# Patient Record
Sex: FEMALE | Race: WHITE | NOT HISPANIC OR LATINO | Employment: OTHER | URBAN - METROPOLITAN AREA
[De-identification: names, ages, dates, MRNs, and addresses within clinical notes are randomized per-mention and may not be internally consistent; named-entity substitution may affect disease eponyms.]

---

## 2017-01-03 RX ORDER — TRAZODONE HYDROCHLORIDE 50 MG/1
50 TABLET ORAL NIGHTLY
Qty: 30 TABLET | Refills: 1 | Status: SHIPPED | OUTPATIENT
Start: 2017-01-03 | End: 2017-02-02 | Stop reason: ALTCHOICE

## 2017-01-04 DIAGNOSIS — N18.30 CHRONIC RENAL INSUFFICIENCY, STAGE 3 (MODERATE) (HCC): ICD-10-CM

## 2017-01-04 DIAGNOSIS — E78.49 OTHER HYPERLIPIDEMIA: ICD-10-CM

## 2017-01-04 DIAGNOSIS — M10.00 ACUTE IDIOPATHIC GOUT, UNSPECIFIED SITE: ICD-10-CM

## 2017-01-04 DIAGNOSIS — E11.22 TYPE 2 DIABETES MELLITUS WITH CHRONIC KIDNEY DISEASE, WITH LONG-TERM CURRENT USE OF INSULIN, UNSPECIFIED CKD STAGE (HCC): ICD-10-CM

## 2017-01-04 DIAGNOSIS — I10 BENIGN ESSENTIAL HYPERTENSION: Primary | ICD-10-CM

## 2017-01-04 DIAGNOSIS — Z79.4 TYPE 2 DIABETES MELLITUS WITH CHRONIC KIDNEY DISEASE, WITH LONG-TERM CURRENT USE OF INSULIN, UNSPECIFIED CKD STAGE (HCC): ICD-10-CM

## 2017-01-04 DIAGNOSIS — I48.20 CHRONIC ATRIAL FIBRILLATION (HCC): ICD-10-CM

## 2017-01-17 ENCOUNTER — OFFICE VISIT (OUTPATIENT)
Dept: INTERNAL MEDICINE | Facility: CLINIC | Age: 82
End: 2017-01-17

## 2017-01-17 VITALS
HEART RATE: 58 BPM | BODY MASS INDEX: 30.11 KG/M2 | OXYGEN SATURATION: 98 % | WEIGHT: 170 LBS | SYSTOLIC BLOOD PRESSURE: 130 MMHG | DIASTOLIC BLOOD PRESSURE: 60 MMHG

## 2017-01-17 DIAGNOSIS — I10 BENIGN ESSENTIAL HYPERTENSION: ICD-10-CM

## 2017-01-17 DIAGNOSIS — I34.0 NON-RHEUMATIC MITRAL REGURGITATION: ICD-10-CM

## 2017-01-17 DIAGNOSIS — J01.00 ACUTE NON-RECURRENT MAXILLARY SINUSITIS: Primary | ICD-10-CM

## 2017-01-17 DIAGNOSIS — IMO0001 IDDM (INSULIN DEPENDENT DIABETES MELLITUS): ICD-10-CM

## 2017-01-17 DIAGNOSIS — N18.30 CHRONIC RENAL INSUFFICIENCY, STAGE 3 (MODERATE) (HCC): ICD-10-CM

## 2017-01-17 PROCEDURE — 99214 OFFICE O/P EST MOD 30 MIN: CPT | Performed by: INTERNAL MEDICINE

## 2017-01-17 RX ORDER — AZITHROMYCIN 250 MG/1
TABLET, FILM COATED ORAL
Qty: 6 TABLET | Refills: 0 | Status: SHIPPED | OUTPATIENT
Start: 2017-01-17 | End: 2017-02-02

## 2017-01-17 RX ORDER — ECHINACEA PURPUREA EXTRACT 125 MG
1 TABLET ORAL AS NEEDED
Qty: 1 EACH | Refills: 12 | Status: SHIPPED | OUTPATIENT
Start: 2017-01-17 | End: 2017-02-02 | Stop reason: ALTCHOICE

## 2017-01-17 RX ORDER — FLUTICASONE PROPIONATE 50 MCG
2 SPRAY, SUSPENSION (ML) NASAL DAILY
Qty: 1 EACH | Refills: 6 | Status: SHIPPED | OUTPATIENT
Start: 2017-01-17 | End: 2017-02-16

## 2017-01-17 NOTE — MR AVS SNAPSHOT
Veronica Henao   1/17/2017 9:00 AM   Office Visit    Dept Phone:  [phone]   Encounter #:  [id number]    Provider:  Nelly Price MD   Department:  [facility] INTERNAL MEDICINE                Your Full Care Plan              Today's Medication Changes          These changes are accurate as of: 1/17/17  4:08 PM.  If you have any questions, ask your nurse or doctor.               New Medication(s)Ordered:     azithromycin 250 MG tablet   Commonly known as:  ZITHROMAX   Take 2 tablets the first day, then 1 tablet daily for 4 days.       fluticasone 50 MCG/ACT nasal spray   Commonly known as:  FLONASE   2 sprays into each nostril Daily for 30 days. Administer 2 sprays in each nostril for each dose.       sodium chloride 0.65 % nasal spray   Commonly known as:  OCEAN NASAL SPRAY   1 spray into each nostril As Needed for congestion.         Stop taking medication(s)listed here:     acetaminophen 325 MG tablet   Commonly known as:  TYLENOL           HYDROcodone-acetaminophen 5-325 MG per tablet   Commonly known as:  NORCO                Where to Get Your Medications      These medications were sent to [id number] - [place], [place] - [address] [place] [address] [place] [address] - [phone] [place] [address]     Phone:  [phone]     azithromycin 250 MG tablet    fluticasone 50 MCG/ACT nasal spray    sodium chloride 0.65 % nasal spray                  Your Updated Medication List          This list is accurate as of: 1/17/17  4:08 PM.  Always use your most recent med list.                allopurinol 100 MG tablet   Commonly known as:  ZYLOPRIM       aspirin 81 MG EC tablet   Take 1 tablet by mouth Daily.       azithromycin 250 MG tablet   Commonly known as:  ZITHROMAX   Take 2 tablets the first day, then 1 tablet daily for 4 days.       CENTRUM SILVER PO       cetirizine 10 MG tablet   Commonly known as:  zyrTEC       cholecalciferol 1000  UNITS tablet   Commonly known as:  VITAMIN D3       fenofibrate 48 MG tablet   Commonly known as:  TRICOR       fish oil 1000 MG capsule capsule       fluticasone 50 MCG/ACT nasal spray   Commonly known as:  FLONASE   2 sprays into each nostril Daily for 30 days. Administer 2 sprays in each nostril for each dose.       furosemide 40 MG tablet   Commonly known as:  LASIX   Take 1 tablet by mouth 2 (Two) Times a Day.       hydrALAZINE 10 MG tablet   Commonly known as:  APRESOLINE   Take 1 tablet by mouth Every 8 (Eight) Hours.       insulin detemir 100 UNIT/ML injection   Commonly known as:  LEVEMIR       metoprolol tartrate 25 MG tablet   Commonly known as:  LOPRESSOR   Take 1 tablet by mouth 3 (Three) Times a Day.       montelukast 10 MG tablet   Commonly known as:  SINGULAIR       potassium chloride 10 MEQ CR capsule   Commonly known as:  MICRO-K   Take 1 capsule by mouth 2 (Two) Times a Day With Meals.       raNITIdine 150 MG tablet   Commonly known as:  ZANTAC       sertraline 50 MG tablet   Commonly known as:  ZOLOFT       sodium chloride 0.65 % nasal spray   Commonly known as:  OCEAN NASAL SPRAY   1 spray into each nostril As Needed for congestion.       sucralfate 1 G tablet   Commonly known as:  CARAFATE       traZODone 50 MG tablet   Commonly known as:  DESYREL   Take 1 tablet by mouth Every Night.       warfarin 5 MG tablet   Commonly known as:  COUMADIN   Take daily or as directed               You Were Diagnosed With        Codes Comments    Acute non-recurrent maxillary sinusitis    -  Primary ICD-10-CM: J01.00  ICD-9-CM: 461.0     Benign essential hypertension     ICD-10-CM: I10  ICD-9-CM: 401.1     Chronic renal insufficiency, stage 3 (moderate)     ICD-10-CM: N18.3  ICD-9-CM: 585.3     Non-rheumatic mitral regurgitation     ICD-10-CM: I34.0  ICD-9-CM: 424.0     IDDM (insulin dependent diabetes mellitus)     ICD-10-CM: E11.9, Z79.4  ICD-9-CM: 250.00, V58.67       Instructions     None    Patient  Instructions History      Upcoming Appointments     Visit Type Date Time Department    OFFICE VISIT 2017  9:00 AM [facility]    FOLLOW UP 2017 12:00 PM [facility] [place]    OFFICE VISIT 2017  8:45 AM [facility]    POST-OP 2017  1:00 PM [facility]      [employer] Signup     [facility] Branch2 allows you to send messages to your doctor, view your test results, renew your prescriptions, schedule appointments, and more. To sign up, go to [employer] and click on the Sign Up Now link in the New User? box. Enter your Branch2 Activation Code exactly as it appears below along with the last four digits of your Social Security Number and your Date of Birth () to complete the sign-up process. If you do not sign up before the expiration date, you must request a new code.    Branch2 Activation Code: [email or web address]  Expires: 2017  4:08 PM    If you have questions, you can email [email or web address] or call [phone] to talk to our Branch2 staff. Remember, Branch2 is NOT to be used for urgent needs. For medical emergencies, dial 911.               Other Info from Your Visit           Your Appointments     2017 12:00 PM EST   Follow Up with Maria Luz Husain MD   [facility] CARDIOLOGY (--)    [address]  [facility] [address] [phone]           Arrive 15 minutes prior to appointment.            2017  8:45 AM EST   Office Visit with Nelly Price MD   [facility] INTERNAL MEDICINE (--)    [address]  [facility] [address] [phone]           Arrive 15 minutes prior to appointment.            2017  1:00 PM EST   Post-Op with DEBORAH Smith   [facility] CARDIAC SURGERY (--)    [address]  [facility] [address] [phone]              Allergies     Cephalexin  Swelling    Meperidine  Swelling    Penicillins   Swelling      Reason for Visit     Nasal Congestion     Fatigue           Vital Signs     Blood Pressure Pulse Weight Oxygen Saturation Body Mass Index Smoking Status    130/60 58 170 lb (77.1 kg) 98% 30.11 kg/m2 Former Smoker      Problems and Diagnoses Noted     Benign essential hypertension    Chronic renal insufficiency    IDDM (insulin dependent diabetes mellitus)    Leaky mitral heart valve    Acute non-recurrent maxillary sinusitis    -  Primary

## 2017-01-17 NOTE — PROGRESS NOTES
Sinus pain, headache, recent cardiac surgery, renal insufficiency    History of Present Illness   Veronica Henao is a 81 y.o. female presents for acute care. Increasing sinus pressure and pain of the left sinus. She reports that this started about 1 week ago and is getting worse not better. Has not tried anything for this. Stopped oral dm medications. She is on 18 u levemir daily only and fbs is running around 105-120 at this time.    The following portions of the patient's history were reviewed and updated as appropriate: allergies, current medications, past family history, past medical history, past social history, past surgical history and problem list.  Current Outpatient Prescriptions on File Prior to Visit   Medication Sig Dispense Refill   • aspirin 81 MG EC tablet Take 1 tablet by mouth Daily.     • cetirizine (zyrTEC) 10 MG tablet Take 10 mg by mouth Every Night.     • cholecalciferol (VITAMIN D3) 1000 UNITS tablet Take 1,000 Units by mouth daily.     • fenofibrate (TRICOR) 48 MG tablet Take 48 mg by mouth Daily.     • furosemide (LASIX) 40 MG tablet Take 1 tablet by mouth 2 (Two) Times a Day. 60 tablet 2   • hydrALAZINE (APRESOLINE) 10 MG tablet Take 1 tablet by mouth Every 8 (Eight) Hours. 90 tablet 2   • insulin detemir (LEVEMIR) 100 UNIT/ML injection Inject 18 Units under the skin Every Night.     • metoprolol tartrate (LOPRESSOR) 25 MG tablet Take 1 tablet by mouth 3 (Three) Times a Day. 90 tablet 2   • montelukast (SINGULAIR) 10 MG tablet Take 10 mg by mouth Every Night.     • Multiple Vitamins-Minerals (CENTRUM SILVER PO) Take 1 tablet by mouth Daily.     • potassium chloride (MICRO-K) 10 MEQ CR capsule Take 1 capsule by mouth 2 (Two) Times a Day With Meals. 60 capsule 2   • raNITIdine (ZANTAC) 150 MG tablet Take 150 mg by mouth 2 (Two) Times a Day.     • sertraline (ZOLOFT) 50 MG tablet Take 50 mg by mouth Daily.     • sucralfate (CARAFATE) 1 G tablet Take 1 g by mouth Daily.     • traZODone (DESYREL)  50 MG tablet Take 1 tablet by mouth Every Night. 30 tablet 1   • warfarin (COUMADIN) 5 MG tablet Take daily or as directed 30 tablet 2   • [DISCONTINUED] acetaminophen (TYLENOL) 325 MG tablet Take 2 tablets by mouth Every 4 (Four) Hours As Needed for mild pain (1-3).  0   • allopurinol (ZYLOPRIM) 100 MG tablet Take 100 mg by mouth Daily.     • Omega-3 Fatty Acids (FISH OIL) 1000 MG capsule capsule Take 1,200 mg by mouth 3 (Three) Times a Day With Meals. NONE IN PAST WEEK     • [DISCONTINUED] HYDROcodone-acetaminophen (NORCO) 5-325 MG per tablet Take 1 tablet by mouth Every 6 (Six) Hours As Needed.       No current facility-administered medications on file prior to visit.      Review of Systems   Constitutional: Negative.    HENT: Positive for sinus pressure and sore throat.    Eyes: Negative.    Respiratory: Negative.    Gastrointestinal: Negative.    Endocrine: Negative.    Genitourinary: Negative.    Musculoskeletal: Positive for arthralgias.   Skin: Negative.    Allergic/Immunologic: Negative.    Neurological: Negative.    Hematological: Negative.    Psychiatric/Behavioral: Negative.        Objective   Physical Exam   Constitutional: She is oriented to person, place, and time. She appears well-developed and well-nourished.   HENT:   Head: Normocephalic and atraumatic.   Maxillary sinus tenderness  Mild pharyngeal erythema   Eyes: Conjunctivae and EOM are normal. Pupils are equal, round, and reactive to light.   Neck: Normal range of motion. Neck supple.   Cardiovascular: Normal rate and regular rhythm.    Pulmonary/Chest: Effort normal and breath sounds normal.   Abdominal: Soft. Bowel sounds are normal.   Musculoskeletal:   artrhitic changes   Neurological: She is alert and oriented to person, place, and time. She has normal reflexes.   Skin: Skin is warm and dry.   Psychiatric: She has a normal mood and affect. Her behavior is normal. Judgment and thought content normal.   Nursing note and vitals reviewed.        Visit Vitals   • /60   • Pulse 58   • Wt 170 lb (77.1 kg)   • SpO2 98%   • BMI 30.11 kg/m2       Assessment/Plan   Diagnoses and all orders for this visit:    Acute non-recurrent maxillary sinusitis    Benign essential hypertension    Chronic renal insufficiency, stage 3 (moderate)    Non-rheumatic mitral regurgitation    IDDM (insulin dependent diabetes mellitus)    Other orders  -     azithromycin (ZITHROMAX) 250 MG tablet; Take 2 tablets the first day, then 1 tablet daily for 4 days.  -     fluticasone (FLONASE) 50 MCG/ACT nasal spray; 2 sprays into each nostril Daily for 30 days. Administer 2 sprays in each nostril for each dose.  -     sodium chloride (OCEAN NASAL SPRAY) 0.65 % nasal spray; 1 spray into each nostril As Needed for congestion.      Patient with acute maxillary sinusitis. She will start zpack for this. She has an inr test tomorrow and will repeat closely due to antibiotic. She will start nasal saline rinse and flonase. She is s/p surgical repair of her valve and she is doing well post repair of tricuspid and mitral valve and doing well. Will test renal function today and will monitor bp closely. She has dm and will test a1c. Continue levemir. She will test cost of vial v pen and apply for low-income subsidy program. Routine f/u as scheduled.

## 2017-01-18 ENCOUNTER — HOSPITAL ENCOUNTER (OUTPATIENT)
Dept: CARDIOLOGY | Facility: HOSPITAL | Age: 82
Setting detail: RECURRING SERIES
Discharge: HOME | End: 2017-01-18

## 2017-01-18 ENCOUNTER — OFFICE VISIT (OUTPATIENT)
Dept: CARDIOLOGY | Facility: CLINIC | Age: 82
End: 2017-01-18

## 2017-01-18 VITALS
BODY MASS INDEX: 30.12 KG/M2 | DIASTOLIC BLOOD PRESSURE: 70 MMHG | HEART RATE: 60 BPM | SYSTOLIC BLOOD PRESSURE: 130 MMHG | WEIGHT: 170 LBS | HEIGHT: 63 IN

## 2017-01-18 DIAGNOSIS — I48.20 CHRONIC ATRIAL FIBRILLATION (HCC): ICD-10-CM

## 2017-01-18 DIAGNOSIS — I34.0 NON-RHEUMATIC MITRAL REGURGITATION: Primary | ICD-10-CM

## 2017-01-18 DIAGNOSIS — I36.1 NON-RHEUMATIC TRICUSPID VALVE INSUFFICIENCY: ICD-10-CM

## 2017-01-18 DIAGNOSIS — E78.49 OTHER HYPERLIPIDEMIA: ICD-10-CM

## 2017-01-18 LAB
ALBUMIN SERPL-MCNC: 3.9 G/DL (ref 3.5–5.2)
ALBUMIN/CREAT UR: 2102.1 MG/G CREAT (ref 0–30)
ALBUMIN/GLOB SERPL: 1.2 G/DL
ALP SERPL-CCNC: 105 U/L (ref 39–117)
ALT SERPL-CCNC: 8 U/L (ref 1–33)
AST SERPL-CCNC: 17 U/L (ref 1–32)
BASOPHILS # BLD AUTO: 0.06 10*3/MM3 (ref 0–0.2)
BASOPHILS NFR BLD AUTO: 0.7 % (ref 0–1.5)
BILIRUB SERPL-MCNC: 0.2 MG/DL (ref 0.1–1.2)
BUN SERPL-MCNC: 42 MG/DL (ref 8–23)
BUN/CREAT SERPL: 18.3 (ref 7–25)
CALCIUM SERPL-MCNC: 9.7 MG/DL (ref 8.6–10.5)
CHLORIDE SERPL-SCNC: 94 MMOL/L (ref 98–107)
CHOLEST SERPL-MCNC: 175 MG/DL (ref 0–200)
CO2 SERPL-SCNC: 27.4 MMOL/L (ref 22–29)
CREAT SERPL-MCNC: 2.29 MG/DL (ref 0.57–1)
CREAT UR-MCNC: 39 MG/DL
EOSINOPHIL # BLD AUTO: 0.59 10*3/MM3 (ref 0–0.7)
EOSINOPHIL NFR BLD AUTO: 6.5 % (ref 0.3–6.2)
ERYTHROCYTE [DISTWIDTH] IN BLOOD BY AUTOMATED COUNT: 15.1 % (ref 11.7–13)
GLOBULIN SER CALC-MCNC: 3.2 GM/DL
GLUCOSE SERPL-MCNC: 119 MG/DL (ref 65–99)
HBA1C MFR BLD: 6.23 % (ref 4.8–5.6)
HCT VFR BLD AUTO: 34 % (ref 35.6–45.5)
HDLC SERPL-MCNC: 42 MG/DL (ref 40–60)
HGB BLD-MCNC: 10.4 G/DL (ref 11.9–15.5)
IMM GRANULOCYTES # BLD: 0.02 10*3/MM3 (ref 0–0.03)
IMM GRANULOCYTES NFR BLD: 0.2 % (ref 0–0.5)
LDLC SERPL CALC-MCNC: 96 MG/DL (ref 0–100)
LDLC/HDLC SERPL: 2.28 {RATIO}
LYMPHOCYTES # BLD AUTO: 2.55 10*3/MM3 (ref 0.9–4.8)
LYMPHOCYTES NFR BLD AUTO: 28.3 % (ref 19.6–45.3)
MCH RBC QN AUTO: 29.3 PG (ref 26.9–32)
MCHC RBC AUTO-ENTMCNC: 30.6 G/DL (ref 32.4–36.3)
MCV RBC AUTO: 95.8 FL (ref 80.5–98.2)
MICROALBUMIN UR-MCNC: 819.8 UG/ML
MONOCYTES # BLD AUTO: 0.65 10*3/MM3 (ref 0.2–1.2)
MONOCYTES NFR BLD AUTO: 7.2 % (ref 5–12)
NEUTROPHILS # BLD AUTO: 5.15 10*3/MM3 (ref 1.9–8.1)
NEUTROPHILS NFR BLD AUTO: 57.1 % (ref 42.7–76)
PLATELET # BLD AUTO: 373 10*3/MM3 (ref 140–500)
POTASSIUM SERPL-SCNC: 5.1 MMOL/L (ref 3.5–5.2)
PROT SERPL-MCNC: 7.1 G/DL (ref 6–8.5)
RBC # BLD AUTO: 3.55 10*6/MM3 (ref 3.9–5.2)
SODIUM SERPL-SCNC: 137 MMOL/L (ref 136–145)
TRIGL SERPL-MCNC: 187 MG/DL (ref 0–150)
TSH SERPL DL<=0.005 MIU/L-ACNC: 1.4 MIU/ML (ref 0.27–4.2)
URATE SERPL-MCNC: 9.7 MG/DL (ref 2.4–5.7)
VLDLC SERPL CALC-MCNC: 37.4 MG/DL (ref 5–40)
WBC # BLD AUTO: 9.02 10*3/MM3 (ref 4.5–10.7)

## 2017-01-18 PROCEDURE — 36416 COLLJ CAPILLARY BLOOD SPEC: CPT | Performed by: INTERNAL MEDICINE

## 2017-01-18 PROCEDURE — 85610 PROTHROMBIN TIME: CPT | Performed by: INTERNAL MEDICINE

## 2017-01-18 PROCEDURE — 93000 ELECTROCARDIOGRAM COMPLETE: CPT | Performed by: INTERNAL MEDICINE

## 2017-01-18 PROCEDURE — 99214 OFFICE O/P EST MOD 30 MIN: CPT | Performed by: INTERNAL MEDICINE

## 2017-01-18 RX ORDER — HYDRALAZINE HYDROCHLORIDE 10 MG/1
10 TABLET, FILM COATED ORAL 2 TIMES DAILY
Qty: 180 TABLET | Refills: 3 | Status: SHIPPED | OUTPATIENT
Start: 2017-01-18 | End: 2017-03-20

## 2017-01-18 RX ORDER — WARFARIN SODIUM 2.5 MG/1
5 TABLET ORAL
Qty: 180 TABLET | Refills: 0 | Status: SHIPPED | OUTPATIENT
Start: 2017-01-18 | End: 2017-02-21

## 2017-01-18 RX ORDER — POTASSIUM CHLORIDE 750 MG/1
10 CAPSULE, EXTENDED RELEASE ORAL 2 TIMES DAILY
Qty: 180 CAPSULE | Refills: 3 | Status: ON HOLD | OUTPATIENT
Start: 2017-01-18 | End: 2017-03-28

## 2017-01-18 NOTE — PROGRESS NOTES
PATIENTINFORMATION    Date of Office Visit: 2017  Encounter Provider: Maria Luz Husain MD  Place of Service: [facility] CARDIOLOGY  Patient Name: Veronica Henao  : 1935    Subjective:     Encounter Date:2017      Patient ID: Veronica Henao is a 81 y.o. female.      History of Present Illness    She was having palpitations with a sensation of a racing heart in  and went to the emergency room and was diagnosed with atrial fibrillation with rapid ventricular response. She was treated by [facility]. She had an echocardiogram, which no left ventricular hypertrophy, normal left ventricular function with an ejection fraction of 55%-60%, and an elevated left atrial pressure. There was mild to moderate left atrial enlargement. There was mild mitral regurgitation. There was mild tricuspid regurgitation with a right ventricular systolic pressure of 37 mmHg. She was put on amiodarone and anticoagulation and was cardioverted approximately a month later. To her knowledge, she has stayed in sinus rhythm since this time period. She began complaining of shortness of breath in the fall of  and her amiodarone was discontinued. She has been maintained on Eliquis at this time. She was dissatisfied with her care at [facility] and hash transferred her care to me in 2014.       I saw her in 2015 and at that time she had had some nausea and vomiting with metformin but was still in sinus rhythm. She reports stopping all of her medications because of the dehydration and then the medicines were slowly resumed.       In 2015 she came in with complaints of feeling her heart race. She had no energy. She was short of breath and had to stop and rest when she exerted herself. She had increased lower extremity edema. She went to see her pulmonologist and he found her to be tachycardic. She came to see me and was in atrial fibrillation with rapid  ventricular  response. I increased her diltiazem and subsequently started her on digoxin.      I saw her in August 2015. At that time she was in rate controlled atrial fibrillation and asymptomatic. We opted to not perform another cardioversion. She was having left lower extremity swelling and I checked a lower extremity Doppler which was negative for DVT.      When I saw her in August 2016, she was complaining of chest pain and shortness of breath. Her transthoracic echocardiogram from August 15, 2016 showed:  · All left ventricular all segments contract normally.  · Left ventricular function is normal. Calculated EF = 50.9%. Estimated EF was in agreement with the calculated EF. Estimated EF = 51%. Normal left ventricular cavity size and wall thickness noted. All left ventricular wall segments contract normally. Left ventricular diastolic function was unable to be assessed. Elevated left atrial pressure.  · Left atrial volume is severely increased.  · Right atrial cavity size is moderately dilated.  · Moderate-to-severe mitral valve regurgitation is present.  · The tricuspid valve is grossly normal. Moderate to severe tricuspid valve regurgitation is present. Estimated right ventricular systolic pressure from tricuspid regurgitation is mildly elevated (35-45 mmHg). The calculated RV systolic pressure 39 mmHg.      Nuclear stress test from August 15, 2016 showed:  · Myocardial perfusion imaging indicates a normal myocardial perfusion study with no evidence of ischemia.  · Left ventricular ejection fraction is normal (Calculated EF = 69%).  · Findings consistent with a normal ECG stress test.  · Impressions are consistent with a low risk study.      Trans-esophageal echo October 19, 2016:  · All left ventricular wall segments contract normally.  · Left ventricular function is normal. Estimated EF = 60%.  · Left atrial cavity size is severely dilated.  · Moderate-to-severe mitral valve regurgitation is  present  · Moderate to severe tricuspid valve regurgitation is present.  · Estimated right ventricular systolic pressure from tricuspid regurgitation is normal (<35 mmHg).  · There is moderate complex plaque in the descending aorta present.  · I am going to refer her to see Dr. Vital. I would like to see if she is a candidate for surgery. If so, we will arrange a coronary angiogram.    Coronary artery disease November 21, 2016 showed no significant coronary disease.  On December 14, 2016, she had a mitral valve repair with a 31 mm ATS band posterior annuloplasty and P2 chordal repair.  The tricuspid valve had an annuloplasty with a 26 mm ring and plication of the anterior and septal leaflet of commissure.  She had a left and right Maze procedure.  The left atrial appendage ligated.    Postoperatively she had asystole and bradycardia for 2 days.  Eventually her underlying rhythm returned.  She did have some postoperative atrial fibrillation.  Initially she did not receive any beta blockers or amiodarone because of the asystole.  However, she was able to tolerate a beta blocker by the time of discharge.  She was started on warfarin.    She comes in today for her one month follow-up.  She's been feeling well.  Her sternal wound is healing well.  She denies pain.  She is not short of breath.  She has been sick for the last week with a sinus infection    Review of Systems   Constitution: Negative for fever, malaise/fatigue, weight gain and weight loss.   HENT: Negative for ear pain, hearing loss, nosebleeds and sore throat.    Eyes: Negative for double vision, pain, vision loss in left eye and vision loss in right eye.   Cardiovascular:        See history of present illness.   Respiratory: Negative for cough, shortness of breath, sleep disturbances due to breathing, snoring and wheezing.    Endocrine: Negative for cold intolerance, heat intolerance and polyuria.   Skin: Negative for itching, poor wound healing and rash.  "  Musculoskeletal: Negative for joint pain, joint swelling and myalgias.   Gastrointestinal: Negative for abdominal pain, diarrhea, hematochezia, nausea and vomiting.   Genitourinary: Negative for hematuria and hesitancy.   Neurological: Negative for numbness, paresthesias and seizures.   Psychiatric/Behavioral: Negative for depression. The patient is not nervous/anxious.            ECG 12 Lead  Date/Time: 1/18/2017 4:43 PM  Performed by: IRVIN LAN  Authorized by: IRVIN LAN   Comparison: compared with previous ECG from 12/29/2016  Similar to previous ECG  Rhythm: sinus rhythm  BPM: 60  Conduction: conduction normal  ST Segments: ST segments normal  Other findings: PRWP  Clinical impression: normal ECG               Objective:       Visit Vitals   • /70   • Pulse 60   • Ht 63\" (160 cm)   • Wt 170 lb (77.1 kg)   • BMI 30.11 kg/m2    Body mass index is 30.11 kg/(m^2).     Physical Exam   Constitutional: She appears well-developed.   HENT:   Head: Normocephalic and atraumatic.   Eyes: Conjunctivae and lids are normal. Pupils are equal, round, and reactive to light. Lids are everted and swept, no foreign bodies found.   Neck: Normal range of motion. No JVD present. Carotid bruit is not present. No tracheal deviation present. No thyroid mass present.   Cardiovascular: Normal rate and regular rhythm.    Murmur heard.  Pulses:       Dorsalis pedis pulses are 2+ on the right side, and 2+ on the left side.   Pulmonary/Chest: Effort normal and breath sounds normal.   Abdominal: Normal appearance and bowel sounds are normal.   Musculoskeletal: Normal range of motion.   Neurological: She is alert. She has normal strength.   Skin: Skin is warm, dry and intact.   Psychiatric: She has a normal mood and affect. Her behavior is normal.   Vitals reviewed.        Assessment/Plan:       1. Moderate to severe mitral regurgitation and tricuspid regurgitation. She is s/p repair  2. Atrial fibrillation. S/P maze and left " atrial appendage ligation. Prior to surgery she was rate controlled and on Eliquis.  She is currently in sinus rhythm.  She is on warfarin.  She is tolerating it well and I will continue for now.  3. Diabetes  4. Hypertension.  Her blood pressure appears to be well controlled.  I'm going to make the metoprolol 25 twice a day.  5. Hyperlipidemia  6. Obstructive sleep apnea. She uses CPAP.  7. Chronic kidney disease.   8. Postoperative anemia    She is set up for cardiac rehabilitation.  I will see her back in 3 months and I will see how she's doing.     Orders Placed This Encounter   Procedures   • ECG 12 Lead     This order was created via procedure documentation      Veronica Henao   Home Medication Instructions VIDYA:    Printed on:01/18/17 2731   Medication Information                      allopurinol (ZYLOPRIM) 100 MG tablet  Take 100 mg by mouth Daily.             aspirin 81 MG EC tablet  Take 1 tablet by mouth Daily.             azithromycin (ZITHROMAX) 250 MG tablet  Take 2 tablets the first day, then 1 tablet daily for 4 days.             cetirizine (zyrTEC) 10 MG tablet  Take 10 mg by mouth Every Night.             cholecalciferol (VITAMIN D3) 1000 UNITS tablet  Take 1,000 Units by mouth daily.             fluticasone (FLONASE) 50 MCG/ACT nasal spray  2 sprays into each nostril Daily for 30 days. Administer 2 sprays in each nostril for each dose.             furosemide (LASIX) 40 MG tablet  Take 1 tablet by mouth 2 (Two) Times a Day.             hydrALAZINE (APRESOLINE) 10 MG tablet  Take 1 tablet by mouth 2 (Two) Times a Day.             insulin detemir (LEVEMIR) 100 UNIT/ML injection  Inject 18 Units under the skin Every Night.             metoprolol tartrate (LOPRESSOR) 25 MG tablet  Take 1 tablet by mouth 2 (Two) Times a Day.             montelukast (SINGULAIR) 10 MG tablet  Take 10 mg by mouth Every Night.             Multiple Vitamins-Minerals (CENTRUM SILVER PO)  Take 1 tablet by mouth Daily.              Omega-3 Fatty Acids (FISH OIL) 1000 MG capsule capsule  Take 1,200 mg by mouth 3 (Three) Times a Day With Meals. NONE IN PAST WEEK             potassium chloride (MICRO-K) 10 MEQ CR capsule  Take 1 capsule by mouth 2 (Two) Times a Day.             raNITIdine (ZANTAC) 150 MG tablet  Take 150 mg by mouth 2 (Two) Times a Day.             sertraline (ZOLOFT) 50 MG tablet  Take 50 mg by mouth Daily.             sodium chloride (OCEAN NASAL SPRAY) 0.65 % nasal spray  1 spray into each nostril As Needed for congestion.             sucralfate (CARAFATE) 1 G tablet  Take 1 g by mouth Daily.             traZODone (DESYREL) 50 MG tablet  Take 1 tablet by mouth Every Night.             warfarin (COUMADIN) 2.5 MG tablet  Take 2 tablets by mouth Daily. Take daily or as directed                        Maria Luz Husain MD  01/18/17  4:50 PM

## 2017-01-23 DIAGNOSIS — N28.9 RENAL INSUFFICIENCY: Primary | ICD-10-CM

## 2017-01-25 ENCOUNTER — HOSPITAL ENCOUNTER (OUTPATIENT)
Dept: CARDIOLOGY | Facility: HOSPITAL | Age: 82
Setting detail: RECURRING SERIES
Discharge: HOME | End: 2017-01-25

## 2017-01-25 PROCEDURE — 36416 COLLJ CAPILLARY BLOOD SPEC: CPT | Performed by: INTERNAL MEDICINE

## 2017-01-25 PROCEDURE — 85610 PROTHROMBIN TIME: CPT | Performed by: INTERNAL MEDICINE

## 2017-01-26 LAB
BUN SERPL-MCNC: 41 MG/DL (ref 8–23)
BUN/CREAT SERPL: 21 (ref 7–25)
CALCIUM SERPL-MCNC: 10 MG/DL (ref 8.6–10.5)
CHLORIDE SERPL-SCNC: 98 MMOL/L (ref 98–107)
CO2 SERPL-SCNC: 28.5 MMOL/L (ref 22–29)
CREAT SERPL-MCNC: 1.95 MG/DL (ref 0.57–1)
GLUCOSE SERPL-MCNC: 101 MG/DL (ref 65–99)
POTASSIUM SERPL-SCNC: 4.9 MMOL/L (ref 3.5–5.2)
SODIUM SERPL-SCNC: 139 MMOL/L (ref 136–145)

## 2017-01-27 ENCOUNTER — HOSPITAL ENCOUNTER (OUTPATIENT)
Dept: CARDIOLOGY | Facility: HOSPITAL | Age: 82
Setting detail: RECURRING SERIES
Discharge: HOME | End: 2017-01-27

## 2017-01-27 PROCEDURE — 85610 PROTHROMBIN TIME: CPT | Performed by: INTERNAL MEDICINE

## 2017-01-27 PROCEDURE — 36416 COLLJ CAPILLARY BLOOD SPEC: CPT | Performed by: INTERNAL MEDICINE

## 2017-01-30 ENCOUNTER — HOSPITAL ENCOUNTER (OUTPATIENT)
Dept: CARDIOLOGY | Facility: HOSPITAL | Age: 82
Setting detail: RECURRING SERIES
Discharge: HOME | End: 2017-01-30

## 2017-01-30 PROCEDURE — 36416 COLLJ CAPILLARY BLOOD SPEC: CPT | Performed by: INTERNAL MEDICINE

## 2017-01-30 PROCEDURE — 85610 PROTHROMBIN TIME: CPT | Performed by: INTERNAL MEDICINE

## 2017-02-02 ENCOUNTER — OFFICE VISIT (OUTPATIENT)
Dept: CARDIAC SURGERY | Facility: CLINIC | Age: 82
End: 2017-02-02

## 2017-02-02 VITALS
BODY MASS INDEX: 29.41 KG/M2 | DIASTOLIC BLOOD PRESSURE: 68 MMHG | OXYGEN SATURATION: 100 % | TEMPERATURE: 97.4 F | WEIGHT: 166 LBS | RESPIRATION RATE: 20 BRPM | HEIGHT: 63 IN | HEART RATE: 68 BPM | SYSTOLIC BLOOD PRESSURE: 128 MMHG

## 2017-02-02 DIAGNOSIS — Z98.890 S/P MVR (MITRAL VALVE REPAIR): Primary | ICD-10-CM

## 2017-02-02 PROCEDURE — 99024 POSTOP FOLLOW-UP VISIT: CPT | Performed by: NURSE PRACTITIONER

## 2017-02-02 RX ORDER — VITAMIN E 268 MG
400 CAPSULE ORAL DAILY
COMMUNITY
End: 2017-03-20

## 2017-02-02 RX ORDER — INSULIN DETEMIR 100 [IU]/ML
18 INJECTION, SOLUTION SUBCUTANEOUS NIGHTLY
COMMUNITY
Start: 2017-01-25 | End: 2017-11-10

## 2017-02-02 NOTE — PROGRESS NOTES
2/2/2017        Subjective:      Nelly Price MD    Chief Complaint of Post-op Follow-up           Dear Dr. Nelly Price MD and Colleagues,    It was nice to see Vernoica Henao in follow up today. She is status post mitral valve and tricuspid valve repair with left and right MAZE procedure and left atrial ligation by Dr. Vital on December 14, 2016.  She reports that she is doing well. She has started driving in the last week or so and has started going to the grocery. She saw Dr. Husain a few weeks ago and she was setup for cardiac rehab but she called and cancelled her appointment. She said that she just really doesn't want to get out 3 days a week in the cold and she has bad knees so she doesn't think she can walk on a Treadmill. She is currently on Coumadin and says that she has had trouble regulating the dose. Dr. Husain is working on this but she was on Eliquis prior to surgery and she would prefer to switch back to Eliquis. She reports that her incisions have healed well.     Patient Active Problem List   Diagnosis   • Atopic rhinitis   • Atrial fibrillation   • Chronic renal insufficiency   • Congestive heart failure   • Gout   • Cephalalgia   • Hypercalcemia   • Hyperlipidemia   • Subclinical hypothyroidism   • Type 2 diabetes mellitus   • Urinary tract infection   • Lung mass   • Gastroesophageal reflux disease   • Tricuspid insufficiency   • Benign essential hypertension   • Morbid obesity   • Obstructive sleep apnea syndrome   • Chronic venous insufficiency   • Mitral valve insufficiency   • S/P Maze operation for atrial fibrillation   • S/P TVR (tricuspid valve repair)   • S/P MVR (mitral valve repair)   • IDDM (insulin dependent diabetes mellitus)       Past Medical History   Diagnosis Date   • Acute bronchitis    • Acute renal insufficiency    • Allergic rhinitis    • Arrhythmia    • Atrial fibrillation    • Breast pain, right    • CHF (congestive heart failure)    • Chronic renal insufficiency    •  Cough    • Depression    • Diabetes mellitus      TYPE 2   • Diverticulosis    • Dyspnea    • Esophageal reflux    • Fatigue    • Gout    • Head injury    • Headache    • Hoarseness    • Hypercalcemia    • Hyperlipidemia    • Hypertension    • Influenza A (H1N1)    • Itching    • Macular degeneration    • Nontoxic multinodular goiter      RIGHT 2.0 CM  LEFT  2.5 CM   • JOSELUIS on CPAP    • Osteoarthritis    • Proteinuria    • Pulmonary nodule    • Sebaceous cyst    • SOBOE (shortness of breath on exertion)    • Solitary thyroid nodule    • Subclinical hypothyroidism    • Tachycardia    • Type 2 diabetes mellitus    • UTI (urinary tract infection)        Past Surgical History   Procedure Laterality Date   • Appendectomy     • Hysterectomy     • Total knee arthroplasty       bilateral   • Cardiac catheterization       procedure outcome:    • Cardiac catheterization N/A 11/21/2016     Procedure: Coronary angiography;  Surgeon: Marcin العراقي MD;  Location:  [facility];  Service:    • Cardiac catheterization N/A 11/21/2016     Procedure: Left Heart Cath;  Surgeon: Marcin العراقي MD;  [facility];  Service:    • Gastric restriction surgery     • Lumbar decompression       L4-5   • Clavicle surgery Left    • Shoulder surgery Left      AFTER SURGERY FOR FRACTURED CLAVICLE   • Mitral valve repair/replacement N/A 12/14/2016     Procedure: INTRAOPERATIVE KATELYN, MIDLINE STERNOTOMY, MITRAL VALVE REPAIR, TRICUSPID VALVE REPAIR, MAZE PROCEDURE;  Surgeon: Young Vital MD;  Location: [facility] MAIN OR;  Service:        Allergies   Allergen Reactions   • Cephalexin Swelling   • Meperidine Swelling   • Penicillins Swelling           Current Outpatient Prescriptions:   •  aspirin 81 MG EC tablet, Take 1 tablet by mouth Daily., Disp: , Rfl:   •  cetirizine (zyrTEC) 10 MG tablet, Take 10 mg by mouth Every Night., Disp: , Rfl:   •  cholecalciferol (VITAMIN D3) 1000 UNITS tablet, Take  1,000 Units by mouth daily., Disp: , Rfl:   •  fluticasone (FLONASE) 50 MCG/ACT nasal spray, 2 sprays into each nostril Daily for 30 days. Administer 2 sprays in each nostril for each dose., Disp: 1 each, Rfl: 6  •  furosemide (LASIX) 40 MG tablet, Take 1 tablet by mouth 2 (Two) Times a Day., Disp: 60 tablet, Rfl: 2  •  hydrALAZINE (APRESOLINE) 10 MG tablet, Take 1 tablet by mouth 2 (Two) Times a Day., Disp: 180 tablet, Rfl: 3  •  insulin detemir (LEVEMIR) 100 UNIT/ML injection, Inject 18 Units under the skin Every Night., Disp: 400 Units, Rfl: 3  •  LEVEMIR FLEXTOUCH 100 UNIT/ML injection, , Disp: , Rfl:   •  metoprolol tartrate (LOPRESSOR) 25 MG tablet, Take 1 tablet by mouth 2 (Two) Times a Day., Disp: 180 tablet, Rfl: 3  •  montelukast (SINGULAIR) 10 MG tablet, Take 10 mg by mouth Every Night., Disp: , Rfl:   •  Multiple Vitamins-Minerals (CENTRUM SILVER PO), Take 1 tablet by mouth Daily., Disp: , Rfl:   •  Omega-3 Fatty Acids (FISH OIL) 1000 MG capsule capsule, Take 1,200 mg by mouth 3 (Three) Times a Day With Meals. NONE IN PAST WEEK, Disp: , Rfl:   •  potassium chloride (MICRO-K) 10 MEQ CR capsule, Take 1 capsule by mouth 2 (Two) Times a Day., Disp: 180 capsule, Rfl: 3  •  raNITIdine (ZANTAC) 150 MG tablet, Take 150 mg by mouth 2 (Two) Times a Day., Disp: , Rfl:   •  sertraline (ZOLOFT) 50 MG tablet, Take 50 mg by mouth Daily., Disp: , Rfl:   •  vitamin E 400 UNIT capsule, Take 400 Units by mouth Daily., Disp: , Rfl:   •  warfarin (COUMADIN) 2.5 MG tablet, Take 2 tablets by mouth Daily. Take daily or as directed, Disp: 180 tablet, Rfl: 0    Social History     Social History   • Marital status:      Spouse name: N/A   • Number of children: N/A   • Years of education: N/A     Occupational History   • Not on file.     Social History Main Topics   • Smoking status: Former Smoker     Packs/day: 1.50     Years: 20.00   • Smokeless tobacco: Not on file      Comment: D/C 1970 SMOKING 1 1/2 PPD FOR 13 YRS  BEFORE QUITTING   • Alcohol use No      Comment: caffeine use   • Drug use: No   • Sexual activity: Defer     Other Topics Concern   • Not on file     Social History Narrative       Family History   Problem Relation Age of Onset   • Emphysema Mother    • Heart disease Father    • Lung cancer Father    • Prostate cancer Father    • Asthma Sister    • Lung cancer Daughter    • Colon cancer Other    • No Known Problems Maternal Grandmother    • No Known Problems Maternal Grandfather    • Arthritis Paternal Grandmother    • No Known Problems Paternal Grandfather            Vital Signs:  Weight: 166 lb (75.3 kg)   Body mass index is 29.41 kg/(m^2).  Temp: 97.4 °F (36.3 °C)   Heart Rate: 68   BP: 128/68     Physical Exam   Constitutional: She is oriented to person, place, and time. She appears well-developed and well-nourished.   Neck: No JVD present.   Cardiovascular: Normal rate.    Rhythm irregularly, irregular   Pulmonary/Chest: Effort normal and breath sounds normal. No respiratory distress.   Musculoskeletal: Normal range of motion. She exhibits edema (trace pedal edema).   Neurological: She is alert and oriented to person, place, and time.   Skin: Skin is warm and dry.   Incision healed   Psychiatric: She has a normal mood and affect. Her behavior is normal.   Vitals reviewed.       Recommendation/Plan:     Veronica Henao was seen for post operative follow up. She is doing well from a surgical standpoint. Her incisions are healing well. I have released her to resume normal daily activities without restrictions. I did talk to her about the benefits of cardiac rehab and encouraged her to give it a try. She tells me she will think about it. We will plan on seeing her back as needed. I have instructed her to call with any questions or concerns.           Thank you for allowing me to participate in her care.    Sincerely,    DEBORAH Werner

## 2017-02-03 ENCOUNTER — HOSPITAL ENCOUNTER (OUTPATIENT)
Dept: CARDIOLOGY | Facility: HOSPITAL | Age: 82
Setting detail: RECURRING SERIES
Discharge: HOME | End: 2017-02-03

## 2017-02-03 PROCEDURE — 85610 PROTHROMBIN TIME: CPT

## 2017-02-03 PROCEDURE — 36416 COLLJ CAPILLARY BLOOD SPEC: CPT

## 2017-02-13 ENCOUNTER — HOSPITAL ENCOUNTER (OUTPATIENT)
Dept: CARDIOLOGY | Facility: HOSPITAL | Age: 82
Setting detail: RECURRING SERIES
Discharge: HOME | End: 2017-02-13

## 2017-02-13 PROCEDURE — 85610 PROTHROMBIN TIME: CPT

## 2017-02-13 PROCEDURE — 36416 COLLJ CAPILLARY BLOOD SPEC: CPT

## 2017-02-14 ENCOUNTER — TELEPHONE (OUTPATIENT)
Dept: CARDIOLOGY | Facility: CLINIC | Age: 82
End: 2017-02-14

## 2017-02-14 DIAGNOSIS — Z98.890 S/P MITRAL VALVE REPAIR: ICD-10-CM

## 2017-02-14 DIAGNOSIS — I48.19 PERSISTENT ATRIAL FIBRILLATION (HCC): ICD-10-CM

## 2017-02-14 DIAGNOSIS — Z98.890 S/P TVR (TRICUSPID VALVE REPAIR): ICD-10-CM

## 2017-02-14 DIAGNOSIS — R00.2 PALPITATIONS: Primary | ICD-10-CM

## 2017-02-14 DIAGNOSIS — R06.09 DOE (DYSPNEA ON EXERTION): ICD-10-CM

## 2017-02-14 NOTE — TELEPHONE ENCOUNTER
I called and spoke with her.  She is having a lot of palpitations and shortness of breath.  I am going to check an echo and Holter monitor.

## 2017-02-14 NOTE — TELEPHONE ENCOUNTER
Pt calls to report that she has had continued fatigue for the last several weeks with no improvement, she reports that it is not any worse, just still present times 3 weeks.  Pt reports that she has bounding/ rapid heart upon minimal movement that is alleviated with rest.   Pt reports shortness of breath times 3 weeks with no improvement or change, it is still constant and she is concerned that her medication may need to be changed.  Pt reports some lightheadedness upon first moving during the day, no syncope or pre syncope just feels unsteady on her feet.  Pt report some mild edema yesterday in left leg that has gone down this morning  Pt denies chest pain  Pt is not able to check BP/ HR, I have placed a hold on tomorrow's ekg/ bp at 1030am. Please advise if you would like to have her come in, she reported that she could not come in today she has no transportation.

## 2017-02-21 ENCOUNTER — TELEPHONE (OUTPATIENT)
Dept: CARDIOLOGY | Facility: CLINIC | Age: 82
End: 2017-02-21

## 2017-02-21 ENCOUNTER — HOSPITAL ENCOUNTER (OUTPATIENT)
Dept: CARDIOLOGY | Facility: HOSPITAL | Age: 82
Discharge: HOME | End: 2017-02-21
Attending: INTERNAL MEDICINE | Admitting: INTERNAL MEDICINE

## 2017-02-21 VITALS
HEIGHT: 63 IN | HEART RATE: 62 BPM | DIASTOLIC BLOOD PRESSURE: 60 MMHG | BODY MASS INDEX: 29.23 KG/M2 | WEIGHT: 165 LBS | SYSTOLIC BLOOD PRESSURE: 138 MMHG

## 2017-02-21 DIAGNOSIS — R06.09 DOE (DYSPNEA ON EXERTION): ICD-10-CM

## 2017-02-21 DIAGNOSIS — Z98.890 S/P MITRAL VALVE REPAIR: ICD-10-CM

## 2017-02-21 DIAGNOSIS — Z98.890 S/P TVR (TRICUSPID VALVE REPAIR): ICD-10-CM

## 2017-02-21 LAB
BH CV ECHO MEAS - ACS: 1.5 CM
BH CV ECHO MEAS - AO MAX PG (FULL): 5.4 MMHG
BH CV ECHO MEAS - AO MAX PG: 9.1 MMHG
BH CV ECHO MEAS - AO MEAN PG (FULL): 3 MMHG
BH CV ECHO MEAS - AO MEAN PG: 4.9 MMHG
BH CV ECHO MEAS - AO ROOT AREA (BSA CORRECTED): 1.6
BH CV ECHO MEAS - AO ROOT AREA: 6.5 CM^2
BH CV ECHO MEAS - AO ROOT DIAM: 2.9 CM
BH CV ECHO MEAS - AO V2 MAX: 150.8 CM/SEC
BH CV ECHO MEAS - AO V2 MEAN: 101.6 CM/SEC
BH CV ECHO MEAS - AO V2 VTI: 35.8 CM
BH CV ECHO MEAS - AVA(I,A): 1.7 CM^2
BH CV ECHO MEAS - AVA(I,D): 1.7 CM^2
BH CV ECHO MEAS - AVA(V,A): 1.6 CM^2
BH CV ECHO MEAS - AVA(V,D): 1.6 CM^2
BH CV ECHO MEAS - BSA(HAYCOCK): 1.8 M^2
BH CV ECHO MEAS - BSA: 1.8 M^2
BH CV ECHO MEAS - BZI_BMI: 29.2 KILOGRAMS/M^2
BH CV ECHO MEAS - BZI_METRIC_HEIGHT: 160 CM
BH CV ECHO MEAS - BZI_METRIC_WEIGHT: 74.8 KG
BH CV ECHO MEAS - CONTRAST EF (2CH): 58.5 ML/M^2
BH CV ECHO MEAS - CONTRAST EF 4CH: 63.5 ML/M^2
BH CV ECHO MEAS - EDV(MOD-SP2): 53 ML
BH CV ECHO MEAS - EDV(MOD-SP4): 63 ML
BH CV ECHO MEAS - EDV(TEICH): 87.1 ML
BH CV ECHO MEAS - EF(CUBED): 74.6 %
BH CV ECHO MEAS - EF(MOD-SP2): 58.5 %
BH CV ECHO MEAS - EF(MOD-SP4): 63.5 %
BH CV ECHO MEAS - EF(TEICH): 66.7 %
BH CV ECHO MEAS - ESV(MOD-SP2): 22 ML
BH CV ECHO MEAS - ESV(MOD-SP4): 23 ML
BH CV ECHO MEAS - ESV(TEICH): 29 ML
BH CV ECHO MEAS - FS: 36.6 %
BH CV ECHO MEAS - IVS/LVPW: 1
BH CV ECHO MEAS - IVSD: 1 CM
BH CV ECHO MEAS - LV DIASTOLIC VOL/BSA (35-75): 35.4 ML/M^2
BH CV ECHO MEAS - LV MASS(C)D: 146.9 GRAMS
BH CV ECHO MEAS - LV MASS(C)DI: 82.4 GRAMS/M^2
BH CV ECHO MEAS - LV MAX PG: 3.7 MMHG
BH CV ECHO MEAS - LV MEAN PG: 1.9 MMHG
BH CV ECHO MEAS - LV SYSTOLIC VOL/BSA (12-30): 12.9 ML/M^2
BH CV ECHO MEAS - LV V1 MAX: 96 CM/SEC
BH CV ECHO MEAS - LV V1 MEAN: 65.6 CM/SEC
BH CV ECHO MEAS - LV V1 VTI: 24 CM
BH CV ECHO MEAS - LVIDD: 4.4 CM
BH CV ECHO MEAS - LVIDS: 2.8 CM
BH CV ECHO MEAS - LVLD AP2: 6.2 CM
BH CV ECHO MEAS - LVLD AP4: 6 CM
BH CV ECHO MEAS - LVLS AP2: 5.6 CM
BH CV ECHO MEAS - LVLS AP4: 5.5 CM
BH CV ECHO MEAS - LVOT AREA (M): 2.5 CM^2
BH CV ECHO MEAS - LVOT AREA: 2.6 CM^2
BH CV ECHO MEAS - LVOT DIAM: 1.8 CM
BH CV ECHO MEAS - LVPWD: 1 CM
BH CV ECHO MEAS - MV DEC SLOPE: 653.5 CM/SEC^2
BH CV ECHO MEAS - MV DEC TIME: 0.22 SEC
BH CV ECHO MEAS - MV E MAX VEL: 206.5 CM/SEC
BH CV ECHO MEAS - MV MAX PG: 18.1 MMHG
BH CV ECHO MEAS - MV MEAN PG: 5.4 MMHG
BH CV ECHO MEAS - MV P1/2T MAX VEL: 223.2 CM/SEC
BH CV ECHO MEAS - MV P1/2T: 100 MSEC
BH CV ECHO MEAS - MV V2 MAX: 212.7 CM/SEC
BH CV ECHO MEAS - MV V2 MEAN: 99.6 CM/SEC
BH CV ECHO MEAS - MV V2 VTI: 59.3 CM
BH CV ECHO MEAS - MVA P1/2T LCG: 0.99 CM^2
BH CV ECHO MEAS - MVA(P1/2T): 2.2 CM^2
BH CV ECHO MEAS - MVA(VTI): 1 CM^2
BH CV ECHO MEAS - PA MAX PG (FULL): 4 MMHG
BH CV ECHO MEAS - PA MAX PG: 4.6 MMHG
BH CV ECHO MEAS - PA V2 MAX: 107.6 CM/SEC
BH CV ECHO MEAS - PVA(V,A): 1.9 CM^2
BH CV ECHO MEAS - PVA(V,D): 1.9 CM^2
BH CV ECHO MEAS - QP/QS: 0.71
BH CV ECHO MEAS - RAP SYSTOLE: 8 MMHG
BH CV ECHO MEAS - RV MAX PG: 0.68 MMHG
BH CV ECHO MEAS - RV MEAN PG: 0.42 MMHG
BH CV ECHO MEAS - RV V1 MAX: 41.2 CM/SEC
BH CV ECHO MEAS - RV V1 MEAN: 30.4 CM/SEC
BH CV ECHO MEAS - RV V1 VTI: 9 CM
BH CV ECHO MEAS - RVOT AREA: 4.8 CM^2
BH CV ECHO MEAS - RVOT DIAM: 2.5 CM
BH CV ECHO MEAS - RVSP: 57 MMHG
BH CV ECHO MEAS - SI(AO): 130 ML/M^2
BH CV ECHO MEAS - SI(CUBED): 35.3 ML/M^2
BH CV ECHO MEAS - SI(LVOT): 34.6 ML/M^2
BH CV ECHO MEAS - SI(MOD-SP2): 17.4 ML/M^2
BH CV ECHO MEAS - SI(MOD-SP4): 22.4 ML/M^2
BH CV ECHO MEAS - SI(TEICH): 32.6 ML/M^2
BH CV ECHO MEAS - SUP REN AO DIAM: 1.9 CM
BH CV ECHO MEAS - SV(AO): 231.7 ML
BH CV ECHO MEAS - SV(CUBED): 62.9 ML
BH CV ECHO MEAS - SV(LVOT): 61.6 ML
BH CV ECHO MEAS - SV(MOD-SP2): 31 ML
BH CV ECHO MEAS - SV(MOD-SP4): 40 ML
BH CV ECHO MEAS - SV(RVOT): 43.5 ML
BH CV ECHO MEAS - SV(TEICH): 58 ML
BH CV ECHO MEAS - TR MAX VEL: 287.8 CM/SEC
BH CV XLRA - RV BASE: 3.5 CM
LEFT ATRIUM VOLUME INDEX: 41 ML/M2
LV EF 2D ECHO EST: 63 %
TV MEAN GRADIENT: 3 MMHG

## 2017-02-21 PROCEDURE — 93306 TTE W/DOPPLER COMPLETE: CPT

## 2017-02-21 PROCEDURE — 93306 TTE W/DOPPLER COMPLETE: CPT | Performed by: INTERNAL MEDICINE

## 2017-02-21 NOTE — TELEPHONE ENCOUNTER
I called her and went over the results of her echo with her.  Her mitral valve repair looks good.  Her tricuspid valve repair is adequate.  She does have some pulmonary hypertension but I don't think there is much else to do at this time.  I really just this is a baseline.  She wants to switch back to Eliquis.  I told her to hold the warfarin for 3 days and then start the Eliquis.  She will need samples.  She will be on 2.5 mg twice a day

## 2017-02-24 ENCOUNTER — TELEPHONE (OUTPATIENT)
Dept: CARDIOLOGY | Facility: CLINIC | Age: 82
End: 2017-02-24

## 2017-02-24 NOTE — TELEPHONE ENCOUNTER
I called and spoke with her about her echo and Holter monitor results.  She is bradycardic throughout the time of her Holter.  I'm going to have her cut her metoprolol tartrate to 12.5 milligrams twice a day.  I asked her to call me in the last week and let you know how she's feeling.  I may stop it completely. Keep for return call next week.

## 2017-03-01 ENCOUNTER — TELEPHONE (OUTPATIENT)
Dept: CARDIOLOGY | Facility: CLINIC | Age: 82
End: 2017-03-01

## 2017-03-01 NOTE — TELEPHONE ENCOUNTER
Pt states that she thinks the Metoprolol is the reason for fatigue and shortness of breath. Pt reports she forgot to take the medicine on Sunday and that day she had no fatigue and was able to walk much further with little fatigue.    Pt states that things are improving and would like to come off the medication all together. Please advise

## 2017-03-20 ENCOUNTER — HOSPITAL ENCOUNTER (OUTPATIENT)
Dept: CARDIOLOGY | Facility: HOSPITAL | Age: 82
Discharge: HOME | End: 2017-03-20
Admitting: INTERNAL MEDICINE

## 2017-03-20 ENCOUNTER — TELEPHONE (OUTPATIENT)
Dept: INTERNAL MEDICINE | Facility: CLINIC | Age: 82
End: 2017-03-20

## 2017-03-20 VITALS
HEIGHT: 63 IN | WEIGHT: 170 LBS | SYSTOLIC BLOOD PRESSURE: 106 MMHG | BODY MASS INDEX: 30.12 KG/M2 | DIASTOLIC BLOOD PRESSURE: 80 MMHG | HEART RATE: 138 BPM | OXYGEN SATURATION: 100 % | RESPIRATION RATE: 16 BRPM

## 2017-03-20 DIAGNOSIS — Z86.79 S/P MAZE OPERATION FOR ATRIAL FIBRILLATION: ICD-10-CM

## 2017-03-20 DIAGNOSIS — I48.92 ATRIAL FLUTTER, UNSPECIFIED TYPE (HCC): Primary | ICD-10-CM

## 2017-03-20 DIAGNOSIS — R79.89 ELEVATED SERUM CREATININE: ICD-10-CM

## 2017-03-20 DIAGNOSIS — I48.0 PAROXYSMAL ATRIAL FIBRILLATION (HCC): ICD-10-CM

## 2017-03-20 DIAGNOSIS — Z98.890 S/P TVR (TRICUSPID VALVE REPAIR): ICD-10-CM

## 2017-03-20 DIAGNOSIS — I48.19 PERSISTENT ATRIAL FIBRILLATION (HCC): Primary | ICD-10-CM

## 2017-03-20 DIAGNOSIS — R00.0 TACHYCARDIA: ICD-10-CM

## 2017-03-20 DIAGNOSIS — R00.2 PALPITATIONS: ICD-10-CM

## 2017-03-20 DIAGNOSIS — Z98.890 S/P MVR (MITRAL VALVE REPAIR): ICD-10-CM

## 2017-03-20 DIAGNOSIS — D64.9 ANEMIA, UNSPECIFIED TYPE: ICD-10-CM

## 2017-03-20 DIAGNOSIS — Z98.890 S/P MAZE OPERATION FOR ATRIAL FIBRILLATION: ICD-10-CM

## 2017-03-20 LAB
ALBUMIN SERPL-MCNC: 3.6 G/DL (ref 3.5–5.2)
ALBUMIN/GLOB SERPL: 1 G/DL
ALP SERPL-CCNC: 106 U/L (ref 39–117)
ALT SERPL W P-5'-P-CCNC: 15 U/L (ref 1–33)
ANION GAP SERPL CALCULATED.3IONS-SCNC: 14.8 MMOL/L
AST SERPL-CCNC: 19 U/L (ref 1–32)
BASOPHILS # BLD AUTO: 0.05 10*3/MM3 (ref 0–0.2)
BASOPHILS NFR BLD AUTO: 0.6 % (ref 0–1.5)
BILIRUB SERPL-MCNC: 0.3 MG/DL (ref 0.1–1.2)
BNP BLD-MCNC: 352 PG/ML
BUN BLD-MCNC: 41 MG/DL (ref 8–23)
BUN/CREAT SERPL: 22.7 (ref 7–25)
CALCIUM SPEC-SCNC: 9.6 MG/DL (ref 8.6–10.5)
CHLORIDE SERPL-SCNC: 97 MMOL/L (ref 98–107)
CK MB BLD-MCNC: 1 NG/ML
CO2 SERPL-SCNC: 26.2 MMOL/L (ref 22–29)
CREAT BLD-MCNC: 1.81 MG/DL (ref 0.57–1)
DEPRECATED D DIMER PPP-ACNC: 495
DEPRECATED RDW RBC AUTO: 53.7 FL (ref 37–54)
EOSINOPHIL # BLD AUTO: 0.34 10*3/MM3 (ref 0–0.7)
EOSINOPHIL NFR BLD AUTO: 4.2 % (ref 0.3–6.2)
ERYTHROCYTE [DISTWIDTH] IN BLOOD BY AUTOMATED COUNT: 17.3 % (ref 11.7–13)
GFR SERPL CREATININE-BSD FRML MDRD: 27 ML/MIN/1.73
GLOBULIN UR ELPH-MCNC: 3.6 GM/DL
GLUCOSE BLD-MCNC: 109 MG/DL (ref 65–99)
HCT VFR BLD AUTO: 33.4 % (ref 35.6–45.5)
HGB BLD-MCNC: 10.7 G/DL (ref 11.9–15.5)
IMM GRANULOCYTES # BLD: 0 10*3/MM3 (ref 0–0.03)
IMM GRANULOCYTES NFR BLD: 0 % (ref 0–0.5)
LYMPHOCYTES # BLD AUTO: 2.14 10*3/MM3 (ref 0.9–4.8)
LYMPHOCYTES NFR BLD AUTO: 26.4 % (ref 19.6–45.3)
MCH RBC QN AUTO: 27.4 PG (ref 26.9–32)
MCHC RBC AUTO-ENTMCNC: 32 G/DL (ref 32.4–36.3)
MCV RBC AUTO: 85.6 FL (ref 80.5–98.2)
MONOCYTES # BLD AUTO: 0.63 10*3/MM3 (ref 0.2–1.2)
MONOCYTES NFR BLD AUTO: 7.8 % (ref 5–12)
NEUTROPHILS # BLD AUTO: 4.96 10*3/MM3 (ref 1.9–8.1)
NEUTROPHILS NFR BLD AUTO: 61 % (ref 42.7–76)
PLATELET # BLD AUTO: 305 10*3/MM3 (ref 140–500)
PMV BLD AUTO: 10.1 FL (ref 6–12)
POC MYOGLOBIN: 132 NG/ML
POTASSIUM BLD-SCNC: 4.3 MMOL/L (ref 3.5–5.2)
PROT SERPL-MCNC: 7.2 G/DL (ref 6–8.5)
RBC # BLD AUTO: 3.9 10*6/MM3 (ref 3.9–5.2)
SODIUM BLD-SCNC: 138 MMOL/L (ref 136–145)
TROPONIN I SERPL-MCNC: 0.05 NG/ML (ref 0–0.6)
WBC NRBC COR # BLD: 8.12 10*3/MM3 (ref 4.5–10.7)

## 2017-03-20 PROCEDURE — 84484 ASSAY OF TROPONIN QUANT: CPT

## 2017-03-20 PROCEDURE — 93005 ELECTROCARDIOGRAM TRACING: CPT | Performed by: NURSE PRACTITIONER

## 2017-03-20 PROCEDURE — 84443 ASSAY THYROID STIM HORMONE: CPT

## 2017-03-20 PROCEDURE — 96376 TX/PRO/DX INJ SAME DRUG ADON: CPT

## 2017-03-20 PROCEDURE — 96374 THER/PROPH/DIAG INJ IV PUSH: CPT

## 2017-03-20 PROCEDURE — 82553 CREATINE MB FRACTION: CPT

## 2017-03-20 PROCEDURE — 99215 OFFICE O/P EST HI 40 MIN: CPT | Performed by: NURSE PRACTITIONER

## 2017-03-20 PROCEDURE — 84439 ASSAY OF FREE THYROXINE: CPT

## 2017-03-20 PROCEDURE — 83874 ASSAY OF MYOGLOBIN: CPT

## 2017-03-20 PROCEDURE — 93010 ELECTROCARDIOGRAM REPORT: CPT | Performed by: NURSE PRACTITIONER

## 2017-03-20 PROCEDURE — 85379 FIBRIN DEGRADATION QUANT: CPT

## 2017-03-20 PROCEDURE — 83880 ASSAY OF NATRIURETIC PEPTIDE: CPT

## 2017-03-20 PROCEDURE — 36415 COLL VENOUS BLD VENIPUNCTURE: CPT | Performed by: NURSE PRACTITIONER

## 2017-03-20 PROCEDURE — 84479 ASSAY OF THYROID (T3 OR T4): CPT

## 2017-03-20 PROCEDURE — 80053 COMPREHEN METABOLIC PANEL: CPT

## 2017-03-20 PROCEDURE — 94760 N-INVAS EAR/PLS OXIMETRY 1: CPT | Performed by: NURSE PRACTITIONER

## 2017-03-20 PROCEDURE — 85025 COMPLETE CBC W/AUTO DIFF WBC: CPT

## 2017-03-20 RX ORDER — SODIUM CHLORIDE 0.9 % (FLUSH) 0.9 %
10 SYRINGE (ML) INJECTION AS NEEDED
Status: DISCONTINUED | OUTPATIENT
Start: 2017-03-20 | End: 2017-03-28

## 2017-03-20 RX ORDER — NITROGLYCERIN 0.4 MG/1
0.4 TABLET SUBLINGUAL
Status: DISCONTINUED | OUTPATIENT
Start: 2017-03-20 | End: 2017-03-28

## 2017-03-20 RX ADMIN — METOROPROLOL TARTRATE 5 MG: 5 INJECTION, SOLUTION INTRAVENOUS at 12:39

## 2017-03-20 RX ADMIN — METOROPROLOL TARTRATE 5 MG: 5 INJECTION, SOLUTION INTRAVENOUS at 13:05

## 2017-03-20 RX ADMIN — METOROPROLOL TARTRATE 5 MG: 5 INJECTION, SOLUTION INTRAVENOUS at 13:22

## 2017-03-20 RX ADMIN — APIXABAN 2.5 MG: 2.5 TABLET, FILM COATED ORAL at 14:39

## 2017-03-20 NOTE — PATIENT INSTRUCTIONS
Stop your Hydralazine and Aspirin.    Take Eliquis 2.5mg this evening, and continue as previously instructed.    Resume your Metoprolol Tartrate 25mg. Take one half tablet twice daily twelve hours apart.    Arrive tomorrow, Tuesday March 21st at 7:30am at the [facility] waiting room for your Cardioversion.

## 2017-03-20 NOTE — TELEPHONE ENCOUNTER
03/20/17  10:05 AM  Veronica Henao  1935    Home Phone [phone]     Ms. Henao calls to report that today her  with /104. She did not measure it over the weekend, but thinks she may have been in AFib since Saturday. She has been SOA with activity. She has no chest pain. There has been no change lightheadedness. She has not been on the metoprolol since this message dated 3/1/17. She is on eliquis and 10mg hydralazine BID. She also takes 40mg lasix BID.    Does she need to come to CP unit? Does she need medication adjustments?    Anamaria ASKEW RN

## 2017-03-20 NOTE — PROGRESS NOTES
Date of Office Visit: 2017  Encounter Provider: DEBORAH Petersen  Place of Service: [facility]  Patient Name: Veronica Henao  :1935    Chief Complaint   Patient presents with   • Palpitations   :     HPI: Veronica Henao is a 82 y.o. female who presents today for evaluation of palpitations.  The patient contacted our office today with a chief concern of fast palpitations over the past 2 days.  She's had accompanying shortness of breath with exertion, lightheadedness, and fatigue.  Her shortness of breath does resolve with rest.  She denies chest pain, paroxysmal nocturnal dyspnea, orthopnea, cough, or syncope.  She does have chronic lower extremity edema, left greater than right which is unchanged.  She is experiencing some tingling of her right hand.  She is on apixaban and denies any blood in her urine or stools.    Upon review of the patient's records, she has had a history of paroxysmal atrial fibrillation dating back to .  In 2016 she was experiencing chest pain and shortness of breath.  She had an echocardiogram which showed ejection fraction of 51%, moderate to severe mitral valve regurgitation, and moderate to severe tricuspid valve regurgitation.  She had a nuclear stress test which showed no evidence of ischemia or infarction.  She had a transesophageal echocardiogram completed in 2016 which revealed the following: Ejection fraction 60%, moderate to severe mitral valve regurgitation, moderate to severe tricuspid valve regurgitation, and moderate complex plaque in the descending aorta.  She had a cardiac catheterization in 2016 which showed no significant coronary artery disease. I reviewed Dr. Young Vital's office note and the patient was on Cartia 300 mg daily and digoxin 0.125 mg daily.      On 16 she had mitral valve repair with 31 mm ATS bands posterior annuloplasty and P2 chordal repair,  tricuspid valve had annuloplasty with a 26 mm ring and plication of the anterior and septal leaflet of commissure, left and right Maze procedure, and left atrial appendage ligated.  Postoperatively she had asystole and bradycardia for 2 days.  She also experienced postoperative atrial fibrillation.  By the end of discharge she was able to tolerate a beta blocker.  Amiodarone was not initiated because of asystole.  She was started on warfarin therapy for stroke prevention.    She saw Dr. Maria Luz Husain for follow-up of the hospitalization on 1/18/17.  Her metoprolol tartrate was increased to 25 mg twice daily.  She had a follow-up 2-D echocardiogram on 2/21/17 with the following results: Ejection fraction 63%, left atrium mildly dilated, mild pulmonic valve regurgitation, mitral valve ring present with increased velocity throughout the valve which is consistent with the repair, tricuspid valve repaired, mild to moderate tricuspid regurgitation and elevated RVSP of greater than 55 mmHg.  She also wore a 24-hour Holter monitor on 2/21/17 with the following results: Normal sinus rhythm with average heart rate of 52 bpm, range 32-81, occasional premature atrial contractions totaling 2514 and 48 hour period accounting for 1.7% of the time, and rare premature atrial contractions.  The patient was noted to be predominantly bradycardic throughout the study.    Dr. Husain spoke with the patient about the test results on 2/21/17.  The patient wanted to switch from warfarin to Eliquis.  Dr. Husain started her on Eliquis 2.5 mg twice daily.  Dr. Husain then spoke with the patient via telephone in 2/24/17 and asked her to reduce the metoprolol tartrate to 12.5 mg twice daily.  Dr. Husain said she may end up stopping the medication completely and asked the patient to call in one week.  The patient told Dr. Husain that she felt the metoprolol was the reason for her fatigue and shortness of breath.  The metoprolol tartrate was  discontinued.      Past Medical History   Diagnosis Date   • Acute bronchitis    • Acute renal insufficiency    • Allergic rhinitis    • Arrhythmia    • Atrial fibrillation    • Breast pain, right    • CHF (congestive heart failure)    • Chronic renal insufficiency    • Cough    • Depression    • Diabetes mellitus      TYPE 2   • Diverticulosis    • Dyspnea    • Esophageal reflux    • Fatigue    • Gout    • Head injury    • Headache    • Hoarseness    • Hypercalcemia    • Hyperlipidemia    • Hypertension    • Influenza A (H1N1)    • Itching    • Macular degeneration    • Nontoxic multinodular goiter      RIGHT 2.0 CM  LEFT  2.5 CM   • JOSELUIS on CPAP    • Osteoarthritis    • Proteinuria    • Pulmonary nodule    • Sebaceous cyst    • SOBOE (shortness of breath on exertion)    • Solitary thyroid nodule    • Subclinical hypothyroidism    • Tachycardia    • Type 2 diabetes mellitus    • UTI (urinary tract infection)        Past Surgical History   Procedure Laterality Date   • Appendectomy     • Hysterectomy     • Total knee arthroplasty       bilateral   • Cardiac catheterization       procedure outcome:    • Cardiac catheterization N/A 11/21/2016     Procedure: Coronary angiography;  Surgeon: Marcin العراقي MD;  Location: [facility] INVASIVE LOCATION;  Service:    • Cardiac catheterization N/A 11/21/2016     Procedure: Left Heart Cath;  Surgeon: Marcin العراقي MD;  Location: [facility] INVASIVE LOCATION;  Service:    • Gastric restriction surgery     • Lumbar decompression       L4-5   • Clavicle surgery Left    • Shoulder surgery Left      AFTER SURGERY FOR FRACTURED CLAVICLE   • Mitral valve repair/replacement N/A 12/14/2016     Procedure: INTRAOPERATIVE KATELYN, MIDLINE STERNOTOMY, MITRAL VALVE REPAIR, TRICUSPID VALVE REPAIR, MAZE PROCEDURE;  Surgeon: Young Vital MD;  Location: [facility] OR;  Service:        Social History     Social History   • Marital status:      Spouse name: N/A   • Number of  children: N/A   • Years of education: N/A     Occupational History   • Not on file.     Social History Main Topics   • Smoking status: Former Smoker     Packs/day: 1.50     Years: 20.00   • Smokeless tobacco: Not on file      Comment: D/C 1970 SMOKING 1 1/2 PPD FOR 13 YRS BEFORE QUITTING   • Alcohol use No      Comment: caffeine use   • Drug use: No   • Sexual activity: Defer     Other Topics Concern   • Not on file     Social History Narrative       Family History   Problem Relation Age of Onset   • Emphysema Mother    • Heart disease Father    • Lung cancer Father    • Prostate cancer Father    • Asthma Sister    • Lung cancer Daughter    • Colon cancer Other    • No Known Problems Maternal Grandmother    • No Known Problems Maternal Grandfather    • Arthritis Paternal Grandmother    • No Known Problems Paternal Grandfather        Review of Systems   Constitution: Positive for malaise/fatigue. Negative for chills, diaphoresis, fever, night sweats, weight gain and weight loss.   HENT: Negative for hearing loss, nosebleeds, sore throat and tinnitus.    Eyes: Negative for blurred vision, double vision, pain and visual disturbance.   Cardiovascular: Positive for dyspnea on exertion, leg swelling and palpitations. Negative for chest pain, claudication, cyanosis, irregular heartbeat, near-syncope, orthopnea, paroxysmal nocturnal dyspnea and syncope.   Respiratory: Negative for cough, hemoptysis, snoring and wheezing.    Endocrine: Negative for cold intolerance, heat intolerance and polyuria.   Hematologic/Lymphatic: Negative for bleeding problem. Does not bruise/bleed easily.   Skin: Negative for color change, dry skin, flushing and itching.   Musculoskeletal: Negative for falls, joint pain, joint swelling, muscle cramps, muscle weakness and myalgias.   Gastrointestinal: Negative for abdominal pain, constipation, heartburn, melena, nausea and vomiting.   Genitourinary: Negative for dysuria and hematuria.   Neurological:  Positive for paresthesias. Negative for excessive daytime sleepiness, dizziness, light-headedness, loss of balance, numbness, seizures and vertigo.   Psychiatric/Behavioral: Negative for altered mental status, depression, memory loss and substance abuse. The patient does not have insomnia and is not nervous/anxious.    Allergic/Immunologic: Negative for environmental allergies.       Allergies   Allergen Reactions   • Cephalexin Swelling   • Meperidine Swelling   • Penicillins Swelling         Current Outpatient Prescriptions:   •  apixaban (ELIQUIS) 2.5 MG tablet tablet, Take 1 tablet by mouth 2 (Two) Times a Day., Disp: 60 tablet, Rfl: 11  •  cetirizine (zyrTEC) 10 MG tablet, Take 10 mg by mouth Every Night., Disp: , Rfl:   •  cholecalciferol (VITAMIN D3) 1000 UNITS tablet, Take 1,000 Units by mouth daily., Disp: , Rfl:   •  furosemide (LASIX) 40 MG tablet, Take 1 tablet by mouth 2 (Two) Times a Day., Disp: 60 tablet, Rfl: 2  •  insulin detemir (LEVEMIR) 100 UNIT/ML injection, Inject 18 Units under the skin Every Night., Disp: 400 Units, Rfl: 3  •  LEVEMIR FLEXTOUCH 100 UNIT/ML injection, , Disp: , Rfl:   •  montelukast (SINGULAIR) 10 MG tablet, Take 10 mg by mouth Every Night., Disp: , Rfl:   •  Multiple Vitamins-Minerals (CENTRUM SILVER PO), Take 1 tablet by mouth Daily., Disp: , Rfl:   •  potassium chloride (MICRO-K) 10 MEQ CR capsule, Take 1 capsule by mouth 2 (Two) Times a Day., Disp: 180 capsule, Rfl: 3  •  raNITIdine (ZANTAC) 150 MG tablet, Take 150 mg by mouth 2 (Two) Times a Day., Disp: , Rfl:   •  sertraline (ZOLOFT) 50 MG tablet, Take 50 mg by mouth Daily., Disp: , Rfl:     Current Facility-Administered Medications:   •  nitroglycerin (NITROSTAT) SL tablet 0.4 mg, 0.4 mg, Sublingual, Q5 Min PRN, Grace Price, APRN  •  sodium chloride 0.9 % flush 10 mL, 10 mL, Intravenous, PRN, Grace Price, APRN     Objective:     Vitals:    03/20/17 1134 03/20/17 1135 03/20/17 1304 03/20/17 1323   BP: 155/99  "151/97 120/84 106/80   BP Location: Left arm Right arm Left arm Left arm   Patient Position: Sitting Sitting Sitting Sitting   Pulse: (!) 151  (!) 132 (!) 138   Resp: 16      SpO2: 100%      Weight: 170 lb (77.1 kg)      Height: 63\" (160 cm)        Body mass index is 30.11 kg/(m^2).    PHYSICAL EXAM:    General Appearance: No acute distress, well developed and well nourished. Obese.   Eyes: Conjunctiva and lids: No erythema, swelling, or discharge. Sclera non-icteric. Glasses.   HENT: Atraumatic, normocephalic. External eyes, ears, and nose normal. No hearing loss noted. Mucous membranes normal. Lips not cyanotic. Neck supple with no tenderness.  Respiratory: No signs of respiratory distress. Respiration rhythm and depth normal.   Clear to auscultation. No rales, crackles, rhonchi, or wheezing auscultated.   Cardiovascular:  Jugular Venous Pressure: Elevated.   Heart Rate and Rhythm: Irregularly, irregular.  Rapid ventricular response.    Heart Sounds: Normal S1 and S2. No S3 or S4 noted.  Murmurs: No murmurs noted. No rubs, thrills, or gallops.   Arterial Pulses: Carotid pulses normal. No carotid bruit noted. Posterior tibialis and dorsalis pedis pulses normal.   Lower Extremities: Bilateral lower edema noted. Left greater than right.   Gastrointestinal:  Abdomen soft, non-distended, non-tender. Normal bowel sounds. No hepatomegaly.   Musculoskeletal: Normal movement of extremities  Skin and Nails: General appearance normal. No pallor, cyanosis, diaphoresis. Skin temperature normal. No clubbing of fingernails.   Psychiatric: Patient alert and oriented to person, place, and time. Speech and behavior appropriate. Normal mood and affect.       ECG 12 Lead  Date/Time: 3/20/2017 11:48 AM  Performed by: GEORGIANA SINCLAIR  Authorized by: GEORGIANA SINCLAIR   Comparison: compared with previous ECG from 1/18/2017  Comparison to previous ECG: NSR  Rhythm: atrial fibrillation  Rate: tachycardic  BPM: 149  Conduction: conduction " normal  ST Segments: ST segments normal  T Waves: T waves normal  QRS axis: normal  Other: no other findings  Clinical impression: abnormal ECG              LABS:     Rapid Blood Work:      Results from last 7 days  Lab Units 03/20/17  1206   BNP  352   TROPONIN I ng/mL 0.05   CKMB ng/mL 1.0   D DIMER QUANT  495   MYOGLOBIN ng/mL 132     BNP and quantitative d-dimer elevated.    Hospital Blood Work:       Results from last 7 days  Lab Units 03/20/17  1138   SODIUM mmol/L 138   POTASSIUM mmol/L 4.3   CHLORIDE mmol/L 97*   TOTAL CO2 mmol/L 26.2   BUN mg/dL 41*   CREATININE mg/dL 1.81*   CALCIUM mg/dL 9.6   BILIRUBIN mg/dL 0.3   ALK PHOS U/L 106   ALT (SGPT) U/L 15   AST (SGOT) U/L 19   GLUCOSE mg/dL 109*       Results from last 7 days  Lab Units 03/20/17  1138   WBC 10*3/mm3 8.12   HEMOGLOBIN g/dL 10.7*   HEMATOCRIT % 33.4*   PLATELETS 10*3/mm3 305         LABS all REVIEWED.     Assessment:       Diagnosis Plan   1. Atrial flutter, unspecified type  apixaban (ELIQUIS) tablet 2.5 mg    Electrical Cardioversion   2. Paroxysmal atrial fibrillation  ECG 12 Lead    ECG 12 Lead   3. Palpitations  Cardiac Monitoring    Vital Signs - Once    Vital Signs - As Needed    Pulse Oximetry    Oxygen Therapy- Nasal Cannula; Titrate for spO2: 92%, equal to or greater than    Insert Peripheral IV    sodium chloride 0.9 % flush 10 mL    nitroglycerin (NITROSTAT) SL tablet 0.4 mg    NPO Diet    Bathroom Privileges With Assistance    POCT B-Type Natriuretic Peptide (BNP)    POCT Troponin I    POC CKMB, Rapid    POCT D-Dimer    POCT Myoglobin    CBC & Differential    Comprehensive Metabolic Panel    Thyroid Panel With TSH    TSH+Free T4    Cardiac Monitoring    Vital Signs - Once    Insert Peripheral IV    NPO Diet    Bathroom Privileges With Assistance    CBC Auto Differential    ECG 12 Lead    ECG 12 Lead   4. Tachycardia  ECG 12 Lead    ECG 12 Lead   5. S/P MVR (mitral valve repair)     6. S/P TVR (tricuspid valve repair)     7. S/P  Maze operation for atrial fibrillation     8. Elevated serum creatinine     9. Anemia, unspecified type            Plan:       Dr. Rodríguez Ward and I had the pleasure of seeing the patient today. Dr. Ward reviewed her EKG and interpreted atrial flutter with rapid ventricular response.  The patient received 3 doses of Lopressor 5 mg IV push at least 5 minutes apart and her heart rate did not respond.  We have recommended the patient undergo electrical cardioversion tomorrow in the hospital.  The patient will be released home today and recommended to relax.  She did not take her dose of apixaban this morning and she received a dosage here in the office.  We've asked her to take her second dose of apixaban later this evening.  We have recommended that she discontinue her hydralazine and restart metoprolol tartrate 12.5 milligrams twice daily.  I've also recommended that she stop her aspirin, vitamin E, and fish oil.  Further recommendations will be made tomorrow after her electrical cardioversion.    I have informed Dr. Maria Luz Husain about the plan of care.    Patient was seen and dictated independently by DEBORAH Hurtado.  As always, it has been a pleasure to participate in your patient's care.      Sincerely,         DEBORAH Hurtado

## 2017-03-20 NOTE — TELEPHONE ENCOUNTER
03/20/17  10:30 AM  Called; Ms. Henao is coming into CP unit now. I advised to have someone drive her - tmm.

## 2017-03-21 ENCOUNTER — HOSPITAL ENCOUNTER (OUTPATIENT)
Dept: CARDIOLOGY | Facility: HOSPITAL | Age: 82
Discharge: HOME | End: 2017-03-21
Attending: INTERNAL MEDICINE | Admitting: INTERNAL MEDICINE

## 2017-03-21 VITALS
HEIGHT: 63 IN | SYSTOLIC BLOOD PRESSURE: 138 MMHG | HEART RATE: 63 BPM | BODY MASS INDEX: 31.01 KG/M2 | TEMPERATURE: 98.3 F | RESPIRATION RATE: 16 BRPM | DIASTOLIC BLOOD PRESSURE: 69 MMHG | OXYGEN SATURATION: 99 % | WEIGHT: 175 LBS

## 2017-03-21 DIAGNOSIS — I48.19 PERSISTENT ATRIAL FIBRILLATION (HCC): ICD-10-CM

## 2017-03-21 DIAGNOSIS — I48.19 PERSISTENT ATRIAL FIBRILLATION (HCC): Primary | ICD-10-CM

## 2017-03-21 LAB
GLUCOSE BLDC GLUCOMTR-MCNC: 152 MG/DL (ref 70–130)
T-UPTAKE NFR SERPL: 1.01 TBI (ref 0.8–1.3)
T4 FREE SERPL-MCNC: 1.1 NG/DL (ref 0.93–1.7)
T4 SERPL-MCNC: 5.5 MCG/DL (ref 4.5–11.7)
TSH SERPL DL<=0.05 MIU/L-ACNC: 1.46 MIU/ML (ref 0.27–4.2)
TSH SERPL DL<=0.05 MIU/L-ACNC: 1.47 MIU/ML (ref 0.27–4.2)

## 2017-03-21 PROCEDURE — 82962 GLUCOSE BLOOD TEST: CPT

## 2017-03-21 PROCEDURE — 93005 ELECTROCARDIOGRAM TRACING: CPT | Performed by: INTERNAL MEDICINE

## 2017-03-21 PROCEDURE — 92960 CARDIOVERSION ELECTRIC EXT: CPT

## 2017-03-21 PROCEDURE — 92960 CARDIOVERSION ELECTRIC EXT: CPT | Performed by: INTERNAL MEDICINE

## 2017-03-21 PROCEDURE — 93010 ELECTROCARDIOGRAM REPORT: CPT | Performed by: INTERNAL MEDICINE

## 2017-03-21 RX ORDER — METOCLOPRAMIDE HYDROCHLORIDE 5 MG/ML
10 INJECTION INTRAMUSCULAR; INTRAVENOUS EVERY 6 HOURS PRN
Status: DISCONTINUED | OUTPATIENT
Start: 2017-03-21 | End: 2017-03-21 | Stop reason: HOSPADM

## 2017-03-21 RX ORDER — POTASSIUM CHLORIDE 750 MG/1
10 CAPSULE, EXTENDED RELEASE ORAL 2 TIMES DAILY
Status: ON HOLD | COMMUNITY
End: 2017-07-05

## 2017-03-21 RX ORDER — LIDOCAINE HYDROCHLORIDE 10 MG/ML
0.1 INJECTION, SOLUTION EPIDURAL; INFILTRATION; INTRACAUDAL; PERINEURAL ONCE AS NEEDED
Status: DISCONTINUED | OUTPATIENT
Start: 2017-03-21 | End: 2017-03-21 | Stop reason: HOSPADM

## 2017-03-21 RX ORDER — ONDANSETRON 2 MG/ML
4 INJECTION INTRAMUSCULAR; INTRAVENOUS EVERY 6 HOURS PRN
Status: DISCONTINUED | OUTPATIENT
Start: 2017-03-21 | End: 2017-03-21 | Stop reason: HOSPADM

## 2017-03-21 RX ORDER — PROMETHAZINE HYDROCHLORIDE 25 MG/ML
12.5 INJECTION, SOLUTION INTRAMUSCULAR; INTRAVENOUS EVERY 4 HOURS PRN
Status: DISCONTINUED | OUTPATIENT
Start: 2017-03-21 | End: 2017-03-21 | Stop reason: HOSPADM

## 2017-03-21 RX ORDER — SODIUM CHLORIDE 9 MG/ML
75 INJECTION, SOLUTION INTRAVENOUS CONTINUOUS
Status: DISCONTINUED | OUTPATIENT
Start: 2017-03-21 | End: 2017-03-23 | Stop reason: HOSPADM

## 2017-03-21 RX ORDER — FUROSEMIDE 40 MG/1
40 TABLET ORAL 2 TIMES DAILY
Status: ON HOLD | COMMUNITY
End: 2017-07-05

## 2017-03-21 RX ORDER — SODIUM CHLORIDE 0.9 % (FLUSH) 0.9 %
1-10 SYRINGE (ML) INJECTION AS NEEDED
Status: DISCONTINUED | OUTPATIENT
Start: 2017-03-21 | End: 2017-03-21 | Stop reason: HOSPADM

## 2017-03-21 RX ADMIN — METHOHEXITAL SODIUM 55 MG: 500 INJECTION, POWDER, LYOPHILIZED, FOR SOLUTION INTRAMUSCULAR; INTRAVENOUS; RECTAL at 09:24

## 2017-03-21 RX ADMIN — SODIUM CHLORIDE 75 ML/HR: 9 INJECTION, SOLUTION INTRAVENOUS at 09:10

## 2017-03-21 NOTE — H&P (VIEW-ONLY)
Date of Office Visit: 2017  Encounter Provider: DEBORAH Petersen  Place of Service: [facility]  Patient Name: Veronica Henao  :1935    Chief Complaint   Patient presents with   • Palpitations   :     HPI: Veronica Henao is a 82 y.o. female who presents today for evaluation of palpitations.  The patient contacted our office today with a chief concern of fast palpitations over the past 2 days.  She's had accompanying shortness of breath with exertion, lightheadedness, and fatigue.  Her shortness of breath does resolve with rest.  She denies chest pain, paroxysmal nocturnal dyspnea, orthopnea, cough, or syncope.  She does have chronic lower extremity edema, left greater than right which is unchanged.  She is experiencing some tingling of her right hand.  She is on apixaban and denies any blood in her urine or stools.    Upon review of the patient's records, she has had a history of paroxysmal atrial fibrillation dating back to .  In 2016 she was experiencing chest pain and shortness of breath.  She had an echocardiogram which showed ejection fraction of 51%, moderate to severe mitral valve regurgitation, and moderate to severe tricuspid valve regurgitation.  She had a nuclear stress test which showed no evidence of ischemia or infarction.  She had a transesophageal echocardiogram completed in 2016 which revealed the following: Ejection fraction 60%, moderate to severe mitral valve regurgitation, moderate to severe tricuspid valve regurgitation, and moderate complex plaque in the descending aorta.  She had a cardiac catheterization in 2016 which showed no significant coronary artery disease. I reviewed Dr. Young Vital's office note and the patient was on Cartia 300 mg daily and digoxin 0.125 mg daily.      On 16 she had mitral valve repair with 31 mm ATS bands posterior annuloplasty and P2 chordal repair,  tricuspid valve had annuloplasty with a 26 mm ring and plication of the anterior and septal leaflet of commissure, left and right Maze procedure, and left atrial appendage ligated.  Postoperatively she had asystole and bradycardia for 2 days.  She also experienced postoperative atrial fibrillation.  By the end of discharge she was able to tolerate a beta blocker.  Amiodarone was not initiated because of asystole.  She was started on warfarin therapy for stroke prevention.    She saw Dr. Maria Luz Husain for follow-up of the hospitalization on 1/18/17.  Her metoprolol tartrate was increased to 25 mg twice daily.  She had a follow-up 2-D echocardiogram on 2/21/17 with the following results: Ejection fraction 63%, left atrium mildly dilated, mild pulmonic valve regurgitation, mitral valve ring present with increased velocity throughout the valve which is consistent with the repair, tricuspid valve repaired, mild to moderate tricuspid regurgitation and elevated RVSP of greater than 55 mmHg.  She also wore a 24-hour Holter monitor on 2/21/17 with the following results: Normal sinus rhythm with average heart rate of 52 bpm, range 32-81, occasional premature atrial contractions totaling 2514 and 48 hour period accounting for 1.7% of the time, and rare premature atrial contractions.  The patient was noted to be predominantly bradycardic throughout the study.    Dr. Husain spoke with the patient about the test results on 2/21/17.  The patient wanted to switch from warfarin to Eliquis.  Dr. Husain started her on Eliquis 2.5 mg twice daily.  Dr. Husain then spoke with the patient via telephone in 2/24/17 and asked her to reduce the metoprolol tartrate to 12.5 mg twice daily.  Dr. Husain said she may end up stopping the medication completely and asked the patient to call in one week.  The patient told Dr. Husain that she felt the metoprolol was the reason for her fatigue and shortness of breath.  The metoprolol tartrate was  discontinued.      Past Medical History   Diagnosis Date   • Acute bronchitis    • Acute renal insufficiency    • Allergic rhinitis    • Arrhythmia    • Atrial fibrillation    • Breast pain, right    • CHF (congestive heart failure)    • Chronic renal insufficiency    • Cough    • Depression    • Diabetes mellitus      TYPE 2   • Diverticulosis    • Dyspnea    • Esophageal reflux    • Fatigue    • Gout    • Head injury    • Headache    • Hoarseness    • Hypercalcemia    • Hyperlipidemia    • Hypertension    • Influenza A (H1N1)    • Itching    • Macular degeneration    • Nontoxic multinodular goiter      RIGHT 2.0 CM  LEFT  2.5 CM   • JOSELUIS on CPAP    • Osteoarthritis    • Proteinuria    • Pulmonary nodule    • Sebaceous cyst    • SOBOE (shortness of breath on exertion)    • Solitary thyroid nodule    • Subclinical hypothyroidism    • Tachycardia    • Type 2 diabetes mellitus    • UTI (urinary tract infection)        Past Surgical History   Procedure Laterality Date   • Appendectomy     • Hysterectomy     • Total knee arthroplasty       bilateral   • Cardiac catheterization       procedure outcome:    • Cardiac catheterization N/A 11/21/2016     Procedure: Coronary angiography;  Surgeon: Marcin العراقي MD;  Location: [facility] INVASIVE LOCATION;  Service:    • Cardiac catheterization N/A 11/21/2016     Procedure: Left Heart Cath;  Surgeon: Marcin العراقي MD;  Location: [facility] INVASIVE LOCATION;  Service:    • Gastric restriction surgery     • Lumbar decompression       L4-5   • Clavicle surgery Left    • Shoulder surgery Left      AFTER SURGERY FOR FRACTURED CLAVICLE   • Mitral valve repair/replacement N/A 12/14/2016     Procedure: INTRAOPERATIVE KATELYN, MIDLINE STERNOTOMY, MITRAL VALVE REPAIR, TRICUSPID VALVE REPAIR, MAZE PROCEDURE;  Surgeon: Young Vital MD;  Location: [facility] OR;  Service:        Social History     Social History   • Marital status:      Spouse name: N/A   • Number of  children: N/A   • Years of education: N/A     Occupational History   • Not on file.     Social History Main Topics   • Smoking status: Former Smoker     Packs/day: 1.50     Years: 20.00   • Smokeless tobacco: Not on file      Comment: D/C 1970 SMOKING 1 1/2 PPD FOR 13 YRS BEFORE QUITTING   • Alcohol use No      Comment: caffeine use   • Drug use: No   • Sexual activity: Defer     Other Topics Concern   • Not on file     Social History Narrative       Family History   Problem Relation Age of Onset   • Emphysema Mother    • Heart disease Father    • Lung cancer Father    • Prostate cancer Father    • Asthma Sister    • Lung cancer Daughter    • Colon cancer Other    • No Known Problems Maternal Grandmother    • No Known Problems Maternal Grandfather    • Arthritis Paternal Grandmother    • No Known Problems Paternal Grandfather        Review of Systems   Constitution: Positive for malaise/fatigue. Negative for chills, diaphoresis, fever, night sweats, weight gain and weight loss.   HENT: Negative for hearing loss, nosebleeds, sore throat and tinnitus.    Eyes: Negative for blurred vision, double vision, pain and visual disturbance.   Cardiovascular: Positive for dyspnea on exertion, leg swelling and palpitations. Negative for chest pain, claudication, cyanosis, irregular heartbeat, near-syncope, orthopnea, paroxysmal nocturnal dyspnea and syncope.   Respiratory: Negative for cough, hemoptysis, snoring and wheezing.    Endocrine: Negative for cold intolerance, heat intolerance and polyuria.   Hematologic/Lymphatic: Negative for bleeding problem. Does not bruise/bleed easily.   Skin: Negative for color change, dry skin, flushing and itching.   Musculoskeletal: Negative for falls, joint pain, joint swelling, muscle cramps, muscle weakness and myalgias.   Gastrointestinal: Negative for abdominal pain, constipation, heartburn, melena, nausea and vomiting.   Genitourinary: Negative for dysuria and hematuria.   Neurological:  Positive for paresthesias. Negative for excessive daytime sleepiness, dizziness, light-headedness, loss of balance, numbness, seizures and vertigo.   Psychiatric/Behavioral: Negative for altered mental status, depression, memory loss and substance abuse. The patient does not have insomnia and is not nervous/anxious.    Allergic/Immunologic: Negative for environmental allergies.       Allergies   Allergen Reactions   • Cephalexin Swelling   • Meperidine Swelling   • Penicillins Swelling         Current Outpatient Prescriptions:   •  apixaban (ELIQUIS) 2.5 MG tablet tablet, Take 1 tablet by mouth 2 (Two) Times a Day., Disp: 60 tablet, Rfl: 11  •  cetirizine (zyrTEC) 10 MG tablet, Take 10 mg by mouth Every Night., Disp: , Rfl:   •  cholecalciferol (VITAMIN D3) 1000 UNITS tablet, Take 1,000 Units by mouth daily., Disp: , Rfl:   •  furosemide (LASIX) 40 MG tablet, Take 1 tablet by mouth 2 (Two) Times a Day., Disp: 60 tablet, Rfl: 2  •  insulin detemir (LEVEMIR) 100 UNIT/ML injection, Inject 18 Units under the skin Every Night., Disp: 400 Units, Rfl: 3  •  LEVEMIR FLEXTOUCH 100 UNIT/ML injection, , Disp: , Rfl:   •  montelukast (SINGULAIR) 10 MG tablet, Take 10 mg by mouth Every Night., Disp: , Rfl:   •  Multiple Vitamins-Minerals (CENTRUM SILVER PO), Take 1 tablet by mouth Daily., Disp: , Rfl:   •  potassium chloride (MICRO-K) 10 MEQ CR capsule, Take 1 capsule by mouth 2 (Two) Times a Day., Disp: 180 capsule, Rfl: 3  •  raNITIdine (ZANTAC) 150 MG tablet, Take 150 mg by mouth 2 (Two) Times a Day., Disp: , Rfl:   •  sertraline (ZOLOFT) 50 MG tablet, Take 50 mg by mouth Daily., Disp: , Rfl:     Current Facility-Administered Medications:   •  nitroglycerin (NITROSTAT) SL tablet 0.4 mg, 0.4 mg, Sublingual, Q5 Min PRN, Grace Price, APRN  •  sodium chloride 0.9 % flush 10 mL, 10 mL, Intravenous, PRN, Grace Price, APRN     Objective:     Vitals:    03/20/17 1134 03/20/17 1135 03/20/17 1304 03/20/17 1323   BP: 155/99  "151/97 120/84 106/80   BP Location: Left arm Right arm Left arm Left arm   Patient Position: Sitting Sitting Sitting Sitting   Pulse: (!) 151  (!) 132 (!) 138   Resp: 16      SpO2: 100%      Weight: 170 lb (77.1 kg)      Height: 63\" (160 cm)        Body mass index is 30.11 kg/(m^2).    PHYSICAL EXAM:    General Appearance: No acute distress, well developed and well nourished. Obese.   Eyes: Conjunctiva and lids: No erythema, swelling, or discharge. Sclera non-icteric. Glasses.   HENT: Atraumatic, normocephalic. External eyes, ears, and nose normal. No hearing loss noted. Mucous membranes normal. Lips not cyanotic. Neck supple with no tenderness.  Respiratory: No signs of respiratory distress. Respiration rhythm and depth normal.   Clear to auscultation. No rales, crackles, rhonchi, or wheezing auscultated.   Cardiovascular:  Jugular Venous Pressure: Elevated.   Heart Rate and Rhythm: Irregularly, irregular.  Rapid ventricular response.    Heart Sounds: Normal S1 and S2. No S3 or S4 noted.  Murmurs: No murmurs noted. No rubs, thrills, or gallops.   Arterial Pulses: Carotid pulses normal. No carotid bruit noted. Posterior tibialis and dorsalis pedis pulses normal.   Lower Extremities: Bilateral lower edema noted. Left greater than right.   Gastrointestinal:  Abdomen soft, non-distended, non-tender. Normal bowel sounds. No hepatomegaly.   Musculoskeletal: Normal movement of extremities  Skin and Nails: General appearance normal. No pallor, cyanosis, diaphoresis. Skin temperature normal. No clubbing of fingernails.   Psychiatric: Patient alert and oriented to person, place, and time. Speech and behavior appropriate. Normal mood and affect.       ECG 12 Lead  Date/Time: 3/20/2017 11:48 AM  Performed by: GEORGIANA SINCLAIR  Authorized by: GEORGIANA SINCLAIR   Comparison: compared with previous ECG from 1/18/2017  Comparison to previous ECG: NSR  Rhythm: atrial fibrillation  Rate: tachycardic  BPM: 149  Conduction: conduction " normal  ST Segments: ST segments normal  T Waves: T waves normal  QRS axis: normal  Other: no other findings  Clinical impression: abnormal ECG              LABS:     Rapid Blood Work:      Results from last 7 days  Lab Units 03/20/17  1206   BNP  352   TROPONIN I ng/mL 0.05   CKMB ng/mL 1.0   D DIMER QUANT  495   MYOGLOBIN ng/mL 132     BNP and quantitative d-dimer elevated.    Hospital Blood Work:       Results from last 7 days  Lab Units 03/20/17  1138   SODIUM mmol/L 138   POTASSIUM mmol/L 4.3   CHLORIDE mmol/L 97*   TOTAL CO2 mmol/L 26.2   BUN mg/dL 41*   CREATININE mg/dL 1.81*   CALCIUM mg/dL 9.6   BILIRUBIN mg/dL 0.3   ALK PHOS U/L 106   ALT (SGPT) U/L 15   AST (SGOT) U/L 19   GLUCOSE mg/dL 109*       Results from last 7 days  Lab Units 03/20/17  1138   WBC 10*3/mm3 8.12   HEMOGLOBIN g/dL 10.7*   HEMATOCRIT % 33.4*   PLATELETS 10*3/mm3 305         LABS all REVIEWED.     Assessment:       Diagnosis Plan   1. Atrial flutter, unspecified type  apixaban (ELIQUIS) tablet 2.5 mg    Electrical Cardioversion   2. Paroxysmal atrial fibrillation  ECG 12 Lead    ECG 12 Lead   3. Palpitations  Cardiac Monitoring    Vital Signs - Once    Vital Signs - As Needed    Pulse Oximetry    Oxygen Therapy- Nasal Cannula; Titrate for spO2: 92%, equal to or greater than    Insert Peripheral IV    sodium chloride 0.9 % flush 10 mL    nitroglycerin (NITROSTAT) SL tablet 0.4 mg    NPO Diet    Bathroom Privileges With Assistance    POCT B-Type Natriuretic Peptide (BNP)    POCT Troponin I    POC CKMB, Rapid    POCT D-Dimer    POCT Myoglobin    CBC & Differential    Comprehensive Metabolic Panel    Thyroid Panel With TSH    TSH+Free T4    Cardiac Monitoring    Vital Signs - Once    Insert Peripheral IV    NPO Diet    Bathroom Privileges With Assistance    CBC Auto Differential    ECG 12 Lead    ECG 12 Lead   4. Tachycardia  ECG 12 Lead    ECG 12 Lead   5. S/P MVR (mitral valve repair)     6. S/P TVR (tricuspid valve repair)     7. S/P  Maze operation for atrial fibrillation     8. Elevated serum creatinine     9. Anemia, unspecified type            Plan:       Dr. Rodríguez Ward and I had the pleasure of seeing the patient today. Dr. Ward reviewed her EKG and interpreted atrial flutter with rapid ventricular response.  The patient received 3 doses of Lopressor 5 mg IV push at least 5 minutes apart and her heart rate did not respond.  We have recommended the patient undergo electrical cardioversion tomorrow in the hospital.  The patient will be released home today and recommended to relax.  She did not take her dose of apixaban this morning and she received a dosage here in the office.  We've asked her to take her second dose of apixaban later this evening.  We have recommended that she discontinue her hydralazine and restart metoprolol tartrate 12.5 milligrams twice daily.  I've also recommended that she stop her aspirin, vitamin E, and fish oil.  Further recommendations will be made tomorrow after her electrical cardioversion.    I have informed Dr. Maria Luz Husain about the plan of care.    Patient was seen and dictated independently by DEBORAH Hurtado.  As always, it has been a pleasure to participate in your patient's care.      Sincerely,         DEBORAH Hurtado

## 2017-03-21 NOTE — TELEPHONE ENCOUNTER
Debby,  I received a request to put in a referral for cardioversion. Cpt code 50333 and dx a fib 48.0  Any idea how to do this???

## 2017-03-24 ENCOUNTER — TELEPHONE (OUTPATIENT)
Dept: CARDIOLOGY | Facility: CLINIC | Age: 82
End: 2017-03-24

## 2017-03-24 DIAGNOSIS — I48.91 ATRIAL FIBRILLATION, RAPID (HCC): Primary | ICD-10-CM

## 2017-03-24 NOTE — TELEPHONE ENCOUNTER
Rodríguez what do you want to do with her? She is the lady you saw in [facility] on Monday and then did a CV

## 2017-03-24 NOTE — TELEPHONE ENCOUNTER
Pt had a cardioversion this week and reports that yesterday afternoon she started feeling her heart race, this morning her HR is 125 and BP is 147/97. No missed doses of medication, no new medication, no recent illness, no soa, no edema, no increased lightheadedness, no chest pain, Pt does note fatigue. Please advise    [phone]

## 2017-03-24 NOTE — TELEPHONE ENCOUNTER
This is what happens when doctors mess around in the [place]    She needs to have ablation     Tuesday --add on     Until then sit in chair and read books.

## 2017-03-28 ENCOUNTER — ANESTHESIA EVENT (OUTPATIENT)
Dept: CARDIOLOGY | Facility: HOSPITAL | Age: 82
End: 2017-03-28

## 2017-03-28 ENCOUNTER — ANESTHESIA (OUTPATIENT)
Dept: CARDIOLOGY | Facility: HOSPITAL | Age: 82
End: 2017-03-28

## 2017-03-28 ENCOUNTER — HOSPITAL ENCOUNTER (OUTPATIENT)
Facility: HOSPITAL | Age: 82
Setting detail: OBSERVATION
Discharge: HOME | End: 2017-03-29
Attending: INTERNAL MEDICINE | Admitting: INTERNAL MEDICINE

## 2017-03-28 DIAGNOSIS — I48.91 ATRIAL FIBRILLATION, RAPID (HCC): ICD-10-CM

## 2017-03-28 LAB
GLUCOSE BLDC GLUCOMTR-MCNC: 101 MG/DL (ref 70–130)
GLUCOSE BLDC GLUCOMTR-MCNC: 110 MG/DL (ref 70–130)
GLUCOSE BLDC GLUCOMTR-MCNC: 205 MG/DL (ref 70–130)

## 2017-03-28 PROCEDURE — 93653 COMPRE EP EVAL TX SVT: CPT | Performed by: INTERNAL MEDICINE

## 2017-03-28 PROCEDURE — C1732 CATH, EP, DIAG/ABL, 3D/VECT: HCPCS | Performed by: INTERNAL MEDICINE

## 2017-03-28 PROCEDURE — C1894 INTRO/SHEATH, NON-LASER: HCPCS | Performed by: INTERNAL MEDICINE

## 2017-03-28 PROCEDURE — 93613 INTRACARDIAC EPHYS 3D MAPG: CPT | Performed by: INTERNAL MEDICINE

## 2017-03-28 PROCEDURE — G0378 HOSPITAL OBSERVATION PER HR: HCPCS

## 2017-03-28 PROCEDURE — 93462 L HRT CATH TRNSPTL PUNCTURE: CPT | Performed by: INTERNAL MEDICINE

## 2017-03-28 PROCEDURE — C1731 CATH, EP, 20 OR MORE ELEC: HCPCS | Performed by: INTERNAL MEDICINE

## 2017-03-28 PROCEDURE — 93621 COMP EP EVL L PAC&REC C SINS: CPT | Performed by: INTERNAL MEDICINE

## 2017-03-28 PROCEDURE — 25010000002 DEXAMETHASONE PER 1 MG: Performed by: NURSE ANESTHETIST, CERTIFIED REGISTERED

## 2017-03-28 PROCEDURE — 25010000002 FENTANYL CITRATE (PF) 100 MCG/2ML SOLUTION: Performed by: NURSE ANESTHETIST, CERTIFIED REGISTERED

## 2017-03-28 PROCEDURE — 25010000002 ONDANSETRON PER 1 MG: Performed by: ANESTHESIOLOGY

## 2017-03-28 PROCEDURE — 25010000002 HEPARIN (PORCINE) PER 1000 UNITS: Performed by: INTERNAL MEDICINE

## 2017-03-28 PROCEDURE — C1893 INTRO/SHEATH, FIXED,NON-PEEL: HCPCS | Performed by: INTERNAL MEDICINE

## 2017-03-28 PROCEDURE — 63710000001 INSULIN DETEMER PER 5 UNITS: Performed by: INTERNAL MEDICINE

## 2017-03-28 PROCEDURE — 25010000002 PROPOFOL 10 MG/ML EMULSION: Performed by: NURSE ANESTHETIST, CERTIFIED REGISTERED

## 2017-03-28 PROCEDURE — 25010000002 PHENYLEPHRINE PER 1 ML: Performed by: NURSE ANESTHETIST, CERTIFIED REGISTERED

## 2017-03-28 PROCEDURE — C1730 CATH, EP, 19 OR FEW ELECT: HCPCS | Performed by: INTERNAL MEDICINE

## 2017-03-28 PROCEDURE — 85347 COAGULATION TIME ACTIVATED: CPT

## 2017-03-28 PROCEDURE — 82962 GLUCOSE BLOOD TEST: CPT

## 2017-03-28 RX ORDER — HYDROCODONE BITARTRATE AND ACETAMINOPHEN 7.5; 325 MG/1; MG/1
1 TABLET ORAL EVERY 4 HOURS PRN
Status: DISCONTINUED | OUTPATIENT
Start: 2017-03-28 | End: 2017-03-29 | Stop reason: HOSPADM

## 2017-03-28 RX ORDER — PROPOFOL 10 MG/ML
VIAL (ML) INTRAVENOUS AS NEEDED
Status: DISCONTINUED | OUTPATIENT
Start: 2017-03-28 | End: 2017-03-28 | Stop reason: SURG

## 2017-03-28 RX ORDER — SODIUM CHLORIDE 9 MG/ML
75 INJECTION, SOLUTION INTRAVENOUS CONTINUOUS
Status: DISCONTINUED | OUTPATIENT
Start: 2017-03-28 | End: 2017-03-28

## 2017-03-28 RX ORDER — HYDROMORPHONE HYDROCHLORIDE 1 MG/ML
0.5 INJECTION, SOLUTION INTRAMUSCULAR; INTRAVENOUS; SUBCUTANEOUS
Status: DISCONTINUED | OUTPATIENT
Start: 2017-03-28 | End: 2017-03-28 | Stop reason: HOSPADM

## 2017-03-28 RX ORDER — FLUMAZENIL 0.1 MG/ML
0.2 INJECTION INTRAVENOUS AS NEEDED
Status: DISCONTINUED | OUTPATIENT
Start: 2017-03-28 | End: 2017-03-28 | Stop reason: HOSPADM

## 2017-03-28 RX ORDER — PROMETHAZINE HYDROCHLORIDE 25 MG/ML
12.5 INJECTION, SOLUTION INTRAMUSCULAR; INTRAVENOUS ONCE AS NEEDED
Status: DISCONTINUED | OUTPATIENT
Start: 2017-03-28 | End: 2017-03-28 | Stop reason: HOSPADM

## 2017-03-28 RX ORDER — FAMOTIDINE 10 MG/ML
20 INJECTION, SOLUTION INTRAVENOUS ONCE
Status: DISCONTINUED | OUTPATIENT
Start: 2017-03-28 | End: 2017-03-29 | Stop reason: HOSPADM

## 2017-03-28 RX ORDER — MIDAZOLAM HYDROCHLORIDE 1 MG/ML
2 INJECTION INTRAMUSCULAR; INTRAVENOUS
Status: DISCONTINUED | OUTPATIENT
Start: 2017-03-28 | End: 2017-03-29 | Stop reason: HOSPADM

## 2017-03-28 RX ORDER — FUROSEMIDE 40 MG/1
40 TABLET ORAL 2 TIMES DAILY
Status: DISCONTINUED | OUTPATIENT
Start: 2017-03-28 | End: 2017-03-29 | Stop reason: HOSPADM

## 2017-03-28 RX ORDER — DIPHENHYDRAMINE HYDROCHLORIDE 50 MG/ML
12.5 INJECTION INTRAMUSCULAR; INTRAVENOUS
Status: DISCONTINUED | OUTPATIENT
Start: 2017-03-28 | End: 2017-03-28 | Stop reason: HOSPADM

## 2017-03-28 RX ORDER — SODIUM CHLORIDE 9 MG/ML
INJECTION, SOLUTION INTRAVENOUS CONTINUOUS PRN
Status: DISCONTINUED | OUTPATIENT
Start: 2017-03-28 | End: 2017-03-28 | Stop reason: HOSPADM

## 2017-03-28 RX ORDER — LIDOCAINE HYDROCHLORIDE 10 MG/ML
0.1 INJECTION, SOLUTION EPIDURAL; INFILTRATION; INTRACAUDAL; PERINEURAL ONCE AS NEEDED
Status: DISCONTINUED | OUTPATIENT
Start: 2017-03-28 | End: 2017-03-28

## 2017-03-28 RX ORDER — DEXAMETHASONE SODIUM PHOSPHATE 10 MG/ML
INJECTION INTRAMUSCULAR; INTRAVENOUS AS NEEDED
Status: DISCONTINUED | OUTPATIENT
Start: 2017-03-28 | End: 2017-03-28 | Stop reason: SURG

## 2017-03-28 RX ORDER — PROMETHAZINE HYDROCHLORIDE 25 MG/1
25 TABLET ORAL ONCE AS NEEDED
Status: DISCONTINUED | OUTPATIENT
Start: 2017-03-28 | End: 2017-03-28 | Stop reason: HOSPADM

## 2017-03-28 RX ORDER — PROMETHAZINE HYDROCHLORIDE 25 MG/1
25 SUPPOSITORY RECTAL ONCE AS NEEDED
Status: DISCONTINUED | OUTPATIENT
Start: 2017-03-28 | End: 2017-03-28 | Stop reason: HOSPADM

## 2017-03-28 RX ORDER — MIDAZOLAM HYDROCHLORIDE 1 MG/ML
1 INJECTION INTRAMUSCULAR; INTRAVENOUS
Status: DISCONTINUED | OUTPATIENT
Start: 2017-03-28 | End: 2017-03-29 | Stop reason: HOSPADM

## 2017-03-28 RX ORDER — FENTANYL CITRATE 50 UG/ML
50 INJECTION, SOLUTION INTRAMUSCULAR; INTRAVENOUS
Status: DISCONTINUED | OUTPATIENT
Start: 2017-03-28 | End: 2017-03-29 | Stop reason: HOSPADM

## 2017-03-28 RX ORDER — LIDOCAINE HYDROCHLORIDE AND EPINEPHRINE 10; 10 MG/ML; UG/ML
INJECTION, SOLUTION INFILTRATION; PERINEURAL AS NEEDED
Status: DISCONTINUED | OUTPATIENT
Start: 2017-03-28 | End: 2017-03-28 | Stop reason: HOSPADM

## 2017-03-28 RX ORDER — LABETALOL HYDROCHLORIDE 5 MG/ML
5 INJECTION, SOLUTION INTRAVENOUS
Status: DISCONTINUED | OUTPATIENT
Start: 2017-03-28 | End: 2017-03-28 | Stop reason: HOSPADM

## 2017-03-28 RX ORDER — POTASSIUM CHLORIDE 750 MG/1
10 CAPSULE, EXTENDED RELEASE ORAL 2 TIMES DAILY
Status: DISCONTINUED | OUTPATIENT
Start: 2017-03-28 | End: 2017-03-29 | Stop reason: HOSPADM

## 2017-03-28 RX ORDER — SODIUM CHLORIDE, SODIUM LACTATE, POTASSIUM CHLORIDE, CALCIUM CHLORIDE 600; 310; 30; 20 MG/100ML; MG/100ML; MG/100ML; MG/100ML
9 INJECTION, SOLUTION INTRAVENOUS CONTINUOUS
Status: DISCONTINUED | OUTPATIENT
Start: 2017-03-28 | End: 2017-03-29 | Stop reason: HOSPADM

## 2017-03-28 RX ORDER — HYDROCODONE BITARTRATE AND ACETAMINOPHEN 10; 325 MG/1; MG/1
1 TABLET ORAL EVERY 4 HOURS PRN
Status: DISCONTINUED | OUTPATIENT
Start: 2017-03-28 | End: 2017-03-29 | Stop reason: HOSPADM

## 2017-03-28 RX ORDER — SODIUM CHLORIDE 0.9 % (FLUSH) 0.9 %
1-10 SYRINGE (ML) INJECTION AS NEEDED
Status: DISCONTINUED | OUTPATIENT
Start: 2017-03-28 | End: 2017-03-28

## 2017-03-28 RX ORDER — FAMOTIDINE 20 MG/1
20 TABLET, FILM COATED ORAL DAILY
Status: DISCONTINUED | OUTPATIENT
Start: 2017-03-28 | End: 2017-03-29 | Stop reason: HOSPADM

## 2017-03-28 RX ORDER — HYDRALAZINE HYDROCHLORIDE 20 MG/ML
5 INJECTION INTRAMUSCULAR; INTRAVENOUS
Status: DISCONTINUED | OUTPATIENT
Start: 2017-03-28 | End: 2017-03-28 | Stop reason: HOSPADM

## 2017-03-28 RX ORDER — FENTANYL CITRATE 50 UG/ML
INJECTION, SOLUTION INTRAMUSCULAR; INTRAVENOUS AS NEEDED
Status: DISCONTINUED | OUTPATIENT
Start: 2017-03-28 | End: 2017-03-28 | Stop reason: SURG

## 2017-03-28 RX ORDER — FENTANYL CITRATE 50 UG/ML
50 INJECTION, SOLUTION INTRAMUSCULAR; INTRAVENOUS
Status: DISCONTINUED | OUTPATIENT
Start: 2017-03-28 | End: 2017-03-28 | Stop reason: HOSPADM

## 2017-03-28 RX ORDER — ALPRAZOLAM 0.25 MG/1
0.25 TABLET ORAL NIGHTLY PRN
Status: DISCONTINUED | OUTPATIENT
Start: 2017-03-28 | End: 2017-03-29 | Stop reason: HOSPADM

## 2017-03-28 RX ORDER — SODIUM CHLORIDE 0.9 % (FLUSH) 0.9 %
1-10 SYRINGE (ML) INJECTION AS NEEDED
Status: DISCONTINUED | OUTPATIENT
Start: 2017-03-28 | End: 2017-03-29 | Stop reason: HOSPADM

## 2017-03-28 RX ORDER — NALOXONE HCL 0.4 MG/ML
0.4 VIAL (ML) INJECTION
Status: DISCONTINUED | OUTPATIENT
Start: 2017-03-28 | End: 2017-03-29 | Stop reason: HOSPADM

## 2017-03-28 RX ORDER — HYDROCODONE BITARTRATE AND ACETAMINOPHEN 7.5; 325 MG/1; MG/1
1 TABLET ORAL ONCE AS NEEDED
Status: DISCONTINUED | OUTPATIENT
Start: 2017-03-28 | End: 2017-03-28 | Stop reason: HOSPADM

## 2017-03-28 RX ORDER — HEPARIN SODIUM 1000 [USP'U]/ML
INJECTION, SOLUTION INTRAVENOUS; SUBCUTANEOUS AS NEEDED
Status: DISCONTINUED | OUTPATIENT
Start: 2017-03-28 | End: 2017-03-28 | Stop reason: HOSPADM

## 2017-03-28 RX ORDER — ROCURONIUM BROMIDE 10 MG/ML
INJECTION, SOLUTION INTRAVENOUS AS NEEDED
Status: DISCONTINUED | OUTPATIENT
Start: 2017-03-28 | End: 2017-03-28 | Stop reason: SURG

## 2017-03-28 RX ORDER — HYDRALAZINE HYDROCHLORIDE 10 MG/1
10 TABLET, FILM COATED ORAL 3 TIMES DAILY
COMMUNITY
End: 2017-05-26 | Stop reason: HOSPADM

## 2017-03-28 RX ORDER — HYDRALAZINE HYDROCHLORIDE 10 MG/1
10 TABLET, FILM COATED ORAL 3 TIMES DAILY
Status: DISCONTINUED | OUTPATIENT
Start: 2017-03-28 | End: 2017-03-29 | Stop reason: HOSPADM

## 2017-03-28 RX ORDER — HYDROMORPHONE HYDROCHLORIDE 1 MG/ML
0.5 INJECTION, SOLUTION INTRAMUSCULAR; INTRAVENOUS; SUBCUTANEOUS
Status: DISCONTINUED | OUTPATIENT
Start: 2017-03-28 | End: 2017-03-29 | Stop reason: HOSPADM

## 2017-03-28 RX ORDER — OXYCODONE AND ACETAMINOPHEN 7.5; 325 MG/1; MG/1
1 TABLET ORAL ONCE AS NEEDED
Status: DISCONTINUED | OUTPATIENT
Start: 2017-03-28 | End: 2017-03-28 | Stop reason: HOSPADM

## 2017-03-28 RX ORDER — ONDANSETRON 2 MG/ML
4 INJECTION INTRAMUSCULAR; INTRAVENOUS ONCE AS NEEDED
Status: COMPLETED | OUTPATIENT
Start: 2017-03-28 | End: 2017-03-28

## 2017-03-28 RX ORDER — SODIUM CHLORIDE, SODIUM LACTATE, POTASSIUM CHLORIDE, CALCIUM CHLORIDE 600; 310; 30; 20 MG/100ML; MG/100ML; MG/100ML; MG/100ML
INJECTION, SOLUTION INTRAVENOUS CONTINUOUS PRN
Status: DISCONTINUED | OUTPATIENT
Start: 2017-03-28 | End: 2017-03-28 | Stop reason: SURG

## 2017-03-28 RX ORDER — NALOXONE HCL 0.4 MG/ML
0.2 VIAL (ML) INJECTION AS NEEDED
Status: DISCONTINUED | OUTPATIENT
Start: 2017-03-28 | End: 2017-03-28 | Stop reason: HOSPADM

## 2017-03-28 RX ORDER — LIDOCAINE HYDROCHLORIDE 20 MG/ML
INJECTION, SOLUTION INFILTRATION; PERINEURAL AS NEEDED
Status: DISCONTINUED | OUTPATIENT
Start: 2017-03-28 | End: 2017-03-28 | Stop reason: SURG

## 2017-03-28 RX ORDER — PROMETHAZINE HYDROCHLORIDE 25 MG/1
12.5 TABLET ORAL ONCE AS NEEDED
Status: DISCONTINUED | OUTPATIENT
Start: 2017-03-28 | End: 2017-03-28 | Stop reason: HOSPADM

## 2017-03-28 RX ADMIN — SODIUM CHLORIDE 75 ML/HR: 9 INJECTION, SOLUTION INTRAVENOUS at 12:35

## 2017-03-28 RX ADMIN — ROCURONIUM BROMIDE 50 MG: 10 INJECTION INTRAVENOUS at 13:41

## 2017-03-28 RX ADMIN — POTASSIUM CHLORIDE 10 MEQ: 750 CAPSULE, EXTENDED RELEASE ORAL at 21:08

## 2017-03-28 RX ADMIN — EPHEDRINE SULFATE 10 MG: 50 INJECTION INTRAMUSCULAR; INTRAVENOUS; SUBCUTANEOUS at 13:49

## 2017-03-28 RX ADMIN — LIDOCAINE HYDROCHLORIDE 100 MG: 20 INJECTION, SOLUTION INFILTRATION; PERINEURAL at 13:41

## 2017-03-28 RX ADMIN — EPHEDRINE SULFATE 10 MG: 50 INJECTION INTRAMUSCULAR; INTRAVENOUS; SUBCUTANEOUS at 13:59

## 2017-03-28 RX ADMIN — PROPOFOL 50 MG: 10 INJECTION, EMULSION INTRAVENOUS at 16:00

## 2017-03-28 RX ADMIN — SERTRALINE 50 MG: 50 TABLET, FILM COATED ORAL at 21:08

## 2017-03-28 RX ADMIN — ONDANSETRON 4 MG: 2 INJECTION INTRAMUSCULAR; INTRAVENOUS at 16:30

## 2017-03-28 RX ADMIN — SODIUM CHLORIDE, POTASSIUM CHLORIDE, SODIUM LACTATE AND CALCIUM CHLORIDE: 600; 310; 30; 20 INJECTION, SOLUTION INTRAVENOUS at 13:20

## 2017-03-28 RX ADMIN — SUGAMMADEX 500 MG: 100 INJECTION, SOLUTION INTRAVENOUS at 16:37

## 2017-03-28 RX ADMIN — INSULIN DETEMIR 18 UNITS: 100 INJECTION, SOLUTION SUBCUTANEOUS at 22:48

## 2017-03-28 RX ADMIN — HYDRALAZINE HYDROCHLORIDE 10 MG: 10 TABLET, FILM COATED ORAL at 21:08

## 2017-03-28 RX ADMIN — PHENYLEPHRINE HYDROCHLORIDE 100 MCG: 10 INJECTION INTRAVENOUS at 14:08

## 2017-03-28 RX ADMIN — PROPOFOL 150 MG: 10 INJECTION, EMULSION INTRAVENOUS at 13:41

## 2017-03-28 RX ADMIN — FAMOTIDINE 20 MG: 20 TABLET, FILM COATED ORAL at 21:08

## 2017-03-28 RX ADMIN — APIXABAN 2.5 MG: 2.5 TABLET, FILM COATED ORAL at 21:08

## 2017-03-28 RX ADMIN — FENTANYL CITRATE 100 MCG: 50 INJECTION INTRAMUSCULAR; INTRAVENOUS at 13:33

## 2017-03-28 RX ADMIN — EPHEDRINE SULFATE 10 MG: 50 INJECTION INTRAMUSCULAR; INTRAVENOUS; SUBCUTANEOUS at 14:06

## 2017-03-28 RX ADMIN — DEXAMETHASONE SODIUM PHOSPHATE 8 MG: 10 INJECTION INTRAMUSCULAR; INTRAVENOUS at 13:50

## 2017-03-28 RX ADMIN — ALPRAZOLAM 0.25 MG: 0.25 TABLET ORAL at 22:48

## 2017-03-28 RX ADMIN — FENTANYL CITRATE 50 MCG: 50 INJECTION INTRAMUSCULAR; INTRAVENOUS at 16:00

## 2017-03-28 NOTE — ANESTHESIA POSTPROCEDURE EVALUATION
Patient: Veronica Henao    Procedure Summary     Date Anesthesia Start Anesthesia Stop Room / Location    03/28/17 4947 [id number] [facility] /  [facility] IN[facility]       Procedure Diagnosis Provider Provider    Ablation atrial flutter (N/A ) Atrial fibrillation, rapid MD Rodríguez Chow MD          Anesthesia Type: general  Last vitals  /59 (03/28/17 1735)    Temp      Pulse 73 (03/28/17 1735)   Resp 16 (03/28/17 1735)    SpO2 95 % (03/28/17 1735)      Post Anesthesia Care and Evaluation    Patient location during evaluation: PACU  Patient participation: complete - patient participated  Level of consciousness: awake and alert  Pain score: 0  Pain management: adequate  Airway patency: patent  Anesthetic complications: No anesthetic complications    Cardiovascular status: acceptable  Respiratory status: acceptable  Hydration status: acceptable

## 2017-03-28 NOTE — ANESTHESIA PROCEDURE NOTES
Airway  Date/Time: 3/28/2017 1:42 PM  Airway not difficult    General Information and Staff    Patient location during procedure: OR  Anesthesiologist: SIERRA GARNER  CRNA: RON CHAVEZ    Indications and Patient Condition  Indications for airway management: airway protection    Preoxygenated: yes  MILS not maintained throughout  Mask difficulty assessment: 1 - vent by mask    Final Airway Details  Final airway type: endotracheal airway      Successful airway: ETT  Cuffed: yes   Successful intubation technique: direct laryngoscopy  Endotracheal tube insertion site: oral  Blade: Elio  Blade size: #3  ETT size: 7.0 mm  Cormack-Lehane Classification: grade I - full view of glottis  Placement verified by: chest auscultation and capnometry   Cuff volume (mL): 7  Measured from: lips  ETT to lips (cm): 21  Number of attempts at approach: 1    Additional Comments  Pt preoxygenated prior to induction, easy mask airway, atraumatic intubation,+ ETCO2, + bs bilat,  ETT secured and connected to ventilator.

## 2017-03-29 VITALS
OXYGEN SATURATION: 96 % | WEIGHT: 181.56 LBS | SYSTOLIC BLOOD PRESSURE: 140 MMHG | HEIGHT: 63 IN | HEART RATE: 72 BPM | DIASTOLIC BLOOD PRESSURE: 65 MMHG | RESPIRATION RATE: 18 BRPM | BODY MASS INDEX: 32.17 KG/M2 | TEMPERATURE: 97.7 F

## 2017-03-29 LAB
ACT BLD: 219 SECONDS (ref 82–152)
ACT BLD: 245 SECONDS (ref 82–152)
ACT BLD: 245 SECONDS (ref 82–152)
ACT BLD: 255 SECONDS (ref 82–152)
ACT BLD: 260 SECONDS (ref 82–152)
ACT BLD: 271 SECONDS (ref 82–152)
GLUCOSE BLDC GLUCOMTR-MCNC: 134 MG/DL (ref 70–130)
GLUCOSE BLDC GLUCOMTR-MCNC: 145 MG/DL (ref 70–130)
INR PPP: 1.21 (ref 0.9–1.1)
PROTHROMBIN TIME: 14.8 SECONDS (ref 11.7–14.2)

## 2017-03-29 PROCEDURE — 93005 ELECTROCARDIOGRAM TRACING: CPT | Performed by: INTERNAL MEDICINE

## 2017-03-29 PROCEDURE — 93010 ELECTROCARDIOGRAM REPORT: CPT | Performed by: INTERNAL MEDICINE

## 2017-03-29 PROCEDURE — G0378 HOSPITAL OBSERVATION PER HR: HCPCS

## 2017-03-29 PROCEDURE — 99217 PR OBSERVATION CARE DISCHARGE MANAGEMENT: CPT | Performed by: INTERNAL MEDICINE

## 2017-03-29 PROCEDURE — 85610 PROTHROMBIN TIME: CPT | Performed by: INTERNAL MEDICINE

## 2017-03-29 PROCEDURE — 82962 GLUCOSE BLOOD TEST: CPT

## 2017-03-29 RX ADMIN — FUROSEMIDE 40 MG: 40 TABLET ORAL at 08:21

## 2017-03-29 RX ADMIN — HYDRALAZINE HYDROCHLORIDE 10 MG: 10 TABLET, FILM COATED ORAL at 08:21

## 2017-03-29 RX ADMIN — APIXABAN 2.5 MG: 2.5 TABLET, FILM COATED ORAL at 08:21

## 2017-03-29 RX ADMIN — POTASSIUM CHLORIDE 10 MEQ: 750 CAPSULE, EXTENDED RELEASE ORAL at 08:21

## 2017-03-29 RX ADMIN — SERTRALINE 50 MG: 50 TABLET, FILM COATED ORAL at 08:21

## 2017-03-29 RX ADMIN — FAMOTIDINE 20 MG: 20 TABLET, FILM COATED ORAL at 08:21

## 2017-03-29 NOTE — PLAN OF CARE
Problem: Patient Care Overview (Adult)  Goal: Plan of Care Review  Outcome: Outcome(s) achieved Date Met:  03/29/17  Goal: Adult Individualization and Mutuality  Outcome: Outcome(s) achieved Date Met:  03/29/17  Goal: Discharge Needs Assessment  Outcome: Outcome(s) achieved Date Met:  03/29/17    Problem: Perioperative Period (Adult)  Goal: Signs and Symptoms of Listed Potential Problems Will be Absent or Manageable (Perioperative Period)  Outcome: Outcome(s) achieved Date Met:  03/29/17

## 2017-03-29 NOTE — H&P (VIEW-ONLY)
Date of Office Visit: 2017  Encounter Provider: DEBORAH Petersen  Place of Service: [facility]  Patient Name: Veronica Henao  :1935    Chief Complaint   Patient presents with   • Palpitations   :     HPI: Veronica Henao is a 82 y.o. female who presents today for evaluation of palpitations.  The patient contacted our office today with a chief concern of fast palpitations over the past 2 days.  She's had accompanying shortness of breath with exertion, lightheadedness, and fatigue.  Her shortness of breath does resolve with rest.  She denies chest pain, paroxysmal nocturnal dyspnea, orthopnea, cough, or syncope.  She does have chronic lower extremity edema, left greater than right which is unchanged.  She is experiencing some tingling of her right hand.  She is on apixaban and denies any blood in her urine or stools.    Upon review of the patient's records, she has had a history of paroxysmal atrial fibrillation dating back to .  In 2016 she was experiencing chest pain and shortness of breath.  She had an echocardiogram which showed ejection fraction of 51%, moderate to severe mitral valve regurgitation, and moderate to severe tricuspid valve regurgitation.  She had a nuclear stress test which showed no evidence of ischemia or infarction.  She had a transesophageal echocardiogram completed in 2016 which revealed the following: Ejection fraction 60%, moderate to severe mitral valve regurgitation, moderate to severe tricuspid valve regurgitation, and moderate complex plaque in the descending aorta.  She had a cardiac catheterization in 2016 which showed no significant coronary artery disease. I reviewed Dr. Young Vital's office note and the patient was on Cartia 300 mg daily and digoxin 0.125 mg daily.      On 16 she had mitral valve repair with 31 mm ATS bands posterior annuloplasty and P2 chordal repair,  tricuspid valve had annuloplasty with a 26 mm ring and plication of the anterior and septal leaflet of commissure, left and right Maze procedure, and left atrial appendage ligated.  Postoperatively she had asystole and bradycardia for 2 days.  She also experienced postoperative atrial fibrillation.  By the end of discharge she was able to tolerate a beta blocker.  Amiodarone was not initiated because of asystole.  She was started on warfarin therapy for stroke prevention.    She saw Dr. Maria Luz Husain for follow-up of the hospitalization on 1/18/17.  Her metoprolol tartrate was increased to 25 mg twice daily.  She had a follow-up 2-D echocardiogram on 2/21/17 with the following results: Ejection fraction 63%, left atrium mildly dilated, mild pulmonic valve regurgitation, mitral valve ring present with increased velocity throughout the valve which is consistent with the repair, tricuspid valve repaired, mild to moderate tricuspid regurgitation and elevated RVSP of greater than 55 mmHg.  She also wore a 24-hour Holter monitor on 2/21/17 with the following results: Normal sinus rhythm with average heart rate of 52 bpm, range 32-81, occasional premature atrial contractions totaling 2514 and 48 hour period accounting for 1.7% of the time, and rare premature atrial contractions.  The patient was noted to be predominantly bradycardic throughout the study.    Dr. Husain spoke with the patient about the test results on 2/21/17.  The patient wanted to switch from warfarin to Eliquis.  Dr. Husain started her on Eliquis 2.5 mg twice daily.  Dr. Husain then spoke with the patient via telephone in 2/24/17 and asked her to reduce the metoprolol tartrate to 12.5 mg twice daily.  Dr. Husain said she may end up stopping the medication completely and asked the patient to call in one week.  The patient told Dr. Husain that she felt the metoprolol was the reason for her fatigue and shortness of breath.  The metoprolol tartrate was  discontinued.      Past Medical History   Diagnosis Date   • Acute bronchitis    • Acute renal insufficiency    • Allergic rhinitis    • Arrhythmia    • Atrial fibrillation    • Breast pain, right    • CHF (congestive heart failure)    • Chronic renal insufficiency    • Cough    • Depression    • Diabetes mellitus      TYPE 2   • Diverticulosis    • Dyspnea    • Esophageal reflux    • Fatigue    • Gout    • Head injury    • Headache    • Hoarseness    • Hypercalcemia    • Hyperlipidemia    • Hypertension    • Influenza A (H1N1)    • Itching    • Macular degeneration    • Nontoxic multinodular goiter      RIGHT 2.0 CM  LEFT  2.5 CM   • JOSELUIS on CPAP    • Osteoarthritis    • Proteinuria    • Pulmonary nodule    • Sebaceous cyst    • SOBOE (shortness of breath on exertion)    • Solitary thyroid nodule    • Subclinical hypothyroidism    • Tachycardia    • Type 2 diabetes mellitus    • UTI (urinary tract infection)        Past Surgical History   Procedure Laterality Date   • Appendectomy     • Hysterectomy     • Total knee arthroplasty       bilateral   • Cardiac catheterization       procedure outcome:    • Cardiac catheterization N/A 11/21/2016     Procedure: Coronary angiography;  Surgeon: Marcin العراقي MD;  Location: [facility] INVASIVE LOCATION;  Service:    • Cardiac catheterization N/A 11/21/2016     Procedure: Left Heart Cath;  Surgeon: Marcin العراقي MD;  Location: [facility] INVASIVE LOCATION;  Service:    • Gastric restriction surgery     • Lumbar decompression       L4-5   • Clavicle surgery Left    • Shoulder surgery Left      AFTER SURGERY FOR FRACTURED CLAVICLE   • Mitral valve repair/replacement N/A 12/14/2016     Procedure: INTRAOPERATIVE KATELYN, MIDLINE STERNOTOMY, MITRAL VALVE REPAIR, TRICUSPID VALVE REPAIR, MAZE PROCEDURE;  Surgeon: Young Vital MD;  Location: [facility] OR;  Service:        Social History     Social History   • Marital status:      Spouse name: N/A   • Number of  children: N/A   • Years of education: N/A     Occupational History   • Not on file.     Social History Main Topics   • Smoking status: Former Smoker     Packs/day: 1.50     Years: 20.00   • Smokeless tobacco: Not on file      Comment: D/C 1970 SMOKING 1 1/2 PPD FOR 13 YRS BEFORE QUITTING   • Alcohol use No      Comment: caffeine use   • Drug use: No   • Sexual activity: Defer     Other Topics Concern   • Not on file     Social History Narrative       Family History   Problem Relation Age of Onset   • Emphysema Mother    • Heart disease Father    • Lung cancer Father    • Prostate cancer Father    • Asthma Sister    • Lung cancer Daughter    • Colon cancer Other    • No Known Problems Maternal Grandmother    • No Known Problems Maternal Grandfather    • Arthritis Paternal Grandmother    • No Known Problems Paternal Grandfather        Review of Systems   Constitution: Positive for malaise/fatigue. Negative for chills, diaphoresis, fever, night sweats, weight gain and weight loss.   HENT: Negative for hearing loss, nosebleeds, sore throat and tinnitus.    Eyes: Negative for blurred vision, double vision, pain and visual disturbance.   Cardiovascular: Positive for dyspnea on exertion, leg swelling and palpitations. Negative for chest pain, claudication, cyanosis, irregular heartbeat, near-syncope, orthopnea, paroxysmal nocturnal dyspnea and syncope.   Respiratory: Negative for cough, hemoptysis, snoring and wheezing.    Endocrine: Negative for cold intolerance, heat intolerance and polyuria.   Hematologic/Lymphatic: Negative for bleeding problem. Does not bruise/bleed easily.   Skin: Negative for color change, dry skin, flushing and itching.   Musculoskeletal: Negative for falls, joint pain, joint swelling, muscle cramps, muscle weakness and myalgias.   Gastrointestinal: Negative for abdominal pain, constipation, heartburn, melena, nausea and vomiting.   Genitourinary: Negative for dysuria and hematuria.   Neurological:  Positive for paresthesias. Negative for excessive daytime sleepiness, dizziness, light-headedness, loss of balance, numbness, seizures and vertigo.   Psychiatric/Behavioral: Negative for altered mental status, depression, memory loss and substance abuse. The patient does not have insomnia and is not nervous/anxious.    Allergic/Immunologic: Negative for environmental allergies.       Allergies   Allergen Reactions   • Cephalexin Swelling   • Meperidine Swelling   • Penicillins Swelling         Current Outpatient Prescriptions:   •  apixaban (ELIQUIS) 2.5 MG tablet tablet, Take 1 tablet by mouth 2 (Two) Times a Day., Disp: 60 tablet, Rfl: 11  •  cetirizine (zyrTEC) 10 MG tablet, Take 10 mg by mouth Every Night., Disp: , Rfl:   •  cholecalciferol (VITAMIN D3) 1000 UNITS tablet, Take 1,000 Units by mouth daily., Disp: , Rfl:   •  furosemide (LASIX) 40 MG tablet, Take 1 tablet by mouth 2 (Two) Times a Day., Disp: 60 tablet, Rfl: 2  •  insulin detemir (LEVEMIR) 100 UNIT/ML injection, Inject 18 Units under the skin Every Night., Disp: 400 Units, Rfl: 3  •  LEVEMIR FLEXTOUCH 100 UNIT/ML injection, , Disp: , Rfl:   •  montelukast (SINGULAIR) 10 MG tablet, Take 10 mg by mouth Every Night., Disp: , Rfl:   •  Multiple Vitamins-Minerals (CENTRUM SILVER PO), Take 1 tablet by mouth Daily., Disp: , Rfl:   •  potassium chloride (MICRO-K) 10 MEQ CR capsule, Take 1 capsule by mouth 2 (Two) Times a Day., Disp: 180 capsule, Rfl: 3  •  raNITIdine (ZANTAC) 150 MG tablet, Take 150 mg by mouth 2 (Two) Times a Day., Disp: , Rfl:   •  sertraline (ZOLOFT) 50 MG tablet, Take 50 mg by mouth Daily., Disp: , Rfl:     Current Facility-Administered Medications:   •  nitroglycerin (NITROSTAT) SL tablet 0.4 mg, 0.4 mg, Sublingual, Q5 Min PRN, Grace Price, APRN  •  sodium chloride 0.9 % flush 10 mL, 10 mL, Intravenous, PRN, Grace Price, APRN     Objective:     Vitals:    03/20/17 1134 03/20/17 1135 03/20/17 1304 03/20/17 1323   BP: 155/99  "151/97 120/84 106/80   BP Location: Left arm Right arm Left arm Left arm   Patient Position: Sitting Sitting Sitting Sitting   Pulse: (!) 151  (!) 132 (!) 138   Resp: 16      SpO2: 100%      Weight: 170 lb (77.1 kg)      Height: 63\" (160 cm)        Body mass index is 30.11 kg/(m^2).    PHYSICAL EXAM:    General Appearance: No acute distress, well developed and well nourished. Obese.   Eyes: Conjunctiva and lids: No erythema, swelling, or discharge. Sclera non-icteric. Glasses.   HENT: Atraumatic, normocephalic. External eyes, ears, and nose normal. No hearing loss noted. Mucous membranes normal. Lips not cyanotic. Neck supple with no tenderness.  Respiratory: No signs of respiratory distress. Respiration rhythm and depth normal.   Clear to auscultation. No rales, crackles, rhonchi, or wheezing auscultated.   Cardiovascular:  Jugular Venous Pressure: Elevated.   Heart Rate and Rhythm: Irregularly, irregular.  Rapid ventricular response.    Heart Sounds: Normal S1 and S2. No S3 or S4 noted.  Murmurs: No murmurs noted. No rubs, thrills, or gallops.   Arterial Pulses: Carotid pulses normal. No carotid bruit noted. Posterior tibialis and dorsalis pedis pulses normal.   Lower Extremities: Bilateral lower edema noted. Left greater than right.   Gastrointestinal:  Abdomen soft, non-distended, non-tender. Normal bowel sounds. No hepatomegaly.   Musculoskeletal: Normal movement of extremities  Skin and Nails: General appearance normal. No pallor, cyanosis, diaphoresis. Skin temperature normal. No clubbing of fingernails.   Psychiatric: Patient alert and oriented to person, place, and time. Speech and behavior appropriate. Normal mood and affect.       ECG 12 Lead  Date/Time: 3/20/2017 11:48 AM  Performed by: GEORGIANA SINCLAIR  Authorized by: GEORGIANA SINCLAIR   Comparison: compared with previous ECG from 1/18/2017  Comparison to previous ECG: NSR  Rhythm: atrial fibrillation  Rate: tachycardic  BPM: 149  Conduction: conduction " normal  ST Segments: ST segments normal  T Waves: T waves normal  QRS axis: normal  Other: no other findings  Clinical impression: abnormal ECG              LABS:     Rapid Blood Work:      Results from last 7 days  Lab Units 03/20/17  1206   BNP  352   TROPONIN I ng/mL 0.05   CKMB ng/mL 1.0   D DIMER QUANT  495   MYOGLOBIN ng/mL 132     BNP and quantitative d-dimer elevated.    Hospital Blood Work:       Results from last 7 days  Lab Units 03/20/17  1138   SODIUM mmol/L 138   POTASSIUM mmol/L 4.3   CHLORIDE mmol/L 97*   TOTAL CO2 mmol/L 26.2   BUN mg/dL 41*   CREATININE mg/dL 1.81*   CALCIUM mg/dL 9.6   BILIRUBIN mg/dL 0.3   ALK PHOS U/L 106   ALT (SGPT) U/L 15   AST (SGOT) U/L 19   GLUCOSE mg/dL 109*       Results from last 7 days  Lab Units 03/20/17  1138   WBC 10*3/mm3 8.12   HEMOGLOBIN g/dL 10.7*   HEMATOCRIT % 33.4*   PLATELETS 10*3/mm3 305         LABS all REVIEWED.     Assessment:       Diagnosis Plan   1. Atrial flutter, unspecified type  apixaban (ELIQUIS) tablet 2.5 mg    Electrical Cardioversion   2. Paroxysmal atrial fibrillation  ECG 12 Lead    ECG 12 Lead   3. Palpitations  Cardiac Monitoring    Vital Signs - Once    Vital Signs - As Needed    Pulse Oximetry    Oxygen Therapy- Nasal Cannula; Titrate for spO2: 92%, equal to or greater than    Insert Peripheral IV    sodium chloride 0.9 % flush 10 mL    nitroglycerin (NITROSTAT) SL tablet 0.4 mg    NPO Diet    Bathroom Privileges With Assistance    POCT B-Type Natriuretic Peptide (BNP)    POCT Troponin I    POC CKMB, Rapid    POCT D-Dimer    POCT Myoglobin    CBC & Differential    Comprehensive Metabolic Panel    Thyroid Panel With TSH    TSH+Free T4    Cardiac Monitoring    Vital Signs - Once    Insert Peripheral IV    NPO Diet    Bathroom Privileges With Assistance    CBC Auto Differential    ECG 12 Lead    ECG 12 Lead   4. Tachycardia  ECG 12 Lead    ECG 12 Lead   5. S/P MVR (mitral valve repair)     6. S/P TVR (tricuspid valve repair)     7. S/P  Maze operation for atrial fibrillation     8. Elevated serum creatinine     9. Anemia, unspecified type            Plan:       Dr. Rodríguez Ward and I had the pleasure of seeing the patient today. Dr. Ward reviewed her EKG and interpreted atrial flutter with rapid ventricular response.  The patient received 3 doses of Lopressor 5 mg IV push at least 5 minutes apart and her heart rate did not respond.  We have recommended the patient undergo electrical cardioversion tomorrow in the hospital.  The patient will be released home today and recommended to relax.  She did not take her dose of apixaban this morning and she received a dosage here in the office.  We've asked her to take her second dose of apixaban later this evening.  We have recommended that she discontinue her hydralazine and restart metoprolol tartrate 12.5 milligrams twice daily.  I've also recommended that she stop her aspirin, vitamin E, and fish oil.  Further recommendations will be made tomorrow after her electrical cardioversion.    I have informed Dr. Maria Luz Husain about the plan of care.    Patient was seen and dictated independently by DEBORAH Hurtado.  As always, it has been a pleasure to participate in your patient's care.      Sincerely,         DEBORAH Hurtado

## 2017-03-29 NOTE — PROGRESS NOTES
Discharge Planning Assessment  [facility]     Patient Name: Veronica Henao  MRN: [id number]  Today's Date: 3/29/2017    Admit Date: 3/28/2017          Discharge Needs Assessment       03/29/17 0900    Living Environment    Lives With alone    Living Arrangements house   states there are 2 steps to enter her home and there is a handrail    Provides Primary Care For no one    Primary Care Provided By child(afshin) (specify)    Quality Of Family Relationships supportive    Able to Return to Prior Living Arrangements yes    Discharge Needs Assessment    Concerns To Be Addressed denies needs/concerns at this time    Outpatient/Agency/Support Group Needs homecare agency (specify level of care);inpatient rehabilitation facility (specify)    Community Agency Name(S) states she has been to [facility] rehab in the past after knee surgery and has used [facility] in the past    Anticipated Changes Related to Illness none    Equipment Currently Used at Home cane, quad;respiratory supplies   has a CPAP thru [employer]    Equipment Needed After Discharge none    Transportation Available car;family or friend will provide    Current Discharge Risk lives alone    Discharge Disposition still a patient            Discharge Plan       03/29/17 0972    Case Management/Social Work Plan    Plan home via private auto with no anticipated needs    Patient/Family In Agreement With Plan yes    Additional Comments Introduced self/explained role of CCP. Face sheet data confirmed. CMS THOMSON letter signed. Pt states she lives alone and independently but has many family members who live close enough to assist her should any needs arise. States her daughter Nesha Young is her POA and Nesha plans to bring in the paperwork to put on pt's chart. Pt states she is unsure if dtr is POA/HC or Finance or both. Pt has a quad cane at bedside she uses for ambulation and a CPAP she wears at night thru [employer]. Pt states she does have a glucometer to check her blood sugar. Pt  states she went to [facility] after knee surgery in the past and has used [facility]. Plans at this time are home via private auto and she does not anticipate any needs. States she hopes to go home today. CCP to follow..........JW        Discharge Placement     No information found                Demographic Summary       03/29/17 0927    Referral Information    Admission Type observation    Arrived From home or self-care    Referral Source admission list    Reason For Consult discharge planning    Record Reviewed medical record    Contact Information    Permission Granted to Share Information With ;family/designee            Functional Status       03/29/17 0927    Functional Status Current    Ambulation 1-->assistive equipment    Transferring 1-->assistive equipment    Toileting 0-->independent    Bathing 0-->independent    Dressing 2-->assistive person    Eating 0-->independent    Communication 0-->understands/communicates without difficulty    Swallowing (if score 2 or more for any item, consult Rehab Services) 0-->swallows foods/liquids without difficulty    Current Functional Level Comment pt encouraged to call for staff assist for needs    Change in Functional Status Since Onset of Current Illness/Injury yes    Functional Status Prior    Ambulation 1-->assistive equipment    Transferring 1-->assistive equipment    Toileting 0-->independent    Bathing 0-->independent    Dressing 0-->independent    Eating 0-->independent    Communication 0-->understands/communicates without difficulty    Swallowing 0-->swallows foods/liquids without difficulty    IADL    Medications independent   also receives medications thru [employer] mail order    Meal Preparation independent    Housekeeping independent    Laundry independent    Shopping independent    Oral Care independent    Activity Tolerance    Current Activity Limitations none    Usual Activity Tolerance good    Current Activity Tolerance moderate     Cognitive/Perceptual/Developmental    Current Mental Status/Cognitive Functioning no deficits noted            Psychosocial     None            Abuse/Neglect     None            Legal       03/29/17 5329    Legal    Assistance with Managing/Advocating Healthcare Needs power of  for healthcare;power of  for finances    Legal Comments pt states she her daughter Nesha Young is her POA but she is unsure if it is for finances or healthcare or both. States she will request her daughter bring in copy of POA to put in her medical record            Substance Abuse     None            Patient Forms     None          Bisi Mccullough, RN

## 2017-03-29 NOTE — DISCHARGE SUMMARY
DISCHARGE NOTE    Patient Name: Veronica Henao  Age/Sex: 82 y.o. female  : 1935  MRN: [id number]    Date of Discharge:  3/29/2017   Date of Admit: 3/28/2017  Encounter Provider: Rodríguez Ward MD  Place of Service: [facility] CARDIOLOGY  Patient Care Team:  Nelly Price MD as PCP - General  Gino Gibbs MD as Consulting Physician (Cardiology)    Subjective:     Discharge Diagnosis:  Active Problems:    Atrial fibrillation, rapid    Right Atrial Flutter     Hospital Course:  Ms. Henao is a 82 year old female patient of Dr. Maria Luz Husain with a documented history of paroxysmal atrial fibrillation, moderate to severe MR and TR, moderate complex plaque in the ascending aorta and on 16 she had mitral valve repair with 31 mm ATS bands posterior annuloplasty and P2 chordal repair, tricuspid valve had annuloplasty with a 26 mm ring and plication of the anterior and septal leaflet of commissure, left and right Maze procedure, and left atrial appendage ligated. Postoperatively she had asystole and bradycardia for 2 days. She also experienced postoperative atrial fibrillation. By the end of discharge she was able to tolerate a beta blocker. Amiodarone was not initiated because of asystole. She was started on warfarin therapy for stroke prevention, which was later switched to Eliquis per patient request and her beta blocker was discontinued due to bradycardia and fatigue. I had seen her in [facility] on 3/20 for atrial flutter with RVR. I gave her 3 doses of IV Lopressor without response. She was restarted on her beta blocker and set up for cardioversion on the .  On 3/23 patient had called and gone back into atrial fibrillation/flutter, she denied missing any doses of her medications. I recommended for her to have an ablation and patient agreed to proceed.     She is now POD # 1  EPS/CTI ablation and eliminated right atrial flutter. She has done well and feels great, her right groin site oozed a little last night, but there is no hematoma. She has ambulated and denied any chest pain, shortness of breath or palpitations. There has been no ectopy noted on telemetry. She is back on her apixaban and has a follow up already scheduled with Dr. Husain. She is not requiring any Narcotics for pain.         Procedures:   Conclusions:  The previous Maze procedure left her with almost no areas of atrial e-grams.   Successful caval tricuspid isthmus ablation for the elimination of right atrial flutter  Normal His-Purkinje function  Normal AV node function  No evidence of an accessory pathway    Vital Signs  Temp:  [97.7 °F (36.5 °C)-98.2 °F (36.8 °C)] 97.7 °F (36.5 °C)  Heart Rate:  [72-80] 72  Resp:  [16-18] 18  BP: (117-145)/(55-75) 140/65    Intake/Output Summary (Last 24 hours) at 03/29/17 1338  Last data filed at 03/29/17 0900   Gross per 24 hour   Intake              980 ml   Output                0 ml   Net              980 ml       Physical Exam:  Physical Exam   Constitutional: She is oriented to person, place, and time.   HENT:   Head: Normocephalic and atraumatic.   Eyes: Right eye exhibits no discharge. Left eye exhibits no discharge.   Neck: No JVD present. No thyromegaly present.   Cardiovascular: Normal rate and regular rhythm.  Exam reveals no gallop and no friction rub.    No murmur heard.  Pulmonary/Chest: Effort normal and breath sounds normal. She has no rales.   Abdominal: Soft. Bowel sounds are normal. There is no tenderness.   Musculoskeletal: Normal range of motion. She exhibits no edema or deformity.   Neurological: She is alert and oriented to person, place, and time. She exhibits normal muscle tone.   Skin: Skin is warm and dry. No erythema.   Psychiatric: She has a normal mood and affect. Her behavior is normal. Thought content normal.        Labs:                 Results from  last 7 days  Lab Units 03/29/17  0404   INR  1.21*           EKG:               Discharge Diet:         Dietary Orders            Start     Ordered    03/28/17 1838  Diet Regular  Diet Effective Now     Question:  Diet Texture / Consistency  Answer:  Regular    03/28/17 1837            Activity at Discharge:   Activity Instructions     **Gradually resume normal activity.**    **No heavy lifting greater than 10 pounds for one week.**    **No tub baths, swimming pools or hot tubs for one week.**                Discharge Medications   Veronica Henao   Home Medication Instructions VIDYA:[id number]    Printed on:03/29/17 9640   Medication Information                      apixaban (ELIQUIS) 2.5 MG tablet tablet  Take 2.5 mg by mouth 2 (Two) Times a Day.             cetirizine (zyrTEC) 10 MG tablet  Take 10 mg by mouth Every Night.             cholecalciferol (VITAMIN D3) 1000 UNITS tablet  Take 1,000 Units by mouth daily.             furosemide (LASIX) 40 MG tablet  Take 40 mg by mouth 2 (Two) Times a Day.             hydrALAZINE (APRESOLINE) 10 MG tablet  Take 10 mg by mouth 3 (Three) Times a Day.             LEVEMIR FLEXTOUCH 100 UNIT/ML injection  18 Units Every Night.             montelukast (SINGULAIR) 10 MG tablet  Take 10 mg by mouth Every Night.             Multiple Vitamins-Minerals (CENTRUM SILVER PO)  Take 1 tablet by mouth Daily.             potassium chloride (MICRO-K) 10 MEQ CR capsule  Take 10 mEq by mouth 2 (Two) Times a Day.             raNITIdine (ZANTAC) 150 MG tablet  Take 150 mg by mouth 2 (Two) Times a Day.             sertraline (ZOLOFT) 50 MG tablet  Take 50 mg by mouth Daily.                 Discharge disposition: home    Follow-up Appointments  Additional Instructions for the Follow-ups that You Need to Schedule     Additional Discharge Follow-Up (Specify Provider)    As directed    To:  Dr. Husain   Follow Up Details:  Keep appointment with Dr. Husain as scheduled       Basic Metabolic Panel    Apr  29, 2017 (Approximate)              Follow-up Information     Follow up with Nelly Price MD .    Specialty:  Internal Medicine    Contact information:    [address]  [place] [address]  [phone]          Future Appointments  Date Time Provider Department Center   4/4/2017 11:40 AM Maria Luz Nye MD [facility] None   4/21/2017 11:00 AM Maria Luz Nye MD [facility] None     Pt is ok     Fu w dr nye     Check bmp 7 in 4 weeks     Instructions -- no need for bb--causes side effect.           Rodríguez Ward MD  03/29/17  1:38 PM

## 2017-03-29 NOTE — PLAN OF CARE
Problem: Patient Care Overview (Adult)  Goal: Plan of Care Review  Outcome: Ongoing (interventions implemented as appropriate)    03/29/17 0503   Coping/Psychosocial Response Interventions   Plan Of Care Reviewed With patient   Patient Care Overview   Progress improving   Outcome Evaluation   Outcome Summary/Follow up Plan VSS. Bilateral groins c/d/i. No c/o this shift. Patient has ambulated to the bathroom and did well. Small rebleed after first attempt at ambulation. Held x10 min with + results. No further rebleeding.

## 2017-03-29 NOTE — DISCHARGE INSTR - ACTIVITY
**Gradually resume normal activity.**    **No heavy lifting greater than 10 pounds for one week.**    **No tub baths, swimming pools or hot tubs for one week.**

## 2017-04-11 ENCOUNTER — TELEPHONE (OUTPATIENT)
Dept: CARDIOLOGY | Facility: CLINIC | Age: 82
End: 2017-04-11

## 2017-05-12 ENCOUNTER — OFFICE VISIT (OUTPATIENT)
Dept: CARDIOLOGY | Facility: CLINIC | Age: 82
End: 2017-05-12

## 2017-05-12 ENCOUNTER — HOSPITAL ENCOUNTER (OUTPATIENT)
Facility: HOSPITAL | Age: 82
Setting detail: HOSPITAL OUTPATIENT SURGERY
End: 2017-05-12
Attending: INTERNAL MEDICINE | Admitting: INTERNAL MEDICINE

## 2017-05-12 VITALS
RESPIRATION RATE: 16 BRPM | DIASTOLIC BLOOD PRESSURE: 88 MMHG | SYSTOLIC BLOOD PRESSURE: 136 MMHG | HEART RATE: 139 BPM | BODY MASS INDEX: 32.43 KG/M2 | HEIGHT: 63 IN | WEIGHT: 183 LBS

## 2017-05-12 DIAGNOSIS — I34.0 NON-RHEUMATIC MITRAL REGURGITATION: ICD-10-CM

## 2017-05-12 DIAGNOSIS — I10 BENIGN ESSENTIAL HYPERTENSION: ICD-10-CM

## 2017-05-12 DIAGNOSIS — I48.0 PAROXYSMAL ATRIAL FIBRILLATION (HCC): ICD-10-CM

## 2017-05-12 DIAGNOSIS — I48.4 ATYPICAL ATRIAL FLUTTER (HCC): Primary | ICD-10-CM

## 2017-05-12 PROCEDURE — 93000 ELECTROCARDIOGRAM COMPLETE: CPT | Performed by: INTERNAL MEDICINE

## 2017-05-12 PROCEDURE — 99214 OFFICE O/P EST MOD 30 MIN: CPT | Performed by: INTERNAL MEDICINE

## 2017-05-24 ENCOUNTER — HOSPITAL ENCOUNTER (INPATIENT)
Facility: HOSPITAL | Age: 82
LOS: 1 days | Discharge: HOME | End: 2017-05-26
Attending: EMERGENCY MEDICINE | Admitting: INTERNAL MEDICINE

## 2017-05-24 ENCOUNTER — TELEPHONE (OUTPATIENT)
Dept: CARDIOLOGY | Facility: CLINIC | Age: 82
End: 2017-05-24

## 2017-05-24 ENCOUNTER — APPOINTMENT (OUTPATIENT)
Dept: GENERAL RADIOLOGY | Facility: HOSPITAL | Age: 82
End: 2017-05-24

## 2017-05-24 DIAGNOSIS — I48.0 PAF (PAROXYSMAL ATRIAL FIBRILLATION) (HCC): ICD-10-CM

## 2017-05-24 DIAGNOSIS — I50.9 ACUTE ON CHRONIC CONGESTIVE HEART FAILURE, UNSPECIFIED CONGESTIVE HEART FAILURE TYPE: Primary | ICD-10-CM

## 2017-05-24 LAB
ALBUMIN SERPL-MCNC: 3.9 G/DL (ref 3.5–5.2)
ALBUMIN/GLOB SERPL: 1 G/DL
ALP SERPL-CCNC: 147 U/L (ref 39–117)
ALT SERPL W P-5'-P-CCNC: 16 U/L (ref 1–33)
ANION GAP SERPL CALCULATED.3IONS-SCNC: 13.2 MMOL/L
AST SERPL-CCNC: 23 U/L (ref 1–32)
BASOPHILS # BLD AUTO: 0.05 10*3/MM3 (ref 0–0.2)
BASOPHILS NFR BLD AUTO: 0.5 % (ref 0–1.5)
BILIRUB SERPL-MCNC: 0.3 MG/DL (ref 0.1–1.2)
BUN BLD-MCNC: 44 MG/DL (ref 8–23)
BUN/CREAT SERPL: 22.2 (ref 7–25)
CALCIUM SPEC-SCNC: 10 MG/DL (ref 8.6–10.5)
CHLORIDE SERPL-SCNC: 92 MMOL/L (ref 98–107)
CO2 SERPL-SCNC: 27.8 MMOL/L (ref 22–29)
CREAT BLD-MCNC: 1.98 MG/DL (ref 0.57–1)
DEPRECATED RDW RBC AUTO: 50.9 FL (ref 37–54)
EOSINOPHIL # BLD AUTO: 0.38 10*3/MM3 (ref 0–0.7)
EOSINOPHIL NFR BLD AUTO: 3.8 % (ref 0.3–6.2)
ERYTHROCYTE [DISTWIDTH] IN BLOOD BY AUTOMATED COUNT: 15.9 % (ref 11.7–13)
GFR SERPL CREATININE-BSD FRML MDRD: 24 ML/MIN/1.73
GLOBULIN UR ELPH-MCNC: 4.1 GM/DL
GLUCOSE BLD-MCNC: 169 MG/DL (ref 65–99)
HCT VFR BLD AUTO: 33 % (ref 35.6–45.5)
HGB BLD-MCNC: 10.5 G/DL (ref 11.9–15.5)
IMM GRANULOCYTES # BLD: 0.03 10*3/MM3 (ref 0–0.03)
IMM GRANULOCYTES NFR BLD: 0.3 % (ref 0–0.5)
LYMPHOCYTES # BLD AUTO: 3.05 10*3/MM3 (ref 0.9–4.8)
LYMPHOCYTES NFR BLD AUTO: 30.5 % (ref 19.6–45.3)
MCH RBC QN AUTO: 27.7 PG (ref 26.9–32)
MCHC RBC AUTO-ENTMCNC: 31.8 G/DL (ref 32.4–36.3)
MCV RBC AUTO: 87.1 FL (ref 80.5–98.2)
MONOCYTES # BLD AUTO: 0.76 10*3/MM3 (ref 0.2–1.2)
MONOCYTES NFR BLD AUTO: 7.6 % (ref 5–12)
NEUTROPHILS # BLD AUTO: 5.74 10*3/MM3 (ref 1.9–8.1)
NEUTROPHILS NFR BLD AUTO: 57.3 % (ref 42.7–76)
NT-PROBNP SERPL-MCNC: 2700 PG/ML (ref 0–1800)
PLATELET # BLD AUTO: 295 10*3/MM3 (ref 140–500)
PMV BLD AUTO: 10 FL (ref 6–12)
POTASSIUM BLD-SCNC: 4.2 MMOL/L (ref 3.5–5.2)
PROT SERPL-MCNC: 8 G/DL (ref 6–8.5)
RBC # BLD AUTO: 3.79 10*6/MM3 (ref 3.9–5.2)
SODIUM BLD-SCNC: 133 MMOL/L (ref 136–145)
TROPONIN T SERPL-MCNC: <0.01 NG/ML (ref 0–0.03)
WBC NRBC COR # BLD: 10.01 10*3/MM3 (ref 4.5–10.7)

## 2017-05-24 PROCEDURE — 80053 COMPREHEN METABOLIC PANEL: CPT | Performed by: EMERGENCY MEDICINE

## 2017-05-24 PROCEDURE — 83880 ASSAY OF NATRIURETIC PEPTIDE: CPT | Performed by: EMERGENCY MEDICINE

## 2017-05-24 PROCEDURE — 85025 COMPLETE CBC W/AUTO DIFF WBC: CPT | Performed by: EMERGENCY MEDICINE

## 2017-05-24 PROCEDURE — 93010 ELECTROCARDIOGRAM REPORT: CPT | Performed by: INTERNAL MEDICINE

## 2017-05-24 PROCEDURE — 71020 HC CHEST PA AND LATERAL: CPT

## 2017-05-24 PROCEDURE — 25010000002 FUROSEMIDE PER 20 MG: Performed by: EMERGENCY MEDICINE

## 2017-05-24 PROCEDURE — 93005 ELECTROCARDIOGRAM TRACING: CPT | Performed by: EMERGENCY MEDICINE

## 2017-05-24 PROCEDURE — 99285 EMERGENCY DEPT VISIT HI MDM: CPT

## 2017-05-24 PROCEDURE — 84484 ASSAY OF TROPONIN QUANT: CPT | Performed by: EMERGENCY MEDICINE

## 2017-05-24 RX ORDER — SODIUM CHLORIDE 0.9 % (FLUSH) 0.9 %
10 SYRINGE (ML) INJECTION AS NEEDED
Status: DISCONTINUED | OUTPATIENT
Start: 2017-05-24 | End: 2017-05-26 | Stop reason: HOSPADM

## 2017-05-24 RX ORDER — FUROSEMIDE 10 MG/ML
40 INJECTION INTRAMUSCULAR; INTRAVENOUS ONCE
Status: COMPLETED | OUTPATIENT
Start: 2017-05-24 | End: 2017-05-24

## 2017-05-24 RX ADMIN — NITROGLYCERIN 1 INCH: 20 OINTMENT TOPICAL at 22:54

## 2017-05-24 RX ADMIN — FUROSEMIDE 40 MG: 10 INJECTION, SOLUTION INTRAMUSCULAR; INTRAVENOUS at 22:54

## 2017-05-25 PROBLEM — I50.9 CHF, ACUTE ON CHRONIC (HCC): Status: ACTIVE | Noted: 2017-05-25

## 2017-05-25 PROBLEM — I50.9 ACUTE ON CHRONIC CONGESTIVE HEART FAILURE (HCC): Status: ACTIVE | Noted: 2017-05-25

## 2017-05-25 LAB
ANION GAP SERPL CALCULATED.3IONS-SCNC: 14.7 MMOL/L
BUN BLD-MCNC: 40 MG/DL (ref 8–23)
BUN/CREAT SERPL: 19.8 (ref 7–25)
CALCIUM SPEC-SCNC: 10 MG/DL (ref 8.6–10.5)
CHLORIDE SERPL-SCNC: 97 MMOL/L (ref 98–107)
CO2 SERPL-SCNC: 26.3 MMOL/L (ref 22–29)
CREAT BLD-MCNC: 2.02 MG/DL (ref 0.57–1)
GFR SERPL CREATININE-BSD FRML MDRD: 24 ML/MIN/1.73
GLUCOSE BLD-MCNC: 169 MG/DL (ref 65–99)
MAGNESIUM SERPL-MCNC: 2.3 MG/DL (ref 1.6–2.4)
POTASSIUM BLD-SCNC: 4.3 MMOL/L (ref 3.5–5.2)
SODIUM BLD-SCNC: 138 MMOL/L (ref 136–145)

## 2017-05-25 PROCEDURE — 80048 BASIC METABOLIC PNL TOTAL CA: CPT | Performed by: INTERNAL MEDICINE

## 2017-05-25 PROCEDURE — 99223 1ST HOSP IP/OBS HIGH 75: CPT | Performed by: INTERNAL MEDICINE

## 2017-05-25 PROCEDURE — 99221 1ST HOSP IP/OBS SF/LOW 40: CPT | Performed by: NURSE PRACTITIONER

## 2017-05-25 PROCEDURE — 93005 ELECTROCARDIOGRAM TRACING: CPT | Performed by: INTERNAL MEDICINE

## 2017-05-25 PROCEDURE — 25010000002 FUROSEMIDE PER 20 MG: Performed by: INTERNAL MEDICINE

## 2017-05-25 PROCEDURE — 83735 ASSAY OF MAGNESIUM: CPT | Performed by: INTERNAL MEDICINE

## 2017-05-25 PROCEDURE — 93010 ELECTROCARDIOGRAM REPORT: CPT | Performed by: INTERNAL MEDICINE

## 2017-05-25 PROCEDURE — 63710000001 INSULIN DETEMER PER 5 UNITS: Performed by: INTERNAL MEDICINE

## 2017-05-25 RX ORDER — CARVEDILOL 12.5 MG/1
12.5 TABLET ORAL 2 TIMES DAILY WITH MEALS
Status: DISCONTINUED | OUTPATIENT
Start: 2017-05-25 | End: 2017-05-26 | Stop reason: HOSPADM

## 2017-05-25 RX ORDER — CETIRIZINE HYDROCHLORIDE 10 MG/1
10 TABLET ORAL NIGHTLY
Status: DISCONTINUED | OUTPATIENT
Start: 2017-05-25 | End: 2017-05-26 | Stop reason: HOSPADM

## 2017-05-25 RX ORDER — MONTELUKAST SODIUM 10 MG/1
10 TABLET ORAL NIGHTLY
Status: DISCONTINUED | OUTPATIENT
Start: 2017-05-25 | End: 2017-05-26 | Stop reason: HOSPADM

## 2017-05-25 RX ORDER — NITROGLYCERIN 0.4 MG/1
0.4 TABLET SUBLINGUAL
Status: DISCONTINUED | OUTPATIENT
Start: 2017-05-25 | End: 2017-05-26 | Stop reason: HOSPADM

## 2017-05-25 RX ORDER — CARVEDILOL 12.5 MG/1
12.5 TABLET ORAL 2 TIMES DAILY WITH MEALS
Qty: 60 TABLET | Refills: 1 | Status: SHIPPED | OUTPATIENT
Start: 2017-05-25 | End: 2017-06-09

## 2017-05-25 RX ORDER — ACETAMINOPHEN 325 MG/1
650 TABLET ORAL EVERY 4 HOURS PRN
Status: DISCONTINUED | OUTPATIENT
Start: 2017-05-25 | End: 2017-05-26 | Stop reason: HOSPADM

## 2017-05-25 RX ORDER — CARVEDILOL 12.5 MG/1
12.5 TABLET ORAL 2 TIMES DAILY WITH MEALS
Status: DISCONTINUED | OUTPATIENT
Start: 2017-05-25 | End: 2017-05-25

## 2017-05-25 RX ORDER — HYDRALAZINE HYDROCHLORIDE 25 MG/1
25 TABLET, FILM COATED ORAL EVERY 12 HOURS SCHEDULED
Status: DISCONTINUED | OUTPATIENT
Start: 2017-05-25 | End: 2017-05-26 | Stop reason: HOSPADM

## 2017-05-25 RX ORDER — POTASSIUM CHLORIDE 750 MG/1
10 CAPSULE, EXTENDED RELEASE ORAL 2 TIMES DAILY
Status: DISCONTINUED | OUTPATIENT
Start: 2017-05-25 | End: 2017-05-26 | Stop reason: HOSPADM

## 2017-05-25 RX ORDER — FUROSEMIDE 10 MG/ML
40 INJECTION INTRAMUSCULAR; INTRAVENOUS ONCE
Status: COMPLETED | OUTPATIENT
Start: 2017-05-25 | End: 2017-05-25

## 2017-05-25 RX ORDER — SODIUM CHLORIDE 0.9 % (FLUSH) 0.9 %
1-10 SYRINGE (ML) INJECTION AS NEEDED
Status: DISCONTINUED | OUTPATIENT
Start: 2017-05-25 | End: 2017-05-26 | Stop reason: HOSPADM

## 2017-05-25 RX ORDER — HYDRALAZINE HYDROCHLORIDE 10 MG/1
10 TABLET, FILM COATED ORAL 3 TIMES DAILY
Status: DISCONTINUED | OUTPATIENT
Start: 2017-05-25 | End: 2017-05-25

## 2017-05-25 RX ORDER — LORAZEPAM 1 MG/1
1 TABLET ORAL EVERY 6 HOURS PRN
Status: DISCONTINUED | OUTPATIENT
Start: 2017-05-25 | End: 2017-05-26 | Stop reason: HOSPADM

## 2017-05-25 RX ADMIN — SERTRALINE 50 MG: 50 TABLET, FILM COATED ORAL at 11:19

## 2017-05-25 RX ADMIN — APIXABAN 2.5 MG: 2.5 TABLET, FILM COATED ORAL at 11:19

## 2017-05-25 RX ADMIN — CETIRIZINE HYDROCHLORIDE 10 MG: 10 TABLET, FILM COATED ORAL at 21:00

## 2017-05-25 RX ADMIN — CARVEDILOL 12.5 MG: 12.5 TABLET, FILM COATED ORAL at 19:05

## 2017-05-25 RX ADMIN — HYDRALAZINE HYDROCHLORIDE 25 MG: 25 TABLET, FILM COATED ORAL at 21:00

## 2017-05-25 RX ADMIN — INSULIN DETEMIR 18 UNITS: 100 INJECTION, SOLUTION SUBCUTANEOUS at 21:00

## 2017-05-25 RX ADMIN — MONTELUKAST 10 MG: 10 TABLET, FILM COATED ORAL at 20:59

## 2017-05-25 RX ADMIN — Medication 10 ML: at 21:00

## 2017-05-25 RX ADMIN — APIXABAN 2.5 MG: 2.5 TABLET, FILM COATED ORAL at 19:04

## 2017-05-25 RX ADMIN — HYDRALAZINE HYDROCHLORIDE 25 MG: 25 TABLET, FILM COATED ORAL at 11:18

## 2017-05-25 RX ADMIN — HYDRALAZINE HYDROCHLORIDE 75 MG: 25 TABLET, FILM COATED ORAL at 05:10

## 2017-05-25 RX ADMIN — FUROSEMIDE 40 MG: 10 INJECTION, SOLUTION INTRAMUSCULAR; INTRAVENOUS at 11:20

## 2017-05-25 RX ADMIN — POTASSIUM CHLORIDE 10 MEQ: 750 CAPSULE, EXTENDED RELEASE ORAL at 19:05

## 2017-05-25 RX ADMIN — POTASSIUM CHLORIDE 10 MEQ: 750 CAPSULE, EXTENDED RELEASE ORAL at 11:22

## 2017-05-26 ENCOUNTER — APPOINTMENT (OUTPATIENT)
Dept: CARDIOLOGY | Facility: HOSPITAL | Age: 82
End: 2017-05-26
Attending: INTERNAL MEDICINE

## 2017-05-26 VITALS
HEIGHT: 63 IN | SYSTOLIC BLOOD PRESSURE: 150 MMHG | DIASTOLIC BLOOD PRESSURE: 69 MMHG | OXYGEN SATURATION: 93 % | RESPIRATION RATE: 16 BRPM | BODY MASS INDEX: 32.78 KG/M2 | WEIGHT: 185 LBS | TEMPERATURE: 98.3 F | HEART RATE: 59 BPM

## 2017-05-26 LAB
ANION GAP SERPL CALCULATED.3IONS-SCNC: 14 MMOL/L
BH CV ECHO MEAS - ACS: 1.7 CM
BH CV ECHO MEAS - AO MAX PG (FULL): 8 MMHG
BH CV ECHO MEAS - AO MEAN PG (FULL): 2 MMHG
BH CV ECHO MEAS - AO MEAN PG: 5 MMHG
BH CV ECHO MEAS - AO ROOT AREA (BSA CORRECTED): 1.4
BH CV ECHO MEAS - AO ROOT AREA: 5.3 CM^2
BH CV ECHO MEAS - AO ROOT DIAM: 2.6 CM
BH CV ECHO MEAS - AO V2 MAX: 137 CM/SEC
BH CV ECHO MEAS - AO V2 MEAN: 103 CM/SEC
BH CV ECHO MEAS - AO V2 VTI: 30.8 CM
BH CV ECHO MEAS - AVA(I,A): 3.3 CM^2
BH CV ECHO MEAS - AVA(I,D): 3.3 CM^2
BH CV ECHO MEAS - BSA(HAYCOCK): 2 M^2
BH CV ECHO MEAS - BSA: 1.9 M^2
BH CV ECHO MEAS - BZI_BMI: 32.8 KILOGRAMS/M^2
BH CV ECHO MEAS - BZI_METRIC_HEIGHT: 160 CM
BH CV ECHO MEAS - BZI_METRIC_WEIGHT: 83.9 KG
BH CV ECHO MEAS - CONTRAST EF (2CH): 63 ML/M^2
BH CV ECHO MEAS - CONTRAST EF 4CH: 71 ML/M^2
BH CV ECHO MEAS - EDV(CUBED): 59.3 ML
BH CV ECHO MEAS - EDV(MOD-SP2): 73 ML
BH CV ECHO MEAS - EDV(MOD-SP4): 69 ML
BH CV ECHO MEAS - EDV(TEICH): 65.9 ML
BH CV ECHO MEAS - EF(CUBED): 66.8 %
BH CV ECHO MEAS - EF(MOD-SP2): 63 %
BH CV ECHO MEAS - EF(MOD-SP4): 71 %
BH CV ECHO MEAS - EF(TEICH): 59 %
BH CV ECHO MEAS - ESV(CUBED): 19.7 ML
BH CV ECHO MEAS - ESV(MOD-SP2): 27 ML
BH CV ECHO MEAS - ESV(MOD-SP4): 20 ML
BH CV ECHO MEAS - ESV(TEICH): 27 ML
BH CV ECHO MEAS - FS: 30.8 %
BH CV ECHO MEAS - IVS/LVPW: 1.2
BH CV ECHO MEAS - IVSD: 1.2 CM
BH CV ECHO MEAS - LA DIMENSION(2D): 32 CM
BH CV ECHO MEAS - LA DIMENSION: 7 CM
BH CV ECHO MEAS - LA/AO: 1.9
BH CV ECHO MEAS - LAT PEAK E' VEL: 7 CM/SEC
BH CV ECHO MEAS - LV DIASTOLIC VOL/BSA (35-75): 36.9 ML/M^2
BH CV ECHO MEAS - LV MASS(C)D: 140.1 GRAMS
BH CV ECHO MEAS - LV MASS(C)DI: 74.9 GRAMS/M^2
BH CV ECHO MEAS - LV MEAN PG: 3 MMHG
BH CV ECHO MEAS - LV SYSTOLIC VOL/BSA (12-30): 10.7 ML/M^2
BH CV ECHO MEAS - LV V1 MEAN: 86 CM/SEC
BH CV ECHO MEAS - LV V1 VTI: 32 CM
BH CV ECHO MEAS - LVIDD: 3.9 CM
BH CV ECHO MEAS - LVIDS: 2.7 CM
BH CV ECHO MEAS - LVLD AP2: 7.9 CM
BH CV ECHO MEAS - LVLD AP4: 7 CM
BH CV ECHO MEAS - LVLS AP2: 6.7 CM
BH CV ECHO MEAS - LVLS AP4: 5.7 CM
BH CV ECHO MEAS - LVOT AREA (M): 3.1 CM^2
BH CV ECHO MEAS - LVOT AREA: 3.1 CM^2
BH CV ECHO MEAS - LVOT DIAM: 2 CM
BH CV ECHO MEAS - LVPWD: 1 CM
BH CV ECHO MEAS - MED PEAK E' VEL: 5 CM/SEC
BH CV ECHO MEAS - MV A DUR: 0.11 SEC
BH CV ECHO MEAS - MV A MAX VEL: 41.5 CM/SEC
BH CV ECHO MEAS - MV DEC SLOPE: 672 CM/SEC^2
BH CV ECHO MEAS - MV DEC TIME: 0.37 SEC
BH CV ECHO MEAS - MV E MAX VEL: 172 CM/SEC
BH CV ECHO MEAS - MV E/A: 4.1
BH CV ECHO MEAS - MV MEAN PG: 6 MMHG
BH CV ECHO MEAS - MV P1/2T MAX VEL: 252 CM/SEC
BH CV ECHO MEAS - MV P1/2T: 109.8 MSEC
BH CV ECHO MEAS - MV V2 MEAN: 109 CM/SEC
BH CV ECHO MEAS - MV V2 VTI: 63.5 CM
BH CV ECHO MEAS - MVA P1/2T LCG: 0.87 CM^2
BH CV ECHO MEAS - MVA(P1/2T): 2 CM^2
BH CV ECHO MEAS - MVA(VTI): 1.6 CM^2
BH CV ECHO MEAS - PA MAX PG (FULL): 3.5 MMHG
BH CV ECHO MEAS - PA MAX PG: 4.4 MMHG
BH CV ECHO MEAS - PA V2 MAX: 105 CM/SEC
BH CV ECHO MEAS - PI END-D VEL: 92.3 CM/SEC
BH CV ECHO MEAS - PULM A REVS DUR: 0.16 SEC
BH CV ECHO MEAS - PULM A REVS VEL: 24.2 CM/SEC
BH CV ECHO MEAS - PULM DIAS VEL: 78.4 CM/SEC
BH CV ECHO MEAS - PULM S/D: 0.3
BH CV ECHO MEAS - PULM SYS VEL: 23.8 CM/SEC
BH CV ECHO MEAS - PVA(V,A): 1.9 CM^2
BH CV ECHO MEAS - PVA(V,D): 1.9 CM^2
BH CV ECHO MEAS - QP/QS: 0.51
BH CV ECHO MEAS - RAP SYSTOLE: 10 MMHG
BH CV ECHO MEAS - RV MAX PG: 0.9 MMHG
BH CV ECHO MEAS - RV MEAN PG: 1 MMHG
BH CV ECHO MEAS - RV V1 MAX: 47.4 CM/SEC
BH CV ECHO MEAS - RV V1 MEAN: 41.3 CM/SEC
BH CV ECHO MEAS - RV V1 VTI: 12.4 CM
BH CV ECHO MEAS - RVOT AREA: 4.2 CM^2
BH CV ECHO MEAS - RVOT DIAM: 2.3 CM
BH CV ECHO MEAS - RVSP: 45 MMHG
BH CV ECHO MEAS - SI(AO): 87.4 ML/M^2
BH CV ECHO MEAS - SI(CUBED): 21.2 ML/M^2
BH CV ECHO MEAS - SI(LVOT): 53.7 ML/M^2
BH CV ECHO MEAS - SI(MOD-SP2): 24.6 ML/M^2
BH CV ECHO MEAS - SI(MOD-SP4): 26.2 ML/M^2
BH CV ECHO MEAS - SI(TEICH): 20.8 ML/M^2
BH CV ECHO MEAS - SV(AO): 163.5 ML
BH CV ECHO MEAS - SV(CUBED): 39.6 ML
BH CV ECHO MEAS - SV(LVOT): 100.5 ML
BH CV ECHO MEAS - SV(MOD-SP2): 46 ML
BH CV ECHO MEAS - SV(MOD-SP4): 49 ML
BH CV ECHO MEAS - SV(RVOT): 51.5 ML
BH CV ECHO MEAS - SV(TEICH): 38.9 ML
BH CV ECHO MEAS - TAPSE (>1.6): 0.9 CM2
BH CV ECHO MEAS - TR MAX VEL: 307 CM/SEC
BH CV XLRA - RV BASE: 3.6 CM
BH CV XLRA - TDI S': 9 CM/SEC
BUN BLD-MCNC: 41 MG/DL (ref 8–23)
BUN/CREAT SERPL: 20.6 (ref 7–25)
CALCIUM SPEC-SCNC: 9.5 MG/DL (ref 8.6–10.5)
CHLORIDE SERPL-SCNC: 96 MMOL/L (ref 98–107)
CO2 SERPL-SCNC: 25 MMOL/L (ref 22–29)
CREAT BLD-MCNC: 1.99 MG/DL (ref 0.57–1)
E/E' RATIO: 32
GFR SERPL CREATININE-BSD FRML MDRD: 24 ML/MIN/1.73
GLUCOSE BLD-MCNC: 143 MG/DL (ref 65–99)
LEFT ATRIUM VOLUME INDEX: 66 ML/M2
LEFT ATRIUM VOLUME: 124 CM3
LV EF 2D ECHO EST: 70 %
MAGNESIUM SERPL-MCNC: 2.4 MG/DL (ref 1.6–2.4)
POTASSIUM BLD-SCNC: 4.2 MMOL/L (ref 3.5–5.2)
SODIUM BLD-SCNC: 135 MMOL/L (ref 136–145)

## 2017-05-26 PROCEDURE — 93306 TTE W/DOPPLER COMPLETE: CPT | Performed by: INTERNAL MEDICINE

## 2017-05-26 PROCEDURE — 93306 TTE W/DOPPLER COMPLETE: CPT

## 2017-05-26 PROCEDURE — 80048 BASIC METABOLIC PNL TOTAL CA: CPT | Performed by: INTERNAL MEDICINE

## 2017-05-26 PROCEDURE — 83735 ASSAY OF MAGNESIUM: CPT | Performed by: INTERNAL MEDICINE

## 2017-05-26 PROCEDURE — 99239 HOSP IP/OBS DSCHRG MGMT >30: CPT | Performed by: INTERNAL MEDICINE

## 2017-05-26 RX ORDER — HYDRALAZINE HYDROCHLORIDE 25 MG/1
25 TABLET, FILM COATED ORAL EVERY 12 HOURS SCHEDULED
Qty: 90 TABLET | Refills: 11 | Status: ON HOLD | OUTPATIENT
Start: 2017-05-26 | End: 2017-07-03

## 2017-05-26 RX ADMIN — APIXABAN 2.5 MG: 2.5 TABLET, FILM COATED ORAL at 09:02

## 2017-05-26 RX ADMIN — HYDRALAZINE HYDROCHLORIDE 25 MG: 25 TABLET, FILM COATED ORAL at 09:02

## 2017-05-26 RX ADMIN — CARVEDILOL 12.5 MG: 12.5 TABLET, FILM COATED ORAL at 09:02

## 2017-05-26 RX ADMIN — POTASSIUM CHLORIDE 10 MEQ: 750 CAPSULE, EXTENDED RELEASE ORAL at 09:02

## 2017-05-26 RX ADMIN — SERTRALINE 50 MG: 50 TABLET, FILM COATED ORAL at 09:02

## 2017-06-02 ENCOUNTER — OFFICE VISIT (OUTPATIENT)
Dept: INTERNAL MEDICINE | Facility: CLINIC | Age: 82
End: 2017-06-02

## 2017-06-02 VITALS
RESPIRATION RATE: 18 BRPM | OXYGEN SATURATION: 96 % | HEART RATE: 87 BPM | DIASTOLIC BLOOD PRESSURE: 90 MMHG | HEIGHT: 63 IN | BODY MASS INDEX: 33.84 KG/M2 | WEIGHT: 191 LBS | SYSTOLIC BLOOD PRESSURE: 162 MMHG

## 2017-06-02 DIAGNOSIS — G47.33 OBSTRUCTIVE SLEEP APNEA SYNDROME: ICD-10-CM

## 2017-06-02 DIAGNOSIS — I48.0 PAROXYSMAL ATRIAL FIBRILLATION (HCC): ICD-10-CM

## 2017-06-02 DIAGNOSIS — I50.22 CHRONIC SYSTOLIC CONGESTIVE HEART FAILURE (HCC): ICD-10-CM

## 2017-06-02 DIAGNOSIS — I10 ESSENTIAL HYPERTENSION: ICD-10-CM

## 2017-06-02 DIAGNOSIS — N28.9 RENAL INSUFFICIENCY: Primary | ICD-10-CM

## 2017-06-02 DIAGNOSIS — J01.00 ACUTE NON-RECURRENT MAXILLARY SINUSITIS: ICD-10-CM

## 2017-06-02 PROCEDURE — 99214 OFFICE O/P EST MOD 30 MIN: CPT | Performed by: INTERNAL MEDICINE

## 2017-06-02 RX ORDER — RIBOFLAVIN (VITAMIN B2) 100 MG
TABLET ORAL
Status: ON HOLD | COMMUNITY
Start: 2017-05-22 | End: 2020-01-05

## 2017-06-02 RX ORDER — VITAMIN E (DL,TOCOPHERYL ACET) 400 UNIT
400 TABLET,CHEWABLE ORAL DAILY
Status: ON HOLD | COMMUNITY
Start: 2017-05-22 | End: 2020-01-05

## 2017-06-02 RX ORDER — AZITHROMYCIN 250 MG/1
TABLET, FILM COATED ORAL
Qty: 6 TABLET | Refills: 0 | Status: SHIPPED | OUTPATIENT
Start: 2017-06-02 | End: 2017-06-14 | Stop reason: HOSPADM

## 2017-06-02 NOTE — PROGRESS NOTES
"Chief Complaint   Patient presents with   • Congestive Heart Failure     Hospital Follow Up   htn, chf, headache    History of Present Illness   Veronica Henao is a 82 y.o. female presents for acute care/ hospital follow up. Patient went to the ER for acute swelling and dizziness. Found to have emergent hypertension w/ chf. Her hydralazine was increased to 25 mg bid and had carvedilol 12.5 mg bid added for heart failure and flutter. She is on eliquis for anticoagulation. Today she reports a continued dyspnea \"like I can't breathe deep down\". \"I don't think my water pills are doing what they are supposed to do.\" on lasix 40 mg bid. Having some fatigue. Leg swelling persists L>R but improvement compared to hospitalization. Having sinus congestion w/ frontal and maxillary sinus pressure/ discomfort.            The following portions of the patient's history were reviewed and updated as appropriate: allergies, current medications, past family history, past medical history, past social history, past surgical history and problem list.  Current Outpatient Prescriptions on File Prior to Visit   Medication Sig Dispense Refill   • apixaban (ELIQUIS) 2.5 MG tablet tablet Take 1 tablet by mouth 2 (Two) Times a Day. 28 tablet 0   • carvedilol (COREG) 12.5 MG tablet Take 1 tablet by mouth 2 (Two) Times a Day With Meals. 60 tablet 1   • cetirizine (zyrTEC) 10 MG tablet Take 10 mg by mouth Every Night.     • cholecalciferol (VITAMIN D3) 1000 UNITS tablet Take 1,000 Units by mouth daily.     • furosemide (LASIX) 40 MG tablet Take 40 mg by mouth 2 (Two) Times a Day.     • hydrALAZINE (APRESOLINE) 25 MG tablet Take 1 tablet by mouth Every 12 (Twelve) Hours. 90 tablet 11   • LEVEMIR FLEXTOUCH 100 UNIT/ML injection 18 Units Every Night.     • montelukast (SINGULAIR) 10 MG tablet Take 10 mg by mouth Every Night.     • Multiple Vitamins-Minerals (CENTRUM SILVER PO) Take 1 tablet by mouth Daily.     • potassium chloride (MICRO-K) 10 MEQ CR " "capsule Take 10 mEq by mouth 2 (Two) Times a Day.     • raNITIdine (ZANTAC) 150 MG tablet Take 150 mg by mouth 2 (Two) Times a Day.     • sertraline (ZOLOFT) 50 MG tablet Take 50 mg by mouth Daily.       No current facility-administered medications on file prior to visit.      Review of Systems   Constitutional: Positive for fatigue.   HENT: Negative.    Eyes: Negative.    Respiratory: Positive for shortness of breath.    Cardiovascular: Positive for leg swelling.   Gastrointestinal: Negative.    Endocrine: Negative.    Genitourinary: Negative.    Musculoskeletal: Positive for arthralgias.   Skin: Negative.    Allergic/Immunologic: Negative.    Neurological: Positive for dizziness.   Hematological: Negative.    Psychiatric/Behavioral: Negative.        Objective   Physical Exam   Constitutional: She is oriented to person, place, and time. She appears well-developed and well-nourished.   HENT:   Head: Normocephalic and atraumatic.   Right Ear: External ear normal.   Left Ear: External ear normal.   Nose: Nose normal.   Mouth/Throat: Oropharynx is clear and moist.   Eyes: Conjunctivae and EOM are normal. Pupils are equal, round, and reactive to light.   Neck: Normal range of motion. Neck supple.   Cardiovascular: Normal rate and regular rhythm.    Edema L>R lower extremity 1-2 +   Pulmonary/Chest: Effort normal and breath sounds normal.   Abdominal: Soft. Bowel sounds are normal.   Musculoskeletal:   Ambulates w/ a cane   Neurological: She is alert and oriented to person, place, and time. She has normal reflexes.   Skin: Skin is warm and dry.   Psychiatric: She has a normal mood and affect. Her behavior is normal. Judgment and thought content normal.   Nursing note and vitals reviewed.       /68  Pulse 87  Resp 18  Ht 63\" (160 cm)  Wt 191 lb (86.6 kg)  SpO2 96%  BMI 33.83 kg/m2    Assessment/Plan   Diagnoses and all orders for this visit:    Renal insufficiency  -     Basic Metabolic Panel; " Future    Chronic systolic congestive heart failure    Paroxysmal atrial fibrillation    Essential hypertension    Obstructive sleep apnea syndrome    Acute non-recurrent maxillary sinusitis    Other orders  -     Vitamin E 400 UNITS chewable tablet;   -     Vitamins A & D (VITAMIN A & D) 76581-928 UNITS tablet;   -     Cyanocobalamin (VITAMIN B-12) 5000 MCG sublingual tablet;   -     metoprolol tartrate (LOPRESSOR) 25 MG tablet;   -     azithromycin (ZITHROMAX) 250 MG tablet; Take 2 tablets the first day, then 1 tablet daily for 4 days.      Patient here for hospital follow up from acute chf. She is still having edema and dyspnea w elevated bp. Will increase lasix to 2 tab am and 1 tab afternoon. Will repeat bmp in 1 week. She has an apparent sinusitis. Will give zpack for this. She will continue cpap hs. Avoid salt. Follow up w/ cardiology as planned. Call if worsens or no improvement.        addendum  6/16/17  Cr continues to increase w/ worsening gfr. Taking lasix 40 mg tid. She had recent cardioversion and feels well for now. Will try lasix 60 am 40 afternoon and repeat bmp next week. Patient informed and she will also f/u w/ her nephrologist.

## 2017-06-09 ENCOUNTER — OFFICE VISIT (OUTPATIENT)
Dept: CARDIOLOGY | Facility: CLINIC | Age: 82
End: 2017-06-09

## 2017-06-09 VITALS
HEIGHT: 63 IN | HEART RATE: 57 BPM | WEIGHT: 191 LBS | BODY MASS INDEX: 33.84 KG/M2 | DIASTOLIC BLOOD PRESSURE: 98 MMHG | SYSTOLIC BLOOD PRESSURE: 162 MMHG

## 2017-06-09 DIAGNOSIS — I10 ESSENTIAL HYPERTENSION: ICD-10-CM

## 2017-06-09 DIAGNOSIS — I48.92 ATRIAL FLUTTER, PAROXYSMAL (HCC): Primary | ICD-10-CM

## 2017-06-09 DIAGNOSIS — N28.9 RENAL INSUFFICIENCY: ICD-10-CM

## 2017-06-09 PROCEDURE — 99214 OFFICE O/P EST MOD 30 MIN: CPT | Performed by: NURSE PRACTITIONER

## 2017-06-09 PROCEDURE — 93000 ELECTROCARDIOGRAM COMPLETE: CPT | Performed by: NURSE PRACTITIONER

## 2017-06-09 RX ORDER — AMLODIPINE BESYLATE 5 MG/1
5 TABLET ORAL DAILY
Qty: 90 TABLET | Refills: 3 | Status: SHIPPED | OUTPATIENT
Start: 2017-06-09 | End: 2017-06-30

## 2017-06-09 RX ORDER — AMLODIPINE BESYLATE 5 MG/1
5 TABLET ORAL DAILY
Qty: 30 TABLET | Refills: 0 | Status: SHIPPED | OUTPATIENT
Start: 2017-06-09 | End: 2017-06-09 | Stop reason: SDUPTHER

## 2017-06-09 NOTE — PROGRESS NOTES
Date of Office Visit: 2017  Encounter Provider: DEBORAH Smith  Place of Service: [facility] CARDIOLOGY  Patient Name: Veronica Henao  :1935    Chief Complaint   Patient presents with   • PAROXYSMALL AFIB     1 MONTH BHE F/U -   :     HPI: Veroniac Henao is a 82 y.o. female who is a patient of Dr. Wade and Dr. Pino. She has a past medical history of  atrial fibrillation, status post maze with postop atrial flutter, status post ablation in 2017 (on apixaban), congestive heart failure, hypertension, hyperlipidemia, obstructive sleep apnea (on CPAP), valvular heart disease, status post mitral and tricuspid valve repair. She had recurrent atrial flutter despite ablation and on 3/24/17 she underwent cardioversion for recurrent atrial flutter but unfortunately has had intermittent recurrent atrial flutter. Patient was recently seen in office on  for follow up at which time she reported she was feeling well. She denied any palpitations, shortness of breath or chest pain and has been able to tolerate some activity. ECG performed at this time showed the patient was back in atrial flutter with a heart rate of 139.  Patient was referred to Dr. Ward for possible redo ablation which was scheduled on . She was not on a beta blocker as that time. It was discontinued due to increase fatigue (she has only tried metoprolol).     She then presented to the hospital on 17 with shortness of breath and was diagnosed with congestive heart failure. However she was not in atrial flutter at that time but was significantly hypertensive. She had a repeat echo which showed Normal LV systolic function with an EF of 70%, severely dilated left atrium, mild mitral valve stenosis with a mitral valve mean gradient is 6 mmHg.  Mild tricuspid regurgitation with a moderately elevated RVSP of 45-55 mmHg. Her medications were adjusted and she was willing to try carvedilol  and was discharged on 12.5mg and hydralazine was added. She presents today for follow up.     She tried the carvedilol however she just became excessively fatigued and could hardly walk across the floor so she discontinued the medication on June 5.  She said after being off of it for 2 days she felt much better. She now feels good without chest pain, shortness of breath, PND, orthopnea, dizziness or palpitations. She has been monitoring her blood pressure and has been getting systolic reading of 160-170's/70's since her discharge from the hospital (even while taking the carvedilol). She says her heart rate has been good, running 50-60's.          Past Medical History:   Diagnosis Date   • Acute bronchitis    • Acute renal insufficiency    • Allergic rhinitis    • Arrhythmia    • Atrial fibrillation    • Brachycephaly    • Breast pain, right    • Chest pain    • CHF (congestive heart failure)    • Chronic renal insufficiency    • Cough    • Depression    • Diabetes mellitus     TYPE 2   • Diverticulosis    • ORTIZ (dyspnea on exertion)    • Dyspnea    • Esophageal reflux    • Fatigue    • Gout    • Head injury    • Headache    • Hoarseness    • Hypercalcemia    • Hyperlipidemia    • Hypertension    • Influenza A (H1N1)    • Itching    • Leg swelling    • Lightheadedness    • Macular degeneration    • Malaise and fatigue    • Mitral valve regurgitation     moderate to severe   • Nontoxic multinodular goiter     RIGHT 2.0 CM  LEFT  2.5 CM   • JOSELUIS on CPAP    • Osteoarthritis    • PAF (paroxysmal atrial fibrillation)    • Palpitations    • Paresthesias    • Proteinuria    • Pulmonary nodule    • Pulmonic valve regurgitation     mild   • Sebaceous cyst    • Sebaceous cyst    • SOBOE (shortness of breath on exertion)    • Solitary thyroid nodule    • Subclinical hypothyroidism    • Tachycardia    • TR (tricuspid regurgitation)     mild to moderate   • Type 2 diabetes mellitus    • UTI (urinary tract infection)        Past  Surgical History:   Procedure Laterality Date   • APPENDECTOMY     • CARDIAC CATHETERIZATION      procedure outcome:    • CARDIAC CATHETERIZATION N/A 11/21/2016    Procedure: Coronary angiography;  Surgeon: Marcin العراقي MD;  Location: [facility] CATH INVASIVE LOCATION;  Service:    • CARDIAC CATHETERIZATION N/A 11/21/2016    Procedure: Left Heart Cath;  Surgeon: Marcin العراقي MD;  Location: [facility] CATH INVASIVE LOCATION;  Service:    • CARDIAC ELECTROPHYSIOLOGY PROCEDURE N/A 3/28/2017    Procedure: Ablation atrial flutter;  Surgeon: Rodríguez Ward MD;  Location: [facility] CATH INVASIVE LOCATION;  Service:    • CLAVICLE SURGERY Left    • GASTRIC RESTRICTION SURGERY     • HYSTERECTOMY     • LUMBAR DECOMPRESSION      L4-5   • MITRAL VALVE REPAIR/REPLACEMENT N/A 12/14/2016    Procedure: INTRAOPERATIVE KATELYN, MIDLINE STERNOTOMY, MITRAL VALVE REPAIR, TRICUSPID VALVE REPAIR, MAZE PROCEDURE;  Surgeon: Young Vital MD;  Location: [facility] OR;  Service:    • SHOULDER SURGERY Left     AFTER SURGERY FOR FRACTURED CLAVICLE   • TOTAL KNEE ARTHROPLASTY      bilateral       Social History     Social History   • Marital status:      Spouse name: N/A   • Number of children: N/A   • Years of education: N/A     Occupational History   • Not on file.     Social History Main Topics   • Smoking status: Former Smoker     Packs/day: 1.50     Years: 20.00   • Smokeless tobacco: Not on file      Comment: D/C 1970 SMOKING 1 1/2 PPD FOR 13 YRS BEFORE QUITTING   • Alcohol use No      Comment: caffeine use   • Drug use: No   • Sexual activity: Defer     Other Topics Concern   • Not on file     Social History Narrative       Family History   Problem Relation Age of Onset   • Emphysema Mother    • Heart disease Father    • Lung cancer Father    • Prostate cancer Father    • Asthma Sister    • Lung cancer Daughter    • Colon cancer Other    • No Known Problems Maternal Grandmother    • No Known Problems Maternal Grandfather    •  Arthritis Paternal Grandmother    • No Known Problems Paternal Grandfather        Review of Systems   Constitution: Positive for malaise/fatigue (while on beta blocker, improved since stopping 6/5). Negative for chills, fever, weight gain and weight loss.   HENT: Negative for ear pain, headaches, hearing loss, nosebleeds and sore throat.    Eyes: Negative for double vision, pain and visual disturbance.   Cardiovascular: Negative for chest pain, dyspnea on exertion, irregular heartbeat, leg swelling, near-syncope, orthopnea, palpitations, paroxysmal nocturnal dyspnea and syncope.   Respiratory: Negative for cough, shortness of breath, sleep disturbances due to breathing, snoring and wheezing.    Endocrine: Negative for cold intolerance, heat intolerance and polyuria.   Hematologic/Lymphatic: Negative.    Skin: Negative for itching and rash.   Musculoskeletal: Negative for joint pain, joint swelling and myalgias.   Gastrointestinal: Negative for abdominal pain, diarrhea, melena, nausea and vomiting.   Genitourinary: Negative for frequency, hematuria and hesitancy.   Neurological: Negative for excessive daytime sleepiness, light-headedness, numbness, paresthesias and seizures.   Psychiatric/Behavioral: Negative for altered mental status and depression.   Allergic/Immunologic: Negative.    All other systems reviewed and are negative.      Allergies   Allergen Reactions   • Cephalexin Swelling   • Meperidine Swelling   • Penicillins Swelling         Current Outpatient Prescriptions:   •  apixaban (ELIQUIS) 2.5 MG tablet tablet, Take 1 tablet by mouth 2 (Two) Times a Day., Disp: 28 tablet, Rfl: 0  •  azithromycin (ZITHROMAX) 250 MG tablet, Take 2 tablets the first day, then 1 tablet daily for 4 days., Disp: 6 tablet, Rfl: 0  •  cetirizine (zyrTEC) 10 MG tablet, Take 10 mg by mouth Every Night., Disp: , Rfl:   •  cholecalciferol (VITAMIN D3) 1000 UNITS tablet, Take 1,000 Units by mouth daily., Disp: , Rfl:   •   "Cyanocobalamin (VITAMIN B-12) 5000 MCG sublingual tablet, , Disp: , Rfl:   •  furosemide (LASIX) 40 MG tablet, Take 40 mg by mouth 2 (Two) Times a Day., Disp: , Rfl:   •  hydrALAZINE (APRESOLINE) 25 MG tablet, Take 1 tablet by mouth Every 12 (Twelve) Hours., Disp: 90 tablet, Rfl: 11  •  LEVEMIR FLEXTOUCH 100 UNIT/ML injection, 18 Units Every Night., Disp: , Rfl:   •  montelukast (SINGULAIR) 10 MG tablet, Take 10 mg by mouth Every Night., Disp: , Rfl:   •  Multiple Vitamins-Minerals (CENTRUM SILVER PO), Take 1 tablet by mouth Daily., Disp: , Rfl:   •  potassium chloride (MICRO-K) 10 MEQ CR capsule, Take 10 mEq by mouth 2 (Two) Times a Day., Disp: , Rfl:   •  raNITIdine (ZANTAC) 150 MG tablet, Take 150 mg by mouth 2 (Two) Times a Day., Disp: , Rfl:   •  sertraline (ZOLOFT) 50 MG tablet, Take 50 mg by mouth Daily., Disp: , Rfl:   •  Vitamin E 400 UNITS chewable tablet, , Disp: , Rfl:   •  Vitamins A & D (VITAMIN A & D) 13614-483 UNITS tablet, , Disp: , Rfl:   •  amLODIPine (NORVASC) 5 MG tablet, Take 1 tablet by mouth Daily., Disp: 90 tablet, Rfl: 3     Objective:     Vitals:    06/09/17 1000   BP: 162/98   Pulse: 57   Weight: 191 lb (86.6 kg)   Height: 63\" (160 cm)     Body mass index is 33.83 kg/(m^2).    PHYSICAL EXAM:    Vitals Reviewed.   General Appearance: No acute distress, well developed and well nourished.   Eyes: Conjunctiva and lids: No erythema, swelling, or discharge. Sclera non-icteric.   HENT: Atraumatic, normocephalic. External eyes, ears, and nose normal. No hearing loss noted. Mucous membranes normal. Lips not cyanotic. Neck supple with no tenderness.  Respiratory: No signs of respiratory distress. Respiration rhythm and depth normal.   Clear to auscultation. No rales, crackles, rhonchi, or wheezing auscultated.   Cardiovascular:  Jugular Venous Pressure: Normal  Heart Rate and Rhythm: Normal, Heart Sounds: Normal S1 and S2. No S3 or S4 noted.  Murmurs: No murmurs noted. No rubs, thrills, or gallops. "   Arterial Pulses: Posterior tibialis and dorsalis pedis pulses normal.   Lower Extremities: Left lower extremity with some mild edema.  Gastrointestinal:  Abdomen soft, non-distended, non-tender. Normal bowel sounds. No hepatomegaly.   Musculoskeletal: Normal movement of extremities  Skin and Nails: General appearance normal. No pallor, cyanosis, diaphoresis. Skin temperature normal. No clubbing of fingernails.   Psychiatric: Patient alert and oriented to person, place, and time. Speech and behavior appropriate. Normal mood and affect.       ECG 12 Lead  Date/Time: 6/9/2017 10:40 AM  Performed by: TELLY LOPEZ  Authorized by: TELLY LOPEZ   Comparison: compared with previous ECG from 5/24/2017  Similar to previous ECG  Rhythm: sinus bradycardia  Clinical impression: non-specific ECG  Comments: Clinical indication: PAF              Assessment:       Diagnosis Plan   1. Atrial flutter, paroxysmal     2. Essential hypertension            Plan:       1. Atrial Fibrillation and Atrial Flutter  Assessment  • The patient has paroxysmal atrial flutter  • The patient's CHADS2-VASc score is 5  • A [id number] score of 2 or more is considered a high risk for a thromboembolic event  • Apixaban prescribed  • She has remained in SB/SR for about a month now. Discussed with Dr. Ward and as long as she is doing well he would prefer not to do repeat ablation but it is always and option. He recommended that if she does have palpitations and heart racing that she could try just PRN carvedilol and she is agreeable to try that. She is going to follow up with Dr. Husain in 4 weeks for her blood pressure and I have told her that if she has further problems with the atrial flutter that Dr. Husain will refer her back to Dr. Ward.     Plan  • Attempt to maintain sinus rhythm  • Continue apixaban for antithrombotic therapy, bleeding issues discussed    2. HTN, blood pressure is still elevated. Discussed with Dr. Husain and we are going  to add amlodipine 5mg daily and she will see her in 4 weeks. Ms. Henao is going to continue to monitor her blood pressure and heart rate and bring her readings with her to that visit.   As always, it has been a pleasure to participate in your patient's care.      Sincerely,         DEBORAH Werner

## 2017-06-12 ENCOUNTER — TELEPHONE (OUTPATIENT)
Dept: CARDIOLOGY | Facility: CLINIC | Age: 82
End: 2017-06-12

## 2017-06-12 NOTE — TELEPHONE ENCOUNTER
Pt reports that Friday 6/9, she was seen by Monie Huitron and a new BP medicine was added Amlodipine 5 mg once daily. She reports her BP is better but her heart rate is elevated now.    Pt reports feeling rapid hr/ palpitations, soa with exertion, and increased fatigue, states she feels like she is running a marathon.    Denies swelling or chest pain. Pt reports she has not missed any doses of any medication, no recent illness, the only new medicine is listed above, no diet change.    Below are vitals    Friday   139/95, 138 pm    Saturday  151/100, 138 am  159/95, 136 noon  146/85, 145 pm    Sunday  146/94, 129 am  153/85, 137 noon  146/90, 140    Monday  155/96, 132    Please advise

## 2017-06-13 LAB
BUN SERPL-MCNC: 50 MG/DL (ref 8–23)
BUN/CREAT SERPL: 22.1 (ref 7–25)
CALCIUM SERPL-MCNC: 10.1 MG/DL (ref 8.6–10.5)
CHLORIDE SERPL-SCNC: 91 MMOL/L (ref 98–107)
CO2 SERPL-SCNC: 30.9 MMOL/L (ref 22–29)
CREAT SERPL-MCNC: 2.26 MG/DL (ref 0.57–1)
GLUCOSE SERPL-MCNC: 75 MG/DL (ref 65–99)
POTASSIUM SERPL-SCNC: 4.6 MMOL/L (ref 3.5–5.2)
SODIUM SERPL-SCNC: 135 MMOL/L (ref 136–145)

## 2017-06-14 ENCOUNTER — CLINICAL SUPPORT (OUTPATIENT)
Dept: CARDIOLOGY | Facility: CLINIC | Age: 82
End: 2017-06-14

## 2017-06-14 ENCOUNTER — HOSPITAL ENCOUNTER (OUTPATIENT)
Dept: CARDIOLOGY | Facility: HOSPITAL | Age: 82
Setting detail: HOSPITAL OUTPATIENT SURGERY
Discharge: HOME | End: 2017-06-14
Attending: INTERNAL MEDICINE | Admitting: INTERNAL MEDICINE

## 2017-06-14 VITALS
RESPIRATION RATE: 16 BRPM | HEIGHT: 63 IN | HEART RATE: 60 BPM | SYSTOLIC BLOOD PRESSURE: 168 MMHG | DIASTOLIC BLOOD PRESSURE: 67 MMHG | TEMPERATURE: 97.9 F | OXYGEN SATURATION: 99 %

## 2017-06-14 DIAGNOSIS — I48.4 ATYPICAL ATRIAL FLUTTER (HCC): ICD-10-CM

## 2017-06-14 DIAGNOSIS — I48.4 ATYPICAL ATRIAL FLUTTER (HCC): Primary | ICD-10-CM

## 2017-06-14 LAB — GLUCOSE BLDC GLUCOMTR-MCNC: 165 MG/DL (ref 70–130)

## 2017-06-14 PROCEDURE — 93005 ELECTROCARDIOGRAM TRACING: CPT | Performed by: INTERNAL MEDICINE

## 2017-06-14 PROCEDURE — 82962 GLUCOSE BLOOD TEST: CPT

## 2017-06-14 PROCEDURE — 92960 CARDIOVERSION ELECTRIC EXT: CPT | Performed by: INTERNAL MEDICINE

## 2017-06-14 PROCEDURE — 93000 ELECTROCARDIOGRAM COMPLETE: CPT | Performed by: INTERNAL MEDICINE

## 2017-06-14 PROCEDURE — 92960 CARDIOVERSION ELECTRIC EXT: CPT

## 2017-06-14 RX ORDER — HYDRALAZINE HYDROCHLORIDE 25 MG/1
25 TABLET, FILM COATED ORAL 3 TIMES DAILY
Status: ON HOLD | COMMUNITY
End: 2017-07-03

## 2017-06-14 RX ORDER — SODIUM CHLORIDE 9 MG/ML
75 INJECTION, SOLUTION INTRAVENOUS CONTINUOUS
Status: DISCONTINUED | OUTPATIENT
Start: 2017-06-14 | End: 2017-06-15 | Stop reason: HOSPADM

## 2017-06-14 RX ORDER — AMLODIPINE BESYLATE 5 MG/1
5 TABLET ORAL DAILY
COMMUNITY
End: 2017-07-05 | Stop reason: HOSPADM

## 2017-06-14 RX ORDER — HYDRALAZINE HYDROCHLORIDE 25 MG/1
25 TABLET, FILM COATED ORAL 3 TIMES DAILY
Status: ON HOLD | COMMUNITY
End: 2017-07-05

## 2017-06-14 RX ADMIN — SODIUM CHLORIDE 75 ML/HR: 9 INJECTION, SOLUTION INTRAVENOUS at 14:06

## 2017-06-14 RX ADMIN — METHOHEXITAL SODIUM 60 MG: 500 INJECTION, POWDER, LYOPHILIZED, FOR SOLUTION INTRAMUSCULAR; INTRAVENOUS; RECTAL at 14:25

## 2017-06-14 NOTE — PROGRESS NOTES
Procedure     ECG 12 Lead  Date/Time: 6/26/2017 6:40 PM  Performed by: FIONA GARCIA  Authorized by: FIONA GARCIA   Comparison: compared with previous ECG   Similar to previous ECG  Comparison to previous ECG: New atrial flutter   Rhythm: atrial flutter and paced  Clinical impression: abnormal ECG  Comments: Atypical variety --            Pt came in EKG/BP check.   B/P 138/92   O2-98%     EKG preformed   and reviewed by Dr Garcia.   Per Dr Garcia wants to do a Cardioversion in Cath Lab this afternoon. He states he will discuss with pt at Cath Lab.   Pt aware. We will transport to to Cath Lab once orders are in Epic.   Hector ENAMORADO     To any of you all who do charting I tried to change the date   The date should be 6-14 but [facility] won't let me   I am now officially done with this note   Don't text me again -- the ecg was 6-14 not 6-26

## 2017-06-14 NOTE — H&P (VIEW-ONLY)
Date of Office Visit: 2017  Encounter Provider: DEOBRAH Smith  Place of Service: [facility] CARDIOLOGY  Patient Name: Veronica Henao  :1935    Chief Complaint   Patient presents with   • PAROXYSMALL AFIB     1 MONTH BHE F/U -   :     HPI: Veronica Henao is a 82 y.o. female who is a patient of Dr. Wade and Dr. Pino. She has a past medical history of  atrial fibrillation, status post maze with postop atrial flutter, status post ablation in 2017 (on apixaban), congestive heart failure, hypertension, hyperlipidemia, obstructive sleep apnea (on CPAP), valvular heart disease, status post mitral and tricuspid valve repair. She had recurrent atrial flutter despite ablation and on 3/24/17 she underwent cardioversion for recurrent atrial flutter but unfortunately has had intermittent recurrent atrial flutter. Patient was recently seen in office on  for follow up at which time she reported she was feeling well. She denied any palpitations, shortness of breath or chest pain and has been able to tolerate some activity. ECG performed at this time showed the patient was back in atrial flutter with a heart rate of 139.  Patient was referred to Dr. Ward for possible redo ablation which was scheduled on . She was not on a beta blocker as that time. It was discontinued due to increase fatigue (she has only tried metoprolol).     She then presented to the hospital on 17 with shortness of breath and was diagnosed with congestive heart failure. However she was not in atrial flutter at that time but was significantly hypertensive. She had a repeat echo which showed Normal LV systolic function with an EF of 70%, severely dilated left atrium, mild mitral valve stenosis with a mitral valve mean gradient is 6 mmHg.  Mild tricuspid regurgitation with a moderately elevated RVSP of 45-55 mmHg. Her medications were adjusted and she was willing to try carvedilol  and was discharged on 12.5mg and hydralazine was added. She presents today for follow up.     She tried the carvedilol however she just became excessively fatigued and could hardly walk across the floor so she discontinued the medication on June 5.  She said after being off of it for 2 days she felt much better. She now feels good without chest pain, shortness of breath, PND, orthopnea, dizziness or palpitations. She has been monitoring her blood pressure and has been getting systolic reading of 160-170's/70's since her discharge from the hospital (even while taking the carvedilol). She says her heart rate has been good, running 50-60's.          Past Medical History:   Diagnosis Date   • Acute bronchitis    • Acute renal insufficiency    • Allergic rhinitis    • Arrhythmia    • Atrial fibrillation    • Brachycephaly    • Breast pain, right    • Chest pain    • CHF (congestive heart failure)    • Chronic renal insufficiency    • Cough    • Depression    • Diabetes mellitus     TYPE 2   • Diverticulosis    • ORTIZ (dyspnea on exertion)    • Dyspnea    • Esophageal reflux    • Fatigue    • Gout    • Head injury    • Headache    • Hoarseness    • Hypercalcemia    • Hyperlipidemia    • Hypertension    • Influenza A (H1N1)    • Itching    • Leg swelling    • Lightheadedness    • Macular degeneration    • Malaise and fatigue    • Mitral valve regurgitation     moderate to severe   • Nontoxic multinodular goiter     RIGHT 2.0 CM  LEFT  2.5 CM   • JOSELUIS on CPAP    • Osteoarthritis    • PAF (paroxysmal atrial fibrillation)    • Palpitations    • Paresthesias    • Proteinuria    • Pulmonary nodule    • Pulmonic valve regurgitation     mild   • Sebaceous cyst    • Sebaceous cyst    • SOBOE (shortness of breath on exertion)    • Solitary thyroid nodule    • Subclinical hypothyroidism    • Tachycardia    • TR (tricuspid regurgitation)     mild to moderate   • Type 2 diabetes mellitus    • UTI (urinary tract infection)        Past  Surgical History:   Procedure Laterality Date   • APPENDECTOMY     • CARDIAC CATHETERIZATION      procedure outcome:    • CARDIAC CATHETERIZATION N/A 11/21/2016    Procedure: Coronary angiography;  Surgeon: Marcin العراقي MD;  Location: [facility] CATH INVASIVE LOCATION;  Service:    • CARDIAC CATHETERIZATION N/A 11/21/2016    Procedure: Left Heart Cath;  Surgeon: Marcin العراقي MD;  Location: [facility] CATH INVASIVE LOCATION;  Service:    • CARDIAC ELECTROPHYSIOLOGY PROCEDURE N/A 3/28/2017    Procedure: Ablation atrial flutter;  Surgeon: Rodríguez Ward MD;  Location: [facility] CATH INVASIVE LOCATION;  Service:    • CLAVICLE SURGERY Left    • GASTRIC RESTRICTION SURGERY     • HYSTERECTOMY     • LUMBAR DECOMPRESSION      L4-5   • MITRAL VALVE REPAIR/REPLACEMENT N/A 12/14/2016    Procedure: INTRAOPERATIVE KATELYN, MIDLINE STERNOTOMY, MITRAL VALVE REPAIR, TRICUSPID VALVE REPAIR, MAZE PROCEDURE;  Surgeon: Young Vital MD;  Location: [facility] OR;  Service:    • SHOULDER SURGERY Left     AFTER SURGERY FOR FRACTURED CLAVICLE   • TOTAL KNEE ARTHROPLASTY      bilateral       Social History     Social History   • Marital status:      Spouse name: N/A   • Number of children: N/A   • Years of education: N/A     Occupational History   • Not on file.     Social History Main Topics   • Smoking status: Former Smoker     Packs/day: 1.50     Years: 20.00   • Smokeless tobacco: Not on file      Comment: D/C 1970 SMOKING 1 1/2 PPD FOR 13 YRS BEFORE QUITTING   • Alcohol use No      Comment: caffeine use   • Drug use: No   • Sexual activity: Defer     Other Topics Concern   • Not on file     Social History Narrative       Family History   Problem Relation Age of Onset   • Emphysema Mother    • Heart disease Father    • Lung cancer Father    • Prostate cancer Father    • Asthma Sister    • Lung cancer Daughter    • Colon cancer Other    • No Known Problems Maternal Grandmother    • No Known Problems Maternal Grandfather    •  Arthritis Paternal Grandmother    • No Known Problems Paternal Grandfather        Review of Systems   Constitution: Positive for malaise/fatigue (while on beta blocker, improved since stopping 6/5). Negative for chills, fever, weight gain and weight loss.   HENT: Negative for ear pain, headaches, hearing loss, nosebleeds and sore throat.    Eyes: Negative for double vision, pain and visual disturbance.   Cardiovascular: Negative for chest pain, dyspnea on exertion, irregular heartbeat, leg swelling, near-syncope, orthopnea, palpitations, paroxysmal nocturnal dyspnea and syncope.   Respiratory: Negative for cough, shortness of breath, sleep disturbances due to breathing, snoring and wheezing.    Endocrine: Negative for cold intolerance, heat intolerance and polyuria.   Hematologic/Lymphatic: Negative.    Skin: Negative for itching and rash.   Musculoskeletal: Negative for joint pain, joint swelling and myalgias.   Gastrointestinal: Negative for abdominal pain, diarrhea, melena, nausea and vomiting.   Genitourinary: Negative for frequency, hematuria and hesitancy.   Neurological: Negative for excessive daytime sleepiness, light-headedness, numbness, paresthesias and seizures.   Psychiatric/Behavioral: Negative for altered mental status and depression.   Allergic/Immunologic: Negative.    All other systems reviewed and are negative.      Allergies   Allergen Reactions   • Cephalexin Swelling   • Meperidine Swelling   • Penicillins Swelling         Current Outpatient Prescriptions:   •  apixaban (ELIQUIS) 2.5 MG tablet tablet, Take 1 tablet by mouth 2 (Two) Times a Day., Disp: 28 tablet, Rfl: 0  •  azithromycin (ZITHROMAX) 250 MG tablet, Take 2 tablets the first day, then 1 tablet daily for 4 days., Disp: 6 tablet, Rfl: 0  •  cetirizine (zyrTEC) 10 MG tablet, Take 10 mg by mouth Every Night., Disp: , Rfl:   •  cholecalciferol (VITAMIN D3) 1000 UNITS tablet, Take 1,000 Units by mouth daily., Disp: , Rfl:   •   "Cyanocobalamin (VITAMIN B-12) 5000 MCG sublingual tablet, , Disp: , Rfl:   •  furosemide (LASIX) 40 MG tablet, Take 40 mg by mouth 2 (Two) Times a Day., Disp: , Rfl:   •  hydrALAZINE (APRESOLINE) 25 MG tablet, Take 1 tablet by mouth Every 12 (Twelve) Hours., Disp: 90 tablet, Rfl: 11  •  LEVEMIR FLEXTOUCH 100 UNIT/ML injection, 18 Units Every Night., Disp: , Rfl:   •  montelukast (SINGULAIR) 10 MG tablet, Take 10 mg by mouth Every Night., Disp: , Rfl:   •  Multiple Vitamins-Minerals (CENTRUM SILVER PO), Take 1 tablet by mouth Daily., Disp: , Rfl:   •  potassium chloride (MICRO-K) 10 MEQ CR capsule, Take 10 mEq by mouth 2 (Two) Times a Day., Disp: , Rfl:   •  raNITIdine (ZANTAC) 150 MG tablet, Take 150 mg by mouth 2 (Two) Times a Day., Disp: , Rfl:   •  sertraline (ZOLOFT) 50 MG tablet, Take 50 mg by mouth Daily., Disp: , Rfl:   •  Vitamin E 400 UNITS chewable tablet, , Disp: , Rfl:   •  Vitamins A & D (VITAMIN A & D) 79439-547 UNITS tablet, , Disp: , Rfl:   •  amLODIPine (NORVASC) 5 MG tablet, Take 1 tablet by mouth Daily., Disp: 90 tablet, Rfl: 3     Objective:     Vitals:    06/09/17 1000   BP: 162/98   Pulse: 57   Weight: 191 lb (86.6 kg)   Height: 63\" (160 cm)     Body mass index is 33.83 kg/(m^2).    PHYSICAL EXAM:    Vitals Reviewed.   General Appearance: No acute distress, well developed and well nourished.   Eyes: Conjunctiva and lids: No erythema, swelling, or discharge. Sclera non-icteric.   HENT: Atraumatic, normocephalic. External eyes, ears, and nose normal. No hearing loss noted. Mucous membranes normal. Lips not cyanotic. Neck supple with no tenderness.  Respiratory: No signs of respiratory distress. Respiration rhythm and depth normal.   Clear to auscultation. No rales, crackles, rhonchi, or wheezing auscultated.   Cardiovascular:  Jugular Venous Pressure: Normal  Heart Rate and Rhythm: Normal, Heart Sounds: Normal S1 and S2. No S3 or S4 noted.  Murmurs: No murmurs noted. No rubs, thrills, or gallops. "   Arterial Pulses: Posterior tibialis and dorsalis pedis pulses normal.   Lower Extremities: Left lower extremity with some mild edema.  Gastrointestinal:  Abdomen soft, non-distended, non-tender. Normal bowel sounds. No hepatomegaly.   Musculoskeletal: Normal movement of extremities  Skin and Nails: General appearance normal. No pallor, cyanosis, diaphoresis. Skin temperature normal. No clubbing of fingernails.   Psychiatric: Patient alert and oriented to person, place, and time. Speech and behavior appropriate. Normal mood and affect.       ECG 12 Lead  Date/Time: 6/9/2017 10:40 AM  Performed by: TELLY LOPEZ  Authorized by: TELLY LOPEZ   Comparison: compared with previous ECG from 5/24/2017  Similar to previous ECG  Rhythm: sinus bradycardia  Clinical impression: non-specific ECG  Comments: Clinical indication: PAF              Assessment:       Diagnosis Plan   1. Atrial flutter, paroxysmal     2. Essential hypertension            Plan:       1. Atrial Fibrillation and Atrial Flutter  Assessment  • The patient has paroxysmal atrial flutter  • The patient's CHADS2-VASc score is 5  • A [id number] score of 2 or more is considered a high risk for a thromboembolic event  • Apixaban prescribed  • She has remained in SB/SR for about a month now. Discussed with Dr. Ward and as long as she is doing well he would prefer not to do repeat ablation but it is always and option. He recommended that if she does have palpitations and heart racing that she could try just PRN carvedilol and she is agreeable to try that. She is going to follow up with Dr. Husain in 4 weeks for her blood pressure and I have told her that if she has further problems with the atrial flutter that Dr. Husain will refer her back to Dr. Ward.     Plan  • Attempt to maintain sinus rhythm  • Continue apixaban for antithrombotic therapy, bleeding issues discussed    2. HTN, blood pressure is still elevated. Discussed with Dr. Husain and we are going  to add amlodipine 5mg daily and she will see her in 4 weeks. Ms. Henao is going to continue to monitor her blood pressure and heart rate and bring her readings with her to that visit.   As always, it has been a pleasure to participate in your patient's care.      Sincerely,         DEBORAH Werner

## 2017-06-14 NOTE — PLAN OF CARE
Problem: Perioperative Period (Adult)  Goal: Signs and Symptoms of Listed Potential Problems Will be Absent or Manageable (Perioperative Period)  Outcome: Outcome(s) achieved Date Met:  06/14/17 06/14/17 1510   Perioperative Period   Problems Assessed (Perioperative Period) all   Problems Present (Perioperative Period) none         Problem: Patient Care Overview (Adult)  Goal: Plan of Care Review  Outcome: Outcome(s) achieved Date Met:  06/14/17  Goal: Adult Individualization and Mutuality  Outcome: Outcome(s) achieved Date Met:  06/14/17  Goal: Discharge Needs Assessment  Outcome: Outcome(s) achieved Date Met:  06/14/17

## 2017-06-14 NOTE — INTERVAL H&P NOTE
H&P reviewed. The patient was examined and there are no changes to the H&P.     Went into rapid rate the same day she saw Monie -- been out of rhythm most of the weekend   Plan cardioversion

## 2017-06-29 ENCOUNTER — TELEPHONE (OUTPATIENT)
Dept: CARDIOLOGY | Facility: CLINIC | Age: 82
End: 2017-06-29

## 2017-06-30 ENCOUNTER — TELEPHONE (OUTPATIENT)
Dept: CARDIOLOGY | Facility: CLINIC | Age: 82
End: 2017-06-30

## 2017-07-03 ENCOUNTER — APPOINTMENT (OUTPATIENT)
Dept: GENERAL RADIOLOGY | Facility: HOSPITAL | Age: 82
End: 2017-07-03
Attending: INTERNAL MEDICINE

## 2017-07-03 ENCOUNTER — HOSPITAL ENCOUNTER (OUTPATIENT)
Facility: HOSPITAL | Age: 82
Setting detail: OBSERVATION
Discharge: HOME | End: 2017-07-05
Attending: INTERNAL MEDICINE | Admitting: INTERNAL MEDICINE

## 2017-07-03 PROBLEM — I49.9 ARRHYTHMIA: Status: ACTIVE | Noted: 2017-07-03

## 2017-07-03 LAB
ANION GAP SERPL CALCULATED.3IONS-SCNC: 16.1 MMOL/L
BASOPHILS # BLD AUTO: 0.04 10*3/MM3 (ref 0–0.2)
BASOPHILS NFR BLD AUTO: 0.5 % (ref 0–1.5)
BUN BLD-MCNC: 54 MG/DL (ref 8–23)
BUN/CREAT SERPL: 23.7 (ref 7–25)
CALCIUM SPEC-SCNC: 10 MG/DL (ref 8.6–10.5)
CHLORIDE SERPL-SCNC: 97 MMOL/L (ref 98–107)
CO2 SERPL-SCNC: 25.9 MMOL/L (ref 22–29)
CREAT BLD-MCNC: 2.28 MG/DL (ref 0.57–1)
DEPRECATED RDW RBC AUTO: 49.3 FL (ref 37–54)
EOSINOPHIL # BLD AUTO: 0.19 10*3/MM3 (ref 0–0.7)
EOSINOPHIL NFR BLD AUTO: 2.5 % (ref 0.3–6.2)
ERYTHROCYTE [DISTWIDTH] IN BLOOD BY AUTOMATED COUNT: 16.2 % (ref 11.7–13)
GFR SERPL CREATININE-BSD FRML MDRD: 21 ML/MIN/1.73
GLUCOSE BLD-MCNC: 87 MG/DL (ref 65–99)
GLUCOSE BLDC GLUCOMTR-MCNC: 120 MG/DL (ref 70–130)
GLUCOSE BLDC GLUCOMTR-MCNC: 138 MG/DL (ref 70–130)
GLUCOSE BLDC GLUCOMTR-MCNC: 90 MG/DL (ref 70–130)
HCT VFR BLD AUTO: 31.5 % (ref 35.6–45.5)
HGB BLD-MCNC: 10.4 G/DL (ref 11.9–15.5)
IMM GRANULOCYTES # BLD: 0.02 10*3/MM3 (ref 0–0.03)
IMM GRANULOCYTES NFR BLD: 0.3 % (ref 0–0.5)
LYMPHOCYTES # BLD AUTO: 1.83 10*3/MM3 (ref 0.9–4.8)
LYMPHOCYTES NFR BLD AUTO: 24.4 % (ref 19.6–45.3)
MCH RBC QN AUTO: 27.2 PG (ref 26.9–32)
MCHC RBC AUTO-ENTMCNC: 33 G/DL (ref 32.4–36.3)
MCV RBC AUTO: 82.2 FL (ref 80.5–98.2)
MONOCYTES # BLD AUTO: 0.6 10*3/MM3 (ref 0.2–1.2)
MONOCYTES NFR BLD AUTO: 8 % (ref 5–12)
NEUTROPHILS # BLD AUTO: 4.82 10*3/MM3 (ref 1.9–8.1)
NEUTROPHILS NFR BLD AUTO: 64.3 % (ref 42.7–76)
PLATELET # BLD AUTO: 278 10*3/MM3 (ref 140–500)
PMV BLD AUTO: 9.5 FL (ref 6–12)
POTASSIUM BLD-SCNC: 4.6 MMOL/L (ref 3.5–5.2)
RBC # BLD AUTO: 3.83 10*6/MM3 (ref 3.9–5.2)
SODIUM BLD-SCNC: 139 MMOL/L (ref 136–145)
WBC NRBC COR # BLD: 7.5 10*3/MM3 (ref 4.5–10.7)

## 2017-07-03 PROCEDURE — C1732 CATH, EP, DIAG/ABL, 3D/VECT: HCPCS | Performed by: INTERNAL MEDICINE

## 2017-07-03 PROCEDURE — 25010000002 MIDAZOLAM PER 1 MG: Performed by: INTERNAL MEDICINE

## 2017-07-03 PROCEDURE — C1785 PMKR, DUAL, RATE-RESP: HCPCS | Performed by: INTERNAL MEDICINE

## 2017-07-03 PROCEDURE — 25010000002 VANCOMYCIN PER 500 MG: Performed by: INTERNAL MEDICINE

## 2017-07-03 PROCEDURE — C1893 INTRO/SHEATH, FIXED,NON-PEEL: HCPCS | Performed by: INTERNAL MEDICINE

## 2017-07-03 PROCEDURE — 25010000002 FENTANYL CITRATE (PF) 100 MCG/2ML SOLUTION: Performed by: INTERNAL MEDICINE

## 2017-07-03 PROCEDURE — 93650 ICAR CATH ABLTJ AV NODE FUNC: CPT | Performed by: INTERNAL MEDICINE

## 2017-07-03 PROCEDURE — G0378 HOSPITAL OBSERVATION PER HR: HCPCS

## 2017-07-03 PROCEDURE — 85025 COMPLETE CBC W/AUTO DIFF WBC: CPT | Performed by: INTERNAL MEDICINE

## 2017-07-03 PROCEDURE — 63710000001 INSULIN DETEMER PER 5 UNITS: Performed by: INTERNAL MEDICINE

## 2017-07-03 PROCEDURE — 93010 ELECTROCARDIOGRAM REPORT: CPT | Performed by: INTERNAL MEDICINE

## 2017-07-03 PROCEDURE — 71010 HC CHEST PA OR AP: CPT

## 2017-07-03 PROCEDURE — C1760 CLOSURE DEV, VASC: HCPCS | Performed by: INTERNAL MEDICINE

## 2017-07-03 PROCEDURE — 0 IOPAMIDOL PER 1 ML: Performed by: INTERNAL MEDICINE

## 2017-07-03 PROCEDURE — 82962 GLUCOSE BLOOD TEST: CPT

## 2017-07-03 PROCEDURE — 80048 BASIC METABOLIC PNL TOTAL CA: CPT | Performed by: INTERNAL MEDICINE

## 2017-07-03 PROCEDURE — 93005 ELECTROCARDIOGRAM TRACING: CPT | Performed by: INTERNAL MEDICINE

## 2017-07-03 PROCEDURE — 99152 MOD SED SAME PHYS/QHP 5/>YRS: CPT | Performed by: INTERNAL MEDICINE

## 2017-07-03 PROCEDURE — C1898 LEAD, PMKR, OTHER THAN TRANS: HCPCS | Performed by: INTERNAL MEDICINE

## 2017-07-03 PROCEDURE — 99153 MOD SED SAME PHYS/QHP EA: CPT | Performed by: INTERNAL MEDICINE

## 2017-07-03 PROCEDURE — C1892 INTRO/SHEATH,FIXED,PEEL-AWAY: HCPCS | Performed by: INTERNAL MEDICINE

## 2017-07-03 PROCEDURE — C1887 CATHETER, GUIDING: HCPCS | Performed by: INTERNAL MEDICINE

## 2017-07-03 PROCEDURE — C1894 INTRO/SHEATH, NON-LASER: HCPCS | Performed by: INTERNAL MEDICINE

## 2017-07-03 PROCEDURE — 33208 INSRT HEART PM ATRIAL & VENT: CPT | Performed by: INTERNAL MEDICINE

## 2017-07-03 DEVICE — IMPLANTABLE DEVICE: Type: IMPLANTABLE DEVICE | Status: FUNCTIONAL

## 2017-07-03 DEVICE — GEN PM ADAPTA DR MVP 45.4MM ADDRL1: Type: IMPLANTABLE DEVICE | Status: FUNCTIONAL

## 2017-07-03 DEVICE — LD PM CAPSUREFIX NOVUS5076 58CM: Type: IMPLANTABLE DEVICE | Status: FUNCTIONAL

## 2017-07-03 RX ORDER — VANCOMYCIN HYDROCHLORIDE 1 G/200ML
INJECTION, SOLUTION INTRAVENOUS CONTINUOUS PRN
Status: DISCONTINUED | OUTPATIENT
Start: 2017-07-03 | End: 2017-07-03 | Stop reason: HOSPADM

## 2017-07-03 RX ORDER — NALOXONE HCL 0.4 MG/ML
0.4 VIAL (ML) INJECTION
Status: DISCONTINUED | OUTPATIENT
Start: 2017-07-03 | End: 2017-07-03 | Stop reason: HOSPADM

## 2017-07-03 RX ORDER — HYDRALAZINE HYDROCHLORIDE 25 MG/1
25 TABLET, FILM COATED ORAL 3 TIMES DAILY
Status: DISCONTINUED | OUTPATIENT
Start: 2017-07-03 | End: 2017-07-05 | Stop reason: HOSPADM

## 2017-07-03 RX ORDER — POTASSIUM CHLORIDE 750 MG/1
10 CAPSULE, EXTENDED RELEASE ORAL 2 TIMES DAILY
Status: DISCONTINUED | OUTPATIENT
Start: 2017-07-03 | End: 2017-07-03 | Stop reason: HOSPADM

## 2017-07-03 RX ORDER — HYDROMORPHONE HYDROCHLORIDE 1 MG/ML
0.5 INJECTION, SOLUTION INTRAMUSCULAR; INTRAVENOUS; SUBCUTANEOUS
Status: DISCONTINUED | OUTPATIENT
Start: 2017-07-03 | End: 2017-07-03 | Stop reason: HOSPADM

## 2017-07-03 RX ORDER — METOPROLOL TARTRATE 5 MG/5ML
INJECTION INTRAVENOUS AS NEEDED
Status: DISCONTINUED | OUTPATIENT
Start: 2017-07-03 | End: 2017-07-03 | Stop reason: HOSPADM

## 2017-07-03 RX ORDER — MIDAZOLAM HYDROCHLORIDE 1 MG/ML
INJECTION INTRAMUSCULAR; INTRAVENOUS AS NEEDED
Status: DISCONTINUED | OUTPATIENT
Start: 2017-07-03 | End: 2017-07-03 | Stop reason: HOSPADM

## 2017-07-03 RX ORDER — POTASSIUM CHLORIDE 750 MG/1
10 CAPSULE, EXTENDED RELEASE ORAL 2 TIMES DAILY
Status: DISCONTINUED | OUTPATIENT
Start: 2017-07-03 | End: 2017-07-05 | Stop reason: HOSPADM

## 2017-07-03 RX ORDER — ACETAMINOPHEN 325 MG/1
650 TABLET ORAL EVERY 6 HOURS PRN
Status: DISCONTINUED | OUTPATIENT
Start: 2017-07-03 | End: 2017-07-05 | Stop reason: HOSPADM

## 2017-07-03 RX ORDER — FUROSEMIDE 40 MG/1
40 TABLET ORAL 2 TIMES DAILY
Status: DISCONTINUED | OUTPATIENT
Start: 2017-07-03 | End: 2017-07-05 | Stop reason: HOSPADM

## 2017-07-03 RX ORDER — AMLODIPINE BESYLATE 5 MG/1
5 TABLET ORAL DAILY
Status: DISCONTINUED | OUTPATIENT
Start: 2017-07-04 | End: 2017-07-05 | Stop reason: HOSPADM

## 2017-07-03 RX ORDER — FENTANYL CITRATE 50 UG/ML
INJECTION, SOLUTION INTRAMUSCULAR; INTRAVENOUS AS NEEDED
Status: DISCONTINUED | OUTPATIENT
Start: 2017-07-03 | End: 2017-07-03 | Stop reason: HOSPADM

## 2017-07-03 RX ORDER — AMLODIPINE BESYLATE 5 MG/1
5 TABLET ORAL DAILY
Status: DISCONTINUED | OUTPATIENT
Start: 2017-07-03 | End: 2017-07-03 | Stop reason: HOSPADM

## 2017-07-03 RX ORDER — FUROSEMIDE 40 MG/1
40 TABLET ORAL 2 TIMES DAILY
Status: DISCONTINUED | OUTPATIENT
Start: 2017-07-03 | End: 2017-07-03 | Stop reason: HOSPADM

## 2017-07-03 RX ORDER — SODIUM CHLORIDE 0.9 % (FLUSH) 0.9 %
1-10 SYRINGE (ML) INJECTION AS NEEDED
Status: DISCONTINUED | OUTPATIENT
Start: 2017-07-03 | End: 2017-07-03 | Stop reason: HOSPADM

## 2017-07-03 RX ORDER — LIDOCAINE HYDROCHLORIDE AND EPINEPHRINE 10; 10 MG/ML; UG/ML
INJECTION, SOLUTION INFILTRATION; PERINEURAL AS NEEDED
Status: DISCONTINUED | OUTPATIENT
Start: 2017-07-03 | End: 2017-07-03 | Stop reason: HOSPADM

## 2017-07-03 RX ORDER — SODIUM CHLORIDE 9 MG/ML
75 INJECTION, SOLUTION INTRAVENOUS CONTINUOUS
Status: DISCONTINUED | OUTPATIENT
Start: 2017-07-03 | End: 2017-07-05 | Stop reason: HOSPADM

## 2017-07-03 RX ORDER — HYDROCODONE BITARTRATE AND ACETAMINOPHEN 10; 325 MG/1; MG/1
1 TABLET ORAL EVERY 4 HOURS PRN
Status: DISCONTINUED | OUTPATIENT
Start: 2017-07-03 | End: 2017-07-03 | Stop reason: HOSPADM

## 2017-07-03 RX ORDER — HYDROCODONE BITARTRATE AND ACETAMINOPHEN 5; 325 MG/1; MG/1
1 TABLET ORAL EVERY 6 HOURS PRN
Status: DISCONTINUED | OUTPATIENT
Start: 2017-07-03 | End: 2017-07-05 | Stop reason: HOSPADM

## 2017-07-03 RX ORDER — HYDRALAZINE HYDROCHLORIDE 25 MG/1
25 TABLET, FILM COATED ORAL 3 TIMES DAILY
Status: DISCONTINUED | OUTPATIENT
Start: 2017-07-03 | End: 2017-07-03 | Stop reason: HOSPADM

## 2017-07-03 RX ORDER — HYDROCODONE BITARTRATE AND ACETAMINOPHEN 7.5; 325 MG/1; MG/1
1 TABLET ORAL EVERY 4 HOURS PRN
Status: DISCONTINUED | OUTPATIENT
Start: 2017-07-03 | End: 2017-07-03 | Stop reason: HOSPADM

## 2017-07-03 RX ORDER — LIDOCAINE HYDROCHLORIDE 10 MG/ML
0.1 INJECTION, SOLUTION EPIDURAL; INFILTRATION; INTRACAUDAL; PERINEURAL ONCE AS NEEDED
Status: DISCONTINUED | OUTPATIENT
Start: 2017-07-03 | End: 2017-07-03 | Stop reason: HOSPADM

## 2017-07-03 RX ADMIN — ACETAMINOPHEN 650 MG: 325 TABLET ORAL at 20:24

## 2017-07-03 RX ADMIN — SODIUM CHLORIDE 75 ML/HR: 9 INJECTION, SOLUTION INTRAVENOUS at 12:44

## 2017-07-03 RX ADMIN — HYDRALAZINE HYDROCHLORIDE 25 MG: 25 TABLET, FILM COATED ORAL at 20:59

## 2017-07-03 RX ADMIN — INSULIN DETEMIR 15 UNITS: 100 INJECTION, SOLUTION SUBCUTANEOUS at 21:00

## 2017-07-03 NOTE — INTERVAL H&P NOTE
H&P reviewed. The patient was examined and there are no changes to the H&P.     We had a long chat about choices -- Primary LA ablation v AV node and pacer   We both think AV node and pacer is best -- tho it is close     Risk discussed

## 2017-07-03 NOTE — H&P (VIEW-ONLY)
Date of Office Visit: 2017  Encounter Provider: DEBORAH Smith  Place of Service: [facility]  Patient Name: Veronica Henao  :1935    Chief Complaint   Patient presents with   • PAROXYSMALL AFIB     1 MONTH BHE F/U -   :     HPI: Veronica Henao is a 82 y.o. female who is a patient of Dr. Wade and Dr. Pino. She has a past medical history of  atrial fibrillation, status post maze with postop atrial flutter, status post ablation in 2017 (on apixaban), congestive heart failure, hypertension, hyperlipidemia, obstructive sleep apnea (on CPAP), valvular heart disease, status post mitral and tricuspid valve repair. She had recurrent atrial flutter despite ablation and on 3/24/17 she underwent cardioversion for recurrent atrial flutter but unfortunately has had intermittent recurrent atrial flutter. Patient was recently seen in office on  for follow up at which time she reported she was feeling well. She denied any palpitations, shortness of breath or chest pain and has been able to tolerate some activity. ECG performed at this time showed the patient was back in atrial flutter with a heart rate of 139.  Patient was referred to Dr. Ward for possible redo ablation which was scheduled on . She was not on a beta blocker as that time. It was discontinued due to increase fatigue (she has only tried metoprolol).     She then presented to the hospital on 17 with shortness of breath and was diagnosed with congestive heart failure. However she was not in atrial flutter at that time but was significantly hypertensive. She had a repeat echo which showed Normal LV systolic function with an EF of 70%, severely dilated left atrium, mild mitral valve stenosis with a mitral valve mean gradient is 6 mmHg.  Mild tricuspid regurgitation with a moderately elevated RVSP of 45-55 mmHg. Her medications were adjusted and she was willing to try carvedilol  and was discharged on 12.5mg and hydralazine was added. She presents today for follow up.     She tried the carvedilol however she just became excessively fatigued and could hardly walk across the floor so she discontinued the medication on June 5.  She said after being off of it for 2 days she felt much better. She now feels good without chest pain, shortness of breath, PND, orthopnea, dizziness or palpitations. She has been monitoring her blood pressure and has been getting systolic reading of 160-170's/70's since her discharge from the hospital (even while taking the carvedilol). She says her heart rate has been good, running 50-60's.          Past Medical History:   Diagnosis Date   • Acute bronchitis    • Acute renal insufficiency    • Allergic rhinitis    • Arrhythmia    • Atrial fibrillation    • Brachycephaly    • Breast pain, right    • Chest pain    • CHF (congestive heart failure)    • Chronic renal insufficiency    • Cough    • Depression    • Diabetes mellitus     TYPE 2   • Diverticulosis    • ORTIZ (dyspnea on exertion)    • Dyspnea    • Esophageal reflux    • Fatigue    • Gout    • Head injury    • Headache    • Hoarseness    • Hypercalcemia    • Hyperlipidemia    • Hypertension    • Influenza A (H1N1)    • Itching    • Leg swelling    • Lightheadedness    • Macular degeneration    • Malaise and fatigue    • Mitral valve regurgitation     moderate to severe   • Nontoxic multinodular goiter     RIGHT 2.0 CM  LEFT  2.5 CM   • JOSELUIS on CPAP    • Osteoarthritis    • PAF (paroxysmal atrial fibrillation)    • Palpitations    • Paresthesias    • Proteinuria    • Pulmonary nodule    • Pulmonic valve regurgitation     mild   • Sebaceous cyst    • Sebaceous cyst    • SOBOE (shortness of breath on exertion)    • Solitary thyroid nodule    • Subclinical hypothyroidism    • Tachycardia    • TR (tricuspid regurgitation)     mild to moderate   • Type 2 diabetes mellitus    • UTI (urinary tract infection)        Past  Surgical History:   Procedure Laterality Date   • APPENDECTOMY     • CARDIAC CATHETERIZATION      procedure outcome:    • CARDIAC CATHETERIZATION N/A 11/21/2016    Procedure: Coronary angiography;  Surgeon: Marcin العراقي MD;  Location: [facility] CATH INVASIVE LOCATION;  Service:    • CARDIAC CATHETERIZATION N/A 11/21/2016    Procedure: Left Heart Cath;  Surgeon: Marcin العراقي MD;  Location: [facility] CATH INVASIVE LOCATION;  Service:    • CARDIAC ELECTROPHYSIOLOGY PROCEDURE N/A 3/28/2017    Procedure: Ablation atrial flutter;  Surgeon: Rodríguez Ward MD;  Location: [facility] CATH INVASIVE LOCATION;  Service:    • CLAVICLE SURGERY Left    • GASTRIC RESTRICTION SURGERY     • HYSTERECTOMY     • LUMBAR DECOMPRESSION      L4-5   • MITRAL VALVE REPAIR/REPLACEMENT N/A 12/14/2016    Procedure: INTRAOPERATIVE KATELYN, MIDLINE STERNOTOMY, MITRAL VALVE REPAIR, TRICUSPID VALVE REPAIR, MAZE PROCEDURE;  Surgeon: Young Vital MD;  Location: [facility] OR;  Service:    • SHOULDER SURGERY Left     AFTER SURGERY FOR FRACTURED CLAVICLE   • TOTAL KNEE ARTHROPLASTY      bilateral       Social History     Social History   • Marital status:      Spouse name: N/A   • Number of children: N/A   • Years of education: N/A     Occupational History   • Not on file.     Social History Main Topics   • Smoking status: Former Smoker     Packs/day: 1.50     Years: 20.00   • Smokeless tobacco: Not on file      Comment: D/C 1970 SMOKING 1 1/2 PPD FOR 13 YRS BEFORE QUITTING   • Alcohol use No      Comment: caffeine use   • Drug use: No   • Sexual activity: Defer     Other Topics Concern   • Not on file     Social History Narrative       Family History   Problem Relation Age of Onset   • Emphysema Mother    • Heart disease Father    • Lung cancer Father    • Prostate cancer Father    • Asthma Sister    • Lung cancer Daughter    • Colon cancer Other    • No Known Problems Maternal Grandmother    • No Known Problems Maternal Grandfather    •  Arthritis Paternal Grandmother    • No Known Problems Paternal Grandfather        Review of Systems   Constitution: Positive for malaise/fatigue (while on beta blocker, improved since stopping 6/5). Negative for chills, fever, weight gain and weight loss.   HENT: Negative for ear pain, headaches, hearing loss, nosebleeds and sore throat.    Eyes: Negative for double vision, pain and visual disturbance.   Cardiovascular: Negative for chest pain, dyspnea on exertion, irregular heartbeat, leg swelling, near-syncope, orthopnea, palpitations, paroxysmal nocturnal dyspnea and syncope.   Respiratory: Negative for cough, shortness of breath, sleep disturbances due to breathing, snoring and wheezing.    Endocrine: Negative for cold intolerance, heat intolerance and polyuria.   Hematologic/Lymphatic: Negative.    Skin: Negative for itching and rash.   Musculoskeletal: Negative for joint pain, joint swelling and myalgias.   Gastrointestinal: Negative for abdominal pain, diarrhea, melena, nausea and vomiting.   Genitourinary: Negative for frequency, hematuria and hesitancy.   Neurological: Negative for excessive daytime sleepiness, light-headedness, numbness, paresthesias and seizures.   Psychiatric/Behavioral: Negative for altered mental status and depression.   Allergic/Immunologic: Negative.    All other systems reviewed and are negative.      Allergies   Allergen Reactions   • Cephalexin Swelling   • Meperidine Swelling   • Penicillins Swelling         Current Outpatient Prescriptions:   •  apixaban (ELIQUIS) 2.5 MG tablet tablet, Take 1 tablet by mouth 2 (Two) Times a Day., Disp: 28 tablet, Rfl: 0  •  azithromycin (ZITHROMAX) 250 MG tablet, Take 2 tablets the first day, then 1 tablet daily for 4 days., Disp: 6 tablet, Rfl: 0  •  cetirizine (zyrTEC) 10 MG tablet, Take 10 mg by mouth Every Night., Disp: , Rfl:   •  cholecalciferol (VITAMIN D3) 1000 UNITS tablet, Take 1,000 Units by mouth daily., Disp: , Rfl:   •   "Cyanocobalamin (VITAMIN B-12) 5000 MCG sublingual tablet, , Disp: , Rfl:   •  furosemide (LASIX) 40 MG tablet, Take 40 mg by mouth 2 (Two) Times a Day., Disp: , Rfl:   •  hydrALAZINE (APRESOLINE) 25 MG tablet, Take 1 tablet by mouth Every 12 (Twelve) Hours., Disp: 90 tablet, Rfl: 11  •  LEVEMIR FLEXTOUCH 100 UNIT/ML injection, 18 Units Every Night., Disp: , Rfl:   •  montelukast (SINGULAIR) 10 MG tablet, Take 10 mg by mouth Every Night., Disp: , Rfl:   •  Multiple Vitamins-Minerals (CENTRUM SILVER PO), Take 1 tablet by mouth Daily., Disp: , Rfl:   •  potassium chloride (MICRO-K) 10 MEQ CR capsule, Take 10 mEq by mouth 2 (Two) Times a Day., Disp: , Rfl:   •  raNITIdine (ZANTAC) 150 MG tablet, Take 150 mg by mouth 2 (Two) Times a Day., Disp: , Rfl:   •  sertraline (ZOLOFT) 50 MG tablet, Take 50 mg by mouth Daily., Disp: , Rfl:   •  Vitamin E 400 UNITS chewable tablet, , Disp: , Rfl:   •  Vitamins A & D (VITAMIN A & D) 03814-148 UNITS tablet, , Disp: , Rfl:   •  amLODIPine (NORVASC) 5 MG tablet, Take 1 tablet by mouth Daily., Disp: 90 tablet, Rfl: 3     Objective:     Vitals:    06/09/17 1000   BP: 162/98   Pulse: 57   Weight: 191 lb (86.6 kg)   Height: 63\" (160 cm)     Body mass index is 33.83 kg/(m^2).    PHYSICAL EXAM:    Vitals Reviewed.   General Appearance: No acute distress, well developed and well nourished.   Eyes: Conjunctiva and lids: No erythema, swelling, or discharge. Sclera non-icteric.   HENT: Atraumatic, normocephalic. External eyes, ears, and nose normal. No hearing loss noted. Mucous membranes normal. Lips not cyanotic. Neck supple with no tenderness.  Respiratory: No signs of respiratory distress. Respiration rhythm and depth normal.   Clear to auscultation. No rales, crackles, rhonchi, or wheezing auscultated.   Cardiovascular:  Jugular Venous Pressure: Normal  Heart Rate and Rhythm: Normal, Heart Sounds: Normal S1 and S2. No S3 or S4 noted.  Murmurs: No murmurs noted. No rubs, thrills, or gallops. "   Arterial Pulses: Posterior tibialis and dorsalis pedis pulses normal.   Lower Extremities: Left lower extremity with some mild edema.  Gastrointestinal:  Abdomen soft, non-distended, non-tender. Normal bowel sounds. No hepatomegaly.   Musculoskeletal: Normal movement of extremities  Skin and Nails: General appearance normal. No pallor, cyanosis, diaphoresis. Skin temperature normal. No clubbing of fingernails.   Psychiatric: Patient alert and oriented to person, place, and time. Speech and behavior appropriate. Normal mood and affect.       ECG 12 Lead  Date/Time: 6/9/2017 10:40 AM  Performed by: TELLY LOPEZ  Authorized by: TELLY LOPEZ   Comparison: compared with previous ECG from 5/24/2017  Similar to previous ECG  Rhythm: sinus bradycardia  Clinical impression: non-specific ECG  Comments: Clinical indication: PAF              Assessment:       Diagnosis Plan   1. Atrial flutter, paroxysmal     2. Essential hypertension            Plan:       1. Atrial Fibrillation and Atrial Flutter  Assessment  • The patient has paroxysmal atrial flutter  • The patient's CHADS2-VASc score is 5  • A [id number] score of 2 or more is considered a high risk for a thromboembolic event  • Apixaban prescribed  • She has remained in SB/SR for about a month now. Discussed with Dr. Ward and as long as she is doing well he would prefer not to do repeat ablation but it is always and option. He recommended that if she does have palpitations and heart racing that she could try just PRN carvedilol and she is agreeable to try that. She is going to follow up with Dr. Husain in 4 weeks for her blood pressure and I have told her that if she has further problems with the atrial flutter that Dr. Husain will refer her back to Dr. Ward.     Plan  • Attempt to maintain sinus rhythm  • Continue apixaban for antithrombotic therapy, bleeding issues discussed    2. HTN, blood pressure is still elevated. Discussed with Dr. Husain and we are going  to add amlodipine 5mg daily and she will see her in 4 weeks. Ms. Henao is going to continue to monitor her blood pressure and heart rate and bring her readings with her to that visit.   As always, it has been a pleasure to participate in your patient's care.      Sincerely,         DEBORAH Werner

## 2017-07-04 PROBLEM — I44.2 THIRD DEGREE ATRIOVENTRICULAR BLOCK (HCC): Status: ACTIVE | Noted: 2017-07-04

## 2017-07-04 LAB
ANION GAP SERPL CALCULATED.3IONS-SCNC: 13.9 MMOL/L
BUN BLD-MCNC: 50 MG/DL (ref 8–23)
BUN/CREAT SERPL: 24 (ref 7–25)
CALCIUM SPEC-SCNC: 9.3 MG/DL (ref 8.6–10.5)
CHLORIDE SERPL-SCNC: 98 MMOL/L (ref 98–107)
CO2 SERPL-SCNC: 24.1 MMOL/L (ref 22–29)
CREAT BLD-MCNC: 2.08 MG/DL (ref 0.57–1)
GFR SERPL CREATININE-BSD FRML MDRD: 23 ML/MIN/1.73
GLUCOSE BLD-MCNC: 199 MG/DL (ref 65–99)
GLUCOSE BLDC GLUCOMTR-MCNC: 174 MG/DL (ref 70–130)
GLUCOSE BLDC GLUCOMTR-MCNC: 191 MG/DL (ref 70–130)
GLUCOSE BLDC GLUCOMTR-MCNC: 209 MG/DL (ref 70–130)
GLUCOSE BLDC GLUCOMTR-MCNC: 84 MG/DL (ref 70–130)
POTASSIUM BLD-SCNC: 4.5 MMOL/L (ref 3.5–5.2)
SODIUM BLD-SCNC: 136 MMOL/L (ref 136–145)

## 2017-07-04 PROCEDURE — 93005 ELECTROCARDIOGRAM TRACING: CPT | Performed by: INTERNAL MEDICINE

## 2017-07-04 PROCEDURE — 80048 BASIC METABOLIC PNL TOTAL CA: CPT | Performed by: INTERNAL MEDICINE

## 2017-07-04 PROCEDURE — 82962 GLUCOSE BLOOD TEST: CPT

## 2017-07-04 PROCEDURE — G0378 HOSPITAL OBSERVATION PER HR: HCPCS

## 2017-07-04 PROCEDURE — 93010 ELECTROCARDIOGRAM REPORT: CPT | Performed by: INTERNAL MEDICINE

## 2017-07-04 PROCEDURE — 99225 PR SBSQ OBSERVATION CARE/DAY 25 MINUTES: CPT | Performed by: INTERNAL MEDICINE

## 2017-07-04 RX ADMIN — HYDRALAZINE HYDROCHLORIDE 25 MG: 25 TABLET, FILM COATED ORAL at 09:16

## 2017-07-04 RX ADMIN — POTASSIUM CHLORIDE 10 MEQ: 750 CAPSULE, EXTENDED RELEASE ORAL at 09:16

## 2017-07-04 RX ADMIN — FUROSEMIDE 40 MG: 40 TABLET ORAL at 17:25

## 2017-07-04 RX ADMIN — HYDRALAZINE HYDROCHLORIDE 25 MG: 25 TABLET, FILM COATED ORAL at 20:55

## 2017-07-04 RX ADMIN — INSULIN DETEMIR 15 UNITS: 100 INJECTION, SOLUTION SUBCUTANEOUS at 20:55

## 2017-07-04 RX ADMIN — ACETAMINOPHEN 650 MG: 325 TABLET ORAL at 04:00

## 2017-07-04 RX ADMIN — SERTRALINE 50 MG: 50 TABLET, FILM COATED ORAL at 09:16

## 2017-07-04 RX ADMIN — HYDRALAZINE HYDROCHLORIDE 25 MG: 25 TABLET, FILM COATED ORAL at 17:24

## 2017-07-04 RX ADMIN — ACETAMINOPHEN 650 MG: 325 TABLET ORAL at 12:21

## 2017-07-04 RX ADMIN — POTASSIUM CHLORIDE 10 MEQ: 750 CAPSULE, EXTENDED RELEASE ORAL at 18:56

## 2017-07-04 RX ADMIN — FUROSEMIDE 40 MG: 40 TABLET ORAL at 09:16

## 2017-07-04 NOTE — PROGRESS NOTES
Electrophysiology Follow-Up Note      Patient Name: Veronica Henao  Age/Sex: 82 y.o. female  : 1935  MRN: [id number]      Day of Service: 17       Chief Complaint/Follow-up: flutter    Interval History:     S: min post op pain        Temp:  [97.5 °F (36.4 °C)-98.9 °F (37.2 °C)] 98.1 °F (36.7 °C)  Heart Rate:  [] 80  Resp:  [16-20] 18  BP: (109-153)/(60-93) 135/93       Appearance: No distress   Neck: No JVD  Lungs: CTA   Cardiac: reg, no rub  Extrem: No edema  Skin: warm and dry  Site ok     ECG/TELE:         Results from last 7 days  Lab Units 17  1244   SODIUM mmol/L 139   POTASSIUM mmol/L 4.6   CHLORIDE mmol/L 97*   CO2 mmol/L 25.9   BUN mg/dL 54*   CREATININE mg/dL 2.28*   GLUCOSE mg/dL 87   CALCIUM mg/dL 10.0       Results from last 7 days  Lab Units 17  1244   WBC 10*3/mm3 7.50   HEMOGLOBIN g/dL 10.4*   HEMATOCRIT % 31.5*   PLATELETS 10*3/mm3 278                           Current Medications:   Scheduled Meds:    amLODIPine 5 mg Oral Daily   furosemide 40 mg Oral BID   hydrALAZINE 25 mg Oral TID   insulin detemir 15 Units Subcutaneous Nightly   potassium chloride 10 mEq Oral BID   sertraline 50 mg Oral Daily     Continuous Infusions:    sodium chloride 75 mL/hr Last Rate: 75 mL/hr (17 1244)         Active Problems:    Atrial flutter, paroxysmal    Arrhythmia    Third degree atrioventricular block       Plan: Her his threshold is down to 3.75 at 1.5   She feels better but i want to watch her one more day given how much trouble she has had w diastolic congestion and now av block.  And we have to watch rachael Ward MD  17  8:43 AM

## 2017-07-05 ENCOUNTER — TELEPHONE (OUTPATIENT)
Dept: CARDIOLOGY | Facility: CLINIC | Age: 82
End: 2017-07-05

## 2017-07-05 VITALS
RESPIRATION RATE: 20 BRPM | WEIGHT: 189.38 LBS | OXYGEN SATURATION: 94 % | BODY MASS INDEX: 33.55 KG/M2 | HEART RATE: 80 BPM | SYSTOLIC BLOOD PRESSURE: 157 MMHG | TEMPERATURE: 98.9 F | DIASTOLIC BLOOD PRESSURE: 86 MMHG | HEIGHT: 63 IN

## 2017-07-05 LAB
ANION GAP SERPL CALCULATED.3IONS-SCNC: 15.5 MMOL/L
BASOPHILS # BLD AUTO: 0.04 10*3/MM3 (ref 0–0.2)
BASOPHILS NFR BLD AUTO: 0.6 % (ref 0–1.5)
BUN BLD-MCNC: 46 MG/DL (ref 8–23)
BUN/CREAT SERPL: 21.9 (ref 7–25)
CALCIUM SPEC-SCNC: 9.4 MG/DL (ref 8.6–10.5)
CHLORIDE SERPL-SCNC: 96 MMOL/L (ref 98–107)
CO2 SERPL-SCNC: 24.5 MMOL/L (ref 22–29)
CREAT BLD-MCNC: 2.1 MG/DL (ref 0.57–1)
DEPRECATED RDW RBC AUTO: 49.6 FL (ref 37–54)
EOSINOPHIL # BLD AUTO: 0.31 10*3/MM3 (ref 0–0.7)
EOSINOPHIL NFR BLD AUTO: 4.4 % (ref 0.3–6.2)
ERYTHROCYTE [DISTWIDTH] IN BLOOD BY AUTOMATED COUNT: 15.9 % (ref 11.7–13)
GFR SERPL CREATININE-BSD FRML MDRD: 23 ML/MIN/1.73
GLUCOSE BLD-MCNC: 127 MG/DL (ref 65–99)
GLUCOSE BLDC GLUCOMTR-MCNC: 116 MG/DL (ref 70–130)
GLUCOSE BLDC GLUCOMTR-MCNC: 204 MG/DL (ref 70–130)
HCT VFR BLD AUTO: 30.2 % (ref 35.6–45.5)
HGB BLD-MCNC: 9.8 G/DL (ref 11.9–15.5)
IMM GRANULOCYTES # BLD: 0 10*3/MM3 (ref 0–0.03)
IMM GRANULOCYTES NFR BLD: 0 % (ref 0–0.5)
LYMPHOCYTES # BLD AUTO: 1.82 10*3/MM3 (ref 0.9–4.8)
LYMPHOCYTES NFR BLD AUTO: 26 % (ref 19.6–45.3)
MCH RBC QN AUTO: 27.6 PG (ref 26.9–32)
MCHC RBC AUTO-ENTMCNC: 32.5 G/DL (ref 32.4–36.3)
MCV RBC AUTO: 85.1 FL (ref 80.5–98.2)
MONOCYTES # BLD AUTO: 0.55 10*3/MM3 (ref 0.2–1.2)
MONOCYTES NFR BLD AUTO: 7.8 % (ref 5–12)
NEUTROPHILS # BLD AUTO: 4.29 10*3/MM3 (ref 1.9–8.1)
NEUTROPHILS NFR BLD AUTO: 61.2 % (ref 42.7–76)
PLATELET # BLD AUTO: 245 10*3/MM3 (ref 140–500)
PMV BLD AUTO: 9.9 FL (ref 6–12)
POTASSIUM BLD-SCNC: 4.9 MMOL/L (ref 3.5–5.2)
RBC # BLD AUTO: 3.55 10*6/MM3 (ref 3.9–5.2)
SODIUM BLD-SCNC: 136 MMOL/L (ref 136–145)
WBC NRBC COR # BLD: 7.01 10*3/MM3 (ref 4.5–10.7)

## 2017-07-05 PROCEDURE — 85025 COMPLETE CBC W/AUTO DIFF WBC: CPT | Performed by: INTERNAL MEDICINE

## 2017-07-05 PROCEDURE — G0378 HOSPITAL OBSERVATION PER HR: HCPCS

## 2017-07-05 PROCEDURE — 80048 BASIC METABOLIC PNL TOTAL CA: CPT | Performed by: INTERNAL MEDICINE

## 2017-07-05 PROCEDURE — 82962 GLUCOSE BLOOD TEST: CPT

## 2017-07-05 PROCEDURE — 99024 POSTOP FOLLOW-UP VISIT: CPT | Performed by: NURSE PRACTITIONER

## 2017-07-05 RX ORDER — HYDRALAZINE HYDROCHLORIDE 50 MG/1
50 TABLET, FILM COATED ORAL 2 TIMES DAILY
Qty: 60 TABLET | Refills: 3 | Status: SHIPPED | OUTPATIENT
Start: 2017-07-05 | End: 2017-09-11 | Stop reason: SDUPTHER

## 2017-07-05 RX ORDER — FUROSEMIDE 40 MG/1
40 TABLET ORAL DAILY
Start: 2017-07-05 | End: 2017-07-18

## 2017-07-05 RX ORDER — POTASSIUM CHLORIDE 750 MG/1
10 CAPSULE, EXTENDED RELEASE ORAL DAILY
Start: 2017-07-05 | End: 2018-02-13 | Stop reason: SDUPTHER

## 2017-07-05 RX ORDER — ACETAMINOPHEN 325 MG/1
650 TABLET ORAL EVERY 6 HOURS PRN
Refills: 0
Start: 2017-07-05

## 2017-07-05 RX ADMIN — POTASSIUM CHLORIDE 10 MEQ: 750 CAPSULE, EXTENDED RELEASE ORAL at 08:39

## 2017-07-05 RX ADMIN — FUROSEMIDE 40 MG: 40 TABLET ORAL at 08:39

## 2017-07-05 RX ADMIN — HYDRALAZINE HYDROCHLORIDE 25 MG: 25 TABLET, FILM COATED ORAL at 08:39

## 2017-07-05 RX ADMIN — SERTRALINE 50 MG: 50 TABLET, FILM COATED ORAL at 08:39

## 2017-07-05 RX ADMIN — ACETAMINOPHEN 650 MG: 325 TABLET ORAL at 08:39

## 2017-07-05 NOTE — TELEPHONE ENCOUNTER
I did schedule pt for 7/12 at 0900 in conjunction with her Dr. Rodrigue kirby. I called and left pt a message informing her of the new incision check apt and reminding her of Dr. Rodrigue kirby. I did tell her if Dr. Ward wanted her checked sooner than Wednesday I would call her to reschedule. I left a call back number if there were any questions or concerns.

## 2017-07-05 NOTE — TELEPHONE ENCOUNTER
----- Message from Darion Green RN sent at 7/5/2017  2:27 PM EDT -----  Regarding: RE: followup   Would Wednesday be ok, since she has backup RV pacing? I ask because she is already seeing Dr. Husain in the AM of 7/12 and Dr. Ward is in clinic that day.     ThanksDarion  ----- Message -----     From: DEBORAH Smith     Sent: 7/5/2017   1:54 PM       To: Darion Green RN  Subject: followup                                         She needs incision check on Monday or Tuesday-she is HIS lead in the aport and backup RV lead in V port-she has high HIS threshold and had pocket stim unipolar. They had improved some post op but she needs to be check both unipolar and bipolar when she comes in and let JM know what they are. Today she was 5V & 5.5V at 1.5ms bipolar today-Steve did not check unipolar.     Thanks Monie

## 2017-07-05 NOTE — DISCHARGE SUMMARY
DISCHARGE NOTE    Patient Name: Veronica Henao  Age/Sex: 82 y.o. female  : 1935  MRN: [id number]    Date of Discharge:  2017   Date of Admit: 7/3/2017  Encounter Provider: DEBORAH Smith  Place of Service: [facility] CARDIOLOGY  Patient Care Team:  Nelly Price MD as PCP - General  Gino Gibbs MD as Consulting Physician (Cardiology)    Subjective:     Discharge Diagnosis:  Active Problems:    Atrial flutter, paroxysmal    Arrhythmia    Third degree atrioventricular block      Hospital Course:     82 year old female with complex past medical history of atrial fibrillation, status post maze with postop atrial flutter, status post ablation in 2017 (on apixaban), congestive heart failure, hypertension, hyperlipidemia, obstructive sleep apnea (on CPAP), valvular heart disease, status post mitral and tricuspid valve repair. She had recurrent atrial flutter despite ablation and on 3/24/17 she underwent cardioversion for recurrent atrial flutter but unfortunately has had intermittent recurrent atrial flutter that has proven very difficult to control. She was scheduled for a redo ablation on 17 but around that time had had a period that she stayed in SR for awhile so it was cancelled. She is intolerant to all beta blockers to this point as they make her severely fatigued. She called on  as was back out of rhythm. Discussed with Dr. Ward and he discussed with Dr. Husain and decided AV node ablation with PPM was probably the best route. Patient was agreeable and came in on 7/3/17 and underwent procedures. She had a dual chamber pacemaker placed which is a HIS lead with RV lead for backup ([employer]). Her HIS lead threshold was elevated on implant and did improve some on POD #1 and this morning it is still elevated but stable. She says she feels much better. Her  creat. In 2.1 today which is stable but she is euvolemic so we are going to decrease her furosemide and potassium to daily for now. Dr. Husain has been adjusting meds for her blood pressure. She added amlodipine recently but she says she can not tolerate it so she stopped it prior to admission. Blood pressure this admission has ranged from 109-165/62-93. Her most recent ones have trended on the high side. She was only taking hydralazine twice daily at home even though it looks like it was prescribed 3 times daily. We are going to increase her to 50mg twice daily and she has follow up appointment with Dr. Husain on 7/13/17. She will have an incision check in the pacemaker center on Monday or Tuesday and they will check her HIS lead thresholds at that time as well. She will be discharged home today.     Vital Signs  Temp:  [97.8 °F (36.6 °C)-98.9 °F (37.2 °C)] 98.9 °F (37.2 °C)  Heart Rate:  [80-81] 80  Resp:  [18-20] 20  BP: (124-157)/(78-86) 157/86  No intake or output data in the 24 hours ending 07/05/17 1404    Physical Exam:  Physical Exam   Constitutional: She is oriented to person, place, and time. She appears well-developed and well-nourished. No distress.   HENT:   Head: Normocephalic.   Eyes: Conjunctivae are normal. No scleral icterus.   Neck: Neck supple.   Cardiovascular: Normal rate, regular rhythm and normal heart sounds.    Pulmonary/Chest: Effort normal. No respiratory distress.   Abdominal: Soft.   Musculoskeletal: Normal range of motion. She exhibits no edema.   Neurological: She is alert and oriented to person, place, and time.   Skin: Skin is warm and dry.   Incision wnl   Psychiatric: She has a normal mood and affect. Her behavior is normal.   Vitals reviewed.       Labs:     Results from last 7 days  Lab Units 07/05/17  0835 07/04/17  1007 07/03/17  1244   SODIUM mmol/L 136 136 139   POTASSIUM mmol/L 4.9 4.5 4.6   CHLORIDE mmol/L 96* 98 97*   CO2 mmol/L 24.5 24.1 25.9   BUN mg/dL 46* 50* 54*    CREATININE mg/dL 2.10* 2.08* 2.28*   GLUCOSE mg/dL 127* 199* 87   CALCIUM mg/dL 9.4 9.3 10.0           Results from last 7 days  Lab Units 07/05/17  0306 07/03/17  1244   WBC 10*3/mm3 7.01 7.50   HEMOGLOBIN g/dL 9.8* 10.4*   HEMATOCRIT % 30.2* 31.5*   PLATELETS 10*3/mm3 245 278                           Discharge Diet:         Dietary Orders            Start     Ordered    07/03/17 1705  Diet Regular  Diet Effective Now     Question:  Diet Texture / Consistency  Answer:  Regular    07/03/17 1705            Activity at Discharge:  Routine post op pacemaker instructions.     Discharge Medications   Veronica Henao   Home Medication Instructions VIDYA:[id number]    Printed on:07/05/17 2823   Medication Information                      acetaminophen (TYLENOL) 325 MG tablet  Take 2 tablets by mouth Every 6 (Six) Hours As Needed for Mild Pain (1-3).             apixaban (ELIQUIS) 2.5 MG tablet tablet  Take 2.5 mg by mouth 2 (Two) Times a Day.             cetirizine (zyrTEC) 10 MG tablet  Take 10 mg by mouth Every Night.             cholecalciferol (VITAMIN D3) 1000 UNITS tablet  Take 1,000 Units by mouth daily.             Cyanocobalamin (VITAMIN B-12) 5000 MCG sublingual tablet  5,000 mcg.             furosemide (LASIX) 40 MG tablet  Take 1 tablet by mouth Daily.             hydrALAZINE (APRESOLINE) 50 MG tablet  Take 1 tablet by mouth 2 (Two) Times a Day.             LEVEMIR FLEXTOUCH 100 UNIT/ML injection  18 Units Every Night.             montelukast (SINGULAIR) 10 MG tablet  Take 10 mg by mouth Every Night.             Multiple Vitamins-Minerals (CENTRUM SILVER PO)  Take 1 tablet by mouth Daily.             potassium chloride (MICRO-K) 10 MEQ CR capsule  Take 1 capsule by mouth Daily.             raNITIdine (ZANTAC) 150 MG tablet  Take 150 mg by mouth 2 (Two) Times a Day.             sertraline (ZOLOFT) 50 MG tablet  Take 50 mg by mouth Daily.             Vitamin E 400 UNITS chewable tablet  400 Units.              Vitamins A & D (VITAMIN A & D) 52524-530 UNITS tablet                   Discharge disposition: home    Follow-up Appointments  Follow-up Information     Follow up with [facility] Follow up in 5 day(s).    Why:  F/u Monday or Tuesday-check thresolds that day also        Future Appointments  Date Time Provider Department Center   7/12/2017 9:40 AM MD KIMBERLI [facility] None   9/8/2017 10:45 AM MD KIMBERLI [facility] None         DEBORAH Smith  07/05/17  2:04 PM

## 2017-07-05 NOTE — PLAN OF CARE
Problem: Patient Care Overview (Adult)  Goal: Plan of Care Review  Outcome: Ongoing (interventions implemented as appropriate)  Goal: Adult Individualization and Mutuality  Outcome: Ongoing (interventions implemented as appropriate)  Goal: Discharge Needs Assessment  Outcome: Ongoing (interventions implemented as appropriate)    Problem: Perioperative Period (Adult)  Goal: Signs and Symptoms of Listed Potential Problems Will be Absent or Manageable (Perioperative Period)  Outcome: Ongoing (interventions implemented as appropriate)    Problem: Arrhythmia/Dysrhythmia (Symptomatic) (Adult)  Goal: Signs and Symptoms of Listed Potential Problems Will be Absent or Manageable (Arrhythmia/Dysrhythmia)  Outcome: Ongoing (interventions implemented as appropriate)

## 2017-07-05 NOTE — PROGRESS NOTES
Discharge Planning Assessment  [facility]     Patient Name: Veronica Henao  MRN: [id number]  Today's Date: 7/5/2017    Admit Date: 7/3/2017          Discharge Needs Assessment       07/05/17 1340    Living Environment    Lives With child(afshin), adult;other relative(s) (specify)   Son Cheo and grandson    Living Arrangements house    Home Accessibility no concerns;bed and bath on same level;stairs to enter home    Number of Stairs to Enter Home 2    Stair Railings at Home outside, present at both sides    Type of Financial/Environmental Concern none    Transportation Available family or friend will provide    Living Environment    Provides Primary Care For no one    Quality Of Family Relationships supportive;helpful;involved    Able to Return to Prior Living Arrangements yes    Discharge Needs Assessment    Concerns To Be Addressed denies needs/concerns at this time;no discharge needs identified    Anticipated Changes Related to Illness none    Equipment Currently Used at Home cane, straight;cane, quad;bipap/ cpap    Discharge Disposition still a patient    Discharge Contact Information if Applicable Nesha Young dtr [phone]            Discharge Plan       07/05/17 1348    Case Management/Social Work Plan    Plan Home.  Denies d/c needs.      Additional Comments S/W Pt at bedside.  CCP Introduced self and role of CCP.  Pt confirmed information on face sheet.  Pt states [employer] & retired.  Pt lives with son and grandson.  Pt has used [facility] in past and has been to [facility] in past.  Pt uses a CPAP provided by [facility].  Pt denies d/c needs.  Pt plans to return home at d/c with assist of son and grandson.  CCP following.              Discharge Placement     No information found        Expected Discharge Date and Time     Expected Discharge Date Expected Discharge Time    Jul 5, 2017               Demographic Summary       07/05/17 1339    Referral Information    Admission Type observation    Arrived From home or  self-care    Contact Information    Permission Granted to Share Information With family/designee    Primary Care Physician Information    Name Nelly Price MD            Functional Status       07/05/17 2902    Functional Status Current    Ambulation 3-->assistive equipment and person    Transferring 3-->assistive equipment and person    Toileting 0-->independent    Bathing 0-->independent    Dressing 0-->independent    Eating 0-->independent    Communication 0-->understands/communicates without difficulty    Swallowing (if score 2 or more for any item, consult Rehab Services) 0-->swallows foods/liquids without difficulty    Change in Functional Status Since Onset of Current Illness/Injury no    Functional Status Prior    Ambulation 1-->assistive equipment    Transferring 1-->assistive equipment    Toileting 0-->independent    Bathing 0-->independent    Dressing 0-->independent    Eating 0-->independent    Communication 0-->understands/communicates without difficulty    Swallowing 0-->swallows foods/liquids without difficulty    Cognitive/Perceptual/Developmental    Current Mental Status/Cognitive Functioning no deficits noted            Psychosocial     None            Abuse/Neglect     None            Legal     None            Substance Abuse     None            Patient Forms     None          Sharon Sexton RN

## 2017-07-05 NOTE — DISCHARGE INSTR - ACTIVITY
· Check the incision site every day to make sure it is not infected, bleeding, or starting to pull apart.  · Do not use lotions or ointments near the incision site unless directed to do so.  · Keep the incision area clean and dry for 2-3 days after the procedure or as directed by your health care provider. It takes several weeks for the incision site to completely heal.  · Do not take baths, swim, or use a hot tub until your health care provider approves.  Activities  · Try to walk a little every day. Exercising is important after this procedure. It is also important to use your shoulder on the side of the pacemaker in daily tasks that do not require exaggerated motion.  · Avoid sudden jerking, pulling, or chopping movements that pull your upper arm far away from your body for at least 6 weeks.  · Do not lift your upper arm above your shoulders for at least 6 weeks. This means no tennis, golf, or swimming for this period of time. If you sleep with the arm above your head, use a restraint to prevent this from happening as you sleep.  · You may go back to work when your health care provider says it is okay. Check with your health care provider before you start to drive or play sports.  Other Instructions  · Follow diet instructions if they were provided. You should be able to eat what you usually do right away, but you may need to limit your salt intake.  · Weigh yourself every day. If you suddenly gain weight, fluid may be building up in your body.  · Always carry your pacemaker identification card with you. The card should list the implant date, device model, and . Consider wearing a medical alert bracelet or necklace.  · Tell all health care providers that you have a pacemaker. This may prevent them from giving you a magnetic resource imaging scan (MRI) because of the strong magnets used during that test.  · If you must pass through a metal detector, quickly walk through it. Do not stop under the  detector or stand near it.  · Avoid places or objects with a strong electric or magnetic field, including:  ¨ Airport security ortiz. When at the airport, let officials know you have a pacemaker. Your ID card will let you be checked in a way that is safe for you and that will not damage your pacemaker. Also, do not let a security person wave a magnetic wand near your pacemaker. That can make it stop working.  ¨ Power plants.  ¨ Large electrical generators.  ¨ Radiofrequency transmission towers, such as cell phone and radio towers.  · Do not use amateur (ham) radio equipment or electric (arc) welding torches. Some devices are safe to use if held at least 1 foot from your pacemaker. These include power tools, lawn mowers, and speakers. If you are unsure of whether something is safe to use, ask your health care provider.  · You may safely use electric blankets, heating pads, computers, and microwave ovens.  · When using your cell phone, hold it to the ear opposite the pacemaker. Do not leave your cell phone in a pocket over the pacemaker.  · Keep all follow-up visits as directed by your health care provider. This is how your health care provider makes sure your chest is healing the way it should. Ask your health care provider when you should come back to have your stitches or staples taken out.  · Have your pacemaker checked every 3-6 months or as directed by your health care provider. Most pacemakers last for 4-8 years before a new one is needed.

## 2017-07-07 NOTE — TELEPHONE ENCOUNTER
DEBORAH Smith RN                     Yes            Previous Messages       ----- Message -----      From: Darion Green RN      Sent: 7/5/2017   2:27 PM        To: DEBORAH Smith, *   Subject: RE: followup                                     Would Wednesday be ok, since she has backup RV pacing? I ask because she is already seeing Dr. Husain in the AM of 7/12 and Dr. Ward is in clinic that day.

## 2017-07-12 ENCOUNTER — OFFICE VISIT (OUTPATIENT)
Dept: CARDIOLOGY | Facility: CLINIC | Age: 82
End: 2017-07-12

## 2017-07-12 ENCOUNTER — CLINICAL SUPPORT NO REQUIREMENTS (OUTPATIENT)
Dept: CARDIOLOGY | Facility: CLINIC | Age: 82
End: 2017-07-12

## 2017-07-12 VITALS
DIASTOLIC BLOOD PRESSURE: 90 MMHG | SYSTOLIC BLOOD PRESSURE: 160 MMHG | WEIGHT: 191 LBS | RESPIRATION RATE: 18 BRPM | HEIGHT: 63 IN | BODY MASS INDEX: 33.84 KG/M2 | HEART RATE: 80 BPM

## 2017-07-12 DIAGNOSIS — E11.22 TYPE 2 DIABETES MELLITUS WITH CHRONIC KIDNEY DISEASE, WITH LONG-TERM CURRENT USE OF INSULIN, UNSPECIFIED CKD STAGE (HCC): ICD-10-CM

## 2017-07-12 DIAGNOSIS — E66.01 MORBID OBESITY, UNSPECIFIED OBESITY TYPE (HCC): ICD-10-CM

## 2017-07-12 DIAGNOSIS — I44.2 ATRIOVENTRICULAR BLOCK, COMPLETE (HCC): Primary | ICD-10-CM

## 2017-07-12 DIAGNOSIS — G47.33 OBSTRUCTIVE SLEEP APNEA SYNDROME: ICD-10-CM

## 2017-07-12 DIAGNOSIS — Z98.890 S/P TVR (TRICUSPID VALVE REPAIR): ICD-10-CM

## 2017-07-12 DIAGNOSIS — I48.0 PAROXYSMAL ATRIAL FIBRILLATION (HCC): Primary | ICD-10-CM

## 2017-07-12 DIAGNOSIS — I36.1 NON-RHEUMATIC TRICUSPID VALVE INSUFFICIENCY: ICD-10-CM

## 2017-07-12 DIAGNOSIS — I10 ESSENTIAL HYPERTENSION: ICD-10-CM

## 2017-07-12 DIAGNOSIS — E78.49 OTHER HYPERLIPIDEMIA: ICD-10-CM

## 2017-07-12 DIAGNOSIS — Z79.4 TYPE 2 DIABETES MELLITUS WITH CHRONIC KIDNEY DISEASE, WITH LONG-TERM CURRENT USE OF INSULIN, UNSPECIFIED CKD STAGE (HCC): ICD-10-CM

## 2017-07-12 DIAGNOSIS — I34.0 NON-RHEUMATIC MITRAL REGURGITATION: ICD-10-CM

## 2017-07-12 PROCEDURE — 99214 OFFICE O/P EST MOD 30 MIN: CPT | Performed by: INTERNAL MEDICINE

## 2017-07-12 PROCEDURE — 93000 ELECTROCARDIOGRAM COMPLETE: CPT | Performed by: INTERNAL MEDICINE

## 2017-07-12 PROCEDURE — 93280 PM DEVICE PROGR EVAL DUAL: CPT | Performed by: INTERNAL MEDICINE

## 2017-07-12 RX ORDER — DILTIAZEM HYDROCHLORIDE 300 MG/1
300 CAPSULE, COATED, EXTENDED RELEASE ORAL DAILY
Qty: 30 CAPSULE | Refills: 11
Start: 2017-07-12 | End: 2017-07-30 | Stop reason: HOSPADM

## 2017-07-12 NOTE — PROGRESS NOTES
PATIENTINFORMATION    Date of Office Visit: 2017  Encounter Provider: Maria Luz Husain MD  Place of Service: [facility] CARDIOLOGY  Patient Name: Veronica Henao  : 1935    Subjective:     Encounter Date:2017      Patient ID: Veronica Henao is a 82 y.o. female.      History of Present Illness    She was having palpitations with a sensation of a racing heart in  and went to the emergency room and was diagnosed with atrial fibrillation with rapid ventricular response. She was treated by [facility]. She had an echocardiogram, which no left ventricular hypertrophy, normal left ventricular function with an ejection fraction of 55%-60%, and an elevated left atrial pressure. There was mild to moderate left atrial enlargement. There was mild mitral regurgitation. There was mild tricuspid regurgitation with a right ventricular systolic pressure of 37 mmHg. She was put on amiodarone and anticoagulation and was cardioverted approximately a month later. To her knowledge, she has stayed in sinus rhythm since this time period. She began complaining of shortness of breath in the fall of  and her amiodarone was discontinued. She has been maintained on Eliquis at this time. She was dissatisfied with her care at [facility] and transferred her care to me in 2014.       I saw her in 2015 and at that time she had had some nausea and vomiting with metformin but was still in sinus rhythm. She reports stopping all of her medications because of the dehydration and then the medicines were slowly resumed.       In 2015 she came in with complaints of feeling her heart race. She had no energy. She was short of breath and had to stop and rest when she exerted herself. She had increased lower extremity edema. She went to see her pulmonologist and he found her to be tachycardic. She came to see me and was in atrial fibrillation with rapid  ventricular response. I increased her diltiazem and subsequently started her on digoxin.      I saw her in August 2015. At that time she was in rate controlled atrial fibrillation and asymptomatic. We opted to not perform another cardioversion. She was having left lower extremity swelling and I checked a lower extremity Doppler which was negative for DVT.      When I saw her in August 2016, she was complaining of chest pain and shortness of breath. Her transthoracic echocardiogram from August 15, 2016 showed:  All left ventricular all segments contract normally.  Left ventricular function is normal. Calculated EF = 50.9%. Estimated EF was in agreement with the calculated EF. Estimated EF = 51%. Normal left ventricular cavity size and wall thickness noted. All left ventricular wall segments contract normally. Left ventricular diastolic function was unable to be assessed. Elevated left atrial pressure.  Left atrial volume is severely increased.  Right atrial cavity size is moderately dilated.  Moderate-to-severe mitral valve regurgitation is present.  The tricuspid valve is grossly normal. Moderate to severe tricuspid valve regurgitation is present. Estimated right ventricular systolic pressure from tricuspid regurgitation is mildly elevated (35-45 mmHg). The calculated RV systolic pressure 39 mmHg.      Nuclear stress test from August 15, 2016 showed:  Myocardial perfusion imaging indicates a normal myocardial perfusion study with no evidence of ischemia.  Left ventricular ejection fraction is normal (Calculated EF = 69%).  Findings consistent with a normal ECG stress test.  Impressions are consistent with a low risk study.      Trans-esophageal echo October 19, 2016:  All left ventricular wall segments contract normally.  Left ventricular function is normal. Estimated EF = 60%.  Left atrial cavity size is severely dilated.  Moderate-to-severe mitral valve regurgitation is present  Moderate to severe tricuspid valve  regurgitation is present.  Estimated right ventricular systolic pressure from tricuspid regurgitation is normal (<35 mmHg).  There is moderate complex plaque in the descending aorta present.  I am going to refer her to see Dr. Vital. I would like to see if she is a candidate for surgery. If so, we will arrange a coronary angiogram.      Coronary artery disease November 21, 2016 showed no significant coronary disease. On December 14, 2016, she had a mitral valve repair with a 31 mm ATS band posterior annuloplasty and P2 chordal repair. The tricuspid valve had an annuloplasty with a 26 mm ring and plication of the anterior and septal leaflet of commissure. She had a left and right Maze procedure. The left atrial appendage ligated.      Postoperatively she had asystole and bradycardia for 2 days. Eventually her underlying rhythm returned. She did have some postoperative atrial fibrillation. Initially she did not receive any beta blockers or amiodarone because of the asystole. However, she was able to tolerate a beta blocker by the time of discharge. She was started on warfarin which was subsequently switched to Eliquis. I discontinued her metoprolol because she felt it was causing fatigue and shortness of breath.     She presented to the chest pain unit in March 2017 with palpitations. She was found to be in rapid atrial flutter. She had a cardioversion on March 24, 2017 which was initially successful but she went back into atrial flutter the next day. On March 28 Dr. Ward performed an atrial flutter ablation.    Echo 5/25/17  · Left ventricular systolic function is normal. Estimated EF = 70%.  · Left atrial cavity size is severely dilated.  · Mild mitral valve stenosis is present. The mitral valve mean gradient is 6 mmHg. There is a mitral valve ring present.  · Mild tricuspid valve regurgitation is present. Estimated right ventricular systolic pressure from tricuspid regurgitation is moderately elevated (45-55  mmHg).  · There is evidence of previous tricuspid valve repair.    She went back into atrial flutter.  Dr. Ward performed a cardioversion on June 14.  She was successfully cardioverted back to sinus rhythm.  In less than 2 weeks, she is back in atrial flutter.  I discussed this with Dr. Ward and we decided on an AV node ablation and pacemaker insertion.  This was performed on July 5.  She had a pacemaker placed with her ventricular lead at the his bundle.    She comes in today for follow-up.  She says she is felt short of breath yesterday and today.  She does have a little bit of a cough but no wheezing.  She denies fever or orthopnea.  She has obstructive sleep apnea and is compliant with her CPAP.  She also reports feeling lightheaded with exertion.  Her pacemaker was interrogated today and is normal.  She is hypertensive and has been hypertensive at home as well.     Review of Systems   Constitution: Negative for fever, malaise/fatigue, weight gain and weight loss.   HENT: Negative for ear pain, hearing loss, nosebleeds and sore throat.    Eyes: Negative for double vision, pain, vision loss in left eye and vision loss in right eye.   Cardiovascular:        See history of present illness.   Respiratory: Positive for cough and shortness of breath. Negative for sleep disturbances due to breathing, snoring and wheezing.    Endocrine: Negative for cold intolerance, heat intolerance and polyuria.   Skin: Negative for itching, poor wound healing and rash.   Musculoskeletal: Negative for joint pain, joint swelling and myalgias.   Gastrointestinal: Negative for abdominal pain, diarrhea, hematochezia, nausea and vomiting.   Genitourinary: Negative for hematuria and hesitancy.   Neurological: Negative for numbness, paresthesias and seizures.   Psychiatric/Behavioral: Negative for depression. The patient is not nervous/anxious.            ECG 12 Lead  Date/Time: 7/12/2017 12:01 PM  Performed by: IRVIN LAN  "A  Authorized by: IRVIN LAN   Comparison: compared with previous ECG from 5/24/2017  Comparison to previous ECG: She is now the paced  Rhythm: atrial fibrillation  BPM: 80  Clinical impression: abnormal ECG               Objective:     /90 (BP Location: Right arm, Patient Position: Sitting, Cuff Size: Adult)  Pulse 80  Resp 18  Ht 63\" (160 cm)  Wt 191 lb (86.6 kg)  BMI 33.83 kg/m2 Body mass index is 33.83 kg/(m^2).     Physical Exam   Constitutional: She appears well-developed.   HENT:   Head: Normocephalic and atraumatic.   Eyes: Conjunctivae and lids are normal. Pupils are equal, round, and reactive to light. Lids are everted and swept, no foreign bodies found.   Neck: Normal range of motion. No JVD present. Carotid bruit is not present. No tracheal deviation present. No thyroid mass present.   Cardiovascular: Normal rate, regular rhythm and normal heart sounds.    Pulses:       Dorsalis pedis pulses are 2+ on the right side, and 2+ on the left side.   Pulmonary/Chest: Effort normal and breath sounds normal.   Abdominal: Normal appearance and bowel sounds are normal.   Musculoskeletal: Normal range of motion.   Neurological: She is alert. She has normal strength.   Skin: Skin is warm, dry and intact.   Psychiatric: She has a normal mood and affect. Her behavior is normal.   Vitals reviewed.          Assessment/Plan:       1. Moderate to severe mitral regurgitation and tricuspid regurgitation. She is s/p repair in December 2016.  2. Atrial fibrillation. S/P maze and left atrial appendage ligation.   3. Rapid atrial flutter. She is status post a flutter ablation in March 2017. Unfortunately, she was not maintaining sinus rhythm.  In July 2017, she had an AV node ablation and pacemaker at the His bundle inserted.  4. Diabetes  5. Hypertension.  Her blood pressure was controlled at her last visit but is now really high.  I have been increasing her hydralazine.  He felt diltiazem works the best for " her blood pressure.  She has a pacemaker so I'm not worried about bradycardia.  I'm going to resume diltiazem 300 mg a day in addition to hydralazine.  She will have a blood pressure check in the office next week and I am going to have them check her home cuff as well for accuracy.  Her high blood pressure may be causing some of her shortness of breath and lightheadedness with exertion.  6. Hyperlipidemia  7. Obstructive sleep apnea. She uses CPAP.  8. Chronic kidney disease.   9. Postoperative anemia      I will see her back in 4 weeks to follow-up on her blood pressure.    Orders Placed This Encounter   Procedures   • ECG 12 Lead     This order was created via procedure documentation      Veronica Henao   Home Medication Instructions VIDYA:    Printed on:07/12/17 3275   Medication Information                      acetaminophen (TYLENOL) 325 MG tablet  Take 2 tablets by mouth Every 6 (Six) Hours As Needed for Mild Pain (1-3).             apixaban (ELIQUIS) 2.5 MG tablet tablet  Take 2.5 mg by mouth 2 (Two) Times a Day.             cetirizine (zyrTEC) 10 MG tablet  Take 10 mg by mouth Every Night.             cholecalciferol (VITAMIN D3) 1000 UNITS tablet  Take 1,000 Units by mouth daily.             Cyanocobalamin (VITAMIN B-12) 5000 MCG sublingual tablet  5,000 mcg.             diltiaZEM CD (CARDIZEM CD) 300 MG 24 hr capsule  Take 1 capsule by mouth Daily.             furosemide (LASIX) 40 MG tablet  Take 1 tablet by mouth Daily.             hydrALAZINE (APRESOLINE) 50 MG tablet  Take 1 tablet by mouth 2 (Two) Times a Day.             LEVEMIR FLEXTOUCH 100 UNIT/ML injection  18 Units Every Night.             montelukast (SINGULAIR) 10 MG tablet  Take 10 mg by mouth Every Night.             Multiple Vitamins-Minerals (CENTRUM SILVER PO)  Take 1 tablet by mouth Daily.             potassium chloride (MICRO-K) 10 MEQ CR capsule  Take 1 capsule by mouth Daily.             raNITIdine (ZANTAC) 150 MG tablet  Take 150 mg by  mouth 2 (Two) Times a Day.             sertraline (ZOLOFT) 50 MG tablet  Take 50 mg by mouth Daily.             Vitamin E 400 UNITS chewable tablet  400 Units.             Vitamins A & D (VITAMIN A & D) 85243-769 UNITS tablet                          Maria Luz Husain MD  07/12/17  12:08 PM

## 2017-07-17 RX ORDER — RANITIDINE 150 MG/1
TABLET ORAL
Qty: 180 TABLET | Refills: 1 | Status: SHIPPED | OUTPATIENT
Start: 2017-07-17 | End: 2018-02-21 | Stop reason: SDUPTHER

## 2017-07-18 ENCOUNTER — OFFICE VISIT (OUTPATIENT)
Dept: INTERNAL MEDICINE | Facility: CLINIC | Age: 82
End: 2017-07-18

## 2017-07-18 VITALS
OXYGEN SATURATION: 98 % | SYSTOLIC BLOOD PRESSURE: 138 MMHG | BODY MASS INDEX: 36.67 KG/M2 | WEIGHT: 207 LBS | HEART RATE: 85 BPM | DIASTOLIC BLOOD PRESSURE: 60 MMHG

## 2017-07-18 DIAGNOSIS — I50.22 CHRONIC SYSTOLIC CONGESTIVE HEART FAILURE (HCC): ICD-10-CM

## 2017-07-18 DIAGNOSIS — IMO0001 IDDM (INSULIN DEPENDENT DIABETES MELLITUS): ICD-10-CM

## 2017-07-18 DIAGNOSIS — D64.9 ANEMIA, UNSPECIFIED TYPE: ICD-10-CM

## 2017-07-18 DIAGNOSIS — I48.0 PAROXYSMAL ATRIAL FIBRILLATION (HCC): Primary | ICD-10-CM

## 2017-07-18 DIAGNOSIS — Z12.11 COLON CANCER SCREENING: ICD-10-CM

## 2017-07-18 PROCEDURE — 99214 OFFICE O/P EST MOD 30 MIN: CPT | Performed by: INTERNAL MEDICINE

## 2017-07-18 RX ORDER — GLIMEPIRIDE 2 MG/1
2 TABLET ORAL 2 TIMES DAILY
COMMUNITY
End: 2018-06-28 | Stop reason: SDUPTHER

## 2017-07-18 RX ORDER — BUMETANIDE 1 MG/1
1 TABLET ORAL DAILY
Qty: 90 TABLET | Refills: 4 | Status: SHIPPED | OUTPATIENT
Start: 2017-07-18 | End: 2017-07-30 | Stop reason: HOSPADM

## 2017-07-18 NOTE — PROGRESS NOTES
Chief Complaint   Patient presents with   • Shortness of Breath   • Leg Swelling   • Dizziness   • Diabetes     discuss meds   dyspnea, edema, afib, chf,     History of Present Illness   Veronica Henao is a 82 y.o. female presents for follow up evaluation. She has dm, cri, chf, and atrial fibrillation. She has had increased shortness of air and lower extremity edema. Reports that she has been eating poorly as she is too winded to prepare more nutritious foods. She had a pacer placed recently. She is on lasix in general taking lasix 40 mg 2 am and 1 pm. Patient thought that her swelling and edema was from using levemir so she stopped this and started tradjenta and glimeperide for dm. Weight gain of approx 16 # in last 2 weeks.     The following portions of the patient's history were reviewed and updated as appropriate: allergies, current medications, past family history, past medical history, past social history, past surgical history and problem list.  Current Outpatient Prescriptions on File Prior to Visit   Medication Sig Dispense Refill   • apixaban (ELIQUIS) 2.5 MG tablet tablet Take 2.5 mg by mouth 2 (Two) Times a Day.     • cetirizine (zyrTEC) 10 MG tablet Take 10 mg by mouth Every Night.     • cholecalciferol (VITAMIN D3) 1000 UNITS tablet Take 1,000 Units by mouth daily.     • Cyanocobalamin (VITAMIN B-12) 5000 MCG sublingual tablet 5,000 mcg.     • diltiaZEM CD (CARDIZEM CD) 300 MG 24 hr capsule Take 1 capsule by mouth Daily. 30 capsule 11   • hydrALAZINE (APRESOLINE) 50 MG tablet Take 1 tablet by mouth 2 (Two) Times a Day. 60 tablet 3   • montelukast (SINGULAIR) 10 MG tablet Take 10 mg by mouth Every Night.     • Multiple Vitamins-Minerals (CENTRUM SILVER PO) Take 1 tablet by mouth Daily.     • potassium chloride (MICRO-K) 10 MEQ CR capsule Take 1 capsule by mouth Daily.     • raNITIdine (ZANTAC) 150 MG tablet TAKE 1 TABLET TWICE DAILY 180 tablet 1   • sertraline (ZOLOFT) 50 MG tablet Take 50 mg by mouth  Daily.     • Vitamin E 400 UNITS chewable tablet 400 Units.     • Vitamins A & D (VITAMIN A & D) 84030-547 UNITS tablet      • [DISCONTINUED] furosemide (LASIX) 40 MG tablet Take 1 tablet by mouth Daily.     • acetaminophen (TYLENOL) 325 MG tablet Take 2 tablets by mouth Every 6 (Six) Hours As Needed for Mild Pain (1-3).  0   • LEVEMIR FLEXTOUCH 100 UNIT/ML injection 18 Units Every Night.       No current facility-administered medications on file prior to visit.      Review of Systems   Constitutional: Negative.    HENT: Negative.    Eyes: Negative.    Respiratory: Positive for shortness of breath.    Cardiovascular: Positive for leg swelling.   Gastrointestinal: Negative.    Endocrine: Negative.    Genitourinary: Negative.    Musculoskeletal: Positive for arthralgias and back pain.   Skin: Negative.    Allergic/Immunologic: Negative.    Neurological: Positive for dizziness.   Hematological: Negative.    Psychiatric/Behavioral: Negative.        Objective   Physical Exam   Constitutional: She is oriented to person, place, and time. She appears well-developed and well-nourished.   HENT:   Head: Normocephalic and atraumatic.   Eyes: EOM are normal. Pupils are equal, round, and reactive to light.   Neck: Normal range of motion. Neck supple.   Cardiovascular: Normal rate and regular rhythm.    Peripheral edema   Pulmonary/Chest: Effort normal and breath sounds normal.   Musculoskeletal: Normal range of motion.   Neurological: She is alert and oriented to person, place, and time. She has normal reflexes.   Skin: Skin is warm and dry.   Psychiatric: She has a normal mood and affect. Her behavior is normal. Judgment and thought content normal.   Nursing note and vitals reviewed.       /60  Pulse 85  Wt 207 lb (93.9 kg)  SpO2 98%  BMI 36.67 kg/m2    Assessment/Plan   Diagnoses and all orders for this visit:    Paroxysmal atrial fibrillation    Chronic systolic congestive heart failure  -     Basic Metabolic Panel;  Future    IDDM (insulin dependent diabetes mellitus)  -     Basic Metabolic Panel; Future    Colon cancer screening  -     Ambulatory Referral For Screening Colonoscopy    Anemia, unspecified type  -     Ambulatory Referral For Screening Colonoscopy  -     CBC & Differential  -     Ferritin  -     Iron Profile  -     Basic Metabolic Panel  -     Vitamin B12  -     Erythropoietin    Other orders  -     glimepiride (AMARYL) 2 MG tablet; Take 2 mg by mouth 2 (Two) Times a Day.  -     linagliptin (TRADJENTA) 5 MG tablet tablet; Take 5 mg by mouth Daily.  -     bumetanide (BUMEX) 1 MG tablet; Take 1 tablet by mouth Daily.      Patient w/ CHF. She is on lasix but dose recently reduced. She will try bumex 2 am and 1 afternoon. She will continue glimeperide and tradjenta for now and monitor glucose. To clean up her diet. Low sodium and low carb. She will watch her weight.   She is also anemic w/ picca/ constant craving for ice. hgb is now <10. Will test listed labs. Possibly anemia chronic disease and may need epo inj given renal insufficiency.

## 2017-07-19 ENCOUNTER — TELEPHONE (OUTPATIENT)
Dept: CARDIOLOGY | Facility: CLINIC | Age: 82
End: 2017-07-19

## 2017-07-19 LAB
BASOPHILS # BLD AUTO: 0.07 10*3/MM3 (ref 0–0.2)
BASOPHILS NFR BLD AUTO: 0.8 % (ref 0–1.5)
BUN SERPL-MCNC: 33 MG/DL (ref 8–23)
BUN/CREAT SERPL: 15.5 (ref 7–25)
CALCIUM SERPL-MCNC: 10 MG/DL (ref 8.6–10.5)
CHLORIDE SERPL-SCNC: 98 MMOL/L (ref 98–107)
CO2 SERPL-SCNC: 24.2 MMOL/L (ref 22–29)
CREAT SERPL-MCNC: 2.13 MG/DL (ref 0.57–1)
EOSINOPHIL # BLD AUTO: 0.25 10*3/MM3 (ref 0–0.7)
EOSINOPHIL NFR BLD AUTO: 2.8 % (ref 0.3–6.2)
EPO SERPL-ACNC: 15.4 MIU/ML (ref 2.6–18.5)
ERYTHROCYTE [DISTWIDTH] IN BLOOD BY AUTOMATED COUNT: 17 % (ref 11.7–13)
FERRITIN SERPL-MCNC: 29.76 NG/ML (ref 13–150)
GLUCOSE SERPL-MCNC: 173 MG/DL (ref 65–99)
HCT VFR BLD AUTO: 32.2 % (ref 35.6–45.5)
HGB BLD-MCNC: 10.2 G/DL (ref 11.9–15.5)
IMM GRANULOCYTES # BLD: 0.03 10*3/MM3 (ref 0–0.03)
IMM GRANULOCYTES NFR BLD: 0.3 % (ref 0–0.5)
IRON SATN MFR SERPL: 8 % (ref 20–50)
IRON SERPL-MCNC: 32 MCG/DL (ref 37–145)
LYMPHOCYTES # BLD AUTO: 1.48 10*3/MM3 (ref 0.9–4.8)
LYMPHOCYTES NFR BLD AUTO: 16.3 % (ref 19.6–45.3)
MCH RBC QN AUTO: 27.8 PG (ref 26.9–32)
MCHC RBC AUTO-ENTMCNC: 31.7 G/DL (ref 32.4–36.3)
MCV RBC AUTO: 87.7 FL (ref 80.5–98.2)
MONOCYTES # BLD AUTO: 0.73 10*3/MM3 (ref 0.2–1.2)
MONOCYTES NFR BLD AUTO: 8 % (ref 5–12)
NEUTROPHILS # BLD AUTO: 6.53 10*3/MM3 (ref 1.9–8.1)
NEUTROPHILS NFR BLD AUTO: 71.8 % (ref 42.7–76)
PLATELET # BLD AUTO: 305 10*3/MM3 (ref 140–500)
POTASSIUM SERPL-SCNC: 4.9 MMOL/L (ref 3.5–5.2)
RBC # BLD AUTO: 3.67 10*6/MM3 (ref 3.9–5.2)
SODIUM SERPL-SCNC: 139 MMOL/L (ref 136–145)
TIBC SERPL-MCNC: 392 MCG/DL
UIBC SERPL-MCNC: 360 MCG/DL
VIT B12 SERPL-MCNC: 943 PG/ML (ref 211–946)
WBC # BLD AUTO: 9.09 10*3/MM3 (ref 4.5–10.7)

## 2017-07-19 NOTE — TELEPHONE ENCOUNTER
Dr. Price adjusted her diuretics yesterday.  Lets get her seen by one of the nurse practitioners early next week to follow up and make sure the change in diuretics is working

## 2017-07-19 NOTE — TELEPHONE ENCOUNTER
Pt recently seen in PCP office, yesterday. She reports that for the last two weeks she has had increased shortness of breath, fatigue and weight gain. She has not felt well enough to fix nutritious meals and reports that she has changed her medication for blood sugar control. She still felt poorly today and did not come in for her BP check. She reports that her BP yesterday was 138/60, she wanted to know if you would be ok with her not being seen in office this week. Please advise.

## 2017-07-20 RX ORDER — LANOLIN ALCOHOL/MO/W.PET/CERES
325 CREAM (GRAM) TOPICAL
Qty: 30 TABLET | Refills: 11 | Status: SHIPPED | OUTPATIENT
Start: 2017-07-20 | End: 2017-07-20 | Stop reason: SDUPTHER

## 2017-07-20 RX ORDER — LANOLIN ALCOHOL/MO/W.PET/CERES
325 CREAM (GRAM) TOPICAL
Qty: 30 TABLET | Refills: 11 | Status: SHIPPED | OUTPATIENT
Start: 2017-07-20 | End: 2018-07-20

## 2017-07-24 ENCOUNTER — TELEPHONE (OUTPATIENT)
Dept: INTERNAL MEDICINE | Facility: CLINIC | Age: 82
End: 2017-07-24

## 2017-07-24 NOTE — TELEPHONE ENCOUNTER
"Please verify how many bumex tab she is taking and which diabetic (\"sugar tabs\") she is taking??  "

## 2017-07-24 NOTE — TELEPHONE ENCOUNTER
2 in the morning and 1 in the afternoon of Bumex.( rx in med list does not say that.)  Glimepiride  2 tab in the am and 2 in the pm   trajenta 1 tablet midday

## 2017-07-24 NOTE — TELEPHONE ENCOUNTER
"Pt called to say that she lost 5lbs Saturday night and gained 2 lbs yesterday of fluids. Says she is still SOB and the \"sugar pill\" is not working  "

## 2017-07-25 ENCOUNTER — HOSPITAL ENCOUNTER (INPATIENT)
Facility: HOSPITAL | Age: 82
LOS: 5 days | Discharge: HOME HEALTH | End: 2017-07-30
Attending: INTERNAL MEDICINE | Admitting: INTERNAL MEDICINE

## 2017-07-25 ENCOUNTER — RESULTS ENCOUNTER (OUTPATIENT)
Dept: INTERNAL MEDICINE | Facility: CLINIC | Age: 82
End: 2017-07-25

## 2017-07-25 ENCOUNTER — OFFICE VISIT (OUTPATIENT)
Dept: CARDIOLOGY | Facility: CLINIC | Age: 82
End: 2017-07-25

## 2017-07-25 VITALS
HEART RATE: 84 BPM | DIASTOLIC BLOOD PRESSURE: 68 MMHG | BODY MASS INDEX: 35.79 KG/M2 | HEIGHT: 63 IN | SYSTOLIC BLOOD PRESSURE: 150 MMHG | WEIGHT: 202 LBS

## 2017-07-25 DIAGNOSIS — I50.22 CHRONIC SYSTOLIC CONGESTIVE HEART FAILURE (HCC): ICD-10-CM

## 2017-07-25 DIAGNOSIS — I50.42 CHRONIC COMBINED SYSTOLIC AND DIASTOLIC CONGESTIVE HEART FAILURE (HCC): Primary | ICD-10-CM

## 2017-07-25 DIAGNOSIS — I50.42 CHRONIC COMBINED SYSTOLIC AND DIASTOLIC CONGESTIVE HEART FAILURE (HCC): ICD-10-CM

## 2017-07-25 DIAGNOSIS — I48.0 PAROXYSMAL ATRIAL FIBRILLATION (HCC): ICD-10-CM

## 2017-07-25 DIAGNOSIS — IMO0001 IDDM (INSULIN DEPENDENT DIABETES MELLITUS): ICD-10-CM

## 2017-07-25 DIAGNOSIS — I10 ESSENTIAL HYPERTENSION: ICD-10-CM

## 2017-07-25 PROBLEM — N18.9 ANEMIA IN CHRONIC KIDNEY DISEASE: Status: ACTIVE | Noted: 2017-07-25

## 2017-07-25 PROBLEM — D63.1 ANEMIA IN CHRONIC KIDNEY DISEASE: Status: ACTIVE | Noted: 2017-07-25

## 2017-07-25 PROBLEM — N18.4 CHRONIC KIDNEY DISEASE, STAGE IV (SEVERE) (HCC): Status: ACTIVE | Noted: 2017-07-25

## 2017-07-25 PROBLEM — D50.9 IRON DEFICIENCY ANEMIA: Status: ACTIVE | Noted: 2017-07-25

## 2017-07-25 LAB
ALBUMIN SERPL-MCNC: 3.9 G/DL (ref 3.5–5.2)
ALBUMIN/GLOB SERPL: 0.9 G/DL
ALP SERPL-CCNC: 178 U/L (ref 39–117)
ALT SERPL W P-5'-P-CCNC: 13 U/L (ref 1–33)
ANION GAP SERPL CALCULATED.3IONS-SCNC: 14.1 MMOL/L
AST SERPL-CCNC: 15 U/L (ref 1–32)
BILIRUB SERPL-MCNC: 0.2 MG/DL (ref 0.1–1.2)
BUN BLD-MCNC: 27 MG/DL (ref 8–23)
BUN/CREAT SERPL: 11.5 (ref 7–25)
CALCIUM SPEC-SCNC: 9.9 MG/DL (ref 8.6–10.5)
CHLORIDE SERPL-SCNC: 97 MMOL/L (ref 98–107)
CO2 SERPL-SCNC: 24.9 MMOL/L (ref 22–29)
CREAT BLD-MCNC: 2.35 MG/DL (ref 0.57–1)
DEPRECATED RDW RBC AUTO: 53.6 FL (ref 37–54)
ERYTHROCYTE [DISTWIDTH] IN BLOOD BY AUTOMATED COUNT: 16.7 % (ref 11.7–13)
GFR SERPL CREATININE-BSD FRML MDRD: 20 ML/MIN/1.73
GLOBULIN UR ELPH-MCNC: 4.2 GM/DL
GLUCOSE BLD-MCNC: 115 MG/DL (ref 65–99)
GLUCOSE BLDC GLUCOMTR-MCNC: 119 MG/DL (ref 70–130)
GLUCOSE BLDC GLUCOMTR-MCNC: 192 MG/DL (ref 70–130)
HCT VFR BLD AUTO: 33.5 % (ref 35.6–45.5)
HGB BLD-MCNC: 10.4 G/DL (ref 11.9–15.5)
MCH RBC QN AUTO: 27.3 PG (ref 26.9–32)
MCHC RBC AUTO-ENTMCNC: 31 G/DL (ref 32.4–36.3)
MCV RBC AUTO: 87.9 FL (ref 80.5–98.2)
PLATELET # BLD AUTO: 273 10*3/MM3 (ref 140–500)
PMV BLD AUTO: 9.1 FL (ref 6–12)
POTASSIUM BLD-SCNC: 4 MMOL/L (ref 3.5–5.2)
PROT SERPL-MCNC: 8.1 G/DL (ref 6–8.5)
RBC # BLD AUTO: 3.81 10*6/MM3 (ref 3.9–5.2)
SODIUM BLD-SCNC: 136 MMOL/L (ref 136–145)
WBC NRBC COR # BLD: 9.18 10*3/MM3 (ref 4.5–10.7)

## 2017-07-25 PROCEDURE — 82962 GLUCOSE BLOOD TEST: CPT

## 2017-07-25 PROCEDURE — 85027 COMPLETE CBC AUTOMATED: CPT | Performed by: INTERNAL MEDICINE

## 2017-07-25 PROCEDURE — 63710000001 DIPHENHYDRAMINE PER 50 MG: Performed by: INTERNAL MEDICINE

## 2017-07-25 PROCEDURE — 99214 OFFICE O/P EST MOD 30 MIN: CPT | Performed by: NURSE PRACTITIONER

## 2017-07-25 PROCEDURE — 93000 ELECTROCARDIOGRAM COMPLETE: CPT | Performed by: NURSE PRACTITIONER

## 2017-07-25 PROCEDURE — 80053 COMPREHEN METABOLIC PANEL: CPT | Performed by: INTERNAL MEDICINE

## 2017-07-25 PROCEDURE — 25010000002 IRON SUCROSE PER 1 MG: Performed by: INTERNAL MEDICINE

## 2017-07-25 PROCEDURE — 63710000001 INSULIN DETEMER PER 5 UNITS: Performed by: INTERNAL MEDICINE

## 2017-07-25 RX ORDER — MELATONIN
1000 DAILY
Status: DISCONTINUED | OUTPATIENT
Start: 2017-07-25 | End: 2017-07-30 | Stop reason: HOSPADM

## 2017-07-25 RX ORDER — DIPHENHYDRAMINE HCL 25 MG
50 CAPSULE ORAL ONCE
Status: COMPLETED | OUTPATIENT
Start: 2017-07-25 | End: 2017-07-25

## 2017-07-25 RX ORDER — CARVEDILOL 6.25 MG/1
6.25 TABLET ORAL EVERY 12 HOURS SCHEDULED
Status: DISCONTINUED | OUTPATIENT
Start: 2017-07-25 | End: 2017-07-26

## 2017-07-25 RX ORDER — HYDRALAZINE HYDROCHLORIDE 50 MG/1
50 TABLET, FILM COATED ORAL 2 TIMES DAILY
Status: DISCONTINUED | OUTPATIENT
Start: 2017-07-25 | End: 2017-07-26

## 2017-07-25 RX ORDER — BUMETANIDE 0.25 MG/ML
2 INJECTION INTRAMUSCULAR; INTRAVENOUS EVERY 8 HOURS
Status: CANCELLED | OUTPATIENT
Start: 2017-07-25

## 2017-07-25 RX ORDER — DEXTROSE MONOHYDRATE 25 G/50ML
25 INJECTION, SOLUTION INTRAVENOUS
Status: DISCONTINUED | OUTPATIENT
Start: 2017-07-25 | End: 2017-07-30 | Stop reason: HOSPADM

## 2017-07-25 RX ORDER — SODIUM CHLORIDE 0.9 % (FLUSH) 0.9 %
1-10 SYRINGE (ML) INJECTION AS NEEDED
Status: CANCELLED | OUTPATIENT
Start: 2017-07-25

## 2017-07-25 RX ORDER — CETIRIZINE HYDROCHLORIDE 10 MG/1
10 TABLET ORAL NIGHTLY
Status: DISCONTINUED | OUTPATIENT
Start: 2017-07-25 | End: 2017-07-30 | Stop reason: HOSPADM

## 2017-07-25 RX ORDER — ACETAMINOPHEN 325 MG/1
650 TABLET ORAL ONCE
Status: COMPLETED | OUTPATIENT
Start: 2017-07-25 | End: 2017-07-25

## 2017-07-25 RX ORDER — NICOTINE POLACRILEX 4 MG
15 LOZENGE BUCCAL
Status: DISCONTINUED | OUTPATIENT
Start: 2017-07-25 | End: 2017-07-30 | Stop reason: HOSPADM

## 2017-07-25 RX ORDER — BUMETANIDE 0.25 MG/ML
2 INJECTION INTRAMUSCULAR; INTRAVENOUS EVERY 8 HOURS
Status: DISCONTINUED | OUTPATIENT
Start: 2017-07-25 | End: 2017-07-25

## 2017-07-25 RX ORDER — ACETAMINOPHEN 325 MG/1
650 TABLET ORAL EVERY 6 HOURS PRN
Status: DISCONTINUED | OUTPATIENT
Start: 2017-07-25 | End: 2017-07-30 | Stop reason: HOSPADM

## 2017-07-25 RX ORDER — POTASSIUM CHLORIDE 750 MG/1
10 CAPSULE, EXTENDED RELEASE ORAL DAILY
Status: DISCONTINUED | OUTPATIENT
Start: 2017-07-25 | End: 2017-07-26

## 2017-07-25 RX ORDER — MONTELUKAST SODIUM 10 MG/1
10 TABLET ORAL NIGHTLY
Status: DISCONTINUED | OUTPATIENT
Start: 2017-07-25 | End: 2017-07-30 | Stop reason: HOSPADM

## 2017-07-25 RX ORDER — FAMOTIDINE 20 MG/1
20 TABLET, FILM COATED ORAL 2 TIMES DAILY
Status: DISCONTINUED | OUTPATIENT
Start: 2017-07-25 | End: 2017-07-26

## 2017-07-25 RX ORDER — SODIUM CHLORIDE 0.9 % (FLUSH) 0.9 %
1-10 SYRINGE (ML) INJECTION AS NEEDED
Status: DISCONTINUED | OUTPATIENT
Start: 2017-07-25 | End: 2017-07-30 | Stop reason: HOSPADM

## 2017-07-25 RX ORDER — BUMETANIDE 0.25 MG/ML
4 INJECTION INTRAMUSCULAR; INTRAVENOUS EVERY 8 HOURS
Status: DISCONTINUED | OUTPATIENT
Start: 2017-07-25 | End: 2017-07-26

## 2017-07-25 RX ADMIN — IRON SUCROSE 300 MG: 20 INJECTION, SOLUTION INTRAVENOUS at 23:39

## 2017-07-25 RX ADMIN — INSULIN DETEMIR 10 UNITS: 100 INJECTION, SOLUTION SUBCUTANEOUS at 22:58

## 2017-07-25 RX ADMIN — DIPHENHYDRAMINE HYDROCHLORIDE 50 MG: 25 CAPSULE ORAL at 22:56

## 2017-07-25 RX ADMIN — CETIRIZINE HYDROCHLORIDE 10 MG: 10 TABLET, FILM COATED ORAL at 22:58

## 2017-07-25 RX ADMIN — ACETAMINOPHEN 650 MG: 325 TABLET ORAL at 22:57

## 2017-07-25 RX ADMIN — CARVEDILOL 6.25 MG: 6.25 TABLET, FILM COATED ORAL at 22:57

## 2017-07-25 RX ADMIN — BUMETANIDE 4 MG: 0.25 INJECTION INTRAMUSCULAR; INTRAVENOUS at 23:00

## 2017-07-25 RX ADMIN — FAMOTIDINE 20 MG: 20 TABLET, FILM COATED ORAL at 23:03

## 2017-07-25 RX ADMIN — VITAMIN D, TAB 1000IU (100/BT) 1000 UNITS: 25 TAB at 23:05

## 2017-07-25 RX ADMIN — APIXABAN 2.5 MG: 2.5 TABLET, FILM COATED ORAL at 23:04

## 2017-07-25 RX ADMIN — MONTELUKAST 10 MG: 10 TABLET, FILM COATED ORAL at 22:57

## 2017-07-25 RX ADMIN — SERTRALINE 50 MG: 50 TABLET, FILM COATED ORAL at 23:06

## 2017-07-25 RX ADMIN — HYDRALAZINE HYDROCHLORIDE 50 MG: 50 TABLET, FILM COATED ORAL at 23:04

## 2017-07-25 NOTE — PROGRESS NOTES
Date of Office Visit: 2017  Encounter Provider: DEBORAH Alonso  Place of Service: [facility] CARDIOLOGY  Patient Name: Veronica Henao  :1935    Chief Complaint   Patient presents with   • Edema   • Shortness of Breath   :     HPI: Veronica Henao is a 82 y.o. female comes in today for follow-up for diuretic adjustment.  She is a patient of Dr. Husain.  I'm seeing her for the first time today and have reviewed her records.  [facility] and transferred care to Dr. Perry in 2014.    She has a history of moderate to severe mitral valve regurgitation with repair in , atrial fibrillation, atrial flutter, diabetes, hypertension, hyperlipidemia, sleep apnea, chronic kidney disease and anemia.    She was diagnosed with A. fib with RVR in .  She was treated with amiodarone and Eliquis.  She was cardioverted a month after diagnosis.  Her amiodarone was discontinued a few months after that.    She did well for 2 years but then presented in 2015 with atrial fibrillation.    2016, she has shortness of breath and had an echocardiogram.  This showed an EF of 50% and moderate to severe regurgitation of her mitral valve.  She had a nuclear stress test showing no evidence of ischemia.  She went on to have a KATELYN showing moderate to severe MR.  She had a cardiac catheterization in 2016 showing no significant coronary disease.  She went on to have a mitral valve repair in 2016 with a 31 mm capital a TS band posterior annuloplasty MP2 cortical repair.  She also had a tricuspid valve repair with annuloplasty ring with a 26 mm ring.  She had a Maze procedure and left atrial appendage ligated.    After surgery, she had asystole and bradycardia for 2 days.  She eventually was able to tolerate beta blocker and switch from warfarin to Eliquis.    2017, she presented with atrial flutter.  An echocardiogram showing  an EF of 70%, mild mitral valve stenosis with a mean gradient of 6 mmHg and mild tricuspid valve regurgitation.  She had a cardioversion March 24, 2017 but then went back into atrial fibrillation and had an ablation on March 28, 2017.  She did go back into atrial flutter and had a cardioversion again performed on June 14, 2017 but then again had issues.  She went on to have an AV francheska ablation and pacemaker placement on July 5, 2017.    She last followed up with Dr. Husain on July 12, 2017 and was complaining of some shortness of breath.  Her blood pressure was really high.  She was put on diltiazem in addition to hydralazine.    On July 19, 2017, the patient called in complaining that she had increased shortness of breath and fatigue.  Her blood pressure was better.  She followed up with her primary care physician who switched her to Bumex.    Today, she comes in for follow-up.  She complains of extreme edema.  She complains of shortness of breath.  She has a unable to walk but a few feet without getting short of breath.  Her daughter is here with her and reports that she was not this swollen before starting diltiazem.  The patient also been taking diltiazem twice a day instead of once a day.  She is very fatigued.  She has gained over 10 pounds and she was here on July 12.  She does not feel any better since switching to bumetanide.    Past Medical History:   Diagnosis Date   • Acute bronchitis    • Acute renal insufficiency    • Allergic rhinitis    • Arrhythmia    • Atrial fibrillation    • Brachycephaly    • Breast pain, right    • Chest pain    • CHF (congestive heart failure)    • Chronic renal insufficiency    • Cough    • Depression    • Diabetes mellitus     TYPE 2   • Diverticulosis    • ORTIZ (dyspnea on exertion)    • Dyspnea    • Esophageal reflux    • Fatigue    • Gout    • Head injury    • Headache    • Hoarseness    • Hypercalcemia    • Hyperlipidemia    • Hypertension    • Influenza A (H1N1)    •  Itching    • Leg swelling    • Lightheadedness    • Macular degeneration    • Malaise and fatigue    • Mitral valve regurgitation     moderate to severe   • Nontoxic multinodular goiter     RIGHT 2.0 CM  LEFT  2.5 CM   • JOSELUIS on CPAP    • Osteoarthritis    • PAF (paroxysmal atrial fibrillation)    • Palpitations    • Paresthesias    • Proteinuria    • Pulmonary nodule    • Pulmonic valve regurgitation     mild   • Sebaceous cyst    • SOBOE (shortness of breath on exertion)    • Solitary thyroid nodule    • Subclinical hypothyroidism    • Tachycardia    • TR (tricuspid regurgitation)     mild to moderate   • Type 2 diabetes mellitus    • UTI (urinary tract infection)        Past Surgical History:   Procedure Laterality Date   • APPENDECTOMY     • CARDIAC CATHETERIZATION      procedure outcome:    • CARDIAC CATHETERIZATION N/A 11/21/2016    Procedure: Coronary angiography;  Surgeon: Marcin العراقي MD;  [facility];  Service:    • CARDIAC CATHETERIZATION N/A 11/21/2016    Procedure: Left Heart Cath;  Surgeon: Marcin العراقي MD;  Location: [facility] CATH INVASIVE LOCATION;  Service:    • CARDIAC ELECTROPHYSIOLOGY PROCEDURE N/A 3/28/2017    Procedure: Ablation atrial flutter;  Surgeon: Rodríguez Ward MD;  Location: [facility] CATH INVASIVE LOCATION;  Service:    • CARDIAC ELECTROPHYSIOLOGY PROCEDURE N/A 7/3/2017    Procedure: AV node ablation;  Surgeon: Rodríguez Ward MD;  Location: [facility] CATH INVASIVE LOCATION;  Service:    • CARDIAC ELECTROPHYSIOLOGY PROCEDURE N/A 7/3/2017    Procedure: Pacemaker DC new  [employer] and will need 3830 lead-I did chase Meneses ;  Surgeon: Rodríguez Ward MD;  Location: [facility] CATH INVASIVE LOCATION;  Service:    • CLAVICLE SURGERY Left    • GASTRIC RESTRICTION SURGERY     • HYSTERECTOMY     • LUMBAR DECOMPRESSION      L4-5   • MITRAL VALVE REPAIR/REPLACEMENT N/A 12/14/2016    Procedure: INTRAOPERATIVE KATELYN, MIDLINE STERNOTOMY, MITRAL VALVE REPAIR,  "TRICUSPID VALVE REPAIR, MAZE PROCEDURE;  Surgeon: Young Vital MD;  Location: [facility];  Service:    • SHOULDER SURGERY Left     AFTER SURGERY FOR FRACTURED CLAVICLE   • TOTAL KNEE ARTHROPLASTY      bilateral           Review of Systems   Constitution: Positive for malaise/fatigue. Negative for fever.   HENT: Negative for ear pain, hearing loss, nosebleeds and sore throat.    Eyes: Negative for double vision, pain, vision loss in left eye, vision loss in right eye and visual disturbance.   Cardiovascular: Positive for dyspnea on exertion and leg swelling. Negative for claudication.   Respiratory: Negative for cough, snoring and wheezing.    Endocrine: Negative for cold intolerance, heat intolerance and polyuria.   Skin: Negative for color change, itching and rash.   Musculoskeletal: Negative for joint pain, joint swelling and muscle cramps.   Gastrointestinal: Negative for abdominal pain, diarrhea, melena, nausea and vomiting.   Genitourinary: Negative for bladder incontinence and hematuria.   Neurological: Negative for excessive daytime sleepiness, dizziness, light-headedness, paresthesias and seizures.   Psychiatric/Behavioral: Negative for depression. The patient is not nervous/anxious.    All other systems reviewed and are negative.    All other systems reviewed and are negative    Allergies   Allergen Reactions   • Cephalexin Swelling   • Meperidine Swelling   • Penicillins Swelling       All aspects of family and social history reviewed.          Objective:     Vitals:    07/25/17 1432   BP: 150/68   BP Location: Left arm   Cuff Size: Large Adult   Pulse: 84   Weight: 202 lb (91.6 kg)   Height: 63\" (160 cm)     Body mass index is 35.78 kg/(m^2).    PHYSICAL EXAM:  Physical Exam   Constitutional: She appears well-developed.   HENT:   Head: Normocephalic and atraumatic.   Eyes: Conjunctivae and lids are normal. Pupils are equal, round, and reactive to light. Lids are everted and swept, no foreign " bodies found.   Neck: Normal range of motion. No JVD present. Carotid bruit is not present. No tracheal deviation present. No thyroid mass present.   Cardiovascular: Normal rate, regular rhythm and normal heart sounds.    Pulses:       Dorsalis pedis pulses are 2+ on the right side, and 2+ on the left side.   Pulmonary/Chest: Effort normal. She has rales.   Abdominal: Normal appearance and bowel sounds are normal.   Musculoskeletal: Normal range of motion. She exhibits edema (3+ edema bilaterally).   Neurological: She is alert. She has normal strength.   Skin: Skin is warm, dry and intact.   Psychiatric: She has a normal mood and affect. Her behavior is normal.   Vitals reviewed.        ECG 12 Lead  Date/Time: 7/25/2017 2:56 PM  Performed by: IRVING MARTINEZ  Authorized by: IRVING MARTINEZ   Comparison: compared with previous ECG from 7/12/2017  Similar to previous ECG  Rhythm: paced  BPM: 84  Clinical impression: normal ECG  Comments: Indication: afib/flutter                Assessment:       Diagnosis Plan   1. Chronic combined systolic and diastolic congestive heart failure  ECG 12 Lead   2. Paroxysmal atrial fibrillation  ECG 12 Lead   3. Essential hypertension  ECG 12 Lead        Orders Placed This Encounter   Procedures   • ECG 12 Lead     This order was created via procedure documentation       Current Outpatient Prescriptions   Medication Sig Dispense Refill   • acetaminophen (TYLENOL) 325 MG tablet Take 2 tablets by mouth Every 6 (Six) Hours As Needed for Mild Pain (1-3).  0   • apixaban (ELIQUIS) 2.5 MG tablet tablet Take 2.5 mg by mouth 2 (Two) Times a Day.     • cetirizine (zyrTEC) 10 MG tablet Take 10 mg by mouth Every Night.     • cholecalciferol (VITAMIN D3) 1000 UNITS tablet Take 1,000 Units by mouth daily.     • Cyanocobalamin (VITAMIN B-12) 5000 MCG sublingual tablet Take 5,000 mcg by mouth Daily.     • ferrous sulfate 325 (65 FE) MG EC tablet Take 1 tablet by mouth Daily With Breakfast. 30  tablet 11   • glimepiride (AMARYL) 2 MG tablet Take 2 mg by mouth 2 (Two) Times a Day.     • hydrALAZINE (APRESOLINE) 50 MG tablet Take 1 tablet by mouth 2 (Two) Times a Day. 60 tablet 3   • insulin degludec (TRESIBA FLEXTOUCH) 100 UNIT/ML solution pen-injector injection Inject 10 Units under the skin Daily. Indications: Diabetes 2 pen 3   • LEVEMIR FLEXTOUCH 100 UNIT/ML injection 18 Units Every Night.     • linagliptin (TRADJENTA) 5 MG tablet tablet Take 5 mg by mouth Daily.     • montelukast (SINGULAIR) 10 MG tablet Take 10 mg by mouth Every Night.     • Multiple Vitamins-Minerals (CENTRUM SILVER PO) Take 1 tablet by mouth Daily.     • potassium chloride (MICRO-K) 10 MEQ CR capsule Take 1 capsule by mouth Daily.     • raNITIdine (ZANTAC) 150 MG tablet TAKE 1 TABLET TWICE DAILY 180 tablet 1   • sertraline (ZOLOFT) 50 MG tablet Take 50 mg by mouth Daily.     • Vitamin E 400 UNITS chewable tablet 400 Units.     • Vitamins A & D (VITAMIN A & D) 82478-769 UNITS tablet      • bumetanide (BUMEX) 1 MG tablet Take 1 tablet by mouth Daily. (Patient taking differently: Take 1 mg by mouth Daily. Take 2 mg in am and 1 mg in pm) 90 tablet 4   • diltiaZEM CD (CARDIZEM CD) 300 MG 24 hr capsule Take 1 capsule by mouth Daily. (Patient taking differently: Take 300 mg by mouth 2 (Two) Times a Day.) 30 capsule 11     No current facility-administered medications for this visit.             Plan:       Mrs. Henao comes in today with complaints of lower show any swelling and shortness of breath.  She appears to be in heart failure.  She did see her primary care physician last week who attempted to switch her to bumetanide but has not had any improvement.  She has chronic renal insufficiency with a baseline creatinine of around 2.0.  She was recently started on diltiazem for hypertension and has been taking it twice a day and soda once a day.  She had a AV francheska ablation with pacemaker placement on July 5, 2017.  Dr. Husain is currently out  [facility] and I have discussed with Dr. Parkinson.  We will admit her to the hospital for diuresis.  She will need nephrology consult to help with hypertension and diuretics due to her renal insufficiency.    Follow up in office after discharge.     As always, it has been a pleasure to participate in this patient's care.      Sincerely,      DEBORAH Alonso

## 2017-07-26 ENCOUNTER — APPOINTMENT (OUTPATIENT)
Dept: GENERAL RADIOLOGY | Facility: HOSPITAL | Age: 82
End: 2017-07-26
Attending: INTERNAL MEDICINE

## 2017-07-26 LAB
ALBUMIN SERPL-MCNC: 3.7 G/DL (ref 3.5–5.2)
ALBUMIN/GLOB SERPL: 1 G/DL
ALP SERPL-CCNC: 158 U/L (ref 39–117)
ALT SERPL W P-5'-P-CCNC: 11 U/L (ref 1–33)
ANION GAP SERPL CALCULATED.3IONS-SCNC: 13.7 MMOL/L
AST SERPL-CCNC: 13 U/L (ref 1–32)
BACTERIA UR QL AUTO: NORMAL /HPF
BASOPHILS # BLD AUTO: 0.05 10*3/MM3 (ref 0–0.2)
BASOPHILS NFR BLD AUTO: 0.7 % (ref 0–1.5)
BILIRUB SERPL-MCNC: 0.3 MG/DL (ref 0.1–1.2)
BILIRUB UR QL STRIP: NEGATIVE
BUN BLD-MCNC: 28 MG/DL (ref 8–23)
BUN/CREAT SERPL: 11.6 (ref 7–25)
CALCIUM SPEC-SCNC: 9.4 MG/DL (ref 8.6–10.5)
CHLORIDE SERPL-SCNC: 99 MMOL/L (ref 98–107)
CLARITY UR: CLEAR
CO2 SERPL-SCNC: 24.3 MMOL/L (ref 22–29)
COLOR UR: YELLOW
CREAT BLD-MCNC: 2.41 MG/DL (ref 0.57–1)
CREAT UR-MCNC: 27.1 MG/DL
CREAT UR-MCNC: 27.1 MG/DL
DEPRECATED RDW RBC AUTO: 52.8 FL (ref 37–54)
EOSINOPHIL # BLD AUTO: 0.38 10*3/MM3 (ref 0–0.7)
EOSINOPHIL NFR BLD AUTO: 5.1 % (ref 0.3–6.2)
ERYTHROCYTE [DISTWIDTH] IN BLOOD BY AUTOMATED COUNT: 16.4 % (ref 11.7–13)
GFR SERPL CREATININE-BSD FRML MDRD: 19 ML/MIN/1.73
GLOBULIN UR ELPH-MCNC: 3.8 GM/DL
GLUCOSE BLD-MCNC: 173 MG/DL (ref 65–99)
GLUCOSE BLDC GLUCOMTR-MCNC: 150 MG/DL (ref 70–130)
GLUCOSE BLDC GLUCOMTR-MCNC: 168 MG/DL (ref 70–130)
GLUCOSE BLDC GLUCOMTR-MCNC: 170 MG/DL (ref 70–130)
GLUCOSE BLDC GLUCOMTR-MCNC: 186 MG/DL (ref 70–130)
GLUCOSE UR STRIP-MCNC: NEGATIVE MG/DL
HCT VFR BLD AUTO: 31.1 % (ref 35.6–45.5)
HGB BLD-MCNC: 9.9 G/DL (ref 11.9–15.5)
HGB UR QL STRIP.AUTO: NEGATIVE
HYALINE CASTS UR QL AUTO: NORMAL /LPF
IMM GRANULOCYTES # BLD: 0.03 10*3/MM3 (ref 0–0.03)
IMM GRANULOCYTES NFR BLD: 0.4 % (ref 0–0.5)
KETONES UR QL STRIP: NEGATIVE
LEUKOCYTE ESTERASE UR QL STRIP.AUTO: NEGATIVE
LYMPHOCYTES # BLD AUTO: 1.28 10*3/MM3 (ref 0.9–4.8)
LYMPHOCYTES NFR BLD AUTO: 17 % (ref 19.6–45.3)
MAGNESIUM SERPL-MCNC: 2.4 MG/DL (ref 1.6–2.4)
MCH RBC QN AUTO: 27.8 PG (ref 26.9–32)
MCHC RBC AUTO-ENTMCNC: 31.8 G/DL (ref 32.4–36.3)
MCV RBC AUTO: 87.4 FL (ref 80.5–98.2)
MONOCYTES # BLD AUTO: 0.59 10*3/MM3 (ref 0.2–1.2)
MONOCYTES NFR BLD AUTO: 7.8 % (ref 5–12)
NEUTROPHILS # BLD AUTO: 5.19 10*3/MM3 (ref 1.9–8.1)
NEUTROPHILS NFR BLD AUTO: 69 % (ref 42.7–76)
NITRITE UR QL STRIP: NEGATIVE
PH UR STRIP.AUTO: 7.5 [PH] (ref 5–8)
PHOSPHATE SERPL-MCNC: 4.4 MG/DL (ref 2.5–4.5)
PLATELET # BLD AUTO: 258 10*3/MM3 (ref 140–500)
PMV BLD AUTO: 9.3 FL (ref 6–12)
POTASSIUM BLD-SCNC: 4.6 MMOL/L (ref 3.5–5.2)
PROT SERPL-MCNC: 7.5 G/DL (ref 6–8.5)
PROT UR QL STRIP: ABNORMAL
PROT UR-MCNC: 145 MG/DL
PROT UR-MCNC: 145 MG/DL
PROT/CREAT UR: 5350.6 MG/G CREA
RBC # BLD AUTO: 3.56 10*6/MM3 (ref 3.9–5.2)
RBC # UR: NORMAL /HPF
REF LAB TEST METHOD: NORMAL
SODIUM BLD-SCNC: 137 MMOL/L (ref 136–145)
SP GR UR STRIP: 1.01 (ref 1–1.03)
SQUAMOUS #/AREA URNS HPF: NORMAL /HPF
URATE SERPL-MCNC: 9.8 MG/DL (ref 2.4–5.7)
UROBILINOGEN UR QL STRIP: ABNORMAL
WBC NRBC COR # BLD: 7.52 10*3/MM3 (ref 4.5–10.7)
WBC UR QL AUTO: NORMAL /HPF

## 2017-07-26 PROCEDURE — 80053 COMPREHEN METABOLIC PANEL: CPT | Performed by: INTERNAL MEDICINE

## 2017-07-26 PROCEDURE — 84100 ASSAY OF PHOSPHORUS: CPT | Performed by: INTERNAL MEDICINE

## 2017-07-26 PROCEDURE — 82962 GLUCOSE BLOOD TEST: CPT

## 2017-07-26 PROCEDURE — 84550 ASSAY OF BLOOD/URIC ACID: CPT | Performed by: INTERNAL MEDICINE

## 2017-07-26 PROCEDURE — 85025 COMPLETE CBC W/AUTO DIFF WBC: CPT | Performed by: INTERNAL MEDICINE

## 2017-07-26 PROCEDURE — 84156 ASSAY OF PROTEIN URINE: CPT | Performed by: INTERNAL MEDICINE

## 2017-07-26 PROCEDURE — 99232 SBSQ HOSP IP/OBS MODERATE 35: CPT | Performed by: INTERNAL MEDICINE

## 2017-07-26 PROCEDURE — 63710000001 INSULIN DETEMER PER 5 UNITS: Performed by: HOSPITALIST

## 2017-07-26 PROCEDURE — 81001 URINALYSIS AUTO W/SCOPE: CPT | Performed by: INTERNAL MEDICINE

## 2017-07-26 PROCEDURE — 71020 HC CHEST PA AND LATERAL: CPT

## 2017-07-26 PROCEDURE — 82570 ASSAY OF URINE CREATININE: CPT | Performed by: INTERNAL MEDICINE

## 2017-07-26 PROCEDURE — 63710000001 INSULIN ASPART PER 5 UNITS: Performed by: INTERNAL MEDICINE

## 2017-07-26 PROCEDURE — 83735 ASSAY OF MAGNESIUM: CPT | Performed by: INTERNAL MEDICINE

## 2017-07-26 RX ORDER — BUMETANIDE 0.25 MG/ML
4 INJECTION INTRAMUSCULAR; INTRAVENOUS EVERY 6 HOURS
Status: DISCONTINUED | OUTPATIENT
Start: 2017-07-26 | End: 2017-07-28

## 2017-07-26 RX ORDER — FAMOTIDINE 20 MG/1
20 TABLET, FILM COATED ORAL DAILY
Status: DISCONTINUED | OUTPATIENT
Start: 2017-07-27 | End: 2017-07-30 | Stop reason: HOSPADM

## 2017-07-26 RX ORDER — HYDRALAZINE HYDROCHLORIDE 25 MG/1
25 TABLET, FILM COATED ORAL 2 TIMES DAILY
Status: DISCONTINUED | OUTPATIENT
Start: 2017-07-26 | End: 2017-07-30 | Stop reason: HOSPADM

## 2017-07-26 RX ORDER — BUMETANIDE 0.25 MG/ML
4 INJECTION INTRAMUSCULAR; INTRAVENOUS EVERY 12 HOURS
Status: DISCONTINUED | OUTPATIENT
Start: 2017-07-26 | End: 2017-07-26

## 2017-07-26 RX ORDER — CARVEDILOL 12.5 MG/1
12.5 TABLET ORAL EVERY 12 HOURS SCHEDULED
Status: DISCONTINUED | OUTPATIENT
Start: 2017-07-26 | End: 2017-07-30 | Stop reason: HOSPADM

## 2017-07-26 RX ADMIN — BUMETANIDE 4 MG: 0.25 INJECTION INTRAMUSCULAR; INTRAVENOUS at 20:57

## 2017-07-26 RX ADMIN — BUMETANIDE 4 MG: 0.25 INJECTION INTRAMUSCULAR; INTRAVENOUS at 06:25

## 2017-07-26 RX ADMIN — INSULIN DETEMIR 15 UNITS: 100 INJECTION, SOLUTION SUBCUTANEOUS at 20:58

## 2017-07-26 RX ADMIN — INSULIN ASPART 2 UNITS: 100 INJECTION, SOLUTION INTRAVENOUS; SUBCUTANEOUS at 17:56

## 2017-07-26 RX ADMIN — CARVEDILOL 12.5 MG: 12.5 TABLET, FILM COATED ORAL at 20:58

## 2017-07-26 RX ADMIN — APIXABAN 2.5 MG: 2.5 TABLET, FILM COATED ORAL at 17:56

## 2017-07-26 RX ADMIN — MONTELUKAST 10 MG: 10 TABLET, FILM COATED ORAL at 20:58

## 2017-07-26 RX ADMIN — HYDRALAZINE HYDROCHLORIDE 25 MG: 50 TABLET, FILM COATED ORAL at 17:56

## 2017-07-26 RX ADMIN — CARVEDILOL 12.5 MG: 12.5 TABLET, FILM COATED ORAL at 08:33

## 2017-07-26 RX ADMIN — VITAMIN D, TAB 1000IU (100/BT) 1000 UNITS: 25 TAB at 08:36

## 2017-07-26 RX ADMIN — INSULIN ASPART 2 UNITS: 100 INJECTION, SOLUTION INTRAVENOUS; SUBCUTANEOUS at 12:29

## 2017-07-26 RX ADMIN — HYDRALAZINE HYDROCHLORIDE 25 MG: 50 TABLET, FILM COATED ORAL at 08:35

## 2017-07-26 RX ADMIN — APIXABAN 2.5 MG: 2.5 TABLET, FILM COATED ORAL at 08:36

## 2017-07-26 RX ADMIN — CETIRIZINE HYDROCHLORIDE 10 MG: 10 TABLET, FILM COATED ORAL at 20:58

## 2017-07-26 RX ADMIN — INSULIN ASPART 2 UNITS: 100 INJECTION, SOLUTION INTRAVENOUS; SUBCUTANEOUS at 08:33

## 2017-07-26 RX ADMIN — BUMETANIDE 4 MG: 0.25 INJECTION INTRAMUSCULAR; INTRAVENOUS at 12:45

## 2017-07-26 RX ADMIN — SERTRALINE 50 MG: 50 TABLET, FILM COATED ORAL at 08:35

## 2017-07-26 RX ADMIN — INSULIN ASPART 2 UNITS: 100 INJECTION, SOLUTION INTRAVENOUS; SUBCUTANEOUS at 20:58

## 2017-07-27 LAB
ANION GAP SERPL CALCULATED.3IONS-SCNC: 15.3 MMOL/L
BUN BLD-MCNC: 30 MG/DL (ref 8–23)
BUN/CREAT SERPL: 13.8 (ref 7–25)
CALCIUM SPEC-SCNC: 9 MG/DL (ref 8.6–10.5)
CHLORIDE SERPL-SCNC: 97 MMOL/L (ref 98–107)
CO2 SERPL-SCNC: 25.7 MMOL/L (ref 22–29)
CREAT BLD-MCNC: 2.18 MG/DL (ref 0.57–1)
GFR SERPL CREATININE-BSD FRML MDRD: 22 ML/MIN/1.73
GLUCOSE BLD-MCNC: 160 MG/DL (ref 65–99)
GLUCOSE BLDC GLUCOMTR-MCNC: 121 MG/DL (ref 70–130)
GLUCOSE BLDC GLUCOMTR-MCNC: 161 MG/DL (ref 70–130)
GLUCOSE BLDC GLUCOMTR-MCNC: 185 MG/DL (ref 70–130)
GLUCOSE BLDC GLUCOMTR-MCNC: 200 MG/DL (ref 70–130)
POTASSIUM BLD-SCNC: 4.2 MMOL/L (ref 3.5–5.2)
SODIUM BLD-SCNC: 138 MMOL/L (ref 136–145)

## 2017-07-27 PROCEDURE — 63710000001 INSULIN ASPART PER 5 UNITS: Performed by: INTERNAL MEDICINE

## 2017-07-27 PROCEDURE — 82962 GLUCOSE BLOOD TEST: CPT

## 2017-07-27 PROCEDURE — 80048 BASIC METABOLIC PNL TOTAL CA: CPT | Performed by: INTERNAL MEDICINE

## 2017-07-27 PROCEDURE — 99232 SBSQ HOSP IP/OBS MODERATE 35: CPT | Performed by: INTERNAL MEDICINE

## 2017-07-27 RX ADMIN — INSULIN ASPART 3 UNITS: 100 INJECTION, SOLUTION INTRAVENOUS; SUBCUTANEOUS at 22:07

## 2017-07-27 RX ADMIN — HYDRALAZINE HYDROCHLORIDE 25 MG: 50 TABLET, FILM COATED ORAL at 08:34

## 2017-07-27 RX ADMIN — VITAMIN D, TAB 1000IU (100/BT) 1000 UNITS: 25 TAB at 08:35

## 2017-07-27 RX ADMIN — BUMETANIDE 4 MG: 0.25 INJECTION INTRAMUSCULAR; INTRAVENOUS at 18:44

## 2017-07-27 RX ADMIN — SERTRALINE 50 MG: 50 TABLET, FILM COATED ORAL at 08:35

## 2017-07-27 RX ADMIN — HYDRALAZINE HYDROCHLORIDE 25 MG: 50 TABLET, FILM COATED ORAL at 17:27

## 2017-07-27 RX ADMIN — BUMETANIDE 4 MG: 0.25 INJECTION INTRAMUSCULAR; INTRAVENOUS at 02:03

## 2017-07-27 RX ADMIN — BUMETANIDE 4 MG: 0.25 INJECTION INTRAMUSCULAR; INTRAVENOUS at 12:44

## 2017-07-27 RX ADMIN — APIXABAN 2.5 MG: 2.5 TABLET, FILM COATED ORAL at 08:35

## 2017-07-27 RX ADMIN — MONTELUKAST 10 MG: 10 TABLET, FILM COATED ORAL at 20:31

## 2017-07-27 RX ADMIN — INSULIN ASPART 2 UNITS: 100 INJECTION, SOLUTION INTRAVENOUS; SUBCUTANEOUS at 12:45

## 2017-07-27 RX ADMIN — CARVEDILOL 12.5 MG: 12.5 TABLET, FILM COATED ORAL at 08:35

## 2017-07-27 RX ADMIN — INSULIN DETEMIR 15 UNITS: 100 INJECTION, SOLUTION SUBCUTANEOUS at 22:07

## 2017-07-27 RX ADMIN — APIXABAN 2.5 MG: 2.5 TABLET, FILM COATED ORAL at 17:27

## 2017-07-27 RX ADMIN — CARVEDILOL 12.5 MG: 12.5 TABLET, FILM COATED ORAL at 20:31

## 2017-07-27 RX ADMIN — BUMETANIDE 4 MG: 0.25 INJECTION INTRAMUSCULAR; INTRAVENOUS at 07:22

## 2017-07-27 RX ADMIN — FAMOTIDINE 20 MG: 20 TABLET, FILM COATED ORAL at 08:35

## 2017-07-27 RX ADMIN — CETIRIZINE HYDROCHLORIDE 10 MG: 10 TABLET, FILM COATED ORAL at 20:31

## 2017-07-27 RX ADMIN — INSULIN ASPART 2 UNITS: 100 INJECTION, SOLUTION INTRAVENOUS; SUBCUTANEOUS at 08:34

## 2017-07-28 ENCOUNTER — TELEPHONE (OUTPATIENT)
Dept: INTERNAL MEDICINE | Facility: CLINIC | Age: 82
End: 2017-07-28

## 2017-07-28 LAB
ALBUMIN SERPL-MCNC: 3.8 G/DL (ref 3.5–5.2)
ALBUMIN/GLOB SERPL: 1.1 G/DL
ALP SERPL-CCNC: 168 U/L (ref 39–117)
ALT SERPL W P-5'-P-CCNC: 10 U/L (ref 1–33)
ANION GAP SERPL CALCULATED.3IONS-SCNC: 16.9 MMOL/L
AST SERPL-CCNC: 12 U/L (ref 1–32)
BILIRUB SERPL-MCNC: 0.2 MG/DL (ref 0.1–1.2)
BUN BLD-MCNC: 36 MG/DL (ref 8–23)
BUN/CREAT SERPL: 15 (ref 7–25)
CALCIUM SPEC-SCNC: 9.2 MG/DL (ref 8.6–10.5)
CHLORIDE SERPL-SCNC: 97 MMOL/L (ref 98–107)
CO2 SERPL-SCNC: 25.1 MMOL/L (ref 22–29)
CREAT BLD-MCNC: 2.4 MG/DL (ref 0.57–1)
DEPRECATED RDW RBC AUTO: 51.1 FL (ref 37–54)
ERYTHROCYTE [DISTWIDTH] IN BLOOD BY AUTOMATED COUNT: 16 % (ref 11.7–13)
GFR SERPL CREATININE-BSD FRML MDRD: 19 ML/MIN/1.73
GLOBULIN UR ELPH-MCNC: 3.6 GM/DL
GLUCOSE BLD-MCNC: 184 MG/DL (ref 65–99)
GLUCOSE BLDC GLUCOMTR-MCNC: 186 MG/DL (ref 70–130)
GLUCOSE BLDC GLUCOMTR-MCNC: 187 MG/DL (ref 70–130)
GLUCOSE BLDC GLUCOMTR-MCNC: 193 MG/DL (ref 70–130)
GLUCOSE BLDC GLUCOMTR-MCNC: 199 MG/DL (ref 70–130)
HCT VFR BLD AUTO: 32.7 % (ref 35.6–45.5)
HGB BLD-MCNC: 10.5 G/DL (ref 11.9–15.5)
MCH RBC QN AUTO: 27.9 PG (ref 26.9–32)
MCHC RBC AUTO-ENTMCNC: 32.1 G/DL (ref 32.4–36.3)
MCV RBC AUTO: 87 FL (ref 80.5–98.2)
PLATELET # BLD AUTO: 256 10*3/MM3 (ref 140–500)
PMV BLD AUTO: 9.4 FL (ref 6–12)
POTASSIUM BLD-SCNC: 3.9 MMOL/L (ref 3.5–5.2)
PROT SERPL-MCNC: 7.4 G/DL (ref 6–8.5)
RBC # BLD AUTO: 3.76 10*6/MM3 (ref 3.9–5.2)
SODIUM BLD-SCNC: 139 MMOL/L (ref 136–145)
WBC NRBC COR # BLD: 7.68 10*3/MM3 (ref 4.5–10.7)

## 2017-07-28 PROCEDURE — 82962 GLUCOSE BLOOD TEST: CPT

## 2017-07-28 PROCEDURE — 63710000001 INSULIN ASPART PER 5 UNITS: Performed by: INTERNAL MEDICINE

## 2017-07-28 PROCEDURE — 80053 COMPREHEN METABOLIC PANEL: CPT | Performed by: INTERNAL MEDICINE

## 2017-07-28 PROCEDURE — 85027 COMPLETE CBC AUTOMATED: CPT | Performed by: INTERNAL MEDICINE

## 2017-07-28 PROCEDURE — 99232 SBSQ HOSP IP/OBS MODERATE 35: CPT | Performed by: INTERNAL MEDICINE

## 2017-07-28 RX ORDER — TORSEMIDE 100 MG/1
100 TABLET ORAL DAILY
Status: DISCONTINUED | OUTPATIENT
Start: 2017-07-28 | End: 2017-07-30 | Stop reason: HOSPADM

## 2017-07-28 RX ADMIN — CARVEDILOL 12.5 MG: 12.5 TABLET, FILM COATED ORAL at 08:57

## 2017-07-28 RX ADMIN — HYDRALAZINE HYDROCHLORIDE 25 MG: 50 TABLET, FILM COATED ORAL at 08:57

## 2017-07-28 RX ADMIN — VITAMIN D, TAB 1000IU (100/BT) 1000 UNITS: 25 TAB at 08:56

## 2017-07-28 RX ADMIN — BUMETANIDE 4 MG: 0.25 INJECTION INTRAMUSCULAR; INTRAVENOUS at 00:37

## 2017-07-28 RX ADMIN — INSULIN DETEMIR 15 UNITS: 100 INJECTION, SOLUTION SUBCUTANEOUS at 22:14

## 2017-07-28 RX ADMIN — BUMETANIDE 4 MG: 0.25 INJECTION INTRAMUSCULAR; INTRAVENOUS at 13:16

## 2017-07-28 RX ADMIN — TORSEMIDE 100 MG: 100 TABLET ORAL at 15:27

## 2017-07-28 RX ADMIN — FAMOTIDINE 20 MG: 20 TABLET, FILM COATED ORAL at 08:57

## 2017-07-28 RX ADMIN — INSULIN ASPART 2 UNITS: 100 INJECTION, SOLUTION INTRAVENOUS; SUBCUTANEOUS at 22:13

## 2017-07-28 RX ADMIN — APIXABAN 2.5 MG: 2.5 TABLET, FILM COATED ORAL at 08:57

## 2017-07-28 RX ADMIN — CETIRIZINE HYDROCHLORIDE 10 MG: 10 TABLET, FILM COATED ORAL at 20:17

## 2017-07-28 RX ADMIN — CARVEDILOL 12.5 MG: 12.5 TABLET, FILM COATED ORAL at 20:17

## 2017-07-28 RX ADMIN — APIXABAN 2.5 MG: 2.5 TABLET, FILM COATED ORAL at 19:04

## 2017-07-28 RX ADMIN — INSULIN ASPART 2 UNITS: 100 INJECTION, SOLUTION INTRAVENOUS; SUBCUTANEOUS at 08:57

## 2017-07-28 RX ADMIN — SERTRALINE 50 MG: 50 TABLET, FILM COATED ORAL at 08:57

## 2017-07-28 RX ADMIN — MONTELUKAST 10 MG: 10 TABLET, FILM COATED ORAL at 20:17

## 2017-07-28 RX ADMIN — INSULIN ASPART 2 UNITS: 100 INJECTION, SOLUTION INTRAVENOUS; SUBCUTANEOUS at 13:17

## 2017-07-28 RX ADMIN — INSULIN ASPART 2 UNITS: 100 INJECTION, SOLUTION INTRAVENOUS; SUBCUTANEOUS at 19:04

## 2017-07-28 RX ADMIN — BUMETANIDE 4 MG: 0.25 INJECTION INTRAMUSCULAR; INTRAVENOUS at 06:57

## 2017-07-28 RX ADMIN — HYDRALAZINE HYDROCHLORIDE 25 MG: 50 TABLET, FILM COATED ORAL at 19:04

## 2017-07-28 NOTE — TELEPHONE ENCOUNTER
I put in a referral on patient 7/2017. She should not require weekly referrals. Please check w company to identify why an additional referral is being requested for same issue??

## 2017-07-29 PROBLEM — I50.33 ACUTE ON CHRONIC DIASTOLIC (CONGESTIVE) HEART FAILURE (HCC): Status: ACTIVE | Noted: 2017-07-29

## 2017-07-29 LAB
ALBUMIN SERPL-MCNC: 3.5 G/DL (ref 3.5–5.2)
ANION GAP SERPL CALCULATED.3IONS-SCNC: 14.2 MMOL/L
BASOPHILS # BLD AUTO: 0.06 10*3/MM3 (ref 0–0.2)
BASOPHILS NFR BLD AUTO: 0.7 % (ref 0–1.5)
BUN BLD-MCNC: 41 MG/DL (ref 8–23)
BUN/CREAT SERPL: 17.9 (ref 7–25)
CALCIUM SPEC-SCNC: 9.6 MG/DL (ref 8.6–10.5)
CHLORIDE SERPL-SCNC: 99 MMOL/L (ref 98–107)
CO2 SERPL-SCNC: 26.8 MMOL/L (ref 22–29)
CREAT BLD-MCNC: 2.29 MG/DL (ref 0.57–1)
DEPRECATED RDW RBC AUTO: 50.3 FL (ref 37–54)
EOSINOPHIL # BLD AUTO: 0.37 10*3/MM3 (ref 0–0.7)
EOSINOPHIL NFR BLD AUTO: 4.4 % (ref 0.3–6.2)
ERYTHROCYTE [DISTWIDTH] IN BLOOD BY AUTOMATED COUNT: 15.8 % (ref 11.7–13)
GFR SERPL CREATININE-BSD FRML MDRD: 20 ML/MIN/1.73
GLUCOSE BLD-MCNC: 131 MG/DL (ref 65–99)
GLUCOSE BLDC GLUCOMTR-MCNC: 126 MG/DL (ref 70–130)
GLUCOSE BLDC GLUCOMTR-MCNC: 131 MG/DL (ref 70–130)
GLUCOSE BLDC GLUCOMTR-MCNC: 134 MG/DL (ref 70–130)
GLUCOSE BLDC GLUCOMTR-MCNC: 167 MG/DL (ref 70–130)
GLUCOSE BLDC GLUCOMTR-MCNC: 219 MG/DL (ref 70–130)
HCT VFR BLD AUTO: 31.9 % (ref 35.6–45.5)
HGB BLD-MCNC: 10.2 G/DL (ref 11.9–15.5)
IMM GRANULOCYTES # BLD: 0.02 10*3/MM3 (ref 0–0.03)
IMM GRANULOCYTES NFR BLD: 0.2 % (ref 0–0.5)
LYMPHOCYTES # BLD AUTO: 2.01 10*3/MM3 (ref 0.9–4.8)
LYMPHOCYTES NFR BLD AUTO: 23.6 % (ref 19.6–45.3)
MAGNESIUM SERPL-MCNC: 2.3 MG/DL (ref 1.6–2.4)
MCH RBC QN AUTO: 27.7 PG (ref 26.9–32)
MCHC RBC AUTO-ENTMCNC: 32 G/DL (ref 32.4–36.3)
MCV RBC AUTO: 86.7 FL (ref 80.5–98.2)
MONOCYTES # BLD AUTO: 0.74 10*3/MM3 (ref 0.2–1.2)
MONOCYTES NFR BLD AUTO: 8.7 % (ref 5–12)
NEUTROPHILS # BLD AUTO: 5.3 10*3/MM3 (ref 1.9–8.1)
NEUTROPHILS NFR BLD AUTO: 62.4 % (ref 42.7–76)
PHOSPHATE SERPL-MCNC: 4.6 MG/DL (ref 2.5–4.5)
PLATELET # BLD AUTO: 249 10*3/MM3 (ref 140–500)
PMV BLD AUTO: 9.2 FL (ref 6–12)
POTASSIUM BLD-SCNC: 4.1 MMOL/L (ref 3.5–5.2)
RBC # BLD AUTO: 3.68 10*6/MM3 (ref 3.9–5.2)
SODIUM BLD-SCNC: 140 MMOL/L (ref 136–145)
URATE SERPL-MCNC: 10.7 MG/DL (ref 2.4–5.7)
WBC NRBC COR # BLD: 8.5 10*3/MM3 (ref 4.5–10.7)

## 2017-07-29 PROCEDURE — 63710000001 INSULIN ASPART PER 5 UNITS: Performed by: INTERNAL MEDICINE

## 2017-07-29 PROCEDURE — 94620 HC PULMONARY STRESS TEST SIMPLE: CPT

## 2017-07-29 PROCEDURE — 80069 RENAL FUNCTION PANEL: CPT | Performed by: INTERNAL MEDICINE

## 2017-07-29 PROCEDURE — 99232 SBSQ HOSP IP/OBS MODERATE 35: CPT | Performed by: INTERNAL MEDICINE

## 2017-07-29 PROCEDURE — 85025 COMPLETE CBC W/AUTO DIFF WBC: CPT | Performed by: INTERNAL MEDICINE

## 2017-07-29 PROCEDURE — 84550 ASSAY OF BLOOD/URIC ACID: CPT | Performed by: INTERNAL MEDICINE

## 2017-07-29 PROCEDURE — 82962 GLUCOSE BLOOD TEST: CPT

## 2017-07-29 PROCEDURE — 83735 ASSAY OF MAGNESIUM: CPT | Performed by: INTERNAL MEDICINE

## 2017-07-29 RX ADMIN — CETIRIZINE HYDROCHLORIDE 10 MG: 10 TABLET, FILM COATED ORAL at 20:58

## 2017-07-29 RX ADMIN — CARVEDILOL 12.5 MG: 12.5 TABLET, FILM COATED ORAL at 20:58

## 2017-07-29 RX ADMIN — FAMOTIDINE 20 MG: 20 TABLET, FILM COATED ORAL at 09:12

## 2017-07-29 RX ADMIN — INSULIN ASPART 3 UNITS: 100 INJECTION, SOLUTION INTRAVENOUS; SUBCUTANEOUS at 12:19

## 2017-07-29 RX ADMIN — CARVEDILOL 12.5 MG: 12.5 TABLET, FILM COATED ORAL at 09:13

## 2017-07-29 RX ADMIN — HYDRALAZINE HYDROCHLORIDE 25 MG: 50 TABLET, FILM COATED ORAL at 09:13

## 2017-07-29 RX ADMIN — TORSEMIDE 100 MG: 100 TABLET ORAL at 09:13

## 2017-07-29 RX ADMIN — APIXABAN 2.5 MG: 2.5 TABLET, FILM COATED ORAL at 09:13

## 2017-07-29 RX ADMIN — VITAMIN D, TAB 1000IU (100/BT) 1000 UNITS: 25 TAB at 09:13

## 2017-07-29 RX ADMIN — INSULIN DETEMIR 15 UNITS: 100 INJECTION, SOLUTION SUBCUTANEOUS at 21:03

## 2017-07-29 RX ADMIN — INSULIN ASPART 2 UNITS: 100 INJECTION, SOLUTION INTRAVENOUS; SUBCUTANEOUS at 21:04

## 2017-07-29 RX ADMIN — MONTELUKAST 10 MG: 10 TABLET, FILM COATED ORAL at 20:58

## 2017-07-29 RX ADMIN — APIXABAN 2.5 MG: 2.5 TABLET, FILM COATED ORAL at 17:50

## 2017-07-29 RX ADMIN — HYDRALAZINE HYDROCHLORIDE 25 MG: 50 TABLET, FILM COATED ORAL at 17:50

## 2017-07-29 RX ADMIN — SERTRALINE 50 MG: 50 TABLET, FILM COATED ORAL at 09:13

## 2017-07-30 VITALS
BODY MASS INDEX: 33.04 KG/M2 | SYSTOLIC BLOOD PRESSURE: 140 MMHG | TEMPERATURE: 98 F | OXYGEN SATURATION: 92 % | HEART RATE: 80 BPM | HEIGHT: 63 IN | WEIGHT: 186.5 LBS | RESPIRATION RATE: 18 BRPM | DIASTOLIC BLOOD PRESSURE: 80 MMHG

## 2017-07-30 LAB
GLUCOSE BLDC GLUCOMTR-MCNC: 123 MG/DL (ref 70–130)
GLUCOSE BLDC GLUCOMTR-MCNC: 269 MG/DL (ref 70–130)

## 2017-07-30 PROCEDURE — 99238 HOSP IP/OBS DSCHRG MGMT 30/<: CPT | Performed by: INTERNAL MEDICINE

## 2017-07-30 PROCEDURE — 63710000001 INSULIN ASPART PER 5 UNITS: Performed by: INTERNAL MEDICINE

## 2017-07-30 PROCEDURE — 82962 GLUCOSE BLOOD TEST: CPT

## 2017-07-30 RX ORDER — TORSEMIDE 100 MG/1
100 TABLET ORAL DAILY
Qty: 30 TABLET | Refills: 6 | Status: SHIPPED | OUTPATIENT
Start: 2017-07-30 | End: 2017-08-07 | Stop reason: SDUPTHER

## 2017-07-30 RX ORDER — CARVEDILOL 12.5 MG/1
12.5 TABLET ORAL EVERY 12 HOURS SCHEDULED
Qty: 60 TABLET | Refills: 3 | Status: SHIPPED | OUTPATIENT
Start: 2017-07-30 | End: 2017-08-11 | Stop reason: SDUPTHER

## 2017-07-30 RX ADMIN — HYDRALAZINE HYDROCHLORIDE 25 MG: 50 TABLET, FILM COATED ORAL at 08:26

## 2017-07-30 RX ADMIN — TORSEMIDE 100 MG: 100 TABLET ORAL at 08:26

## 2017-07-30 RX ADMIN — VITAMIN D, TAB 1000IU (100/BT) 1000 UNITS: 25 TAB at 08:26

## 2017-07-30 RX ADMIN — INSULIN ASPART 4 UNITS: 100 INJECTION, SOLUTION INTRAVENOUS; SUBCUTANEOUS at 13:03

## 2017-07-30 RX ADMIN — SERTRALINE 50 MG: 50 TABLET, FILM COATED ORAL at 08:26

## 2017-07-30 RX ADMIN — FAMOTIDINE 20 MG: 20 TABLET, FILM COATED ORAL at 08:26

## 2017-07-30 RX ADMIN — CARVEDILOL 12.5 MG: 12.5 TABLET, FILM COATED ORAL at 08:26

## 2017-07-30 RX ADMIN — APIXABAN 2.5 MG: 2.5 TABLET, FILM COATED ORAL at 08:26

## 2017-08-07 ENCOUNTER — OFFICE VISIT (OUTPATIENT)
Dept: INTERNAL MEDICINE | Facility: CLINIC | Age: 82
End: 2017-08-07

## 2017-08-07 ENCOUNTER — TELEPHONE (OUTPATIENT)
Dept: INTERNAL MEDICINE | Facility: CLINIC | Age: 82
End: 2017-08-07

## 2017-08-07 VITALS
HEART RATE: 88 BPM | OXYGEN SATURATION: 98 % | WEIGHT: 191 LBS | BODY MASS INDEX: 33.83 KG/M2 | DIASTOLIC BLOOD PRESSURE: 60 MMHG | SYSTOLIC BLOOD PRESSURE: 140 MMHG

## 2017-08-07 DIAGNOSIS — I48.0 PAROXYSMAL ATRIAL FIBRILLATION (HCC): ICD-10-CM

## 2017-08-07 DIAGNOSIS — G47.33 OBSTRUCTIVE SLEEP APNEA SYNDROME: ICD-10-CM

## 2017-08-07 DIAGNOSIS — I48.91 ATRIAL FIBRILLATION, UNSPECIFIED TYPE (HCC): ICD-10-CM

## 2017-08-07 DIAGNOSIS — Z95.0 PACEMAKER: Primary | ICD-10-CM

## 2017-08-07 DIAGNOSIS — Z79.4 TYPE 2 DIABETES MELLITUS WITH CHRONIC KIDNEY DISEASE, WITH LONG-TERM CURRENT USE OF INSULIN, UNSPECIFIED CKD STAGE (HCC): ICD-10-CM

## 2017-08-07 DIAGNOSIS — I10 ESSENTIAL HYPERTENSION: ICD-10-CM

## 2017-08-07 DIAGNOSIS — I50.22 CHRONIC SYSTOLIC CONGESTIVE HEART FAILURE (HCC): ICD-10-CM

## 2017-08-07 DIAGNOSIS — I50.33 ACUTE ON CHRONIC DIASTOLIC CONGESTIVE HEART FAILURE (HCC): ICD-10-CM

## 2017-08-07 DIAGNOSIS — E11.22 TYPE 2 DIABETES MELLITUS WITH CHRONIC KIDNEY DISEASE, WITH LONG-TERM CURRENT USE OF INSULIN, UNSPECIFIED CKD STAGE (HCC): ICD-10-CM

## 2017-08-07 DIAGNOSIS — D64.9 ANEMIA, UNSPECIFIED TYPE: ICD-10-CM

## 2017-08-07 LAB
BASOPHILS # BLD AUTO: 0.03 10*3/MM3 (ref 0–0.2)
BASOPHILS NFR BLD AUTO: 0.4 % (ref 0–1.5)
BUN SERPL-MCNC: 78 MG/DL (ref 8–23)
BUN/CREAT SERPL: 27.8 (ref 7–25)
CALCIUM SERPL-MCNC: 9.3 MG/DL (ref 8.6–10.5)
CHLORIDE SERPL-SCNC: 91 MMOL/L (ref 98–107)
CO2 SERPL-SCNC: 28.2 MMOL/L (ref 22–29)
CREAT SERPL-MCNC: 2.81 MG/DL (ref 0.57–1)
DIFFERENTIAL COMMENT: NORMAL
EOSINOPHIL # BLD AUTO: 0.26 10*3/MM3 (ref 0–0.7)
EOSINOPHIL NFR BLD AUTO: 3.8 % (ref 0.3–6.2)
ERYTHROCYTE [DISTWIDTH] IN BLOOD BY AUTOMATED COUNT: 16.2 % (ref 11.7–13)
GLUCOSE SERPL-MCNC: 224 MG/DL (ref 65–99)
HCT VFR BLD AUTO: 33.6 % (ref 35.6–45.5)
HGB BLD-MCNC: 10.5 G/DL (ref 11.9–15.5)
IMM GRANULOCYTES # BLD: 0.02 10*3/MM3 (ref 0–0.03)
IMM GRANULOCYTES NFR BLD: 0.3 % (ref 0–0.5)
LYMPHOCYTES # BLD AUTO: 1.5 10*3/MM3 (ref 0.9–4.8)
LYMPHOCYTES NFR BLD AUTO: 21.7 % (ref 19.6–45.3)
MCH RBC QN AUTO: 27.1 PG (ref 26.9–32)
MCHC RBC AUTO-ENTMCNC: 31.3 G/DL (ref 32.4–36.3)
MCV RBC AUTO: 86.8 FL (ref 80.5–98.2)
MONOCYTES # BLD AUTO: 0.46 10*3/MM3 (ref 0.2–1.2)
MONOCYTES NFR BLD AUTO: 6.6 % (ref 5–12)
NEUTROPHILS # BLD AUTO: 4.65 10*3/MM3 (ref 1.9–8.1)
NEUTROPHILS NFR BLD AUTO: 67.2 % (ref 42.7–76)
NRBC BLD AUTO-RTO: 0 /100 WBC (ref 0–0)
PLATELET # BLD AUTO: 265 10*3/MM3 (ref 140–500)
PLATELET BLD QL SMEAR: NORMAL
POTASSIUM SERPL-SCNC: 4.3 MMOL/L (ref 3.5–5.2)
RBC # BLD AUTO: 3.87 10*6/MM3 (ref 3.9–5.2)
RBC MORPH BLD: NORMAL
SODIUM SERPL-SCNC: 136 MMOL/L (ref 136–145)
TSH SERPL DL<=0.005 MIU/L-ACNC: 1.08 MIU/ML (ref 0.27–4.2)
WBC # BLD AUTO: 6.92 10*3/MM3 (ref 4.5–10.7)

## 2017-08-07 PROCEDURE — 99397 PER PM REEVAL EST PAT 65+ YR: CPT | Performed by: INTERNAL MEDICINE

## 2017-08-07 RX ORDER — TORSEMIDE 100 MG/1
100 TABLET ORAL DAILY
Qty: 90 TABLET | Refills: 2 | Status: SHIPPED | OUTPATIENT
Start: 2017-08-07 | End: 2017-08-11 | Stop reason: SDUPTHER

## 2017-08-07 NOTE — PROGRESS NOTES
"Chief Complaint   Patient presents with   • Follow-up     from hospital  july 18 2014   chF, HTN, DM,     History of Present Illness   Veronica Henao is a 82 y.o. female presents for hospital f/u. She has chf w/ recent exacerbation w/ symptoms of dyspnea and fatigue and weight gain. She was hospitalized and diuresed. Discharged on demadex and reports that she now feels \"better than I have in a long time\". She is aware of need for low sodium diet but is now using kosher salt.     The following portions of the patient's history were reviewed and updated as appropriate: allergies, current medications, past family history, past medical history, past social history, past surgical history and problem list.  Current Outpatient Prescriptions on File Prior to Visit   Medication Sig Dispense Refill   • acetaminophen (TYLENOL) 325 MG tablet Take 2 tablets by mouth Every 6 (Six) Hours As Needed for Mild Pain (1-3).  0   • apixaban (ELIQUIS) 2.5 MG tablet tablet Take 2.5 mg by mouth 2 (Two) Times a Day.     • carvedilol (COREG) 12.5 MG tablet Take 1 tablet by mouth Every 12 (Twelve) Hours. 60 tablet 3   • cetirizine (zyrTEC) 10 MG tablet Take 10 mg by mouth Every Night.     • cholecalciferol (VITAMIN D3) 1000 UNITS tablet Take 1,000 Units by mouth daily.     • Cyanocobalamin (VITAMIN B-12) 5000 MCG sublingual tablet Take 5,000 mcg by mouth Daily.     • ferrous sulfate 325 (65 FE) MG EC tablet Take 1 tablet by mouth Daily With Breakfast. 30 tablet 11   • glimepiride (AMARYL) 2 MG tablet Take 2 mg by mouth 2 (Two) Times a Day.     • hydrALAZINE (APRESOLINE) 50 MG tablet Take 1 tablet by mouth 2 (Two) Times a Day. 60 tablet 3   • insulin degludec (TRESIBA FLEXTOUCH) 100 UNIT/ML solution pen-injector injection Inject 10 Units under the skin Daily. Indications: Diabetes 2 pen 3   • LEVEMIR FLEXTOUCH 100 UNIT/ML injection 18 Units Every Night.     • linagliptin (TRADJENTA) 5 MG tablet tablet Take 5 mg by mouth Daily.     • montelukast " (SINGULAIR) 10 MG tablet Take 10 mg by mouth Every Night.     • Multiple Vitamins-Minerals (CENTRUM SILVER PO) Take 1 tablet by mouth Daily.     • potassium chloride (MICRO-K) 10 MEQ CR capsule Take 1 capsule by mouth Daily.     • raNITIdine (ZANTAC) 150 MG tablet TAKE 1 TABLET TWICE DAILY 180 tablet 1   • sertraline (ZOLOFT) 50 MG tablet Take 50 mg by mouth Daily.     • Vitamin E 400 UNITS chewable tablet 400 Units.     • Vitamins A & D (VITAMIN A & D) 98858-459 UNITS tablet      • [DISCONTINUED] torsemide (DEMADEX) 100 MG tablet Take 1 tablet by mouth Daily. 30 tablet 6     No current facility-administered medications on file prior to visit.      Review of Systems   Constitutional: Negative.    HENT: Negative.    Eyes: Negative.    Respiratory: Negative.    Cardiovascular: Negative.         Swelling now resolved   Gastrointestinal: Negative.    Endocrine: Negative.    Genitourinary: Negative.    Musculoskeletal: Positive for arthralgias.   Skin: Negative.    Allergic/Immunologic: Negative.    Neurological: Negative.    Hematological: Negative.    Psychiatric/Behavioral: Negative.        Objective   Physical Exam   Constitutional: She is oriented to person, place, and time. She appears well-developed and well-nourished.   HENT:   Head: Normocephalic and atraumatic.   Right Ear: External ear normal.   Left Ear: External ear normal.   Nose: Nose normal.   Mouth/Throat: Oropharynx is clear and moist.   Eyes: EOM are normal. Pupils are equal, round, and reactive to light.   Neck: Neck supple.   Cardiovascular: Normal rate and regular rhythm.    Murmur heard.  Mild edema/ much improved from prior   Pulmonary/Chest: Effort normal and breath sounds normal. No respiratory distress.   Abdominal: Soft.   Musculoskeletal: Normal range of motion.   Neurological: She is alert and oriented to person, place, and time.   Skin: Skin is warm and dry.   Psychiatric: She has a normal mood and affect. Her behavior is normal. Judgment  and thought content normal.   Nursing note and vitals reviewed.       /60  Pulse 88  Wt 191 lb (86.6 kg)  SpO2 98%  BMI 33.83 kg/m2    Assessment/Plan   Diagnoses and all orders for this visit:    Pacemaker  -     Ambulatory Referral to Cardiology    Chronic systolic congestive heart failure  -     Ambulatory Referral to Cardiology  -     TSH    Essential hypertension  -     Basic Metabolic Panel    Paroxysmal atrial fibrillation    Acute on chronic diastolic congestive heart failure    Obstructive sleep apnea syndrome    Type 2 diabetes mellitus with chronic kidney disease, with long-term current use of insulin, unspecified CKD stage    Atrial fibrillation, unspecified type  -     TSH    Anemia, unspecified type  -     CBC & Differential    Other orders  -     torsemide (DEMADEX) 100 MG tablet; Take 1 tablet by mouth Daily.      Presents for hospital follow up evaluation. She is doing well on torsemide. Has gained a few pounds from discharge. Advised a low sodium diet and gave lit on DASH. Advised no added salt of any kind. Will test listed labs today. She has CRI and will monitor her renal function. Anemia of chronic disease w/ mild iron def. She is on an iron supplement. Will monitor her blood counts.   She has hypertension. Will continue to monitor bp at home.

## 2017-08-07 NOTE — TELEPHONE ENCOUNTER
Dr guerrero office needs new referral for a follow up pacemaker      [id number] code   ICD 10 I50.42

## 2017-08-11 ENCOUNTER — OFFICE VISIT (OUTPATIENT)
Dept: CARDIOLOGY | Facility: CLINIC | Age: 82
End: 2017-08-11

## 2017-08-11 ENCOUNTER — CLINICAL SUPPORT NO REQUIREMENTS (OUTPATIENT)
Dept: CARDIOLOGY | Facility: CLINIC | Age: 82
End: 2017-08-11

## 2017-08-11 VITALS
HEIGHT: 63 IN | BODY MASS INDEX: 33.88 KG/M2 | WEIGHT: 191.2 LBS | SYSTOLIC BLOOD PRESSURE: 112 MMHG | DIASTOLIC BLOOD PRESSURE: 80 MMHG | HEART RATE: 86 BPM | RESPIRATION RATE: 16 BRPM

## 2017-08-11 DIAGNOSIS — E78.49 OTHER HYPERLIPIDEMIA: ICD-10-CM

## 2017-08-11 DIAGNOSIS — I48.0 PAROXYSMAL ATRIAL FIBRILLATION (HCC): ICD-10-CM

## 2017-08-11 DIAGNOSIS — N18.4 CHRONIC KIDNEY DISEASE, STAGE IV (SEVERE) (HCC): ICD-10-CM

## 2017-08-11 DIAGNOSIS — I10 ESSENTIAL HYPERTENSION: ICD-10-CM

## 2017-08-11 DIAGNOSIS — I50.33 ACUTE ON CHRONIC DIASTOLIC CONGESTIVE HEART FAILURE (HCC): Primary | ICD-10-CM

## 2017-08-11 PROCEDURE — 99214 OFFICE O/P EST MOD 30 MIN: CPT | Performed by: INTERNAL MEDICINE

## 2017-08-11 PROCEDURE — 93000 ELECTROCARDIOGRAM COMPLETE: CPT | Performed by: INTERNAL MEDICINE

## 2017-08-11 RX ORDER — TORSEMIDE 20 MG/1
60 TABLET ORAL DAILY
Qty: 270 TABLET | Refills: 3 | Status: SHIPPED | OUTPATIENT
Start: 2017-08-11 | End: 2018-07-17 | Stop reason: ALTCHOICE

## 2017-08-11 RX ORDER — CARVEDILOL 12.5 MG/1
12.5 TABLET ORAL EVERY 12 HOURS SCHEDULED
Qty: 180 TABLET | Refills: 3 | Status: SHIPPED | OUTPATIENT
Start: 2017-08-11 | End: 2017-11-10 | Stop reason: SINTOL

## 2017-08-11 NOTE — PROGRESS NOTES
PATIENTINFORMATION    Date of Office Visit: 2017  Encounter Provider: Maria Luz Husain MD  Place of Service: [facility] CARDIOLOGY  Patient Name: Veronica Henao  : 1935    Subjective:     Encounter Date:2017      Patient ID: Veronica Henao is a 82 y.o. female.      History of Present Illness    She was having palpitations with a sensation of a racing heart in  and went to the emergency room and was diagnosed with atrial fibrillation with rapid ventricular response. She was treated by [facility]. She had an echocardiogram, which no left ventricular hypertrophy, normal left ventricular function with an ejection fraction of 55%-60%, and an elevated left atrial pressure. There was mild to moderate left atrial enlargement. There was mild mitral regurgitation. There was mild tricuspid regurgitation with a right ventricular systolic pressure of 37 mmHg. She was put on amiodarone and anticoagulation and was cardioverted approximately a month later. To her knowledge, she has stayed in sinus rhythm since this time period. She began complaining of shortness of breath in the fall of  and her amiodarone was discontinued. She has been maintained on Eliquis at this time. She was dissatisfied with her care at [facility] and transferred her care to me in 2014.       I saw her in 2015 and at that time she had had some nausea and vomiting with metformin but was still in sinus rhythm. She reports stopping all of her medications because of the dehydration and then the medicines were slowly resumed.       In 2015 she came in with complaints of feeling her heart race. She had no energy. She was short of breath and had to stop and rest when she exerted herself. She had increased lower extremity edema. She went to see her pulmonologist and he found her to be tachycardic. She came to see me and was in atrial fibrillation with rapid  ventricular response. I increased her diltiazem and subsequently started her on digoxin.      I saw her in August 2015. At that time she was in rate controlled atrial fibrillation and asymptomatic. We opted to not perform another cardioversion. She was having left lower extremity swelling and I checked a lower extremity Doppler which was negative for DVT.      When I saw her in August 2016, she was complaining of chest pain and shortness of breath. Her transthoracic echocardiogram from August 15, 2016 showed:  All left ventricular all segments contract normally.  Left ventricular function is normal. Calculated EF = 50.9%. Estimated EF was in agreement with the calculated EF. Estimated EF = 51%. Normal left ventricular cavity size and wall thickness noted. All left ventricular wall segments contract normally. Left ventricular diastolic function was unable to be assessed. Elevated left atrial pressure.  Left atrial volume is severely increased.  Right atrial cavity size is moderately dilated.  Moderate-to-severe mitral valve regurgitation is present.  The tricuspid valve is grossly normal. Moderate to severe tricuspid valve regurgitation is present. Estimated right ventricular systolic pressure from tricuspid regurgitation is mildly elevated (35-45 mmHg). The calculated RV systolic pressure 39 mmHg.      Nuclear stress test from August 15, 2016 showed:  Myocardial perfusion imaging indicates a normal myocardial perfusion study with no evidence of ischemia.  Left ventricular ejection fraction is normal (Calculated EF = 69%).  Findings consistent with a normal ECG stress test.  Impressions are consistent with a low risk study.      Trans-esophageal echo October 19, 2016:  All left ventricular wall segments contract normally.  Left ventricular function is normal. Estimated EF = 60%.  Left atrial cavity size is severely dilated.  Moderate-to-severe mitral valve regurgitation is present  Moderate to severe tricuspid valve  regurgitation is present.  Estimated right ventricular systolic pressure from tricuspid regurgitation is normal (<35 mmHg).  There is moderate complex plaque in the descending aorta present.  I am going to refer her to see Dr. Vital. I would like to see if she is a candidate for surgery. If so, we will arrange a coronary angiogram.      Coronary artery disease November 21, 2016 showed no significant coronary disease. On December 14, 2016, she had a mitral valve repair with a 31 mm ATS band posterior annuloplasty and P2 chordal repair. The tricuspid valve had an annuloplasty with a 26 mm ring and plication of the anterior and septal leaflet of commissure. She had a left and right Maze procedure. The left atrial appendage ligated.      Postoperatively she had asystole and bradycardia for 2 days. Eventually her underlying rhythm returned. She did have some postoperative atrial fibrillation. Initially she did not receive any beta blockers or amiodarone because of the asystole. However, she was able to tolerate a beta blocker by the time of discharge. She was started on warfarin which was subsequently switched to Eliquis. I discontinued her metoprolol because she felt it was causing fatigue and shortness of breath.      She presented to the chest pain unit in March 2017 with palpitations. She was found to be in rapid atrial flutter. She had a cardioversion on March 24, 2017 which was initially successful but she went back into atrial flutter the next day. On March 28 Dr. Ward performed an atrial flutter ablation.     Echo 5/25/17  · Left ventricular systolic function is normal. Estimated EF = 70%.  · Left atrial cavity size is severely dilated.  · Mild mitral valve stenosis is present. The mitral valve mean gradient is 6 mmHg. There is a mitral valve ring present.  · Mild tricuspid valve regurgitation is present. Estimated right ventricular systolic pressure from tricuspid regurgitation is moderately elevated (45-55  mmHg).  · There is evidence of previous tricuspid valve repair.     She went back into atrial flutter.  Dr. Ward performed a cardioversion on June 14.  She was successfully cardioverted back to sinus rhythm.  In less than 2 weeks, she is back in atrial flutter.  I discussed this with Dr. Ward and we decided on an AV node ablation and pacemaker insertion.  This was performed on July 5.  She had a pacemaker placed with her ventricular lead at the HIS bundle.    Unfortunately, she was admitted on July 25 with heart failure.  She was diuresed 15 pounds and felt much better.  She was discharged on torsemide 100 mg a day.  She had lab work at Dr. Price's office which I reviewed today.  Her creatinine and BUN have, quite a bit.  She feels great.  She is not having shortness of breath or edema.  She can do the things she wants to do without dyspnea.  Her pacemaker was interrogated today.  She has high thresholds on her his lead.  Dr. Ward adjusted it.  She may feel some muscular pacing.  She does have an RV lead as a backup.    Review of Systems   Constitution: Negative for fever, malaise/fatigue, weight gain and weight loss.   HENT: Negative for ear pain, hearing loss, nosebleeds and sore throat.    Eyes: Negative for double vision, pain, vision loss in left eye and vision loss in right eye.   Cardiovascular:        See history of present illness.   Respiratory: Negative for cough, shortness of breath, sleep disturbances due to breathing, snoring and wheezing.    Endocrine: Negative for cold intolerance, heat intolerance and polyuria.   Skin: Negative for itching, poor wound healing and rash.   Musculoskeletal: Negative for joint pain, joint swelling and myalgias.   Gastrointestinal: Negative for abdominal pain, diarrhea, hematochezia, nausea and vomiting.   Genitourinary: Negative for hematuria and hesitancy.   Neurological: Negative for numbness, paresthesias and seizures.   Psychiatric/Behavioral: Negative  "for depression. The patient is not nervous/anxious.            ECG 12 Lead  Date/Time: 8/11/2017 11:58 AM  Performed by: IRVIN LAN  Authorized by: IRVIN LNA   Comparison: compared with previous ECG from 7/25/2017  Similar to previous ECG  Rhythm comments: Ventricularly paced rhythm  Clinical impression: abnormal ECG               Objective:     /80 (BP Location: Left arm, Patient Position: Sitting, Cuff Size: Adult)  Pulse 86  Resp 16  Ht 63\" (160 cm)  Wt 191 lb 3.2 oz (86.7 kg)  BMI 33.87 kg/m2 Body mass index is 33.87 kg/(m^2).     Physical Exam   Constitutional: She appears well-developed.   HENT:   Head: Normocephalic and atraumatic.   Eyes: Conjunctivae and lids are normal. Pupils are equal, round, and reactive to light. Lids are everted and swept, no foreign bodies found.   Neck: Normal range of motion. No JVD present. Carotid bruit is not present. No tracheal deviation present. No thyroid mass present.   Cardiovascular: Normal rate, regular rhythm and normal heart sounds.    Pulses:       Dorsalis pedis pulses are 2+ on the right side, and 2+ on the left side.   Pulmonary/Chest: Effort normal and breath sounds normal.   Abdominal: Normal appearance and bowel sounds are normal.   Musculoskeletal: Normal range of motion.   Neurological: She is alert. She has normal strength.   Skin: Skin is warm, dry and intact.   Psychiatric: She has a normal mood and affect. Her behavior is normal.   Vitals reviewed.           Assessment/Plan:       1. Moderate to severe mitral regurgitation and tricuspid regurgitation. She is s/p repair in December 2016.  2. Atrial fibrillation. S/P maze and left atrial appendage ligation.   3. Rapid atrial flutter. She is status post a flutter ablation in March 2017. Unfortunately, she was not maintaining sinus rhythm.  In July 2017, she had an AV node ablation and pacemaker at the His bundle inserted.  4. Diabetes  5. Hypertension.   her blood pressure looks great " today on her current regimen.  I am not going to make any adjustments except to decrease the torsemide due to her acute on chronic kidney disease  6. Hyperlipidemia  7. Obstructive sleep apnea. She uses CPAP.  8. Chronic kidney disease.  Her BUN and creatinine have all increased.  I am going to decrease the torsemide to 60 mg a day and I told the patient she should call Dr. May to schedule an appointment.  9. Postoperative anemia    I will see her back in 3 months repeat basic metabolic panel in 2 weeks    Orders Placed This Encounter   Procedures   • Basic Metabolic Panel     Standing Status:   Future     Standing Expiration Date:   8/11/2018   • ECG 12 Lead     This order was created via procedure documentation      Veronica Henao   Home Medication Instructions VIDYA:    Printed on:08/11/17 8329   Medication Information                      acetaminophen (TYLENOL) 325 MG tablet  Take 2 tablets by mouth Every 6 (Six) Hours As Needed for Mild Pain (1-3).             apixaban (ELIQUIS) 2.5 MG tablet tablet  Take 2.5 mg by mouth 2 (Two) Times a Day.             carvedilol (COREG) 12.5 MG tablet  Take 1 tablet by mouth Every 12 (Twelve) Hours.             cetirizine (zyrTEC) 10 MG tablet  Take 10 mg by mouth Every Night.             cholecalciferol (VITAMIN D3) 1000 UNITS tablet  Take 1,000 Units by mouth daily.             Cyanocobalamin (VITAMIN B-12) 5000 MCG sublingual tablet  Take 5,000 mcg by mouth Daily.             ferrous sulfate 325 (65 FE) MG EC tablet  Take 1 tablet by mouth Daily With Breakfast.             glimepiride (AMARYL) 2 MG tablet  Take 2 mg by mouth 2 (Two) Times a Day.             hydrALAZINE (APRESOLINE) 50 MG tablet  Take 1 tablet by mouth 2 (Two) Times a Day.             insulin degludec (TRESIBA FLEXTOUCH) 100 UNIT/ML solution pen-injector injection  Inject 10 Units under the skin Daily. Indications: Diabetes             LEVEMIR FLEXTOUCH 100 UNIT/ML injection  18 Units Every Night.              linagliptin (TRADJENTA) 5 MG tablet tablet  Take 5 mg by mouth Daily.             montelukast (SINGULAIR) 10 MG tablet  Take 10 mg by mouth Every Night.             Multiple Vitamins-Minerals (CENTRUM SILVER PO)  Take 1 tablet by mouth Daily.             potassium chloride (MICRO-K) 10 MEQ CR capsule  Take 1 capsule by mouth Daily.             raNITIdine (ZANTAC) 150 MG tablet  TAKE 1 TABLET TWICE DAILY             sertraline (ZOLOFT) 50 MG tablet  Take 50 mg by mouth Daily.             torsemide (DEMADEX) 20 MG tablet  Take 3 tablets by mouth Daily.             Vitamin E 400 UNITS chewable tablet  400 Units.             Vitamins A & D (VITAMIN A & D) 20943-262 UNITS tablet                          Maria Luz Husain MD  08/11/17  11:59 AM

## 2017-08-22 ENCOUNTER — TELEPHONE (OUTPATIENT)
Dept: GASTROENTEROLOGY | Facility: CLINIC | Age: 82
End: 2017-08-22

## 2017-08-22 NOTE — TELEPHONE ENCOUNTER
Spoke with patient and informed her that Dr Browning requests to see her in the office prior to scheduling any testing.  Patient voiced understanding.  New patient appointment scheduled for 9/1/17 at 1:15 pm.

## 2017-08-22 NOTE — TELEPHONE ENCOUNTER
----- Message from Jacy Browning MD sent at 8/22/2017 11:39 AM EDT -----  Regarding: RE: OA PAPERWORK  I would like to see her in the office before hand-- It looks like she needs an EGD and colonoscopy.  OK to overbook in the next couple of weeks, thanks    ----- Message -----     From: Honey Holguin RN     Sent: 8/18/2017   2:02 PM       To: Jacy Browning MD  Subject: OA PAPERWORK                                     Open Access/Recall paperwork scanned in under the media tab.  Please review and return to Honey.

## 2017-09-01 ENCOUNTER — CLINICAL SUPPORT NO REQUIREMENTS (OUTPATIENT)
Dept: CARDIOLOGY | Facility: CLINIC | Age: 82
End: 2017-09-01

## 2017-09-01 DIAGNOSIS — I44.2 ATRIOVENTRICULAR BLOCK, COMPLETE (HCC): Primary | ICD-10-CM

## 2017-09-01 PROCEDURE — 93280 PM DEVICE PROGR EVAL DUAL: CPT | Performed by: INTERNAL MEDICINE

## 2017-09-12 RX ORDER — HYDRALAZINE HYDROCHLORIDE 50 MG/1
TABLET, FILM COATED ORAL
Qty: 180 TABLET | Refills: 1 | Status: SHIPPED | OUTPATIENT
Start: 2017-09-12 | End: 2018-03-15 | Stop reason: SDUPTHER

## 2017-09-25 ENCOUNTER — TELEPHONE (OUTPATIENT)
Dept: CARDIOLOGY | Facility: CLINIC | Age: 82
End: 2017-09-25

## 2017-09-25 ENCOUNTER — OFFICE VISIT (OUTPATIENT)
Dept: GASTROENTEROLOGY | Facility: CLINIC | Age: 82
End: 2017-09-25

## 2017-09-25 ENCOUNTER — OFFICE VISIT (OUTPATIENT)
Dept: INTERNAL MEDICINE | Facility: CLINIC | Age: 82
End: 2017-09-25

## 2017-09-25 ENCOUNTER — TELEPHONE (OUTPATIENT)
Dept: GASTROENTEROLOGY | Facility: CLINIC | Age: 82
End: 2017-09-25

## 2017-09-25 VITALS
SYSTOLIC BLOOD PRESSURE: 128 MMHG | WEIGHT: 198 LBS | DIASTOLIC BLOOD PRESSURE: 76 MMHG | HEIGHT: 63 IN | BODY MASS INDEX: 35.08 KG/M2

## 2017-09-25 VITALS
OXYGEN SATURATION: 97 % | WEIGHT: 204 LBS | BODY MASS INDEX: 36.14 KG/M2 | SYSTOLIC BLOOD PRESSURE: 140 MMHG | DIASTOLIC BLOOD PRESSURE: 70 MMHG | HEART RATE: 99 BPM

## 2017-09-25 DIAGNOSIS — D50.8 OTHER IRON DEFICIENCY ANEMIA: Primary | ICD-10-CM

## 2017-09-25 DIAGNOSIS — N28.9 RENAL INSUFFICIENCY: Primary | ICD-10-CM

## 2017-09-25 DIAGNOSIS — L29.9 ITCHING: ICD-10-CM

## 2017-09-25 DIAGNOSIS — K21.9 GASTROESOPHAGEAL REFLUX DISEASE, ESOPHAGITIS PRESENCE NOT SPECIFIED: ICD-10-CM

## 2017-09-25 DIAGNOSIS — I10 ESSENTIAL HYPERTENSION: ICD-10-CM

## 2017-09-25 PROCEDURE — 90670 PCV13 VACCINE IM: CPT | Performed by: INTERNAL MEDICINE

## 2017-09-25 PROCEDURE — 90472 IMMUNIZATION ADMIN EACH ADD: CPT | Performed by: INTERNAL MEDICINE

## 2017-09-25 PROCEDURE — 99204 OFFICE O/P NEW MOD 45 MIN: CPT | Performed by: INTERNAL MEDICINE

## 2017-09-25 PROCEDURE — 99214 OFFICE O/P EST MOD 30 MIN: CPT | Performed by: INTERNAL MEDICINE

## 2017-09-25 PROCEDURE — 90662 IIV NO PRSV INCREASED AG IM: CPT | Performed by: INTERNAL MEDICINE

## 2017-09-25 PROCEDURE — G0008 ADMIN INFLUENZA VIRUS VAC: HCPCS | Performed by: INTERNAL MEDICINE

## 2017-09-25 RX ORDER — ALLOPURINOL 100 MG/1
100 TABLET ORAL DAILY
Qty: 30 TABLET | Refills: 5 | Status: SHIPPED | OUTPATIENT
Start: 2017-09-25 | End: 2018-06-11 | Stop reason: SDUPTHER

## 2017-09-25 RX ORDER — SODIUM CHLORIDE, SODIUM LACTATE, POTASSIUM CHLORIDE, CALCIUM CHLORIDE 600; 310; 30; 20 MG/100ML; MG/100ML; MG/100ML; MG/100ML
30 INJECTION, SOLUTION INTRAVENOUS CONTINUOUS
Status: CANCELLED | OUTPATIENT
Start: 2017-11-14

## 2017-09-25 RX ORDER — HYDROXYZINE HYDROCHLORIDE 25 MG/1
25 TABLET, FILM COATED ORAL
Qty: 30 TABLET | Refills: 3 | Status: SHIPPED | OUTPATIENT
Start: 2017-09-25 | End: 2017-12-01 | Stop reason: SDUPTHER

## 2017-09-25 NOTE — PROGRESS NOTES
Chief Complaint   Patient presents with   • Rash     itching     Renal insufficiency, htn, itching, gout  History of Present Illness   Veronica Henao is a 82 y.o. female presents for acute care. Reports itching. Most pronounced on the upper extremities but can be all over at times. No real rash but is developing scratches and bruising from itching. She has progressively worsening kidney dysfunction as well as chf. Reports some recent weight gain. She is now taking torsemide for fluid retention. She is taking 50 mg bid of this. She has very elevated uric acid levels at this time.   Has dm and reports glucose levels are 90s fasting.     The following portions of the patient's history were reviewed and updated as appropriate: allergies, current medications, past family history, past medical history, past social history, past surgical history and problem list.  Current Outpatient Prescriptions on File Prior to Visit   Medication Sig Dispense Refill   • acetaminophen (TYLENOL) 325 MG tablet Take 2 tablets by mouth Every 6 (Six) Hours As Needed for Mild Pain (1-3).  0   • apixaban (ELIQUIS) 2.5 MG tablet tablet Take 1 tablet by mouth 2 (Two) Times a Day. 60 tablet 0   • cetirizine (zyrTEC) 10 MG tablet Take 10 mg by mouth Every Night.     • cholecalciferol (VITAMIN D3) 1000 UNITS tablet Take 1,000 Units by mouth daily.     • Cyanocobalamin (VITAMIN B-12) 5000 MCG sublingual tablet Take 5,000 mcg by mouth Daily.     • ferrous sulfate 325 (65 FE) MG EC tablet Take 1 tablet by mouth Daily With Breakfast. 30 tablet 11   • glimepiride (AMARYL) 2 MG tablet Take 2 mg by mouth 2 (Two) Times a Day.     • hydrALAZINE (APRESOLINE) 50 MG tablet TAKE 1 TABLET TWICE DAILY 180 tablet 1   • insulin degludec (TRESIBA FLEXTOUCH) 100 UNIT/ML solution pen-injector injection Inject 10 Units under the skin Daily. Indications: Diabetes 2 pen 3   • linagliptin (TRADJENTA) 5 MG tablet tablet Take 5 mg by mouth Daily.     • montelukast (SINGULAIR)  10 MG tablet Take 10 mg by mouth Every Night.     • Multiple Vitamins-Minerals (CENTRUM SILVER PO) Take 1 tablet by mouth Daily.     • potassium chloride (MICRO-K) 10 MEQ CR capsule Take 1 capsule by mouth Daily.     • raNITIdine (ZANTAC) 150 MG tablet TAKE 1 TABLET TWICE DAILY 180 tablet 1   • sertraline (ZOLOFT) 50 MG tablet TAKE 1 TABLET EVERY DAY AS DIRECTED 90 tablet 2   • Vitamin E 400 UNITS chewable tablet 400 Units.     • Vitamins A & D (VITAMIN A & D) 93254-080 UNITS tablet      • carvedilol (COREG) 12.5 MG tablet Take 1 tablet by mouth Every 12 (Twelve) Hours. 180 tablet 3   • LEVEMIR FLEXTOUCH 100 UNIT/ML injection 18 Units Every Night.     • torsemide (DEMADEX) 20 MG tablet Take 3 tablets by mouth Daily. (Patient taking differently: Take 100 mg by mouth Daily.) 270 tablet 3     No current facility-administered medications on file prior to visit.      Review of Systems   Constitutional: Negative.    HENT: Negative.    Eyes: Negative.    Respiratory: Negative.    Cardiovascular: Positive for leg swelling.        Small vol leg swelling. Fairly stable at this time   Gastrointestinal: Negative.    Endocrine: Negative.    Genitourinary: Positive for frequency.   Musculoskeletal: Positive for arthralgias.   Skin:        itching   Hematological: Negative.    Psychiatric/Behavioral: Negative.        Objective   Physical Exam   Constitutional: She is oriented to person, place, and time. She appears well-developed and well-nourished.   HENT:   Head: Normocephalic and atraumatic.   Right Ear: External ear normal.   Left Ear: External ear normal.   Nose: Nose normal.   Mouth/Throat: Oropharynx is clear and moist.   Eyes: EOM are normal. Pupils are equal, round, and reactive to light.   Neck: Neck supple.   Cardiovascular: Normal rate, regular rhythm and normal heart sounds.    Pulmonary/Chest: Effort normal and breath sounds normal. No respiratory distress.   Abdominal: Soft.   Musculoskeletal: Normal range of  motion.   Neurological: She is alert and oriented to person, place, and time.   Skin: Skin is warm and dry.   Excoriations and bruising but no real rash.    Psychiatric: She has a normal mood and affect. Her behavior is normal.   Nursing note and vitals reviewed.       /70  Pulse 99  Wt 204 lb (92.5 kg)  SpO2 97%  BMI 36.14 kg/m2    Assessment/Plan   Diagnoses and all orders for this visit:    Renal insufficiency  -     Basic Metabolic Panel  -     PTH, Intact  -     Phosphorus  -     Magnesium    Itching  -     Basic Metabolic Panel  -     PTH, Intact  -     Phosphorus  -     Magnesium    Essential hypertension  -     Basic Metabolic Panel    Other orders  -     hydrOXYzine (ATARAX) 25 MG tablet; Take 1 tablet by mouth every night at bedtime.      Patient presents w/ pruirits. Suspect this is related to renal dysfunction and meds. She will start zyrtec am and hydroxyzine at night. She will stop zquil. Use diphenhydramine cream on arms. Start allopurinol for gout/ elevated uric acid. Test listed labs. Close follow up.

## 2017-09-25 NOTE — PATIENT INSTRUCTIONS
Schedule the EGD and colonoscopy    I will make sure it is ok with Dr Husain to stop the eliquis 3 days before-- my office will confirm this with you    Continue iron supplementation and ranitidine    For any additional questions, concerns or changes to your condition after today's office visit please contact the office at [phone].

## 2017-09-25 NOTE — TELEPHONE ENCOUNTER
Can you send a clearance letter that it is ok to hold eliquis 3 day prior to surgery to the fax number listed? I called the doctor office and they are close for the day

## 2017-09-25 NOTE — TELEPHONE ENCOUNTER
Call from Josie at Dr Husain's office.  She is checking what procedure - advise c/s scheduled for 11/14/17.  Office phone and fax # given -awaiting further instructions.

## 2017-09-25 NOTE — TELEPHONE ENCOUNTER
----- Message from Jacy Browning MD sent at 9/25/2017 10:36 AM EDT -----  She is on Eliquis with Dr. Maria Luz Husain.  She will need to hold this 3 days prior to her endoscopy.  Can we please check to make sure this is okay with Dr. Husain and then notify the patient.  Thank you!

## 2017-09-25 NOTE — TELEPHONE ENCOUNTER
Call to Dr Maria Luz Husain's office @ [phone].  VM left for [place] to check if pt may hold Eliquis for 3 days prior to scope to be scheduled.  Awaiting reply.

## 2017-09-25 NOTE — TELEPHONE ENCOUNTER
Sonam called from the doctor office. Pt is having a Colonoscopy on 11/14/17 with . Is it ok for the pt to hold Eliquis 3 days prior?Please Advise    PH #: [phone]   FaX #: [phone]    Thanks Josie

## 2017-09-25 NOTE — PROGRESS NOTES
Chief Complaint   Patient presents with   • Heartburn   • Anemia       Subjective     HPI    Veronica Henao is a 82 y.o. female with a past medical history noted below who presents for evaluation of heartburn and anemia.  She has iron deficiency anemia diagnosed during a July hospitalization.  Her iron was 32 and her ferritin was 28.  Her Hb remains ~10.  She denies any overt bleeding.  She reports being told that her blood counts have been low in the past though she does not think anything was done for her.   She does not have any diarrhea or constipation.  She reports a easy to pass bowel movement every day.  She has no nausea or vomiting.  She does not take NSAIDs.    She has chronic GERD.  She takes twice daily ranitidine for this.  This controls her symptoms.  She is currently on oral iron supplementation.    I have reviewed records from her 2003 colonoscopy and EGD with Dr. Turner.  She had diverticulosis of the colon but no polyps.  Her EGD showed a hiatal hernia.  Biopsies were normal for Reis's esophagus.  She has not had further screening since that time.  She does not have a family history of GI malignancy.    She is on eliquis for A fib.  She has been able to come off of this medication in the past for mostly heart procedures.    She occasionally has issues with eating meat-- usually pork- this will come back up.  However, she denies any consistent episodes of food sticking    No chest pain.  She does have shortness of breath that is chronic.    She denies family history of GI malignancy or inflammatory bowel disease.  She does not smoke cigarettes.  She does not drink alcohol.    She was initially completed open access paperwork for colonoscopy only but indicated that the procedure was to be for anemia, she also reported heartburn symptom; subsequently meriting further evaluation in the office to decide about further endoscopy.      Past Medical History:   Diagnosis Date   • Acute bronchitis    • Acute  renal insufficiency    • Allergic rhinitis    • Anemia in chronic kidney disease    • Arrhythmia    • Atrial fibrillation     chronic   • Brachycephaly    • Breast pain, right    • Chest pain    • CHF (congestive heart failure)    • Chronic combined systolic and diastolic CHF (congestive heart failure)    • Chronic renal insufficiency    • CKD (chronic kidney disease), stage IV    • Cough    • Depression    • Diabetes mellitus     TYPE 2   • Diverticulosis    • ORTIZ (dyspnea on exertion)    • Dyspnea    • Esophageal reflux    • Essential hypertension    • Fatigue    • GERD (gastroesophageal reflux disease)    • Gout    • Head injury    • Headache    • Hoarseness    • Hypercalcemia    • Hyperlipidemia    • Hypertension    • Influenza A (H1N1)    • Iron deficiency anemia    • Itching    • Leg swelling    • Lightheadedness    • Macular degeneration    • Malaise and fatigue    • Mitral valve regurgitation     moderate to severe   • Nontoxic multinodular goiter     RIGHT 2.0 CM  LEFT  2.5 CM   • JOSELUIS on CPAP    • Osteoarthritis    • PAF (paroxysmal atrial fibrillation)    • Palpitations    • Paresthesias    • Proteinuria    • Pulmonary nodule    • Pulmonic valve regurgitation     mild   • Sebaceous cyst    • SOBOE (shortness of breath on exertion)    • Solitary thyroid nodule    • Subclinical hypothyroidism    • Tachycardia    • TR (tricuspid regurgitation)     mild to moderate   • Type 2 diabetes mellitus    • Type 2 diabetes mellitus with chronic kidney disease    • UTI (urinary tract infection)          Current Outpatient Prescriptions:   •  acetaminophen (TYLENOL) 325 MG tablet, Take 2 tablets by mouth Every 6 (Six) Hours As Needed for Mild Pain (1-3)., Disp: , Rfl: 0  •  apixaban (ELIQUIS) 2.5 MG tablet tablet, Take 1 tablet by mouth 2 (Two) Times a Day., Disp: 60 tablet, Rfl: 0  •  cetirizine (zyrTEC) 10 MG tablet, Take 10 mg by mouth Every Night., Disp: , Rfl:   •  cholecalciferol (VITAMIN D3) 1000 UNITS tablet,  Take 1,000 Units by mouth daily., Disp: , Rfl:   •  Cyanocobalamin (VITAMIN B-12) 5000 MCG sublingual tablet, Take 5,000 mcg by mouth Daily., Disp: , Rfl:   •  ferrous sulfate 325 (65 FE) MG EC tablet, Take 1 tablet by mouth Daily With Breakfast., Disp: 30 tablet, Rfl: 11  •  glimepiride (AMARYL) 2 MG tablet, Take 2 mg by mouth 2 (Two) Times a Day., Disp: , Rfl:   •  hydrALAZINE (APRESOLINE) 50 MG tablet, TAKE 1 TABLET TWICE DAILY, Disp: 180 tablet, Rfl: 1  •  insulin degludec (TRESIBA FLEXTOUCH) 100 UNIT/ML solution pen-injector injection, Inject 10 Units under the skin Daily. Indications: Diabetes, Disp: 2 pen, Rfl: 3  •  LEVEMIR FLEXTOUCH 100 UNIT/ML injection, 18 Units Every Night., Disp: , Rfl:   •  linagliptin (TRADJENTA) 5 MG tablet tablet, Take 5 mg by mouth Daily., Disp: , Rfl:   •  montelukast (SINGULAIR) 10 MG tablet, Take 10 mg by mouth Every Night., Disp: , Rfl:   •  Multiple Vitamins-Minerals (CENTRUM SILVER PO), Take 1 tablet by mouth Daily., Disp: , Rfl:   •  potassium chloride (MICRO-K) 10 MEQ CR capsule, Take 1 capsule by mouth Daily., Disp: , Rfl:   •  raNITIdine (ZANTAC) 150 MG tablet, TAKE 1 TABLET TWICE DAILY, Disp: 180 tablet, Rfl: 1  •  sertraline (ZOLOFT) 50 MG tablet, TAKE 1 TABLET EVERY DAY AS DIRECTED, Disp: 90 tablet, Rfl: 2  •  torsemide (DEMADEX) 20 MG tablet, Take 3 tablets by mouth Daily., Disp: 270 tablet, Rfl: 3  •  Vitamin E 400 UNITS chewable tablet, 400 Units., Disp: , Rfl:   •  Vitamins A & D (VITAMIN A & D) 27355-309 UNITS tablet, , Disp: , Rfl:   •  carvedilol (COREG) 12.5 MG tablet, Take 1 tablet by mouth Every 12 (Twelve) Hours., Disp: 180 tablet, Rfl: 3    Allergies   Allergen Reactions   • Cephalexin Swelling   • Meperidine Swelling   • Penicillins Swelling       Social History     Social History   • Marital status:      Spouse name: N/A   • Number of children: N/A   • Years of education: N/A     Occupational History   • Not on file.     Social History Main Topics    • Smoking status: Former Smoker     Packs/day: 1.50     Years: 20.00     Start date: 6/14/1970   • Smokeless tobacco: Not on file      Comment: D/C 1970 SMOKING 1 1/2 PPD FOR 13 YRS BEFORE QUITTING   • Alcohol use No      Comment: caffeine use   • Drug use: No   • Sexual activity: Defer     Other Topics Concern   • Not on file     Social History Narrative       Family History   Problem Relation Age of Onset   • Emphysema Mother    • Heart disease Father    • Lung cancer Father    • Prostate cancer Father    • Asthma Sister    • Lung cancer Daughter    • Colon cancer Other    • No Known Problems Maternal Grandmother    • No Known Problems Maternal Grandfather    • Arthritis Paternal Grandmother    • No Known Problems Paternal Grandfather        Review of Systems   Constitutional: Negative for activity change, appetite change, fatigue and unexpected weight change.   HENT: Negative for sore throat and trouble swallowing.    Respiratory: Positive for shortness of breath.         Chronic   Cardiovascular: Negative.    Gastrointestinal: Negative for abdominal distention, abdominal pain, blood in stool, constipation, diarrhea, nausea and rectal pain.   Endocrine: Negative for cold intolerance and heat intolerance.   Genitourinary: Negative for difficulty urinating, dysuria and frequency.   Musculoskeletal: Negative for arthralgias, back pain and myalgias.   Skin: Negative.    Hematological: Negative for adenopathy. Does not bruise/bleed easily.   All other systems reviewed and are negative.      Objective     Vitals:    09/25/17 1000   BP: 128/76     Last 2 weights    09/25/17  1000   Weight: 198 lb (89.8 kg)     Body mass index is 35.07 kg/(m^2).    Physical Exam   Constitutional: She is oriented to person, place, and time. She appears well-developed and well-nourished. No distress.   HENT:   Head: Normocephalic and atraumatic.   Right Ear: External ear normal.   Left Ear: External ear normal.   Nose: Nose normal.    Mouth/Throat: Oropharynx is clear and moist.   Eyes: Conjunctivae and EOM are normal. Right eye exhibits no discharge. Left eye exhibits no discharge. No scleral icterus.   Neck: Normal range of motion. Neck supple. No thyromegaly present.   No supraclavicular adenopathy   Cardiovascular: Normal rate, regular rhythm, normal heart sounds and intact distal pulses.  Exam reveals no gallop.    No murmur heard.  No lower extremity edema   Pulmonary/Chest: Effort normal and breath sounds normal. No respiratory distress. She has no wheezes.   Abdominal: Soft. Normal appearance and bowel sounds are normal. She exhibits no distension and no mass. There is no hepatosplenomegaly. There is no tenderness. There is no rigidity, no rebound and no guarding.   Genitourinary:   Genitourinary Comments: Rectal exam deferred   Musculoskeletal: Normal range of motion. She exhibits no edema or tenderness.   No atrophy of upper or lower extremities.  Normal digits and nails of both hands.  Walks with a cane   Lymphadenopathy:     She has no cervical adenopathy.   Neurological: She is alert and oriented to person, place, and time. She displays no atrophy. Coordination normal.   Skin: Skin is warm and dry. No rash noted. She is not diaphoretic. No erythema.   Psychiatric: She has a normal mood and affect. Her behavior is normal. Judgment and thought content normal.   Vitals reviewed.      WBC   Date Value Ref Range Status   08/07/2017 6.92 4.50 - 10.70 10*3/mm3 Final   07/29/2017 8.50 4.50 - 10.70 10*3/mm3 Final     RBC   Date Value Ref Range Status   08/07/2017 3.87 (L) 3.90 - 5.20 10*6/mm3 Final   07/29/2017 3.68 (L) 3.90 - 5.20 10*6/mm3 Final   05/19/2017 3.53  Final     Hemoglobin   Date Value Ref Range Status   08/07/2017 10.5 (L) 11.9 - 15.5 g/dL Final   07/29/2017 10.2 (L) 11.9 - 15.5 g/dL Final     Hematocrit   Date Value Ref Range Status   08/07/2017 33.6 (L) 35.6 - 45.5 % Final   07/29/2017 31.9 (L) 35.6 - 45.5 % Final     MCV    Date Value Ref Range Status   08/07/2017 86.8 80.5 - 98.2 fL Final   07/29/2017 86.7 80.5 - 98.2 fL Final   05/19/2017 86.1  Final     MCH   Date Value Ref Range Status   08/07/2017 27.1 26.9 - 32.0 pg Final   07/29/2017 27.7 26.9 - 32.0 pg Final   05/19/2017 28.3  Final     MCHC   Date Value Ref Range Status   08/07/2017 31.3 (L) 32.4 - 36.3 g/dL Final   07/29/2017 32.0 (L) 32.4 - 36.3 g/dL Final   05/19/2017 32.9  Final     RDW   Date Value Ref Range Status   08/07/2017 16.2 (H) 11.7 - 13.0 % Final   07/29/2017 15.8 (H) 11.7 - 13.0 % Final     RDW-SD   Date Value Ref Range Status   07/29/2017 50.3 37.0 - 54.0 fl Final     MPV   Date Value Ref Range Status   07/29/2017 9.2 6.0 - 12.0 fL Final   05/19/2017 9.6  Final     Platelets   Date Value Ref Range Status   08/07/2017 265 140 - 500 10*3/mm3 Final   07/29/2017 249 140 - 500 10*3/mm3 Final     Neutrophil %   Date Value Ref Range Status   07/29/2017 62.4 42.7 - 76.0 % Final     Neutrophil Rel %   Date Value Ref Range Status   08/07/2017 67.2 42.7 - 76.0 % Final     Lymphocyte %   Date Value Ref Range Status   07/29/2017 23.6 19.6 - 45.3 % Final     Lymphocyte Rel %   Date Value Ref Range Status   08/07/2017 21.7 19.6 - 45.3 % Final     Monocyte %   Date Value Ref Range Status   07/29/2017 8.7 5.0 - 12.0 % Final     Monocyte Rel %   Date Value Ref Range Status   08/07/2017 6.6 5.0 - 12.0 % Final     Eosinophil %   Date Value Ref Range Status   07/29/2017 4.4 0.3 - 6.2 % Final     Eosinophil Rel %   Date Value Ref Range Status   08/07/2017 3.8 0.3 - 6.2 % Final     Basophil %   Date Value Ref Range Status   07/29/2017 0.7 0.0 - 1.5 % Final     Basophil Rel %   Date Value Ref Range Status   08/07/2017 0.4 0.0 - 1.5 % Final     Immature Grans %   Date Value Ref Range Status   07/29/2017 0.2 0.0 - 0.5 % Final     Neutrophils, Absolute   Date Value Ref Range Status   07/29/2017 5.30 1.90 - 8.10 10*3/mm3 Final     Neutrophils Absolute   Date Value Ref Range Status    08/07/2017 4.65 1.90 - 8.10 10*3/mm3 Final     Lymphocytes, Absolute   Date Value Ref Range Status   07/29/2017 2.01 0.90 - 4.80 10*3/mm3 Final     Lymphocytes Absolute   Date Value Ref Range Status   08/07/2017 1.50 0.90 - 4.80 10*3/mm3 Final     Monocytes, Absolute   Date Value Ref Range Status   07/29/2017 0.74 0.20 - 1.20 10*3/mm3 Final     Monocytes Absolute   Date Value Ref Range Status   08/07/2017 0.46 0.20 - 1.20 10*3/mm3 Final     Eosinophils, Absolute   Date Value Ref Range Status   07/29/2017 0.37 0.00 - 0.70 10*3/mm3 Final     Eosinophils Absolute   Date Value Ref Range Status   08/07/2017 0.26 0.00 - 0.70 10*3/mm3 Final     Basophils, Absolute   Date Value Ref Range Status   07/29/2017 0.06 0.00 - 0.20 10*3/mm3 Final     Basophils Absolute   Date Value Ref Range Status   08/07/2017 0.03 0.00 - 0.20 10*3/mm3 Final     Immature Grans, Absolute   Date Value Ref Range Status   07/29/2017 0.02 0.00 - 0.03 10*3/mm3 Final     nRBC   Date Value Ref Range Status   08/07/2017 0.0 0.0 - 0.0 /100 WBC Final       Glucose   Date Value Ref Range Status   07/29/2017 131 (H) 65 - 99 mg/dL Final     Sodium   Date Value Ref Range Status   08/07/2017 136 136 - 145 mmol/L Final   07/29/2017 140 136 - 145 mmol/L Final     Potassium   Date Value Ref Range Status   08/07/2017 4.3 3.5 - 5.2 mmol/L Final   07/29/2017 4.1 3.5 - 5.2 mmol/L Final     CO2   Date Value Ref Range Status   07/29/2017 26.8 22.0 - 29.0 mmol/L Final     Total CO2   Date Value Ref Range Status   08/07/2017 28.2 22.0 - 29.0 mmol/L Final     Chloride   Date Value Ref Range Status   08/07/2017 91 (L) 98 - 107 mmol/L Final   07/29/2017 99 98 - 107 mmol/L Final     Anion Gap   Date Value Ref Range Status   07/29/2017 14.2 mmol/L Final     Creatinine   Date Value Ref Range Status   08/07/2017 2.81 (H) 0.57 - 1.00 mg/dL Final   07/29/2017 2.29 (H) 0.57 - 1.00 mg/dL Final   04/21/2016 2.00 (H) 0.60 - 1.30 mg/dL Final     Comment:     Serial Number: [id number]     : [id number]     BUN   Date Value Ref Range Status   08/07/2017 78 (H) 8 - 23 mg/dL Final   07/29/2017 41 (H) 8 - 23 mg/dL Final     BUN/Creatinine Ratio   Date Value Ref Range Status   08/07/2017 27.8 (H) 7.0 - 25.0 Final   07/29/2017 17.9 7.0 - 25.0 Final     Calcium   Date Value Ref Range Status   08/07/2017 9.3 8.6 - 10.5 mg/dL Final   07/29/2017 9.6 8.6 - 10.5 mg/dL Final     eGFR Non  Amer   Date Value Ref Range Status   07/29/2017 20 (L) >60 mL/min/1.73 Final     eGFR Non  Am   Date Value Ref Range Status   08/07/2017 16 (L) >60 mL/min/1.73 Final     Comment:     The MDRD GFR formula is only valid for adults with stable  renal function between ages 18 and 70.       Alkaline Phosphatase   Date Value Ref Range Status   07/28/2017 168 (H) 39 - 117 U/L Final     Total Protein   Date Value Ref Range Status   07/28/2017 7.4 6.0 - 8.5 g/dL Final     ALT (SGPT)   Date Value Ref Range Status   07/28/2017 10 1 - 33 U/L Final     AST (SGOT)   Date Value Ref Range Status   07/28/2017 12 1 - 32 U/L Final     Total Bilirubin   Date Value Ref Range Status   07/28/2017 0.2 0.1 - 1.2 mg/dL Final     Albumin   Date Value Ref Range Status   07/29/2017 3.50 3.50 - 5.20 g/dL Final     Globulin   Date Value Ref Range Status   07/28/2017 3.6 gm/dL Final     A/G Ratio   Date Value Ref Range Status   07/28/2017 1.1 g/dL Final         Imaging Results (last 7 days)     ** No results found for the last 168 hours. **            No notes on file    Assessment/Plan    Iron deficiency anemia- diagnosed via lab work during her July hospitalization.  She denies any overt bleeding.  She has been on oral supplementation since that time.  She is on Eliquis    GERD: Chronic issue but controlled on twice daily ranitidine.  Review of records show an EGD in 2003 with Dr. mcelroy notable for a hiatal hernia; biopsies without evidence of Reis's    Atrial fibrillation: on eliquis    CKD: stage IV    Plan  EGD and colonoscopy for  further evaluation of iron deficiency anemia  I will have my office contact Dr. Husain to make sure it is okay to hold her Eliquis for 3 days prior to the procedure  Will need GoLYTELY prep given her heart issues and chronic kidney disease  To continue the ranitidine and iron supplementation    Veronica was seen today for heartburn and anemia.    Diagnoses and all orders for this visit:    Other iron deficiency anemia  -     Case Request; Standing  -     lactated ringers infusion; Infuse 30 mL/hr into a venous catheter Continuous.  -     Case Request    Gastroesophageal reflux disease, esophagitis presence not specified  -     Case Request; Standing  -     lactated ringers infusion; Infuse 30 mL/hr into a venous catheter Continuous.  -     Case Request    Other orders  -     Implement Anesthesia Orders Day of Procedure; Standing  -     Obtain Informed Consent; Standing      I have discussed the above plan with the patient.  They verbalize understanding and are in agreement with the plan.  They have been advised to contact the office for any questions, concerns, or changes related to their health.    Dictated utilizing Dragon dictation

## 2017-09-26 LAB
BUN SERPL-MCNC: 58 MG/DL (ref 8–23)
BUN/CREAT SERPL: 25.3 (ref 7–25)
CALCIUM SERPL-MCNC: 9.8 MG/DL (ref 8.6–10.5)
CHLORIDE SERPL-SCNC: 100 MMOL/L (ref 98–107)
CO2 SERPL-SCNC: 25.4 MMOL/L (ref 22–29)
CREAT SERPL-MCNC: 2.29 MG/DL (ref 0.57–1)
GLUCOSE SERPL-MCNC: 151 MG/DL (ref 65–99)
MAGNESIUM SERPL-MCNC: 2.5 MG/DL (ref 1.6–2.4)
PHOSPHATE SERPL-MCNC: 4.3 MG/DL (ref 2.5–4.5)
POTASSIUM SERPL-SCNC: 5 MMOL/L (ref 3.5–5.2)
PTH-INTACT SERPL-MCNC: 150 PG/ML (ref 15–65)
SODIUM SERPL-SCNC: 138 MMOL/L (ref 136–145)

## 2017-09-28 ENCOUNTER — TELEPHONE (OUTPATIENT)
Dept: CARDIOLOGY | Facility: CLINIC | Age: 82
End: 2017-09-28

## 2017-09-28 NOTE — TELEPHONE ENCOUNTER
See notes from 9/25 from Dr Husain's office, and letter under Letter Tab  - may hold Eliquis for 3 days prior to procedure.      Call to pt.  Advise that DR Husain has authorized to hold Eliquis for 3 days prior to procedure on 11/14.  Take last dose on 11/10, and hold 11/11, 11/12, 11/13.   Pt verb understanding.    Update to DR Browning.

## 2017-10-03 DIAGNOSIS — E21.3 HYPERPARATHYROIDISM (HCC): ICD-10-CM

## 2017-10-03 DIAGNOSIS — D50.8 OTHER IRON DEFICIENCY ANEMIA: ICD-10-CM

## 2017-10-03 DIAGNOSIS — N18.30 CHRONIC RENAL IMPAIRMENT, STAGE 3 (MODERATE) (HCC): Primary | ICD-10-CM

## 2017-10-10 ENCOUNTER — CLINICAL SUPPORT NO REQUIREMENTS (OUTPATIENT)
Dept: CARDIOLOGY | Facility: CLINIC | Age: 82
End: 2017-10-10

## 2017-10-10 DIAGNOSIS — I44.2 ATRIOVENTRICULAR BLOCK, COMPLETE (HCC): Primary | ICD-10-CM

## 2017-10-31 LAB
25(OH)D3+25(OH)D2 SERPL-MCNC: 34.6 NG/ML (ref 30–100)
BUN SERPL-MCNC: 43 MG/DL (ref 8–23)
BUN/CREAT SERPL: 16.7 (ref 7–25)
CALCIUM SERPL-MCNC: 9.4 MG/DL (ref 8.6–10.5)
CHLORIDE SERPL-SCNC: 98 MMOL/L (ref 98–107)
CO2 SERPL-SCNC: 24.9 MMOL/L (ref 22–29)
CREAT SERPL-MCNC: 2.58 MG/DL (ref 0.57–1)
CRP SERPL-MCNC: 0.55 MG/DL (ref 0–0.5)
GFR SERPLBLD CREATININE-BSD FMLA CKD-EPI: 18 ML/MIN/1.73
GFR SERPLBLD CREATININE-BSD FMLA CKD-EPI: 22 ML/MIN/1.73
GLUCOSE SERPL-MCNC: 158 MG/DL (ref 65–99)
POTASSIUM SERPL-SCNC: 4.3 MMOL/L (ref 3.5–5.2)
PTH-INTACT SERPL-MCNC: 135 PG/ML (ref 15–65)
SODIUM SERPL-SCNC: 140 MMOL/L (ref 136–145)

## 2017-11-08 ENCOUNTER — TELEPHONE (OUTPATIENT)
Dept: CARDIOLOGY | Facility: CLINIC | Age: 82
End: 2017-11-08

## 2017-11-08 NOTE — TELEPHONE ENCOUNTER
Pt called re: both Xarelto and Eliquis is going to cost her $131/mo.  Pt is wanting to know if there is any other cheaper alternative?  Please advise.

## 2017-11-10 ENCOUNTER — OFFICE VISIT (OUTPATIENT)
Dept: INTERNAL MEDICINE | Facility: CLINIC | Age: 82
End: 2017-11-10

## 2017-11-10 VITALS
DIASTOLIC BLOOD PRESSURE: 66 MMHG | BODY MASS INDEX: 36.31 KG/M2 | SYSTOLIC BLOOD PRESSURE: 156 MMHG | WEIGHT: 205 LBS | OXYGEN SATURATION: 96 % | HEART RATE: 91 BPM

## 2017-11-10 DIAGNOSIS — IMO0001 IDDM (INSULIN DEPENDENT DIABETES MELLITUS): ICD-10-CM

## 2017-11-10 DIAGNOSIS — D63.1 ANEMIA IN STAGE 4 CHRONIC KIDNEY DISEASE (HCC): Primary | ICD-10-CM

## 2017-11-10 DIAGNOSIS — N18.4 ANEMIA IN STAGE 4 CHRONIC KIDNEY DISEASE (HCC): Primary | ICD-10-CM

## 2017-11-10 DIAGNOSIS — I50.33 ACUTE ON CHRONIC DIASTOLIC CONGESTIVE HEART FAILURE (HCC): ICD-10-CM

## 2017-11-10 DIAGNOSIS — I10 ESSENTIAL HYPERTENSION: ICD-10-CM

## 2017-11-10 DIAGNOSIS — I48.0 PAROXYSMAL ATRIAL FIBRILLATION (HCC): ICD-10-CM

## 2017-11-10 DIAGNOSIS — M1A.9XX0 CHRONIC GOUT WITHOUT TOPHUS, UNSPECIFIED CAUSE, UNSPECIFIED SITE: ICD-10-CM

## 2017-11-10 DIAGNOSIS — E78.49 OTHER HYPERLIPIDEMIA: ICD-10-CM

## 2017-11-10 LAB — URATE SERPL-MCNC: 8.6 MG/DL (ref 2.4–5.7)

## 2017-11-10 PROCEDURE — 99214 OFFICE O/P EST MOD 30 MIN: CPT | Performed by: INTERNAL MEDICINE

## 2017-11-10 RX ORDER — NYSTATIN AND TRIAMCINOLONE ACETONIDE 100000; 1 [USP'U]/G; MG/G
OINTMENT TOPICAL 2 TIMES DAILY
Qty: 30 G | Refills: 3 | Status: SHIPPED | OUTPATIENT
Start: 2017-11-10 | End: 2018-11-04 | Stop reason: HOSPADM

## 2017-11-10 NOTE — PROGRESS NOTES
"Chief Complaint   Patient presents with   • Hyperlipidemia   • Hypertension       History of Present Illness   Veronica Henao is a 82 y.o. female presents for follow up evaluation. She has multiple chronic medical issues but in general is doing well. She does report that she is itching. She has taken hydroxyzine at night w/ benefit. There is no assosciated rash.   Has dm. Glucose is doing \"very well\". fbs 90s-100s. No issues w/ hypoglycemia.   Has a h/o dermatitis below the breast. Responds well to lotrisone and would like a refill.   Has ckd. She reports avoiding nsaids and maintinaing hydration. \"i'm eating a lot of ice\". She has had iron infusions for anemia in the past. She is taking oral iron at this time.   No leg swelling or chest pain. occ soa. Reports weight gain and sensation of \"feeling so hungry\".   She is taking eliquis v xarelto for atrial fibrillation. Unfortunately both have been cost prohibitive.   Medically managed bp and lipids.         The following portions of the patient's history were reviewed and updated as appropriate: allergies, current medications, past family history, past medical history, past social history, past surgical history and problem list.  Current Outpatient Prescriptions on File Prior to Visit   Medication Sig Dispense Refill   • acetaminophen (TYLENOL) 325 MG tablet Take 2 tablets by mouth Every 6 (Six) Hours As Needed for Mild Pain (1-3).  0   • allopurinol (ZYLOPRIM) 100 MG tablet Take 1 tablet by mouth Daily. 30 tablet 5   • cetirizine (zyrTEC) 10 MG tablet Take 10 mg by mouth Every Night.     • cholecalciferol (VITAMIN D3) 1000 UNITS tablet Take 1,000 Units by mouth daily.     • Cyanocobalamin (VITAMIN B-12) 5000 MCG sublingual tablet Take 5,000 mcg by mouth Daily.     • diphenhydrAMINE (BENADRYL) 2 % cream Apply  topically 3 (Three) Times a Day As Needed for Itching. 30 g 3   • ferrous sulfate 325 (65 FE) MG EC tablet Take 1 tablet by mouth Daily With Breakfast. 30 tablet " 11   • glimepiride (AMARYL) 2 MG tablet Take 2 mg by mouth 2 (Two) Times a Day.     • hydrALAZINE (APRESOLINE) 50 MG tablet TAKE 1 TABLET TWICE DAILY 180 tablet 1   • hydrOXYzine (ATARAX) 25 MG tablet Take 1 tablet by mouth every night at bedtime. 30 tablet 3   • insulin degludec (TRESIBA FLEXTOUCH) 100 UNIT/ML solution pen-injector injection Inject 10 Units under the skin Daily. Indications: Diabetes 2 pen 3   • linagliptin (TRADJENTA) 5 MG tablet tablet Take 5 mg by mouth Daily.     • montelukast (SINGULAIR) 10 MG tablet Take 10 mg by mouth Every Night.     • Multiple Vitamins-Minerals (CENTRUM SILVER PO) Take 1 tablet by mouth Daily.     • potassium chloride (MICRO-K) 10 MEQ CR capsule Take 1 capsule by mouth Daily.     • raNITIdine (ZANTAC) 150 MG tablet TAKE 1 TABLET TWICE DAILY 180 tablet 1   • rivaroxaban (XARELTO) 15 MG tablet Take 1 tablet by mouth Daily With Dinner. 30 tablet 11   • sertraline (ZOLOFT) 50 MG tablet TAKE 1 TABLET EVERY DAY AS DIRECTED 90 tablet 2   • torsemide (DEMADEX) 20 MG tablet Take 3 tablets by mouth Daily. (Patient taking differently: Take 100 mg by mouth Daily.) 270 tablet 3   • Vitamin E 400 UNITS chewable tablet 400 Units.     • Vitamins A & D (VITAMIN A & D) 42261-182 UNITS tablet      • [DISCONTINUED] carvedilol (COREG) 12.5 MG tablet Take 1 tablet by mouth Every 12 (Twelve) Hours. 180 tablet 3   • [DISCONTINUED] LEVEMIR FLEXTOUCH 100 UNIT/ML injection 18 Units Every Night.       No current facility-administered medications on file prior to visit.      Review of Systems   Constitutional: Positive for fatigue.   HENT: Negative.    Eyes: Negative.    Respiratory: Negative.    Cardiovascular: Negative.    Gastrointestinal: Negative.    Endocrine: Negative.    Genitourinary: Negative.    Musculoskeletal: Negative.    Skin: Negative.         Persistent itching   Allergic/Immunologic: Negative.    Neurological: Negative.    Hematological: Negative.    Psychiatric/Behavioral: Negative.         Objective   Physical Exam   Constitutional: She is oriented to person, place, and time. She appears well-developed and well-nourished.   HENT:   Head: Normocephalic and atraumatic.   Right Ear: External ear normal.   Left Ear: External ear normal.   Nose: Nose normal.   Mouth/Throat: Oropharynx is clear and moist.   Eyes: EOM are normal. Pupils are equal, round, and reactive to light.   Neck: Neck supple.   Cardiovascular: Normal rate, regular rhythm and normal heart sounds.    Pulmonary/Chest: Effort normal and breath sounds normal. No respiratory distress.   Abdominal: Soft. Bowel sounds are normal.   Musculoskeletal: Normal range of motion.   Neurological: She is alert and oriented to person, place, and time.   Skin: Skin is warm and dry.   Psychiatric: She has a normal mood and affect. Her behavior is normal. Judgment and thought content normal.   Nursing note and vitals reviewed.       /66  Pulse 91  Wt 205 lb (93 kg)  SpO2 96%  BMI 36.31 kg/m2    Assessment/Plan   Diagnoses and all orders for this visit:    Anemia in stage 4 chronic kidney disease  -     CBC & Differential  -     Iron Profile  -     Vitamin B12    Chronic gout without tophus, unspecified cause, unspecified site  -     Uric Acid    Paroxysmal atrial fibrillation    Other hyperlipidemia    Essential hypertension    Acute on chronic diastolic (congestive) heart failure    IDDM (insulin dependent diabetes mellitus)    Other orders  -     nystatin-triamcinolone (MYCOLOG) 532696-0.1 UNIT/GM-% ointment; Apply  topically 2 (Two) Times a Day.        Patient w/ mult medical issues. She has anemia. Will check blood counts and listed labs today. She will have a cscope and egd next week. If unrevealing may need to consider epo inj. She has htn and will continue current meds for this and lipids as well as diabetic management. She has gout and will test uric acid level as well. Recent rash will given nystatin therapy for this. She is having  "cost issues with xarelto. Gave numbers for patient to call for possible patient assistance program. Gave samples #[phone] today. Will also provide insulin samples in attempt to avoid \"dougnut hole\". She will f/u routinely.          "

## 2017-11-14 ENCOUNTER — ANESTHESIA EVENT (OUTPATIENT)
Dept: GASTROENTEROLOGY | Facility: HOSPITAL | Age: 82
End: 2017-11-14

## 2017-11-14 ENCOUNTER — ANESTHESIA (OUTPATIENT)
Dept: GASTROENTEROLOGY | Facility: HOSPITAL | Age: 82
End: 2017-11-14

## 2017-11-14 ENCOUNTER — HOSPITAL ENCOUNTER (OUTPATIENT)
Facility: HOSPITAL | Age: 82
Setting detail: HOSPITAL OUTPATIENT SURGERY
Discharge: HOME | End: 2017-11-14
Attending: INTERNAL MEDICINE | Admitting: INTERNAL MEDICINE

## 2017-11-14 VITALS
WEIGHT: 204.3 LBS | SYSTOLIC BLOOD PRESSURE: 165 MMHG | HEIGHT: 63 IN | HEART RATE: 69 BPM | DIASTOLIC BLOOD PRESSURE: 80 MMHG | RESPIRATION RATE: 16 BRPM | BODY MASS INDEX: 36.2 KG/M2 | OXYGEN SATURATION: 97 % | TEMPERATURE: 97.6 F

## 2017-11-14 DIAGNOSIS — K21.9 GASTROESOPHAGEAL REFLUX DISEASE, ESOPHAGITIS PRESENCE NOT SPECIFIED: ICD-10-CM

## 2017-11-14 DIAGNOSIS — D50.8 OTHER IRON DEFICIENCY ANEMIA: ICD-10-CM

## 2017-11-14 LAB — GLUCOSE BLDC GLUCOMTR-MCNC: 94 MG/DL (ref 70–130)

## 2017-11-14 PROCEDURE — 88312 SPECIAL STAINS GROUP 1: CPT | Performed by: INTERNAL MEDICINE

## 2017-11-14 PROCEDURE — 45380 COLONOSCOPY AND BIOPSY: CPT | Performed by: INTERNAL MEDICINE

## 2017-11-14 PROCEDURE — 25010000002 PROPOFOL 10 MG/ML EMULSION: Performed by: ANESTHESIOLOGY

## 2017-11-14 PROCEDURE — 43239 EGD BIOPSY SINGLE/MULTIPLE: CPT | Performed by: INTERNAL MEDICINE

## 2017-11-14 PROCEDURE — 82962 GLUCOSE BLOOD TEST: CPT

## 2017-11-14 PROCEDURE — 88305 TISSUE EXAM BY PATHOLOGIST: CPT | Performed by: INTERNAL MEDICINE

## 2017-11-14 PROCEDURE — 45381 COLONOSCOPY SUBMUCOUS NJX: CPT | Performed by: INTERNAL MEDICINE

## 2017-11-14 PROCEDURE — 45385 COLONOSCOPY W/LESION REMOVAL: CPT | Performed by: INTERNAL MEDICINE

## 2017-11-14 DEVICE — DEV CLIP ENDO RESOLUTION360 CONTRL ROT 235CM: Type: IMPLANTABLE DEVICE | Site: CECUM | Status: FUNCTIONAL

## 2017-11-14 RX ORDER — PROPOFOL 10 MG/ML
VIAL (ML) INTRAVENOUS AS NEEDED
Status: DISCONTINUED | OUTPATIENT
Start: 2017-11-14 | End: 2017-11-14 | Stop reason: SURG

## 2017-11-14 RX ORDER — LIDOCAINE HYDROCHLORIDE 20 MG/ML
INJECTION, SOLUTION INFILTRATION; PERINEURAL AS NEEDED
Status: DISCONTINUED | OUTPATIENT
Start: 2017-11-14 | End: 2017-11-14 | Stop reason: SURG

## 2017-11-14 RX ORDER — SODIUM CHLORIDE, SODIUM LACTATE, POTASSIUM CHLORIDE, CALCIUM CHLORIDE 600; 310; 30; 20 MG/100ML; MG/100ML; MG/100ML; MG/100ML
1000 INJECTION, SOLUTION INTRAVENOUS CONTINUOUS PRN
Status: DISCONTINUED | OUTPATIENT
Start: 2017-11-14 | End: 2017-11-14 | Stop reason: HOSPADM

## 2017-11-14 RX ORDER — SODIUM CHLORIDE, SODIUM LACTATE, POTASSIUM CHLORIDE, CALCIUM CHLORIDE 600; 310; 30; 20 MG/100ML; MG/100ML; MG/100ML; MG/100ML
30 INJECTION, SOLUTION INTRAVENOUS CONTINUOUS
Status: DISCONTINUED | OUTPATIENT
Start: 2017-11-14 | End: 2017-11-14 | Stop reason: HOSPADM

## 2017-11-14 RX ORDER — SODIUM CHLORIDE 0.9 % (FLUSH) 0.9 %
3 SYRINGE (ML) INJECTION AS NEEDED
Status: DISCONTINUED | OUTPATIENT
Start: 2017-11-14 | End: 2017-11-14 | Stop reason: HOSPADM

## 2017-11-14 RX ORDER — LIDOCAINE HYDROCHLORIDE 10 MG/ML
0.5 INJECTION, SOLUTION INFILTRATION; PERINEURAL ONCE AS NEEDED
Status: DISCONTINUED | OUTPATIENT
Start: 2017-11-14 | End: 2017-11-14 | Stop reason: HOSPADM

## 2017-11-14 RX ADMIN — LIDOCAINE HYDROCHLORIDE 100 MG: 20 INJECTION, SOLUTION INFILTRATION; PERINEURAL at 08:54

## 2017-11-14 RX ADMIN — PROPOFOL 200 MG: 10 INJECTION, EMULSION INTRAVENOUS at 08:54

## 2017-11-14 RX ADMIN — PROPOFOL 200 MG: 10 INJECTION, EMULSION INTRAVENOUS at 09:00

## 2017-11-14 RX ADMIN — SODIUM CHLORIDE, POTASSIUM CHLORIDE, SODIUM LACTATE AND CALCIUM CHLORIDE 30 ML/HR: 600; 310; 30; 20 INJECTION, SOLUTION INTRAVENOUS at 07:58

## 2017-11-14 NOTE — H&P
[facility]  Pre Procedure History & Physical    Chief Complaint: iron deficiency anemia      HPI: 82 y.o. female with a past medical history noted below who presents for evaluation of heartburn and anemia.  She has iron deficiency anemia diagnosed during a July hospitalization.  Her iron was 32 and her ferritin was 28.  Her Hb remains ~10.  She denies any overt bleeding.  She reports being told that her blood counts have been low in the past though she does not think anything was done for her.   She does not have any diarrhea or constipation.  She reports a easy to pass bowel movement every day.  She has no nausea or vomiting.  She does not take NSAIDs.     She has chronic GERD.  She takes twice daily ranitidine for this.  This controls her symptoms.  She is currently on oral iron supplementation.     I have reviewed records from her 2003 colonoscopy and EGD with Dr. Turner.  She had diverticulosis of the colon but no polyps.  Her EGD showed a hiatal hernia.  Biopsies were normal for Reis's esophagus.  She has not had further screening since that time.  She does not have a family history of GI malignancy.     She is on eliquis for A fib.  She has been able to come off of this medication in the past for mostly heart procedures.     She occasionally has issues with eating meat-- usually pork- this will come back up.  However, she denies any consistent episodes of food sticking     No chest pain.  She does have shortness of breath that is chronic.     She denies family history of GI malignancy or inflammatory bowel disease.  She does not smoke cigarettes.  She does not drink alcohol.    Past Medical History:   Past Medical History:   Diagnosis Date   • Acute bronchitis    • Acute renal insufficiency    • Allergic rhinitis    • Anemia in chronic kidney disease    • Arrhythmia    • Atrial fibrillation     chronic   • Brachycephaly    • Breast pain, right    • Chest pain    • CHF (congestive  heart failure)    • Chronic combined systolic and diastolic CHF (congestive heart failure)    • Chronic renal insufficiency    • CKD (chronic kidney disease), stage IV    • Cough    • Depression    • Diabetes mellitus     TYPE 2   • Diverticulosis    • ORTIZ (dyspnea on exertion)    • Dyspnea    • Esophageal reflux    • Essential hypertension    • Fatigue    • GERD (gastroesophageal reflux disease)    • Gout    • Head injury    • Headache    • Hoarseness    • Hypercalcemia    • Hyperlipidemia    • Hypertension    • Influenza A (H1N1)    • Iron deficiency anemia    • Itching    • Leg swelling    • Lightheadedness    • Macular degeneration    • Malaise and fatigue    • Mitral valve regurgitation     moderate to severe   • Nontoxic multinodular goiter     RIGHT 2.0 CM  LEFT  2.5 CM   • JOSELUIS on CPAP    • Osteoarthritis    • PAF (paroxysmal atrial fibrillation)    • Palpitations    • Paresthesias    • Proteinuria    • Pulmonary nodule    • Pulmonic valve regurgitation     mild   • Sebaceous cyst    • SOBOE (shortness of breath on exertion)    • Solitary thyroid nodule    • Subclinical hypothyroidism    • Tachycardia    • TR (tricuspid regurgitation)     mild to moderate   • Type 2 diabetes mellitus    • Type 2 diabetes mellitus with chronic kidney disease    • UTI (urinary tract infection)        Family History:  Family History   Problem Relation Age of Onset   • Emphysema Mother    • Heart disease Father    • Lung cancer Father    • Prostate cancer Father    • Asthma Sister    • Lung cancer Daughter    • Colon cancer Other    • No Known Problems Maternal Grandmother    • No Known Problems Maternal Grandfather    • Arthritis Paternal Grandmother    • No Known Problems Paternal Grandfather        Social History:   reports that she has quit smoking. She started smoking about 47 years ago. She has a 30.00 pack-year smoking history. She does not have any smokeless tobacco history on file. She reports that she does not drink  alcohol or use illicit drugs.    Medications:   Prescriptions Prior to Admission   Medication Sig Dispense Refill Last Dose   • acetaminophen (TYLENOL) 325 MG tablet Take 2 tablets by mouth Every 6 (Six) Hours As Needed for Mild Pain (1-3).  0 Taking   • allopurinol (ZYLOPRIM) 100 MG tablet Take 1 tablet by mouth Daily. 30 tablet 5 Taking   • cetirizine (zyrTEC) 10 MG tablet Take 10 mg by mouth Every Night.   Taking   • cholecalciferol (VITAMIN D3) 1000 UNITS tablet Take 1,000 Units by mouth daily.   Taking   • Cyanocobalamin (VITAMIN B-12) 5000 MCG sublingual tablet Take 5,000 mcg by mouth Daily.   Taking   • diphenhydrAMINE (BENADRYL) 2 % cream Apply  topically 3 (Three) Times a Day As Needed for Itching. 30 g 3 Taking   • ferrous sulfate 325 (65 FE) MG EC tablet Take 1 tablet by mouth Daily With Breakfast. 30 tablet 11 Taking   • glimepiride (AMARYL) 2 MG tablet Take 2 mg by mouth 2 (Two) Times a Day.   Taking   • hydrALAZINE (APRESOLINE) 50 MG tablet TAKE 1 TABLET TWICE DAILY 180 tablet 1 Taking   • hydrOXYzine (ATARAX) 25 MG tablet Take 1 tablet by mouth every night at bedtime. 30 tablet 3 Taking   • insulin degludec (TRESIBA FLEXTOUCH) 100 UNIT/ML solution pen-injector injection Inject 10 Units under the skin Daily. Indications: Diabetes 2 pen 3 Taking   • linagliptin (TRADJENTA) 5 MG tablet tablet Take 5 mg by mouth Daily.   Taking   • montelukast (SINGULAIR) 10 MG tablet Take 10 mg by mouth Every Night.   Taking   • Multiple Vitamins-Minerals (CENTRUM SILVER PO) Take 1 tablet by mouth Daily.   Taking   • nystatin-triamcinolone (MYCOLOG) 685282-5.1 UNIT/GM-% ointment Apply  topically 2 (Two) Times a Day. 30 g 3    • potassium chloride (MICRO-K) 10 MEQ CR capsule Take 1 capsule by mouth Daily.   Taking   • raNITIdine (ZANTAC) 150 MG tablet TAKE 1 TABLET TWICE DAILY 180 tablet 1 Taking   • rivaroxaban (XARELTO) 15 MG tablet Take 1 tablet by mouth Daily With Dinner. 30 tablet 11 Taking   • sertraline (ZOLOFT) 50  MG tablet TAKE 1 TABLET EVERY DAY AS DIRECTED 90 tablet 2 Taking   • torsemide (DEMADEX) 20 MG tablet Take 3 tablets by mouth Daily. (Patient taking differently: Take 100 mg by mouth Daily.) 270 tablet 3 Taking   • Vitamin E 400 UNITS chewable tablet 400 Units.   Taking   • Vitamins A & D (VITAMIN A & D) 17936-680 UNITS tablet    Taking       Allergies:  Cephalexin; Meperidine; and Penicillins    ROS:    Pertinent items are noted in HPI     Objective     There were no vitals taken for this visit.    Physical Exam   Constitutional: Pt is oriented to person, place, and time and well-developed, well-nourished, and in no distress.   HENT:   Mouth/Throat: Oropharynx is clear and moist.   Neck: Normal range of motion. Neck supple.   Cardiovascular: Normal rate, regular rhythm and normal heart sounds.    Pulmonary/Chest: Effort normal and breath sounds normal. No respiratory distress. No  wheezes.   Abdominal: Soft. Bowel sounds are normal.   Skin: Skin is warm and dry.   Psychiatric: Mood, memory, affect and judgment normal.     Assessment/Plan     Diagnosis:  iron deficiency anemia      Anticipated Surgical Procedure:  EGD  Colonoscopy    The risks, benefits, and alternatives of this procedure have been discussed with the patient or the responsible party- the patient understands and agrees to proceed.

## 2017-11-14 NOTE — DISCHARGE INSTRUCTIONS
For the next 24 hours patient needs to be with a responsible adult.    For 24 hours DO NOT drive, operate machinery, appliances, drink alcohol, make important decisions or sign legal documents.    Start with a light or bland diet and advance to regular diet as tolerated.    Follow recommendations on procedure report provided by your doctor.    Call Dr Browning for problems [phone] and for biopsy results in 7-10 days.    Problems may include but not limited to: large amounts of bleeding, trouble breathing, repeated vomiting, severe unrelieved pain, fever or chills.

## 2017-11-14 NOTE — ANESTHESIA POSTPROCEDURE EVALUATION
"Patient: Veronica Henao    Procedure Summary     Date Anesthesia Start Anesthesia Stop Room / Location    11/14/17 0840 0935  [facility]       Procedure Diagnosis Surgeon Provider    COLONOSCOPY INTO CECUM/ TERMINAL ILEUM WITH POLYPECTOMY  X 8 AND CLIP X 2 (N/A ); ESOPHAGOGASTRODUODENOSCOPY WITH BIOPSIES (N/A Esophagus) Other iron deficiency anemia; Gastroesophageal reflux disease, esophagitis presence not specified  (Other iron deficiency anemia [D50.8]; Gastroesophageal reflux disease, esophagitis presence not specified [K21.9]) MD Daxa Garcia MD          Anesthesia Type: MAC  Last vitals  BP   165/80 (11/14/17 0955)   Temp   36.4 °C (97.6 °F) (11/14/17 0945)   Pulse   69 (11/14/17 0955)   Resp   16 (11/14/17 0955)     SpO2   97 % (11/14/17 0955)     Post Anesthesia Care and Evaluation    Patient location during evaluation: PHASE II  Patient participation: complete - patient participated  Level of consciousness: awake  Pain score: 0  Pain management: adequate  Airway patency: patent  Anesthetic complications: No anesthetic complications    Cardiovascular status: acceptable  Respiratory status: acceptable  Hydration status: acceptable    Comments: Blood pressure 165/80, pulse 69, temperature 36.4 °C (97.6 °F), resp. rate 16, height 63\" (160 cm), weight 204 lb 4.8 oz (92.7 kg), SpO2 97 %.    No anesthesia care post op    "

## 2017-11-14 NOTE — ANESTHESIA PREPROCEDURE EVALUATION
Anesthesia Evaluation     Patient summary reviewed and Nursing notes reviewed          Airway   Mallampati: IV  TM distance: <3 FB  Dental      Pulmonary    (+) shortness of breath, sleep apnea on CPAP, decreased breath sounds,   Cardiovascular     ECG reviewed  PT is on anticoagulation therapy  Rhythm: regular  Rate: normal    (+) pacemaker pacemaker interrogated <1 month ago, hypertension, valvular problems/murmurs (mvr tvr), CHF, ORTIZ,       Neuro/Psych  (+) psychiatric history Depression,    GI/Hepatic/Renal/Endo    (+) obesity,  renal disease CRI, diabetes mellitus type 2 using insulin,   (-) morbid obesity    Musculoskeletal     Abdominal   (+) obese,    Substance History      OB/GYN          Other                                      Anesthesia Plan    ASA 3     MAC     Anesthetic plan and risks discussed with patient.

## 2017-11-14 NOTE — PLAN OF CARE
Problem: Patient Care Overview (Adult)  Goal: Plan of Care Review  Outcome: Ongoing (interventions implemented as appropriate)    11/14/17 0739   Coping/Psychosocial Response Interventions   Plan Of Care Reviewed With patient   Patient Care Overview   Progress progress toward functional goals as expected       Goal: Adult Individualization and Mutuality  Outcome: Ongoing (interventions implemented as appropriate)    11/14/17 0739   Individualization   Patient Specific Preferences Veronica       Goal: Discharge Needs Assessment  Outcome: Ongoing (interventions implemented as appropriate)    11/14/17 0739   Discharge Needs Assessment   Concerns To Be Addressed denies needs/concerns at this time   Discharge Disposition home or self-care   Living Environment   Transportation Available car         Problem: GI Endoscopy (Adult)  Goal: Signs and Symptoms of Listed Potential Problems Will be Absent or Manageable (GI Endoscopy)  Outcome: Ongoing (interventions implemented as appropriate)    11/14/17 0739   GI Endoscopy   Problems Assessed (GI Endoscopy) all   Problems Present (GI Endoscopy) (iron deficiency anemia)

## 2017-11-15 LAB
CYTO UR: NORMAL
LAB AP CASE REPORT: NORMAL
Lab: NORMAL
PATH REPORT.FINAL DX SPEC: NORMAL
PATH REPORT.GROSS SPEC: NORMAL

## 2017-11-16 NOTE — PROGRESS NOTES
The biopsies of her stomach and small intestine were normal.    The polyps removed were all tubular adenomas, which are high risk polyps.      Repeat colonoscopy is recommended in 3 years, so please place in recall, but she can discuss the benefit of further colon cancer screening with her PCP as it is not likely to provide a mortality benefit at age 85.

## 2017-11-30 ENCOUNTER — TELEPHONE (OUTPATIENT)
Dept: GASTROENTEROLOGY | Facility: CLINIC | Age: 82
End: 2017-11-30

## 2017-11-30 NOTE — TELEPHONE ENCOUNTER
The biopsies of her stomach and small intestine were normal.     The polyps removed were all tubular adenomas, which are high risk polyps.       Repeat colonoscopy is recommended in 3 years, so please place in recall, but she can discuss the benefit of further colon cancer screening with her PCP as it is not likely to provide a mortality benefit at age 85. Per Dr Browning.     Called pt and advised of the above. Pt verb understanding.     C/s placed in recall for 11/14/2020.

## 2017-11-30 NOTE — TELEPHONE ENCOUNTER
----- Message from Nadya Gaytan sent at 11/30/2017  1:03 PM EST -----  Regarding: test results  Contact: [phone]  Pt had a scope 11-14 still has not received her results

## 2017-11-30 NOTE — TELEPHONE ENCOUNTER
----- Message from Jacy Browning MD sent at 11/16/2017  3:26 PM EST -----  The biopsies of her stomach and small intestine were normal.    The polyps removed were all tubular adenomas, which are high risk polyps.      Repeat colonoscopy is recommended in 3 years, so please place in recall, but she can discuss the benefit of further colon cancer screening with her PCP as it is not likely to provide a mortality benefit at age 85.

## 2017-12-01 ENCOUNTER — TELEPHONE (OUTPATIENT)
Dept: INTERNAL MEDICINE | Facility: CLINIC | Age: 82
End: 2017-12-01

## 2017-12-01 RX ORDER — HYDROXYZINE HYDROCHLORIDE 25 MG/1
25 TABLET, FILM COATED ORAL
Qty: 90 TABLET | Refills: 0 | Status: SHIPPED | OUTPATIENT
Start: 2017-12-01 | End: 2018-02-13 | Stop reason: SDUPTHER

## 2017-12-01 NOTE — TELEPHONE ENCOUNTER
Pt called to see if we had results on her anemia? I told her I didn't see anything on that. She said she knows she has labs in march but wanted to see if you wanted to wait till then to check or have her come in and have blood work now

## 2017-12-01 NOTE — TELEPHONE ENCOUNTER
Order was put in chart 2 weeks ago for blood count, iron, and b 12 studies. She may schedule and come in for lab at any time. Does not need to fast for this.   JW

## 2017-12-05 ENCOUNTER — OFFICE VISIT (OUTPATIENT)
Dept: CARDIOLOGY | Facility: CLINIC | Age: 82
End: 2017-12-05

## 2017-12-05 VITALS
RESPIRATION RATE: 16 BRPM | BODY MASS INDEX: 36.93 KG/M2 | WEIGHT: 208.4 LBS | HEIGHT: 63 IN | DIASTOLIC BLOOD PRESSURE: 80 MMHG | SYSTOLIC BLOOD PRESSURE: 142 MMHG | HEART RATE: 77 BPM

## 2017-12-05 DIAGNOSIS — I50.33 ACUTE ON CHRONIC DIASTOLIC CONGESTIVE HEART FAILURE (HCC): ICD-10-CM

## 2017-12-05 DIAGNOSIS — I48.0 PAROXYSMAL ATRIAL FIBRILLATION (HCC): Primary | ICD-10-CM

## 2017-12-05 DIAGNOSIS — I48.92 ATRIAL FLUTTER, PAROXYSMAL (HCC): ICD-10-CM

## 2017-12-05 DIAGNOSIS — E78.49 OTHER HYPERLIPIDEMIA: ICD-10-CM

## 2017-12-05 DIAGNOSIS — I10 ESSENTIAL HYPERTENSION: ICD-10-CM

## 2017-12-05 DIAGNOSIS — I34.0 NON-RHEUMATIC MITRAL REGURGITATION: ICD-10-CM

## 2017-12-05 DIAGNOSIS — Z98.890 S/P TVR (TRICUSPID VALVE REPAIR): ICD-10-CM

## 2017-12-05 DIAGNOSIS — I36.1 NON-RHEUMATIC TRICUSPID VALVE INSUFFICIENCY: ICD-10-CM

## 2017-12-05 PROCEDURE — 99214 OFFICE O/P EST MOD 30 MIN: CPT | Performed by: INTERNAL MEDICINE

## 2017-12-05 PROCEDURE — 93000 ELECTROCARDIOGRAM COMPLETE: CPT | Performed by: INTERNAL MEDICINE

## 2017-12-05 RX ORDER — CHLORAL HYDRATE 500 MG
CAPSULE ORAL
COMMUNITY
End: 2018-11-04 | Stop reason: HOSPADM

## 2017-12-05 NOTE — PROGRESS NOTES
PATIENTINFORMATION    Date of Office Visit: 2017  Encounter Provider: Maria Luz Husain MD  Place of Service: [facility]  Patient Name: Veronica Henao  : 1935    Subjective:     Encounter Date:2017      Patient ID: Veronica Henao is a 82 y.o. female.      History of Present Illness    This is a lady that I have been following for many years.  In , she went to the emergency room and was diagnosed with atrial fibrillation with rapid ventricular response.  She had normal left ventricular function and no significant valvular heart disease.  She transferred her care to me in 2014.  She had a recurrence of atrial fibrillation in 2015 associated with diastolic heart failure.  In 2016, she was complaining of chest pain and shortness of breath.  A repeat echocardiogram at that time showed her ejection fraction to be 51% with elevated left atrial pressure, severe left atrial enlargement, moderate to severe mitral regurgitation, and moderate to severe tricuspid regurgitation.  Nuclear stress test from 2016 showed no evidence for ischemia.  Transesophageal echocardiogram in 10/2016 showed normal left ventricular systolic function, severe left atrial enlargement, and mild to moderate mitral regurgitation with moderate to severe tricuspid regurgitation.  She had a cardiac catheterization in 2016, which showed no significant coronary disease.  In 2016, she had a mitral valve repair with a 23 mm ATF band posterior annuloplasty and P2 chordal repair.  The tricuspid valve was repaired with a 26 mm ring and plication of the anterior and septal leaflet commissure.  She also underwent a right and left Maze procedure and left atrial appendage ligation.     In 2017, she was found to be in rapid atrial flutter.  I cardioverted her but she went back into atrial flutter the next day.  On 2017, Dr. Ward performed and atrial flutter ablation.  She had an  echocardiogram in 05/2017, which showed her ejection fraction to be 70%, severe left atrial enlargement, mild mitral stenosis from her valve repair but no regurgitation.  There was mild tricuspid regurgitation with moderate pulmonary hypertension.  She went back into atrial flutter in 06/2017 and was cardioverted back to sinus rhythm but again went right back into atrial flutter and Dr. Ward performed an AV node ablation with a pacemaker, which was performed on 07/05/2017.  The pacemaker was placed with a ventricular lead at the His bundle.      She was readmitted on 07/25/2017, with heart failure.  She was diuresed well.  Pacemaker interrogation in 08/2017 showed high thresholds in her His lead.  Dr. Ward made some adjustments and said that she may feel some muscular pacing and has a right ventricular lead for backup.    She comes in today for follow-up.  She has been feeling well.  She has not had any palpitations and cannot feel her pacemaker.  She stopped taking carvedilol because it was making her dizzy.  She feels better now that she is off of it.  She denies any kind of palpitations, lightheadedness, or chest pain.  She has some shortness of breath with exertion.  She is not having any edema.        Review of Systems   Constitution: Negative for fever, malaise/fatigue, weight gain and weight loss.   HENT: Negative for ear pain, hearing loss, nosebleeds and sore throat.    Eyes: Negative for double vision, pain, vision loss in left eye and vision loss in right eye.   Cardiovascular:        See history of present illness.   Respiratory: Negative for cough, shortness of breath, sleep disturbances due to breathing, snoring and wheezing.    Endocrine: Negative for cold intolerance, heat intolerance and polyuria.   Skin: Negative for itching, poor wound healing and rash.   Musculoskeletal: Negative for joint pain, joint swelling and myalgias.   Gastrointestinal: Negative for abdominal pain, diarrhea,  "hematochezia, nausea and vomiting.   Genitourinary: Negative for hematuria and hesitancy.   Neurological: Negative for numbness, paresthesias and seizures.   Psychiatric/Behavioral: Negative for depression. The patient is not nervous/anxious.            ECG 12 Lead  Date/Time: 12/5/2017 1:19 PM  Performed by: IRVIN LAN  Authorized by: IRVIN LAN   Comparison: compared with previous ECG from 8/11/2017  Comparison to previous ECG: Pacemaker spikes are less prominent  Rhythm comments: Ventricularly paced  BPM: 77  Clinical impression: abnormal ECG               Objective:     /80 (BP Location: Right arm, Patient Position: Sitting, Cuff Size: Adult)  Pulse 77  Resp 16  Ht 160 cm (63\")  Wt 94.5 kg (208 lb 6.4 oz)  BMI 36.92 kg/m2 Body mass index is 36.92 kg/(m^2).     Physical Exam   Constitutional: She appears well-developed.   HENT:   Head: Normocephalic and atraumatic.   Eyes: Conjunctivae and lids are normal. Pupils are equal, round, and reactive to light. Lids are everted and swept, no foreign bodies found.   Neck: Normal range of motion. No JVD present. Carotid bruit is not present. No tracheal deviation present. No thyroid mass present.   Cardiovascular: Normal rate, regular rhythm and normal heart sounds.    Pulses:       Dorsalis pedis pulses are 2+ on the right side, and 2+ on the left side.   Pulmonary/Chest: Effort normal and breath sounds normal.   Abdominal: Normal appearance and bowel sounds are normal.   Musculoskeletal: Normal range of motion.   Neurological: She is alert. She has normal strength.   Skin: Skin is warm, dry and intact.   Psychiatric: She has a normal mood and affect. Her behavior is normal.   Vitals reviewed.      Lab Review: She had lab work drawn today as Dr. Price's office.      Assessment/Plan:       1. Atrial Fibrillation and Atrial Flutter  Assessment  • The patient has persistent atrial fibrillation  • The patient's CHADS2-VASc score is 6  • A [id number] " score of 2 or more is considered a high risk for a thromboembolic event    Plan  • Continue rivaroxaban for antithrombotic therapy, bleeding issues discussed  • She has had a right and left-sided maze before.  She had an atrial flutter ablation before.  Eventually she had an AV node ablation and pacemaker implant.  She is anticoagulated with Xarelto.    2. Heart Failure  Assessment  • NYHA class II - There is slight limitation of physical activity. The patient is comfortable at rest, but physical activity results in fatigue, palpitations or shortness of breath.  • ACE inhibitor not prescribed for medical reasons  • Beta blocker not prescribed for patient reasons  • Diuretics prescribed  • The most recent ejection fraction is 70%  • The left ventricle was last assessed on 5/25/2017    Plan  • The heart failure care plan was discussed with the patient today including: continuing the current program  • She is not on an ACE inhibitor because of renal insufficiency.  She is not on a beta blocker because they made her feel dizzy.  She has normal LV function.  Now that she is not having arrhythmia issues, I do not suspect she will have issues with diastolic heart failure any longer.    Subjective/Objective    • Physical exam findings negative for elevated JVP.    3.  AV node ablation status post pacemaker  4.  Diabetes  5.  Hypertension.  Controlled.  6.  Hyperlipidemia  7.  Obstructive sleep apnea compliant with CPAP  8.  Chronic kidney disease.  She follows with Dr. May  9.  History of mitral regurgitation status post mitral valve repair.  She has some residual mitral stenosis but no regurgitation.  10.  History of tricuspid regurgitation status post tricuspid repair  11.  Moderate pulmonary hypertension.    She will see Meeta in March 2018 with a pacemaker check.  If everything is going well at that time, I will see her back a year later.    Orders Placed This Encounter   Procedures   • ECG 12 Lead     This order was  created via procedure documentation      Veronica Henao   Home Medication Instructions VIDYA:    Printed on:12/05/17 8070   Medication Information                      acetaminophen (TYLENOL) 325 MG tablet  Take 2 tablets by mouth Every 6 (Six) Hours As Needed for Mild Pain (1-3).             allopurinol (ZYLOPRIM) 100 MG tablet  Take 1 tablet by mouth Daily.             cetirizine (zyrTEC) 10 MG tablet  Take 10 mg by mouth Every Night.             cholecalciferol (VITAMIN D3) 1000 UNITS tablet  Take 1,000 Units by mouth daily.             Cyanocobalamin (VITAMIN B-12) 5000 MCG sublingual tablet  Take 5,000 mcg by mouth Daily.             diphenhydrAMINE (BENADRYL) 2 % cream  Apply  topically 3 (Three) Times a Day As Needed for Itching.             ferrous sulfate 325 (65 FE) MG EC tablet  Take 1 tablet by mouth Daily With Breakfast.             glimepiride (AMARYL) 2 MG tablet  Take 2 mg by mouth 2 (Two) Times a Day.             hydrALAZINE (APRESOLINE) 50 MG tablet  TAKE 1 TABLET TWICE DAILY             hydrOXYzine (ATARAX) 25 MG tablet  Take 1 tablet by mouth every night at bedtime.             insulin degludec (TRESIBA FLEXTOUCH) 100 UNIT/ML solution pen-injector injection  Inject 10 Units under the skin Daily. Indications: Diabetes             linagliptin (TRADJENTA) 5 MG tablet tablet  Take 5 mg by mouth Daily.             montelukast (SINGULAIR) 10 MG tablet  Take 10 mg by mouth Every Night.             Multiple Vitamins-Minerals (CENTRUM SILVER PO)  Take 1 tablet by mouth Daily.             nystatin-triamcinolone (MYCOLOG) 719047-4.1 UNIT/GM-% ointment  Apply  topically 2 (Two) Times a Day.             Omega-3 Fatty Acids (FISH OIL) 1000 MG capsule capsule  Take by mouth Daily With Breakfast             potassium chloride (MICRO-K) 10 MEQ CR capsule  Take 1 capsule by mouth Daily.             raNITIdine (ZANTAC) 150 MG tablet  TAKE 1 TABLET TWICE DAILY             rivaroxaban (XARELTO) 15 MG tablet  Take 1  tablet by mouth Daily With Dinner.             sertraline (ZOLOFT) 50 MG tablet  TAKE 1 TABLET EVERY DAY AS DIRECTED             torsemide (DEMADEX) 20 MG tablet  Take 3 tablets by mouth Daily.             Vitamin E 400 UNITS chewable tablet  400 Units.             Vitamins A & D (VITAMIN A & D) 79138-784 UNITS tablet                          Maria Luz Husain MD  12/05/17  1:30 PM

## 2017-12-06 LAB
BASOPHILS # BLD AUTO: 0.05 10*3/MM3 (ref 0–0.2)
BASOPHILS NFR BLD AUTO: 0.7 % (ref 0–1.5)
EOSINOPHIL # BLD AUTO: 0.28 10*3/MM3 (ref 0–0.7)
EOSINOPHIL NFR BLD AUTO: 3.7 % (ref 0.3–6.2)
ERYTHROCYTE [DISTWIDTH] IN BLOOD BY AUTOMATED COUNT: 17.1 % (ref 11.7–13)
HCT VFR BLD AUTO: 35.4 % (ref 35.6–45.5)
HGB BLD-MCNC: 11.1 G/DL (ref 11.9–15.5)
IMM GRANULOCYTES # BLD: 0.02 10*3/MM3 (ref 0–0.03)
IMM GRANULOCYTES NFR BLD: 0.3 % (ref 0–0.5)
IRON SATN MFR SERPL: 22 % (ref 20–50)
IRON SERPL-MCNC: 84 MCG/DL (ref 37–145)
LYMPHOCYTES # BLD AUTO: 1.53 10*3/MM3 (ref 0.9–4.8)
LYMPHOCYTES NFR BLD AUTO: 20.1 % (ref 19.6–45.3)
MCH RBC QN AUTO: 29.1 PG (ref 26.9–32)
MCHC RBC AUTO-ENTMCNC: 31.4 G/DL (ref 32.4–36.3)
MCV RBC AUTO: 92.9 FL (ref 80.5–98.2)
MONOCYTES # BLD AUTO: 0.49 10*3/MM3 (ref 0.2–1.2)
MONOCYTES NFR BLD AUTO: 6.4 % (ref 5–12)
NEUTROPHILS # BLD AUTO: 5.23 10*3/MM3 (ref 1.9–8.1)
NEUTROPHILS NFR BLD AUTO: 68.8 % (ref 42.7–76)
PLATELET # BLD AUTO: 280 10*3/MM3 (ref 140–500)
RBC # BLD AUTO: 3.81 10*6/MM3 (ref 3.9–5.2)
TIBC SERPL-MCNC: 385 MCG/DL
UIBC SERPL-MCNC: 301 MCG/DL
VIT B12 SERPL-MCNC: >2000 PG/ML (ref 211–946)
WBC # BLD AUTO: 7.6 10*3/MM3 (ref 4.5–10.7)

## 2017-12-08 ENCOUNTER — TELEPHONE (OUTPATIENT)
Dept: CARDIOLOGY | Facility: CLINIC | Age: 82
End: 2017-12-08

## 2017-12-08 ENCOUNTER — CLINICAL SUPPORT NO REQUIREMENTS (OUTPATIENT)
Dept: CARDIOLOGY | Facility: CLINIC | Age: 82
End: 2017-12-08

## 2017-12-08 DIAGNOSIS — Z98.890 H/O ATRIOVENTRICULAR NODAL ABLATION: Primary | ICD-10-CM

## 2017-12-08 DIAGNOSIS — R06.09 DYSPNEA ON EXERTION: Primary | ICD-10-CM

## 2017-12-08 PROCEDURE — 93296 REM INTERROG EVL PM/IDS: CPT | Performed by: INTERNAL MEDICINE

## 2017-12-08 PROCEDURE — 93294 REM INTERROG EVL PM/LDLS PM: CPT | Performed by: INTERNAL MEDICINE

## 2017-12-08 NOTE — TELEPHONE ENCOUNTER
Pt called to see if she should send a remote transmission or come in to have PM checked due to SOA with movement.  Last remote was 10/10/17 and is due for in-clinic check 3/5/18.

## 2017-12-08 NOTE — TELEPHONE ENCOUNTER
I called pt back. She stated she is +30 lbs since PM placed(was 170 now 200lbs) but has no edema. However, she states her breathing just isn't right.     She just saw Dr. Husain in clinic on 12/5/17, where she noted pt has SOB with exertion and no peripheral edema.     Pt is convinced it's her PM not functioning appropriately again. The HIS lead was turned off due to inconsistent capture.  With hx of AVN ablation pt should RV pace near 100%.     I asked her to send a remote transmission. Dr. Husain, I will let you know the results.

## 2017-12-08 NOTE — TELEPHONE ENCOUNTER
Remote shows a presenting rhythm RVP at 102 bpm. She  100%. There are no episodes since 10/10/17. HR histogram shows HR in 70's ~ 80% of the time and 80 - 110 the other 20% of the time. Completely normal transmission.     Please advise

## 2017-12-11 NOTE — TELEPHONE ENCOUNTER
I see an EKG on 12/5. I don't see an echo. Did she have it done somewhere else or she confusing the EKG with an echo?

## 2017-12-18 ENCOUNTER — HOSPITAL ENCOUNTER (OUTPATIENT)
Dept: CARDIOLOGY | Facility: HOSPITAL | Age: 82
Discharge: HOME | End: 2017-12-18
Attending: INTERNAL MEDICINE | Admitting: INTERNAL MEDICINE

## 2017-12-18 ENCOUNTER — TELEPHONE (OUTPATIENT)
Dept: CARDIOLOGY | Facility: CLINIC | Age: 82
End: 2017-12-18

## 2017-12-18 VITALS
HEART RATE: 68 BPM | HEIGHT: 63 IN | WEIGHT: 199 LBS | DIASTOLIC BLOOD PRESSURE: 78 MMHG | SYSTOLIC BLOOD PRESSURE: 170 MMHG | BODY MASS INDEX: 35.26 KG/M2

## 2017-12-18 DIAGNOSIS — R06.09 DYSPNEA ON EXERTION: ICD-10-CM

## 2017-12-18 DIAGNOSIS — I27.20 PULMONARY HTN (HCC): ICD-10-CM

## 2017-12-18 DIAGNOSIS — R06.02 SHORTNESS OF BREATH: Primary | ICD-10-CM

## 2017-12-18 LAB
ASCENDING AORTA: 3.2 CM
BH CV ECHO MEAS - ACS: 1.5 CM
BH CV ECHO MEAS - AO MAX PG (FULL): 7 MMHG
BH CV ECHO MEAS - AO MAX PG: 11.1 MMHG
BH CV ECHO MEAS - AO MEAN PG (FULL): 3.5 MMHG
BH CV ECHO MEAS - AO MEAN PG: 5.4 MMHG
BH CV ECHO MEAS - AO ROOT AREA (BSA CORRECTED): 1.5
BH CV ECHO MEAS - AO ROOT AREA: 6.2 CM^2
BH CV ECHO MEAS - AO ROOT DIAM: 2.8 CM
BH CV ECHO MEAS - AO V2 MAX: 167 CM/SEC
BH CV ECHO MEAS - AO V2 MEAN: 103.1 CM/SEC
BH CV ECHO MEAS - AO V2 VTI: 34.1 CM
BH CV ECHO MEAS - ASC AORTA: 3.2 CM
BH CV ECHO MEAS - AVA(I,A): 1.6 CM^2
BH CV ECHO MEAS - AVA(I,D): 1.6 CM^2
BH CV ECHO MEAS - AVA(V,A): 1.4 CM^2
BH CV ECHO MEAS - AVA(V,D): 1.4 CM^2
BH CV ECHO MEAS - BSA(HAYCOCK): 2 M^2
BH CV ECHO MEAS - BSA: 1.9 M^2
BH CV ECHO MEAS - BZI_BMI: 35.3 KILOGRAMS/M^2
BH CV ECHO MEAS - BZI_METRIC_HEIGHT: 160 CM
BH CV ECHO MEAS - BZI_METRIC_WEIGHT: 90.3 KG
BH CV ECHO MEAS - CONTRAST EF (2CH): 58.6 ML/M^2
BH CV ECHO MEAS - CONTRAST EF 4CH: 51.8 ML/M^2
BH CV ECHO MEAS - EDV(MOD-SP2): 70 ML
BH CV ECHO MEAS - EDV(MOD-SP4): 56 ML
BH CV ECHO MEAS - EDV(TEICH): 120 ML
BH CV ECHO MEAS - EF(CUBED): 75.5 %
BH CV ECHO MEAS - EF(MOD-SP2): 58.6 %
BH CV ECHO MEAS - EF(MOD-SP4): 59 %
BH CV ECHO MEAS - EF(TEICH): 67.2 %
BH CV ECHO MEAS - ESV(MOD-SP2): 29 ML
BH CV ECHO MEAS - ESV(MOD-SP4): 27 ML
BH CV ECHO MEAS - ESV(TEICH): 39.4 ML
BH CV ECHO MEAS - FS: 37.4 %
BH CV ECHO MEAS - IVS/LVPW: 1.1
BH CV ECHO MEAS - IVSD: 1 CM
BH CV ECHO MEAS - LAT PEAK E' VEL: 9 CM/SEC
BH CV ECHO MEAS - LV DIASTOLIC VOL/BSA (35-75): 29 ML/M^2
BH CV ECHO MEAS - LV MASS(C)D: 183.6 GRAMS
BH CV ECHO MEAS - LV MASS(C)DI: 95.1 GRAMS/M^2
BH CV ECHO MEAS - LV MAX PG: 4.1 MMHG
BH CV ECHO MEAS - LV MEAN PG: 2 MMHG
BH CV ECHO MEAS - LV SYSTOLIC VOL/BSA (12-30): 14 ML/M^2
BH CV ECHO MEAS - LV V1 MAX: 101.4 CM/SEC
BH CV ECHO MEAS - LV V1 MEAN: 59.8 CM/SEC
BH CV ECHO MEAS - LV V1 VTI: 23.6 CM
BH CV ECHO MEAS - LVIDD: 5 CM
BH CV ECHO MEAS - LVIDS: 3.1 CM
BH CV ECHO MEAS - LVLD AP2: 7.4 CM
BH CV ECHO MEAS - LVLD AP4: 7.3 CM
BH CV ECHO MEAS - LVLS AP2: 6.8 CM
BH CV ECHO MEAS - LVLS AP4: 6.4 CM
BH CV ECHO MEAS - LVOT AREA (M): 2.3 CM^2
BH CV ECHO MEAS - LVOT AREA: 2.4 CM^2
BH CV ECHO MEAS - LVOT DIAM: 1.7 CM
BH CV ECHO MEAS - LVPWD: 0.96 CM
BH CV ECHO MEAS - MED PEAK E' VEL: 6 CM/SEC
BH CV ECHO MEAS - MR MAX PG: 89.4 MMHG
BH CV ECHO MEAS - MR MAX VEL: 472.8 CM/SEC
BH CV ECHO MEAS - MV DEC SLOPE: 692.7 CM/SEC^2
BH CV ECHO MEAS - MV DEC TIME: 0.24 SEC
BH CV ECHO MEAS - MV E MAX VEL: 212.3 CM/SEC
BH CV ECHO MEAS - MV MAX PG: 22.4 MMHG
BH CV ECHO MEAS - MV MEAN PG: 7.1 MMHG
BH CV ECHO MEAS - MV P1/2T MAX VEL: 202.7 CM/SEC
BH CV ECHO MEAS - MV P1/2T: 85.7 MSEC
BH CV ECHO MEAS - MV V2 MAX: 236.7 CM/SEC
BH CV ECHO MEAS - MV V2 MEAN: 109.9 CM/SEC
BH CV ECHO MEAS - MV V2 VTI: 48.4 CM
BH CV ECHO MEAS - MVA P1/2T LCG: 1.1 CM^2
BH CV ECHO MEAS - MVA(P1/2T): 2.6 CM^2
BH CV ECHO MEAS - MVA(VTI): 1.1 CM^2
BH CV ECHO MEAS - PA MAX PG (FULL): 0.51 MMHG
BH CV ECHO MEAS - PA MAX PG: 1.4 MMHG
BH CV ECHO MEAS - PA V2 MAX: 58.2 CM/SEC
BH CV ECHO MEAS - PVA(V,A): 1.7 CM^2
BH CV ECHO MEAS - PVA(V,D): 1.7 CM^2
BH CV ECHO MEAS - QP/QS: 0.45
BH CV ECHO MEAS - RAP SYSTOLE: 15 MMHG
BH CV ECHO MEAS - RV MAX PG: 0.85 MMHG
BH CV ECHO MEAS - RV MEAN PG: 0.46 MMHG
BH CV ECHO MEAS - RV V1 MAX: 46.1 CM/SEC
BH CV ECHO MEAS - RV V1 MEAN: 31.7 CM/SEC
BH CV ECHO MEAS - RV V1 VTI: 11.9 CM
BH CV ECHO MEAS - RVOT AREA: 2.1 CM^2
BH CV ECHO MEAS - RVOT DIAM: 1.6 CM
BH CV ECHO MEAS - RVSP: 69.5 MMHG
BH CV ECHO MEAS - SI(AO): 110.4 ML/M^2
BH CV ECHO MEAS - SI(CUBED): 49.8 ML/M^2
BH CV ECHO MEAS - SI(LVOT): 28.7 ML/M^2
BH CV ECHO MEAS - SI(MOD-SP2): 21.2 ML/M^2
BH CV ECHO MEAS - SI(MOD-SP4): 15 ML/M^2
BH CV ECHO MEAS - SI(TEICH): 41.8 ML/M^2
BH CV ECHO MEAS - SUP REN AO DIAM: 1.7 CM
BH CV ECHO MEAS - SV(AO): 213.1 ML
BH CV ECHO MEAS - SV(CUBED): 96.2 ML
BH CV ECHO MEAS - SV(LVOT): 55.4 ML
BH CV ECHO MEAS - SV(MOD-SP2): 41 ML
BH CV ECHO MEAS - SV(MOD-SP4): 29 ML
BH CV ECHO MEAS - SV(RVOT): 25.2 ML
BH CV ECHO MEAS - SV(TEICH): 80.6 ML
BH CV ECHO MEAS - TAPSE (>1.6): 1.9 CM2
BH CV ECHO MEAS - TR MAX VEL: 369.2 CM/SEC
BH CV XLRA - RV BASE: 3.2 CM
BH CV XLRA - TDI S': 6 CM/SEC
E/E' RATIO: 28
LEFT ATRIUM VOLUME INDEX: 31 ML/M2
LV EF 2D ECHO EST: 58 %
MAXIMAL PREDICTED HEART RATE: 138 BPM
SINUS: 3 CM
STJ: 2.9 CM
STRESS TARGET HR: 117 BPM

## 2017-12-18 PROCEDURE — 93306 TTE W/DOPPLER COMPLETE: CPT | Performed by: INTERNAL MEDICINE

## 2017-12-18 PROCEDURE — 93306 TTE W/DOPPLER COMPLETE: CPT

## 2017-12-18 NOTE — TELEPHONE ENCOUNTER
I spoke with her.  She is taking 60 mg of torsemide a day.  I want her to increase that to twice a day.  On Friday I want her to come in for a chest x-ray and lab work.  We please make sure she is reminded on Thursday she needs to come in on Friday

## 2017-12-22 NOTE — TELEPHONE ENCOUNTER
I spoke with the Pt yesterday to remind her of labs and cxr. She was going to come in today hwoever she cannot find a ride and does not want to drvie. She wanted to know if she could come and get the testing done on Tuesday. Is this ok ? Please advise

## 2018-01-02 ENCOUNTER — TELEPHONE (OUTPATIENT)
Dept: INTERNAL MEDICINE | Facility: CLINIC | Age: 83
End: 2018-01-02

## 2018-01-02 RX ORDER — DOXYCYCLINE HYCLATE 100 MG/1
100 TABLET, DELAYED RELEASE ORAL 2 TIMES DAILY
Qty: 14 TABLET | Refills: 0 | Status: SHIPPED | OUTPATIENT
Start: 2018-01-02 | End: 2018-03-16

## 2018-01-02 NOTE — TELEPHONE ENCOUNTER
Pt called to say she has a sinus infection and would like something to be called in as she states it is to cold for her to come out and she is afraid of falling

## 2018-01-02 NOTE — TELEPHONE ENCOUNTER
rx for doxycycline 100 mg po bid x 7 d sent to [facility]. Verify if this is her preferred pharmacy and send accordingly. Nasal saline rinse. Follow up if no improvement.

## 2018-01-10 ENCOUNTER — CLINICAL SUPPORT NO REQUIREMENTS (OUTPATIENT)
Dept: CARDIOLOGY | Facility: CLINIC | Age: 83
End: 2018-01-10

## 2018-01-10 DIAGNOSIS — I48.0 PAROXYSMAL ATRIAL FIBRILLATION (HCC): Primary | ICD-10-CM

## 2018-02-14 RX ORDER — POTASSIUM CHLORIDE 750 MG/1
CAPSULE, EXTENDED RELEASE ORAL
Qty: 180 CAPSULE | Refills: 3 | Status: SHIPPED | OUTPATIENT
Start: 2018-02-14 | End: 2018-06-11

## 2018-02-14 RX ORDER — HYDROXYZINE HYDROCHLORIDE 25 MG/1
TABLET, FILM COATED ORAL
Qty: 90 TABLET | Refills: 0 | Status: SHIPPED | OUTPATIENT
Start: 2018-02-14 | End: 2018-05-08 | Stop reason: SDUPTHER

## 2018-02-21 RX ORDER — RANITIDINE 150 MG/1
TABLET ORAL
Qty: 180 TABLET | Refills: 1 | Status: SHIPPED | OUTPATIENT
Start: 2018-02-21 | End: 2018-07-23 | Stop reason: SDUPTHER

## 2018-03-02 ENCOUNTER — LAB (OUTPATIENT)
Dept: INTERNAL MEDICINE | Facility: CLINIC | Age: 83
End: 2018-03-02

## 2018-03-02 DIAGNOSIS — M10.00 ACUTE IDIOPATHIC GOUT, UNSPECIFIED SITE: Primary | ICD-10-CM

## 2018-03-02 DIAGNOSIS — D64.9 ANEMIA, UNSPECIFIED TYPE: ICD-10-CM

## 2018-03-02 DIAGNOSIS — E11.8 TYPE 2 DIABETES MELLITUS WITH COMPLICATION, UNSPECIFIED LONG TERM INSULIN USE STATUS: ICD-10-CM

## 2018-03-02 LAB
BASOPHILS # BLD AUTO: 0.06 10*3/MM3 (ref 0–0.2)
BASOPHILS NFR BLD AUTO: 0.8 % (ref 0–1.5)
EOSINOPHIL # BLD AUTO: 0.35 10*3/MM3 (ref 0–0.7)
EOSINOPHIL NFR BLD AUTO: 4.9 % (ref 0.3–6.2)
ERYTHROCYTE [DISTWIDTH] IN BLOOD BY AUTOMATED COUNT: 14.7 % (ref 11.7–13)
FERRITIN SERPL-MCNC: 42.3 NG/ML (ref 13–150)
FOLATE SERPL-MCNC: >20 NG/ML (ref 4.78–24.2)
HBA1C MFR BLD: 7.4 % (ref 4.8–5.6)
HCT VFR BLD AUTO: 35 % (ref 35.6–45.5)
HGB BLD-MCNC: 11.1 G/DL (ref 11.9–15.5)
IMM GRANULOCYTES # BLD: 0.02 10*3/MM3 (ref 0–0.03)
IMM GRANULOCYTES NFR BLD: 0.3 % (ref 0–0.5)
IRON SATN MFR SERPL: 10 % (ref 20–50)
IRON SERPL-MCNC: 38 MCG/DL (ref 37–145)
LYMPHOCYTES # BLD AUTO: 1.29 10*3/MM3 (ref 0.9–4.8)
LYMPHOCYTES NFR BLD AUTO: 18 % (ref 19.6–45.3)
MCH RBC QN AUTO: 29.7 PG (ref 26.9–32)
MCHC RBC AUTO-ENTMCNC: 31.7 G/DL (ref 32.4–36.3)
MCV RBC AUTO: 93.6 FL (ref 80.5–98.2)
MONOCYTES # BLD AUTO: 0.53 10*3/MM3 (ref 0.2–1.2)
MONOCYTES NFR BLD AUTO: 7.4 % (ref 5–12)
NEUTROPHILS # BLD AUTO: 4.93 10*3/MM3 (ref 1.9–8.1)
NEUTROPHILS NFR BLD AUTO: 68.6 % (ref 42.7–76)
PLATELET # BLD AUTO: 272 10*3/MM3 (ref 140–500)
RBC # BLD AUTO: 3.74 10*6/MM3 (ref 3.9–5.2)
TIBC SERPL-MCNC: 370 MCG/DL
UIBC SERPL-MCNC: 332 MCG/DL
URATE SERPL-MCNC: 8.4 MG/DL (ref 2.4–5.7)
VIT B12 SERPL-MCNC: 893 PG/ML (ref 211–946)
WBC # BLD AUTO: 7.18 10*3/MM3 (ref 4.5–10.7)

## 2018-03-02 RX ORDER — LATANOPROST 50 UG/ML
1 SOLUTION/ DROPS OPHTHALMIC NIGHTLY
COMMUNITY
Start: 2017-12-06 | End: 2018-11-04 | Stop reason: HOSPADM

## 2018-03-02 RX ORDER — ETOH/EUC OIL/MENTH/PEP/WINTERG
SPRAY, NON-AEROSOL (ML) MUCOUS MEMBRANE
COMMUNITY
Start: 2017-11-30 | End: 2020-01-01

## 2018-03-05 ENCOUNTER — OFFICE VISIT (OUTPATIENT)
Dept: CARDIOLOGY | Facility: CLINIC | Age: 83
End: 2018-03-05

## 2018-03-05 ENCOUNTER — LAB (OUTPATIENT)
Dept: LAB | Facility: HOSPITAL | Age: 83
End: 2018-03-05

## 2018-03-05 ENCOUNTER — TELEPHONE (OUTPATIENT)
Dept: CARDIOLOGY | Facility: CLINIC | Age: 83
End: 2018-03-05

## 2018-03-05 ENCOUNTER — HOSPITAL ENCOUNTER (OUTPATIENT)
Dept: GENERAL RADIOLOGY | Facility: HOSPITAL | Age: 83
Discharge: HOME | End: 2018-03-05
Admitting: NURSE PRACTITIONER

## 2018-03-05 ENCOUNTER — CLINICAL SUPPORT NO REQUIREMENTS (OUTPATIENT)
Dept: CARDIOLOGY | Facility: CLINIC | Age: 83
End: 2018-03-05

## 2018-03-05 VITALS
BODY MASS INDEX: 37.56 KG/M2 | HEART RATE: 77 BPM | DIASTOLIC BLOOD PRESSURE: 84 MMHG | HEIGHT: 63 IN | SYSTOLIC BLOOD PRESSURE: 158 MMHG | WEIGHT: 212 LBS

## 2018-03-05 DIAGNOSIS — I50.33 ACUTE ON CHRONIC DIASTOLIC CONGESTIVE HEART FAILURE (HCC): ICD-10-CM

## 2018-03-05 DIAGNOSIS — I10 ESSENTIAL HYPERTENSION: ICD-10-CM

## 2018-03-05 DIAGNOSIS — I36.1 NON-RHEUMATIC TRICUSPID VALVE INSUFFICIENCY: ICD-10-CM

## 2018-03-05 DIAGNOSIS — I44.2 COMPLETE HEART BLOCK (HCC): Primary | ICD-10-CM

## 2018-03-05 DIAGNOSIS — I50.33 ACUTE ON CHRONIC DIASTOLIC CONGESTIVE HEART FAILURE (HCC): Primary | ICD-10-CM

## 2018-03-05 DIAGNOSIS — E78.49 OTHER HYPERLIPIDEMIA: ICD-10-CM

## 2018-03-05 DIAGNOSIS — G47.33 OBSTRUCTIVE SLEEP APNEA SYNDROME: ICD-10-CM

## 2018-03-05 DIAGNOSIS — I34.0 NON-RHEUMATIC MITRAL REGURGITATION: ICD-10-CM

## 2018-03-05 LAB
ANION GAP SERPL CALCULATED.3IONS-SCNC: 13.3 MMOL/L
BUN BLD-MCNC: 38 MG/DL (ref 8–23)
BUN/CREAT SERPL: 16.5 (ref 7–25)
CALCIUM SPEC-SCNC: 9.2 MG/DL (ref 8.6–10.5)
CHLORIDE SERPL-SCNC: 98 MMOL/L (ref 98–107)
CO2 SERPL-SCNC: 25.7 MMOL/L (ref 22–29)
CREAT BLD-MCNC: 2.31 MG/DL (ref 0.57–1)
GFR SERPL CREATININE-BSD FRML MDRD: 20 ML/MIN/1.73
GLUCOSE BLD-MCNC: 223 MG/DL (ref 65–99)
NT-PROBNP SERPL-MCNC: 5184 PG/ML (ref 0–1800)
POTASSIUM BLD-SCNC: 4.7 MMOL/L (ref 3.5–5.2)
SODIUM BLD-SCNC: 137 MMOL/L (ref 136–145)

## 2018-03-05 PROCEDURE — 93279 PRGRMG DEV EVAL PM/LDLS PM: CPT | Performed by: INTERNAL MEDICINE

## 2018-03-05 PROCEDURE — 93000 ELECTROCARDIOGRAM COMPLETE: CPT | Performed by: NURSE PRACTITIONER

## 2018-03-05 PROCEDURE — 80048 BASIC METABOLIC PNL TOTAL CA: CPT

## 2018-03-05 PROCEDURE — 83880 ASSAY OF NATRIURETIC PEPTIDE: CPT

## 2018-03-05 PROCEDURE — 99214 OFFICE O/P EST MOD 30 MIN: CPT | Performed by: NURSE PRACTITIONER

## 2018-03-05 PROCEDURE — 36415 COLL VENOUS BLD VENIPUNCTURE: CPT

## 2018-03-05 PROCEDURE — 71046 X-RAY EXAM CHEST 2 VIEWS: CPT

## 2018-03-05 NOTE — PROGRESS NOTES
Date of Office Visit: 2017  Encounter Provider: DEBORAH Alonso  Place of Service: [facility] CARDIOLOGY  Patient Name: Veronica Henao  :1935    Chief Complaint   Patient presents with   • Shortness of Breath   :     HPI: Veronica Henao is a 83 y.o. female comes in today for follow-up 3 month follow up.   [facility] and transferred care to Dr. Husain in 2014.    She has a history of moderate to severe mitral valve regurgitation with repair in , atrial fibrillation, atrial flutter, diabetes, hypertension, hyperlipidemia, sleep apnea, chronic kidney disease and anemia.    She was diagnosed with A. fib with RVR in .  She was treated with amiodarone and Eliquis.  She was cardioverted a month after diagnosis.  Her amiodarone was discontinued a few months after that.    She did well for 2 years but then presented in 2015 with atrial fibrillation.    2016, she has shortness of breath and had an echocardiogram.  This showed an EF of 50% and moderate to severe regurgitation of her mitral valve.  She had a nuclear stress test showing no evidence of ischemia.  She went on to have a KATELYN showing moderate to severe MR.  She had a cardiac catheterization in 2016 showing no significant coronary disease.  She went on to have a mitral valve repair in 2016 with a 31 mm capital a TS band posterior annuloplasty MP2 cortical repair.  She also had a tricuspid valve repair with annuloplasty ring with a 26 mm ring.  She had a Maze procedure and left atrial appendage ligated.    After surgery, she had asystole and bradycardia for 2 days.  She eventually was able to tolerate beta blocker and switch from warfarin to Eliquis.    2017, she presented with atrial flutter.  An echocardiogram showing an EF of 70%, mild mitral valve stenosis with a mean gradient of 6 mmHg and mild tricuspid valve regurgitation.  She had  a cardioversion March 24, 2017 but then went back into atrial fibrillation and had an ablation on March 28, 2017.  She did go back into atrial flutter and had a cardioversion again performed on June 14, 2017 but then again had issues.  She went on to have an AV francheska ablation and pacemaker placement on July 5, 2017.    She has had issues with edema and was admitted in July/2017 for heart failure.  Continued to have shortness of breath.  An echo in December showed an EF of 58%, RVSP of 69.5 mmHg, trace MR.  Dr. Husain increased her torsemide.  She was to come in for a chest x-ray and lab work but it does not like like this was ever performed.    Her latest blood work shows CBC that is stable along along with some other lab work.  She has not had a BMP drawn since October.  At that time her creatinine was 2.58.    She comes in today reporting that she still very short of breath.  She also has some lower extremity edema.  Left greater than right.  Her weight has been stable.  She is taking torsemide 40 mg 3 times a day.  She did not have the lab work or the chest x-ray done that was ordered in the past.  Occasional dizziness at times.  Denies chest pain or chest pressure.  Denies orthopnea or PND.  Past Medical History:   Diagnosis Date   • Acute bronchitis    • Acute renal insufficiency    • Allergic rhinitis    • Anemia in chronic kidney disease    • Arrhythmia    • Atrial fibrillation     chronic   • Brachycephaly    • Breast pain, right    • Chest pain    • CHF (congestive heart failure)    • Chronic combined systolic and diastolic CHF (congestive heart failure)    • Chronic renal insufficiency    • CKD (chronic kidney disease), stage IV    • Cough    • Depression    • Diabetes mellitus     TYPE 2   • Diverticulosis    • ORTIZ (dyspnea on exertion)    • Dyspnea    • Esophageal reflux    • Essential hypertension    • Fatigue    • GERD (gastroesophageal reflux disease)    • Gout    • Head injury    • Headache    •  Hoarseness    • Hypercalcemia    • Hyperlipidemia    • Hypertension    • Influenza A (H1N1)    • Iron deficiency anemia    • Itching    • Leg swelling    • Lightheadedness    • Macular degeneration    • Malaise and fatigue    • Mitral valve regurgitation     moderate to severe   • Nontoxic multinodular goiter     RIGHT 2.0 CM  LEFT  2.5 CM   • JOSELUIS on CPAP    • Osteoarthritis    • PAF (paroxysmal atrial fibrillation)    • Palpitations    • Paresthesias    • Proteinuria    • Pulmonary nodule    • Pulmonic valve regurgitation     mild   • Sebaceous cyst    • SOBOE (shortness of breath on exertion)    • Solitary thyroid nodule    • Subclinical hypothyroidism    • Tachycardia    • TR (tricuspid regurgitation)     mild to moderate   • Type 2 diabetes mellitus    • Type 2 diabetes mellitus with chronic kidney disease    • UTI (urinary tract infection)        Past Surgical History:   Procedure Laterality Date   • APPENDECTOMY     • CARDIAC CATHETERIZATION      procedure outcome:    • CARDIAC CATHETERIZATION N/A 11/21/2016    Procedure: Coronary angiography;  Surgeon: Marcin العراقي MD;  Location: [facility] CATH INVASIVE LOCATION;  Service:    • CARDIAC CATHETERIZATION N/A 11/21/2016    Procedure: Left Heart Cath;  Surgeon: Marcin العراقي MD;  Location: [facility] CATH INVASIVE LOCATION;  Service:    • CARDIAC ELECTROPHYSIOLOGY PROCEDURE N/A 3/28/2017    Procedure: Ablation atrial flutter;  Surgeon: Rodríguez Ward MD;  Location: [facility] CATH INVASIVE LOCATION;  Service:    • CARDIAC ELECTROPHYSIOLOGY PROCEDURE N/A 7/3/2017    Procedure: AV node ablation;  Surgeon: Rodríguez Ward MD;  Location: [facility] CATH INVASIVE LOCATION;  Service:    • CARDIAC ELECTROPHYSIOLOGY PROCEDURE N/A 7/3/2017    Procedure: Pacemaker DC new  [employer] and will need 3830 lead-I did chase Meneses ;  Surgeon: Rodríguez Ward MD;  Location: [facility] CATH INVASIVE LOCATION;  Service:    • CARDIAC SURGERY     • CHOLECYSTECTOMY     • CLAVICLE SURGERY  Left    • COLONOSCOPY N/A 11/14/2017    Procedure: COLONOSCOPY INTO CECUM/ TERMINAL ILEUM WITH POLYPECTOMY  X 8 AND CLIP X 2;  Surgeon: Jacy Browning MD;  Location: [facility] ENDOSCOPY;  Service:    • ENDOSCOPY N/A 11/14/2017    Procedure: ESOPHAGOGASTRODUODENOSCOPY WITH BIOPSIES;  Surgeon: Jacy Browning MD;  Location: [facility] ENDOSCOPY;  Service:    • GASTRIC RESTRICTION SURGERY     • HYSTERECTOMY     • JOINT REPLACEMENT     • LUMBAR DECOMPRESSION      L4-5   • MITRAL VALVE REPAIR/REPLACEMENT N/A 12/14/2016    Procedure: INTRAOPERATIVE KATELYN, MIDLINE STERNOTOMY, MITRAL VALVE REPAIR, TRICUSPID VALVE REPAIR, MAZE PROCEDURE;  Surgeon: Yonug Vital MD;  Location: [facility] MAIN OR;  Service:    • SHOULDER SURGERY Left     AFTER SURGERY FOR FRACTURED CLAVICLE   • TONSILLECTOMY     • TOTAL KNEE ARTHROPLASTY      bilateral           Review of Systems   Constitution: Positive for malaise/fatigue. Negative for fever.   HENT: Negative for ear pain, hearing loss, nosebleeds and sore throat.    Eyes: Negative for double vision, pain, vision loss in left eye, vision loss in right eye and visual disturbance.   Cardiovascular: Positive for dyspnea on exertion and leg swelling. Negative for claudication.   Respiratory: Negative for cough, shortness of breath, snoring and wheezing.    Endocrine: Negative for cold intolerance, heat intolerance and polyuria.   Skin: Negative for color change, itching and rash.   Musculoskeletal: Negative for joint pain, joint swelling and muscle cramps.   Gastrointestinal: Negative for abdominal pain, diarrhea, melena, nausea and vomiting.   Genitourinary: Negative for bladder incontinence and hematuria.   Neurological: Negative for excessive daytime sleepiness, dizziness, light-headedness, paresthesias and seizures.   Psychiatric/Behavioral: Negative for depression. The patient is not nervous/anxious.    All other systems reviewed and are negative.    All other systems reviewed and are  "negative    Allergies   Allergen Reactions   • Cephalexin Swelling   • Meperidine Swelling   • Penicillins Swelling       All aspects of family and social history reviewed.          Objective:     Vitals:    03/05/18 1240   BP: 158/84   BP Location: Left arm   Pulse: 77   Weight: 96.2 kg (212 lb)   Height: 160 cm (63\")     Body mass index is 37.55 kg/(m^2).    PHYSICAL EXAM:  Physical Exam   Constitutional: She appears well-developed.   HENT:   Head: Normocephalic and atraumatic.   Eyes: Conjunctivae and lids are normal. Pupils are equal, round, and reactive to light. Lids are everted and swept, no foreign bodies found.   Neck: Normal range of motion. No JVD present. Carotid bruit is not present. No tracheal deviation present. No thyroid mass present.   Cardiovascular: Normal rate, regular rhythm and normal heart sounds.    Pulses:       Dorsalis pedis pulses are 2+ on the right side, and 2+ on the left side.   Pulmonary/Chest: Effort normal. She has no rales.   Abdominal: Normal appearance and bowel sounds are normal.   Musculoskeletal: Normal range of motion. She exhibits edema (1+ edema BLE).   Neurological: She is alert. She has normal strength.   Skin: Skin is warm, dry and intact.   Psychiatric: She has a normal mood and affect. Her behavior is normal.   Vitals reviewed.        ECG 12 Lead  Date/Time: 3/5/2018 1:18 PM  Performed by: IRVING MARTINEZ  Authorized by: IRVING MARTINEZ   Comparison: compared with previous ECG   Rhythm: paced  Rate: normal  BPM: 77  Conduction: conduction normal  ST Segments: ST segments normal  QRS axis: normal  Comments: Indication: afib                Assessment:       Diagnosis Plan   1. Acute on chronic diastolic (congestive) heart failure  XR Chest 2 View    BNP    Basic Metabolic Panel    Adult Transthoracic Echo Limited W/ Cont if Necessary Per Protocol    ECG 12 Lead   2. Essential hypertension  XR Chest 2 View    BNP    Basic Metabolic Panel    Adult Transthoracic Echo " Limited W/ Cont if Necessary Per Protocol    ECG 12 Lead   3. Non-rheumatic mitral regurgitation  ECG 12 Lead   4. Non-rheumatic tricuspid valve insufficiency  ECG 12 Lead   5. Other hyperlipidemia  ECG 12 Lead   6. Obstructive sleep apnea syndrome  ECG 12 Lead        Orders Placed This Encounter   Procedures   • XR Chest 2 View     Standing Status:   Future     Number of Occurrences:   1     Standing Expiration Date:   3/5/2019     Order Specific Question:   Reason for Exam:     Answer:   shortness of breath   • BNP     Standing Status:   Future     Standing Expiration Date:   3/5/2019   • Basic Metabolic Panel     Standing Status:   Future     Standing Expiration Date:   3/5/2019   • ECG 12 Lead     This order was created via procedure documentation   • Adult Transthoracic Echo Limited W/ Cont if Necessary Per Protocol     eval RVSP,     Standing Status:   Future     Standing Expiration Date:   3/5/2019     Order Specific Question:   Reason for exam?     Answer:   Dyspnea       Current Outpatient Prescriptions   Medication Sig Dispense Refill   • acetaminophen (TYLENOL) 325 MG tablet Take 2 tablets by mouth Every 6 (Six) Hours As Needed for Mild Pain (1-3).  0   • allopurinol (ZYLOPRIM) 100 MG tablet Take 1 tablet by mouth Daily. 30 tablet 5   • cetirizine (zyrTEC) 10 MG tablet Take 10 mg by mouth Every Night.     • cholecalciferol (VITAMIN D3) 1000 UNITS tablet Take 1,000 Units by mouth daily.     • Cyanocobalamin (VITAMIN B-12) 5000 MCG sublingual tablet Take 5,000 mcg by mouth Daily.     • diphenhydrAMINE (BENADRYL) 2 % cream Apply  topically 3 (Three) Times a Day As Needed for Itching. 30 g 3   • doxycycline (DORYX) 100 MG enteric coated tablet Take 1 tablet by mouth 2 (Two) Times a Day. 14 tablet 0   • ferrous sulfate 325 (65 FE) MG EC tablet Take 1 tablet by mouth Daily With Breakfast. 30 tablet 11   • glimepiride (AMARYL) 2 MG tablet Take 2 mg by mouth 2 (Two) Times a Day.     • hydrALAZINE (APRESOLINE) 50  MG tablet TAKE 1 TABLET TWICE DAILY 180 tablet 1   • hydrOXYzine (ATARAX) 25 MG tablet TAKE 1 TABLET EVERY NIGHT AT BEDTIME 90 tablet 0   • Incontinence Supply Disposable (BLADDER CONTROL PADS EX ABSORB) misc      • insulin degludec (TRESIBA FLEXTOUCH) 100 UNIT/ML solution pen-injector injection Inject 10 Units under the skin Daily. Indications: Diabetes 2 pen 3   • latanoprost (XALATAN) 0.005 % ophthalmic solution Administer 1 drop to both eyes Every Night.     • linagliptin (TRADJENTA) 5 MG tablet tablet Take 5 mg by mouth Daily.     • montelukast (SINGULAIR) 10 MG tablet Take 10 mg by mouth Every Night.     • Multiple Vitamins-Minerals (CENTRUM SILVER PO) Take 1 tablet by mouth Daily.     • nystatin-triamcinolone (MYCOLOG) 480979-4.1 UNIT/GM-% ointment Apply  topically 2 (Two) Times a Day. 30 g 3   • Omega-3 Fatty Acids (FISH OIL) 1000 MG capsule capsule Take by mouth Daily With Breakfast     • potassium chloride (MICRO-K) 10 MEQ CR capsule TAKE 1 CAPSULE TWICE DAILY 180 capsule 3   • raNITIdine (ZANTAC) 150 MG tablet TAKE 1 TABLET TWICE DAILY 180 tablet 1   • rivaroxaban (XARELTO) 15 MG tablet Take 1 tablet by mouth Daily With Dinner. 42 tablet 0   • sertraline (ZOLOFT) 50 MG tablet TAKE 1 TABLET EVERY DAY AS DIRECTED 90 tablet 2   • torsemide (DEMADEX) 20 MG tablet Take 3 tablets by mouth Daily. (Patient taking differently: Take 100 mg by mouth Daily.) 270 tablet 3   • Vitamin E 400 UNITS chewable tablet Chew 400 Units Daily. Two daily     • Vitamins A & D (VITAMIN A & D) 80591-751 UNITS tablet        No current facility-administered medications for this visit.             Plan:       1. Heart Failure  Assessment  • NYHA class II - There is slight limitation of physical activity. The patient is comfortable at rest, but physical activity results in fatigue, palpitations or shortness of breath.  • ACE inhibitor not prescribed for medical reasons  • Beta blocker not prescribed for patient reasons  • Diuretics  prescribed  • The most recent ejection fraction is 70%  • Left ventricular function is normal by qualitative assessment  • The left ventricle was last assessed on 5/25/2017  • HFpEF    Plan  • The patient has received heart failure education on the following topics: dietary sodium restriction, medication instructions, minimizing or avoiding NSAID use, symptom management, physical activity, weight monitoring and minimizing alcohol intake  • The heart failure care plan was discussed with the patient today including: continuing the current program    Subjective/Objective    • Physical exam findings negative for rales and elevated JVP.    It was thought that her A. fib was causing her diastolic heart failure.  She still continues to have issues.  Her last echo shows an RVSP greater than 65 mmHg.  She increased her torsemide but has not had repeat lab work.  She has renal insufficiency.  I minute check a B in PE and a pro BNP today.  We'll check a chest x-ray.  Repeat limited echo to evaluate RVSP.    2. HTN-will need to monitor    3. Valve disease-s/p mitral replacement with tricuspid repair.     4. Hyperlipidemia    5. Sleep apnea    6. Atrial Fibrillation and Atrial Flutter  Assessment  • The patient has persistent atrial fibrillation  • The patient's CHADS2-VASc score is 6  • A [id number] score of 2 or more is considered a high risk for a thromboembolic event    Plan  • Continue rivaroxaban for antithrombotic therapy, bleeding issues discussed  • She has had a right and left-sided maze before.  She had an atrial flutter ablation before.  Eventually she had an AV node ablation and pacemaker implant.  She is anticoagulated with Xarelto.    7. Renal insufficiency-    Follow up after testing complete    As always, it has been a pleasure to participate in this patient's care.      Sincerely,      DEBORAH Alonso

## 2018-03-13 NOTE — TELEPHONE ENCOUNTER
Can we please call to see if this patient scheduled an appt with her kidney doctor for help with management of her diuretics?

## 2018-03-13 NOTE — TELEPHONE ENCOUNTER
Contacted pt and she states she has an appt with her PCP on Friday for referral to kidney specialist.  [facility] Float

## 2018-03-15 RX ORDER — HYDRALAZINE HYDROCHLORIDE 50 MG/1
TABLET, FILM COATED ORAL
Qty: 180 TABLET | Refills: 1 | Status: SHIPPED | OUTPATIENT
Start: 2018-03-15 | End: 2018-06-11

## 2018-03-16 ENCOUNTER — OFFICE VISIT (OUTPATIENT)
Dept: INTERNAL MEDICINE | Facility: CLINIC | Age: 83
End: 2018-03-16

## 2018-03-16 VITALS
HEART RATE: 85 BPM | BODY MASS INDEX: 37.73 KG/M2 | WEIGHT: 213 LBS | OXYGEN SATURATION: 96 % | SYSTOLIC BLOOD PRESSURE: 144 MMHG | DIASTOLIC BLOOD PRESSURE: 84 MMHG

## 2018-03-16 DIAGNOSIS — I50.32 CHRONIC DIASTOLIC CONGESTIVE HEART FAILURE (HCC): ICD-10-CM

## 2018-03-16 DIAGNOSIS — I10 ESSENTIAL HYPERTENSION: ICD-10-CM

## 2018-03-16 DIAGNOSIS — Z79.4 TYPE 2 DIABETES MELLITUS WITH CHRONIC KIDNEY DISEASE, WITH LONG-TERM CURRENT USE OF INSULIN, UNSPECIFIED CKD STAGE (HCC): ICD-10-CM

## 2018-03-16 DIAGNOSIS — E11.22 TYPE 2 DIABETES MELLITUS WITH CHRONIC KIDNEY DISEASE, WITH LONG-TERM CURRENT USE OF INSULIN, UNSPECIFIED CKD STAGE (HCC): ICD-10-CM

## 2018-03-16 DIAGNOSIS — I48.0 PAROXYSMAL ATRIAL FIBRILLATION (HCC): ICD-10-CM

## 2018-03-16 DIAGNOSIS — N28.9 RENAL INSUFFICIENCY: Primary | ICD-10-CM

## 2018-03-16 DIAGNOSIS — R60.9 EDEMA, UNSPECIFIED TYPE: ICD-10-CM

## 2018-03-16 PROCEDURE — 99214 OFFICE O/P EST MOD 30 MIN: CPT | Performed by: INTERNAL MEDICINE

## 2018-03-16 NOTE — PROGRESS NOTES
Chief Complaint   Patient presents with   • Shortness of Breath   • Leg Swelling       History of Present Illness   Veronica Henao is a 83 y.o. female presents for follow up evaluation. Patient has CRI and CHF. She reports that she has recently been having increasing shortness of air and swelling. She is on demadex for fluid retention. She is taking 2 tab 20 mg bid. Rx tid but this is a difficult regiment for the patient to follow. She follows with cardiology. She is feeling fatigue with the dyspnea. She is having leg swelling. She recently had labs. Her anemia is above baseline and stable. She has cri and gfr is quite low but stable from 7/17. Her BNP is quite elevated. She has JOSELUIS and is wearing CPAP hs. Has DM. fbs 100-130. She is wearing diabetic appropropriate footwear and is due for new shoes.           The following portions of the patient's history were reviewed and updated as appropriate: allergies, current medications, past family history, past medical history, past social history, past surgical history and problem list.  Current Outpatient Prescriptions on File Prior to Visit   Medication Sig Dispense Refill   • acetaminophen (TYLENOL) 325 MG tablet Take 2 tablets by mouth Every 6 (Six) Hours As Needed for Mild Pain (1-3).  0   • allopurinol (ZYLOPRIM) 100 MG tablet Take 1 tablet by mouth Daily. 30 tablet 5   • cetirizine (zyrTEC) 10 MG tablet Take 10 mg by mouth Every Night.     • cholecalciferol (VITAMIN D3) 1000 UNITS tablet Take 1,000 Units by mouth daily.     • Cyanocobalamin (VITAMIN B-12) 5000 MCG sublingual tablet Take 5,000 mcg by mouth Daily.     • diphenhydrAMINE (BENADRYL) 2 % cream Apply  topically 3 (Three) Times a Day As Needed for Itching. 30 g 3   • ferrous sulfate 325 (65 FE) MG EC tablet Take 1 tablet by mouth Daily With Breakfast. 30 tablet 11   • glimepiride (AMARYL) 2 MG tablet Take 2 mg by mouth 2 (Two) Times a Day.     • hydrALAZINE (APRESOLINE) 50 MG tablet TAKE 1 TABLET TWICE DAILY  180 tablet 1   • hydrOXYzine (ATARAX) 25 MG tablet TAKE 1 TABLET EVERY NIGHT AT BEDTIME 90 tablet 0   • Incontinence Supply Disposable (BLADDER CONTROL PADS EX ABSORB) misc      • insulin degludec (TRESIBA FLEXTOUCH) 100 UNIT/ML solution pen-injector injection Inject 10 Units under the skin Daily. Indications: Diabetes 2 pen 3   • latanoprost (XALATAN) 0.005 % ophthalmic solution Administer 1 drop to both eyes Every Night.     • linagliptin (TRADJENTA) 5 MG tablet tablet Take 5 mg by mouth Daily.     • montelukast (SINGULAIR) 10 MG tablet Take 10 mg by mouth Every Night.     • Multiple Vitamins-Minerals (CENTRUM SILVER PO) Take 1 tablet by mouth Daily.     • nystatin-triamcinolone (MYCOLOG) 895875-4.1 UNIT/GM-% ointment Apply  topically 2 (Two) Times a Day. 30 g 3   • Omega-3 Fatty Acids (FISH OIL) 1000 MG capsule capsule Take by mouth Daily With Breakfast     • potassium chloride (MICRO-K) 10 MEQ CR capsule TAKE 1 CAPSULE TWICE DAILY 180 capsule 3   • raNITIdine (ZANTAC) 150 MG tablet TAKE 1 TABLET TWICE DAILY 180 tablet 1   • rivaroxaban (XARELTO) 15 MG tablet Take 1 tablet by mouth Daily With Dinner. 42 tablet 0   • sertraline (ZOLOFT) 50 MG tablet TAKE 1 TABLET EVERY DAY AS DIRECTED 90 tablet 2   • torsemide (DEMADEX) 20 MG tablet Take 3 tablets by mouth Daily. (Patient taking differently: Take 100 mg by mouth Daily.) 270 tablet 3   • Vitamin E 400 UNITS chewable tablet Chew 400 Units Daily. Two daily     • Vitamins A & D (VITAMIN A & D) 19996-692 UNITS tablet      • doxycycline (DORYX) 100 MG enteric coated tablet Take 1 tablet by mouth 2 (Two) Times a Day. 14 tablet 0     No current facility-administered medications on file prior to visit.      Review of Systems   Constitutional: Positive for fatigue.   HENT: Negative.    Eyes: Negative.    Respiratory: Positive for shortness of breath.    Gastrointestinal: Negative.    Endocrine: Negative.    Genitourinary: Positive for frequency.   Musculoskeletal: Positive  for arthralgias.   Skin: Negative.    Allergic/Immunologic: Negative.    Neurological: Positive for dizziness.   Hematological: Negative.    Psychiatric/Behavioral: Negative.        Objective   Physical Exam   Constitutional: She is oriented to person, place, and time. She appears well-developed and well-nourished.   HENT:   Head: Normocephalic and atraumatic.   Right Ear: External ear normal.   Left Ear: External ear normal.   Nose: Nose normal.   Mouth/Throat: Oropharynx is clear and moist.   Eyes: EOM are normal. Pupils are equal, round, and reactive to light.   Neck: Normal range of motion. Neck supple.   Cardiovascular: Normal rate, regular rhythm and normal heart sounds.    Pulmonary/Chest: Effort normal and breath sounds normal.   Abdominal: Soft.   Musculoskeletal: Normal range of motion.    Veronica had a diabetic foot exam performed today.   During the foot exam she had a monofilament test performed.     Neurological: She is alert and oriented to person, place, and time.   Skin: Skin is warm and dry.   Psychiatric: She has a normal mood and affect. Her behavior is normal. Judgment and thought content normal.   Nursing note and vitals reviewed.       /84   Pulse 85   Wt 96.6 kg (213 lb)   SpO2 96%   BMI 37.73 kg/m²     Assessment/Plan   Diagnoses and all orders for this visit:    Renal insufficiency  -     Ambulatory Referral to Nephrology    Edema, unspecified type  -     Ambulatory Referral to Nephrology    Chronic diastolic congestive heart failure    Essential hypertension    Paroxysmal atrial fibrillation      Patient with chronic renal insufficiency and CHF. She is struggling with fluid retention. She is having difficulty w/ tid demadex dosing. Will try 60 mg am then 40 mg afternoon. She is to work on sleep hygeine and continue cpap qhs for angle. She will follow up w/ pulmonary to establish if mask is adequate. She has dm. She will go to podiatry for nail trimming and evaluation. She will  continue diabetic socks and shoes for callous formation. She will f/u w/ nephrology to assist on optimal fluid status. Cardiology routinely as well given bnp >5,000. Monitor weight.

## 2018-03-27 ENCOUNTER — TRANSCRIBE ORDERS (OUTPATIENT)
Dept: ADMINISTRATIVE | Facility: HOSPITAL | Age: 83
End: 2018-03-27

## 2018-03-27 DIAGNOSIS — N17.9 ACUTE KIDNEY INJURY (HCC): Primary | ICD-10-CM

## 2018-04-04 ENCOUNTER — HOSPITAL ENCOUNTER (OUTPATIENT)
Dept: ULTRASOUND IMAGING | Facility: HOSPITAL | Age: 83
Discharge: HOME | End: 2018-04-04
Attending: INTERNAL MEDICINE | Admitting: INTERNAL MEDICINE

## 2018-04-04 DIAGNOSIS — N17.9 ACUTE KIDNEY INJURY (HCC): ICD-10-CM

## 2018-04-04 PROCEDURE — 76775 US EXAM ABDO BACK WALL LIM: CPT

## 2018-04-16 RX ORDER — CALCIUM CITRATE/VITAMIN D3 200MG-6.25
TABLET ORAL
Qty: 100 EACH | Refills: 3 | Status: SHIPPED | OUTPATIENT
Start: 2018-04-16 | End: 2020-01-27 | Stop reason: SDUPTHER

## 2018-05-08 RX ORDER — HYDROXYZINE HYDROCHLORIDE 25 MG/1
TABLET, FILM COATED ORAL
Qty: 90 TABLET | Refills: 0 | Status: SHIPPED | OUTPATIENT
Start: 2018-05-08 | End: 2018-06-08 | Stop reason: DRUGHIGH

## 2018-05-23 ENCOUNTER — HOSPITAL ENCOUNTER (OUTPATIENT)
Dept: GENERAL RADIOLOGY | Facility: HOSPITAL | Age: 83
Discharge: HOME | End: 2018-05-23
Attending: INTERNAL MEDICINE | Admitting: INTERNAL MEDICINE

## 2018-05-23 DIAGNOSIS — R06.02 SHORT OF BREATH ON EXERTION: ICD-10-CM

## 2018-05-23 PROCEDURE — 71046 X-RAY EXAM CHEST 2 VIEWS: CPT

## 2018-06-07 ENCOUNTER — CLINICAL SUPPORT NO REQUIREMENTS (OUTPATIENT)
Dept: CARDIOLOGY | Facility: CLINIC | Age: 83
End: 2018-06-07

## 2018-06-07 DIAGNOSIS — I48.21 PERMANENT ATRIAL FIBRILLATION (HCC): Primary | ICD-10-CM

## 2018-06-07 PROCEDURE — 93294 REM INTERROG EVL PM/LDLS PM: CPT | Performed by: INTERNAL MEDICINE

## 2018-06-07 PROCEDURE — 93296 REM INTERROG EVL PM/IDS: CPT | Performed by: INTERNAL MEDICINE

## 2018-06-08 ENCOUNTER — OFFICE VISIT (OUTPATIENT)
Dept: INTERNAL MEDICINE | Facility: CLINIC | Age: 83
End: 2018-06-08

## 2018-06-08 VITALS
TEMPERATURE: 98 F | WEIGHT: 219 LBS | OXYGEN SATURATION: 100 % | SYSTOLIC BLOOD PRESSURE: 142 MMHG | BODY MASS INDEX: 38.8 KG/M2 | HEIGHT: 63 IN | HEART RATE: 85 BPM | DIASTOLIC BLOOD PRESSURE: 72 MMHG

## 2018-06-08 DIAGNOSIS — Z79.4 TYPE 2 DIABETES MELLITUS WITH CHRONIC KIDNEY DISEASE, WITH LONG-TERM CURRENT USE OF INSULIN, UNSPECIFIED CKD STAGE (HCC): ICD-10-CM

## 2018-06-08 DIAGNOSIS — M1A.9XX0 CHRONIC GOUT WITHOUT TOPHUS, UNSPECIFIED CAUSE, UNSPECIFIED SITE: ICD-10-CM

## 2018-06-08 DIAGNOSIS — N18.4 CHRONIC KIDNEY DISEASE, STAGE IV (SEVERE) (HCC): ICD-10-CM

## 2018-06-08 DIAGNOSIS — R06.09 EXERTIONAL DYSPNEA: ICD-10-CM

## 2018-06-08 DIAGNOSIS — E03.8 SUBCLINICAL HYPOTHYROIDISM: ICD-10-CM

## 2018-06-08 DIAGNOSIS — I50.32 CHRONIC DIASTOLIC CONGESTIVE HEART FAILURE (HCC): Primary | ICD-10-CM

## 2018-06-08 DIAGNOSIS — I10 ESSENTIAL HYPERTENSION: ICD-10-CM

## 2018-06-08 DIAGNOSIS — E11.22 TYPE 2 DIABETES MELLITUS WITH CHRONIC KIDNEY DISEASE, WITH LONG-TERM CURRENT USE OF INSULIN, UNSPECIFIED CKD STAGE (HCC): ICD-10-CM

## 2018-06-08 DIAGNOSIS — D63.1 ANEMIA IN STAGE 4 CHRONIC KIDNEY DISEASE (HCC): ICD-10-CM

## 2018-06-08 DIAGNOSIS — N18.4 ANEMIA IN STAGE 4 CHRONIC KIDNEY DISEASE (HCC): ICD-10-CM

## 2018-06-08 PROCEDURE — 99214 OFFICE O/P EST MOD 30 MIN: CPT | Performed by: INTERNAL MEDICINE

## 2018-06-08 RX ORDER — HYDRALAZINE HYDROCHLORIDE 50 MG/1
50 TABLET, FILM COATED ORAL 2 TIMES DAILY
COMMUNITY
End: 2018-06-11 | Stop reason: DRUGHIGH

## 2018-06-08 NOTE — PROGRESS NOTES
"Chief Complaint   Patient presents with   • Follow-up     3 mon f/u    • Shortness of Breath   • Dizziness   • Itching     All over body    ckd, anemia, gout, htn, itch, dyspnea, dm.     History of Present Illness   Veronica Henao is a 83 y.o. female presents for routine follow up. Patient has chronic kidney disease. Patient has hypertension. She has not been feeling well. She is dyspnic. She went to her nephrologist. She had a chest x ray that per patient did not reveal \"any fluid\". She becomes short of breath with short distances. Due to poor renal function demadex was reduced from 80 mg to 40 mg and hydralazine was increased to 100 mg po bid. Patient feels itching without a rash. She is quite fatigued.   Reports that glucose is stable. No  Recent a1c. No recent gout flair.   Has persistent itching. All over. She is taking zyrtec an claritin every evening w/ no benefit.         The following portions of the patient's history were reviewed and updated as appropriate: allergies, current medications, past family history, past medical history, past social history, past surgical history and problem list.  Current Outpatient Prescriptions on File Prior to Visit   Medication Sig Dispense Refill   • acetaminophen (TYLENOL) 325 MG tablet Take 2 tablets by mouth Every 6 (Six) Hours As Needed for Mild Pain (1-3).  0   • allopurinol (ZYLOPRIM) 100 MG tablet Take 1 tablet by mouth Daily. 30 tablet 5   • cetirizine (zyrTEC) 10 MG tablet Take 10 mg by mouth Every Night.     • cholecalciferol (VITAMIN D3) 1000 UNITS tablet Take 1,000 Units by mouth daily.     • Cyanocobalamin (VITAMIN B-12) 5000 MCG sublingual tablet Take 5,000 mcg by mouth Daily.     • diphenhydrAMINE (BENADRYL) 2 % cream Apply  topically 3 (Three) Times a Day As Needed for Itching. 30 g 3   • ferrous sulfate 325 (65 FE) MG EC tablet Take 1 tablet by mouth Daily With Breakfast. 30 tablet 11   • glimepiride (AMARYL) 2 MG tablet Take 2 mg by mouth 2 (Two) Times a " Day.     • hydrALAZINE (APRESOLINE) 50 MG tablet TAKE 1 TABLET TWICE DAILY 180 tablet 1   • Incontinence Supply Disposable (BLADDER CONTROL PADS EX ABSORB) misc      • insulin degludec (TRESIBA FLEXTOUCH) 100 UNIT/ML solution pen-injector injection Inject 10 Units under the skin Daily. Indications: Diabetes 2 pen 3   • latanoprost (XALATAN) 0.005 % ophthalmic solution Administer 1 drop to both eyes Every Night.     • linagliptin (TRADJENTA) 5 MG tablet tablet Take 5 mg by mouth Daily.     • montelukast (SINGULAIR) 10 MG tablet Take 10 mg by mouth Every Night.     • Multiple Vitamins-Minerals (CENTRUM SILVER PO) Take 1 tablet by mouth Daily.     • nystatin-triamcinolone (MYCOLOG) 190909-7.1 UNIT/GM-% ointment Apply  topically 2 (Two) Times a Day. 30 g 3   • Omega-3 Fatty Acids (FISH OIL) 1000 MG capsule capsule Take by mouth Daily With Breakfast     • potassium chloride (MICRO-K) 10 MEQ CR capsule TAKE 1 CAPSULE TWICE DAILY 180 capsule 3   • raNITIdine (ZANTAC) 150 MG tablet TAKE 1 TABLET TWICE DAILY 180 tablet 1   • rivaroxaban (XARELTO) 15 MG tablet Take 1 tablet by mouth Daily With Dinner. 42 tablet 0   • sertraline (ZOLOFT) 50 MG tablet TAKE 1 TABLET EVERY DAY AS DIRECTED 90 tablet 2   • torsemide (DEMADEX) 20 MG tablet Take 3 tablets by mouth Daily. (Patient taking differently: Take 100 mg by mouth Daily.) 270 tablet 3   • TRUE METRIX BLOOD GLUCOSE TEST test strip CHECK BLOOD SUGAR TWICE DAILY 100 each 3   • Vitamin E 400 UNITS chewable tablet Chew 400 Units Daily. Two daily     • Vitamins A & D (VITAMIN A & D) 72397-140 UNITS tablet      • [DISCONTINUED] hydrOXYzine (ATARAX) 25 MG tablet TAKE 1 TABLET EVERY NIGHT AT BEDTIME 90 tablet 0     No current facility-administered medications on file prior to visit.      Review of Systems   Constitutional: Positive for fatigue.   HENT: Negative.    Eyes: Negative.    Respiratory: Positive for shortness of breath.    Cardiovascular: Negative.    Gastrointestinal:  "Negative.    Endocrine: Negative.    Genitourinary: Negative.    Musculoskeletal: Negative.    Skin: Negative.    Allergic/Immunologic: Negative.    Hematological: Negative.    Psychiatric/Behavioral: Negative.        Objective   Physical Exam   Constitutional: She is oriented to person, place, and time. She appears well-developed and well-nourished.   HENT:   Head: Normocephalic and atraumatic.   Right Ear: External ear normal.   Left Ear: External ear normal.   Nose: Nose normal.   Mouth/Throat: Oropharynx is clear and moist.   Eyes: EOM are normal. Pupils are equal, round, and reactive to light.   Neck: Normal range of motion. Neck supple.   Cardiovascular: Normal rate, regular rhythm, normal heart sounds and intact distal pulses.    Pitting edema L>R   Pulmonary/Chest: Effort normal and breath sounds normal.   Dyspnea with exertion   Abdominal: Soft. Bowel sounds are normal.   Musculoskeletal:   Arthritic changes   Neurological: She is alert and oriented to person, place, and time.   Skin: Skin is warm and dry.   Psychiatric: She has a normal mood and affect. Her behavior is normal. Judgment and thought content normal.   Nursing note and vitals reviewed.   02 [id number]% on ra even with walk. Gave xopenex neb. Patient reported mild symptom improvement.     /72   Pulse 85   Temp 98 °F (36.7 °C)   Ht 160 cm (62.99\")   Wt 99.3 kg (219 lb)   SpO2 100%   BMI 38.80 kg/m²     Assessment/Plan   Diagnoses and all orders for this visit:    Chronic diastolic congestive heart failure  -     proBNP    Essential hypertension  -     Basic Metabolic Panel    Anemia in stage 4 chronic kidney disease  -     CBC & Differential    Chronic kidney disease, stage IV (severe)  -     PTH, Intact    Subclinical hypothyroidism  -     PTH, Intact  -     TSH    Chronic gout without tophus, unspecified cause, unspecified site  -     Uric Acid    Type 2 diabetes mellitus with chronic kidney disease, with long-term current " use of insulin, unspecified CKD stage  -     Hemoglobin A1c    Other orders  -     hydrALAZINE (APRESOLINE) 50 MG tablet; Take 50 mg by mouth 2 (Two) Times a Day.      Complicated patient. Feeling fatigue w/ exertional dyspnea. Progressive renal disease with anemia. Likely largely the source. She also has heart failure. Will test bmp and bnp to see which is larger contributor. Will increase torsemide to 20 mg tid. She has a past history of tob dep. Will get pft and try anoro qd in the event that copd plays any role. ? If she has JOSELUIS contributing to heart failure/ fatigue? Will refer to pulm for pft and possibly sleep studies. She will have listed labs tested. Encouraged lotion for her itching and will continue antihistamine for now. Will test a1c, look for anemia, thyroid function, uric acid levels as well. She will have a close follow up.

## 2018-06-09 LAB
BASOPHILS # BLD AUTO: 0.04 10*3/MM3 (ref 0–0.2)
BASOPHILS NFR BLD AUTO: 0.5 % (ref 0–1.5)
BUN SERPL-MCNC: 41 MG/DL (ref 8–23)
BUN/CREAT SERPL: 16.7 (ref 7–25)
CALCIUM SERPL-MCNC: 9.4 MG/DL (ref 8.6–10.5)
CHLORIDE SERPL-SCNC: 98 MMOL/L (ref 98–107)
CO2 SERPL-SCNC: 23.2 MMOL/L (ref 22–29)
CREAT SERPL-MCNC: 2.46 MG/DL (ref 0.57–1)
EOSINOPHIL # BLD AUTO: 0.2 10*3/MM3 (ref 0–0.7)
EOSINOPHIL NFR BLD AUTO: 2.4 % (ref 0.3–6.2)
ERYTHROCYTE [DISTWIDTH] IN BLOOD BY AUTOMATED COUNT: 15.4 % (ref 11.7–13)
GFR SERPLBLD CREATININE-BSD FMLA CKD-EPI: 19 ML/MIN/1.73
GFR SERPLBLD CREATININE-BSD FMLA CKD-EPI: 23 ML/MIN/1.73
GLUCOSE SERPL-MCNC: 244 MG/DL (ref 65–99)
HBA1C MFR BLD: 7.55 % (ref 4.8–5.6)
HCT VFR BLD AUTO: 34.6 % (ref 35.6–45.5)
HGB BLD-MCNC: 11.2 G/DL (ref 11.9–15.5)
IMM GRANULOCYTES # BLD: 0.02 10*3/MM3 (ref 0–0.03)
IMM GRANULOCYTES NFR BLD: 0.2 % (ref 0–0.5)
LYMPHOCYTES # BLD AUTO: 1.56 10*3/MM3 (ref 0.9–4.8)
LYMPHOCYTES NFR BLD AUTO: 18.7 % (ref 19.6–45.3)
MCH RBC QN AUTO: 29.8 PG (ref 26.9–32)
MCHC RBC AUTO-ENTMCNC: 32.4 G/DL (ref 32.4–36.3)
MCV RBC AUTO: 92 FL (ref 80.5–98.2)
MONOCYTES # BLD AUTO: 0.64 10*3/MM3 (ref 0.2–1.2)
MONOCYTES NFR BLD AUTO: 7.7 % (ref 5–12)
NEUTROPHILS # BLD AUTO: 5.9 10*3/MM3 (ref 1.9–8.1)
NEUTROPHILS NFR BLD AUTO: 70.7 % (ref 42.7–76)
NT-PROBNP SERPL-MCNC: 2841 PG/ML (ref 0–738)
PLATELET # BLD AUTO: 249 10*3/MM3 (ref 140–500)
POTASSIUM SERPL-SCNC: 5.3 MMOL/L (ref 3.5–5.2)
PTH-INTACT SERPL-MCNC: 181 PG/ML (ref 15–65)
RBC # BLD AUTO: 3.76 10*6/MM3 (ref 3.9–5.2)
SODIUM SERPL-SCNC: 138 MMOL/L (ref 136–145)
TSH SERPL DL<=0.005 MIU/L-ACNC: 2.55 MIU/ML (ref 0.27–4.2)
URATE SERPL-MCNC: 8.8 MG/DL (ref 2.4–5.7)
WBC # BLD AUTO: 8.34 10*3/MM3 (ref 4.5–10.7)

## 2018-06-11 ENCOUNTER — DOCUMENTATION (OUTPATIENT)
Dept: INTERNAL MEDICINE | Facility: CLINIC | Age: 83
End: 2018-06-11

## 2018-06-11 RX ORDER — ALLOPURINOL 100 MG/1
100 TABLET ORAL 2 TIMES DAILY
Qty: 180 TABLET | Refills: 2 | Status: SHIPPED | OUTPATIENT
Start: 2018-06-11 | End: 2019-08-19

## 2018-06-11 RX ORDER — HYDRALAZINE HYDROCHLORIDE 100 MG/1
100 TABLET, FILM COATED ORAL 2 TIMES DAILY
Qty: 180 TABLET | Refills: 1 | Status: SHIPPED | OUTPATIENT
Start: 2018-06-11 | End: 2018-12-10 | Stop reason: SDUPTHER

## 2018-06-11 NOTE — PROGRESS NOTES
Patients labs reviewed. She is advised to increase allopurinol to 2/ day. Continue demadex at tid. Will monitor potassium level. She does not have elevated calcium. She reports that her breathing is improving w/ increased demadex. She is on an increased dose of hydralazine and this was sent to her pharmacy. Patient is aware of med changes. She is to reduce carbohydrates in her diet. Per new guidelines will maintain A1c <8.0.

## 2018-06-25 ENCOUNTER — TELEPHONE (OUTPATIENT)
Dept: INTERNAL MEDICINE | Facility: CLINIC | Age: 83
End: 2018-06-25

## 2018-06-25 NOTE — TELEPHONE ENCOUNTER
Max dosing is 8 mg daily   Is she taking 2mg 4 times daily?  With her limited kidney function she could end up with low sugars. Please schedule a follow up to discuss DM management

## 2018-06-25 NOTE — TELEPHONE ENCOUNTER
Pt called to say that she has stopped her Tresiba and is taking her Glimepiride 4 x day. She says she feels much better. Her sugars are running 109-135.

## 2018-06-28 RX ORDER — GLIMEPIRIDE 2 MG/1
4 TABLET ORAL 2 TIMES DAILY
Qty: 180 TABLET | Refills: 1 | Status: SHIPPED | OUTPATIENT
Start: 2018-06-28 | End: 2018-07-05 | Stop reason: SDUPTHER

## 2018-07-05 RX ORDER — GLIMEPIRIDE 2 MG/1
4 TABLET ORAL 2 TIMES DAILY
Qty: 180 TABLET | Refills: 1 | Status: SHIPPED | OUTPATIENT
Start: 2018-07-05 | End: 2018-10-08 | Stop reason: ALTCHOICE

## 2018-07-09 RX ORDER — HYDROXYZINE HYDROCHLORIDE 25 MG/1
TABLET, FILM COATED ORAL
Qty: 90 TABLET | Refills: 0 | Status: SHIPPED | OUTPATIENT
Start: 2018-07-09 | End: 2018-11-04 | Stop reason: HOSPADM

## 2018-07-17 ENCOUNTER — OFFICE VISIT (OUTPATIENT)
Dept: INTERNAL MEDICINE | Facility: CLINIC | Age: 83
End: 2018-07-17

## 2018-07-17 VITALS
OXYGEN SATURATION: 98 % | WEIGHT: 214 LBS | DIASTOLIC BLOOD PRESSURE: 80 MMHG | HEART RATE: 86 BPM | BODY MASS INDEX: 37.92 KG/M2 | SYSTOLIC BLOOD PRESSURE: 146 MMHG

## 2018-07-17 DIAGNOSIS — E66.01 MORBID OBESITY, UNSPECIFIED OBESITY TYPE (HCC): ICD-10-CM

## 2018-07-17 DIAGNOSIS — I50.33 ACUTE ON CHRONIC DIASTOLIC CONGESTIVE HEART FAILURE (HCC): Primary | ICD-10-CM

## 2018-07-17 DIAGNOSIS — E11.22 TYPE 2 DIABETES MELLITUS WITH CHRONIC KIDNEY DISEASE, WITH LONG-TERM CURRENT USE OF INSULIN, UNSPECIFIED CKD STAGE (HCC): ICD-10-CM

## 2018-07-17 DIAGNOSIS — Z79.4 TYPE 2 DIABETES MELLITUS WITH CHRONIC KIDNEY DISEASE, WITH LONG-TERM CURRENT USE OF INSULIN, UNSPECIFIED CKD STAGE (HCC): ICD-10-CM

## 2018-07-17 DIAGNOSIS — D63.1 ANEMIA IN STAGE 4 CHRONIC KIDNEY DISEASE (HCC): ICD-10-CM

## 2018-07-17 DIAGNOSIS — N18.4 ANEMIA IN STAGE 4 CHRONIC KIDNEY DISEASE (HCC): ICD-10-CM

## 2018-07-17 PROCEDURE — 99214 OFFICE O/P EST MOD 30 MIN: CPT | Performed by: INTERNAL MEDICINE

## 2018-07-17 RX ORDER — FUROSEMIDE 40 MG/1
40 TABLET ORAL 2 TIMES DAILY
Qty: 180 TABLET | Refills: 2 | Status: ON HOLD | OUTPATIENT
Start: 2018-07-17 | End: 2018-08-30

## 2018-07-17 NOTE — PROGRESS NOTES
Chief Complaint   Patient presents with   • Diabetes   dyspnea  DM 2  CKD  Gout  chf    History of Present Illness   Veronica Henao is a 83 y.o. female presents for follow up evaluation. Patient has multiple chronic issues. She has dyspnea related to chf on possibly chronic lung disease. Patient had an elevated bnp early June. She has been taking demadex 20 mg tid. This was not giving her adequate fluid control so she switched to furosemide 40 mg once a day. Some continued swelling and dyspnea but improved with this change. She has dm and glucose was close to goal level on daily insulin. She reported that this caused increased swelling and intolerance. She stopped insulin for this reason. Am fasting glucose is now 140s-170 on glimeperide and tradjenta. She has gout. She takes allopurinol. Uric acid is above goal. She is to apply for med coverage through social security.     The following portions of the patient's history were reviewed and updated as appropriate: allergies, current medications, past family history, past medical history, past social history, past surgical history and problem list.  Current Outpatient Prescriptions on File Prior to Visit   Medication Sig Dispense Refill   • acetaminophen (TYLENOL) 325 MG tablet Take 2 tablets by mouth Every 6 (Six) Hours As Needed for Mild Pain (1-3).  0   • allopurinol (ZYLOPRIM) 100 MG tablet Take 1 tablet by mouth 2 (Two) Times a Day. 180 tablet 2   • cetirizine (zyrTEC) 10 MG tablet Take 10 mg by mouth Every Night.     • cholecalciferol (VITAMIN D3) 1000 UNITS tablet Take 1,000 Units by mouth daily.     • Cyanocobalamin (VITAMIN B-12) 5000 MCG sublingual tablet Take 5,000 mcg by mouth Daily.     • diphenhydrAMINE (BENADRYL) 2 % cream Apply  topically 3 (Three) Times a Day As Needed for Itching. 30 g 3   • ferrous sulfate 325 (65 FE) MG EC tablet Take 1 tablet by mouth Daily With Breakfast. 30 tablet 11   • glimepiride (AMARYL) 2 MG tablet Take 2 tablets by mouth 2  (Two) Times a Day. DM E11.9 180 tablet 1   • hydrALAZINE (APRESOLINE) 100 MG tablet Take 1 tablet by mouth 2 (Two) Times a Day. 180 tablet 1   • hydrOXYzine (ATARAX) 25 MG tablet TAKE 1 TABLET AT BEDTIME 90 tablet 0   • Incontinence Supply Disposable (BLADDER CONTROL PADS EX ABSORB) misc      • latanoprost (XALATAN) 0.005 % ophthalmic solution Administer 1 drop to both eyes Every Night.     • linagliptin (TRADJENTA) 5 MG tablet tablet Take 5 mg by mouth Daily.     • montelukast (SINGULAIR) 10 MG tablet Take 10 mg by mouth Every Night.     • Multiple Vitamins-Minerals (CENTRUM SILVER PO) Take 1 tablet by mouth Daily.     • nystatin-triamcinolone (MYCOLOG) 014443-2.1 UNIT/GM-% ointment Apply  topically 2 (Two) Times a Day. 30 g 3   • Omega-3 Fatty Acids (FISH OIL) 1000 MG capsule capsule Take by mouth Daily With Breakfast     • raNITIdine (ZANTAC) 150 MG tablet TAKE 1 TABLET TWICE DAILY 180 tablet 1   • rivaroxaban (XARELTO) 15 MG tablet Take 1 tablet by mouth Daily With Dinner. 42 tablet 0   • sertraline (ZOLOFT) 50 MG tablet TAKE 1 TABLET EVERY DAY AS DIRECTED 90 tablet 2   • TRUE METRIX BLOOD GLUCOSE TEST test strip CHECK BLOOD SUGAR TWICE DAILY 100 each 3   • Vitamin E 400 UNITS chewable tablet Chew 400 Units Daily. Two daily     • Vitamins A & D (VITAMIN A & D) 00924-940 UNITS tablet      • [DISCONTINUED] torsemide (DEMADEX) 20 MG tablet Take 3 tablets by mouth Daily. (Patient taking differently: Take 100 mg by mouth Daily.) 270 tablet 3   • insulin degludec (TRESIBA FLEXTOUCH) 100 UNIT/ML solution pen-injector injection Inject 10 Units under the skin Daily. Indications: Diabetes 2 pen 3     No current facility-administered medications on file prior to visit.      Review of Systems   Constitutional: Positive for fatigue.   HENT: Negative.    Eyes: Negative.    Respiratory: Positive for shortness of breath.    Cardiovascular: Positive for leg swelling.   Gastrointestinal: Negative.    Endocrine: Negative.     Genitourinary: Negative.    Musculoskeletal: Positive for arthralgias and back pain.   Skin: Negative.    Allergic/Immunologic: Negative.    Neurological: Negative.    Hematological: Negative.    Psychiatric/Behavioral: Negative.        Objective   Physical Exam   Constitutional: She is oriented to person, place, and time. She appears well-developed and well-nourished.   HENT:   Head: Normocephalic and atraumatic.   Right Ear: External ear normal.   Left Ear: External ear normal.   Nose: Nose normal.   Mouth/Throat: Oropharynx is clear and moist.   Eyes: Conjunctivae and EOM are normal. Pupils are equal, round, and reactive to light.   Neck: Normal range of motion. Neck supple.   Cardiovascular: Normal rate, regular rhythm and normal heart sounds.    2+ pitting edema bilateral   Pulmonary/Chest: Effort normal and breath sounds normal.   Abdominal: Soft. Bowel sounds are normal.   Musculoskeletal:   Cautious gait but ambulating independently   Neurological: She is alert and oriented to person, place, and time.   Skin: Skin is warm and dry.   Psychiatric: She has a normal mood and affect. Her behavior is normal. Judgment and thought content normal.   Nursing note and vitals reviewed.       /80   Pulse 86   Wt 97.1 kg (214 lb)   SpO2 98%   BMI 37.92 kg/m²     Assessment/Plan   Diagnoses and all orders for this visit:    Acute on chronic diastolic (congestive) heart failure  -     Comprehensive metabolic panel; Future  -     proBNP    Type 2 diabetes mellitus with chronic kidney disease, with long-term current use of insulin, unspecified CKD stage (CMS/[facility])  -     Comprehensive metabolic panel; Future  -     proBNP    Morbid obesity, unspecified obesity type (CMS/[facility])    Anemia in stage 4 chronic kidney disease (CMS/[facility])    Other orders  -     Semaglutide (OZEMPIC) 0.25 or 0.5 MG/DOSE solution pen-injector; Inject 0.5 mg under the skin 1 (One) Time Per Week.  -     furosemide (LASIX) 40 MG tablet; Take 1  tablet by mouth 2 (Two) Times a Day.      Patient w/ dyspnea. Known chf. Will try switching from torsemide back to furosemide given improved symptoms with this. Will test bmp and renal function today. She is off of insulin and will remain off of this. She will try ozempic once weekly starting at .25mg for 1 mo then increase to .5mg for one month. May increase to 1 mg if needed. She will f/u w/ nephrology and go to pulmonary as scheduled. She is advised weight loss through dietary and fitness changes. shwe will follow up in 3 mo or prn. Continue allopurinol for gout. Will not increase related to renal insufficiency.

## 2018-07-18 LAB
Lab: NORMAL
NT-PROBNP SERPL-MCNC: 2311 PG/ML (ref 0–738)

## 2018-07-22 ENCOUNTER — RESULTS ENCOUNTER (OUTPATIENT)
Dept: INTERNAL MEDICINE | Facility: CLINIC | Age: 83
End: 2018-07-22

## 2018-07-22 DIAGNOSIS — I50.33 ACUTE ON CHRONIC DIASTOLIC CONGESTIVE HEART FAILURE (HCC): ICD-10-CM

## 2018-07-22 DIAGNOSIS — Z79.4 TYPE 2 DIABETES MELLITUS WITH CHRONIC KIDNEY DISEASE, WITH LONG-TERM CURRENT USE OF INSULIN, UNSPECIFIED CKD STAGE (HCC): ICD-10-CM

## 2018-07-22 DIAGNOSIS — E11.22 TYPE 2 DIABETES MELLITUS WITH CHRONIC KIDNEY DISEASE, WITH LONG-TERM CURRENT USE OF INSULIN, UNSPECIFIED CKD STAGE (HCC): ICD-10-CM

## 2018-07-23 RX ORDER — RANITIDINE 150 MG/1
TABLET ORAL
Qty: 180 TABLET | Refills: 1 | Status: SHIPPED | OUTPATIENT
Start: 2018-07-23 | End: 2018-07-24 | Stop reason: SDUPTHER

## 2018-07-24 RX ORDER — ISOPROPYL ALCOHOL 0.75 G/1
1 SWAB TOPICAL DAILY
Qty: 100 EACH | Refills: 11 | Status: SHIPPED | OUTPATIENT
Start: 2018-07-24 | End: 2020-01-27 | Stop reason: SDUPTHER

## 2018-07-24 RX ORDER — GLUCOSAM/CHON-MSM1/C/MANG/BOSW 500-416.6
TABLET ORAL
Qty: 100 EACH | Refills: 12 | Status: SHIPPED | OUTPATIENT
Start: 2018-07-24 | End: 2018-07-31 | Stop reason: SDUPTHER

## 2018-07-24 RX ORDER — RANITIDINE 150 MG/1
150 TABLET ORAL 2 TIMES DAILY
Qty: 180 TABLET | Refills: 1 | Status: SHIPPED | OUTPATIENT
Start: 2018-07-24 | End: 2019-02-12 | Stop reason: SDUPTHER

## 2018-07-24 RX ORDER — BLOOD-GLUCOSE METER
1 EACH MISCELLANEOUS DAILY
Qty: 1 EACH | Refills: 0 | Status: SHIPPED | OUTPATIENT
Start: 2018-07-24 | End: 2020-01-27 | Stop reason: SDUPTHER

## 2018-07-31 RX ORDER — GLUCOSAM/CHON-MSM1/C/MANG/BOSW 500-416.6
TABLET ORAL
Qty: 100 EACH | Refills: 12 | Status: SHIPPED | OUTPATIENT
Start: 2018-07-31 | End: 2020-01-27 | Stop reason: SDUPTHER

## 2018-07-31 RX ORDER — LANCETS 23 GAUGE
EACH MISCELLANEOUS
Qty: 100 EACH | Refills: 12 | Status: SHIPPED | OUTPATIENT
Start: 2018-07-31

## 2018-08-27 ENCOUNTER — APPOINTMENT (OUTPATIENT)
Dept: GENERAL RADIOLOGY | Facility: HOSPITAL | Age: 83
End: 2018-08-27

## 2018-08-27 ENCOUNTER — HOSPITAL ENCOUNTER (INPATIENT)
Facility: HOSPITAL | Age: 83
LOS: 3 days | Discharge: HOME | End: 2018-08-30
Attending: INTERNAL MEDICINE | Admitting: INTERNAL MEDICINE

## 2018-08-27 ENCOUNTER — APPOINTMENT (OUTPATIENT)
Dept: CARDIOLOGY | Facility: HOSPITAL | Age: 83
End: 2018-08-27

## 2018-08-27 ENCOUNTER — APPOINTMENT (OUTPATIENT)
Dept: NUCLEAR MEDICINE | Facility: HOSPITAL | Age: 83
End: 2018-08-27

## 2018-08-27 DIAGNOSIS — Z99.89 OSA ON CPAP: ICD-10-CM

## 2018-08-27 DIAGNOSIS — I48.0 PAROXYSMAL ATRIAL FIBRILLATION (HCC): Primary | ICD-10-CM

## 2018-08-27 DIAGNOSIS — G47.33 OSA ON CPAP: ICD-10-CM

## 2018-08-27 LAB
ALBUMIN SERPL-MCNC: 3.7 G/DL (ref 3.5–5.2)
ALBUMIN/GLOB SERPL: 1 G/DL
ALP SERPL-CCNC: 139 U/L (ref 39–117)
ALT SERPL W P-5'-P-CCNC: 12 U/L (ref 1–33)
ANION GAP SERPL CALCULATED.3IONS-SCNC: 13.6 MMOL/L
AST SERPL-CCNC: 16 U/L (ref 1–32)
BH CV LOWER VASCULAR LEFT COMMON FEMORAL AUGMENT: NORMAL
BH CV LOWER VASCULAR LEFT COMMON FEMORAL COMPETENT: NORMAL
BH CV LOWER VASCULAR LEFT COMMON FEMORAL COMPRESS: NORMAL
BH CV LOWER VASCULAR LEFT COMMON FEMORAL PHASIC: NORMAL
BH CV LOWER VASCULAR LEFT COMMON FEMORAL SPONT: NORMAL
BH CV LOWER VASCULAR LEFT DISTAL FEMORAL COMPRESS: NORMAL
BH CV LOWER VASCULAR LEFT GASTRONEMIUS COMPRESS: NORMAL
BH CV LOWER VASCULAR LEFT GREATER SAPH AK COMPRESS: NORMAL
BH CV LOWER VASCULAR LEFT GREATER SAPH BK COMPRESS: NORMAL
BH CV LOWER VASCULAR LEFT MID FEMORAL AUGMENT: NORMAL
BH CV LOWER VASCULAR LEFT MID FEMORAL COMPETENT: NORMAL
BH CV LOWER VASCULAR LEFT MID FEMORAL COMPRESS: NORMAL
BH CV LOWER VASCULAR LEFT MID FEMORAL PHASIC: NORMAL
BH CV LOWER VASCULAR LEFT MID FEMORAL SPONT: NORMAL
BH CV LOWER VASCULAR LEFT PERONEAL COMPRESS: NORMAL
BH CV LOWER VASCULAR LEFT POPLITEAL AUGMENT: NORMAL
BH CV LOWER VASCULAR LEFT POPLITEAL COMPETENT: NORMAL
BH CV LOWER VASCULAR LEFT POPLITEAL COMPRESS: NORMAL
BH CV LOWER VASCULAR LEFT POPLITEAL PHASIC: NORMAL
BH CV LOWER VASCULAR LEFT POPLITEAL SPONT: NORMAL
BH CV LOWER VASCULAR LEFT POSTERIOR TIBIAL COMPRESS: NORMAL
BH CV LOWER VASCULAR LEFT PROXIMAL FEMORAL COMPRESS: NORMAL
BH CV LOWER VASCULAR LEFT SAPHENOFEMORAL JUNCTION AUGMENT: NORMAL
BH CV LOWER VASCULAR LEFT SAPHENOFEMORAL JUNCTION COMPETENT: NORMAL
BH CV LOWER VASCULAR LEFT SAPHENOFEMORAL JUNCTION COMPRESS: NORMAL
BH CV LOWER VASCULAR LEFT SAPHENOFEMORAL JUNCTION PHASIC: NORMAL
BH CV LOWER VASCULAR LEFT SAPHENOFEMORAL JUNCTION SPONT: NORMAL
BH CV LOWER VASCULAR RIGHT COMMON FEMORAL AUGMENT: NORMAL
BH CV LOWER VASCULAR RIGHT COMMON FEMORAL COMPETENT: NORMAL
BH CV LOWER VASCULAR RIGHT COMMON FEMORAL COMPRESS: NORMAL
BH CV LOWER VASCULAR RIGHT COMMON FEMORAL PHASIC: NORMAL
BH CV LOWER VASCULAR RIGHT COMMON FEMORAL SPONT: NORMAL
BH CV LOWER VASCULAR RIGHT DISTAL FEMORAL COMPRESS: NORMAL
BH CV LOWER VASCULAR RIGHT GASTRONEMIUS COMPRESS: NORMAL
BH CV LOWER VASCULAR RIGHT GREATER SAPH AK COMPRESS: NORMAL
BH CV LOWER VASCULAR RIGHT GREATER SAPH BK COMPRESS: NORMAL
BH CV LOWER VASCULAR RIGHT MID FEMORAL AUGMENT: NORMAL
BH CV LOWER VASCULAR RIGHT MID FEMORAL COMPETENT: NORMAL
BH CV LOWER VASCULAR RIGHT MID FEMORAL COMPRESS: NORMAL
BH CV LOWER VASCULAR RIGHT MID FEMORAL PHASIC: NORMAL
BH CV LOWER VASCULAR RIGHT MID FEMORAL SPONT: NORMAL
BH CV LOWER VASCULAR RIGHT PERONEAL COMPRESS: NORMAL
BH CV LOWER VASCULAR RIGHT POPLITEAL AUGMENT: NORMAL
BH CV LOWER VASCULAR RIGHT POPLITEAL COMPETENT: NORMAL
BH CV LOWER VASCULAR RIGHT POPLITEAL COMPRESS: NORMAL
BH CV LOWER VASCULAR RIGHT POPLITEAL PHASIC: NORMAL
BH CV LOWER VASCULAR RIGHT POPLITEAL SPONT: NORMAL
BH CV LOWER VASCULAR RIGHT POSTERIOR TIBIAL COMPRESS: NORMAL
BH CV LOWER VASCULAR RIGHT PROXIMAL FEMORAL COMPRESS: NORMAL
BH CV LOWER VASCULAR RIGHT SAPHENOFEMORAL JUNCTION AUGMENT: NORMAL
BH CV LOWER VASCULAR RIGHT SAPHENOFEMORAL JUNCTION COMPETENT: NORMAL
BH CV LOWER VASCULAR RIGHT SAPHENOFEMORAL JUNCTION COMPRESS: NORMAL
BH CV LOWER VASCULAR RIGHT SAPHENOFEMORAL JUNCTION PHASIC: NORMAL
BH CV LOWER VASCULAR RIGHT SAPHENOFEMORAL JUNCTION SPONT: NORMAL
BILIRUB SERPL-MCNC: 0.3 MG/DL (ref 0.1–1.2)
BUN BLD-MCNC: 38 MG/DL (ref 8–23)
BUN/CREAT SERPL: 14.2 (ref 7–25)
CALCIUM SPEC-SCNC: 9.7 MG/DL (ref 8.6–10.5)
CHLORIDE SERPL-SCNC: 100 MMOL/L (ref 98–107)
CO2 SERPL-SCNC: 22.4 MMOL/L (ref 22–29)
CREAT BLD-MCNC: 2.67 MG/DL (ref 0.57–1)
GFR SERPL CREATININE-BSD FRML MDRD: 17 ML/MIN/1.73
GLOBULIN UR ELPH-MCNC: 3.8 GM/DL
GLUCOSE BLD-MCNC: 141 MG/DL (ref 65–99)
GLUCOSE BLDC GLUCOMTR-MCNC: 101 MG/DL (ref 70–130)
GLUCOSE BLDC GLUCOMTR-MCNC: 117 MG/DL (ref 70–130)
POTASSIUM BLD-SCNC: 4.2 MMOL/L (ref 3.5–5.2)
PROT SERPL-MCNC: 7.5 G/DL (ref 6–8.5)
SODIUM BLD-SCNC: 136 MMOL/L (ref 136–145)

## 2018-08-27 PROCEDURE — 99222 1ST HOSP IP/OBS MODERATE 55: CPT | Performed by: INTERNAL MEDICINE

## 2018-08-27 PROCEDURE — 93970 EXTREMITY STUDY: CPT

## 2018-08-27 PROCEDURE — 80053 COMPREHEN METABOLIC PANEL: CPT | Performed by: NURSE PRACTITIONER

## 2018-08-27 PROCEDURE — 93010 ELECTROCARDIOGRAM REPORT: CPT | Performed by: INTERNAL MEDICINE

## 2018-08-27 PROCEDURE — 78582 LUNG VENTILAT&PERFUS IMAGING: CPT

## 2018-08-27 PROCEDURE — 94762 N-INVAS EAR/PLS OXIMTRY CONT: CPT

## 2018-08-27 PROCEDURE — A9540 TC99M MAA: HCPCS | Performed by: INTERNAL MEDICINE

## 2018-08-27 PROCEDURE — 76000 FLUOROSCOPY <1 HR PHYS/QHP: CPT

## 2018-08-27 PROCEDURE — A9558 XE133 XENON 10MCI: HCPCS | Performed by: INTERNAL MEDICINE

## 2018-08-27 PROCEDURE — 0 XENON XE 133: Performed by: INTERNAL MEDICINE

## 2018-08-27 PROCEDURE — 0 TECHNETIUM ALBUMIN AGGREGATED: Performed by: INTERNAL MEDICINE

## 2018-08-27 PROCEDURE — 93005 ELECTROCARDIOGRAM TRACING: CPT | Performed by: INTERNAL MEDICINE

## 2018-08-27 PROCEDURE — 25010000002 FUROSEMIDE PER 20 MG: Performed by: INTERNAL MEDICINE

## 2018-08-27 PROCEDURE — 82962 GLUCOSE BLOOD TEST: CPT

## 2018-08-27 RX ORDER — ONDANSETRON 4 MG/1
4 TABLET, FILM COATED ORAL EVERY 6 HOURS PRN
Status: DISCONTINUED | OUTPATIENT
Start: 2018-08-27 | End: 2018-08-30 | Stop reason: HOSPADM

## 2018-08-27 RX ORDER — FAMOTIDINE 20 MG/1
20 TABLET, FILM COATED ORAL 2 TIMES DAILY
Status: DISCONTINUED | OUTPATIENT
Start: 2018-08-27 | End: 2018-08-27

## 2018-08-27 RX ORDER — MELATONIN
1000 DAILY
Status: DISCONTINUED | OUTPATIENT
Start: 2018-08-27 | End: 2018-08-30 | Stop reason: HOSPADM

## 2018-08-27 RX ORDER — HYDRALAZINE HYDROCHLORIDE 50 MG/1
100 TABLET, FILM COATED ORAL 2 TIMES DAILY
Status: DISCONTINUED | OUTPATIENT
Start: 2018-08-27 | End: 2018-08-30 | Stop reason: HOSPADM

## 2018-08-27 RX ORDER — GLIPIZIDE 5 MG/1
5 TABLET ORAL
Status: DISCONTINUED | OUTPATIENT
Start: 2018-08-27 | End: 2018-08-30 | Stop reason: HOSPADM

## 2018-08-27 RX ORDER — VITAMIN E 268 MG
400 CAPSULE ORAL DAILY
Status: DISCONTINUED | OUTPATIENT
Start: 2018-08-27 | End: 2018-08-30 | Stop reason: HOSPADM

## 2018-08-27 RX ORDER — LEVOCETIRIZINE DIHYDROCHLORIDE 5 MG/1
5 TABLET, FILM COATED ORAL 2 TIMES DAILY
COMMUNITY
End: 2018-09-27

## 2018-08-27 RX ORDER — LATANOPROST 50 UG/ML
1 SOLUTION/ DROPS OPHTHALMIC NIGHTLY
Status: DISCONTINUED | OUTPATIENT
Start: 2018-08-27 | End: 2018-08-30 | Stop reason: HOSPADM

## 2018-08-27 RX ORDER — HYDROXYZINE HYDROCHLORIDE 25 MG/1
25 TABLET, FILM COATED ORAL NIGHTLY PRN
Status: DISCONTINUED | OUTPATIENT
Start: 2018-08-27 | End: 2018-08-30 | Stop reason: HOSPADM

## 2018-08-27 RX ORDER — ONDANSETRON 4 MG/1
4 TABLET, ORALLY DISINTEGRATING ORAL EVERY 6 HOURS PRN
Status: DISCONTINUED | OUTPATIENT
Start: 2018-08-27 | End: 2018-08-30 | Stop reason: HOSPADM

## 2018-08-27 RX ORDER — MULTIPLE VITAMINS W/ MINERALS TAB 9MG-400MCG
1 TAB ORAL DAILY
Status: DISCONTINUED | OUTPATIENT
Start: 2018-08-27 | End: 2018-08-30 | Stop reason: HOSPADM

## 2018-08-27 RX ORDER — CHOLECALCIFEROL (VITAMIN D3) 125 MCG
1000 CAPSULE ORAL DAILY
Status: DISCONTINUED | OUTPATIENT
Start: 2018-08-27 | End: 2018-08-30 | Stop reason: HOSPADM

## 2018-08-27 RX ORDER — DEXTROSE MONOHYDRATE 25 G/50ML
25 INJECTION, SOLUTION INTRAVENOUS
Status: DISCONTINUED | OUTPATIENT
Start: 2018-08-27 | End: 2018-08-30 | Stop reason: HOSPADM

## 2018-08-27 RX ORDER — SODIUM CHLORIDE 0.9 % (FLUSH) 0.9 %
1-10 SYRINGE (ML) INJECTION AS NEEDED
Status: DISCONTINUED | OUTPATIENT
Start: 2018-08-27 | End: 2018-08-30 | Stop reason: HOSPADM

## 2018-08-27 RX ORDER — ONDANSETRON 2 MG/ML
4 INJECTION INTRAMUSCULAR; INTRAVENOUS EVERY 6 HOURS PRN
Status: DISCONTINUED | OUTPATIENT
Start: 2018-08-27 | End: 2018-08-30 | Stop reason: HOSPADM

## 2018-08-27 RX ORDER — NICOTINE POLACRILEX 4 MG
15 LOZENGE BUCCAL
Status: DISCONTINUED | OUTPATIENT
Start: 2018-08-27 | End: 2018-08-30 | Stop reason: HOSPADM

## 2018-08-27 RX ORDER — NITROGLYCERIN 0.4 MG/1
0.4 TABLET SUBLINGUAL
Status: DISCONTINUED | OUTPATIENT
Start: 2018-08-27 | End: 2018-08-30 | Stop reason: HOSPADM

## 2018-08-27 RX ORDER — ACETAMINOPHEN 325 MG/1
650 TABLET ORAL EVERY 6 HOURS PRN
Status: DISCONTINUED | OUTPATIENT
Start: 2018-08-27 | End: 2018-08-30 | Stop reason: HOSPADM

## 2018-08-27 RX ORDER — CETIRIZINE HYDROCHLORIDE 10 MG/1
10 TABLET ORAL NIGHTLY
Status: DISCONTINUED | OUTPATIENT
Start: 2018-08-27 | End: 2018-08-30 | Stop reason: HOSPADM

## 2018-08-27 RX ORDER — FAMOTIDINE 10 MG
10 TABLET ORAL 2 TIMES DAILY
Status: DISCONTINUED | OUTPATIENT
Start: 2018-08-27 | End: 2018-08-30 | Stop reason: HOSPADM

## 2018-08-27 RX ORDER — FUROSEMIDE 10 MG/ML
80 INJECTION INTRAMUSCULAR; INTRAVENOUS EVERY 8 HOURS SCHEDULED
Status: DISCONTINUED | OUTPATIENT
Start: 2018-08-27 | End: 2018-08-29

## 2018-08-27 RX ADMIN — LATANOPROST 1 DROP: 50 SOLUTION OPHTHALMIC at 21:46

## 2018-08-27 RX ADMIN — CETIRIZINE HYDROCHLORIDE 10 MG: 10 TABLET, FILM COATED ORAL at 20:29

## 2018-08-27 RX ADMIN — RIVAROXABAN 15 MG: 15 TABLET, FILM COATED ORAL at 18:09

## 2018-08-27 RX ADMIN — XENON XE-133 5.7 MILLICURIE: 10 GAS RESPIRATORY (INHALATION) at 12:20

## 2018-08-27 RX ADMIN — HYDRALAZINE HYDROCHLORIDE 100 MG: 50 TABLET, FILM COATED ORAL at 20:29

## 2018-08-27 RX ADMIN — FUROSEMIDE 80 MG: 10 INJECTION, SOLUTION INTRAMUSCULAR; INTRAVENOUS at 20:29

## 2018-08-27 RX ADMIN — Medication 1 DOSE: at 12:30

## 2018-08-27 RX ADMIN — FAMOTIDINE 10 MG: 10 TABLET, FILM COATED ORAL at 20:29

## 2018-08-28 PROBLEM — I48.0 PAROXYSMAL ATRIAL FIBRILLATION (HCC): Status: ACTIVE | Noted: 2018-08-27

## 2018-08-28 PROBLEM — I27.20 PULMONARY HYPERTENSION (HCC): Status: ACTIVE | Noted: 2018-08-28

## 2018-08-28 LAB
ALBUMIN SERPL-MCNC: 3.5 G/DL (ref 3.5–5.2)
ANION GAP SERPL CALCULATED.3IONS-SCNC: 13.5 MMOL/L
BACTERIA UR QL AUTO: NORMAL /HPF
BASOPHILS # BLD AUTO: 0.04 10*3/MM3 (ref 0–0.2)
BASOPHILS NFR BLD AUTO: 0.5 % (ref 0–1.5)
BILIRUB UR QL STRIP: NEGATIVE
BUN BLD-MCNC: 36 MG/DL (ref 8–23)
BUN/CREAT SERPL: 15.4 (ref 7–25)
CALCIUM SPEC-SCNC: 9.2 MG/DL (ref 8.6–10.5)
CHLORIDE SERPL-SCNC: 102 MMOL/L (ref 98–107)
CLARITY UR: CLEAR
CO2 SERPL-SCNC: 22.5 MMOL/L (ref 22–29)
COLOR UR: YELLOW
CREAT BLD-MCNC: 2.34 MG/DL (ref 0.57–1)
CREAT UR-MCNC: 23.7 MG/DL
DEPRECATED RDW RBC AUTO: 47.7 FL (ref 37–54)
EOSINOPHIL # BLD AUTO: 0.28 10*3/MM3 (ref 0–0.7)
EOSINOPHIL NFR BLD AUTO: 3.7 % (ref 0.3–6.2)
ERYTHROCYTE [DISTWIDTH] IN BLOOD BY AUTOMATED COUNT: 14.3 % (ref 11.7–13)
GFR SERPL CREATININE-BSD FRML MDRD: 20 ML/MIN/1.73
GLUCOSE BLD-MCNC: 131 MG/DL (ref 65–99)
GLUCOSE BLDC GLUCOMTR-MCNC: 116 MG/DL (ref 70–130)
GLUCOSE BLDC GLUCOMTR-MCNC: 128 MG/DL (ref 70–130)
GLUCOSE BLDC GLUCOMTR-MCNC: 133 MG/DL (ref 70–130)
GLUCOSE BLDC GLUCOMTR-MCNC: 200 MG/DL (ref 70–130)
GLUCOSE UR STRIP-MCNC: NEGATIVE MG/DL
HCT VFR BLD AUTO: 36.2 % (ref 35.6–45.5)
HGB BLD-MCNC: 11.5 G/DL (ref 11.9–15.5)
HGB UR QL STRIP.AUTO: NEGATIVE
HYALINE CASTS UR QL AUTO: NORMAL /LPF
IMM GRANULOCYTES # BLD: 0 10*3/MM3 (ref 0–0.03)
IMM GRANULOCYTES NFR BLD: 0 % (ref 0–0.5)
KETONES UR QL STRIP: NEGATIVE
LEUKOCYTE ESTERASE UR QL STRIP.AUTO: NEGATIVE
LYMPHOCYTES # BLD AUTO: 1.36 10*3/MM3 (ref 0.9–4.8)
LYMPHOCYTES NFR BLD AUTO: 18 % (ref 19.6–45.3)
MCH RBC QN AUTO: 29.3 PG (ref 26.9–32)
MCHC RBC AUTO-ENTMCNC: 31.8 G/DL (ref 32.4–36.3)
MCV RBC AUTO: 92.3 FL (ref 80.5–98.2)
MONOCYTES # BLD AUTO: 0.78 10*3/MM3 (ref 0.2–1.2)
MONOCYTES NFR BLD AUTO: 10.3 % (ref 5–12)
NEUTROPHILS # BLD AUTO: 5.1 10*3/MM3 (ref 1.9–8.1)
NEUTROPHILS NFR BLD AUTO: 67.5 % (ref 42.7–76)
NITRITE UR QL STRIP: NEGATIVE
PH UR STRIP.AUTO: 7 [PH] (ref 5–8)
PHOSPHATE SERPL-MCNC: 4.1 MG/DL (ref 2.5–4.5)
PLATELET # BLD AUTO: 236 10*3/MM3 (ref 140–500)
PMV BLD AUTO: 10.2 FL (ref 6–12)
POTASSIUM BLD-SCNC: 4.1 MMOL/L (ref 3.5–5.2)
PROT UR QL STRIP: ABNORMAL
PROT UR-MCNC: 184 MG/DL
RBC # BLD AUTO: 3.92 10*6/MM3 (ref 3.9–5.2)
RBC # UR: NORMAL /HPF
REF LAB TEST METHOD: NORMAL
SODIUM BLD-SCNC: 138 MMOL/L (ref 136–145)
SODIUM UR-SCNC: 115 MMOL/L
SP GR UR STRIP: 1.01 (ref 1–1.03)
SQUAMOUS #/AREA URNS HPF: NORMAL /HPF
TSH SERPL DL<=0.05 MIU/L-ACNC: 1.78 MIU/ML (ref 0.27–4.2)
UROBILINOGEN UR QL STRIP: ABNORMAL
WBC NRBC COR # BLD: 7.56 10*3/MM3 (ref 4.5–10.7)
WBC UR QL AUTO: NORMAL /HPF

## 2018-08-28 PROCEDURE — 84156 ASSAY OF PROTEIN URINE: CPT | Performed by: INTERNAL MEDICINE

## 2018-08-28 PROCEDURE — 25010000002 FUROSEMIDE PER 20 MG: Performed by: INTERNAL MEDICINE

## 2018-08-28 PROCEDURE — 82962 GLUCOSE BLOOD TEST: CPT

## 2018-08-28 PROCEDURE — 84300 ASSAY OF URINE SODIUM: CPT | Performed by: INTERNAL MEDICINE

## 2018-08-28 PROCEDURE — B2141ZZ FLUOROSCOPY OF RIGHT HEART USING LOW OSMOLAR CONTRAST: ICD-10-PCS | Performed by: INTERNAL MEDICINE

## 2018-08-28 PROCEDURE — 84443 ASSAY THYROID STIM HORMONE: CPT | Performed by: INTERNAL MEDICINE

## 2018-08-28 PROCEDURE — 81001 URINALYSIS AUTO W/SCOPE: CPT | Performed by: INTERNAL MEDICINE

## 2018-08-28 PROCEDURE — 80069 RENAL FUNCTION PANEL: CPT | Performed by: INTERNAL MEDICINE

## 2018-08-28 PROCEDURE — 4A023N6 MEASUREMENT OF CARDIAC SAMPLING AND PRESSURE, RIGHT HEART, PERCUTANEOUS APPROACH: ICD-10-PCS | Performed by: INTERNAL MEDICINE

## 2018-08-28 PROCEDURE — B31S1ZZ FLUOROSCOPY OF RIGHT PULMONARY ARTERY USING LOW OSMOLAR CONTRAST: ICD-10-PCS | Performed by: INTERNAL MEDICINE

## 2018-08-28 PROCEDURE — 82570 ASSAY OF URINE CREATININE: CPT | Performed by: INTERNAL MEDICINE

## 2018-08-28 PROCEDURE — 85025 COMPLETE CBC W/AUTO DIFF WBC: CPT | Performed by: INTERNAL MEDICINE

## 2018-08-28 RX ADMIN — FUROSEMIDE 80 MG: 10 INJECTION, SOLUTION INTRAMUSCULAR; INTRAVENOUS at 06:41

## 2018-08-28 RX ADMIN — HYDRALAZINE HYDROCHLORIDE 100 MG: 50 TABLET, FILM COATED ORAL at 08:51

## 2018-08-28 RX ADMIN — RIVAROXABAN 15 MG: 15 TABLET, FILM COATED ORAL at 17:59

## 2018-08-28 RX ADMIN — SERTRALINE 50 MG: 50 TABLET, FILM COATED ORAL at 08:51

## 2018-08-28 RX ADMIN — VITAMIN D, TAB 1000IU (100/BT) 1000 UNITS: 25 TAB at 08:50

## 2018-08-28 RX ADMIN — GLIPIZIDE 5 MG: 5 TABLET ORAL at 08:51

## 2018-08-28 RX ADMIN — FAMOTIDINE 10 MG: 10 TABLET, FILM COATED ORAL at 08:50

## 2018-08-28 RX ADMIN — HYDRALAZINE HYDROCHLORIDE 100 MG: 50 TABLET, FILM COATED ORAL at 20:43

## 2018-08-28 RX ADMIN — FAMOTIDINE 10 MG: 10 TABLET, FILM COATED ORAL at 20:52

## 2018-08-28 RX ADMIN — MULTIPLE VITAMINS W/ MINERALS TAB 1 TABLET: TAB at 08:51

## 2018-08-28 RX ADMIN — FUROSEMIDE 80 MG: 10 INJECTION, SOLUTION INTRAMUSCULAR; INTRAVENOUS at 13:58

## 2018-08-28 RX ADMIN — FUROSEMIDE 80 MG: 10 INJECTION, SOLUTION INTRAMUSCULAR; INTRAVENOUS at 21:11

## 2018-08-28 RX ADMIN — CETIRIZINE HYDROCHLORIDE 10 MG: 10 TABLET, FILM COATED ORAL at 20:43

## 2018-08-28 RX ADMIN — Medication 400 UNITS: at 08:50

## 2018-08-28 RX ADMIN — LATANOPROST 1 DROP: 50 SOLUTION OPHTHALMIC at 20:43

## 2018-08-28 RX ADMIN — LINAGLIPTIN 5 MG: 5 TABLET, FILM COATED ORAL at 08:51

## 2018-08-28 RX ADMIN — Medication 1000 MCG: at 08:50

## 2018-08-29 LAB
ALBUMIN SERPL-MCNC: 3.5 G/DL (ref 3.5–5.2)
ANION GAP SERPL CALCULATED.3IONS-SCNC: 10.5 MMOL/L
BUN BLD-MCNC: 38 MG/DL (ref 8–23)
BUN/CREAT SERPL: 14.8 (ref 7–25)
CALCIUM SPEC-SCNC: 9 MG/DL (ref 8.6–10.5)
CHLORIDE SERPL-SCNC: 102 MMOL/L (ref 98–107)
CO2 SERPL-SCNC: 26.5 MMOL/L (ref 22–29)
CREAT BLD-MCNC: 2.57 MG/DL (ref 0.57–1)
GFR SERPL CREATININE-BSD FRML MDRD: 18 ML/MIN/1.73
GLUCOSE BLD-MCNC: 132 MG/DL (ref 65–99)
GLUCOSE BLDC GLUCOMTR-MCNC: 132 MG/DL (ref 70–130)
GLUCOSE BLDC GLUCOMTR-MCNC: 185 MG/DL (ref 70–130)
PHOSPHATE SERPL-MCNC: 3.9 MG/DL (ref 2.5–4.5)
POTASSIUM BLD-SCNC: 3.9 MMOL/L (ref 3.5–5.2)
SODIUM BLD-SCNC: 139 MMOL/L (ref 136–145)

## 2018-08-29 PROCEDURE — 85014 HEMATOCRIT: CPT

## 2018-08-29 PROCEDURE — 25010000002 FUROSEMIDE PER 20 MG: Performed by: INTERNAL MEDICINE

## 2018-08-29 PROCEDURE — 25010000002 FENTANYL CITRATE (PF) 100 MCG/2ML SOLUTION: Performed by: INTERNAL MEDICINE

## 2018-08-29 PROCEDURE — 93463 DRUG ADMIN & HEMODYNMIC MEAS: CPT | Performed by: INTERNAL MEDICINE

## 2018-08-29 PROCEDURE — 63710000001 INSULIN ASPART PER 5 UNITS: Performed by: NURSE PRACTITIONER

## 2018-08-29 PROCEDURE — 25010000002 BH (CUPID ONLY) ADENOSINE CHARGE TOTAL VOLUME: Performed by: INTERNAL MEDICINE

## 2018-08-29 PROCEDURE — 93451 RIGHT HEART CATH: CPT | Performed by: INTERNAL MEDICINE

## 2018-08-29 PROCEDURE — 85018 HEMOGLOBIN: CPT

## 2018-08-29 PROCEDURE — 80069 RENAL FUNCTION PANEL: CPT | Performed by: INTERNAL MEDICINE

## 2018-08-29 PROCEDURE — 82962 GLUCOSE BLOOD TEST: CPT

## 2018-08-29 PROCEDURE — 99232 SBSQ HOSP IP/OBS MODERATE 35: CPT | Performed by: INTERNAL MEDICINE

## 2018-08-29 PROCEDURE — 25010000002 MIDAZOLAM PER 1 MG: Performed by: INTERNAL MEDICINE

## 2018-08-29 PROCEDURE — C1894 INTRO/SHEATH, NON-LASER: HCPCS | Performed by: INTERNAL MEDICINE

## 2018-08-29 RX ORDER — NIFEDIPINE 60 MG/1
60 TABLET, EXTENDED RELEASE ORAL
Status: DISCONTINUED | OUTPATIENT
Start: 2018-08-29 | End: 2018-08-30 | Stop reason: HOSPADM

## 2018-08-29 RX ORDER — MIDAZOLAM HYDROCHLORIDE 1 MG/ML
INJECTION INTRAMUSCULAR; INTRAVENOUS AS NEEDED
Status: DISCONTINUED | OUTPATIENT
Start: 2018-08-29 | End: 2018-08-29 | Stop reason: HOSPADM

## 2018-08-29 RX ORDER — LIDOCAINE HYDROCHLORIDE 20 MG/ML
INJECTION, SOLUTION INFILTRATION; PERINEURAL AS NEEDED
Status: DISCONTINUED | OUTPATIENT
Start: 2018-08-29 | End: 2018-08-29 | Stop reason: HOSPADM

## 2018-08-29 RX ORDER — FUROSEMIDE 40 MG/1
40 TABLET ORAL
Status: DISCONTINUED | OUTPATIENT
Start: 2018-08-29 | End: 2018-08-30

## 2018-08-29 RX ORDER — SODIUM CHLORIDE 9 MG/ML
INJECTION, SOLUTION INTRAVENOUS CONTINUOUS PRN
Status: COMPLETED | OUTPATIENT
Start: 2018-08-29 | End: 2018-08-29

## 2018-08-29 RX ORDER — FENTANYL CITRATE 50 UG/ML
INJECTION, SOLUTION INTRAMUSCULAR; INTRAVENOUS AS NEEDED
Status: DISCONTINUED | OUTPATIENT
Start: 2018-08-29 | End: 2018-08-29 | Stop reason: HOSPADM

## 2018-08-29 RX ADMIN — HYDRALAZINE HYDROCHLORIDE 100 MG: 50 TABLET, FILM COATED ORAL at 20:21

## 2018-08-29 RX ADMIN — FAMOTIDINE 10 MG: 10 TABLET, FILM COATED ORAL at 13:35

## 2018-08-29 RX ADMIN — NIFEDIPINE 60 MG: 60 TABLET, FILM COATED, EXTENDED RELEASE ORAL at 13:34

## 2018-08-29 RX ADMIN — CETIRIZINE HYDROCHLORIDE 10 MG: 10 TABLET, FILM COATED ORAL at 20:21

## 2018-08-29 RX ADMIN — INSULIN ASPART 2 UNITS: 100 INJECTION, SOLUTION INTRAVENOUS; SUBCUTANEOUS at 20:50

## 2018-08-29 RX ADMIN — HYDRALAZINE HYDROCHLORIDE 100 MG: 50 TABLET, FILM COATED ORAL at 13:34

## 2018-08-29 RX ADMIN — VITAMIN D, TAB 1000IU (100/BT) 1000 UNITS: 25 TAB at 13:37

## 2018-08-29 RX ADMIN — SERTRALINE 50 MG: 50 TABLET, FILM COATED ORAL at 13:34

## 2018-08-29 RX ADMIN — Medication 400 UNITS: at 13:34

## 2018-08-29 RX ADMIN — FAMOTIDINE 10 MG: 10 TABLET, FILM COATED ORAL at 20:21

## 2018-08-29 RX ADMIN — FUROSEMIDE 40 MG: 40 TABLET ORAL at 13:35

## 2018-08-29 RX ADMIN — LATANOPROST 1 DROP: 50 SOLUTION OPHTHALMIC at 20:21

## 2018-08-29 RX ADMIN — Medication 1000 MCG: at 13:35

## 2018-08-29 RX ADMIN — MULTIPLE VITAMINS W/ MINERALS TAB 1 TABLET: TAB at 13:34

## 2018-08-29 RX ADMIN — FUROSEMIDE 80 MG: 10 INJECTION, SOLUTION INTRAMUSCULAR; INTRAVENOUS at 05:41

## 2018-08-30 ENCOUNTER — APPOINTMENT (OUTPATIENT)
Dept: CARDIOLOGY | Facility: HOSPITAL | Age: 83
End: 2018-08-30
Attending: INTERNAL MEDICINE

## 2018-08-30 VITALS
WEIGHT: 191 LBS | BODY MASS INDEX: 33.84 KG/M2 | DIASTOLIC BLOOD PRESSURE: 69 MMHG | SYSTOLIC BLOOD PRESSURE: 158 MMHG | HEIGHT: 63 IN | TEMPERATURE: 97.9 F | OXYGEN SATURATION: 97 % | RESPIRATION RATE: 18 BRPM | HEART RATE: 73 BPM

## 2018-08-30 LAB
ALBUMIN SERPL-MCNC: 3.8 G/DL (ref 3.5–5.2)
ANION GAP SERPL CALCULATED.3IONS-SCNC: 12.4 MMOL/L
AORTIC DIMENSIONLESS INDEX: 0.6 (DI)
BH CV ECHO MEAS - ACS: 1.4 CM
BH CV ECHO MEAS - AO MAX PG (FULL): 7 MMHG
BH CV ECHO MEAS - AO MAX PG: 11.7 MMHG
BH CV ECHO MEAS - AO MEAN PG (FULL): 3 MMHG
BH CV ECHO MEAS - AO MEAN PG: 6 MMHG
BH CV ECHO MEAS - AO ROOT AREA (BSA CORRECTED): 1.6
BH CV ECHO MEAS - AO ROOT AREA: 7.5 CM^2
BH CV ECHO MEAS - AO ROOT DIAM: 3.1 CM
BH CV ECHO MEAS - AO V2 MAX: 171 CM/SEC
BH CV ECHO MEAS - AO V2 MEAN: 111 CM/SEC
BH CV ECHO MEAS - AO V2 VTI: 33.5 CM
BH CV ECHO MEAS - AVA(I,A): 1.9 CM^2
BH CV ECHO MEAS - AVA(I,D): 1.9 CM^2
BH CV ECHO MEAS - AVA(V,A): 1.8 CM^2
BH CV ECHO MEAS - AVA(V,D): 1.8 CM^2
BH CV ECHO MEAS - BSA(HAYCOCK): 2 M^2
BH CV ECHO MEAS - BSA: 1.9 M^2
BH CV ECHO MEAS - BZI_BMI: 33.8 KILOGRAMS/M^2
BH CV ECHO MEAS - BZI_METRIC_HEIGHT: 160 CM
BH CV ECHO MEAS - BZI_METRIC_WEIGHT: 86.6 KG
BH CV ECHO MEAS - EDV(CUBED): 125 ML
BH CV ECHO MEAS - EDV(MOD-SP2): 87 ML
BH CV ECHO MEAS - EDV(MOD-SP4): 80 ML
BH CV ECHO MEAS - EDV(TEICH): 118.2 ML
BH CV ECHO MEAS - EF(CUBED): 62.7 %
BH CV ECHO MEAS - EF(MOD-BP): 65 %
BH CV ECHO MEAS - EF(MOD-SP2): 62.1 %
BH CV ECHO MEAS - EF(MOD-SP4): 67.5 %
BH CV ECHO MEAS - EF(TEICH): 54 %
BH CV ECHO MEAS - ESV(CUBED): 46.7 ML
BH CV ECHO MEAS - ESV(MOD-SP2): 33 ML
BH CV ECHO MEAS - ESV(MOD-SP4): 26 ML
BH CV ECHO MEAS - ESV(TEICH): 54.4 ML
BH CV ECHO MEAS - FS: 28 %
BH CV ECHO MEAS - IVS/LVPW: 1
BH CV ECHO MEAS - IVSD: 0.8 CM
BH CV ECHO MEAS - LAT PEAK E' VEL: 6 CM/SEC
BH CV ECHO MEAS - LV DIASTOLIC VOL/BSA (35-75): 42.2 ML/M^2
BH CV ECHO MEAS - LV MASS(C)D: 135.8 GRAMS
BH CV ECHO MEAS - LV MASS(C)DI: 71.6 GRAMS/M^2
BH CV ECHO MEAS - LV MAX PG: 4.7 MMHG
BH CV ECHO MEAS - LV MEAN PG: 3 MMHG
BH CV ECHO MEAS - LV SYSTOLIC VOL/BSA (12-30): 13.7 ML/M^2
BH CV ECHO MEAS - LV V1 MAX: 108 CM/SEC
BH CV ECHO MEAS - LV V1 MEAN: 74.2 CM/SEC
BH CV ECHO MEAS - LV V1 VTI: 22.6 CM
BH CV ECHO MEAS - LVIDD: 5 CM
BH CV ECHO MEAS - LVIDS: 3.6 CM
BH CV ECHO MEAS - LVLD AP2: 7.8 CM
BH CV ECHO MEAS - LVLD AP4: 7.3 CM
BH CV ECHO MEAS - LVLS AP2: 6.9 CM
BH CV ECHO MEAS - LVLS AP4: 6.4 CM
BH CV ECHO MEAS - LVOT AREA (M): 2.8 CM^2
BH CV ECHO MEAS - LVOT AREA: 2.8 CM^2
BH CV ECHO MEAS - LVOT DIAM: 1.9 CM
BH CV ECHO MEAS - LVPWD: 0.8 CM
BH CV ECHO MEAS - MED PEAK E' VEL: 4 CM/SEC
BH CV ECHO MEAS - MV DEC SLOPE: 893 CM/SEC^2
BH CV ECHO MEAS - MV DEC TIME: 0.23 SEC
BH CV ECHO MEAS - MV E MAX VEL: 219 CM/SEC
BH CV ECHO MEAS - MV MAX PG: 25.8 MMHG
BH CV ECHO MEAS - MV MEAN PG: 7 MMHG
BH CV ECHO MEAS - MV P1/2T MAX VEL: 252 CM/SEC
BH CV ECHO MEAS - MV P1/2T: 82.7 MSEC
BH CV ECHO MEAS - MV V2 MAX: 254 CM/SEC
BH CV ECHO MEAS - MV V2 MEAN: 106 CM/SEC
BH CV ECHO MEAS - MV V2 VTI: 56.1 CM
BH CV ECHO MEAS - MVA P1/2T LCG: 0.87 CM^2
BH CV ECHO MEAS - MVA(P1/2T): 2.7 CM^2
BH CV ECHO MEAS - MVA(VTI): 1.1 CM^2
BH CV ECHO MEAS - PA ACC TIME: 0.1 SEC
BH CV ECHO MEAS - PA MAX PG (FULL): 7.5 MMHG
BH CV ECHO MEAS - PA MAX PG: 9.9 MMHG
BH CV ECHO MEAS - PA PR(ACCEL): 33.1 MMHG
BH CV ECHO MEAS - PA V2 MAX: 157 CM/SEC
BH CV ECHO MEAS - PI END-D VEL: 141 CM/SEC
BH CV ECHO MEAS - PULM DIAS VEL: 79 CM/SEC
BH CV ECHO MEAS - PULM S/D: 0.37
BH CV ECHO MEAS - PULM SYS VEL: 29.5 CM/SEC
BH CV ECHO MEAS - PVA(V,A): 1.7 CM^2
BH CV ECHO MEAS - PVA(V,D): 1.7 CM^2
BH CV ECHO MEAS - QP/QS: 0.81
BH CV ECHO MEAS - RAP SYSTOLE: 8 MMHG
BH CV ECHO MEAS - RV MAX PG: 2.3 MMHG
BH CV ECHO MEAS - RV MEAN PG: 1 MMHG
BH CV ECHO MEAS - RV V1 MAX: 76.1 CM/SEC
BH CV ECHO MEAS - RV V1 MEAN: 42.4 CM/SEC
BH CV ECHO MEAS - RV V1 VTI: 14.9 CM
BH CV ECHO MEAS - RVOT AREA: 3.5 CM^2
BH CV ECHO MEAS - RVOT DIAM: 2.1 CM
BH CV ECHO MEAS - RVSP: 63 MMHG
BH CV ECHO MEAS - SI(AO): 133.3 ML/M^2
BH CV ECHO MEAS - SI(CUBED): 41.3 ML/M^2
BH CV ECHO MEAS - SI(LVOT): 33.8 ML/M^2
BH CV ECHO MEAS - SI(MOD-SP2): 28.5 ML/M^2
BH CV ECHO MEAS - SI(MOD-SP4): 28.5 ML/M^2
BH CV ECHO MEAS - SI(TEICH): 33.6 ML/M^2
BH CV ECHO MEAS - SV(AO): 252.8 ML
BH CV ECHO MEAS - SV(CUBED): 78.3 ML
BH CV ECHO MEAS - SV(LVOT): 64.1 ML
BH CV ECHO MEAS - SV(MOD-SP2): 54 ML
BH CV ECHO MEAS - SV(MOD-SP4): 54 ML
BH CV ECHO MEAS - SV(RVOT): 51.6 ML
BH CV ECHO MEAS - SV(TEICH): 63.8 ML
BH CV ECHO MEAS - TAPSE (>1.6): 1.3 CM2
BH CV ECHO MEAS - TR MAX VEL: 371 CM/SEC
BH CV ECHO MEASUREMENTS AVERAGE E/E' RATIO: 43.8
BH CV VAS BP RIGHT ARM: NORMAL MMHG
BH CV XLRA - RV BASE: 3.5 CM
BH CV XLRA - TDI S': 13 CM/SEC
BUN BLD-MCNC: 41 MG/DL (ref 8–23)
BUN/CREAT SERPL: 14.8 (ref 7–25)
CALCIUM SPEC-SCNC: 9.5 MG/DL (ref 8.6–10.5)
CHLORIDE SERPL-SCNC: 101 MMOL/L (ref 98–107)
CO2 SERPL-SCNC: 24.6 MMOL/L (ref 22–29)
CREAT BLD-MCNC: 2.77 MG/DL (ref 0.57–1)
GFR SERPL CREATININE-BSD FRML MDRD: 16 ML/MIN/1.73
GLUCOSE BLD-MCNC: 152 MG/DL (ref 65–99)
GLUCOSE BLDC GLUCOMTR-MCNC: 114 MG/DL (ref 70–130)
GLUCOSE BLDC GLUCOMTR-MCNC: 118 MG/DL (ref 70–130)
HCT VFR BLDA CALC: 35 % (ref 38–51)
HCT VFR BLDA CALC: 36 % (ref 38–51)
HCT VFR BLDA CALC: 37 % (ref 38–51)
HGB BLDA-MCNC: 11.9 G/DL (ref 12–17)
HGB BLDA-MCNC: 12.2 G/DL (ref 12–17)
HGB BLDA-MCNC: 12.6 G/DL (ref 12–17)
LEFT ATRIUM VOLUME INDEX: 38 ML/M2
LEFT ATRIUM VOLUME: 68 CM3
MAXIMAL PREDICTED HEART RATE: 137 BPM
PHOSPHATE SERPL-MCNC: 4.4 MG/DL (ref 2.5–4.5)
POTASSIUM BLD-SCNC: 3.8 MMOL/L (ref 3.5–5.2)
SAO2 % BLDA: 75 % (ref 95–98)
SAO2 % BLDA: 75 % (ref 95–98)
SAO2 % BLDA: 76 % (ref 95–98)
SODIUM BLD-SCNC: 138 MMOL/L (ref 136–145)
STRESS TARGET HR: 116 BPM

## 2018-08-30 PROCEDURE — 93306 TTE W/DOPPLER COMPLETE: CPT | Performed by: INTERNAL MEDICINE

## 2018-08-30 PROCEDURE — 97110 THERAPEUTIC EXERCISES: CPT

## 2018-08-30 PROCEDURE — 99233 SBSQ HOSP IP/OBS HIGH 50: CPT | Performed by: INTERNAL MEDICINE

## 2018-08-30 PROCEDURE — 93306 TTE W/DOPPLER COMPLETE: CPT

## 2018-08-30 PROCEDURE — 80069 RENAL FUNCTION PANEL: CPT | Performed by: INTERNAL MEDICINE

## 2018-08-30 PROCEDURE — 82962 GLUCOSE BLOOD TEST: CPT

## 2018-08-30 PROCEDURE — 97162 PT EVAL MOD COMPLEX 30 MIN: CPT

## 2018-08-30 PROCEDURE — 94799 UNLISTED PULMONARY SVC/PX: CPT

## 2018-08-30 RX ORDER — NIFEDIPINE 60 MG/1
60 TABLET, FILM COATED, EXTENDED RELEASE ORAL
Qty: 30 TABLET | Refills: 2 | Status: SHIPPED | OUTPATIENT
Start: 2018-08-31 | End: 2018-09-07 | Stop reason: HOSPADM

## 2018-08-30 RX ORDER — AMBRISENTAN 5 MG/1
5 TABLET, FILM COATED ORAL DAILY
Qty: 30 TABLET | Refills: 11 | Status: CANCELLED | OUTPATIENT
Start: 2018-08-30 | End: 2019-08-30

## 2018-08-30 RX ORDER — SILDENAFIL CITRATE 20 MG/1
20 TABLET ORAL EVERY 8 HOURS SCHEDULED
Status: CANCELLED | OUTPATIENT
Start: 2018-08-30

## 2018-08-30 RX ORDER — TADALAFIL 20 MG/1
20 TABLET ORAL
Status: CANCELLED | OUTPATIENT
Start: 2018-08-30

## 2018-08-30 RX ORDER — TADALAFIL 2.5 MG/1
2.5 TABLET ORAL DAILY PRN
Qty: 60 TABLET | Status: CANCELLED | OUTPATIENT
Start: 2018-08-30

## 2018-08-30 RX ORDER — FUROSEMIDE 40 MG/1
40 TABLET ORAL DAILY
Qty: 180 TABLET | Refills: 2 | Status: SHIPPED | OUTPATIENT
Start: 2018-08-30 | End: 2018-11-04 | Stop reason: HOSPADM

## 2018-08-30 RX ORDER — SILDENAFIL CITRATE 20 MG/1
10 TABLET ORAL 2 TIMES DAILY
Status: DISCONTINUED | OUTPATIENT
Start: 2018-08-30 | End: 2018-08-30 | Stop reason: HOSPADM

## 2018-08-30 RX ADMIN — SERTRALINE 50 MG: 50 TABLET, FILM COATED ORAL at 08:36

## 2018-08-30 RX ADMIN — NIFEDIPINE 60 MG: 60 TABLET, FILM COATED, EXTENDED RELEASE ORAL at 08:36

## 2018-08-30 RX ADMIN — Medication 1000 MCG: at 08:36

## 2018-08-30 RX ADMIN — HYDRALAZINE HYDROCHLORIDE 100 MG: 50 TABLET, FILM COATED ORAL at 08:36

## 2018-08-30 RX ADMIN — FAMOTIDINE 10 MG: 10 TABLET, FILM COATED ORAL at 08:36

## 2018-08-30 RX ADMIN — Medication 400 UNITS: at 08:36

## 2018-08-30 RX ADMIN — MULTIPLE VITAMINS W/ MINERALS TAB 1 TABLET: TAB at 08:36

## 2018-08-30 RX ADMIN — GLIPIZIDE 5 MG: 5 TABLET ORAL at 08:36

## 2018-08-30 RX ADMIN — FUROSEMIDE 40 MG: 40 TABLET ORAL at 08:36

## 2018-08-30 RX ADMIN — RIVAROXABAN 15 MG: 15 TABLET, FILM COATED ORAL at 17:23

## 2018-08-30 RX ADMIN — VITAMIN D, TAB 1000IU (100/BT) 1000 UNITS: 25 TAB at 08:36

## 2018-08-30 RX ADMIN — LINAGLIPTIN 5 MG: 5 TABLET, FILM COATED ORAL at 08:36

## 2018-08-31 ENCOUNTER — READMISSION MANAGEMENT (OUTPATIENT)
Dept: CALL CENTER | Facility: HOSPITAL | Age: 83
End: 2018-08-31

## 2018-09-04 ENCOUNTER — READMISSION MANAGEMENT (OUTPATIENT)
Dept: CALL CENTER | Facility: HOSPITAL | Age: 83
End: 2018-09-04

## 2018-09-04 ENCOUNTER — TELEPHONE (OUTPATIENT)
Dept: CARDIOLOGY | Facility: CLINIC | Age: 83
End: 2018-09-04

## 2018-09-04 NOTE — OUTREACH NOTE
CHF Week 1 Survey      Responses   Facility patient discharged from?  [facility]   Does the patient have one of the following disease processes/diagnoses(primary or secondary)?  CHF   Is there a successful TCM telephone encounter documented?  No   CHF Week 1 attempt successful?  Yes   Call start time  1218   Call end time  1227   Discharge diagnosis  AFIB,  CHF,  CKD,  DM II,    Meds reviewed with patient/caregiver?  Yes   Is the patient having any side effects they believe may be caused by any medication additions or changes?  Yes   Side effects comments   Pt was having DAYANA, severe body pain, pain with breathing she called Dr. Husain today &waiting on return call. Pt has decided not to take Nifedipine at this time r/t side effects   Does the patient have all medications ordered at discharge?  Yes   Is the patient taking all medications as directed (includes completed medication regime)?  No   What is preventing the patient from taking all medications as directed?  Side effects   Nursing Interventions  Nurse provided patient education, Advised patient to call provider   Does the patient have a primary care provider?   Yes   Does the patient have an appointment with their PCP within 7 days of discharge?  Yes   Has the patient kept scheduled appointments due by today?  N/A   DME comments  CPAP and cane are currently in home, NOT new orders.    Psychosocial issues?  No   Comments  Pt monitors BP at home. Reports 150/80's   Did the patient receive a copy of their discharge instructions?  Yes   Nursing interventions  Reviewed instructions with patient   What is the patient's perception of their health status since discharge?  New symptoms unrelated to diagnosis   Nursing interventions  Nurse provided patient education, Advised patient to call provider   Is the patient weighing daily?  Yes   Does the patient have scales?  Yes   Daily weight interventions  Education provided on importance of daily weight   Is the patient  able to teach back Heart Failure Zones?  Yes   Is the patient/caregiver able to teach back the hierarchy of who to call/visit for symptoms/problems? PCP, Specialist, Home health nurse, Urgent Care, ED, 911  Yes    CHF Week 1 call completed?  Yes          Marissa Neal RN

## 2018-09-04 NOTE — TELEPHONE ENCOUNTER
I tried to call 3 times, there was a busy signal.  I think that she can just hold on the nifedipine for now.  I have copied Dr. Husain on this note in regards to having her come in tomorrow.

## 2018-09-05 NOTE — TELEPHONE ENCOUNTER
Tell her to remain off nifedipine.  Keep follow up appointments.  Monitor blood pressure and bring in a list when she follows up

## 2018-09-06 ENCOUNTER — APPOINTMENT (OUTPATIENT)
Dept: SLEEP MEDICINE | Facility: HOSPITAL | Age: 83
End: 2018-09-06

## 2018-09-06 ENCOUNTER — HOSPITAL ENCOUNTER (OUTPATIENT)
Dept: SLEEP MEDICINE | Facility: HOSPITAL | Age: 83
Discharge: HOME | End: 2018-09-06

## 2018-09-07 ENCOUNTER — OFFICE VISIT (OUTPATIENT)
Dept: INTERNAL MEDICINE | Facility: CLINIC | Age: 83
End: 2018-09-07

## 2018-09-07 VITALS
HEART RATE: 90 BPM | SYSTOLIC BLOOD PRESSURE: 152 MMHG | DIASTOLIC BLOOD PRESSURE: 76 MMHG | WEIGHT: 193 LBS | BODY MASS INDEX: 34.19 KG/M2 | OXYGEN SATURATION: 98 %

## 2018-09-07 DIAGNOSIS — I27.20 PULMONARY HYPERTENSION (HCC): ICD-10-CM

## 2018-09-07 DIAGNOSIS — E11.22 TYPE 2 DIABETES MELLITUS WITH CHRONIC KIDNEY DISEASE, WITH LONG-TERM CURRENT USE OF INSULIN, UNSPECIFIED CKD STAGE (HCC): ICD-10-CM

## 2018-09-07 DIAGNOSIS — I48.0 PAROXYSMAL ATRIAL FIBRILLATION (HCC): ICD-10-CM

## 2018-09-07 DIAGNOSIS — I50.33 ACUTE ON CHRONIC DIASTOLIC CONGESTIVE HEART FAILURE (HCC): Primary | ICD-10-CM

## 2018-09-07 DIAGNOSIS — Z79.4 TYPE 2 DIABETES MELLITUS WITH CHRONIC KIDNEY DISEASE, WITH LONG-TERM CURRENT USE OF INSULIN, UNSPECIFIED CKD STAGE (HCC): ICD-10-CM

## 2018-09-07 DIAGNOSIS — N18.4 CHRONIC KIDNEY DISEASE, STAGE IV (SEVERE) (HCC): ICD-10-CM

## 2018-09-07 PROCEDURE — 99214 OFFICE O/P EST MOD 30 MIN: CPT | Performed by: INTERNAL MEDICINE

## 2018-09-07 PROCEDURE — 90471 IMMUNIZATION ADMIN: CPT | Performed by: INTERNAL MEDICINE

## 2018-09-07 PROCEDURE — 90662 IIV NO PRSV INCREASED AG IM: CPT | Performed by: INTERNAL MEDICINE

## 2018-09-07 NOTE — PROGRESS NOTES
Chief Complaint   Patient presents with   • Hypertension   • Congestive Heart Failure   • Chronic Kidney Disease       History of Present Illness   Veronica Henao is a 83 y.o. female presents for hospital follow up. Patient has known chf w/ difficulty managing this even with increasing dosing of diuretics. In hospital she was found to have pulmonary hypertension. After med changes she is maintaining a base weight and has diuresed well. She is on glp-1 inj q week (ozempic) w great results on glucose regulation. She is tolerating this well.   Reduced appetite and as listed weight reduction. She continues to have sbp in 150s. She is taking hydralazine 100 mg bid and is tolerating this well. She was given nifedipine but this gave her chest/back discomfort and was discontinued for this reason. Pain resolved when she stopped the medication. She is to f/u w/ pulmonary and consider sildenafil for pulm hypertension.       The following portions of the patient's history were reviewed and updated as appropriate: allergies, current medications, past family history, past medical history, past social history, past surgical history and problem list.  Current Outpatient Prescriptions on File Prior to Visit   Medication Sig Dispense Refill   • acetaminophen (TYLENOL) 325 MG tablet Take 2 tablets by mouth Every 6 (Six) Hours As Needed for Mild Pain (1-3).  0   • Alcohol Swabs (B-D SINGLE USE SWABS REGULAR) pads Inject 1 each under the skin Daily. 100 each 11   • allopurinol (ZYLOPRIM) 100 MG tablet Take 1 tablet by mouth 2 (Two) Times a Day. 180 tablet 2   • Blood Glucose Monitoring Suppl (TRUE METRIX AIR GLUCOSE METER) device 1 each Daily. 1 each 0   • cholecalciferol (VITAMIN D3) 1000 UNITS tablet Take 1,000 Units by mouth daily.     • Cyanocobalamin (VITAMIN B-12) 5000 MCG sublingual tablet Take 5,000 mcg by mouth Daily.     • diphenhydrAMINE (BENADRYL) 2 % cream Apply  topically 3 (Three) Times a Day As Needed for Itching. 30 g 3    • furosemide (LASIX) 40 MG tablet Take 1 tablet by mouth Daily. 180 tablet 2   • glimepiride (AMARYL) 2 MG tablet Take 2 tablets by mouth 2 (Two) Times a Day. DM E11.9 180 tablet 1   • hydrALAZINE (APRESOLINE) 100 MG tablet Take 1 tablet by mouth 2 (Two) Times a Day. 180 tablet 1   • hydrOXYzine (ATARAX) 25 MG tablet TAKE 1 TABLET AT BEDTIME 90 tablet 0   • Incontinence Supply Disposable (BLADDER CONTROL PADS EX ABSORB) misc      • insulin degludec (TRESIBA FLEXTOUCH) 100 UNIT/ML solution pen-injector injection Inject 10 Units under the skin Daily. Indications: Diabetes 2 pen 3   • Lancets 28G misc To check sugar qd. E11.9  each 12   • latanoprost (XALATAN) 0.005 % ophthalmic solution Administer 1 drop to both eyes Every Night.     • levocetirizine (XYZAL) 5 MG tablet Take 5 mg by mouth 2 (Two) Times a Day. For Runny Nose     • linagliptin (TRADJENTA) 5 MG tablet tablet Take 5 mg by mouth Daily.     • Multiple Vitamins-Minerals (CENTRUM SILVER PO) Take 1 tablet by mouth Daily.     • nystatin-triamcinolone (MYCOLOG) 840901-5.1 UNIT/GM-% ointment Apply  topically 2 (Two) Times a Day. 30 g 3   • Omega-3 Fatty Acids (FISH OIL) 1000 MG capsule capsule Take by mouth Daily With Breakfast     • raNITIdine (ZANTAC) 150 MG tablet Take 1 tablet by mouth 2 (Two) Times a Day. 180 tablet 1   • rivaroxaban (XARELTO) 15 MG tablet Take 1 tablet by mouth Daily With Dinner. 42 tablet 0   • Semaglutide (OZEMPIC) 0.25 or 0.5 MG/DOSE solution pen-injector Inject 0.5 mg under the skin into the appropriate area as directed 1 (One) Time Per Week. 1.5 mL 5   • sertraline (ZOLOFT) 50 MG tablet TAKE 1 TABLET EVERY DAY AS DIRECTED 90 tablet 2   • TRUE METRIX BLOOD GLUCOSE TEST test strip CHECK BLOOD SUGAR TWICE DAILY 100 each 3   • TRUEPLUS LANCETS 28G misc E11.9 DM to check sugar  each 12   • Vitamin E 400 UNITS chewable tablet Chew 400 Units Daily. Two daily     • Vitamins A & D (VITAMIN A & D) 74869-645 UNITS tablet      •  [DISCONTINUED] NIFEdipine CC (ADALAT CC) 60 MG 24 hr tablet Take 1 tablet by mouth Daily. 30 tablet 2     No current facility-administered medications on file prior to visit.      Review of Systems   Constitutional: Negative.    HENT: Negative.    Eyes: Negative.    Respiratory: Negative.    Cardiovascular: Negative.    Gastrointestinal: Negative.    Endocrine: Negative.    Genitourinary: Negative.    Musculoskeletal: Positive for arthralgias.        B knee pain   Skin: Negative.    Allergic/Immunologic: Negative.    Neurological: Negative.    Hematological: Negative.    Psychiatric/Behavioral: Negative.        Objective   Physical Exam   Constitutional: She is oriented to person, place, and time. She appears well-developed and well-nourished.   HENT:   Head: Normocephalic and atraumatic.   Right Ear: External ear normal.   Left Ear: External ear normal.   Mouth/Throat: Oropharynx is clear and moist.   Eyes: Pupils are equal, round, and reactive to light. EOM are normal.   Neck: Normal range of motion. Neck supple.   Cardiovascular: Normal rate, regular rhythm, normal heart sounds and intact distal pulses.    Pulmonary/Chest: Effort normal and breath sounds normal.   Abdominal: Soft. Bowel sounds are normal.   Musculoskeletal: Normal range of motion.   Chronic oa changes. Knee degeneration   Neurological: She is alert and oriented to person, place, and time.   Skin: Skin is warm and dry.   Psychiatric: She has a normal mood and affect. Her behavior is normal. Judgment and thought content normal.   Nursing note and vitals reviewed.       /76   Pulse 90   Wt 87.5 kg (193 lb)   SpO2 98%   BMI 34.19 kg/m²     Assessment/Plan   Diagnoses and all orders for this visit:    Acute on chronic diastolic (congestive) heart failure    Paroxysmal atrial fibrillation (CMS/HCC)    Pulmonary hypertension    Type 2 diabetes mellitus with chronic kidney disease, with long-term current use of insulin, unspecified CKD stage  (CMS/[facility])    Chronic kidney disease, stage IV (severe) (CMS/[facility])      Patient is doing well after hospitalization. Her bp is elevated. She reports good tolerance w/ hydralazine but some side effects w/ other meds. She will increase to 120 mg bid and monitor. Maintain low sodium diet. Continue ozempic for dm and hopefully weight loss will be beneficial for bp. She will f/u w/ her specialists. monthor her bp and glucose routinely. Continue lasix tab daily. F/u 3 mo or prn.       Current outpatient and discharge medications have been reconciled for the patient.  Reviewed by: Nelly Price MD

## 2018-09-07 NOTE — PROGRESS NOTES
Pt arrived at [facility].  Pt expressed she did not have issues with intolerance of CPAP.  Unable to access modem, pt is not assigned to [facility] ([facility] pt).  Pt unsure if she is having leakage issues.  Pt uses CPAP device every night and does not have compliance issues either, per pt report.  Pt states that [facility] DEBORAH Rendon wanted her to see DreamWear FFM.  Pt was not aware she could contact her DME with equipment or mask issues.  Pt was shown Air Touch F20 small, and DreamWear FFM small/small.  Pt does not think she is exclusively an oral breather, but did not want to trial any nasal masks this visit.  Pt preference for [employer] DreamWear FFM Sm/Sm.  Mask loaned to patient for 5 day trial at home.  Pt to return to [facility] with Mask in 5 days.  Will order mask from DME if pt has successful trial at home.  Pt may benefit from use of Air TOUCH F20 if unable to tolerate Dream Wear at home.  Tech will await pt feedback for mask order. juan

## 2018-09-10 RX ORDER — HYDRALAZINE HYDROCHLORIDE 25 MG/1
25 TABLET, FILM COATED ORAL 2 TIMES DAILY
Qty: 180 TABLET | Refills: 0 | Status: SHIPPED | OUTPATIENT
Start: 2018-09-10 | End: 2018-11-04 | Stop reason: HOSPADM

## 2018-09-10 NOTE — TELEPHONE ENCOUNTER
Pt is asking for a rx for her Hydralazine 20mg/ said it was added at last visit. I do see it in the note but can not find a rx with that dose. She states she is to take 120 mg bid????  If so she needs a rx for the 20 mg

## 2018-09-11 ENCOUNTER — READMISSION MANAGEMENT (OUTPATIENT)
Dept: CALL CENTER | Facility: HOSPITAL | Age: 83
End: 2018-09-11

## 2018-09-11 NOTE — OUTREACH NOTE
CHF Week 2 Survey      Responses   Facility patient discharged from?  [facility]   Does the patient have one of the following disease processes/diagnoses(primary or secondary)?  CHF   Week 2 attempt successful?  Yes   Call start time  1531   Call end time  1535   Discharge diagnosis  AFIB,  CHF,  CKD,  DM II,    Meds reviewed with patient/caregiver?  Yes   Is the patient having any side effects they believe may be caused by any medication additions or changes?  No   Does the patient have all medications ordered at discharge?  Yes   Is the patient taking all medications as directed (includes completed medication regime)?  Yes   What is preventing the patient from taking all medications as directed?  Side effects   Does the patient have a primary care provider?   Yes   Does the patient have an appointment with their PCP within 7 days of discharge?  Yes   Has the patient kept scheduled appointments due by today?  Yes   Psychosocial issues?  No   Comments  Pt monitors BP at home. Reports 150/80's   Did the patient receive a copy of their discharge instructions?  Yes   Nursing interventions  Reviewed instructions with patient   What is the patient's perception of their health status since discharge?  Improving   Nursing interventions  Nurse provided patient education   Is the patient weighing daily?  Yes   Does the patient have scales?  Yes   Daily weight interventions  Education provided on importance of daily weight   Is the patient able to teach back Heart Failure diet management?  Yes   Is the patient able to teach back Heart Failure Zones?  Yes   Is the patient able to teach back signs and symptoms of worsening condition? (i.e. weight gain, shortness of air, etc.)  Yes   Is the patient/caregiver able to teach back the hierarchy of who to call/visit for symptoms/problems? PCP, Specialist, Home health nurse, Urgent Care, ED, 911  Yes   CHF Week 2 call completed?  Yes          Artis Griffiths RN

## 2018-09-19 ENCOUNTER — READMISSION MANAGEMENT (OUTPATIENT)
Dept: CALL CENTER | Facility: HOSPITAL | Age: 83
End: 2018-09-19

## 2018-09-19 NOTE — OUTREACH NOTE
CHF Week 3 Survey      Responses   Facility patient discharged from?  [facility]   Does the patient have one of the following disease processes/diagnoses(primary or secondary)?  CHF   Week 3 attempt successful?  Yes   Call start time  1201   Call end time  1204   Discharge diagnosis  AFIB,  CHF,  CKD,  DM II,    Meds reviewed with patient/caregiver?  Yes   Is the patient having any side effects they believe may be caused by any medication additions or changes?  No   Does the patient have all medications ordered at discharge?  Yes   Is the patient taking all medications as directed (includes completed medication regime)?  Yes   Does the patient have a primary care provider?   Yes   Does the patient have an appointment with their PCP within 7 days of discharge?  Yes   Has the patient kept scheduled appointments due by today?  Yes   Has all DME been delivered?  Yes   Psychosocial issues?  No   Comments  BP is 148/76   Did the patient receive a copy of their discharge instructions?  Yes   Nursing interventions  Reviewed instructions with patient   What is the patient's perception of their health status since discharge?  Improving   Nursing interventions  Nurse provided patient education   Is the patient weighing daily?  Yes   Does the patient have scales?  Yes   Daily weight interventions  Education provided on importance of daily weight   Is the patient able to teach back Heart Failure diet management?  Yes   Is the patient able to teach back Heart Failure Zones?  Yes   Is the patient able to teach back signs and symptoms of worsening condition? (i.e. weight gain, shortness of air, etc.)  Yes   Is the patient/caregiver able to teach back the hierarchy of who to call/visit for symptoms/problems? PCP, Specialist, Home health nurse, Urgent Care, ED, 911  Yes   CHF Week 3 call completed?  Yes          Janey Richard RN

## 2018-09-26 ENCOUNTER — READMISSION MANAGEMENT (OUTPATIENT)
Dept: CALL CENTER | Facility: HOSPITAL | Age: 83
End: 2018-09-26

## 2018-09-26 NOTE — OUTREACH NOTE
CHF Week 4 Survey      Responses   Facility patient discharged from?  [place]   Does the patient have one of the following disease processes/diagnoses(primary or secondary)?  CHF   Week 4 attempt successful?  No          Radha Ramos RN

## 2018-09-27 ENCOUNTER — OFFICE VISIT (OUTPATIENT)
Dept: CARDIOLOGY | Facility: CLINIC | Age: 83
End: 2018-09-27

## 2018-09-27 VITALS
BODY MASS INDEX: 34.23 KG/M2 | WEIGHT: 186 LBS | SYSTOLIC BLOOD PRESSURE: 152 MMHG | DIASTOLIC BLOOD PRESSURE: 90 MMHG | RESPIRATION RATE: 16 BRPM | HEIGHT: 62 IN | HEART RATE: 74 BPM

## 2018-09-27 DIAGNOSIS — I36.1 NON-RHEUMATIC TRICUSPID VALVE INSUFFICIENCY: ICD-10-CM

## 2018-09-27 DIAGNOSIS — I50.33 ACUTE ON CHRONIC DIASTOLIC HEART FAILURE (HCC): Primary | ICD-10-CM

## 2018-09-27 DIAGNOSIS — I44.2 THIRD DEGREE ATRIOVENTRICULAR BLOCK (HCC): ICD-10-CM

## 2018-09-27 DIAGNOSIS — I10 ESSENTIAL HYPERTENSION: ICD-10-CM

## 2018-09-27 DIAGNOSIS — E78.49 OTHER HYPERLIPIDEMIA: ICD-10-CM

## 2018-09-27 DIAGNOSIS — I48.19 PERSISTENT ATRIAL FIBRILLATION (HCC): ICD-10-CM

## 2018-09-27 DIAGNOSIS — Z99.89 OSA ON CPAP: ICD-10-CM

## 2018-09-27 DIAGNOSIS — Z98.890 S/P MVR (MITRAL VALVE REPAIR): ICD-10-CM

## 2018-09-27 DIAGNOSIS — I87.2 CHRONIC VENOUS INSUFFICIENCY: ICD-10-CM

## 2018-09-27 DIAGNOSIS — G47.33 OSA ON CPAP: ICD-10-CM

## 2018-09-27 DIAGNOSIS — Z98.890 S/P TVR (TRICUSPID VALVE REPAIR): ICD-10-CM

## 2018-09-27 DIAGNOSIS — I48.0 PAROXYSMAL ATRIAL FIBRILLATION (HCC): ICD-10-CM

## 2018-09-27 PROBLEM — I50.32 CHRONIC DIASTOLIC CONGESTIVE HEART FAILURE (HCC): Status: ACTIVE | Noted: 2017-07-29

## 2018-09-27 PROCEDURE — 93000 ELECTROCARDIOGRAM COMPLETE: CPT | Performed by: INTERNAL MEDICINE

## 2018-09-27 PROCEDURE — 99214 OFFICE O/P EST MOD 30 MIN: CPT | Performed by: INTERNAL MEDICINE

## 2018-10-02 ENCOUNTER — CLINICAL SUPPORT NO REQUIREMENTS (OUTPATIENT)
Dept: CARDIOLOGY | Facility: CLINIC | Age: 83
End: 2018-10-02

## 2018-10-02 ENCOUNTER — TELEPHONE (OUTPATIENT)
Dept: CARDIOLOGY | Facility: CLINIC | Age: 83
End: 2018-10-02

## 2018-10-02 DIAGNOSIS — I48.21 PERMANENT ATRIAL FIBRILLATION (HCC): Primary | ICD-10-CM

## 2018-10-02 PROCEDURE — 93296 REM INTERROG EVL PM/IDS: CPT | Performed by: INTERNAL MEDICINE

## 2018-10-02 PROCEDURE — 93294 REM INTERROG EVL PM/LDLS PM: CPT | Performed by: INTERNAL MEDICINE

## 2018-10-02 NOTE — TELEPHONE ENCOUNTER
Normal LV function by an echo in August 2018.  Not on a beta blocker due to severe lung disease.  No coronary artery disease by heart catheterization in November 2016.  Will monitor for now.

## 2018-10-02 NOTE — TELEPHONE ENCOUNTER
A remote tx. Was reviewed 10/2/18 and there is an episode of what appears to be vt lasting 14 beats on 6/11/18 max rate of 202 bpm. . Strips are  In your box. Thanks Kailyn            Sent to Dr. GARCIA / TELLY BY MISTAKE 10/2/18

## 2018-10-05 ENCOUNTER — TELEPHONE (OUTPATIENT)
Dept: INTERNAL MEDICINE | Facility: CLINIC | Age: 83
End: 2018-10-05

## 2018-10-08 ENCOUNTER — OFFICE VISIT (OUTPATIENT)
Dept: INTERNAL MEDICINE | Facility: CLINIC | Age: 83
End: 2018-10-08

## 2018-10-08 VITALS
SYSTOLIC BLOOD PRESSURE: 150 MMHG | HEART RATE: 91 BPM | BODY MASS INDEX: 34.2 KG/M2 | WEIGHT: 187 LBS | OXYGEN SATURATION: 99 % | DIASTOLIC BLOOD PRESSURE: 66 MMHG

## 2018-10-08 DIAGNOSIS — I48.19 PERSISTENT ATRIAL FIBRILLATION (HCC): ICD-10-CM

## 2018-10-08 DIAGNOSIS — Z79.4 TYPE 2 DIABETES MELLITUS WITH CHRONIC KIDNEY DISEASE, WITH LONG-TERM CURRENT USE OF INSULIN, UNSPECIFIED CKD STAGE (HCC): ICD-10-CM

## 2018-10-08 DIAGNOSIS — E11.22 TYPE 2 DIABETES MELLITUS WITH CHRONIC KIDNEY DISEASE, WITH LONG-TERM CURRENT USE OF INSULIN, UNSPECIFIED CKD STAGE (HCC): ICD-10-CM

## 2018-10-08 DIAGNOSIS — I50.9 CONGESTIVE HEART FAILURE, UNSPECIFIED HF CHRONICITY, UNSPECIFIED HEART FAILURE TYPE (HCC): ICD-10-CM

## 2018-10-08 DIAGNOSIS — H81.10 BENIGN PAROXYSMAL POSITIONAL VERTIGO, UNSPECIFIED LATERALITY: Primary | ICD-10-CM

## 2018-10-08 DIAGNOSIS — I27.20 PULMONARY HYPERTENSION (HCC): ICD-10-CM

## 2018-10-08 DIAGNOSIS — I10 ESSENTIAL HYPERTENSION: ICD-10-CM

## 2018-10-08 PROBLEM — IMO0001 IDDM (INSULIN DEPENDENT DIABETES MELLITUS): Status: RESOLVED | Noted: 2017-01-17 | Resolved: 2018-10-08

## 2018-10-08 PROCEDURE — 99214 OFFICE O/P EST MOD 30 MIN: CPT | Performed by: INTERNAL MEDICINE

## 2018-10-08 PROCEDURE — G0009 ADMIN PNEUMOCOCCAL VACCINE: HCPCS | Performed by: INTERNAL MEDICINE

## 2018-10-08 PROCEDURE — 90732 PPSV23 VACC 2 YRS+ SUBQ/IM: CPT | Performed by: INTERNAL MEDICINE

## 2018-10-08 RX ORDER — SILDENAFIL 25 MG/1
10 TABLET, FILM COATED ORAL 3 TIMES DAILY
COMMUNITY
End: 2019-02-19

## 2018-10-08 NOTE — PATIENT INSTRUCTIONS
Dizziness  Dizziness is a common problem. It is a feeling of unsteadiness or light-headedness. You may feel like you are about to faint. Dizziness can lead to injury if you stumble or fall. Anyone can become dizzy, but dizziness is more common in older adults. This condition can be caused by a number of things, including medicines, dehydration, or illness.  Follow these instructions at home:How to Perform the Epley Maneuver  The Epley maneuver is an exercise that relieves symptoms of vertigo. Vertigo is the feeling that you or your surroundings are moving when they are not. When you feel vertigo, you may feel like the room is spinning and have trouble walking. Dizziness is a little different than vertigo. When you are dizzy, you may feel unsteady or light-headed.  You can do this maneuver at home whenever you have symptoms of vertigo. You can do it up to 3 times a day until your symptoms go away.  Even though the Epley maneuver may relieve your vertigo for a few weeks, it is possible that your symptoms will return. This maneuver relieves vertigo, but it does not relieve dizziness.  What are the risks?  If it is done correctly, the Epley maneuver is considered safe. Sometimes it can lead to dizziness or nausea that goes away after a short time. If you develop other symptoms, such as changes in vision, weakness, or numbness, stop doing the maneuver and call your health care provider.  How to perform the Epley maneuver  1. Sit on the edge of a bed or table with your back straight and your legs extended or hanging over the edge of the bed or table.  2. Turn your head custodial toward the affected ear or side.  3. Lie backward quickly with your head turned until you are lying flat on your back. You may want to position a pillow under your shoulders.  4. Hold this position for 30 seconds. You may experience an attack of vertigo. This is normal.  5. Turn your head to the opposite direction until your unaffected ear is facing  the floor.  6. Hold this position for 30 seconds. You may experience an attack of vertigo. This is normal. Hold this position until the vertigo stops.  7. Turn your whole body to the same side as your head. Hold for another 30 seconds.  8. Sit back up.  You can repeat this exercise up to 3 times a day.  Follow these instructions at home:  · After doing the Epley maneuver, you can return to your normal activities.  · Ask your health care provider if there is anything you should do at home to prevent vertigo. He or she may recommend that you:  ? Keep your head raised (elevated) with two or more pillows while you sleep.  ? Do not sleep on the side of your affected ear.  ? Get up slowly from bed.  ? Avoid sudden movements during the day.  ? Avoid extreme head movement, like looking up or bending over.  Contact a health care provider if:  · Your vertigo gets worse.  · You have other symptoms, including:  ? Nausea.  ? Vomiting.  ? Headache.  Get help right away if:  · You have vision changes.  · You have a severe or worsening headache or neck pain.  · You cannot stop vomiting.  · You have new numbness or weakness in any part of your body.  Summary  · Vertigo is the feeling that you or your surroundings are moving when they are not.  · The Epley maneuver is an exercise that relieves symptoms of vertigo.  · If the Epley maneuver is done correctly, it is considered safe. You can do it up to 3 times a day.  This information is not intended to replace advice given to you by your health care provider. Make sure you discuss any questions you have with your health care provider.  Document Released: 12/23/2014 Document Revised: 11/07/2017 Document Reviewed: 11/07/2017  [employer] Interactive Patient Education © 2017 [employer]    Benign Positional Vertigo  Vertigo is the feeling that you or your surroundings are moving when they are not. Benign positional vertigo is the most common form of vertigo. The cause of this condition is  not serious (is benign). This condition is triggered by certain movements and positions (is positional). This condition can be dangerous if it occurs while you are doing something that could endanger you or others, such as driving.  What are the causes?  In many cases, the cause of this condition is not known. It may be caused by a disturbance in an area of the inner ear that helps your brain to sense movement and balance. This disturbance can be caused by a viral infection (labyrinthitis), head injury, or repetitive motion.  What increases the risk?  This condition is more likely to develop in:  Women.  People who are 50 years of age or older.    What are the signs or symptoms?  Symptoms of this condition usually happen when you move your head or your eyes in different directions. Symptoms may start suddenly, and they usually last for less than a minute. Symptoms may include:  Loss of balance and falling.  Feeling like you are spinning or moving.  Feeling like your surroundings are spinning or moving.  Nausea and vomiting.  Blurred vision.  Dizziness.  Involuntary eye movement (nystagmus).    Symptoms can be mild and cause only slight annoyance, or they can be severe and interfere with daily life. Episodes of benign positional vertigo may return (recur) over time, and they may be triggered by certain movements. Symptoms may improve over time.  How is this diagnosed?  This condition is usually diagnosed by medical history and a physical exam of the head, neck, and ears. You may be referred to a health care provider who specializes in ear, nose, and throat (ENT) problems (otolaryngologist) or a provider who specializes in disorders of the nervous system (neurologist). You may have additional testing, including:  MRI.  A CT scan.  Eye movement tests. Your health care provider may ask you to change positions quickly while he or she watches you for symptoms of benign positional vertigo, such as nystagmus. Eye movement  may be tested with an electronystagmogram (ENG), caloric stimulation, the Jere-Hallpike test, or the roll test.  An electroencephalogram (EEG). This records electrical activity in your brain.  Hearing tests.    How is this treated?  Usually, your health care provider will treat this by moving your head in specific positions to adjust your inner ear back to normal. Surgery may be needed in severe cases, but this is rare. In some cases, benign positional vertigo may resolve on its own in 2-4 weeks.  Follow these instructions at home:  Safety  Move slowly.Avoid sudden body or head movements.  Avoid driving.  Avoid operating heavy machinery.  Avoid doing any tasks that would be dangerous to you or others if a vertigo episode would occur.  If you have trouble walking or keeping your balance, try using a cane for stability. If you feel dizzy or unstable, sit down right away.  Return to your normal activities as told by your health care provider. Ask your health care provider what activities are safe for you.  General instructions  Take over-the-counter and prescription medicines only as told by your health care provider.  Avoid certain positions or movements as told by your health care provider.  Drink enough fluid to keep your urine clear or pale yellow.  Keep all follow-up visits as told by your health care provider. This is important.  Contact a health care provider if:  You have a fever.  Your condition gets worse or you develop new symptoms.  Your family or friends notice any behavioral changes.  Your nausea or vomiting gets worse.  You have numbness or a “pins and needles” sensation.  Get help right away if:  You have difficulty speaking or moving.  You are always dizzy.  You faint.  You develop severe headaches.  You have weakness in your legs or arms.  You have changes in your hearing or vision.  You develop a stiff neck.  You develop sensitivity to light.  This information is not intended to replace advice given  to you by your health care provider. Make sure you discuss any questions you have with your health care provider.  Document Released: 09/25/2007 Document Revised: 05/25/2017 Document Reviewed: 04/11/2016  [employer] Interactive Patient Education © 2018 [employer]    Eating and drinking  · Drink enough fluid to keep your urine clear or pale yellow. This helps to keep you from becoming dehydrated. Try to drink more clear fluids, such as water.  · Do not drink alcohol.  · Limit your caffeine intake if told to do so by your health care provider. Check ingredients and nutrition facts to see if a food or beverage contains caffeine.  · Limit your salt (sodium) intake if told to do so by your health care provider. Check ingredients and nutrition facts to see if a food or beverage contains sodium.  Activity  · Avoid making quick movements.  ? Rise slowly from chairs and steady yourself until you feel okay.  ? In the morning, first sit up on the side of the bed. When you feel okay, stand slowly while you hold onto something until you know that your balance is fine.  · If you need to  one place for a long time, move your legs often. Tighten and relax the muscles in your legs while you are standing.  · Do not drive or use heavy machinery if you feel dizzy.  · Avoid bending down if you feel dizzy. Place items in your home so that they are easy for you to reach without leaning over.  Lifestyle  · Do not use any products that contain nicotine or tobacco, such as cigarettes and e-cigarettes. If you need help quitting, ask your health care provider.  · Try to reduce your stress level by using methods such as yoga or meditation. Talk with your health care provider if you need help to manage your stress.  General instructions  · Watch your dizziness for any changes.  · Take over-the-counter and prescription medicines only as told by your health care provider. Talk with your health care provider if you think that your  dizziness is caused by a medicine that you are taking.  · Tell a friend or a family member that you are feeling dizzy. If he or she notices any changes in your behavior, have this person call your health care provider.  · Keep all follow-up visits as told by your health care provider. This is important.  Contact a health care provider if:  · Your dizziness does not go away.  · Your dizziness or light-headedness gets worse.  · You feel nauseous.  · You have reduced hearing.  · You have new symptoms.  · You are unsteady on your feet or you feel like the room is spinning.  Get help right away if:  · You vomit or have diarrhea and are unable to eat or drink anything.  · You have problems talking, walking, swallowing, or using your arms, hands, or legs.  · You feel generally weak.  · You are not thinking clearly or you have trouble forming sentences. It may take a friend or family member to notice this.  · You have chest pain, abdominal pain, shortness of breath, or sweating.  · Your vision changes.  · You have any bleeding.  · You have a severe headache.  · You have neck pain or a stiff neck.  · You have a fever.  These symptoms may represent a serious problem that is an emergency. Do not wait to see if the symptoms will go away. Get medical help right away. Call your local emergency services (911 in the [place] Do not drive yourself to the hospital.  Summary  · Dizziness is a feeling of unsteadiness or light-headedness. This condition can be caused by a number of things, including medicines, dehydration, or illness.  · Anyone can become dizzy, but dizziness is more common in older adults.  · Drink enough fluid to keep your urine clear or pale yellow. Do not drink alcohol.  · Avoid making quick movements if you feel dizzy. Monitor your dizziness for any changes.  This information is not intended to replace advice given to you by your health care provider. Make sure you discuss any questions you have with your health care  provider.  Document Released: 06/13/2002 Document Revised: 01/20/2018 Document Reviewed: 01/20/2018  [employer] Interactive Patient Education © 2018 [employer]

## 2018-10-08 NOTE — PROGRESS NOTES
"Chief Complaint   Patient presents with   • Dizziness   • Diabetes   • Congestive Heart Failure   pulmonary hypertension.    History of Present Illness   Veronica Henao is a 83 y.o. female presents for routine follow up evaluation. Patient has DM, chf, peripheral edema, and dizziness. Patient recently started ozempic for dm. This is working very well. She has a decreased appetite and has d/c her oral hypoglycemic medications.   Has pulmnary hyertension. She reports that she started letairis and \"i think it's working\" this allows her to walk further without becoming dyspnic. She has been cleaning her home more than in the past. Reduced leg swelling.     Patient reports feeling \"dizzy\". This occurs with positional changes of the head. She reports it has been present for some time. She notes when she first sits up in the morning as well. She reports this has been present for > 1 year. (first noted around time of heart surger)    Has hypertension. bp is elevated today. Patient is compliant with her current medications.     Patient has renal insufficiency. She is to f/u w/ her nephrologist this week     The following portions of the patient's history were reviewed and updated as appropriate: allergies, current medications, past family history, past medical history, past social history, past surgical history and problem list.  Current Outpatient Prescriptions on File Prior to Visit   Medication Sig Dispense Refill   • acetaminophen (TYLENOL) 325 MG tablet Take 2 tablets by mouth Every 6 (Six) Hours As Needed for Mild Pain (1-3).  0   • Alcohol Swabs (B-D SINGLE USE SWABS REGULAR) pads Inject 1 each under the skin Daily. 100 each 11   • allopurinol (ZYLOPRIM) 100 MG tablet Take 1 tablet by mouth 2 (Two) Times a Day. 180 tablet 2   • Blood Glucose Monitoring Suppl (TRUE METRIX AIR GLUCOSE METER) device 1 each Daily. 1 each 0   • cholecalciferol (VITAMIN D3) 1000 UNITS tablet Take 1,000 Units by mouth daily.     • " Cyanocobalamin (VITAMIN B-12) 5000 MCG sublingual tablet Take 5,000 mcg by mouth Daily.     • diphenhydrAMINE (BENADRYL) 2 % cream Apply  topically 3 (Three) Times a Day As Needed for Itching. 30 g 3   • furosemide (LASIX) 40 MG tablet Take 1 tablet by mouth Daily. 180 tablet 2   • hydrALAZINE (APRESOLINE) 100 MG tablet Take 1 tablet by mouth 2 (Two) Times a Day. 180 tablet 1   • hydrALAZINE (APRESOLINE) 25 MG tablet Take 1 tablet by mouth 2 (Two) Times a Day. 180 tablet 0   • hydrOXYzine (ATARAX) 25 MG tablet TAKE 1 TABLET AT BEDTIME 90 tablet 0   • Incontinence Supply Disposable (BLADDER CONTROL PADS EX ABSORB) misc      • Lancets 28G misc To check sugar qd. E11.9  each 12   • latanoprost (XALATAN) 0.005 % ophthalmic solution Administer 1 drop to both eyes Every Night.     • Multiple Vitamins-Minerals (CENTRUM SILVER PO) Take 1 tablet by mouth Daily.     • nystatin-triamcinolone (MYCOLOG) 383884-7.1 UNIT/GM-% ointment Apply  topically 2 (Two) Times a Day. 30 g 3   • Omega-3 Fatty Acids (FISH OIL) 1000 MG capsule capsule Take by mouth Daily With Breakfast     • raNITIdine (ZANTAC) 150 MG tablet Take 1 tablet by mouth 2 (Two) Times a Day. 180 tablet 1   • rivaroxaban (XARELTO) 15 MG tablet Take 1 tablet by mouth Daily With Dinner. 42 tablet 0   • Semaglutide (OZEMPIC) 0.25 or 0.5 MG/DOSE solution pen-injector Inject 0.5 mg under the skin into the appropriate area as directed 1 (One) Time Per Week. 1.5 mL 5   • sertraline (ZOLOFT) 50 MG tablet TAKE 1 TABLET EVERY DAY AS DIRECTED 90 tablet 2   • TRUE METRIX BLOOD GLUCOSE TEST test strip CHECK BLOOD SUGAR TWICE DAILY 100 each 3   • TRUEPLUS LANCETS 28G misc E11.9 DM to check sugar  each 12   • Vitamin E 400 UNITS chewable tablet Chew 400 Units Daily. Two daily     • Vitamins A & D (VITAMIN A & D) 53105-211 UNITS tablet      • [DISCONTINUED] glimepiride (AMARYL) 2 MG tablet Take 2 tablets by mouth 2 (Two) Times a Day. DM E11.9 180 tablet 1   • [DISCONTINUED]  linagliptin (TRADJENTA) 5 MG tablet tablet Take 5 mg by mouth Daily.       No current facility-administered medications on file prior to visit.      Review of Systems   Constitutional: Positive for fatigue.   HENT: Negative.    Eyes: Negative.    Respiratory: Positive for shortness of breath.    Cardiovascular:        Stable leg swelling   Gastrointestinal: Negative.    Endocrine: Negative.    Genitourinary: Negative.    Musculoskeletal: Positive for arthralgias.   Skin: Negative.    Allergic/Immunologic: Negative.    Neurological: Negative.    Hematological: Negative.    Psychiatric/Behavioral: Negative.        Objective   Physical Exam   Constitutional: She appears well-developed and well-nourished.   HENT:   Head: Normocephalic and atraumatic.   Right Ear: External ear normal.   Left Ear: External ear normal.   Mouth/Throat: Oropharynx is clear and moist.   Eyes: Pupils are equal, round, and reactive to light. EOM are normal.   Neck: Normal range of motion. Neck supple.   Cardiovascular: Normal rate, normal heart sounds and intact distal pulses.    Pulmonary/Chest:   Less dyspnic   Neurological:   Dizziness reproduced with modified epley maneuver   Skin: Skin is warm and dry.   Psychiatric: She has a normal mood and affect. Her behavior is normal. Judgment and thought content normal.   Nursing note and vitals reviewed.       /66   Pulse 91   Wt 84.8 kg (187 lb)   SpO2 99%   BMI 34.20 kg/m²     Assessment/Plan   Diagnoses and all orders for this visit:    Benign paroxysmal positional vertigo, unspecified laterality  -     Ambulatory Referral to Physical Therapy Evaluate and treat, Vestibular    Congestive heart failure, unspecified HF chronicity, unspecified heart failure type (CMS/HCC)    Persistent atrial fibrillation (CMS/HCC)    Essential hypertension    Pulmonary hypertension (CMS/HCC)    Type 2 diabetes mellitus with chronic kidney disease, with long-term current use of insulin, unspecified CKD  stage (CMS/HCC)    Other orders  -     Ambrisentan (LETAIRIS PO); Take  by mouth.  -     sildenafil (VIAGRA) 25 MG tablet; Take 25 mg by mouth Daily As Needed for erectile dysfunction.      Patient w/ DM. She will continue weekly ozempic. She is advised a healthy diet with activity as tolerated. Will test listed labs. She is having positional vertigo. Gave literature on this and a referral to physical therapy. She will continue meds for chf and pulmonary hypertension. She will monitor bp as meds may need to be adjusted. She will eat a low salt and consistent carb diet. She will f/u here in 3-4 months or prn.

## 2018-10-09 LAB
BASOPHILS # BLD AUTO: 0.03 10*3/MM3 (ref 0–0.2)
BASOPHILS NFR BLD AUTO: 0.4 % (ref 0–1.5)
BUN SERPL-MCNC: 54 MG/DL (ref 8–23)
BUN/CREAT SERPL: 16.9 (ref 7–25)
CALCIUM SERPL-MCNC: 9.8 MG/DL (ref 8.6–10.5)
CHLORIDE SERPL-SCNC: 96 MMOL/L (ref 98–107)
CO2 SERPL-SCNC: 21.3 MMOL/L (ref 22–29)
CREAT SERPL-MCNC: 3.19 MG/DL (ref 0.57–1)
EOSINOPHIL # BLD AUTO: 0.16 10*3/MM3 (ref 0–0.7)
EOSINOPHIL NFR BLD AUTO: 2.2 % (ref 0.3–6.2)
ERYTHROCYTE [DISTWIDTH] IN BLOOD BY AUTOMATED COUNT: 16.2 % (ref 11.7–13)
GLUCOSE SERPL-MCNC: 148 MG/DL (ref 65–99)
HBA1C MFR BLD: 6.5 % (ref 4.8–5.6)
HCT VFR BLD AUTO: 35.9 % (ref 35.6–45.5)
HGB BLD-MCNC: 11.8 G/DL (ref 11.9–15.5)
IMM GRANULOCYTES # BLD: 0.02 10*3/MM3 (ref 0–0.03)
IMM GRANULOCYTES NFR BLD: 0.3 % (ref 0–0.5)
LYMPHOCYTES # BLD AUTO: 1.37 10*3/MM3 (ref 0.9–4.8)
LYMPHOCYTES NFR BLD AUTO: 18.5 % (ref 19.6–45.3)
MCH RBC QN AUTO: 30.1 PG (ref 26.9–32)
MCHC RBC AUTO-ENTMCNC: 32.9 G/DL (ref 32.4–36.3)
MCV RBC AUTO: 91.6 FL (ref 80.5–98.2)
MONOCYTES # BLD AUTO: 0.54 10*3/MM3 (ref 0.2–1.2)
MONOCYTES NFR BLD AUTO: 7.3 % (ref 5–12)
NEUTROPHILS # BLD AUTO: 5.3 10*3/MM3 (ref 1.9–8.1)
NEUTROPHILS NFR BLD AUTO: 71.6 % (ref 42.7–76)
PLATELET # BLD AUTO: 209 10*3/MM3 (ref 140–500)
POTASSIUM SERPL-SCNC: 3.9 MMOL/L (ref 3.5–5.2)
RBC # BLD AUTO: 3.92 10*6/MM3 (ref 3.9–5.2)
SODIUM SERPL-SCNC: 136 MMOL/L (ref 136–145)
WBC # BLD AUTO: 7.4 10*3/MM3 (ref 4.5–10.7)

## 2018-10-15 ENCOUNTER — TELEPHONE (OUTPATIENT)
Dept: INTERNAL MEDICINE | Facility: CLINIC | Age: 83
End: 2018-10-15

## 2018-10-15 RX ORDER — AZELASTINE 1 MG/ML
SPRAY, METERED NASAL
Qty: 1 EACH | Refills: 3 | Status: SHIPPED | OUTPATIENT
Start: 2018-10-15 | End: 2018-11-04 | Stop reason: HOSPADM

## 2018-10-15 NOTE — TELEPHONE ENCOUNTER
Pt is asking for a antibiotic for nasal congestion  Says that after using her nose spray she received some relief (?)

## 2018-10-17 ENCOUNTER — HOSPITAL ENCOUNTER (OUTPATIENT)
Dept: PHYSICAL THERAPY | Facility: HOSPITAL | Age: 83
Setting detail: THERAPIES SERIES
Discharge: HOME | End: 2018-10-17

## 2018-10-17 DIAGNOSIS — R42 DIZZINESS: Primary | ICD-10-CM

## 2018-10-17 DIAGNOSIS — H81.10 BPPV (BENIGN PAROXYSMAL POSITIONAL VERTIGO), UNSPECIFIED LATERALITY: ICD-10-CM

## 2018-10-17 DIAGNOSIS — H83.2X9 VESTIBULAR HYPOFUNCTION, UNSPECIFIED LATERALITY: ICD-10-CM

## 2018-10-17 PROCEDURE — G8982 BODY POS GOAL STATUS: HCPCS | Performed by: PHYSICAL THERAPIST

## 2018-10-17 PROCEDURE — 97112 NEUROMUSCULAR REEDUCATION: CPT | Performed by: PHYSICAL THERAPIST

## 2018-10-17 PROCEDURE — 97162 PT EVAL MOD COMPLEX 30 MIN: CPT | Performed by: PHYSICAL THERAPIST

## 2018-10-17 PROCEDURE — G8981 BODY POS CURRENT STATUS: HCPCS | Performed by: PHYSICAL THERAPIST

## 2018-10-17 NOTE — THERAPY EVALUATION
Outpatient Physical Therapy Vestibular Initial Evaluation  [facility]     Patient Name: Veronica Henao  : 1935  MRN: [id number]  Today's Date: 10/17/2018      Visit Date: 10/17/2018    Patient Active Problem List   Diagnosis   • Atopic rhinitis   • Atrial fibrillation (CMS/HCC)   • Chronic renal insufficiency   • Gout   • Cephalalgia   • Hypercalcemia   • Hyperlipidemia   • Subclinical hypothyroidism   • 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)   • Urinary tract infection   • Lung mass   • Essential hypertension   • Gastroesophageal reflux disease   • Tricuspid insufficiency   • Morbid obesity (CMS/HCC)   • JOSELUIS on CPAP   • Chronic venous insufficiency   • Mitral valve insufficiency   • S/P Maze operation for atrial fibrillation   • S/P TVR (tricuspid valve repair)   • S/P MVR (mitral valve repair)   • CHF (congestive heart failure) (CMS/HCC)   • Acute non-recurrent maxillary sinusitis   • Atrial flutter, paroxysmal (CMS/HCC)   • Third degree atrioventricular block (CMS/HCC)   • Chronic kidney disease, stage IV (severe) (CMS/HCC)   • Anemia in chronic kidney disease   • Iron deficiency anemia   • Chronic diastolic congestive heart failure (CMS/HCC)   • Paroxysmal atrial fibrillation (CMS/HCC)   • Pulmonary hypertension (CMS/HCC)        Past Medical History:   Diagnosis Date   • Acute bronchitis    • Acute renal insufficiency    • Allergic rhinitis    • Anemia in chronic kidney disease    • Arrhythmia    • Atrial fibrillation (CMS/HCC)     chronic   • Brachycephaly    • Breast pain, right    • Chest pain    • CHF (congestive heart failure) (CMS/HCC)    • Chronic combined systolic and diastolic CHF (congestive heart failure) (CMS/HCC)    • Chronic renal insufficiency    • CKD (chronic kidney disease), stage IV (CMS/HCC)    • Cough    • Depression    • Diabetes mellitus (CMS/HCC)     TYPE 2   • Diverticulosis    • ORTIZ (dyspnea on exertion)    • Dyspnea    • Esophageal reflux     • Essential hypertension    • Fatigue    • GERD (gastroesophageal reflux disease)    • Gout    • Head injury    • Headache    • Hoarseness    • Hypercalcemia    • Hyperlipidemia    • Hypertension    • Influenza A (H1N1)    • Iron deficiency anemia    • Itching    • Leg swelling    • Lightheadedness    • Macular degeneration    • Malaise and fatigue    • Mitral valve regurgitation     moderate to severe   • Nontoxic multinodular goiter     RIGHT 2.0 CM  LEFT  2.5 CM   • Obesity    • JOSELUIS on CPAP    • Osteoarthritis    • PAF (paroxysmal atrial fibrillation) (CMS/HCC)    • Palpitations    • Paresthesias    • Proteinuria    • Pulmonary nodule    • Pulmonic valve regurgitation     mild   • Sebaceous cyst    • SOBOE (shortness of breath on exertion)    • Solitary thyroid nodule    • Subclinical hypothyroidism    • Tachycardia    • TR (tricuspid regurgitation)     mild to moderate   • Type 2 diabetes mellitus (CMS/HCC)    • 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)    • UTI (urinary tract infection)         Past Surgical History:   Procedure Laterality Date   • APPENDECTOMY     • CARDIAC CATHETERIZATION      procedure outcome:    • CARDIAC CATHETERIZATION N/A 11/21/2016    Procedure: Coronary angiography;  Surgeon: Marcin العراقي MD;  Location: [facility] CATH INVASIVE LOCATION;  Service:    • CARDIAC CATHETERIZATION N/A 11/21/2016    Procedure: Left Heart Cath;  Surgeon: Marcin العراقي MD;  Location: [facility] CATH INVASIVE LOCATION;  Service:    • CARDIAC CATHETERIZATION N/A 8/29/2018    Procedure: Right Heart Cath with adenosine challenge if wedge pressure is normal.;  Surgeon: Paulo Decker MD;  Location: [facility] CATH INVASIVE LOCATION;  Service: Cardiovascular   • CARDIAC ELECTROPHYSIOLOGY PROCEDURE N/A 3/28/2017    Procedure: Ablation atrial flutter;  Surgeon: Rodríguez Ward MD;  Location: [facility] CATH INVASIVE LOCATION;  Service:    • CARDIAC ELECTROPHYSIOLOGY PROCEDURE N/A 7/3/2017     Procedure: AV node ablation;  Surgeon: Rodríguez Ward MD;  Location: [facility] CATH INVASIVE LOCATION;  Service:    • CARDIAC ELECTROPHYSIOLOGY PROCEDURE N/A 7/3/2017    Procedure: Pacemaker DC new  [employer] and will need 3830 lead-I did text Rachael Meneses ;  Surgeon: Rodríguez Ward MD;  Location: [facility] CATH INVASIVE LOCATION;  Service:    • CARDIAC SURGERY     • CHOLECYSTECTOMY     • CLAVICLE SURGERY Left    • COLONOSCOPY N/A 11/14/2017    Procedure: COLONOSCOPY INTO CECUM/ TERMINAL ILEUM WITH POLYPECTOMY  X 8 AND CLIP X 2;  Surgeon: Jacy Browning MD;  Location: [facility] ENDOSCOPY;  Service:    • ENDOSCOPY N/A 11/14/2017    Procedure: ESOPHAGOGASTRODUODENOSCOPY WITH BIOPSIES;  Surgeon: Jacy Brownnig MD;  Location: [facility] ENDOSCOPY;  Service:    • GASTRIC RESTRICTION SURGERY     • HYSTERECTOMY     • JOINT REPLACEMENT     • LUMBAR DECOMPRESSION      L4-5   • MITRAL VALVE REPAIR/REPLACEMENT N/A 12/14/2016    Procedure: INTRAOPERATIVE KATELYN, MIDLINE STERNOTOMY, MITRAL VALVE REPAIR, TRICUSPID VALVE REPAIR, MAZE PROCEDURE;  Surgeon: Young Vital MD;  Location: [facility] MAIN OR;  Service:    • SHOULDER SURGERY Left     AFTER SURGERY FOR FRACTURED CLAVICLE   • TONSILLECTOMY     • TOTAL KNEE ARTHROPLASTY      bilateral         Visit Dx:     ICD-10-CM ICD-9-CM   1. Dizziness R42 780.4   2. BPPV (benign paroxysmal positional vertigo), unspecified laterality H81.10 386.11   3. Vestibular hypofunction, unspecified laterality H83.2X9 386.50             Patient History     Row Name 10/17/18 1600 10/17/18 1500          History    Chief Complaint Dizziness  -GR  --     Date Current Problem(s) Began 12/30/16  -GR  --     Brief Description of Current Complaint 2 year history of vertigo, onset near the time of heart surgery.  She has had inner ear issues in the past and she states it feels different this time.  She denies spinning. She generally feels unsteady after waking. She will sit at the side of the bed for a while and then  the symptoms will stay her throughout the day.  She sits/rests to subside her symptoms. She is on BP medication and has had recent elevation in pressure as well as pulmonary hypotension. She is on a new medication for this which she thinks she is helping.  She ambulates with a SC. Has not fallen this year, but did in years past around the time of her heart surgery and then in the yard. She denies tinnitus or sound sensitivity; she is irritable to bright lights.  She drinks 1 caffeinated beverage per day; she has recently switched to a low sodium diet and has not noticed a change in her dizziness.  She does have correlated intermittent headache.  She has stopped driving the last few weeks due to increase in her dizziness. She lives with her son and grandson.  She has macular degeneration and history of trauma to the L eye with now a dilated pupil and intermittent loss of vision.    -GR  --     Patient/Caregiver Goals Relief from dizziness  -GR  --     Are you or can you be pregnant No  -GR  --        Fall Risk Assessment    Any falls in the past year: No  -GR  --        Services    Do you plan to receive Home Health services in the near future  -- No  -GR        Daily Activities    Primary Language  -- English  -GR     How does patient learn best?  -- Listening  -GR     Pt Participated in POC and Goals  -- Yes  -GR        Safety    Are you being hurt, hit, or frightened by anyone at home or in your life?  -- No  -GR     Are you being neglected by a caregiver  -- No  -GR       User Key  (r) = Recorded By, (t) = Taken By, (c) = Cosigned By    Initials Name Provider Type    Milton Sosa, PT Physical Therapist                Vestibular Eval     Row Name 10/17/18 1600             Occulomotor Exam Fixation Present    Occular ROM Normal  -GR      Spontaneous Nystagmus Absent  -GR      Gaze-induced Nystagmus Absent  -GR      Smooth Pursuit Saccadic  -GR      Saccades Overshoots  -GR      Convergence Abnormal  -GR          Vestibulo-Occular Reflex (VOR)    VOR to Fast Head Movement/Head Thrust Test Positive right  -GR      VOR Cancellation Corrective saccades  -GR         Positional Testing    Positional Testing Without infrared goggles  -GR      Vertebrobasilar Artery Screen - Right Negative  -GR      Vertebrobasilar Artery Screen - Left Negative  -GR      Jere-Hallpike Right No nystagmus  -GR      Jere-Hallpike Left No nystagmus  -GR      Horizontal Roll Test Right No nystagmus  -GR      Horizontal Roll Test Left No nystagmus  -GR         High-level Balance    High-level Balance Other modified CTSIB 32/120  -GR         Cervicogenic Assessment    Cervicogenic Assess >45 degrees of cervical rotation bilaterally  -GR        User Key  (r) = Recorded By, (t) = Taken By, (c) = Cosigned By    Initials Name Provider Type    Milton Sosa PT Physical Therapist              PT Ortho     Row Name 10/17/18 1700       Gait/Stairs Assessment/Training    Comment (Gait/Stairs) ambulates with crouched stance unven stride length heavy reliance on SC; h/o B TKA 98 and 02 with reported recurrent pain  -GR      User Key  (r) = Recorded By, (t) = Taken By, (c) = Cosigned By    Initials Name Provider Type    Milton Sosa PT Physical Therapist                    Therapy Education  Education Details: role of PT, POC, brief differential diagnosis, initial HEP and follow up plan  Given: HEP, Symptoms/condition management  Program: New  How Provided: Verbal, Demonstration, Written  Provided to: Patient  Level of Understanding: Teach back education performed, Verbalized, Demonstrated            Exercises     Row Name 10/17/18 1700             Total Minutes    32480 -  PT Neuromuscular Reeducation Minutes 8  -GR         Exercise 1    Exercise Name 1 VORx1 seated  -GR      Cueing 1 Demo  -GR      Sets 1 1  -GR      Reps 1 2  -GR      Time 1 15 seconds  -GR      Additional Comments slow controlled motion  -GR        User Key  (r) = Recorded  By, (t) = Taken By, (c) = Cosigned By    Initials Name Provider Type    Milton Sosa, PT Physical Therapist                            PT OP Goals     Row Name 10/17/18 1500          PT Short Term Goals    STG Date to Achieve 11/01/18  -GR     STG 1 Patient will deny falls  -GR     STG 1 Progress New  -GR     STG 2 Patient will be independent with static habituation for in home safety.  -GR     STG 2 Progress New  -GR        Long Term Goals    LTG Date to Achieve 12/01/18  -GR     LTG 1 Patient will report 50% improvement of greater with waking.  -GR     LTG 1 Progress New  -GR     LTG 2 Patient will score </=24/100 on the dizziness handicap inventory to indicate improved perceived positional tolerance.  -GR     LTG 2 Progress New  -GR     LTG 3 Patient will be independent with progressive adaptation techniques for community safety.  -GR     LTG 3 Progress New  -GR        Time Calculation    PT Goal Re-Cert Due Date 01/15/19  -GR       User Key  (r) = Recorded By, (t) = Taken By, (c) = Cosigned By    Initials Name Provider Type    Milton Sosa, PT Physical Therapist                PT Assessment/Plan     Row Name 10/17/18 0777          PT Assessment    Functional Limitations Decreased safety during functional activities;Impaired gait;Limitations in community activities;Performance in leisure activities  -GR     Impairments Balance;Gait;Posture  -GR     Assessment Comments 84 y/o F referred to outpatient PT for vestibular evaluation of dizziness x 2 year history; onset at time of heart surgery. Signs and symptoms are consistent with vestibular hypofunction and also unable to rule out blood pressure involvement.  All positional testing for BPPV is negative. She has difficulty fixating during occuluomotor exam and exhibits a positive R head thrust test.  Her condition is evolving. Pertinent comorbidities that may affect progress include h/o inner ear disorder, R eye trauma, HTN, arthritis, gait  abnormality, diabets and intermittent headache. Recommend trial gaze stabization in skilled PT environment and monitor response. Thank you for this referral.  -GR     Please refer to paper survey for additional self-reported information Yes  -GR     Rehab Potential Good  -GR     Patient/caregiver participated in establishment of treatment plan and goals Yes  -GR     Patient would benefit from skilled therapy intervention Yes  -GR        PT Plan    PT Frequency 1x/week  -GR     Predicted Duration of Therapy Intervention (Therapy Eval) 6 weeks  -GR     Planned CPT's? PT EVAL MOD COMPLELITY: 55911;PT RE-EVAL: 86578;PT THER PROC EA 15 MIN: 52844;PT THER ACT EA 15 MIN: 51154;PT NEUROMUSC RE-EDUCATION EA 15 MIN: 20327;PT GAIT TRAINING EA 15 MIN: 98639  -GR     Physical Therapy Interventions (Optional Details) balance training;home exercise program;neuromuscular re-education;patient/family education;vestibular training  -GR     PT Plan Comments Review and progress VOR as tolerated. Add metronome. Trial standing. Vary background. Consider substitution and adaptation techniques.  -GR       User Key  (r) = Recorded By, (t) = Taken By, (c) = Cosigned By    Initials Name Provider Type    GR Milton Lopez, PT Physical Therapist           Outcome Measure Options: Dizziness Handicap Inventory (44/100)         Time Calculation:   Start Time: 1600  Stop Time: 1640  Time Calculation (min): 40 min  Total Timed Code Minutes- PT: 8 minute(s)   Therapy Suggested Charges     Code   Minutes Charges    14543 (CPT®) Hc Pt Neuromusc Re Education Ea 15 Min 8 1    46133 (CPT®) Hc Pt Ther Proc Ea 15 Min      25037 (CPT®) Hc Gait Training Ea 15 Min      48850 (CPT®) Hc Pt Therapeutic Act Ea 15 Min      21860 (CPT®) Hc Pt Manual Therapy Ea 15 Min      79893 (CPT®) Hc Pt Ther Massage- Per 15 Min      43669 (CPT®) Hc Pt Iontophoresis Ea 15 Min      19581 (CPT®) Hc Pt Elec Stim Ea-Per 15 Min      95905 (CPT®) Hc Pt Ultrasound Ea 15 Min      35926  (CPT®) Hc Pt Self Care/Mgmt/Train Ea 15 Min      82886 (CPT®) Hc Pt Prosthetic (S) Train Initial Encounter, Each 15 Min      59647 (CPT®) Hc Orthotic(S) Mgmt/Train Initial Encounter, Each 15min      62274 (CPT®) Hc Pt Aquatic Therapy Ea 15 Min      48939 (CPT®) Hc Pt Orthotic(S)/Prosthetic(S) Encounter, Each 15 Min       (CPT®) Hc Pt Electrical Stim Unattended      Total  8 1        Therapy Charges for Today     Code Description Service Date Service Provider Modifiers Qty    85485154512 HC PT CHNG MAIN POS CURRENT 10/17/2018 Milton Lopez, PT GP, CK 1    05684483354 HC PT CHNG MAIN POS PROJECTED 10/17/2018 Milton Lopez, PT GP, CI 1    63180761978 HC PT NEUROMUSC RE EDUCATION EA 15 MIN 10/17/2018 Milton Lopez, PT GP 1    52864594114 HC PT EVAL MOD COMPLEXITY 2 10/17/2018 Milton Lopez, PT GP 1          PT G-Codes  PT Professional Judgement Used?: Yes  Outcome Measure Options: Dizziness Handicap Inventory (44/100)  Functional Limitation: Changing and maintaining body position  Changing and Maintaining Body Position Current Status (): At least 40 percent but less than 60 percent impaired, limited or restricted  Changing and Maintaining Body Position Goal Status (): At least 1 percent but less than 20 percent impaired, limited or restricted         Milton Lopez, PT  10/17/2018

## 2018-10-29 ENCOUNTER — APPOINTMENT (OUTPATIENT)
Dept: PHYSICAL THERAPY | Facility: HOSPITAL | Age: 83
End: 2018-10-29

## 2018-10-30 ENCOUNTER — HOSPITAL ENCOUNTER (INPATIENT)
Facility: HOSPITAL | Age: 83
LOS: 5 days | Discharge: HOME | End: 2018-11-04
Attending: INTERNAL MEDICINE | Admitting: HOSPITALIST

## 2018-10-30 PROBLEM — R06.02 SOBOE (SHORTNESS OF BREATH ON EXERTION): Status: ACTIVE | Noted: 2018-10-30

## 2018-10-30 LAB
ALBUMIN SERPL-MCNC: 3.7 G/DL (ref 3.5–5.2)
ALBUMIN/GLOB SERPL: 1.1 G/DL
ALP SERPL-CCNC: 113 U/L (ref 39–117)
ALT SERPL W P-5'-P-CCNC: 9 U/L (ref 1–33)
ANION GAP SERPL CALCULATED.3IONS-SCNC: 15.9 MMOL/L
AST SERPL-CCNC: 17 U/L (ref 1–32)
BASOPHILS # BLD AUTO: 0.03 10*3/MM3 (ref 0–0.2)
BASOPHILS NFR BLD AUTO: 0.4 % (ref 0–1.5)
BILIRUB SERPL-MCNC: 0.3 MG/DL (ref 0.1–1.2)
BUN BLD-MCNC: 39 MG/DL (ref 8–23)
BUN/CREAT SERPL: 15.1 (ref 7–25)
CALCIUM SPEC-SCNC: 9.8 MG/DL (ref 8.6–10.5)
CHLORIDE SERPL-SCNC: 101 MMOL/L (ref 98–107)
CO2 SERPL-SCNC: 23.1 MMOL/L (ref 22–29)
CREAT BLD-MCNC: 2.59 MG/DL (ref 0.57–1)
DEPRECATED RDW RBC AUTO: 57.5 FL (ref 37–54)
EOSINOPHIL # BLD AUTO: 0.24 10*3/MM3 (ref 0–0.7)
EOSINOPHIL NFR BLD AUTO: 3.3 % (ref 0.3–6.2)
ERYTHROCYTE [DISTWIDTH] IN BLOOD BY AUTOMATED COUNT: 16.4 % (ref 11.7–13)
GFR SERPL CREATININE-BSD FRML MDRD: 18 ML/MIN/1.73
GLOBULIN UR ELPH-MCNC: 3.4 GM/DL
GLUCOSE BLD-MCNC: 158 MG/DL (ref 65–99)
HCT VFR BLD AUTO: 34.1 % (ref 35.6–45.5)
HGB BLD-MCNC: 10.5 G/DL (ref 11.9–15.5)
IMM GRANULOCYTES # BLD: 0.02 10*3/MM3 (ref 0–0.03)
IMM GRANULOCYTES NFR BLD: 0.3 % (ref 0–0.5)
LYMPHOCYTES # BLD AUTO: 1.28 10*3/MM3 (ref 0.9–4.8)
LYMPHOCYTES NFR BLD AUTO: 17.8 % (ref 19.6–45.3)
MCH RBC QN AUTO: 29.4 PG (ref 26.9–32)
MCHC RBC AUTO-ENTMCNC: 30.8 G/DL (ref 32.4–36.3)
MCV RBC AUTO: 95.5 FL (ref 80.5–98.2)
MONOCYTES # BLD AUTO: 0.58 10*3/MM3 (ref 0.2–1.2)
MONOCYTES NFR BLD AUTO: 8.1 % (ref 5–12)
NEUTROPHILS # BLD AUTO: 5.04 10*3/MM3 (ref 1.9–8.1)
NEUTROPHILS NFR BLD AUTO: 70.1 % (ref 42.7–76)
PLATELET # BLD AUTO: 187 10*3/MM3 (ref 140–500)
PMV BLD AUTO: 10.3 FL (ref 6–12)
POTASSIUM BLD-SCNC: 3.8 MMOL/L (ref 3.5–5.2)
PROT SERPL-MCNC: 7.1 G/DL (ref 6–8.5)
RBC # BLD AUTO: 3.57 10*6/MM3 (ref 3.9–5.2)
SODIUM BLD-SCNC: 140 MMOL/L (ref 136–145)
WBC NRBC COR # BLD: 7.19 10*3/MM3 (ref 4.5–10.7)

## 2018-10-30 PROCEDURE — 80053 COMPREHEN METABOLIC PANEL: CPT | Performed by: INTERNAL MEDICINE

## 2018-10-30 PROCEDURE — 85025 COMPLETE CBC W/AUTO DIFF WBC: CPT | Performed by: INTERNAL MEDICINE

## 2018-10-30 RX ORDER — CHOLECALCIFEROL (VITAMIN D3) 125 MCG
1000 CAPSULE ORAL DAILY
Status: DISCONTINUED | OUTPATIENT
Start: 2018-10-31 | End: 2018-11-04 | Stop reason: HOSPADM

## 2018-10-30 RX ORDER — NITROGLYCERIN 0.4 MG/1
0.4 TABLET SUBLINGUAL
Status: DISCONTINUED | OUTPATIENT
Start: 2018-10-30 | End: 2018-11-04 | Stop reason: HOSPADM

## 2018-10-30 RX ORDER — MELATONIN
1000 DAILY
Status: DISCONTINUED | OUTPATIENT
Start: 2018-10-31 | End: 2018-11-04 | Stop reason: HOSPADM

## 2018-10-30 RX ORDER — HYDRALAZINE HYDROCHLORIDE 50 MG/1
100 TABLET, FILM COATED ORAL 2 TIMES DAILY
Status: DISCONTINUED | OUTPATIENT
Start: 2018-10-30 | End: 2018-11-04 | Stop reason: HOSPADM

## 2018-10-30 RX ORDER — SODIUM CHLORIDE 0.9 % (FLUSH) 0.9 %
3-10 SYRINGE (ML) INJECTION AS NEEDED
Status: DISCONTINUED | OUTPATIENT
Start: 2018-10-30 | End: 2018-11-04 | Stop reason: HOSPADM

## 2018-10-30 RX ORDER — DEXTROSE MONOHYDRATE 25 G/50ML
25 INJECTION, SOLUTION INTRAVENOUS
Status: DISCONTINUED | OUTPATIENT
Start: 2018-10-30 | End: 2018-11-04 | Stop reason: HOSPADM

## 2018-10-30 RX ORDER — CETIRIZINE HYDROCHLORIDE 10 MG/1
5 TABLET ORAL DAILY
Status: DISCONTINUED | OUTPATIENT
Start: 2018-10-31 | End: 2018-11-04 | Stop reason: HOSPADM

## 2018-10-30 RX ORDER — ONDANSETRON 2 MG/ML
4 INJECTION INTRAMUSCULAR; INTRAVENOUS EVERY 6 HOURS PRN
Status: DISCONTINUED | OUTPATIENT
Start: 2018-10-30 | End: 2018-11-04 | Stop reason: HOSPADM

## 2018-10-30 RX ORDER — NICOTINE POLACRILEX 4 MG
15 LOZENGE BUCCAL
Status: DISCONTINUED | OUTPATIENT
Start: 2018-10-30 | End: 2018-11-04 | Stop reason: HOSPADM

## 2018-10-30 RX ORDER — AMBRISENTAN 5 MG/1
5 TABLET, FILM COATED ORAL DAILY
Status: DISCONTINUED | OUTPATIENT
Start: 2018-10-31 | End: 2018-11-04 | Stop reason: HOSPADM

## 2018-10-30 RX ORDER — ALLOPURINOL 100 MG/1
100 TABLET ORAL DAILY
Status: DISCONTINUED | OUTPATIENT
Start: 2018-10-31 | End: 2018-11-04 | Stop reason: HOSPADM

## 2018-10-30 RX ORDER — LEVOCETIRIZINE DIHYDROCHLORIDE 5 MG/1
5 TABLET, FILM COATED ORAL EVERY EVENING
COMMUNITY
End: 2019-02-19

## 2018-10-30 RX ORDER — SODIUM CHLORIDE 0.9 % (FLUSH) 0.9 %
3 SYRINGE (ML) INJECTION EVERY 12 HOURS SCHEDULED
Status: DISCONTINUED | OUTPATIENT
Start: 2018-10-30 | End: 2018-11-01

## 2018-10-30 RX ADMIN — Medication 3 ML: at 23:57

## 2018-10-30 RX ADMIN — RIVAROXABAN 15 MG: 15 TABLET, FILM COATED ORAL at 22:09

## 2018-10-30 RX ADMIN — BUMETANIDE 1 MG/HR: 0.25 INJECTION INTRAMUSCULAR; INTRAVENOUS at 22:02

## 2018-10-30 RX ADMIN — HYDRALAZINE HYDROCHLORIDE 100 MG: 50 TABLET, FILM COATED ORAL at 22:09

## 2018-10-31 ENCOUNTER — APPOINTMENT (OUTPATIENT)
Dept: GENERAL RADIOLOGY | Facility: HOSPITAL | Age: 83
End: 2018-10-31
Attending: INTERNAL MEDICINE

## 2018-10-31 PROBLEM — I50.33 ACUTE ON CHRONIC DIASTOLIC CONGESTIVE HEART FAILURE (HCC): Status: ACTIVE | Noted: 2018-10-31

## 2018-10-31 LAB
ALBUMIN SERPL-MCNC: 3.4 G/DL (ref 3.5–5.2)
ALBUMIN/GLOB SERPL: 1.1 G/DL
ALP SERPL-CCNC: 95 U/L (ref 39–117)
ALT SERPL W P-5'-P-CCNC: 8 U/L (ref 1–33)
ANION GAP SERPL CALCULATED.3IONS-SCNC: 11.9 MMOL/L
AST SERPL-CCNC: 15 U/L (ref 1–32)
BASOPHILS # BLD AUTO: 0.02 10*3/MM3 (ref 0–0.2)
BASOPHILS NFR BLD AUTO: 0.3 % (ref 0–1.5)
BILIRUB SERPL-MCNC: 0.3 MG/DL (ref 0.1–1.2)
BUN BLD-MCNC: 39 MG/DL (ref 8–23)
BUN/CREAT SERPL: 15.4 (ref 7–25)
CALCIUM SPEC-SCNC: 9.4 MG/DL (ref 8.6–10.5)
CHLORIDE SERPL-SCNC: 103 MMOL/L (ref 98–107)
CK SERPL-CCNC: 49 U/L (ref 20–180)
CO2 SERPL-SCNC: 24.1 MMOL/L (ref 22–29)
CREAT BLD-MCNC: 2.54 MG/DL (ref 0.57–1)
D-LACTATE SERPL-SCNC: 0.7 MMOL/L (ref 0.5–2)
DEPRECATED RDW RBC AUTO: 58.6 FL (ref 37–54)
EOSINOPHIL # BLD AUTO: 0.16 10*3/MM3 (ref 0–0.7)
EOSINOPHIL NFR BLD AUTO: 2.6 % (ref 0.3–6.2)
ERYTHROCYTE [DISTWIDTH] IN BLOOD BY AUTOMATED COUNT: 16.9 % (ref 11.7–13)
GFR SERPL CREATININE-BSD FRML MDRD: 18 ML/MIN/1.73
GLOBULIN UR ELPH-MCNC: 3 GM/DL
GLUCOSE BLD-MCNC: 158 MG/DL (ref 65–99)
GLUCOSE BLDC GLUCOMTR-MCNC: 123 MG/DL (ref 70–130)
GLUCOSE BLDC GLUCOMTR-MCNC: 151 MG/DL (ref 70–130)
GLUCOSE BLDC GLUCOMTR-MCNC: 157 MG/DL (ref 70–130)
GLUCOSE BLDC GLUCOMTR-MCNC: 96 MG/DL (ref 70–130)
HBA1C MFR BLD: 5.9 % (ref 4.8–5.6)
HCT VFR BLD AUTO: 31.6 % (ref 35.6–45.5)
HGB BLD-MCNC: 10.2 G/DL (ref 11.9–15.5)
IMM GRANULOCYTES # BLD: 0 10*3/MM3 (ref 0–0.03)
IMM GRANULOCYTES NFR BLD: 0 % (ref 0–0.5)
INR PPP: 2.6 (ref 0.9–1.1)
LYMPHOCYTES # BLD AUTO: 1.46 10*3/MM3 (ref 0.9–4.8)
LYMPHOCYTES NFR BLD AUTO: 23.6 % (ref 19.6–45.3)
MAGNESIUM SERPL-MCNC: 2.2 MG/DL (ref 1.6–2.4)
MCH RBC QN AUTO: 30.4 PG (ref 26.9–32)
MCHC RBC AUTO-ENTMCNC: 32.3 G/DL (ref 32.4–36.3)
MCV RBC AUTO: 94 FL (ref 80.5–98.2)
MONOCYTES # BLD AUTO: 0.33 10*3/MM3 (ref 0.2–1.2)
MONOCYTES NFR BLD AUTO: 5.3 % (ref 5–12)
NEUTROPHILS # BLD AUTO: 4.22 10*3/MM3 (ref 1.9–8.1)
NEUTROPHILS NFR BLD AUTO: 68.2 % (ref 42.7–76)
PLATELET # BLD AUTO: 205 10*3/MM3 (ref 140–500)
PMV BLD AUTO: 10.6 FL (ref 6–12)
POTASSIUM BLD-SCNC: 3.8 MMOL/L (ref 3.5–5.2)
PROT SERPL-MCNC: 6.4 G/DL (ref 6–8.5)
PROTHROMBIN TIME: 27.4 SECONDS (ref 11.7–14.2)
RBC # BLD AUTO: 3.36 10*6/MM3 (ref 3.9–5.2)
SODIUM BLD-SCNC: 139 MMOL/L (ref 136–145)
TROPONIN T SERPL-MCNC: 0.03 NG/ML (ref 0–0.03)
TSH SERPL DL<=0.05 MIU/L-ACNC: 1.89 MIU/ML (ref 0.27–4.2)
WBC NRBC COR # BLD: 6.19 10*3/MM3 (ref 4.5–10.7)

## 2018-10-31 PROCEDURE — 85610 PROTHROMBIN TIME: CPT | Performed by: INTERNAL MEDICINE

## 2018-10-31 PROCEDURE — 82962 GLUCOSE BLOOD TEST: CPT

## 2018-10-31 PROCEDURE — 93010 ELECTROCARDIOGRAM REPORT: CPT | Performed by: INTERNAL MEDICINE

## 2018-10-31 PROCEDURE — 93005 ELECTROCARDIOGRAM TRACING: CPT | Performed by: INTERNAL MEDICINE

## 2018-10-31 PROCEDURE — 25010000002 CHLOROTHIAZIDE PER 500 MG: Performed by: INTERNAL MEDICINE

## 2018-10-31 PROCEDURE — 85025 COMPLETE CBC W/AUTO DIFF WBC: CPT | Performed by: INTERNAL MEDICINE

## 2018-10-31 PROCEDURE — 83036 HEMOGLOBIN GLYCOSYLATED A1C: CPT | Performed by: INTERNAL MEDICINE

## 2018-10-31 PROCEDURE — 83735 ASSAY OF MAGNESIUM: CPT | Performed by: INTERNAL MEDICINE

## 2018-10-31 PROCEDURE — 83605 ASSAY OF LACTIC ACID: CPT | Performed by: INTERNAL MEDICINE

## 2018-10-31 PROCEDURE — 80053 COMPREHEN METABOLIC PANEL: CPT | Performed by: INTERNAL MEDICINE

## 2018-10-31 PROCEDURE — 63710000001 INSULIN ASPART PER 5 UNITS: Performed by: INTERNAL MEDICINE

## 2018-10-31 PROCEDURE — 71046 X-RAY EXAM CHEST 2 VIEWS: CPT

## 2018-10-31 PROCEDURE — 82550 ASSAY OF CK (CPK): CPT | Performed by: INTERNAL MEDICINE

## 2018-10-31 PROCEDURE — 84484 ASSAY OF TROPONIN QUANT: CPT | Performed by: INTERNAL MEDICINE

## 2018-10-31 PROCEDURE — 84443 ASSAY THYROID STIM HORMONE: CPT | Performed by: INTERNAL MEDICINE

## 2018-10-31 RX ORDER — CHLOROTHIAZIDE SODIUM 500 MG/1
500 INJECTION INTRAVENOUS ONCE
Status: COMPLETED | OUTPATIENT
Start: 2018-10-31 | End: 2018-10-31

## 2018-10-31 RX ORDER — SILDENAFIL CITRATE 20 MG/1
10 TABLET ORAL 3 TIMES DAILY
Status: DISCONTINUED | OUTPATIENT
Start: 2018-10-31 | End: 2018-11-04 | Stop reason: HOSPADM

## 2018-10-31 RX ORDER — FAMOTIDINE 20 MG/1
20 TABLET, FILM COATED ORAL 2 TIMES DAILY
Status: DISCONTINUED | OUTPATIENT
Start: 2018-10-31 | End: 2018-11-02 | Stop reason: DRUGHIGH

## 2018-10-31 RX ORDER — DIPHENHYDRAMINE HCL 25 MG
25 CAPSULE ORAL EVERY 6 HOURS PRN
Status: DISCONTINUED | OUTPATIENT
Start: 2018-10-31 | End: 2018-11-04 | Stop reason: HOSPADM

## 2018-10-31 RX ADMIN — Medication 1000 MCG: at 09:15

## 2018-10-31 RX ADMIN — RIVAROXABAN 15 MG: 15 TABLET, FILM COATED ORAL at 18:11

## 2018-10-31 RX ADMIN — Medication 3 ML: at 22:17

## 2018-10-31 RX ADMIN — HYDRALAZINE HYDROCHLORIDE 100 MG: 50 TABLET, FILM COATED ORAL at 21:03

## 2018-10-31 RX ADMIN — ALLOPURINOL 100 MG: 100 TABLET ORAL at 09:15

## 2018-10-31 RX ADMIN — AMBRISENTAN 5 MG: 5 TABLET, FILM COATED ORAL at 09:13

## 2018-10-31 RX ADMIN — HYDRALAZINE HYDROCHLORIDE 100 MG: 50 TABLET, FILM COATED ORAL at 09:15

## 2018-10-31 RX ADMIN — INSULIN ASPART 2 UNITS: 100 INJECTION, SOLUTION INTRAVENOUS; SUBCUTANEOUS at 12:36

## 2018-10-31 RX ADMIN — VITAMIN D, TAB 1000IU (100/BT) 1000 UNITS: 25 TAB at 09:15

## 2018-10-31 RX ADMIN — SILDENAFIL 10 MG: 20 TABLET ORAL at 20:52

## 2018-10-31 RX ADMIN — CETIRIZINE HYDROCHLORIDE 5 MG: 10 TABLET, FILM COATED ORAL at 09:15

## 2018-10-31 RX ADMIN — CHLOROTHIAZIDE SODIUM 500 MG: 500 INJECTION, POWDER, LYOPHILIZED, FOR SOLUTION INTRAVENOUS at 20:52

## 2018-10-31 RX ADMIN — Medication 3 ML: at 09:16

## 2018-10-31 RX ADMIN — BUMETANIDE 1 MG/HR: 0.25 INJECTION INTRAMUSCULAR; INTRAVENOUS at 09:21

## 2018-11-01 LAB
ALBUMIN SERPL-MCNC: 3.3 G/DL (ref 3.5–5.2)
ANION GAP SERPL CALCULATED.3IONS-SCNC: 13.7 MMOL/L
BUN BLD-MCNC: 36 MG/DL (ref 8–23)
BUN/CREAT SERPL: 14.8 (ref 7–25)
CALCIUM SPEC-SCNC: 9 MG/DL (ref 8.6–10.5)
CHLORIDE SERPL-SCNC: 102 MMOL/L (ref 98–107)
CO2 SERPL-SCNC: 25.3 MMOL/L (ref 22–29)
CREAT BLD-MCNC: 2.44 MG/DL (ref 0.57–1)
GFR SERPL CREATININE-BSD FRML MDRD: 19 ML/MIN/1.73
GLUCOSE BLD-MCNC: 132 MG/DL (ref 65–99)
GLUCOSE BLDC GLUCOMTR-MCNC: 134 MG/DL (ref 70–130)
GLUCOSE BLDC GLUCOMTR-MCNC: 145 MG/DL (ref 70–130)
GLUCOSE BLDC GLUCOMTR-MCNC: 146 MG/DL (ref 70–130)
GLUCOSE BLDC GLUCOMTR-MCNC: 149 MG/DL (ref 70–130)
MAGNESIUM SERPL-MCNC: 2.1 MG/DL (ref 1.6–2.4)
PHOSPHATE SERPL-MCNC: 3.9 MG/DL (ref 2.5–4.5)
POTASSIUM BLD-SCNC: 3.7 MMOL/L (ref 3.5–5.2)
SODIUM BLD-SCNC: 141 MMOL/L (ref 136–145)
URATE SERPL-MCNC: 8.4 MG/DL (ref 2.4–5.7)

## 2018-11-01 PROCEDURE — 84550 ASSAY OF BLOOD/URIC ACID: CPT | Performed by: HOSPITALIST

## 2018-11-01 PROCEDURE — 82962 GLUCOSE BLOOD TEST: CPT

## 2018-11-01 PROCEDURE — 80069 RENAL FUNCTION PANEL: CPT | Performed by: HOSPITALIST

## 2018-11-01 PROCEDURE — 83735 ASSAY OF MAGNESIUM: CPT | Performed by: HOSPITALIST

## 2018-11-01 PROCEDURE — 63710000001 DIPHENHYDRAMINE PER 50 MG: Performed by: HOSPITALIST

## 2018-11-01 RX ORDER — POTASSIUM CHLORIDE 750 MG/1
40 CAPSULE, EXTENDED RELEASE ORAL ONCE
Status: COMPLETED | OUTPATIENT
Start: 2018-11-01 | End: 2018-11-01

## 2018-11-01 RX ADMIN — DIPHENHYDRAMINE HYDROCHLORIDE 25 MG: 25 CAPSULE ORAL at 20:26

## 2018-11-01 RX ADMIN — RIVAROXABAN 15 MG: 15 TABLET, FILM COATED ORAL at 17:56

## 2018-11-01 RX ADMIN — Medication 1000 MCG: at 09:11

## 2018-11-01 RX ADMIN — FAMOTIDINE 20 MG: 20 TABLET, FILM COATED ORAL at 09:10

## 2018-11-01 RX ADMIN — ALLOPURINOL 100 MG: 100 TABLET ORAL at 09:11

## 2018-11-01 RX ADMIN — AMBRISENTAN 5 MG: 5 TABLET, FILM COATED ORAL at 09:11

## 2018-11-01 RX ADMIN — SILDENAFIL 10 MG: 20 TABLET ORAL at 20:25

## 2018-11-01 RX ADMIN — DIPHENHYDRAMINE HYDROCHLORIDE 25 MG: 25 CAPSULE ORAL at 00:53

## 2018-11-01 RX ADMIN — FAMOTIDINE 20 MG: 20 TABLET, FILM COATED ORAL at 00:51

## 2018-11-01 RX ADMIN — POTASSIUM CHLORIDE 40 MEQ: 750 CAPSULE, EXTENDED RELEASE ORAL at 14:36

## 2018-11-01 RX ADMIN — SILDENAFIL 10 MG: 20 TABLET ORAL at 15:18

## 2018-11-01 RX ADMIN — SILDENAFIL 10 MG: 20 TABLET ORAL at 09:11

## 2018-11-01 RX ADMIN — FAMOTIDINE 20 MG: 20 TABLET, FILM COATED ORAL at 20:26

## 2018-11-01 RX ADMIN — HYDRALAZINE HYDROCHLORIDE 100 MG: 50 TABLET, FILM COATED ORAL at 20:26

## 2018-11-01 RX ADMIN — VITAMIN D, TAB 1000IU (100/BT) 1000 UNITS: 25 TAB at 09:11

## 2018-11-01 RX ADMIN — BUMETANIDE 1 MG/HR: 0.25 INJECTION INTRAMUSCULAR; INTRAVENOUS at 19:21

## 2018-11-01 RX ADMIN — HYDRALAZINE HYDROCHLORIDE 100 MG: 50 TABLET, FILM COATED ORAL at 09:11

## 2018-11-01 RX ADMIN — CETIRIZINE HYDROCHLORIDE 5 MG: 10 TABLET, FILM COATED ORAL at 09:11

## 2018-11-01 RX ADMIN — Medication 3 ML: at 09:15

## 2018-11-01 NOTE — PROGRESS NOTES
NEPHROLOGY PROGRESS NOTE    PATIENT IDENTIFICATION:   Name:  Veronica Henao      MRN:  [id number]     83 y.o.  female             Reason for visit: CKD4    SUBJECTIVE:  Feels better; breathing is more comfortable; abd girth and leg swelling less; +orthopnea; good UOP on bumex drip    OBJECTIVE:  Vitals:    10/31/18 2020 11/01/18 0013 11/01/18 0620 11/01/18 0744   BP: 168/56   158/69   BP Location: Left arm   Right arm   Patient Position: Lying   Sitting   Pulse: 73 71     Resp: 20 20 18   Temp: 97.7 °F (36.5 °C) 98.4 °F (36.9 °C)  98.1 °F (36.7 °C)   TempSrc: Oral Oral  Oral   SpO2: 93% 91%     Weight:   85.9 kg (189 lb 6.4 oz)    Height:               Body mass index is 35.81 kg/m².    Intake/Output Summary (Last 24 hours) at 11/01/18 1237  Last data filed at 11/01/18 0600   Gross per 24 hour   Intake                0 ml   Output             2125 ml   Net            -2125 ml     Wt Readings from Last 1 Encounters:   11/01/18 0620 85.9 kg (189 lb 6.4 oz)   10/30/18 2000 88.5 kg (195 lb)     Wt Readings from Last 3 Encounters:   11/01/18 85.9 kg (189 lb 6.4 oz)   10/08/18 84.8 kg (187 lb)   09/27/18 84.4 kg (186 lb)         Exam:  NAD; pleasant; oriented; looks stated age  MMM; AT/NC  No eye d/c; no scleral icterus  No JVD; no carotid bruits  Rales bilat; not labored lying flat in bed on RA  RRR, no rub  Soft, NT, +D, BS+  +2 edema  No clubbing  No asterixis  Moves all extremities   Normal mood and affect  Speech fluent    Scheduled meds:    allopurinol 100 mg Oral Daily   ambrisentan 5 mg Oral Daily   cetirizine 5 mg Oral Daily   cholecalciferol 1,000 Units Oral Daily   famotidine 20 mg Oral BID   hydrALAZINE 100 mg Oral BID   insulin aspart 0-9 Units Subcutaneous 4x Daily With Meals & Nightly   rivaroxaban 15 mg Oral Daily With Dinner   sildenafil 10 mg Oral TID   sodium chloride 3 mL Intravenous Q12H   vitamin B-12 1,000 mcg Oral Daily     IV meds:                        bumetanide 1 mg/hr Last Rate: 1 mg/hr  (10/31/18 0921)       Data Review:      Results from last 7 days  Lab Units 11/01/18  0513 10/31/18  0535 10/30/18  2049   SODIUM mmol/L 141 139 140   POTASSIUM mmol/L 3.7 3.8 3.8   CHLORIDE mmol/L 102 103 101   CO2 mmol/L 25.3 24.1 23.1   BUN mg/dL 36* 39* 39*   CREATININE mg/dL 2.44* 2.54* 2.59*   CALCIUM mg/dL 9.0 9.4 9.8   BILIRUBIN mg/dL  --  0.3 0.3   ALK PHOS U/L  --  95 113   ALT (SGPT) U/L  --  8 9   AST (SGOT) U/L  --  15 17   GLUCOSE mg/dL 132* 158* 158*     Estimated Creatinine Clearance: 17.4 mL/min (A) (by C-G formula based on SCr of 2.44 mg/dL (H)).    Results from last 7 days  Lab Units 11/01/18 0513   URIC ACID mg/dL 8.4*       Results from last 7 days  Lab Units 11/01/18  0513 10/31/18  0535   MAGNESIUM mg/dL 2.1 2.2   PHOSPHORUS mg/dL 3.9  --          Results from last 7 days  Lab Units 10/31/18  0758 10/30/18  2049   WBC 10*3/mm3 6.19 7.19   HEMOGLOBIN g/dL 10.2* 10.5*   PLATELETS 10*3/mm3 205 187         Results from last 7 days  Lab Units 10/31/18  0758   INR  2.60*             ASSESSMENT:     Acute on chronic diastolic congestive heart failure (CMS/[facility])    Type 2 diabetes mellitus with diabetic chronic kidney disease (CMS/[facility])    Gastroesophageal reflux disease    Tricuspid insufficiency    JOSELUIS on CPAP    S/P Maze operation for atrial fibrillation    S/P TVR (tricuspid valve repair)    S/P MVR (mitral valve repair)    CHF (congestive heart failure) (CMS/[facility])    Chronic kidney disease, stage IV (severe) (CMS/[facility])    Paroxysmal atrial fibrillation (CMS/[facility])    Pulmonary hypertension (CMS/[facility])    SOBOE (shortness of breath on exertion)    1.  CKD4: stable tho poor fxn.  Central and peripheral vol excess; lytes compensated. TSH ok  2.  dCHF, decompensated  3.  Multi-valvular heart dz  4.  JOSELUIS on CPAP  5.  Pulmonary HTN on Rx  6.  Anemia      PLAN:  1.  Bumex drip still at 1 mg/hour  2   One dose of KCl  3.  Surveillance labs    Jaren Pack MD  11/1/2018    12:37 PM

## 2018-11-01 NOTE — PROGRESS NOTES
"    DAILY PROGRESS NOTE  [facility]    Patient Identification:  Name: Veronica Henao  Age: 83 y.o.  Sex: female  :  1935  MRN: [id number]         Primary Care Physician: Nelly Price MD    Subjective:  Interval History: Feeling much better.  \"I can now breathe\" - denies chest pain fever chills nausea vomiting or diarrhea    Objective: In chair.  No family at bedside    Scheduled Meds:  allopurinol 100 mg Oral Daily   ambrisentan 5 mg Oral Daily   cetirizine 5 mg Oral Daily   cholecalciferol 1,000 Units Oral Daily   famotidine 20 mg Oral BID   hydrALAZINE 100 mg Oral BID   insulin aspart 0-9 Units Subcutaneous 4x Daily With Meals & Nightly   rivaroxaban 15 mg Oral Daily With Dinner   sildenafil 10 mg Oral TID   sodium chloride 3 mL Intravenous Q12H   vitamin B-12 1,000 mcg Oral Daily     Continuous Infusions:  bumetanide 1 mg/hr Last Rate: 1 mg/hr (10/31/18 0921)       Vital signs in last 24 hours:  Temp:  [97.6 °F (36.4 °C)-98.4 °F (36.9 °C)] 97.6 °F (36.4 °C)  Heart Rate:  [71-81] 81  Resp:  [18-20] 18  BP: (158-168)/(56-82) 165/82    Intake/Output:    Intake/Output Summary (Last 24 hours) at 18 1526  Last data filed at 18 1516   Gross per 24 hour   Intake                0 ml   Output             2925 ml   Net            -2925 ml       Exam:  /82   Pulse 81   Temp 97.6 °F (36.4 °C) (Oral)   Resp 18   Ht 154.9 cm (60.98\")   Wt 85.9 kg (189 lb 6.4 oz)   SpO2 91%   BMI 35.81 kg/m²     General Appearance:    Alert, cooperative, no distress, AAOx3 months clinically improved                         Throat:   Lips, tongue, gums normal; oral mucosa pink and moist                           Neck:   Supple, symmetrical, trachea midline, no JVD                         Lungs:    Bibasilar rales right side greater than left to auscultation bilaterally, respirations unlabored                          Heart:    Regular rate and rhythm, S1 and S2 normal                  Abdomen:     " Soft, non-tender, bowel sounds active                 Extremities:   Improving pitting edema                        Pulses:   Pulses palpable in lower extremities                                 Data Review:  Labs in chart were reviewed.    Assessment:  Active Hospital Problems    Diagnosis Date Noted   • **Acute on chronic diastolic congestive heart failure (CMS/[facility]) [I50.33] 10/31/2018   • SOBOE (shortness of breath on exertion) [R06.02] 10/30/2018   • Pulmonary hypertension (CMS/[facility]) [I27.20] 08/28/2018   • Paroxysmal atrial fibrillation (CMS/[facility]) [I48.0] 08/27/2018   • Chronic kidney disease, stage IV (severe) (CMS/[facility]) [N18.4] 07/25/2017   • CHF (congestive heart failure) (CMS/[facility]) [I50.9] 05/25/2017   • S/P TVR (tricuspid valve repair) [Z98.890] 12/29/2016   • S/P MVR (mitral valve repair) [Z98.890] 12/29/2016   • S/P Maze operation for atrial fibrillation [Z98.890, Z86.79] 12/29/2016   • JOSELUIS on CPAP [G47.33, Z99.89] 11/11/2016   • Tricuspid insufficiency [I07.1] 09/16/2016   • Gastroesophageal reflux disease [K21.9] 08/29/2016   • Type 2 diabetes mellitus with diabetic chronic kidney disease (CMS/[facility]) [E11.22] 02/01/2016      Resolved Hospital Problems    Diagnosis Date Noted Date Resolved   No resolved problems to display.       Plan:  Continue Bumex gtt - further diuresis per Renal                 -compounded by Pulm HTN      DM2 - A1c 5.9     HTN - stable      On Xarelto      Cheko Wahl MD  11/1/2018  3:26 PM

## 2018-11-01 NOTE — PLAN OF CARE
Problem: Patient Care Overview  Goal: Plan of Care Review  Outcome: Ongoing (interventions implemented as appropriate)   11/01/18 0426   Coping/Psychosocial   Plan of Care Reviewed With patient   Plan of Care Review   Progress improving   OTHER   Outcome Summary vss; diuresing much better after addition of diuril this pm; still very dypneic with activity however; will continue to closely monitor     Goal: Individualization and Mutuality  Outcome: Ongoing (interventions implemented as appropriate)    Goal: Discharge Needs Assessment  Outcome: Ongoing (interventions implemented as appropriate)      Problem: Cardiac: Heart Failure (Adult)  Goal: Signs and Symptoms of Listed Potential Problems Will be Absent, Minimized or Managed (Cardiac: Heart Failure)  Outcome: Ongoing (interventions implemented as appropriate)      Problem: Fall Risk (Adult)  Goal: Absence of Fall  Outcome: Ongoing (interventions implemented as appropriate)

## 2018-11-01 NOTE — PLAN OF CARE
Problem: Patient Care Overview  Goal: Plan of Care Review  Outcome: Ongoing (interventions implemented as appropriate)   11/01/18 8363   Coping/Psychosocial   Plan of Care Reviewed With patient   OTHER   Outcome Summary syd states that she is able to breathe much better now, tolerating the diuresis very well, no c/o pain or discomfort, vss, will continue to monitor     Goal: Individualization and Mutuality  Outcome: Ongoing (interventions implemented as appropriate)    Goal: Discharge Needs Assessment  Outcome: Ongoing (interventions implemented as appropriate)    Goal: Interprofessional Rounds/Family Conf  Outcome: Ongoing (interventions implemented as appropriate)      Problem: Cardiac: Heart Failure (Adult)  Goal: Signs and Symptoms of Listed Potential Problems Will be Absent, Minimized or Managed (Cardiac: Heart Failure)  Outcome: Ongoing (interventions implemented as appropriate)      Problem: Fall Risk (Adult)  Goal: Absence of Fall  Outcome: Ongoing (interventions implemented as appropriate)

## 2018-11-02 PROBLEM — R06.02 SOBOE (SHORTNESS OF BREATH ON EXERTION): Status: RESOLVED | Noted: 2018-10-30 | Resolved: 2018-11-02

## 2018-11-02 LAB
ALBUMIN SERPL-MCNC: 3.2 G/DL (ref 3.5–5.2)
ANION GAP SERPL CALCULATED.3IONS-SCNC: 13.6 MMOL/L
BUN BLD-MCNC: 40 MG/DL (ref 8–23)
BUN/CREAT SERPL: 16.5 (ref 7–25)
CALCIUM SPEC-SCNC: 9.1 MG/DL (ref 8.6–10.5)
CHLORIDE SERPL-SCNC: 100 MMOL/L (ref 98–107)
CO2 SERPL-SCNC: 24.4 MMOL/L (ref 22–29)
CREAT BLD-MCNC: 2.43 MG/DL (ref 0.57–1)
GFR SERPL CREATININE-BSD FRML MDRD: 19 ML/MIN/1.73
GLUCOSE BLD-MCNC: 122 MG/DL (ref 65–99)
GLUCOSE BLDC GLUCOMTR-MCNC: 106 MG/DL (ref 70–130)
GLUCOSE BLDC GLUCOMTR-MCNC: 127 MG/DL (ref 70–130)
GLUCOSE BLDC GLUCOMTR-MCNC: 131 MG/DL (ref 70–130)
GLUCOSE BLDC GLUCOMTR-MCNC: 181 MG/DL (ref 70–130)
PHOSPHATE SERPL-MCNC: 3.7 MG/DL (ref 2.5–4.5)
POTASSIUM BLD-SCNC: 3.7 MMOL/L (ref 3.5–5.2)
SODIUM BLD-SCNC: 138 MMOL/L (ref 136–145)
URATE SERPL-MCNC: 9.2 MG/DL (ref 2.4–5.7)

## 2018-11-02 PROCEDURE — 63710000001 INSULIN ASPART PER 5 UNITS: Performed by: INTERNAL MEDICINE

## 2018-11-02 PROCEDURE — 25010000002 CHLOROTHIAZIDE PER 500 MG: Performed by: INTERNAL MEDICINE

## 2018-11-02 PROCEDURE — 80069 RENAL FUNCTION PANEL: CPT | Performed by: HOSPITALIST

## 2018-11-02 PROCEDURE — 63710000001 DIPHENHYDRAMINE PER 50 MG: Performed by: HOSPITALIST

## 2018-11-02 PROCEDURE — 84550 ASSAY OF BLOOD/URIC ACID: CPT | Performed by: HOSPITALIST

## 2018-11-02 PROCEDURE — 82962 GLUCOSE BLOOD TEST: CPT

## 2018-11-02 RX ORDER — CHLOROTHIAZIDE SODIUM 500 MG/1
500 INJECTION INTRAVENOUS ONCE
Status: COMPLETED | OUTPATIENT
Start: 2018-11-02 | End: 2018-11-02

## 2018-11-02 RX ORDER — POTASSIUM CHLORIDE 750 MG/1
40 CAPSULE, EXTENDED RELEASE ORAL ONCE
Status: COMPLETED | OUTPATIENT
Start: 2018-11-02 | End: 2018-11-02

## 2018-11-02 RX ORDER — FAMOTIDINE 20 MG/1
20 TABLET, FILM COATED ORAL DAILY
Status: DISCONTINUED | OUTPATIENT
Start: 2018-11-03 | End: 2018-11-04 | Stop reason: HOSPADM

## 2018-11-02 RX ADMIN — AMBRISENTAN 5 MG: 5 TABLET, FILM COATED ORAL at 08:05

## 2018-11-02 RX ADMIN — ALLOPURINOL 100 MG: 100 TABLET ORAL at 08:05

## 2018-11-02 RX ADMIN — VITAMIN D, TAB 1000IU (100/BT) 1000 UNITS: 25 TAB at 08:05

## 2018-11-02 RX ADMIN — CETIRIZINE HYDROCHLORIDE 5 MG: 10 TABLET, FILM COATED ORAL at 08:04

## 2018-11-02 RX ADMIN — FAMOTIDINE 20 MG: 20 TABLET, FILM COATED ORAL at 09:30

## 2018-11-02 RX ADMIN — SILDENAFIL 10 MG: 20 TABLET ORAL at 21:32

## 2018-11-02 RX ADMIN — RIVAROXABAN 15 MG: 15 TABLET, FILM COATED ORAL at 17:12

## 2018-11-02 RX ADMIN — SILDENAFIL 10 MG: 20 TABLET ORAL at 15:28

## 2018-11-02 RX ADMIN — DIPHENHYDRAMINE HYDROCHLORIDE 25 MG: 25 CAPSULE ORAL at 21:32

## 2018-11-02 RX ADMIN — HYDRALAZINE HYDROCHLORIDE 100 MG: 50 TABLET, FILM COATED ORAL at 08:05

## 2018-11-02 RX ADMIN — HYDRALAZINE HYDROCHLORIDE 100 MG: 50 TABLET, FILM COATED ORAL at 21:32

## 2018-11-02 RX ADMIN — INSULIN ASPART 2 UNITS: 100 INJECTION, SOLUTION INTRAVENOUS; SUBCUTANEOUS at 17:12

## 2018-11-02 RX ADMIN — POTASSIUM CHLORIDE 40 MEQ: 750 CAPSULE, EXTENDED RELEASE ORAL at 17:14

## 2018-11-02 RX ADMIN — CHLOROTHIAZIDE SODIUM 500 MG: 500 INJECTION, POWDER, LYOPHILIZED, FOR SOLUTION INTRAVENOUS at 11:33

## 2018-11-02 RX ADMIN — SILDENAFIL 10 MG: 20 TABLET ORAL at 08:05

## 2018-11-02 RX ADMIN — Medication 1000 MCG: at 08:05

## 2018-11-02 RX ADMIN — BUMETANIDE 1 MG/HR: 0.25 INJECTION INTRAMUSCULAR; INTRAVENOUS at 08:04

## 2018-11-02 NOTE — PLAN OF CARE
Problem: Patient Care Overview  Goal: Plan of Care Review  Outcome: Ongoing (interventions implemented as appropriate)   11/02/18 8376   Coping/Psychosocial   Plan of Care Reviewed With patient   OTHER   Outcome Summary bumex drip continued, no c/o pain or discomfort, no reports of SOB, vss, will continue to monitor      Goal: Individualization and Mutuality  Outcome: Ongoing (interventions implemented as appropriate)    Goal: Discharge Needs Assessment  Outcome: Ongoing (interventions implemented as appropriate)    Goal: Interprofessional Rounds/Family Conf  Outcome: Ongoing (interventions implemented as appropriate)      Problem: Cardiac: Heart Failure (Adult)  Goal: Signs and Symptoms of Listed Potential Problems Will be Absent, Minimized or Managed (Cardiac: Heart Failure)  Outcome: Ongoing (interventions implemented as appropriate)      Problem: Fall Risk (Adult)  Goal: Absence of Fall  Outcome: Ongoing (interventions implemented as appropriate)

## 2018-11-02 NOTE — PROGRESS NOTES
NEPHROLOGY PROGRESS NOTE    PATIENT IDENTIFICATION:   Name:  Veronica Henao      MRN:  [id number]     83 y.o.  female             Reason for visit: CKD4    SUBJECTIVE:  Feels about the same; leg swelling w/o much change today, tho abd girth decreasing and breathing is comfortable; modest UOP (2 liters) with bumex drip     OBJECTIVE:  Vitals:    11/01/18 1942 11/01/18 2354 11/02/18 0555 11/02/18 0739   BP: 128/49 163/71  160/81   BP Location: Right arm Right arm  Right arm   Patient Position: Lying Lying  Lying   Pulse: 73 79  71   Resp: 18 18 18   Temp: 98.2 °F (36.8 °C) 98.5 °F (36.9 °C)  97.3 °F (36.3 °C)   TempSrc: Oral Oral  Oral   SpO2: 98% 93%  97%   Weight:   85 kg (187 lb 6.4 oz)    Height:               Body mass index is 35.43 kg/m².    Intake/Output Summary (Last 24 hours) at 11/02/18 0842  Last data filed at 11/02/18 0739   Gross per 24 hour   Intake              465 ml   Output             2200 ml   Net            -1735 ml     Wt Readings from Last 1 Encounters:   11/02/18 0555 85 kg (187 lb 6.4 oz)   11/01/18 0620 85.9 kg (189 lb 6.4 oz)   10/30/18 2000 88.5 kg (195 lb)     Wt Readings from Last 3 Encounters:   11/02/18 85 kg (187 lb 6.4 oz)   10/08/18 84.8 kg (187 lb)   09/27/18 84.4 kg (186 lb)         Exam:  NAD; pleasant; oriented; looks stated age  MMM; AT/NC  No eye d/c; no scleral icterus  No JVD; no carotid bruits  Rales bilat; not labored lying flat in bed  RRR, no rub  Soft, NT, +D, BS+  +2 edema  No clubbing  No asterixis  Moves all extremities   Normal mood and affect  Speech fluent    Scheduled meds:      allopurinol 100 mg Oral Daily   ambrisentan 5 mg Oral Daily   cetirizine 5 mg Oral Daily   cholecalciferol 1,000 Units Oral Daily   famotidine 20 mg Oral BID   hydrALAZINE 100 mg Oral BID   insulin aspart 0-9 Units Subcutaneous 4x Daily With Meals & Nightly   rivaroxaban 15 mg Oral Daily With Dinner   sildenafil 10 mg Oral TID   vitamin B-12 1,000 mcg Oral Daily     IV meds:                           bumetanide 1 mg/hr Last Rate: 1 mg/hr (11/02/18 0804)       Data Review:      Results from last 7 days  Lab Units 11/02/18  0309 11/01/18  0513 10/31/18  0535 10/30/18  2049   SODIUM mmol/L 138 141 139 140   POTASSIUM mmol/L 3.7 3.7 3.8 3.8   CHLORIDE mmol/L 100 102 103 101   CO2 mmol/L 24.4 25.3 24.1 23.1   BUN mg/dL 40* 36* 39* 39*   CREATININE mg/dL 2.43* 2.44* 2.54* 2.59*   CALCIUM mg/dL 9.1 9.0 9.4 9.8   BILIRUBIN mg/dL  --   --  0.3 0.3   ALK PHOS U/L  --   --  95 113   ALT (SGPT) U/L  --   --  8 9   AST (SGOT) U/L  --   --  15 17   GLUCOSE mg/dL 122* 132* 158* 158*     Estimated Creatinine Clearance: 17.4 mL/min (A) (by C-G formula based on SCr of 2.43 mg/dL (H)).    Results from last 7 days  Lab Units 11/02/18  0309   URIC ACID mg/dL 9.2*       Results from last 7 days  Lab Units 11/02/18  0309 11/01/18  0513 10/31/18  0535   MAGNESIUM mg/dL  --  2.1 2.2   PHOSPHORUS mg/dL 3.7 3.9  --          Results from last 7 days  Lab Units 10/31/18  0758 10/30/18  2049   WBC 10*3/mm3 6.19 7.19   HEMOGLOBIN g/dL 10.2* 10.5*   PLATELETS 10*3/mm3 205 187         Results from last 7 days  Lab Units 10/31/18  0758   INR  2.60*             ASSESSMENT:     Acute on chronic diastolic congestive heart failure (CMS/HCC)    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)    Gastroesophageal reflux disease    Tricuspid insufficiency    JOSELUIS on CPAP    S/P Maze operation for atrial fibrillation    S/P TVR (tricuspid valve repair)    S/P MVR (mitral valve repair)    CHF (congestive heart failure) (CMS/HCC)    Chronic kidney disease, stage IV (severe) (CMS/HCC)    Paroxysmal atrial fibrillation (CMS/HCC)    Pulmonary hypertension (CMS/HCC)    SOBOE (shortness of breath on exertion)    1.  CKD4: stable tho poor fxn.  Central and peripheral vol excess, slowly better on bumex drip; lytes compensated. TSH ok  2.  dCHF, decompensated  3.  Multi-valvular heart dz  4.  JOSELUIS on CPAP  5.  Pulmonary HTN on  Rx  6.  Anemia      PLAN:  1.  Bumex drip still at 1 mg/hour  2   Another dose of diuril 500 mg iv for synergy with bumex  3.  Anticipate change to oral diuretic soon (next day or two)  4.  Extra dose of KCl    Jaren Pack MD  11/2/2018    8:42 AM

## 2018-11-02 NOTE — PROGRESS NOTES
"    DAILY PROGRESS NOTE  [facility]    Patient Identification:  Name: Veronica Henao  Age: 83 y.o.  Sex: female  :  1935  MRN: [id number]         Primary Care Physician: Nelly Price MD    Subjective:  Interval History: No new complaints.  She continues to be without chest pain and she states her breathing is much improved.  She still concerned about some swelling in her lower extremities though that is also improving no issues with fever chills or emesis    Objective: Family at bedside    Scheduled Meds:  allopurinol 100 mg Oral Daily   ambrisentan 5 mg Oral Daily   cetirizine 5 mg Oral Daily   cholecalciferol 1,000 Units Oral Daily   [START ON 11/3/2018] famotidine 20 mg Oral Daily   hydrALAZINE 100 mg Oral BID   insulin aspart 0-9 Units Subcutaneous 4x Daily With Meals & Nightly   rivaroxaban 15 mg Oral Daily With Dinner   sildenafil 10 mg Oral TID   vitamin B-12 1,000 mcg Oral Daily     Continuous Infusions:  bumetanide 1 mg/hr Last Rate: 1 mg/hr (18 0804)       Vital signs in last 24 hours:  Temp:  [97.3 °F (36.3 °C)-98.5 °F (36.9 °C)] 97.4 °F (36.3 °C)  Heart Rate:  [71-81] 73  Resp:  [18] 18  BP: (128-165)/(49-82) 157/62    Intake/Output:    Intake/Output Summary (Last 24 hours) at 18 1510  Last data filed at 18 1309   Gross per 24 hour   Intake              835 ml   Output             2400 ml   Net            -1565 ml       Exam:  /62 (BP Location: Right arm, Patient Position: Lying)   Pulse 73   Temp 97.4 °F (36.3 °C) (Oral)   Resp 18   Ht 154.9 cm (60.98\")   Wt 85 kg (187 lb 6.4 oz)   SpO2 98%   BMI 35.43 kg/m²     General Appearance:    Alert, cooperative, no distress, AAOx3, clinically improving                          Throat:   Lips, tongue, gums normal; oral mucosa pink and moist                           Neck:   Supple, symmetrical, trachea midline, no JVD                         Lungs:    Improving though still with bibasilar Rales to " auscultation bilaterally, respirations unlabored                          Heart:    Regular rate and rhythm, S1 and S2 normal                  Abdomen:     Soft, non-tender, bowel sounds active                 Extremities:   Pitting edema proving now to 1+                           Data Review:  Labs in chart were reviewed.    Assessment:  Active Hospital Problems    Diagnosis Date Noted   • **Acute on chronic diastolic congestive heart failure (CMS/[facility]) [I50.33] 10/31/2018   • Pulmonary hypertension (CMS/[facility]) [I27.20] 08/28/2018   • Paroxysmal atrial fibrillation (CMS/[facility]) [I48.0] 08/27/2018   • Chronic kidney disease, stage IV (severe) (CMS/[facility]) [N18.4] 07/25/2017   • CHF (congestive heart failure) (CMS/[facility]) [I50.9] 05/25/2017   • S/P TVR (tricuspid valve repair) [Z98.890] 12/29/2016   • S/P MVR (mitral valve repair) [Z98.890] 12/29/2016   • S/P Maze operation for atrial fibrillation [Z98.890, Z86.79] 12/29/2016   • JOSELUIS on CPAP [G47.33, Z99.89] 11/11/2016   • Tricuspid insufficiency [I07.1] 09/16/2016   • Gastroesophageal reflux disease [K21.9] 08/29/2016   • Type 2 diabetes mellitus with diabetic chronic kidney disease (CMS/[facility]) [E11.22] 02/01/2016      Resolved Hospital Problems    Diagnosis Date Noted Date Resolved   • SOBOE (shortness of breath on exertion) [R06.02] 10/30/2018 11/02/2018       Plan:  Continue Bumex gtt - further diuresis per Renal - likely ready to transition to schedule IV dosing either later tonight or tomorrow              -compounded by Pulm HTN      DM2 - A1c 5.9     HTN - stable      On Xarelto     Cheko Wahl MD  11/2/2018  3:10 PM

## 2018-11-02 NOTE — PLAN OF CARE
Problem: Patient Care Overview  Goal: Plan of Care Review  Outcome: Ongoing (interventions implemented as appropriate)   11/02/18 0506   Coping/Psychosocial   Plan of Care Reviewed With patient   Plan of Care Review   Progress improving   OTHER   Outcome Summary Pt. vss, contines on bumex gtt. No issues noted, Pt. has no complaints. Will continue to monitor.       Problem: Cardiac: Heart Failure (Adult)  Goal: Signs and Symptoms of Listed Potential Problems Will be Absent, Minimized or Managed (Cardiac: Heart Failure)  Outcome: Ongoing (interventions implemented as appropriate)   11/02/18 0506   Goal/Outcome Evaluation   Problems Assessed (Heart Failure) all   Problems Present (Heart Failure) respiratory compromise;functional decline/self-care deficit       Problem: Fall Risk (Adult)  Goal: Absence of Fall  Outcome: Ongoing (interventions implemented as appropriate)   11/02/18 0506   Fall Risk (Adult)   Absence of Fall making progress toward outcome

## 2018-11-03 LAB
ANION GAP SERPL CALCULATED.3IONS-SCNC: 12.2 MMOL/L
BUN BLD-MCNC: 40 MG/DL (ref 8–23)
BUN/CREAT SERPL: 14.8 (ref 7–25)
CALCIUM SPEC-SCNC: 9.3 MG/DL (ref 8.6–10.5)
CHLORIDE SERPL-SCNC: 101 MMOL/L (ref 98–107)
CO2 SERPL-SCNC: 26.8 MMOL/L (ref 22–29)
CREAT BLD-MCNC: 2.7 MG/DL (ref 0.57–1)
GFR SERPL CREATININE-BSD FRML MDRD: 17 ML/MIN/1.73
GLUCOSE BLD-MCNC: 128 MG/DL (ref 65–99)
GLUCOSE BLDC GLUCOMTR-MCNC: 131 MG/DL (ref 70–130)
GLUCOSE BLDC GLUCOMTR-MCNC: 138 MG/DL (ref 70–130)
GLUCOSE BLDC GLUCOMTR-MCNC: 144 MG/DL (ref 70–130)
GLUCOSE BLDC GLUCOMTR-MCNC: 174 MG/DL (ref 70–130)
POTASSIUM BLD-SCNC: 4.5 MMOL/L (ref 3.5–5.2)
SODIUM BLD-SCNC: 140 MMOL/L (ref 136–145)

## 2018-11-03 PROCEDURE — 63710000001 DIPHENHYDRAMINE PER 50 MG: Performed by: HOSPITALIST

## 2018-11-03 PROCEDURE — 82962 GLUCOSE BLOOD TEST: CPT

## 2018-11-03 PROCEDURE — 80048 BASIC METABOLIC PNL TOTAL CA: CPT | Performed by: HOSPITALIST

## 2018-11-03 PROCEDURE — 63710000001 INSULIN ASPART PER 5 UNITS: Performed by: INTERNAL MEDICINE

## 2018-11-03 RX ORDER — BUMETANIDE 0.25 MG/ML
2 INJECTION INTRAMUSCULAR; INTRAVENOUS ONCE
Status: COMPLETED | OUTPATIENT
Start: 2018-11-03 | End: 2018-11-03

## 2018-11-03 RX ORDER — TORSEMIDE 20 MG/1
40 TABLET ORAL DAILY
Status: DISCONTINUED | OUTPATIENT
Start: 2018-11-04 | End: 2018-11-04 | Stop reason: HOSPADM

## 2018-11-03 RX ADMIN — CETIRIZINE HYDROCHLORIDE 5 MG: 10 TABLET, FILM COATED ORAL at 09:36

## 2018-11-03 RX ADMIN — BUMETANIDE 2 MG: 0.25 INJECTION INTRAMUSCULAR; INTRAVENOUS at 16:23

## 2018-11-03 RX ADMIN — ALLOPURINOL 100 MG: 100 TABLET ORAL at 09:36

## 2018-11-03 RX ADMIN — SILDENAFIL 10 MG: 20 TABLET ORAL at 20:36

## 2018-11-03 RX ADMIN — INSULIN ASPART 2 UNITS: 100 INJECTION, SOLUTION INTRAVENOUS; SUBCUTANEOUS at 11:57

## 2018-11-03 RX ADMIN — RIVAROXABAN 15 MG: 15 TABLET, FILM COATED ORAL at 16:23

## 2018-11-03 RX ADMIN — HYDRALAZINE HYDROCHLORIDE 100 MG: 50 TABLET, FILM COATED ORAL at 20:36

## 2018-11-03 RX ADMIN — Medication 1000 MCG: at 09:36

## 2018-11-03 RX ADMIN — SILDENAFIL 10 MG: 20 TABLET ORAL at 09:36

## 2018-11-03 RX ADMIN — BUMETANIDE 1 MG/HR: 0.25 INJECTION INTRAMUSCULAR; INTRAVENOUS at 11:05

## 2018-11-03 RX ADMIN — AMBRISENTAN 5 MG: 5 TABLET, FILM COATED ORAL at 09:36

## 2018-11-03 RX ADMIN — SILDENAFIL 10 MG: 20 TABLET ORAL at 16:22

## 2018-11-03 RX ADMIN — HYDRALAZINE HYDROCHLORIDE 100 MG: 50 TABLET, FILM COATED ORAL at 09:36

## 2018-11-03 RX ADMIN — VITAMIN D, TAB 1000IU (100/BT) 1000 UNITS: 25 TAB at 09:36

## 2018-11-03 RX ADMIN — FAMOTIDINE 20 MG: 20 TABLET, FILM COATED ORAL at 09:36

## 2018-11-03 RX ADMIN — DIPHENHYDRAMINE HYDROCHLORIDE 25 MG: 25 CAPSULE ORAL at 22:01

## 2018-11-03 RX ADMIN — DIPHENHYDRAMINE HYDROCHLORIDE 25 MG: 25 CAPSULE ORAL at 09:35

## 2018-11-03 NOTE — PROGRESS NOTES
"    DAILY PROGRESS NOTE  [facility]    Patient Identification:  Name: Veronica Henao  Age: 83 y.o.  Sex: female  :  1935  MRN: [id number]         Primary Care Physician: Nelly Price MD    Subjective:  Interval History: No new complaints.  No nausea vomiting or diarrhea.  No chest pain or shortness of breath.  Swelling improved    Objective: No family at bedside    Scheduled Meds:  allopurinol 100 mg Oral Daily   ambrisentan 5 mg Oral Daily   bumetanide 2 mg Intravenous Once   cetirizine 5 mg Oral Daily   cholecalciferol 1,000 Units Oral Daily   famotidine 20 mg Oral Daily   hydrALAZINE 100 mg Oral BID   insulin aspart 0-9 Units Subcutaneous 4x Daily With Meals & Nightly   rivaroxaban 15 mg Oral Daily With Dinner   sildenafil 10 mg Oral TID   [START ON 2018] torsemide 40 mg Oral Daily   vitamin B-12 1,000 mcg Oral Daily     Continuous Infusions:     Vital signs in last 24 hours:  Temp:  [97.8 °F (36.6 °C)-98 °F (36.7 °C)] 97.9 °F (36.6 °C)  Heart Rate:  [72-82] 80  Resp:  [18-20] 20  BP: (129-159)/(51-75) 136/61    Intake/Output:    Intake/Output Summary (Last 24 hours) at 18 1351  Last data filed at 18 1159   Gross per 24 hour   Intake              340 ml   Output              675 ml   Net             -335 ml       Exam:  /61 (BP Location: Right arm, Patient Position: Sitting)   Pulse 80   Temp 97.9 °F (36.6 °C) (Oral)   Resp 20   Ht 154.9 cm (60.98\")   Wt 83.2 kg (183 lb 8 oz)   SpO2 97%   BMI 34.69 kg/m²     General Appearance:    Alert, cooperative, no distress, AAOx3                        Throat:   Lips, tongue, gums normal; oral mucosa pink and moist                           Neck:   Supple, symmetrical, trachea midline, no JVD                         Lungs:    Clear to auscultation bilaterally, respirations unlabored                          Heart:    Regular rate and rhythm, S1 and S2 normal                  Abdomen:     Soft, non-tender, bowel sounds " active                 Extremities:   Resolving edema to trace/1+                             Data Review:  Labs in chart were reviewed.    Assessment:  Active Hospital Problems    Diagnosis Date Noted   • **Acute on chronic diastolic congestive heart failure (CMS/[facility]) [I50.33] 10/31/2018   • Pulmonary hypertension (CMS/[facility]) [I27.20] 08/28/2018   • Paroxysmal atrial fibrillation (CMS/[facility]) [I48.0] 08/27/2018   • Chronic kidney disease, stage IV (severe) (CMS/[facility]) [N18.4] 07/25/2017   • CHF (congestive heart failure) (CMS/[facility]) [I50.9] 05/25/2017   • S/P TVR (tricuspid valve repair) [Z98.890] 12/29/2016   • S/P MVR (mitral valve repair) [Z98.890] 12/29/2016   • S/P Maze operation for atrial fibrillation [Z98.890, Z86.79] 12/29/2016   • JOSELUIS on CPAP [G47.33, Z99.89] 11/11/2016   • Tricuspid insufficiency [I07.1] 09/16/2016   • Gastroesophageal reflux disease [K21.9] 08/29/2016   • Type 2 diabetes mellitus with diabetic chronic kidney disease (CMS/[facility]) [E11.22] 02/01/2016      Resolved Hospital Problems    Diagnosis Date Noted Date Resolved   • SOBOE (shortness of breath on exertion) [R06.02] 10/30/2018 11/02/2018       Plan:  Agree w/ transition off Bumex gtt to IV then PO     DM2 - A1c 5.9     HTN - stable      On Xarelto     Dispo- likely home tomorrow     Cheko Wahl MD  11/3/2018  1:51 PM

## 2018-11-03 NOTE — PLAN OF CARE
Problem: Patient Care Overview  Goal: Plan of Care Review  Outcome: Ongoing (interventions implemented as appropriate)   11/03/18 2186   Coping/Psychosocial   Plan of Care Reviewed With patient   Plan of Care Review   Progress improving   OTHER   Outcome Summary Bumex gtt d/c'd, to have IV x1 this evening and started PO Torsemide in a.m. Swelling decreased BLE, Denies SOA. Benadryl PRN for itching. Hopeful for d/c soon. will closely monitor.       Problem: Cardiac: Heart Failure (Adult)  Goal: Signs and Symptoms of Listed Potential Problems Will be Absent, Minimized or Managed (Cardiac: Heart Failure)  Outcome: Ongoing (interventions implemented as appropriate)      Problem: Fall Risk (Adult)  Goal: Absence of Fall  Outcome: Ongoing (interventions implemented as appropriate)

## 2018-11-03 NOTE — PROGRESS NOTES
"   LOS: 4 days    Patient Care Team:  Nelly Price MD as PCP - General  Gino Gibbs MD as Consulting Physician (Cardiology)  Hayes Ferrer MD as Consulting Physician (Pulmonary Disease)    Chief Complaint:  No chief complaint on file.    Follow UP CKD IV anasarca   Subjective     Interval History:   I suspect UOP under recorded, Wt down another 4 lbs and breathing back to baseline, less swelling    Objective     Vital Signs  Temp:  [97.4 °F (36.3 °C)-98 °F (36.7 °C)] 98 °F (36.7 °C)  Heart Rate:  [72-82] 82  Resp:  [18] 18  BP: (129-159)/(51-75) 141/75    Flowsheet Rows      First Filed Value   Admission Height  154.9 cm (61\") Documented at 10/30/2018 1933   Admission Weight  88.5 kg (195 lb) Documented at 10/30/2018 2000          I/O this shift:  In: 340 [P.O.:240; I.V.:100]  Out: 675 [Urine:675]  I/O last 3 completed shifts:  In: 370 [P.O.:370]  Out: 2100 [Urine:2100]    Intake/Output Summary (Last 24 hours) at 11/03/18 1258  Last data filed at 11/03/18 1159   Gross per 24 hour   Intake              590 ml   Output             1175 ml   Net             -585 ml       Physical Exam:  gen nad, alert  Neck supple no jvd  Lungs CTA bilat no rales  CV RRR   abd soft NT/ND   vasc 1+ BLE pedal edema      Results Review:      Results from last 7 days  Lab Units 11/03/18  0442 11/02/18  0309 11/01/18  0513 10/31/18  0535 10/30/18  2049   SODIUM mmol/L 140 138 141 139 140   POTASSIUM mmol/L 4.5 3.7 3.7 3.8 3.8   CHLORIDE mmol/L 101 100 102 103 101   CO2 mmol/L 26.8 24.4 25.3 24.1 23.1   BUN mg/dL 40* 40* 36* 39* 39*   CREATININE mg/dL 2.70* 2.43* 2.44* 2.54* 2.59*   CALCIUM mg/dL 9.3 9.1 9.0 9.4 9.8   BILIRUBIN mg/dL  --   --   --  0.3 0.3   ALK PHOS U/L  --   --   --  95 113   ALT (SGPT) U/L  --   --   --  8 9   AST (SGOT) U/L  --   --   --  15 17   GLUCOSE mg/dL 128* 122* 132* 158* 158*       Estimated Creatinine Clearance: 15.5 mL/min (A) (by C-G formula based on SCr of 2.7 mg/dL (H)).      Results from last 7 " days  Lab Units 11/02/18  0309 11/01/18  0513 10/31/18  0535   MAGNESIUM mg/dL  --  2.1 2.2   PHOSPHORUS mg/dL 3.7 3.9  --          Results from last 7 days  Lab Units 11/02/18  0309 11/01/18  0513   URIC ACID mg/dL 9.2* 8.4*         Results from last 7 days  Lab Units 10/31/18  0758 10/30/18  2049   WBC 10*3/mm3 6.19 7.19   HEMOGLOBIN g/dL 10.2* 10.5*   PLATELETS 10*3/mm3 205 187         Results from last 7 days  Lab Units 10/31/18  0758   INR  2.60*         Imaging Results (last 24 hours)     ** No results found for the last 24 hours. **          allopurinol 100 mg Oral Daily   ambrisentan 5 mg Oral Daily   cetirizine 5 mg Oral Daily   cholecalciferol 1,000 Units Oral Daily   famotidine 20 mg Oral Daily   hydrALAZINE 100 mg Oral BID   insulin aspart 0-9 Units Subcutaneous 4x Daily With Meals & Nightly   rivaroxaban 15 mg Oral Daily With Dinner   sildenafil 10 mg Oral TID   vitamin B-12 1,000 mcg Oral Daily       bumetanide 1 mg/hr Last Rate: 1 mg/hr (11/03/18 1105)       Medication Review:   Current Facility-Administered Medications   Medication Dose Route Frequency Provider Last Rate Last Dose   • allopurinol (ZYLOPRIM) tablet 100 mg  100 mg Oral Daily Sridevi Huizar MD   100 mg at 11/03/18 0936   • ambrisentan (LETAIRIS) tablet 5 mg  5 mg Oral Daily Sridevi Huizar MD   5 mg at 11/03/18 0936   • bumetanide (BUMEX) 10 mg in sodium chloride 0.9 % 100 mL (0.1 mg/mL) infusion  1 mg/hr Intravenous Continuous Sridevi Huizar MD 10 mL/hr at 11/03/18 1105 1 mg/hr at 11/03/18 1105   • cetirizine (zyrTEC) tablet 5 mg  5 mg Oral Daily Sridevi Huizar MD   5 mg at 11/03/18 0936   • cholecalciferol (VITAMIN D3) tablet 1,000 Units  1,000 Units Oral Daily Sridevi Huizar MD   1,000 Units at 11/03/18 0936   • dextrose (D50W) 25 g/ 50mL Intravenous Solution 25 g  25 g Intravenous Q15 Min PRN Sridevi Huizar MD       • dextrose (GLUTOSE) oral gel 15 g  15 g Oral Q15 Min PRN Sridevi Huizar MD       • diphenhydrAMINE (BENADRYL) capsule 25 mg   25 mg Oral Q6H PRN Cheko Wahl MD   25 mg at 11/03/18 0935   • famotidine (PEPCID) tablet 20 mg  20 mg Oral Daily Cheko Wahl MD   20 mg at 11/03/18 0936   • glucagon (human recombinant) (GLUCAGEN DIAGNOSTIC) injection 1 mg  1 mg Subcutaneous PRN Sridevi Huizar MD       • hydrALAZINE (APRESOLINE) tablet 100 mg  100 mg Oral BID Sridevi Huizar MD   100 mg at 11/03/18 0936   • insulin aspart (novoLOG) injection 0-9 Units  0-9 Units Subcutaneous 4x Daily With Meals & Nightly Sridevi Huizar MD   2 Units at 11/03/18 1157   • nitroglycerin (NITROSTAT) SL tablet 0.4 mg  0.4 mg Sublingual Q5 Min PRN Sridevi Huizar MD       • ondansetron (ZOFRAN) injection 4 mg  4 mg Intravenous Q6H PRN Sridevi Huizar MD       • rivaroxaban (XARELTO) tablet 15 mg  15 mg Oral Daily With Dinner Sridevi Huizar MD   15 mg at 11/02/18 1712   • sildenafil (REVATIO) tablet 10 mg  10 mg Oral TID Cheko Wahl MD   10 mg at 11/03/18 0936   • sodium chloride 0.9 % flush 3-10 mL  3-10 mL Intravenous PRN Sridevi Huizar MD       • vitamin B-12 (CYANOCOBALAMIN) tablet 1,000 mcg  1,000 mcg Oral Daily Sridevi Huizar MD   1,000 mcg at 11/03/18 0936       Assessment/Plan   1.  CKD4: slight rise in Cr (2.7) with needed diuresis.  Central and peripheral vol excess, slowly better on bumex drip; lytes compensated. TSH ok  2.  dCHF, decompensated  3.  Multi-valvular heart dz  4.  JOSELUIS on CPAP  5.  Pulmonary HTN on Rx  6.  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

## 2018-11-04 VITALS
HEART RATE: 72 BPM | HEIGHT: 61 IN | TEMPERATURE: 97.7 F | BODY MASS INDEX: 34.36 KG/M2 | DIASTOLIC BLOOD PRESSURE: 60 MMHG | RESPIRATION RATE: 18 BRPM | SYSTOLIC BLOOD PRESSURE: 136 MMHG | WEIGHT: 182 LBS | OXYGEN SATURATION: 95 %

## 2018-11-04 LAB
ALBUMIN SERPL-MCNC: 3.1 G/DL (ref 3.5–5.2)
ANION GAP SERPL CALCULATED.3IONS-SCNC: 12.7 MMOL/L
BUN BLD-MCNC: 42 MG/DL (ref 8–23)
BUN/CREAT SERPL: 12.8 (ref 7–25)
CALCIUM SPEC-SCNC: 9.5 MG/DL (ref 8.6–10.5)
CHLORIDE SERPL-SCNC: 101 MMOL/L (ref 98–107)
CO2 SERPL-SCNC: 27.3 MMOL/L (ref 22–29)
CREAT BLD-MCNC: 3.28 MG/DL (ref 0.57–1)
GFR SERPL CREATININE-BSD FRML MDRD: 13 ML/MIN/1.73
GFR SERPL CREATININE-BSD FRML MDRD: ABNORMAL ML/MIN/1.73
GLUCOSE BLD-MCNC: 125 MG/DL (ref 65–99)
GLUCOSE BLDC GLUCOMTR-MCNC: 125 MG/DL (ref 70–130)
GLUCOSE BLDC GLUCOMTR-MCNC: 149 MG/DL (ref 70–130)
PHOSPHATE SERPL-MCNC: 4.1 MG/DL (ref 2.5–4.5)
POTASSIUM BLD-SCNC: 4.1 MMOL/L (ref 3.5–5.2)
SODIUM BLD-SCNC: 141 MMOL/L (ref 136–145)

## 2018-11-04 PROCEDURE — 80069 RENAL FUNCTION PANEL: CPT | Performed by: INTERNAL MEDICINE

## 2018-11-04 PROCEDURE — 82962 GLUCOSE BLOOD TEST: CPT

## 2018-11-04 RX ORDER — TORSEMIDE 20 MG/1
40 TABLET ORAL DAILY
Qty: 30 TABLET | Refills: 0 | Status: SHIPPED | OUTPATIENT
Start: 2018-11-05 | End: 2019-01-08 | Stop reason: ALTCHOICE

## 2018-11-04 RX ADMIN — FAMOTIDINE 20 MG: 20 TABLET, FILM COATED ORAL at 09:19

## 2018-11-04 RX ADMIN — TORSEMIDE 40 MG: 20 TABLET ORAL at 09:19

## 2018-11-04 RX ADMIN — AMBRISENTAN 5 MG: 5 TABLET, FILM COATED ORAL at 09:18

## 2018-11-04 RX ADMIN — HYDRALAZINE HYDROCHLORIDE 100 MG: 50 TABLET, FILM COATED ORAL at 09:19

## 2018-11-04 RX ADMIN — ALLOPURINOL 100 MG: 100 TABLET ORAL at 09:20

## 2018-11-04 RX ADMIN — CETIRIZINE HYDROCHLORIDE 5 MG: 10 TABLET, FILM COATED ORAL at 09:19

## 2018-11-04 RX ADMIN — Medication 1000 MCG: at 09:20

## 2018-11-04 RX ADMIN — VITAMIN D, TAB 1000IU (100/BT) 1000 UNITS: 25 TAB at 09:19

## 2018-11-04 RX ADMIN — SILDENAFIL 10 MG: 20 TABLET ORAL at 09:19

## 2018-11-04 NOTE — PLAN OF CARE
Problem: Patient Care Overview  Goal: Plan of Care Review  Outcome: Ongoing (interventions implemented as appropriate)   11/04/18 0458   Coping/Psychosocial   Plan of Care Reviewed With patient   Plan of Care Review   Progress improving   OTHER   Outcome Summary vss. no c/o pain. resting well throughout shift. probable discharge in the morning. will continue to monitor

## 2018-11-04 NOTE — DISCHARGE SUMMARY
[facility]    Date of Discharge:  11/4/2018    PCP: Nelly Price MD    Discharge Diagnosis:   Active Hospital Problems    Diagnosis Date Noted   • **Acute on chronic diastolic congestive heart failure (CMS/[facility]) [I50.33] 10/31/2018   • Pulmonary hypertension (CMS/[facility]) [I27.20] 08/28/2018   • Paroxysmal atrial fibrillation (CMS/[facility]) [I48.0] 08/27/2018   • Chronic kidney disease, stage IV (severe) (CMS/[facility]) [N18.4] 07/25/2017   • CHF (congestive heart failure) (CMS/[facility]) [I50.9] 05/25/2017   • S/P TVR (tricuspid valve repair) [Z98.890] 12/29/2016   • S/P MVR (mitral valve repair) [Z98.890] 12/29/2016   • S/P Maze operation for atrial fibrillation [Z98.890, Z86.79] 12/29/2016   • JOSELUIS on CPAP [G47.33, Z99.89] 11/11/2016   • Tricuspid insufficiency [I07.1] 09/16/2016   • Gastroesophageal reflux disease [K21.9] 08/29/2016   • Type 2 diabetes mellitus with diabetic chronic kidney disease (CMS/[facility]) [E11.22] 02/01/2016      Resolved Hospital Problems    Diagnosis Date Noted Date Resolved   • SOBOE (shortness of breath on exertion) [R06.02] 10/30/2018 11/02/2018     Procedures Performed       Consults     Date and Time Order Name Status Description    10/30/2018 8783 Inpatient Nephrology Consult          Hospital Course  Please see history and physical for details. Patient is a 83 y.o. female initially admitted for complaints of shortness of breath and weight gain.  Patient was found to be acute on chronic heart failure compounded by pulmonary hypertension.  Nephrology was consulted while here due to chronic kidney disease and appreciate their input with diuresis.  She was maintained on a Bumex drip for about 2-3 days with appropriate diuresis.  She was then transitioned to as needed IV Bumex x1 and is now transitioned off of Lasix to torsemide.  I discussed the case with nephrology Dr. Lundy on the day of discharge and he will help organize outpatient lab  follow-up in the next week or 2 to reevaluate kidney function.  This patient is otherwise medically much improved and her previous complaints of shortness of breath and been resolved for days.  Her edematous changes of her legs are back to baseline.  She can resume her normal regimen for diabetes as well as blood pressure and she will continue use of Xarelto post discharge.  Patient denies any additional need for home health for additional assistance.  No family present on day of discharge.  All questions answered to patient prior to disposition and she is very much amenable to the above plan.          Condition on Discharge: Improved.     Temp:  [97.7 °F (36.5 °C)-97.9 °F (36.6 °C)] 97.7 °F (36.5 °C)  Heart Rate:  [71-80] 72  Resp:  [18-20] 18  BP: (125-136)/(45-61) 136/60  Body mass index is 34.41 kg/m².    Physical Exam   Constitutional: She is oriented to person, place, and time. She appears well-developed and well-nourished.   HENT:   Head: Normocephalic and atraumatic.   Neck: Neck supple. No JVD present.   Cardiovascular: Normal rate and regular rhythm.    Pulmonary/Chest: Effort normal and breath sounds normal. No respiratory distress.   Abdominal: Soft. Bowel sounds are normal. She exhibits no distension. There is no tenderness.   Musculoskeletal: She exhibits no edema.   Neurological: She is alert and oriented to person, place, and time. No cranial nerve deficit.   Psychiatric: She has a normal mood and affect. Her behavior is normal. Thought content normal.        Discharge Medications      New Medications      Instructions Start Date   torsemide 20 MG tablet  Commonly known as:  DEMADEX   40 mg, Oral, Daily         Changes to Medications      Instructions Start Date   allopurinol 100 MG tablet  Commonly known as:  ZYLOPRIM  What changed:  when to take this   100 mg, Oral, 2 Times Daily      hydrALAZINE 100 MG tablet  Commonly known as:  APRESOLINE  What changed:  Another medication with the same name was  removed. Continue taking this medication, and follow the directions you see here.   100 mg, Oral, 2 Times Daily         Continue These Medications      Instructions Start Date   acetaminophen 325 MG tablet  Commonly known as:  TYLENOL   650 mg, Oral, Every 6 Hours PRN      B-D SINGLE USE SWABS REGULAR pads   1 each, Subcutaneous, Daily      Bladder Control Pads Ex Absorb misc   No dose, route, or frequency recorded.      CENTRUM SILVER PO   1 tablet, Oral, Daily      cholecalciferol 1000 units tablet  Commonly known as:  VITAMIN D3   1,000 Units, Oral, Daily      Lancets 28G misc   To check sugar qd. E11.9 DM      TRUEPLUS LANCETS 28G misc   E11.9 DM to check sugar QD      LETAIRIS PO   5 mg, Oral, Daily      levocetirizine 5 MG tablet  Commonly known as:  XYZAL   5 mg, Oral, Every Evening      raNITIdine 150 MG tablet  Commonly known as:  ZANTAC   150 mg, Oral, 2 Times Daily      rivaroxaban 15 MG tablet  Commonly known as:  XARELTO   15 mg, Oral, Daily With Dinner      Semaglutide 0.25 or 0.5 MG/DOSE solution pen-injector  Commonly known as:  OZEMPIC   0.5 mg, Subcutaneous, Weekly      sildenafil 25 MG tablet  Commonly known as:  VIAGRA   10 mg, Oral, 3 Times Daily      TRUE METRIX AIR GLUCOSE METER device   1 each, Does not apply, Daily      TRUE METRIX BLOOD GLUCOSE TEST test strip  Generic drug:  glucose blood   CHECK BLOOD SUGAR TWICE DAILY      Vitamin A & D 63638-964 units tablet   No dose, route, or frequency recorded.      Vitamin E 400 units chewable tablet   400 Units, Oral, Daily, Two daily         Stop These Medications    azelastine 0.1 % nasal spray  Commonly known as:  ASTELIN     diphenhydrAMINE 2 % cream  Commonly known as:  BENADRYL     fish oil 1000 MG capsule capsule     furosemide 40 MG tablet  Commonly known as:  LASIX     hydrOXYzine 25 MG tablet  Commonly known as:  ATARAX     latanoprost 0.005 % ophthalmic solution  Commonly known as:  XALATAN     nystatin-triamcinolone 203456-7.1  UNIT/GM-% ointment  Commonly known as:  MYCOLOG     sertraline 50 MG tablet  Commonly known as:  ZOLOFT     Vitamin B-12 5000 MCG sublingual tablet             Additional Instructions for the Follow-ups that You Need to Schedule     Discharge Follow-up with PCP    As directed      Currently Documented PCP:  Nelly Price MD  PCP Phone Number:  [phone]    Follow Up Details:  pcp 2 weeks - renal per their recs           Follow-up Information     Nelly Price MD .    Specialty:  Internal Medicine  Why:  pcp 2 weeks - renal per their recs  Contact information:  [address] [place] [address]  [phone]                 Test Results Pending at Discharge     Cheko Wahl MD  11/04/18  11:05 AM    Discharge time spent greater than 30 minutes.

## 2018-11-05 ENCOUNTER — READMISSION MANAGEMENT (OUTPATIENT)
Dept: CALL CENTER | Facility: HOSPITAL | Age: 83
End: 2018-11-05

## 2018-11-05 NOTE — PROGRESS NOTES
Case Management Discharge Note    Final Note: home    Destination     No service has been selected for the patient.      Durable Medical Equipment     No service has been selected for the patient.      Dialysis/Infusion     No service has been selected for the patient.      Home Medical Care     No service has been selected for the patient.      Social Care     No service has been selected for the patient.        Other: Other (private)    Final Discharge Disposition Code: 01 - home or self-care

## 2018-11-05 NOTE — OUTREACH NOTE
Prep Survey      Responses   Facility patient discharged from?  [facility]   Is patient eligible?  Yes   Discharge diagnosis  A/C CHF compounded by pulmonary hypertension, CKD   Does the patient have one of the following disease processes/diagnoses(primary or secondary)?  CHF   Does the patient have Home health ordered?  No   Is there a DME ordered?  No   Prep survey completed?  Yes          Sis Carolina RN

## 2018-11-08 ENCOUNTER — READMISSION MANAGEMENT (OUTPATIENT)
Dept: CALL CENTER | Facility: HOSPITAL | Age: 83
End: 2018-11-08

## 2018-11-08 NOTE — OUTREACH NOTE
CHF Week 1 Survey      Responses   Facility patient discharged from?  [facility]   Does the patient have one of the following disease processes/diagnoses(primary or secondary)?  CHF   Is there a successful TCM telephone encounter documented?  No   CHF Week 1 attempt successful?  Yes   Call start time  1352   Call end time  1401   Discharge diagnosis  A/C CHF compounded by pulmonary hypertension, CKD   Meds reviewed with patient/caregiver?  Yes   Is the patient having any side effects they believe may be caused by any medication additions or changes?  No   Does the patient have all medications ordered at discharge?  Yes   Is the patient taking all medications as directed (includes completed medication regime)?  Yes   Does the patient have a primary care provider?   Yes   Does the patient have an appointment with their PCP within 7 days of discharge?  Yes   Comments regarding PCP  will see Dr Price in 1 week   Has the patient kept scheduled appointments due by today?  N/A   Psychosocial issues?  No   Comments  pt states has no edema, has BP cuff that is not calibrated, plans to take to MD office to have adjusted   Did the patient receive a copy of their discharge instructions?  Yes   Nursing interventions  Reviewed instructions with patient   What is the patient's perception of their health status since discharge?  Improving   Nursing interventions  Nurse provided patient education   Is the patient weighing daily?  Yes   Does the patient have scales?  Yes   Daily weight interventions  Education provided on importance of daily weight   Is the patient able to teach back Heart Failure diet management?  Yes   Is the patient able to teach back Heart Failure Zones?  Yes   Is the patient able to teach back signs and symptoms of worsening condition? (i.e. weight gain, shortness of air, etc.)  Yes   Is the patient/caregiver able to teach back the hierarchy of who to call/visit for symptoms/problems? PCP, Specialist, Home  health nurse, Urgent Care, ED, 911  Yes    CHF Week 1 call completed?  Yes          Nahomy Harris RN

## 2018-11-12 RX ORDER — HYDROXYZINE HYDROCHLORIDE 25 MG/1
25 TABLET, FILM COATED ORAL NIGHTLY PRN
Qty: 90 TABLET | Refills: 1 | Status: SHIPPED | OUTPATIENT
Start: 2018-11-12 | End: 2019-03-26 | Stop reason: SDUPTHER

## 2018-11-16 ENCOUNTER — READMISSION MANAGEMENT (OUTPATIENT)
Dept: CALL CENTER | Facility: HOSPITAL | Age: 83
End: 2018-11-16

## 2018-11-16 NOTE — OUTREACH NOTE
CHF Week 2 Survey      Responses   Facility patient discharged from?  [facility]   Does the patient have one of the following disease processes/diagnoses(primary or secondary)?  CHF   Week 2 attempt successful?  Yes   Call start time  1304   Call end time  1307   Discharge diagnosis  A/C CHF compounded by pulmonary hypertension, CKD   Meds reviewed with patient/caregiver?  Yes   Is the patient taking all medications as directed (includes completed medication regime)?  Yes   Has the patient kept scheduled appointments due by today?  Yes   What is the patient's perception of their health status since discharge?  Improving   Is the patient weighing daily?  Yes   Is the patient able to teach back Heart Failure Zones?  Yes   Additional teach back comments  Pt says she is doing well. Has seen  Taking meds as order. No swelling in feet/ankles   CHF Week 2 call completed?  Yes          Christina Flanagan RN

## 2018-11-23 ENCOUNTER — READMISSION MANAGEMENT (OUTPATIENT)
Dept: CALL CENTER | Facility: HOSPITAL | Age: 83
End: 2018-11-23

## 2018-11-23 NOTE — OUTREACH NOTE
CHF Week 3 Survey      Responses   Facility patient discharged from?  [facility]   Does the patient have one of the following disease processes/diagnoses(primary or secondary)?  CHF   Week 3 attempt successful?  No   Unsuccessful attempts  Attempt 1          Elizabeth Jacob RN

## 2018-11-26 ENCOUNTER — READMISSION MANAGEMENT (OUTPATIENT)
Dept: CALL CENTER | Facility: HOSPITAL | Age: 83
End: 2018-11-26

## 2018-11-26 NOTE — OUTREACH NOTE
CHF Week 3 Survey      Responses   Facility patient discharged from?  [facility]   Does the patient have one of the following disease processes/diagnoses(primary or secondary)?  CHF   Week 3 attempt successful?  Yes   Call start time  1739   Call end time  1743   Discharge diagnosis  A/C CHF compounded by pulmonary hypertension, CKD   Is patient permission given to speak with other caregiver?  No   Meds reviewed with patient/caregiver?  Yes   Is the patient having any side effects they believe may be caused by any medication additions or changes?  No   Does the patient have all medications ordered at discharge?  Yes   Is the patient taking all medications as directed (includes completed medication regime)?  Yes   Does the patient have an appointment with their PCP within 7 days of discharge?  Greater than 7 days   Comments regarding PCP  Nelly Price MD . Appt 12/7/18.    Nursing Interventions  Verified appointment date/time/provider   Has the patient kept scheduled appointments due by today?  Yes   Psychosocial issues?  No   Did the patient receive a copy of their discharge instructions?  Yes   Nursing interventions  Reviewed instructions with patient   What is the patient's perception of their health status since discharge?  Improving   Nursing interventions  Nurse provided patient education   Is the patient weighing daily?  Yes   Does the patient have scales?  Yes   Daily weight interventions  Education provided on importance of daily weight   Is the patient able to teach back Heart Failure diet management?  Yes   Is the patient able to teach back Heart Failure Zones?  Yes   Is the patient able to teach back signs and symptoms of worsening condition? (i.e. weight gain, shortness of air, etc.)  Yes   Is the patient/caregiver able to teach back the hierarchy of who to call/visit for symptoms/problems? PCP, Specialist, Home health nurse, Urgent Care, ED, 911  Yes   Additional teach back comments  Patient states  that she is doing well. States that she lost another pound today.    CHF Week 3 call completed?  Yes          Rosalba Walters RN

## 2018-12-03 ENCOUNTER — READMISSION MANAGEMENT (OUTPATIENT)
Dept: CALL CENTER | Facility: HOSPITAL | Age: 83
End: 2018-12-03

## 2018-12-03 NOTE — OUTREACH NOTE
CHF Week 4 Survey      Responses   Facility patient discharged from?  [facility]   Does the patient have one of the following disease processes/diagnoses(primary or secondary)?  CHF   Week 4 attempt successful?  Yes   Call start time  1217   Call end time  1220   Discharge diagnosis  A/C CHF compounded by pulmonary hypertension, CKD   Meds reviewed with patient/caregiver?  Yes   Is the patient having any side effects they believe may be caused by any medication additions or changes?  No   Is the patient taking all medications as directed (includes completed medication regime)?  Yes   Has the patient kept scheduled appointments due by today?  Yes   Is the patient still receiving Home Health Services?  N/A   Psychosocial issues?  No   Comments  Patient reports she is doing well. No fluid retention, weighing herself daily with no gain. Minimal SOB, no chest pain. Will continue to moniter v/s and weights and log for her MD.    What is the patient's perception of their health status since discharge?  Improving   Nursing interventions  Nurse provided patient education   Is the patient weighing daily?  Yes   Does the patient have scales?  Yes   Daily weight interventions  Education provided on importance of daily weight   Is the patient able to teach back Heart Failure diet management?  Yes   Is the patient able to teach back Heart Failure Zones?  Yes   Is the patient able to teach back signs and symptoms of worsening condition? (i.e. weight gain, shortness of air, etc.)  Yes   Is the patient/caregiver able to teach back the hierarchy of who to call/visit for symptoms/problems? PCP, Specialist, Home health nurse, Urgent Care, ED, 911  Yes   Week 4 Call Completed?  Yes   Would the patient like one additional call?  No   Graduated  Yes   Did the patient feel the follow up calls were helpful during their recovery period?  Yes   Was the number of calls appropriate?  Yes          Artis Griffiths RN

## 2018-12-10 RX ORDER — HYDRALAZINE HYDROCHLORIDE 100 MG/1
100 TABLET, FILM COATED ORAL 2 TIMES DAILY
Qty: 180 TABLET | Refills: 1 | Status: SHIPPED | OUTPATIENT
Start: 2018-12-10 | End: 2019-03-18 | Stop reason: SDUPTHER

## 2018-12-18 ENCOUNTER — OFFICE VISIT (OUTPATIENT)
Dept: INTERNAL MEDICINE | Facility: CLINIC | Age: 83
End: 2018-12-18

## 2018-12-18 VITALS
WEIGHT: 175 LBS | OXYGEN SATURATION: 98 % | SYSTOLIC BLOOD PRESSURE: 140 MMHG | BODY MASS INDEX: 33.08 KG/M2 | HEART RATE: 85 BPM | DIASTOLIC BLOOD PRESSURE: 60 MMHG

## 2018-12-18 DIAGNOSIS — I50.9 CONGESTIVE HEART FAILURE, UNSPECIFIED HF CHRONICITY, UNSPECIFIED HEART FAILURE TYPE (HCC): ICD-10-CM

## 2018-12-18 DIAGNOSIS — I27.20 PULMONARY HYPERTENSION (HCC): ICD-10-CM

## 2018-12-18 DIAGNOSIS — N18.4 CHRONIC KIDNEY DISEASE, STAGE IV (SEVERE) (HCC): ICD-10-CM

## 2018-12-18 DIAGNOSIS — I48.19 PERSISTENT ATRIAL FIBRILLATION (HCC): Primary | ICD-10-CM

## 2018-12-18 PROCEDURE — 99214 OFFICE O/P EST MOD 30 MIN: CPT | Performed by: INTERNAL MEDICINE

## 2018-12-18 NOTE — PROGRESS NOTES
"Chief Complaint   Patient presents with   • Follow-up     Hospital   chf, cri    History of Present Illness   Veronica Henao is a 83 y.o. female presents for hospital follow patient w/ recent acute on chronic chf. She required inpatient admission with iv diuresis. She was discharged on torsemide. She reports that she tried to get this refilled and was unable to do so. Once she ran out she restarted lasix. Now taking 40 mg lasix bid. She reports that her weight has remained within 1-2 pounds of dry weight with this. She feels like she does not \"urinate as well\" when taking torsemide as compared to furosemide. Chronic renal insufficiency. Discharge cr was 3.4. She has lost 10 pounds from September. 39 # from July. Patient is on ozempic for dm and this has been extremely beneficial with reduced appetite and stable glucose readings w/ fbs 100 post prandial 130-140.     Patient reports that she is less dyspnic but still some exertional dyspnea.   The following portions of the patient's history were reviewed and updated as appropriate: allergies, current medications, past family history, past medical history, past social history, past surgical history and problem list.  Current Outpatient Medications on File Prior to Visit   Medication Sig Dispense Refill   • acetaminophen (TYLENOL) 325 MG tablet Take 2 tablets by mouth Every 6 (Six) Hours As Needed for Mild Pain (1-3).  0   • Alcohol Swabs (B-D SINGLE USE SWABS REGULAR) pads Inject 1 each under the skin Daily. 100 each 11   • allopurinol (ZYLOPRIM) 100 MG tablet Take 1 tablet by mouth 2 (Two) Times a Day. (Patient taking differently: Take 100 mg by mouth Daily.) 180 tablet 2   • Ambrisentan (LETAIRIS PO) Take 5 mg by mouth Daily.     • Blood Glucose Monitoring Suppl (TRUE METRIX AIR GLUCOSE METER) device 1 each Daily. 1 each 0   • cholecalciferol (VITAMIN D3) 1000 UNITS tablet Take 1,000 Units by mouth daily.     • hydrALAZINE (APRESOLINE) 100 MG tablet Take 1 tablet by " mouth 2 (Two) Times a Day. 180 tablet 1   • hydrOXYzine (ATARAX) 25 MG tablet Take 1 tablet by mouth At Night As Needed for Itching. 90 tablet 1   • Incontinence Supply Disposable (BLADDER CONTROL PADS EX ABSORB) misc      • Lancets 28G misc To check sugar qd. E11.9  each 12   • levocetirizine (XYZAL) 5 MG tablet Take 5 mg by mouth Every Evening.     • Multiple Vitamins-Minerals (CENTRUM SILVER PO) Take 1 tablet by mouth Daily.     • raNITIdine (ZANTAC) 150 MG tablet Take 1 tablet by mouth 2 (Two) Times a Day. 180 tablet 1   • rivaroxaban (XARELTO) 15 MG tablet Take 1 tablet by mouth Daily With Dinner. 42 tablet 0   • Semaglutide (OZEMPIC) 0.25 or 0.5 MG/DOSE solution pen-injector Inject 0.5 mg under the skin into the appropriate area as directed 1 (One) Time Per Week. 1.5 mL 5   • sildenafil (VIAGRA) 25 MG tablet Take 10 mg by mouth 3 (Three) Times a Day.     • torsemide (DEMADEX) 20 MG tablet Take 2 tablets by mouth Daily. 30 tablet 0   • TRUE METRIX BLOOD GLUCOSE TEST test strip CHECK BLOOD SUGAR TWICE DAILY 100 each 3   • TRUEPLUS LANCETS 28G misc E11.9 DM to check sugar  each 12   • Vitamin E 400 UNITS chewable tablet Chew 400 Units Daily. Two daily     • Vitamins A & D (VITAMIN A & D) 11365-629 UNITS tablet        No current facility-administered medications on file prior to visit.      Review of Systems   Constitutional: Positive for fatigue.   HENT: Negative.    Eyes: Negative.    Respiratory: Positive for shortness of breath.    Cardiovascular: Positive for leg swelling.   Gastrointestinal: Negative.    Endocrine: Negative.    Genitourinary: Negative.    Musculoskeletal: Positive for arthralgias.   Skin: Negative.    Allergic/Immunologic: Negative.    Neurological: Negative.    Hematological: Negative.    Psychiatric/Behavioral: Negative.        Objective   Physical Exam   Constitutional: She is oriented to person, place, and time. She appears well-developed and well-nourished.   Alert and  pleasant. No acute distress. Chronically ill appearing.    HENT:   Head: Normocephalic and atraumatic.   Right Ear: External ear normal.   Left Ear: External ear normal.   Mouth/Throat: Oropharynx is clear and moist.   Eyes: EOM are normal. Pupils are equal, round, and reactive to light.   Neck: Normal range of motion. Neck supple.   Cardiovascular: Normal rate, regular rhythm and normal heart sounds.   1+edema B legs. Intolerant to compression stockings.    Pulmonary/Chest: Effort normal and breath sounds normal.   Abdominal: Soft. Bowel sounds are normal.   Musculoskeletal: Normal range of motion.   Neurological: She is alert and oriented to person, place, and time.   Skin: Skin is warm and dry.   Psychiatric: She has a normal mood and affect. Her behavior is normal. Judgment and thought content normal.        /60   Pulse 85   Wt 79.4 kg (175 lb)   SpO2 98%   BMI 33.08 kg/m²     Assessment/Plan   Diagnoses and all orders for this visit:    Persistent atrial fibrillation (CMS/HCC)  -     Basic Metabolic Panel  -     Phosphorus  -     Uric Acid    Congestive heart failure, unspecified HF chronicity, unspecified heart failure type (CMS/HCC)  -     Basic Metabolic Panel  -     Phosphorus  -     Uric Acid    Pulmonary hypertension (CMS/HCC)    Chronic kidney disease, stage IV (severe) (CMS/[facility])  -     Basic Metabolic Panel  -     Phosphorus  -     Uric Acid      pateint w/ recent chf exacerbation. She is feeling much improvement today. She will continue furosemide as she notes better regulation with this than torsemide. Will test renal function and electrolytes. Will continue ozempic for dm regulation (sample given today). She will continue current medications for atrial fibrillation as well. She will f/u w/ her specialists routinely.       Current outpatient and discharge medications have been reconciled for the patient.  Reviewed by: Nelly Price MD

## 2018-12-19 LAB
BUN SERPL-MCNC: 42 MG/DL (ref 8–23)
BUN/CREAT SERPL: 17.1 (ref 7–25)
CALCIUM SERPL-MCNC: 9.2 MG/DL (ref 8.6–10.5)
CHLORIDE SERPL-SCNC: 99 MMOL/L (ref 98–107)
CO2 SERPL-SCNC: 23.9 MMOL/L (ref 22–29)
CREAT SERPL-MCNC: 2.45 MG/DL (ref 0.57–1)
GLUCOSE SERPL-MCNC: 123 MG/DL (ref 65–99)
PHOSPHATE SERPL-MCNC: 3.8 MG/DL (ref 2.5–4.5)
POTASSIUM SERPL-SCNC: 4.2 MMOL/L (ref 3.5–5.2)
SODIUM SERPL-SCNC: 136 MMOL/L (ref 136–145)
URATE SERPL-MCNC: 9.1 MG/DL (ref 2.4–5.7)

## 2019-01-08 ENCOUNTER — TELEPHONE (OUTPATIENT)
Dept: INTERNAL MEDICINE | Facility: CLINIC | Age: 84
End: 2019-01-08

## 2019-01-08 RX ORDER — FUROSEMIDE 40 MG/1
40 TABLET ORAL 2 TIMES DAILY
Qty: 60 TABLET | Refills: 2 | Status: SHIPPED | OUTPATIENT
Start: 2019-01-08 | End: 2019-04-18 | Stop reason: SDUPTHER

## 2019-01-08 NOTE — TELEPHONE ENCOUNTER
Pt called to say thatt she thought she was going to get a new lasix (torsemide)  And a rf on her  Furosemide when needed

## 2019-01-08 NOTE — TELEPHONE ENCOUNTER
Per note patient prefers lasix. rx sent for 40 mg bid. Is that what she is taking? Verify. If she gains 5 # in 3 days she should call.

## 2019-02-12 RX ORDER — RANITIDINE 150 MG/1
TABLET ORAL
Qty: 180 TABLET | Refills: 1 | Status: SHIPPED | OUTPATIENT
Start: 2019-02-12 | End: 2019-07-24 | Stop reason: SDUPTHER

## 2019-02-12 RX ORDER — NYSTATIN AND TRIAMCINOLONE ACETONIDE 100000; 1 [USP'U]/G; MG/G
OINTMENT TOPICAL
Qty: 30 G | Refills: 3 | Status: ON HOLD | OUTPATIENT
Start: 2019-02-12 | End: 2020-01-05

## 2019-02-19 ENCOUNTER — CLINICAL SUPPORT NO REQUIREMENTS (OUTPATIENT)
Dept: CARDIOLOGY | Facility: CLINIC | Age: 84
End: 2019-02-19

## 2019-02-19 ENCOUNTER — OFFICE VISIT (OUTPATIENT)
Dept: CARDIOLOGY | Facility: CLINIC | Age: 84
End: 2019-02-19

## 2019-02-19 VITALS
DIASTOLIC BLOOD PRESSURE: 86 MMHG | SYSTOLIC BLOOD PRESSURE: 150 MMHG | WEIGHT: 160.8 LBS | HEART RATE: 78 BPM | HEIGHT: 61 IN | BODY MASS INDEX: 30.36 KG/M2

## 2019-02-19 DIAGNOSIS — I48.0 PAROXYSMAL ATRIAL FIBRILLATION (HCC): ICD-10-CM

## 2019-02-19 DIAGNOSIS — N18.4 CHRONIC KIDNEY DISEASE, STAGE IV (SEVERE) (HCC): ICD-10-CM

## 2019-02-19 DIAGNOSIS — I27.20 PULMONARY HYPERTENSION (HCC): ICD-10-CM

## 2019-02-19 DIAGNOSIS — Z98.890 S/P MAZE OPERATION FOR ATRIAL FIBRILLATION: ICD-10-CM

## 2019-02-19 DIAGNOSIS — Z98.890 S/P MVR (MITRAL VALVE REPAIR): ICD-10-CM

## 2019-02-19 DIAGNOSIS — I50.32 CHRONIC DIASTOLIC CONGESTIVE HEART FAILURE (HCC): Primary | ICD-10-CM

## 2019-02-19 DIAGNOSIS — Z86.79 S/P MAZE OPERATION FOR ATRIAL FIBRILLATION: ICD-10-CM

## 2019-02-19 DIAGNOSIS — Z98.890 S/P TVR (TRICUSPID VALVE REPAIR): ICD-10-CM

## 2019-02-19 DIAGNOSIS — I44.2 COMPLETE HEART BLOCK (HCC): Primary | ICD-10-CM

## 2019-02-19 DIAGNOSIS — G47.33 OSA ON CPAP: ICD-10-CM

## 2019-02-19 DIAGNOSIS — Z99.89 OSA ON CPAP: ICD-10-CM

## 2019-02-19 DIAGNOSIS — I10 ESSENTIAL HYPERTENSION: ICD-10-CM

## 2019-02-19 PROCEDURE — 93279 PRGRMG DEV EVAL PM/LDLS PM: CPT | Performed by: INTERNAL MEDICINE

## 2019-02-19 PROCEDURE — 99214 OFFICE O/P EST MOD 30 MIN: CPT | Performed by: NURSE PRACTITIONER

## 2019-02-19 PROCEDURE — 93000 ELECTROCARDIOGRAM COMPLETE: CPT | Performed by: NURSE PRACTITIONER

## 2019-02-19 RX ORDER — PRAMIPEXOLE DIHYDROCHLORIDE 0.12 MG/1
1 TABLET ORAL
Refills: 2 | COMMUNITY
Start: 2019-01-03 | End: 2019-04-19 | Stop reason: SDUPTHER

## 2019-02-19 NOTE — PROGRESS NOTES
Date of Office Visit: 2019  Encounter Provider: Leydi Pack, HANS, APRN  Place of Service: [facility] CARDIOLOGY  Patient Name: Veronica Henao  :1935        Subjective:     Chief Complaint:  Follow-up, atrial fibrillation, diastolic heart failure, chronic shortness of breath.      History of Present Illness:  Veronica Henao is a 83 y.o. female patient of Dr. Husain.  This is my first time seeing this patient in the office today, and I have reviewed her records.    Patient has a history of atrial fibrillation on Xarelto, diastolic heart failure, AV node ablation status post pacemaker, diabetes, hypertension, hyperlipidemia, obstructive sleep apnea on CPAP, chronic kidney disease followed by Dr. Honeycutt, history of mitral regurgitation status post mitral valve repair, history of tricuspid regurgitation status post tricuspid repair, severe pulmonary hypertension followed by Dr. Ferrer.     Patient has been followed by Dr. Husain for many years.  In  patient was seen in the ER and diagnosed with atrial fibrillation with RVR.  She had a normal echocardiogram showing normal LV function and no significant valvular heart disease.  She transferred care to Dr. Husain 2014.  She had recurrence of atrial fibrillation 2015 associated with diastolic heart failure.  In 2016 she was complaining of chest pain and shortness of breath and echocardiogram showed EF of 51%, elevated left atrial pressure, severe left atrial enlargement, moderate to severe mitral regurgitation, moderate to severe tricuspid regurgitation.  She had a nuclear stress test 2016 that showed no evidence of ischemia.  Transesophageal echocardiogram 10/2016 showed normal left ventricular systolic function, severe left atrial enlargement, mild to moderate mitral regurgitation with moderate to severe tricuspid regurgitation.  She had heart catheterization 2016 showing no significant coronary artery disease.  2016  she had a mitral valve repair with a 23 mm ATF band posterior angioplasty and P2 portal repair.  Tricuspid valve was repaired with a 26 mm ring and plication of the anterior and septal leaflet commissure.  She also underwent right and left Maze procedure left atrial appendage ligation.    3/2017 patient was found to be in rapid atrial flutter.  She was cardioverted but went back in atrial flutter the next day.  3/28/17 Dr. Ward performed atrial flutter ablation.  She had an echocardiogram 5/2017 showing EF of 70%, severe left atrial enlargement, mild mitral stenosis from valve repair, but no regurgitation.  There was also mild tricuspid regurgitation with moderate pulmonary hypertension.  She went back in atrial flutter 6/2017 and was cardioverted back to sinus rhythm but again went back into atrial flutter and Dr. Ward performed an AV node ablation with pacemaker, which was performed on 7/5/17.  Pacemaker was placed with ventricular lead at the His bundle.    Patient was readmitted 7/25/17 with heart failure.  She diuresed well.  Pacemaker interrogation 8/2017 showed high thresholds in the his lead.  Dr. Ward made some adjustments and said that she may feel some muscular pacing and had right ventricular lead for backup.    Patient was hospitalized August 2018 with shortness of breath.  She had a normal lower extremity venous Doppler at that time.  She had a right heart catheterization done which showed PA pressure of 59/16 with a ware pressure of 14 and large V waves.  Pulmonary pressures did not change with adenosine.  Transthoracic echocardiogram showed EF of 65%, moderate left atrial enlargement, mitral annuloplasty ring with mildly increased gradient of 7 mmHg with trace regurgitation.  Tricuspid valve also had annuloplasty ring with mild to moderate tricuspid regurgitation and RVSP of 63 mmHg.    Patient was last seen in office by Dr. Husain 9/27/18.  At this point she had not been able to start  Letairis since previously seen.  She was still short of breath.  She was lightheaded.  She was okay with sitting but when she would stand she would notice lightheadedness and it would continue the whole time she was up.  She was cautioned against falls.  She was instructed to follow-up in December to see how she was doing after starting Letairis through Dr. Ferrer.      Patient presents to office today for follow-up appointment.  Patient reports she has been doing okay overall since last visit.  She reports that Dr. Ferrer started her on the Letairis and she did okay for a couple of months but then started having extreme swelling of her lower extremities.  He stopped it and the swelling went back to baseline.  She did not notice any improvement in her chronic shortness of breath while on this medication.  She has a follow-up with Dr. Ferrer in March and is wondering if he will try to start her on another medication at this time.  She does have some trace edema on the exam today, however she reports that this is more than usual.  It resolves with elevating lower extremities.  If for some reason it does not resolve then she will call the office right away.  She reports that her shortness of breath is pretty much the same as last visit, may be slightly better, which she attributes to weight loss.  She checks daily weights and reports that her weight might fluctuate 2 pounds, however never more than that.  She knows to call the office right away if she gains 3 pounds in a day or 5 pounds in a week.  She continues to have lightheadedness when she stands up and when she is up doing things.  She reports that this is not new or worsening, the same as last visit.  We discussed precautions against falls.  She does use a cane when she is outside the office and I advised her to use it in the office as well as needed.  She does know to change positions more slowly.  We discussed that if she ever were to fall and hit her head she would  need to go the ER for a CT scan.  She verbalized understanding.  A chronic fatigue is also at baseline.  She denies any chest pain, palpitations, racing heartbeat sensation, syncope, near syncope, falls, or abnormal bleeding.  She does have a history of sleep apnea and uses her CPAP machine all night every night.    Patient to follow-up with Dr. Husain in 3-4 months or sooner if needed for any new, recurrent, or worsening symptoms or other problems/concerns.  At this point she should have been seen again by pulmonology and we will know she is on another medication.  She will notify the office right away if she has any issues or concerns before this time.        Past Medical History:   Diagnosis Date   • Acute bronchitis    • Acute renal insufficiency    • Allergic rhinitis    • Anemia in chronic kidney disease    • Arrhythmia    • Atrial fibrillation (CMS/HCC)     chronic   • Brachycephaly    • Breast pain, right    • Chest pain    • CHF (congestive heart failure) (CMS/HCC)    • Chronic combined systolic and diastolic CHF (congestive heart failure) (CMS/HCC)    • Chronic renal insufficiency    • CKD (chronic kidney disease), stage IV (CMS/HCC)    • Cough    • Depression    • Diabetes mellitus (CMS/HCC)     TYPE 2   • Diverticulosis    • ORTIZ (dyspnea on exertion)    • Dyspnea    • Esophageal reflux    • Essential hypertension    • Fatigue    • GERD (gastroesophageal reflux disease)    • Gout    • Head injury    • Headache    • Hoarseness    • Hypercalcemia    • Hyperlipidemia    • Hypertension    • Influenza A (H1N1)    • Iron deficiency anemia    • Itching    • Leg swelling    • Lightheadedness    • Macular degeneration    • Malaise and fatigue    • Mitral valve regurgitation     moderate to severe   • Nontoxic multinodular goiter     RIGHT 2.0 CM  LEFT  2.5 CM   • Obesity    • JOSELUIS on CPAP    • Osteoarthritis    • PAF (paroxysmal atrial fibrillation) (CMS/HCC)    • Palpitations    • Paresthesias    • Proteinuria    •  Pulmonary nodule    • Pulmonic valve regurgitation     mild   • Sebaceous cyst    • SOBOE (shortness of breath on exertion)    • Solitary thyroid nodule    • Subclinical hypothyroidism    • Tachycardia    • TR (tricuspid regurgitation)     mild to moderate   • Type 2 diabetes mellitus (CMS/HCC)    • 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)    • UTI (urinary tract infection)      Past Surgical History:   Procedure Laterality Date   • APPENDECTOMY     • CARDIAC CATHETERIZATION      procedure outcome:    • CARDIAC CATHETERIZATION N/A 11/21/2016    Procedure: Coronary angiography;  Surgeon: Marcin العراقي MD;  Location: [facility] CATH INVASIVE LOCATION;  Service:    • CARDIAC CATHETERIZATION N/A 11/21/2016    Procedure: Left Heart Cath;  Surgeon: Marcin العراقي MD;  Location: [facility] CATH INVASIVE LOCATION;  Service:    • CARDIAC CATHETERIZATION N/A 8/29/2018    Procedure: Right Heart Cath with adenosine challenge if wedge pressure is normal.;  Surgeon: Paulo Decker MD;  Location: [facility] CATH INVASIVE LOCATION;  Service: Cardiovascular   • CARDIAC ELECTROPHYSIOLOGY PROCEDURE N/A 3/28/2017    Procedure: Ablation atrial flutter;  Surgeon: Rodríguez Ward MD;  Location: [facility] CATH INVASIVE LOCATION;  Service:    • CARDIAC ELECTROPHYSIOLOGY PROCEDURE N/A 7/3/2017    Procedure: AV node ablation;  Surgeon: Rodríguez Ward MD;  Location: [facility] CATH INVASIVE LOCATION;  Service:    • CARDIAC ELECTROPHYSIOLOGY PROCEDURE N/A 7/3/2017    Procedure: Pacemaker DC new  [employer] and will need 3830 lead-I did chase Meneses ;  Surgeon: Rodríguez Ward MD;  Location: [facility] CATH INVASIVE LOCATION;  Service:    • CARDIAC SURGERY     • CHOLECYSTECTOMY     • CLAVICLE SURGERY Left    • COLONOSCOPY N/A 11/14/2017    Procedure: COLONOSCOPY INTO CECUM/ TERMINAL ILEUM WITH POLYPECTOMY  X 8 AND CLIP X 2;  Surgeon: Jacy Browning MD;  Location: [facility] ENDOSCOPY;  Service:    • ENDOSCOPY N/A 11/14/2017     Procedure: ESOPHAGOGASTRODUODENOSCOPY WITH BIOPSIES;  Surgeon: Jacy Browning MD;  Location: [facility] ENDOSCOPY;  Service:    • GASTRIC RESTRICTION SURGERY     • HYSTERECTOMY     • JOINT REPLACEMENT     • LUMBAR DECOMPRESSION      L4-5   • MITRAL VALVE REPAIR/REPLACEMENT N/A 12/14/2016    Procedure: INTRAOPERATIVE KATELYN, MIDLINE STERNOTOMY, MITRAL VALVE REPAIR, TRICUSPID VALVE REPAIR, MAZE PROCEDURE;  Surgeon: Young Vital MD;  Location: [facility] MAIN OR;  Service:    • SHOULDER SURGERY Left     AFTER SURGERY FOR FRACTURED CLAVICLE   • TONSILLECTOMY     • TOTAL KNEE ARTHROPLASTY      bilateral     Outpatient Medications Prior to Visit   Medication Sig Dispense Refill   • acetaminophen (TYLENOL) 325 MG tablet Take 2 tablets by mouth Every 6 (Six) Hours As Needed for Mild Pain (1-3).  0   • Alcohol Swabs (B-D SINGLE USE SWABS REGULAR) pads Inject 1 each under the skin Daily. 100 each 11   • allopurinol (ZYLOPRIM) 100 MG tablet Take 1 tablet by mouth 2 (Two) Times a Day. (Patient taking differently: Take 100 mg by mouth Daily.) 180 tablet 2   • Blood Glucose Monitoring Suppl (TRUE METRIX AIR GLUCOSE METER) device 1 each Daily. 1 each 0   • cholecalciferol (VITAMIN D3) 1000 UNITS tablet Take 1,000 Units by mouth daily.     • furosemide (LASIX) 40 MG tablet Take 1 tablet by mouth 2 (Two) Times a Day. 60 tablet 2   • hydrALAZINE (APRESOLINE) 100 MG tablet Take 1 tablet by mouth 2 (Two) Times a Day. 180 tablet 1   • hydrOXYzine (ATARAX) 25 MG tablet Take 1 tablet by mouth At Night As Needed for Itching. 90 tablet 1   • Incontinence Supply Disposable (BLADDER CONTROL PADS EX ABSORB) misc      • Lancets 28G misc To check sugar qd. E11.9  each 12   • Multiple Vitamins-Minerals (CENTRUM SILVER PO) Take 1 tablet by mouth Daily.     • nystatin-triamcinolone (MYCOLOG) 781425-2.1 UNIT/GM-% ointment APPLY TOPICALLY 2 TIMES DAILY 30 g 3   • pramipexole (MIRAPEX) 0.125 MG tablet Take 1 tablet by mouth every night at bedtime.  2    • raNITIdine (ZANTAC) 150 MG tablet TAKE 1 TABLET TWICE DAILY 180 tablet 1   • rivaroxaban (XARELTO) 15 MG tablet Take 1 tablet by mouth Daily With Dinner. 42 tablet 0   • Semaglutide (OZEMPIC) 0.25 or 0.5 MG/DOSE solution pen-injector Inject 0.5 mg under the skin into the appropriate area as directed 1 (One) Time Per Week. 1.5 mL 5   • TRUE METRIX BLOOD GLUCOSE TEST test strip CHECK BLOOD SUGAR TWICE DAILY 100 each 3   • TRUEPLUS LANCETS 28G misc E11.9 DM to check sugar  each 12   • Vitamin E 400 UNITS chewable tablet Chew 400 Units Daily. Two daily     • Vitamins A & D (VITAMIN A & D) 46813-693 UNITS tablet      • Ambrisentan (LETAIRIS PO) Take 5 mg by mouth Daily.     • levocetirizine (XYZAL) 5 MG tablet Take 5 mg by mouth Every Evening.     • sildenafil (VIAGRA) 25 MG tablet Take 10 mg by mouth 3 (Three) Times a Day.       No facility-administered medications prior to visit.        Allergies as of 02/19/2019 - Reviewed 02/19/2019   Allergen Reaction Noted   • Cephalexin Swelling 02/06/2016   • Meperidine Swelling 02/06/2016   • Penicillins Swelling 02/06/2016     Social History     Socioeconomic History   • Marital status:      Spouse name: Not on file   • Number of children: Not on file   • Years of education: Not on file   • Highest education level: Not on file   Social Needs   • Financial resource strain: Not on file   • Food insecurity - worry: Not on file   • Food insecurity - inability: Not on file   • Transportation needs - medical: Not on file   • Transportation needs - non-medical: Not on file   Occupational History   • Not on file   Tobacco Use   • Smoking status: Former Smoker     Packs/day: 1.50     Years: 20.00     Pack years: 30.00     Start date: 6/14/1970   • Smokeless tobacco: Never Used   • Tobacco comment: D/C 1970 SMOKING 1 1/2 PPD FOR 13 YRS BEFORE QUITTING   Substance and Sexual Activity   • Alcohol use: No     Comment: caffeine use   • Drug use: No   • Sexual activity: Defer  "  Other Topics Concern   • Not on file   Social History Narrative   • Not on file     Family History   Problem Relation Age of Onset   • Emphysema Mother    • Heart disease Father    • Lung cancer Father    • Prostate cancer Father    • Asthma Sister    • Lung cancer Daughter    • Colon cancer Other    • No Known Problems Maternal Grandmother    • No Known Problems Maternal Grandfather    • Arthritis Paternal Grandmother    • No Known Problems Paternal Grandfather        Review of Systems   Constitution: Positive for malaise/fatigue. Negative for weight gain.   Eyes: Negative for redness.   Cardiovascular: Positive for leg swelling. Negative for chest pain, irregular heartbeat, near-syncope, palpitations and syncope.   Respiratory: Positive for shortness of breath.         Sleep apnea, uses CPAP machine every night.   Hematologic/Lymphatic: Negative for bleeding problem.   Musculoskeletal: Negative for falls.   Gastrointestinal: Negative for hematemesis, hematochezia, jaundice and melena.   Genitourinary: Negative for hematuria.   Neurological: Positive for light-headedness. Negative for dizziness and seizures.   Psychiatric/Behavioral: Negative for altered mental status.          Objective:     Vitals:    02/19/19 1034   BP: 150/86   BP Location: Left arm   Pulse: 78   Weight: 72.9 kg (160 lb 12.8 oz)   Height: 154.9 cm (61\")     Body mass index is 30.38 kg/m².      PHYSICAL EXAM:  Physical Exam   Constitutional: She is oriented to person, place, and time. She appears well-developed and well-nourished. No distress.   HENT:   Head: Normocephalic and atraumatic.   Eyes: Pupils are equal, round, and reactive to light.   Neck: Neck supple. No JVD present. Carotid bruit is not present. No tracheal deviation present.   Cardiovascular: Normal rate, regular rhythm, normal heart sounds and intact distal pulses. Exam reveals no gallop and no friction rub.   No murmur heard.  Pulses:       Radial pulses are 2+ on the right " side, and 2+ on the left side.        Posterior tibial pulses are 2+ on the right side, and 2+ on the left side.   Pulmonary/Chest: Effort normal and breath sounds normal. No respiratory distress. She has no wheezes. She has no rales.   Abdominal: Soft. Bowel sounds are normal. She exhibits no distension. There is no tenderness.   Musculoskeletal: She exhibits edema (trace bilateral ankle edema). She exhibits no tenderness or deformity.   Neurological: She is alert and oriented to person, place, and time.   Skin: Skin is warm and dry. No rash noted. She is not diaphoretic. No erythema.   Psychiatric: She has a normal mood and affect. Her behavior is normal. Judgment normal.           ECG 12 Lead  Date/Time: 2/19/2019 1:25 PM  Performed by: Leydi Pack DNP, APRN  Authorized by: Leydi Pack DNP, APRN   Comparison: compared with previous ECG from 10/31/2018  Similar to previous ECG  Other findings: left ventricular hypertrophy  Pacing: ventricular pacing  Clinical impression: abnormal EKG              Assessment:       Diagnosis Plan   1. Chronic diastolic congestive heart failure (CMS/HCC)     2. Paroxysmal atrial fibrillation (CMS/HCC)     3. Essential hypertension     4. S/P TVR (tricuspid valve repair)     5. Pulmonary hypertension (CMS/HCC)     6. JOSELUIS on CPAP     7. Chronic kidney disease, stage IV (severe) (CMS/HCC)     8. S/P MVR (mitral valve repair)     9. S/P Maze operation for atrial fibrillation           Plan:     1. Atrial fibrillation: Patient remains on Xarelto.    Atrial Fibrillation and Atrial Flutter  Assessment  • The patient has persistent atrial fibrillation  • The patient's CHADS2-VASc score is 6  • A [id number] score of 2 or more is considered a high risk for a thromboembolic event    Plan  • Continue rivaroxaban for antithrombotic therapy, bleeding issues discussed  • She has had a right and left-sided maze before.  She had an atrial flutter ablation before.  Eventually she had  an AV node ablation and pacemaker implant.  She is anticoagulated with Xarelto.    2. Diastolic heart failure: Patient appears to be pretty euvolemic in the office today.  She does have some trace lower extremity swelling, however she reports this is not normal.  She will call the office if does not improve with lower extremity elevation.  We discussed continuing to follow a low-salt diet.  Patient monitor his daily weights and reports that sometimes we will fluctuate 1-2 pounds, however never more than this.  If she does gain 3 pounds in a day, 5 pounds in a week, or has increased lower extremity swelling or any increased shortness of breath she will call the office right away.  3. Pacemaker: Patient had pacemaker check done in the office today showing normal VVIR pacemaker function.  Presenting rhythm was ventricular paced at 87 bpm.  Ventricular paced 99.9% of the time.  No episodes reported.  No permanent changes made.  Patient to follow-up with remote testing in 3 months.  4. Hypertension: Patient reports blood pressures have been controlled.  5. Anemia: Managed by outside provider.  6. Sleep apnea: Uses CPAP machine every night.  7. Chronic kidney disease: Followed by Dr. Honeycutt.  8. History of mitral regurgitation status post mitral valve repair: Patient had some residual mitral stenosis but no regurgitation on last echo.  9. History of tricuspid regurgitation status post tricuspid repair  10. Severe pulmonary hypertension: Managed by Dr. Ferrer.  Patient has a follow-up with him in March.    Patient to follow-up with Dr. Husain in 3-4 months or sooner if needed for any new, recurrent, or worsening symptoms or other problems/concerns.           Your medication list           Accurate as of 2/19/19  1:32 PM. If you have any questions, ask your nurse or doctor.               CHANGE how you take these medications      Instructions Last Dose Given Next Dose Due   allopurinol 100 MG tablet  Commonly known as:   ZYLOPRIM  What changed:  when to take this      Take 1 tablet by mouth 2 (Two) Times a Day.          CONTINUE taking these medications      Instructions Last Dose Given Next Dose Due   acetaminophen 325 MG tablet  Commonly known as:  TYLENOL      Take 2 tablets by mouth Every 6 (Six) Hours As Needed for Mild Pain (1-3).       B-D SINGLE USE SWABS REGULAR pads      Inject 1 each under the skin Daily.       Bladder Control Pads Ex Absorb misc           CENTRUM SILVER PO      Take 1 tablet by mouth Daily.       cholecalciferol 1000 units tablet  Commonly known as:  VITAMIN D3      Take 1,000 Units by mouth daily.       furosemide 40 MG tablet  Commonly known as:  LASIX      Take 1 tablet by mouth 2 (Two) Times a Day.       hydrALAZINE 100 MG tablet  Commonly known as:  APRESOLINE      Take 1 tablet by mouth 2 (Two) Times a Day.       hydrOXYzine 25 MG tablet  Commonly known as:  ATARAX      Take 1 tablet by mouth At Night As Needed for Itching.       Lancets 28G misc      To check sugar qd. E11.9 DM       TRUEPLUS LANCETS 28G misc      E11.9 DM to check sugar QD       nystatin-triamcinolone 142869-7.1 UNIT/GM-% ointment  Commonly known as:  MYCOLOG      APPLY TOPICALLY 2 TIMES DAILY       pramipexole 0.125 MG tablet  Commonly known as:  MIRAPEX      Take 1 tablet by mouth every night at bedtime.       raNITIdine 150 MG tablet  Commonly known as:  ZANTAC      TAKE 1 TABLET TWICE DAILY       rivaroxaban 15 MG tablet  Commonly known as:  XARELTO      Take 1 tablet by mouth Daily With Dinner.       Semaglutide 0.25 or 0.5 MG/DOSE solution pen-injector  Commonly known as:  OZEMPIC      Inject 0.5 mg under the skin into the appropriate area as directed 1 (One) Time Per Week.       TRUE METRIX AIR GLUCOSE METER device      1 each Daily.       TRUE METRIX BLOOD GLUCOSE TEST test strip  Generic drug:  glucose blood      CHECK BLOOD SUGAR TWICE DAILY       Vitamin A & D 60975-024 units tablet           Vitamin E 400 units  chewable tablet      Chew 400 Units Daily. Two daily          STOP taking these medications    LETAIRIS PO  Stopped by:  Leydi Pack DNP, APRN        levocetirizine 5 MG tablet  Commonly known as:  XYZAL  Stopped by:  Leydi Pack DNP, APRN        sildenafil 25 MG tablet  Commonly known as:  VIAGRA  Stopped by:  Leydi Pack DNP, APRN               I did not stop or change the above medications.  Patient's medication list was updated to reflect medications they are currently taking including medication changes made by other providers.            Thanks,    Leydi Pack DNP, APRN  02/19/2019             Dictated utilizing Dragon dictation

## 2019-03-06 ENCOUNTER — TRANSCRIBE ORDERS (OUTPATIENT)
Dept: ADMINISTRATIVE | Facility: HOSPITAL | Age: 84
End: 2019-03-06

## 2019-03-06 DIAGNOSIS — I27.20 PULMONARY HTN (HCC): Primary | ICD-10-CM

## 2019-03-18 ENCOUNTER — OFFICE VISIT (OUTPATIENT)
Dept: INTERNAL MEDICINE | Facility: CLINIC | Age: 84
End: 2019-03-18

## 2019-03-18 VITALS
HEART RATE: 76 BPM | WEIGHT: 155 LBS | OXYGEN SATURATION: 98 % | SYSTOLIC BLOOD PRESSURE: 168 MMHG | BODY MASS INDEX: 29.29 KG/M2 | DIASTOLIC BLOOD PRESSURE: 90 MMHG

## 2019-03-18 DIAGNOSIS — M10.9 GOUT, UNSPECIFIED CAUSE, UNSPECIFIED CHRONICITY, UNSPECIFIED SITE: ICD-10-CM

## 2019-03-18 DIAGNOSIS — E11.22 TYPE 2 DIABETES MELLITUS WITH STAGE 4 CHRONIC KIDNEY DISEASE, WITHOUT LONG-TERM CURRENT USE OF INSULIN (HCC): ICD-10-CM

## 2019-03-18 DIAGNOSIS — I10 ESSENTIAL HYPERTENSION: ICD-10-CM

## 2019-03-18 DIAGNOSIS — N18.4 TYPE 2 DIABETES MELLITUS WITH STAGE 4 CHRONIC KIDNEY DISEASE, WITHOUT LONG-TERM CURRENT USE OF INSULIN (HCC): ICD-10-CM

## 2019-03-18 DIAGNOSIS — N18.4 ANEMIA IN STAGE 4 CHRONIC KIDNEY DISEASE (HCC): ICD-10-CM

## 2019-03-18 DIAGNOSIS — D63.1 ANEMIA IN STAGE 4 CHRONIC KIDNEY DISEASE (HCC): ICD-10-CM

## 2019-03-18 DIAGNOSIS — E03.8 SUBCLINICAL HYPOTHYROIDISM: ICD-10-CM

## 2019-03-18 DIAGNOSIS — I50.32 CHRONIC DIASTOLIC CONGESTIVE HEART FAILURE (HCC): Primary | ICD-10-CM

## 2019-03-18 DIAGNOSIS — E78.49 OTHER HYPERLIPIDEMIA: ICD-10-CM

## 2019-03-18 DIAGNOSIS — M10.00 ACUTE IDIOPATHIC GOUT, UNSPECIFIED SITE: ICD-10-CM

## 2019-03-18 PROCEDURE — 99214 OFFICE O/P EST MOD 30 MIN: CPT | Performed by: INTERNAL MEDICINE

## 2019-03-18 RX ORDER — HYDRALAZINE HYDROCHLORIDE 100 MG/1
100 TABLET, FILM COATED ORAL 3 TIMES DAILY
Qty: 270 TABLET | Refills: 1 | Status: SHIPPED | OUTPATIENT
Start: 2019-03-18 | End: 2020-01-14 | Stop reason: HOSPADM

## 2019-03-18 NOTE — PATIENT INSTRUCTIONS
"DASH Eating Plan  DASH stands for \"Dietary Approaches to Stop Hypertension.\" The DASH eating plan is a healthy eating plan that has been shown to reduce high blood pressure (hypertension). It may also reduce your risk for type 2 diabetes, heart disease, and stroke. The DASH eating plan may also help with weight loss.  What are tips for following this plan?  General guidelines  · Avoid eating more than 2,300 mg (milligrams) of salt (sodium) a day. If you have hypertension, you may need to reduce your sodium intake to 1,500 mg a day.  · Limit alcohol intake to no more than 1 drink a day for nonpregnant women and 2 drinks a day for men. One drink equals 12 oz of beer, 5 oz of wine, or 1½ oz of hard liquor.  · Work with your health care provider to maintain a healthy body weight or to lose weight. Ask what an ideal weight is for you.  · Get at least 30 minutes of exercise that causes your heart to beat faster (aerobic exercise) most days of the week. Activities may include walking, swimming, or biking.  · Work with your health care provider or diet and nutrition specialist (dietitian) to adjust your eating plan to your individual calorie needs.  Reading food labels  · Check food labels for the amount of sodium per serving. Choose foods with less than 5 percent of the Daily Value of sodium. Generally, foods with less than 300 mg of sodium per serving fit into this eating plan.  · To find whole grains, look for the word \"whole\" as the first word in the ingredient list.  Shopping  · Buy products labeled as \"low-sodium\" or \"no salt added.\"  · Buy fresh foods. Avoid canned foods and premade or frozen meals.  Cooking  · Avoid adding salt when cooking. Use salt-free seasonings or herbs instead of table salt or sea salt. Check with your health care provider or pharmacist before using salt substitutes.  · Do not gannon foods. Cook foods using healthy methods such as baking, boiling, grilling, and broiling instead.  · Cook with " heart-healthy oils, such as olive, canola, soybean, or sunflower oil.  Meal planning    · Eat a balanced diet that includes:  ? 5 or more servings of fruits and vegetables each day. At each meal, try to fill half of your plate with fruits and vegetables.  ? Up to 6-8 servings of whole grains each day.  ? Less than 6 oz of lean meat, poultry, or fish each day. A 3-oz serving of meat is about the same size as a deck of cards. One egg equals 1 oz.  ? 2 servings of low-fat dairy each day.  ? A serving of nuts, seeds, or beans 5 times each week.  ? Heart-healthy fats. Healthy fats called Omega-3 fatty acids are found in foods such as flaxseeds and coldwater fish, like sardines, salmon, and mackerel.  · Limit how much you eat of the following:  ? Canned or prepackaged foods.  ? Food that is high in trans fat, such as fried foods.  ? Food that is high in saturated fat, such as fatty meat.  ? Sweets, desserts, sugary drinks, and other foods with added sugar.  ? Full-fat dairy products.  · Do not salt foods before eating.  · Try to eat at least 2 vegetarian meals each week.  · Eat more home-cooked food and less restaurant, buffet, and fast food.  · When eating at a restaurant, ask that your food be prepared with less salt or no salt, if possible.  What foods are recommended?  The items listed may not be a complete list. Talk with your dietitian about what dietary choices are best for you.  Grains  Whole-grain or whole-wheat bread. Whole-grain or whole-wheat pasta. Brown rice. Oatmeal. Quinoa. Bulgur. Whole-grain and low-sodium cereals. Anjana bread. Low-fat, low-sodium crackers. Whole-wheat flour tortillas.  Vegetables  Fresh or frozen vegetables (raw, steamed, roasted, or grilled). Low-sodium or reduced-sodium tomato and vegetable juice. Low-sodium or reduced-sodium tomato sauce and tomato paste. Low-sodium or reduced-sodium canned vegetables.  Fruits  All fresh, dried, or frozen fruit. Canned fruit in natural juice (without  added sugar).  Meat and other protein foods  Skinless chicken or turkey. Ground chicken or turkey. Pork with fat trimmed off. Fish and seafood. Egg whites. Dried beans, peas, or lentils. Unsalted nuts, nut butters, and seeds. Unsalted canned beans. Lean cuts of beef with fat trimmed off. Low-sodium, lean deli meat.  Dairy  Low-fat (1%) or fat-free (skim) milk. Fat-free, low-fat, or reduced-fat cheeses. Nonfat, low-sodium ricotta or cottage cheese. Low-fat or nonfat yogurt. Low-fat, low-sodium cheese.  Fats and oils  Soft margarine without trans fats. Vegetable oil. Low-fat, reduced-fat, or light mayonnaise and salad dressings (reduced-sodium). Canola, safflower, olive, soybean, and sunflower oils. Avocado.  Seasoning and other foods  Herbs. Spices. Seasoning mixes without salt. Unsalted popcorn and pretzels. Fat-free sweets.  What foods are not recommended?  The items listed may not be a complete list. Talk with your dietitian about what dietary choices are best for you.  Grains  Baked goods made with fat, such as croissants, muffins, or some breads. Dry pasta or rice meal packs.  Vegetables  Creamed or fried vegetables. Vegetables in a cheese sauce. Regular canned vegetables (not low-sodium or reduced-sodium). Regular canned tomato sauce and paste (not low-sodium or reduced-sodium). Regular tomato and vegetable juice (not low-sodium or reduced-sodium). Pickles. Olives.  Fruits  Canned fruit in a light or heavy syrup. Fried fruit. Fruit in cream or butter sauce.  Meat and other protein foods  Fatty cuts of meat. Ribs. Fried meat. Cisneros. Sausage. Bologna and other processed lunch meats. Salami. Fatback. Hotdogs. Bratwurst. Salted nuts and seeds. Canned beans with added salt. Canned or smoked fish. Whole eggs or egg yolks. Chicken or turkey with skin.  Dairy  Whole or 2% milk, cream, and half-and-half. Whole or full-fat cream cheese. Whole-fat or sweetened yogurt. Full-fat cheese. Nondairy creamers. Whipped toppings.  Processed cheese and cheese spreads.  Fats and oils  Butter. Stick margarine. Lard. Shortening. Ghee. Cisneros fat. Tropical oils, such as coconut, palm kernel, or palm oil.  Seasoning and other foods  Salted popcorn and pretzels. Onion salt, garlic salt, seasoned salt, table salt, and sea salt. Worcestershire sauce. Tartar sauce. Barbecue sauce. Teriyaki sauce. Soy sauce, including reduced-sodium. Steak sauce. Canned and packaged gravies. Fish sauce. Oyster sauce. Cocktail sauce. Horseradish that you find on the shelf. Ketchup. Mustard. Meat flavorings and tenderizers. Bouillon cubes. Hot sauce and Tabasco sauce. Premade or packaged marinades. Premade or packaged taco seasonings. Relishes. Regular salad dressings.  Where to find more information:  · National Heart, Lung, and [employer] [place]: www.nhlbi.nih.gov  · American Heart Association: www.heart.org  Summary  · The DASH eating plan is a healthy eating plan that has been shown to reduce high blood pressure (hypertension). It may also reduce your risk for type 2 diabetes, heart disease, and stroke.  · With the DASH eating plan, you should limit salt (sodium) intake to 2,300 mg a day. If you have hypertension, you may need to reduce your sodium intake to 1,500 mg a day.  · When on the DASH eating plan, aim to eat more fresh fruits and vegetables, whole grains, lean proteins, low-fat dairy, and heart-healthy fats.  · Work with your health care provider or diet and nutrition specialist (dietitian) to adjust your eating plan to your individual calorie needs.  This information is not intended to replace advice given to you by your health care provider. Make sure you discuss any questions you have with your health care provider.  Document Released: 12/06/2012 Document Revised: 12/11/2017 Document Reviewed: 12/11/2017  [employer] Patient Education © 2019 [employer]

## 2019-03-18 NOTE — PROGRESS NOTES
Chief Complaint   Patient presents with   • Weight Loss   • Hyperlipidemia   • Hypertension   • Diabetes   • Chronic Kidney Disease       History of Present Illness   Veronica Henao is a 84 y.o. female presents for routine follow up evaluation. She is doing well today. She has a history of hypertension, ckd, dm, dyslipidemia, chf. Patient has been doing well. Has DM. She has had very good success with sugar regulation and weight loss after starting ozempic. Glucose ranging 100-110s fasting and 120-140 post prandial. Her bp is normotensive. Lipids are in range.       The following portions of the patient's history were reviewed and updated as appropriate: allergies, current medications, past family history, past medical history, past social history, past surgical history and problem list.  Current Outpatient Medications on File Prior to Visit   Medication Sig Dispense Refill   • acetaminophen (TYLENOL) 325 MG tablet Take 2 tablets by mouth Every 6 (Six) Hours As Needed for Mild Pain (1-3).  0   • Alcohol Swabs (B-D SINGLE USE SWABS REGULAR) pads Inject 1 each under the skin Daily. 100 each 11   • allopurinol (ZYLOPRIM) 100 MG tablet Take 1 tablet by mouth 2 (Two) Times a Day. (Patient taking differently: Take 100 mg by mouth Daily.) 180 tablet 2   • Blood Glucose Monitoring Suppl (TRUE METRIX AIR GLUCOSE METER) device 1 each Daily. 1 each 0   • cholecalciferol (VITAMIN D3) 1000 UNITS tablet Take 1,000 Units by mouth daily.     • furosemide (LASIX) 40 MG tablet Take 1 tablet by mouth 2 (Two) Times a Day. 60 tablet 2   • hydrOXYzine (ATARAX) 25 MG tablet Take 1 tablet by mouth At Night As Needed for Itching. 90 tablet 1   • Incontinence Supply Disposable (BLADDER CONTROL PADS EX ABSORB) misc      • Lancets 28G misc To check sugar qd. E11.9  each 12   • Multiple Vitamins-Minerals (CENTRUM SILVER PO) Take 1 tablet by mouth Daily.     • nystatin-triamcinolone (MYCOLOG) 853877-3.1 UNIT/GM-% ointment APPLY TOPICALLY 2  TIMES DAILY 30 g 3   • pramipexole (MIRAPEX) 0.125 MG tablet Take 1 tablet by mouth every night at bedtime.  2   • raNITIdine (ZANTAC) 150 MG tablet TAKE 1 TABLET TWICE DAILY 180 tablet 1   • rivaroxaban (XARELTO) 15 MG tablet Take 1 tablet by mouth Daily With Dinner. 56 tablet 0   • Semaglutide (OZEMPIC) 0.25 or 0.5 MG/DOSE solution pen-injector Inject 0.5 mg under the skin into the appropriate area as directed 1 (One) Time Per Week. 1.5 mL 5   • TRUE METRIX BLOOD GLUCOSE TEST test strip CHECK BLOOD SUGAR TWICE DAILY 100 each 3   • TRUEPLUS LANCETS 28G misc E11.9 DM to check sugar  each 12   • Vitamin E 400 UNITS chewable tablet Chew 400 Units Daily. Two daily     • Vitamins A & D (VITAMIN A & D) 74712-640 UNITS tablet      • [DISCONTINUED] hydrALAZINE (APRESOLINE) 100 MG tablet Take 1 tablet by mouth 2 (Two) Times a Day. 180 tablet 1     No current facility-administered medications on file prior to visit.      Review of Systems   Constitutional: Negative.    HENT: Negative.    Eyes: Negative.    Respiratory: Negative.    Cardiovascular: Negative.    Gastrointestinal: Negative.        Objective   Physical Exam   Constitutional: She is oriented to person, place, and time. She appears well-developed and well-nourished.   HENT:   Head: Normocephalic and atraumatic.   Right Ear: External ear normal.   Left Ear: External ear normal.   Nose: Nose normal.   Mouth/Throat: Oropharynx is clear and moist.   Eyes: Conjunctivae and EOM are normal. Pupils are equal, round, and reactive to light.   Neck: Normal range of motion. Neck supple.   Cardiovascular: Normal rate, regular rhythm and normal heart sounds.   Pulmonary/Chest: Effort normal and breath sounds normal.   Abdominal: Soft. Bowel sounds are normal.   Musculoskeletal: Normal range of motion.   Neurological: She is alert and oriented to person, place, and time.   Skin: Skin is warm and dry.   Psychiatric: She has a normal mood and affect. Her behavior is normal.  Judgment and thought content normal.   Nursing note and vitals reviewed.       /90   Pulse 76   Wt 70.3 kg (155 lb)   SpO2 98%   BMI 29.29 kg/m²     Assessment/Plan   Diagnoses and all orders for this visit:    Chronic diastolic congestive heart failure (CMS/[facility])  -     CBC & Differential  -     Comprehensive Metabolic Panel    Other hyperlipidemia  -     Comprehensive Metabolic Panel  -     Lipid Panel With LDL / HDL Ratio    Subclinical hypothyroidism  -     TSH    Anemia in stage 4 chronic kidney disease (CMS/[facility])  -     CBC & Differential    Acute idiopathic gout, unspecified site  -     Uric Acid    Gout, unspecified cause, unspecified chronicity, unspecified site  -     Uric Acid    Type 2 diabetes mellitus with stage 4 chronic kidney disease, without long-term current use of insulin (CMS/[facility])  -     Comprehensive Metabolic Panel  -     Hemoglobin A1c  -     Urinalysis With Culture If Indicated - Urine, Clean Catch  -     Microalbumin / Creatinine Urine Ratio - Urine, Clean Catch    Essential hypertension  -     Comprehensive Metabolic Panel  -     Urinalysis With Culture If Indicated - Urine, Clean Catch    Other orders  -     hydrALAZINE (APRESOLINE) 100 MG tablet; Take 1 tablet by mouth 3 (Three) Times a Day.    patient w/ chf, htn, dm, ckd. Increased bp today. Will increase hydralazine to tid. She will continue olempic weekly for glucose regulation.will test listed labs. Will give lit on DASH diet to pateint. She will continue mobility as tolerated. She will monitor glucose and bp and incrase hydration w/ water. She will f/u routinely.

## 2019-03-19 LAB
ALBUMIN SERPL-MCNC: 4.1 G/DL (ref 3.5–5.2)
ALBUMIN/CREAT UR: 3183.9 MG/G CREAT (ref 0–30)
ALBUMIN/GLOB SERPL: 1.2 G/DL
ALP SERPL-CCNC: 98 U/L (ref 39–117)
ALT SERPL-CCNC: 10 U/L (ref 1–33)
AST SERPL-CCNC: 16 U/L (ref 1–32)
BASOPHILS # BLD AUTO: 0.07 10*3/MM3 (ref 0–0.2)
BASOPHILS NFR BLD AUTO: 0.9 % (ref 0–1.5)
BILIRUB SERPL-MCNC: 0.3 MG/DL (ref 0.2–1.2)
BUN SERPL-MCNC: 40 MG/DL (ref 8–23)
BUN/CREAT SERPL: 18.4 (ref 7–25)
CALCIUM SERPL-MCNC: 10.1 MG/DL (ref 8.6–10.5)
CHLORIDE SERPL-SCNC: 95 MMOL/L (ref 98–107)
CHOLEST SERPL-MCNC: 178 MG/DL (ref 0–200)
CO2 SERPL-SCNC: 25 MMOL/L (ref 22–29)
CREAT SERPL-MCNC: 2.17 MG/DL (ref 0.57–1)
CREAT UR-MCNC: 18 MG/DL
EOSINOPHIL # BLD AUTO: 0.15 10*3/MM3 (ref 0–0.4)
EOSINOPHIL NFR BLD AUTO: 1.8 % (ref 0.3–6.2)
ERYTHROCYTE [DISTWIDTH] IN BLOOD BY AUTOMATED COUNT: 14.9 % (ref 12.3–15.4)
GLOBULIN SER CALC-MCNC: 3.5 GM/DL
GLUCOSE SERPL-MCNC: 127 MG/DL (ref 65–99)
HBA1C MFR BLD: 6.3 % (ref 4.8–5.6)
HCT VFR BLD AUTO: 35.2 % (ref 34–46.6)
HDLC SERPL-MCNC: 56 MG/DL (ref 40–60)
HGB BLD-MCNC: 11.2 G/DL (ref 12–15.9)
IMM GRANULOCYTES # BLD AUTO: 0.02 10*3/MM3 (ref 0–0.05)
IMM GRANULOCYTES NFR BLD AUTO: 0.2 % (ref 0–0.5)
LDLC SERPL CALC-MCNC: 92 MG/DL (ref 0–100)
LDLC/HDLC SERPL: 1.64 {RATIO}
LYMPHOCYTES # BLD AUTO: 1.87 10*3/MM3 (ref 0.7–3.1)
LYMPHOCYTES NFR BLD AUTO: 23 % (ref 19.6–45.3)
MCH RBC QN AUTO: 31.6 PG (ref 26.6–33)
MCHC RBC AUTO-ENTMCNC: 31.8 G/DL (ref 31.5–35.7)
MCV RBC AUTO: 99.4 FL (ref 79–97)
MICROALBUMIN UR-MCNC: 573.1 UG/ML
MONOCYTES # BLD AUTO: 0.5 10*3/MM3 (ref 0.1–0.9)
MONOCYTES NFR BLD AUTO: 6.2 % (ref 5–12)
NEUTROPHILS # BLD AUTO: 5.51 10*3/MM3 (ref 1.4–7)
NEUTROPHILS NFR BLD AUTO: 67.9 % (ref 42.7–76)
NRBC BLD AUTO-RTO: 0 /100 WBC (ref 0–0)
PLATELET # BLD AUTO: 289 10*3/MM3 (ref 140–450)
POTASSIUM SERPL-SCNC: 4.1 MMOL/L (ref 3.5–5.2)
PROT SERPL-MCNC: 7.6 G/DL (ref 6–8.5)
RBC # BLD AUTO: 3.54 10*6/MM3 (ref 3.77–5.28)
SODIUM SERPL-SCNC: 138 MMOL/L (ref 136–145)
TRIGL SERPL-MCNC: 152 MG/DL (ref 0–150)
TSH SERPL DL<=0.005 MIU/L-ACNC: 2.08 MIU/ML (ref 0.27–4.2)
URATE SERPL-MCNC: 4.5 MG/DL (ref 2.4–5.7)
VLDLC SERPL CALC-MCNC: 30.4 MG/DL (ref 5–40)
WBC # BLD AUTO: 8.12 10*3/MM3 (ref 3.4–10.8)

## 2019-03-26 ENCOUNTER — APPOINTMENT (OUTPATIENT)
Dept: CARDIOLOGY | Facility: HOSPITAL | Age: 84
End: 2019-03-26

## 2019-03-26 RX ORDER — HYDROXYZINE HYDROCHLORIDE 25 MG/1
TABLET, FILM COATED ORAL
Qty: 90 TABLET | Refills: 1 | Status: SHIPPED | OUTPATIENT
Start: 2019-03-26 | End: 2019-07-29 | Stop reason: SDUPTHER

## 2019-03-28 ENCOUNTER — TELEPHONE (OUTPATIENT)
Dept: CARDIOLOGY | Facility: CLINIC | Age: 84
End: 2019-03-28

## 2019-04-08 ENCOUNTER — TELEPHONE (OUTPATIENT)
Dept: INTERNAL MEDICINE | Facility: CLINIC | Age: 84
End: 2019-04-08

## 2019-04-08 NOTE — TELEPHONE ENCOUNTER
Patient's lab results are stable with mild improvement in kidney function and healthy levels of thyroid, uric acid, blood sugar and cholesterol. Advise patient of results and forward to abhinav suarez nephrology.

## 2019-04-11 ENCOUNTER — APPOINTMENT (OUTPATIENT)
Dept: CARDIOLOGY | Facility: HOSPITAL | Age: 84
End: 2019-04-11

## 2019-04-15 ENCOUNTER — TELEPHONE (OUTPATIENT)
Dept: INTERNAL MEDICINE | Facility: CLINIC | Age: 84
End: 2019-04-15

## 2019-04-15 NOTE — TELEPHONE ENCOUNTER
Pt said she has restless leg. Said they burn and keep her awake  She was given Mirapex from another Dr but states it is not working.  Would like for you to suggest a new rx

## 2019-04-19 ENCOUNTER — OFFICE VISIT (OUTPATIENT)
Dept: INTERNAL MEDICINE | Facility: CLINIC | Age: 84
End: 2019-04-19

## 2019-04-19 VITALS
WEIGHT: 153 LBS | SYSTOLIC BLOOD PRESSURE: 154 MMHG | BODY MASS INDEX: 28.91 KG/M2 | DIASTOLIC BLOOD PRESSURE: 72 MMHG | OXYGEN SATURATION: 97 % | HEART RATE: 92 BPM

## 2019-04-19 DIAGNOSIS — D63.1 ANEMIA DUE TO STAGE 4 CHRONIC KIDNEY DISEASE (HCC): ICD-10-CM

## 2019-04-19 DIAGNOSIS — I10 HYPERTENSION, UNSPECIFIED TYPE: ICD-10-CM

## 2019-04-19 DIAGNOSIS — G25.81 RESTLESS LEGS: Primary | ICD-10-CM

## 2019-04-19 DIAGNOSIS — N18.4 ANEMIA DUE TO STAGE 4 CHRONIC KIDNEY DISEASE (HCC): ICD-10-CM

## 2019-04-19 PROCEDURE — 99214 OFFICE O/P EST MOD 30 MIN: CPT | Performed by: INTERNAL MEDICINE

## 2019-04-19 RX ORDER — FUROSEMIDE 40 MG/1
TABLET ORAL
Qty: 180 TABLET | Refills: 2 | Status: SHIPPED | OUTPATIENT
Start: 2019-04-19 | End: 2019-08-19 | Stop reason: SDUPTHER

## 2019-04-19 RX ORDER — PRAMIPEXOLE DIHYDROCHLORIDE 0.12 MG/1
0.12 TABLET ORAL
Qty: 180 TABLET | Refills: 2 | Status: SHIPPED | OUTPATIENT
Start: 2019-04-19 | End: 2019-06-04 | Stop reason: DRUGHIGH

## 2019-04-19 NOTE — PROGRESS NOTES
Chief Complaint   Patient presents with   • Restless Legs Syndrome   • Hypertension       History of Present Illness   Veronica Henao is a 84 y.o. female presents for acute needs. She reports that she is unable to sleep at night due to her legs jerking and burning. She can not even tolerate the blanket to touch the legs. They move around and she is getting up every 2 hours to walk around. Started on pramipexole .125 mg through her pulmonologist. This has not yet been beneficial.   She has hypertension and bp is elevated upon arrival to office but did improve on repeat testing. Much less swelling today than in the past.   Patient has anemia. Likely of chronic disease (v other).   The following portions of the patient's history were reviewed and updated as appropriate: allergies, current medications, past family history, past medical history, past social history, past surgical history and problem list.  Current Outpatient Medications on File Prior to Visit   Medication Sig Dispense Refill   • acetaminophen (TYLENOL) 325 MG tablet Take 2 tablets by mouth Every 6 (Six) Hours As Needed for Mild Pain (1-3).  0   • Alcohol Swabs (B-D SINGLE USE SWABS REGULAR) pads Inject 1 each under the skin Daily. 100 each 11   • allopurinol (ZYLOPRIM) 100 MG tablet Take 1 tablet by mouth 2 (Two) Times a Day. (Patient taking differently: Take 100 mg by mouth Daily.) 180 tablet 2   • Blood Glucose Monitoring Suppl (TRUE METRIX AIR GLUCOSE METER) device 1 each Daily. 1 each 0   • cholecalciferol (VITAMIN D3) 1000 UNITS tablet Take 1,000 Units by mouth daily.     • furosemide (LASIX) 40 MG tablet TAKE 1 TABLET TWICE DAILY 180 tablet 2   • hydrALAZINE (APRESOLINE) 100 MG tablet Take 1 tablet by mouth 3 (Three) Times a Day. 270 tablet 1   • hydrOXYzine (ATARAX) 25 MG tablet TAKE 1 TABLET AT NIGHT AS NEEDED FOR ITCHING 90 tablet 1   • Incontinence Supply Disposable (BLADDER CONTROL PADS EX ABSORB) misc      • Lancets 28G misc To check sugar qd.  E11.9  each 12   • Multiple Vitamins-Minerals (CENTRUM SILVER PO) Take 1 tablet by mouth Daily.     • nystatin-triamcinolone (MYCOLOG) 441880-9.1 UNIT/GM-% ointment APPLY TOPICALLY 2 TIMES DAILY 30 g 3   • pramipexole (MIRAPEX) 0.125 MG tablet Take 1 tablet by mouth every night at bedtime.  2   • raNITIdine (ZANTAC) 150 MG tablet TAKE 1 TABLET TWICE DAILY 180 tablet 1   • rivaroxaban (XARELTO) 15 MG tablet Take 1 tablet by mouth Daily With Dinner. 56 tablet 0   • Semaglutide (OZEMPIC) 0.25 or 0.5 MG/DOSE solution pen-injector Inject 0.5 mg under the skin into the appropriate area as directed 1 (One) Time Per Week. 1.5 mL 5   • TRUE METRIX BLOOD GLUCOSE TEST test strip CHECK BLOOD SUGAR TWICE DAILY 100 each 3   • TRUEPLUS LANCETS 28G misc E11.9 DM to check sugar  each 12   • Vitamin E 400 UNITS chewable tablet Chew 400 Units Daily. Two daily     • Vitamins A & D (VITAMIN A & D) 39117-732 UNITS tablet        No current facility-administered medications on file prior to visit.      Review of Systems   Constitutional: Positive for fatigue.   HENT: Negative.    Eyes:        S/p cataract removal. Improved vision   Respiratory:        Improved breathing   Cardiovascular: Negative.    Gastrointestinal: Negative.    Endocrine: Positive for cold intolerance.   Genitourinary: Negative.    Musculoskeletal: Positive for arthralgias.   Allergic/Immunologic: Negative.    Neurological: Positive for dizziness and numbness.        Restless legs   Hematological: Negative.    Psychiatric/Behavioral: Negative.        Objective   Physical Exam   Constitutional: She is oriented to person, place, and time. She appears well-developed and well-nourished.   HENT:   Head: Normocephalic and atraumatic.   Right Ear: External ear normal.   Left Ear: External ear normal.   Mouth/Throat: Oropharynx is clear and moist.   Eyes: EOM are normal. Pupils are equal, round, and reactive to light.   Neck: Normal range of motion. Neck supple.    Cardiovascular: Normal rate, regular rhythm and normal heart sounds.   Pulmonary/Chest: Effort normal and breath sounds normal.   Abdominal: Soft. Bowel sounds are normal.   Musculoskeletal: Normal range of motion.   Neurological: She is alert and oriented to person, place, and time.   Skin: Skin is warm and dry.   Psychiatric: She has a normal mood and affect. Her behavior is normal. Judgment and thought content normal.   Nursing note and vitals reviewed.       /72   Pulse 92   Wt 69.4 kg (153 lb)   SpO2 97%   BMI 28.91 kg/m²     Assessment/Plan   Diagnoses and all orders for this visit:    Restless legs    Anemia due to stage 4 chronic kidney disease (CMS/HCC)  -     Ferritin  -     Iron Profile  -     Folate  -     Vitamin B12  -     CBC & Differential    Hypertension, unspecified type      Patient w/ restless legs. Will increase pramipexole to .25 mg qhs. She will have listed labs tested and supplement if indicated. She is to stretch at night. She will have blood  Monitored. Will monitor bp. Low salt diet. Continue current meds.

## 2019-04-20 LAB
BASOPHILS # BLD AUTO: 0.05 10*3/MM3 (ref 0–0.2)
BASOPHILS NFR BLD AUTO: 0.7 % (ref 0–1.5)
EOSINOPHIL # BLD AUTO: 0.16 10*3/MM3 (ref 0–0.4)
EOSINOPHIL NFR BLD AUTO: 2.3 % (ref 0.3–6.2)
ERYTHROCYTE [DISTWIDTH] IN BLOOD BY AUTOMATED COUNT: 15.2 % (ref 12.3–15.4)
FERRITIN SERPL-MCNC: 76.4 NG/ML (ref 13–150)
FOLATE SERPL-MCNC: 10.4 NG/ML (ref 4.78–24.2)
HCT VFR BLD AUTO: 33.7 % (ref 34–46.6)
HGB BLD-MCNC: 10.6 G/DL (ref 12–15.9)
IMM GRANULOCYTES # BLD AUTO: 0.02 10*3/MM3 (ref 0–0.05)
IMM GRANULOCYTES NFR BLD AUTO: 0.3 % (ref 0–0.5)
IRON SATN MFR SERPL: 15 % (ref 20–50)
IRON SERPL-MCNC: 55 MCG/DL (ref 37–145)
LYMPHOCYTES # BLD AUTO: 1.93 10*3/MM3 (ref 0.7–3.1)
LYMPHOCYTES NFR BLD AUTO: 27.3 % (ref 19.6–45.3)
MCH RBC QN AUTO: 30.9 PG (ref 26.6–33)
MCHC RBC AUTO-ENTMCNC: 31.5 G/DL (ref 31.5–35.7)
MCV RBC AUTO: 98.3 FL (ref 79–97)
MONOCYTES # BLD AUTO: 0.48 10*3/MM3 (ref 0.1–0.9)
MONOCYTES NFR BLD AUTO: 6.8 % (ref 5–12)
NEUTROPHILS # BLD AUTO: 4.43 10*3/MM3 (ref 1.7–7)
NEUTROPHILS NFR BLD AUTO: 62.6 % (ref 42.7–76)
NRBC BLD AUTO-RTO: 0 /100 WBC (ref 0–0.2)
PLATELET # BLD AUTO: 289 10*3/MM3 (ref 140–450)
RBC # BLD AUTO: 3.43 10*6/MM3 (ref 3.77–5.28)
TIBC SERPL-MCNC: 374 MCG/DL
UIBC SERPL-MCNC: 319 MCG/DL
VIT B12 SERPL-MCNC: 448 PG/ML (ref 211–946)
WBC # BLD AUTO: 7.07 10*3/MM3 (ref 3.4–10.8)

## 2019-04-23 ENCOUNTER — TELEPHONE (OUTPATIENT)
Dept: INTERNAL MEDICINE | Facility: CLINIC | Age: 84
End: 2019-04-23

## 2019-04-23 RX ORDER — LANOLIN ALCOHOL/MO/W.PET/CERES
1000 CREAM (GRAM) TOPICAL DAILY
Qty: 90 TABLET | Refills: 3 | Status: ON HOLD | OUTPATIENT
Start: 2019-04-23 | End: 2020-01-13

## 2019-04-23 RX ORDER — AMITRIPTYLINE HYDROCHLORIDE 25 MG/1
25 TABLET, FILM COATED ORAL NIGHTLY
Qty: 30 TABLET | Refills: 3 | Status: SHIPPED | OUTPATIENT
Start: 2019-04-23 | End: 2019-09-24

## 2019-04-23 NOTE — TELEPHONE ENCOUNTER
Pt said that her feet still feel like there are on fire. She said the increase in rx did help but very little.

## 2019-05-31 ENCOUNTER — CLINICAL SUPPORT NO REQUIREMENTS (OUTPATIENT)
Dept: CARDIOLOGY | Facility: CLINIC | Age: 84
End: 2019-05-31

## 2019-05-31 DIAGNOSIS — I44.2 COMPLETE HEART BLOCK (HCC): Primary | ICD-10-CM

## 2019-05-31 PROCEDURE — 93294 REM INTERROG EVL PM/LDLS PM: CPT | Performed by: INTERNAL MEDICINE

## 2019-05-31 PROCEDURE — 93296 REM INTERROG EVL PM/IDS: CPT | Performed by: INTERNAL MEDICINE

## 2019-06-04 ENCOUNTER — TELEPHONE (OUTPATIENT)
Dept: INTERNAL MEDICINE | Facility: CLINIC | Age: 84
End: 2019-06-04

## 2019-06-04 RX ORDER — PRAMIPEXOLE DIHYDROCHLORIDE 0.25 MG/1
TABLET ORAL
Qty: 270 TABLET | Refills: 1 | Status: SHIPPED | OUTPATIENT
Start: 2019-06-04 | End: 2020-01-01

## 2019-06-04 RX ORDER — PRAMIPEXOLE DIHYDROCHLORIDE 0.25 MG/1
0.25 TABLET ORAL 3 TIMES DAILY
Qty: 90 TABLET | Refills: 1 | Status: SHIPPED | OUTPATIENT
Start: 2019-06-04 | End: 2019-06-04 | Stop reason: SDUPTHER

## 2019-06-04 NOTE — TELEPHONE ENCOUNTER
Pt is asking for a rf on her Mirapex. She said you increased it at last visit. Pt will need a new rx sent to her local pharmacy

## 2019-07-05 ENCOUNTER — OFFICE VISIT (OUTPATIENT)
Dept: CARDIOLOGY | Facility: CLINIC | Age: 84
End: 2019-07-05

## 2019-07-05 VITALS
WEIGHT: 150 LBS | RESPIRATION RATE: 16 BRPM | HEIGHT: 61 IN | SYSTOLIC BLOOD PRESSURE: 150 MMHG | DIASTOLIC BLOOD PRESSURE: 80 MMHG | HEART RATE: 82 BPM | BODY MASS INDEX: 28.32 KG/M2

## 2019-07-05 DIAGNOSIS — I50.32 CHRONIC DIASTOLIC CONGESTIVE HEART FAILURE (HCC): ICD-10-CM

## 2019-07-05 DIAGNOSIS — I10 ESSENTIAL HYPERTENSION: ICD-10-CM

## 2019-07-05 DIAGNOSIS — Z98.890 S/P TVR (TRICUSPID VALVE REPAIR): ICD-10-CM

## 2019-07-05 DIAGNOSIS — I34.0 NON-RHEUMATIC MITRAL REGURGITATION: ICD-10-CM

## 2019-07-05 DIAGNOSIS — I27.20 PULMONARY HYPERTENSION (HCC): ICD-10-CM

## 2019-07-05 DIAGNOSIS — I48.0 PAROXYSMAL ATRIAL FIBRILLATION (HCC): Primary | ICD-10-CM

## 2019-07-05 DIAGNOSIS — I87.2 CHRONIC VENOUS INSUFFICIENCY: ICD-10-CM

## 2019-07-05 DIAGNOSIS — E78.49 OTHER HYPERLIPIDEMIA: ICD-10-CM

## 2019-07-05 PROCEDURE — 93000 ELECTROCARDIOGRAM COMPLETE: CPT | Performed by: INTERNAL MEDICINE

## 2019-07-05 PROCEDURE — 99214 OFFICE O/P EST MOD 30 MIN: CPT | Performed by: INTERNAL MEDICINE

## 2019-07-05 NOTE — PROGRESS NOTES
PATIENTINFORMATION    Date of Office Visit: 2019  Encounter Provider: Maria Luz Husain MD  Place of Service: [facility] CARDIOLOGY  Patient Name: Veronica Henao  : 1935    Subjective:     Encounter Date:2019      Patient ID: Veronica Henao is a 84 y.o. female.      History of Present Illness    This is a lady that I have been following for many years.  In , she went to the emergency room and was diagnosed with atrial fibrillation with rapid ventricular response.  She had normal left ventricular function and no significant valvular heart disease.  She transferred her care to me in 2014.  She had a recurrence of atrial fibrillation in 2015 associated with diastolic heart failure.  In 2016, she was complaining of chest pain and shortness of breath.  A repeat echocardiogram at that time showed her ejection fraction to be 51% with elevated left atrial pressure, severe left atrial enlargement, moderate to severe mitral regurgitation, and moderate to severe tricuspid regurgitation.  Nuclear stress test from 2016 showed no evidence for ischemia.  Transesophageal echocardiogram in 10/2016 showed normal left ventricular systolic function, severe left atrial enlargement, and mild to moderate mitral regurgitation with moderate to severe tricuspid regurgitation.  She had a cardiac catheterization in 2016, which showed no significant coronary disease.  In 2016, she had a mitral valve repair with a 23 mm ATF band posterior annuloplasty and P2 chordal repair.  The tricuspid valve was repaired with a 26 mm ring and plication of the anterior and septal leaflet commissure.  She also underwent a right and left Maze procedure and left atrial appendage ligation.      In 2017, she was found to be in rapid atrial flutter.  I cardioverted her but she went back into atrial flutter the next day.  On 2017, Dr. Ward performed and atrial flutter ablation.  She had an  echocardiogram in 05/2017, which showed her ejection fraction to be 70%, severe left atrial enlargement, mild mitral stenosis from her valve repair but no regurgitation.  There was mild tricuspid regurgitation with moderate pulmonary hypertension.  She went back into atrial flutter in 06/2017 and was cardioverted back to sinus rhythm but again went right back into atrial flutter and Dr. Ward performed an AV node ablation with a pacemaker, which was performed on 07/05/2017.  The pacemaker was placed with a ventricular lead at the His bundle.       She was readmitted on 07/25/2017, with heart failure.  She was diuresed well.  Pacemaker interrogation in 08/2017 showed high thresholds in her His lead.  Dr. Ward made some adjustments and said that she may feel some muscular pacing and has a right ventricular lead for backup.     She was hospitalized in August 2018 with shortness of breath.  She had a normal lower extremity venous Doppler.  She underwent a right heart catheterization which showed a PA pressure of 59/16 with aware of 14 with large V waves.  Her pulmonary pressures did not change with adenosine.  She had a transthoracic echocardiogram which showed her ejection fraction to be 65%, moderate left atrial enlargement, mitral annuloplasty ring with a mildly increased gradient of 7 mmHg with trace regurgitation.  The tricuspid valve also had an annuloplasty ring with mild to moderate tricuspid regurgitation and right ventricular systolic pressure of 63 mmHg.    Review of Systems   Constitution: Negative for fever, malaise/fatigue, weight gain and weight loss.   HENT: Negative for ear pain, hearing loss, nosebleeds and sore throat.    Eyes: Negative for double vision, pain, vision loss in left eye and vision loss in right eye.   Cardiovascular:        See history of present illness.   Respiratory: Negative for cough, shortness of breath, sleep disturbances due to breathing, snoring and wheezing.   "  Endocrine: Negative for cold intolerance, heat intolerance and polyuria.   Skin: Negative for itching, poor wound healing and rash.   Musculoskeletal: Negative for joint pain, joint swelling and myalgias.        Burning foot pain   Gastrointestinal: Negative for abdominal pain, diarrhea, hematochezia, nausea and vomiting.   Genitourinary: Negative for hematuria and hesitancy.   Neurological: Negative for numbness, paresthesias and seizures.   Psychiatric/Behavioral: Negative for depression. The patient is not nervous/anxious.            ECG 12 Lead  Date/Time: 7/5/2019 10:53 AM  Performed by: Maria Luz Husain MD  Authorized by: Maria Luz Husain MD   Comparison: compared with previous ECG from 2/19/2019  Similar to previous ECG  Rhythm: atrial fibrillation  BPM: 82  Pacing: ventricular pacing  Clinical impression: abnormal EKG               Objective:     /80 (BP Location: Right arm, Patient Position: Sitting, Cuff Size: Adult)   Pulse 82   Resp 16   Ht 154.9 cm (61\")   Wt 68 kg (150 lb)   BMI 28.34 kg/m²  Body mass index is 28.34 kg/m².     Physical Exam   Constitutional: She appears well-developed.   HENT:   Head: Normocephalic and atraumatic.   Eyes: Conjunctivae and lids are normal. Pupils are equal, round, and reactive to light. Lids are everted and swept, no foreign bodies found.   Neck: Normal range of motion. No JVD present. Carotid bruit is not present. No tracheal deviation present. No thyroid mass present.   Cardiovascular: Normal rate, regular rhythm and normal heart sounds.   Pulses:       Dorsalis pedis pulses are 2+ on the right side, and 2+ on the left side.   Pulmonary/Chest: Effort normal and breath sounds normal.   Abdominal: Normal appearance and bowel sounds are normal.   Musculoskeletal: Normal range of motion.   Neurological: She is alert. She has normal strength.   Skin: Skin is warm, dry and intact.   Psychiatric: She has a normal mood and affect. Her behavior is normal. "   Vitals reviewed.          Assessment/Plan:       1.  Atrial fibrillation.  CHADS2-Vasc score of 6.  Continue Xarelto  2.  Diastolic heart failure.  Currently appears to be euvolemic.  Last ejection fraction was normal in August 2018.  She does have some mild aortic stenosis from her repair.  She is compliant with a low-salt diet.  She monitors her weight and takes extra Lasix as needed.  3.  AV node ablation status post pacemaker  4.  Diabetes  5.  Hypertension.  Controlled.  6.  Hyperlipidemia  7.  Obstructive sleep apnea compliant with CPAP  8.  Chronic kidney disease.  She follows with Dr. Honeycutt  9.  History of mitral regurgitation status post mitral valve repair.  She has some residual mitral stenosis but no regurgitation.  10.  History of tricuspid regurgitation status post tricuspid repair  11.  Severe pulmonary hypertension.  She did not tolerate Letairis.  She follows with Dr. Ferrer    From a heart standpoint she is doing very well.  I am not going to change her medications.  She will follow-up in 1 year with a NP.    Orders Placed This Encounter   Procedures   • ECG 12 Lead     This order was created via procedure documentation        Discharge Medications           Accurate as of 7/5/19 11:29 AM. If you have any questions, ask your nurse or doctor.               Changes to Medications      Instructions Start Date   allopurinol 100 MG tablet  Commonly known as:  ZYLOPRIM  What changed:  when to take this   100 mg, Oral, 2 Times Daily         Continue These Medications      Instructions Start Date   acetaminophen 325 MG tablet  Commonly known as:  TYLENOL   650 mg, Oral, Every 6 Hours PRN      amitriptyline 25 MG tablet  Commonly known as:  ELAVIL   25 mg, Oral, Nightly      B-D SINGLE USE SWABS REGULAR pads   1 each, Subcutaneous, Daily      Bladder Control Pads Ex Absorb misc   No dose, route, or frequency recorded.      CENTRUM SILVER PO   1 tablet, Oral, Daily      cholecalciferol 1000 units  tablet  Commonly known as:  VITAMIN D3   1,000 Units, Oral, Daily      furosemide 40 MG tablet  Commonly known as:  LASIX   TAKE 1 TABLET TWICE DAILY      hydrALAZINE 100 MG tablet  Commonly known as:  APRESOLINE   100 mg, Oral, 3 Times Daily      hydrOXYzine 25 MG tablet  Commonly known as:  ATARAX   TAKE 1 TABLET AT NIGHT AS NEEDED FOR ITCHING      Lancets 28G misc   To check sugar qd. E11.9 DM      TRUEPLUS LANCETS 28G misc   E11.9 DM to check sugar QD      nystatin-triamcinolone 756096-9.1 UNIT/GM-% ointment  Commonly known as:  MYCOLOG   APPLY TOPICALLY 2 TIMES DAILY      pramipexole 0.25 MG tablet  Commonly known as:  MIRAPEX   TAKE 1 TABLET BY MOUTH THREE TIMES DAILY      raNITIdine 150 MG tablet  Commonly known as:  ZANTAC   TAKE 1 TABLET TWICE DAILY      rivaroxaban 15 MG tablet  Commonly known as:  XARELTO   15 mg, Oral, Daily With Dinner      Semaglutide 0.25 or 0.5 MG/DOSE solution pen-injector  Commonly known as:  OZEMPIC   0.5 mg, Subcutaneous, Weekly      TRUE METRIX AIR GLUCOSE METER device   1 each, Does not apply, Daily      TRUE METRIX BLOOD GLUCOSE TEST test strip  Generic drug:  glucose blood   CHECK BLOOD SUGAR TWICE DAILY      Vitamin A & D 02688-897 units tablet   No dose, route, or frequency recorded.      vitamin B-12 1000 MCG tablet  Commonly known as:  CYANOCOBALAMIN   1,000 mcg, Oral, Daily      Vitamin E 400 units chewable tablet   400 Units, Oral, Daily, Two daily                    Maria Luz Husain MD  07/05/19  11:29 AM

## 2019-07-11 ENCOUNTER — TELEPHONE (OUTPATIENT)
Dept: INTERNAL MEDICINE | Facility: CLINIC | Age: 84
End: 2019-07-11

## 2019-07-11 NOTE — TELEPHONE ENCOUNTER
Pt is asking for a rx for sertraline. Yes use to take this rx but stopped some time ago. Pt states that dr nye told her to start it as it can help with her foot pain as well.

## 2019-07-22 ENCOUNTER — OFFICE VISIT (OUTPATIENT)
Dept: INTERNAL MEDICINE | Facility: CLINIC | Age: 84
End: 2019-07-22

## 2019-07-22 VITALS
OXYGEN SATURATION: 99 % | SYSTOLIC BLOOD PRESSURE: 136 MMHG | HEART RATE: 88 BPM | DIASTOLIC BLOOD PRESSURE: 50 MMHG | WEIGHT: 148 LBS | BODY MASS INDEX: 27.96 KG/M2

## 2019-07-22 DIAGNOSIS — E11.22 TYPE 2 DIABETES MELLITUS WITH CHRONIC KIDNEY DISEASE, WITH LONG-TERM CURRENT USE OF INSULIN, UNSPECIFIED CKD STAGE (HCC): ICD-10-CM

## 2019-07-22 DIAGNOSIS — D63.1 ANEMIA IN STAGE 4 CHRONIC KIDNEY DISEASE (HCC): ICD-10-CM

## 2019-07-22 DIAGNOSIS — E03.8 SUBCLINICAL HYPOTHYROIDISM: ICD-10-CM

## 2019-07-22 DIAGNOSIS — N18.4 ANEMIA IN STAGE 4 CHRONIC KIDNEY DISEASE (HCC): ICD-10-CM

## 2019-07-22 DIAGNOSIS — Z79.4 TYPE 2 DIABETES MELLITUS WITH CHRONIC KIDNEY DISEASE, WITH LONG-TERM CURRENT USE OF INSULIN, UNSPECIFIED CKD STAGE (HCC): ICD-10-CM

## 2019-07-22 DIAGNOSIS — G62.9 PERIPHERAL POLYNEUROPATHY: Primary | ICD-10-CM

## 2019-07-22 LAB
BASOPHILS # BLD AUTO: 0.07 10*3/MM3 (ref 0–0.2)
BASOPHILS NFR BLD AUTO: 1 % (ref 0–1.5)
BUN SERPL-MCNC: 35 MG/DL (ref 8–23)
BUN/CREAT SERPL: 14.3 (ref 7–25)
CALCIUM SERPL-MCNC: 9.9 MG/DL (ref 8.6–10.5)
CHLORIDE SERPL-SCNC: 95 MMOL/L (ref 98–107)
CO2 SERPL-SCNC: 25.3 MMOL/L (ref 22–29)
CREAT SERPL-MCNC: 2.45 MG/DL (ref 0.57–1)
EOSINOPHIL # BLD AUTO: 0.18 10*3/MM3 (ref 0–0.4)
EOSINOPHIL NFR BLD AUTO: 2.6 % (ref 0.3–6.2)
ERYTHROCYTE [DISTWIDTH] IN BLOOD BY AUTOMATED COUNT: 15.9 % (ref 12.3–15.4)
GLUCOSE SERPL-MCNC: 179 MG/DL (ref 65–99)
HBA1C MFR BLD: 6.9 % (ref 4.8–5.6)
HCT VFR BLD AUTO: 35.7 % (ref 34–46.6)
HGB BLD-MCNC: 11.3 G/DL (ref 12–15.9)
IMM GRANULOCYTES # BLD AUTO: 0.04 10*3/MM3 (ref 0–0.05)
IMM GRANULOCYTES NFR BLD AUTO: 0.6 % (ref 0–0.5)
LYMPHOCYTES # BLD AUTO: 1.18 10*3/MM3 (ref 0.7–3.1)
LYMPHOCYTES NFR BLD AUTO: 17 % (ref 19.6–45.3)
MCH RBC QN AUTO: 30.7 PG (ref 26.6–33)
MCHC RBC AUTO-ENTMCNC: 31.7 G/DL (ref 31.5–35.7)
MCV RBC AUTO: 97 FL (ref 79–97)
MONOCYTES # BLD AUTO: 0.49 10*3/MM3 (ref 0.1–0.9)
MONOCYTES NFR BLD AUTO: 7.1 % (ref 5–12)
NEUTROPHILS # BLD AUTO: 4.97 10*3/MM3 (ref 1.7–7)
NEUTROPHILS NFR BLD AUTO: 71.7 % (ref 42.7–76)
NRBC BLD AUTO-RTO: 0 /100 WBC (ref 0–0.2)
PLATELET # BLD AUTO: 284 10*3/MM3 (ref 140–450)
POTASSIUM SERPL-SCNC: 3.7 MMOL/L (ref 3.5–5.2)
RBC # BLD AUTO: 3.68 10*6/MM3 (ref 3.77–5.28)
SODIUM SERPL-SCNC: 137 MMOL/L (ref 136–145)
TSH SERPL DL<=0.005 MIU/L-ACNC: 0.08 MIU/ML (ref 0.27–4.2)
VIT B12 SERPL-MCNC: >2000 PG/ML (ref 211–946)
WBC # BLD AUTO: 6.93 10*3/MM3 (ref 3.4–10.8)

## 2019-07-22 PROCEDURE — 99214 OFFICE O/P EST MOD 30 MIN: CPT | Performed by: INTERNAL MEDICINE

## 2019-07-22 NOTE — PROGRESS NOTES
"Chief Complaint   Patient presents with   • Foot Burn   • Diabetes   • Anemia       History of Present Illness   Veronica Henao is a 84 y.o. female presents for follow up evaluation. She is doing fairly well today. Patient has DM. She has lost a fair amount of weight and appetite is low after starting ozempic. About 65 pounds total. She is currently dosing .5 mg weekly. Unfortunately this is cost prohibitive. Also her appetite is low and she recently started boost for this.   Patient now complains of a burning sensation in her feet. She has knee pain as well but this is managed with icy hot. She has a history of anemia and is taking oral b12 daily. Folate and b12 levels tested in April and were wnl. Thyroid was normal in march. meds are now cost prohipitive as she is in the \"doughnut hole\". Denies back pain or strength changes in her legs.     The following portions of the patient's history were reviewed and updated as appropriate: allergies, current medications, past family history, past medical history, past social history, past surgical history and problem list.  Current Outpatient Medications on File Prior to Visit   Medication Sig Dispense Refill   • acetaminophen (TYLENOL) 325 MG tablet Take 2 tablets by mouth Every 6 (Six) Hours As Needed for Mild Pain (1-3).  0   • Alcohol Swabs (B-D SINGLE USE SWABS REGULAR) pads Inject 1 each under the skin Daily. 100 each 11   • allopurinol (ZYLOPRIM) 100 MG tablet Take 1 tablet by mouth 2 (Two) Times a Day. (Patient taking differently: Take 100 mg by mouth Daily.) 180 tablet 2   • amitriptyline (ELAVIL) 25 MG tablet Take 1 tablet by mouth Every Night. 30 tablet 3   • Blood Glucose Monitoring Suppl (TRUE METRIX AIR GLUCOSE METER) device 1 each Daily. 1 each 0   • cholecalciferol (VITAMIN D3) 1000 UNITS tablet Take 1,000 Units by mouth daily.     • furosemide (LASIX) 40 MG tablet TAKE 1 TABLET TWICE DAILY 180 tablet 2   • hydrALAZINE (APRESOLINE) 100 MG tablet Take 1 tablet " by mouth 3 (Three) Times a Day. 270 tablet 1   • hydrOXYzine (ATARAX) 25 MG tablet TAKE 1 TABLET AT NIGHT AS NEEDED FOR ITCHING 90 tablet 1   • Incontinence Supply Disposable (BLADDER CONTROL PADS EX ABSORB) misc      • Lancets 28G misc To check sugar qd. E11.9  each 12   • Multiple Vitamins-Minerals (CENTRUM SILVER PO) Take 1 tablet by mouth Daily.     • nystatin-triamcinolone (MYCOLOG) 219383-9.1 UNIT/GM-% ointment APPLY TOPICALLY 2 TIMES DAILY 30 g 3   • pramipexole (MIRAPEX) 0.25 MG tablet TAKE 1 TABLET BY MOUTH THREE TIMES DAILY 270 tablet 1   • raNITIdine (ZANTAC) 150 MG tablet TAKE 1 TABLET TWICE DAILY 180 tablet 1   • rivaroxaban (XARELTO) 15 MG tablet Take 1 tablet by mouth Daily With Dinner. 56 tablet 0   • Semaglutide (OZEMPIC) 0.25 or 0.5 MG/DOSE solution pen-injector Inject 0.5 mg under the skin into the appropriate area as directed 1 (One) Time Per Week. 1.5 mL 5   • sertraline (ZOLOFT) 50 MG tablet Take 1 tablet by mouth Daily. 90 tablet 1   • TRUE METRIX BLOOD GLUCOSE TEST test strip CHECK BLOOD SUGAR TWICE DAILY 100 each 3   • TRUEPLUS LANCETS 28G [facility] E11.9 DM to check sugar  each 12   • vitamin B-12 (CYANOCOBALAMIN) 1000 MCG tablet Take 1 tablet by mouth Daily. 90 tablet 3   • Vitamin E 400 UNITS chewable tablet Chew 400 Units Daily. Two daily     • Vitamins A & D (VITAMIN A & D) 50294-481 UNITS tablet        No current facility-administered medications on file prior to visit.      Review of Systems   Constitutional: Positive for appetite change and fatigue.   HENT: Negative.    Eyes: Negative.    Respiratory: Negative.    Cardiovascular: Negative.    Gastrointestinal: Positive for nausea. Negative for blood in stool, constipation and diarrhea.   Endocrine: Negative.    Genitourinary: Negative.    Musculoskeletal: Positive for arthralgias.   Skin: Negative.    Allergic/Immunologic: Negative.    Neurological:        Burning pain in feet   Hematological: Negative.     Psychiatric/Behavioral: Negative.        Objective   Physical Exam   Constitutional: She is oriented to person, place, and time. She appears well-developed and well-nourished.   HENT:   Head: Normocephalic and atraumatic.   Right Ear: External ear normal.   Left Ear: External ear normal.   Mouth/Throat: Oropharynx is clear and moist.   Eyes: Conjunctivae and EOM are normal. Pupils are equal, round, and reactive to light.   Neck: Normal range of motion. Neck supple.   Cardiovascular: Normal rate, regular rhythm, normal heart sounds and intact distal pulses.   Pulmonary/Chest: Effort normal and breath sounds normal.   Abdominal: Soft. Bowel sounds are normal.   Musculoskeletal:   B knee OA   Neurological: She is alert and oriented to person, place, and time.   Sensation intact both feet   Skin: Skin is warm and dry.   Psychiatric: She has a normal mood and affect. Her behavior is normal. Judgment and thought content normal.   Nursing note and vitals reviewed.       /50   Pulse 88   Wt 67.1 kg (148 lb)   SpO2 99%   BMI 27.96 kg/m²     Assessment/Plan   Diagnoses and all orders for this visit:    Peripheral polyneuropathy  -     CBC & Differential  -     Vitamin B12  -     Basic Metabolic Panel    Subclinical hypothyroidism  -     TSH    Type 2 diabetes mellitus with chronic kidney disease, with long-term current use of insulin, unspecified CKD stage (CMS/[facility])  -     CBC & Differential  -     Hemoglobin A1c    Anemia in stage 4 chronic kidney disease (CMS/[facility])  -     CBC & Differential  -     Basic Metabolic Panel        Patient w/ peripheral neuropathy. She will have listed labs tested. She will continue b12 supplementation. She has lost a great deal of weight and will hold ozempic to see if appetite improves. Anemia likely related to ckd and she may need procrit injections. She will try capsaicin cream on her feet. She has sensation w/ normal pulses at this time. Close follow up.

## 2019-07-24 RX ORDER — RANITIDINE 150 MG/1
TABLET ORAL
Qty: 180 TABLET | Refills: 1 | Status: SHIPPED | OUTPATIENT
Start: 2019-07-24 | End: 2019-12-09 | Stop reason: ALTCHOICE

## 2019-07-29 RX ORDER — HYDROXYZINE HYDROCHLORIDE 25 MG/1
TABLET, FILM COATED ORAL
Qty: 90 TABLET | Refills: 1 | Status: SHIPPED | OUTPATIENT
Start: 2019-07-29 | End: 2020-01-16

## 2019-07-30 DIAGNOSIS — E05.90 HYPERTHYROIDISM: Primary | ICD-10-CM

## 2019-07-30 DIAGNOSIS — N18.4 CHRONIC RENAL INSUFFICIENCY, STAGE 4 (SEVERE) (HCC): ICD-10-CM

## 2019-08-19 ENCOUNTER — OFFICE VISIT (OUTPATIENT)
Dept: INTERNAL MEDICINE | Facility: CLINIC | Age: 84
End: 2019-08-19

## 2019-08-19 VITALS
OXYGEN SATURATION: 97 % | SYSTOLIC BLOOD PRESSURE: 156 MMHG | BODY MASS INDEX: 28.15 KG/M2 | HEART RATE: 92 BPM | WEIGHT: 149 LBS | DIASTOLIC BLOOD PRESSURE: 64 MMHG

## 2019-08-19 DIAGNOSIS — N18.4 CHRONIC KIDNEY DISEASE, STAGE IV (SEVERE) (HCC): ICD-10-CM

## 2019-08-19 DIAGNOSIS — E05.90 SUBCLINICAL HYPERTHYROIDISM: ICD-10-CM

## 2019-08-19 DIAGNOSIS — E11.22 TYPE 2 DIABETES MELLITUS WITH CHRONIC KIDNEY DISEASE, WITH LONG-TERM CURRENT USE OF INSULIN, UNSPECIFIED CKD STAGE (HCC): ICD-10-CM

## 2019-08-19 DIAGNOSIS — I50.33 ACUTE ON CHRONIC DIASTOLIC CONGESTIVE HEART FAILURE (HCC): Primary | ICD-10-CM

## 2019-08-19 DIAGNOSIS — Z79.4 TYPE 2 DIABETES MELLITUS WITH CHRONIC KIDNEY DISEASE, WITH LONG-TERM CURRENT USE OF INSULIN, UNSPECIFIED CKD STAGE (HCC): ICD-10-CM

## 2019-08-19 DIAGNOSIS — I48.19 PERSISTENT ATRIAL FIBRILLATION (HCC): ICD-10-CM

## 2019-08-19 DIAGNOSIS — I50.32 CHRONIC DIASTOLIC CONGESTIVE HEART FAILURE (HCC): ICD-10-CM

## 2019-08-19 DIAGNOSIS — M10.00 ACUTE IDIOPATHIC GOUT, UNSPECIFIED SITE: ICD-10-CM

## 2019-08-19 DIAGNOSIS — I10 ESSENTIAL HYPERTENSION: ICD-10-CM

## 2019-08-19 DIAGNOSIS — D63.1 ANEMIA IN STAGE 4 CHRONIC KIDNEY DISEASE (HCC): ICD-10-CM

## 2019-08-19 DIAGNOSIS — N18.4 ANEMIA IN STAGE 4 CHRONIC KIDNEY DISEASE (HCC): ICD-10-CM

## 2019-08-19 PROBLEM — I48.0 PAROXYSMAL ATRIAL FIBRILLATION (HCC): Status: RESOLVED | Noted: 2018-08-27 | Resolved: 2019-08-19

## 2019-08-19 PROCEDURE — 99214 OFFICE O/P EST MOD 30 MIN: CPT | Performed by: INTERNAL MEDICINE

## 2019-08-19 RX ORDER — FUROSEMIDE 40 MG/1
40 TABLET ORAL 3 TIMES DAILY
Qty: 270 TABLET | Refills: 2 | Status: SHIPPED | OUTPATIENT
Start: 2019-08-19 | End: 2020-01-03

## 2019-08-19 NOTE — PROGRESS NOTES
Chief Complaint   Patient presents with   • Diabetes   • Hyperlipidemia   • Hypertension       History of Present Illness   Veronica Henao is a 84 y.o. female presents for follow up evaluation on DM, htn, chf, dyslipidemia. Patient has had weight loss. Related to this ozempic was held. She now reports that glucose is higher, she feels she is retaining fluid, and she is not feeling as well. She has hypertension and renal insufficiency. She has chf. She reports weight gain of 5 pounds. As such she increased furosemide to 3 tab/ day. She does this when she gains more than 2 pounds and does get some benefit when needed. She has cri and her creatinine is monitored closely. She has anemia related to this that has been stable. She has restless legs and is now able to get sleep at night w/ mirapex at .25 mg qhs. H/o gout and has not been recently tested for uric acid.     The following portions of the patient's history were reviewed and updated as appropriate: allergies, current medications, past family history, past medical history, past social history, past surgical history and problem list.  Current Outpatient Medications on File Prior to Visit   Medication Sig Dispense Refill   • acetaminophen (TYLENOL) 325 MG tablet Take 2 tablets by mouth Every 6 (Six) Hours As Needed for Mild Pain (1-3).  0   • Alcohol Swabs (B-D SINGLE USE SWABS REGULAR) pads Inject 1 each under the skin Daily. 100 each 11   • amitriptyline (ELAVIL) 25 MG tablet Take 1 tablet by mouth Every Night. 30 tablet 3   • Blood Glucose Monitoring Suppl (TRUE METRIX AIR GLUCOSE METER) device 1 each Daily. 1 each 0   • cholecalciferol (VITAMIN D3) 1000 UNITS tablet Take 1,000 Units by mouth daily.     • hydrALAZINE (APRESOLINE) 100 MG tablet Take 1 tablet by mouth 3 (Three) Times a Day. 270 tablet 1   • hydrOXYzine (ATARAX) 25 MG tablet TAKE 1 TABLET AT NIGHT AS NEEDED FOR ITCHING 90 tablet 1   • Incontinence Supply Disposable (BLADDER CONTROL PADS EX ABSORB)  misc      • Lancets 28G misc To check sugar qd. E11.9  each 12   • Multiple Vitamins-Minerals (CENTRUM SILVER PO) Take 1 tablet by mouth Daily.     • nystatin-triamcinolone (MYCOLOG) 170975-7.1 UNIT/GM-% ointment APPLY TOPICALLY 2 TIMES DAILY 30 g 3   • pramipexole (MIRAPEX) 0.25 MG tablet TAKE 1 TABLET BY MOUTH THREE TIMES DAILY 270 tablet 1   • raNITIdine (ZANTAC) 150 MG tablet TAKE 1 TABLET TWICE DAILY 180 tablet 1   • rivaroxaban (XARELTO) 15 MG tablet Take 1 tablet by mouth Daily With Dinner. 56 tablet 0   • sertraline (ZOLOFT) 50 MG tablet Take 1 tablet by mouth Daily. 90 tablet 1   • TRUE METRIX BLOOD GLUCOSE TEST test strip CHECK BLOOD SUGAR TWICE DAILY 100 each 3   • TRUEPLUS LANCETS 28G misc E11.9 DM to check sugar  each 12   • vitamin B-12 (CYANOCOBALAMIN) 1000 MCG tablet Take 1 tablet by mouth Daily. 90 tablet 3   • Vitamin E 400 UNITS chewable tablet Chew 400 Units Daily. Two daily     • Vitamins A & D (VITAMIN A & D) 12244-200 UNITS tablet      • [DISCONTINUED] allopurinol (ZYLOPRIM) 100 MG tablet Take 1 tablet by mouth 2 (Two) Times a Day. (Patient taking differently: Take 100 mg by mouth Daily.) 180 tablet 2   • [DISCONTINUED] furosemide (LASIX) 40 MG tablet TAKE 1 TABLET TWICE DAILY 180 tablet 2   • [DISCONTINUED] Semaglutide (OZEMPIC) 0.25 or 0.5 MG/DOSE solution pen-injector Inject 0.5 mg under the skin into the appropriate area as directed 1 (One) Time Per Week. 1.5 mL 5     No current facility-administered medications on file prior to visit.      Review of Systems   Constitutional: Positive for fatigue.   HENT: Negative.    Eyes: Negative.    Respiratory: Positive for shortness of breath.    Cardiovascular: Positive for leg swelling.   Gastrointestinal: Negative.    Endocrine: Negative.    Genitourinary: Negative.    Musculoskeletal: Positive for arthralgias.   Skin: Negative.    Allergic/Immunologic: Negative.    Neurological: Negative.    Hematological: Negative.     Psychiatric/Behavioral: Negative.        Objective   Physical Exam   Constitutional: She is oriented to person, place, and time. She appears well-developed and well-nourished.   Alert and pleasant. No distress   HENT:   Head: Normocephalic and atraumatic.   Right Ear: External ear normal.   Left Ear: External ear normal.   Mouth/Throat: Oropharynx is clear and moist.   Eyes: EOM are normal. Pupils are equal, round, and reactive to light.   Neck: Normal range of motion. Neck supple.   Cardiovascular: Normal rate, regular rhythm, normal heart sounds and intact distal pulses.   B peripheral edema 2+   Pulmonary/Chest: Effort normal and breath sounds normal.   Abdominal: Soft. Bowel sounds are normal.   Musculoskeletal: Normal range of motion.   Neurological: She is alert and oriented to person, place, and time.   Skin: Skin is warm and dry.   Psychiatric: She has a normal mood and affect. Her behavior is normal. Judgment and thought content normal.   Nursing note and vitals reviewed.       /64   Pulse 92   Wt 67.6 kg (149 lb)   SpO2 97%   BMI 28.15 kg/m²     Assessment/Plan   Diagnoses and all orders for this visit:    Acute on chronic diastolic congestive heart failure (CMS/HCC)  -     Comprehensive Metabolic Panel    Persistent atrial fibrillation (CMS/[facility])  -     TSH  -     Thyroid Antibodies  -     T4, Free    Chronic diastolic congestive heart failure (CMS/HCC)    Essential hypertension    Type 2 diabetes mellitus with chronic kidney disease, with long-term current use of insulin, unspecified CKD stage (CMS/[facility])  -     Hemoglobin A1c    Chronic kidney disease, stage IV (severe) (CMS/[facility])  -     Comprehensive Metabolic Panel  -     Uric Acid    Anemia in stage 4 chronic kidney disease (CMS/HCC)  -     CBC & Differential    Acute idiopathic gout, unspecified site  -     Uric Acid    Subclinical hyperthyroidism  -     TSH  -     Thyroid Antibodies  -     T4, Free    Other orders  -     Semaglutide  (OZEMPIC) 0.25 or 0.5 MG/DOSE solution pen-injector; Inject 0.5 mg under the skin into the appropriate area as directed 1 (One) Time Per Week.  -     furosemide (LASIX) 40 MG tablet; Take 1 tablet by mouth 3 (Three) Times a Day.      Patient w/ DM. Will restart ozempic at .25 mg / wk and may increase to .5 if needed. She will increase furosemide to tid until she is euvolemic. She will have her cr monitored. She will have her thyroid retested as she had a slightly suppressed tsh on last testing. She will have uric acid tested and a1c tested as well. She will monitor her wieght and glucose readings as well as bp and call if this is elevated. She will have listed labs and f/u routinely.

## 2019-08-20 DIAGNOSIS — E05.90 HYPERTHYROIDISM: Primary | ICD-10-CM

## 2019-08-20 LAB
ALBUMIN SERPL-MCNC: 3.9 G/DL (ref 3.5–5.2)
ALBUMIN/GLOB SERPL: 1.4 G/DL
ALP SERPL-CCNC: 163 U/L (ref 39–117)
ALT SERPL-CCNC: 24 U/L (ref 1–33)
AST SERPL-CCNC: 23 U/L (ref 1–32)
BASOPHILS # BLD AUTO: 0.06 10*3/MM3 (ref 0–0.2)
BASOPHILS NFR BLD AUTO: 0.9 % (ref 0–1.5)
BILIRUB SERPL-MCNC: 0.3 MG/DL (ref 0.2–1.2)
BUN SERPL-MCNC: 47 MG/DL (ref 8–23)
BUN/CREAT SERPL: 21.6 (ref 7–25)
CALCIUM SERPL-MCNC: 9.4 MG/DL (ref 8.6–10.5)
CHLORIDE SERPL-SCNC: 97 MMOL/L (ref 98–107)
CO2 SERPL-SCNC: 24.8 MMOL/L (ref 22–29)
CREAT SERPL-MCNC: 2.18 MG/DL (ref 0.57–1)
EOSINOPHIL # BLD AUTO: 0.22 10*3/MM3 (ref 0–0.4)
EOSINOPHIL NFR BLD AUTO: 3.3 % (ref 0.3–6.2)
ERYTHROCYTE [DISTWIDTH] IN BLOOD BY AUTOMATED COUNT: 16.4 % (ref 12.3–15.4)
GLOBULIN SER CALC-MCNC: 2.7 GM/DL
GLUCOSE SERPL-MCNC: 196 MG/DL (ref 65–99)
HBA1C MFR BLD: 6.8 % (ref 4.8–5.6)
HCT VFR BLD AUTO: 29.8 % (ref 34–46.6)
HGB BLD-MCNC: 9.2 G/DL (ref 12–15.9)
IMM GRANULOCYTES # BLD AUTO: 0.04 10*3/MM3 (ref 0–0.05)
IMM GRANULOCYTES NFR BLD AUTO: 0.6 % (ref 0–0.5)
LYMPHOCYTES # BLD AUTO: 1.56 10*3/MM3 (ref 0.7–3.1)
LYMPHOCYTES NFR BLD AUTO: 23.2 % (ref 19.6–45.3)
MCH RBC QN AUTO: 30.2 PG (ref 26.6–33)
MCHC RBC AUTO-ENTMCNC: 30.9 G/DL (ref 31.5–35.7)
MCV RBC AUTO: 97.7 FL (ref 79–97)
MONOCYTES # BLD AUTO: 0.49 10*3/MM3 (ref 0.1–0.9)
MONOCYTES NFR BLD AUTO: 7.3 % (ref 5–12)
NEUTROPHILS # BLD AUTO: 4.34 10*3/MM3 (ref 1.7–7)
NEUTROPHILS NFR BLD AUTO: 64.7 % (ref 42.7–76)
NRBC BLD AUTO-RTO: 0 /100 WBC (ref 0–0.2)
PLATELET # BLD AUTO: 306 10*3/MM3 (ref 140–450)
POTASSIUM SERPL-SCNC: 3.8 MMOL/L (ref 3.5–5.2)
PROT SERPL-MCNC: 6.6 G/DL (ref 6–8.5)
RBC # BLD AUTO: 3.05 10*6/MM3 (ref 3.77–5.28)
SODIUM SERPL-SCNC: 138 MMOL/L (ref 136–145)
T4 FREE SERPL-MCNC: 1.22 NG/DL (ref 0.93–1.7)
THYROGLOB AB SERPL-ACNC: 76.8 IU/ML (ref 0–0.9)
THYROPEROXIDASE AB SERPL-ACNC: 214 IU/ML (ref 0–34)
TSH SERPL DL<=0.005 MIU/L-ACNC: 0.09 MIU/ML (ref 0.27–4.2)
URATE SERPL-MCNC: 3.9 MG/DL (ref 2.4–5.7)
WBC # BLD AUTO: 6.71 10*3/MM3 (ref 3.4–10.8)

## 2019-08-20 RX ORDER — FERROUS SULFATE 325(65) MG
325 TABLET ORAL
Qty: 30 TABLET | Refills: 5 | Status: SHIPPED | OUTPATIENT
Start: 2019-08-20 | End: 2019-09-27 | Stop reason: SDUPTHER

## 2019-08-27 ENCOUNTER — HOSPITAL ENCOUNTER (OUTPATIENT)
Dept: ULTRASOUND IMAGING | Facility: HOSPITAL | Age: 84
Discharge: HOME | End: 2019-08-27
Admitting: INTERNAL MEDICINE

## 2019-08-27 DIAGNOSIS — E05.90 HYPERTHYROIDISM: ICD-10-CM

## 2019-08-27 PROCEDURE — 76536 US EXAM OF HEAD AND NECK: CPT

## 2019-09-05 ENCOUNTER — CLINICAL SUPPORT NO REQUIREMENTS (OUTPATIENT)
Dept: CARDIOLOGY | Facility: CLINIC | Age: 84
End: 2019-09-05

## 2019-09-05 DIAGNOSIS — I44.2 THIRD DEGREE AV BLOCK (HCC): Primary | ICD-10-CM

## 2019-09-05 PROCEDURE — 93296 REM INTERROG EVL PM/IDS: CPT | Performed by: INTERNAL MEDICINE

## 2019-09-05 PROCEDURE — 93294 REM INTERROG EVL PM/LDLS PM: CPT | Performed by: INTERNAL MEDICINE

## 2019-09-19 DIAGNOSIS — D50.8 OTHER IRON DEFICIENCY ANEMIA: ICD-10-CM

## 2019-09-19 DIAGNOSIS — E11.22 TYPE 2 DIABETES MELLITUS WITH CHRONIC KIDNEY DISEASE, WITH LONG-TERM CURRENT USE OF INSULIN, UNSPECIFIED CKD STAGE (HCC): ICD-10-CM

## 2019-09-19 DIAGNOSIS — Z79.4 TYPE 2 DIABETES MELLITUS WITH CHRONIC KIDNEY DISEASE, WITH LONG-TERM CURRENT USE OF INSULIN, UNSPECIFIED CKD STAGE (HCC): ICD-10-CM

## 2019-09-19 DIAGNOSIS — E78.49 OTHER HYPERLIPIDEMIA: Primary | ICD-10-CM

## 2019-09-19 DIAGNOSIS — Z00.00 HEALTHCARE MAINTENANCE: ICD-10-CM

## 2019-09-19 DIAGNOSIS — I48.19 PERSISTENT ATRIAL FIBRILLATION (HCC): ICD-10-CM

## 2019-09-19 DIAGNOSIS — I10 ESSENTIAL HYPERTENSION: ICD-10-CM

## 2019-09-24 ENCOUNTER — OFFICE VISIT (OUTPATIENT)
Dept: ENDOCRINOLOGY | Age: 84
End: 2019-09-24

## 2019-09-24 VITALS
HEART RATE: 80 BPM | HEIGHT: 62 IN | OXYGEN SATURATION: 100 % | WEIGHT: 155 LBS | BODY MASS INDEX: 28.52 KG/M2 | SYSTOLIC BLOOD PRESSURE: 130 MMHG | DIASTOLIC BLOOD PRESSURE: 82 MMHG

## 2019-09-24 DIAGNOSIS — E05.90 HYPERTHYROIDISM: Primary | ICD-10-CM

## 2019-09-24 DIAGNOSIS — E05.90 HYPERTHYROIDISM: ICD-10-CM

## 2019-09-24 PROCEDURE — 99204 OFFICE O/P NEW MOD 45 MIN: CPT | Performed by: INTERNAL MEDICINE

## 2019-09-24 NOTE — PROGRESS NOTES
Chief Complaint   Patient presents with   • Hyperthyroidism   NEW PATIENT APPOINTMENT/ HYPERTHYROIDISM    Veronica Henao 84 y.o. presents as a new patient for the evaluation of Hyperthyroidism. Consulted by Dr. Price.   Pt did have hx of hyperthyroidism years ago - 13 years ago, used to see Dr. Silva.   She was admitted to hospital with hives and that time she was thought to have some thyroid problem and Dr. Silva started her on a medication to help with the hives.   She doesn't remember the name of the medication. Doesn't think methimazole or that thyroid med caused the rash.     She complains of feeling tired, losing weight on ozempic, lost about 40 - 50 pounds in 1 year which was intentional weight loss, c/o hair loss, no increased sweating, some dry skin, sleep is ok. No c/o heat intolerance and no cold intolerance. Some c/o tremors, racing of heart, hx of afib treated with ablation and pacemaker, no c/o anxiety and no eye symptoms.   Denied c/o difficulty breathing, swallowing and change in voice.   No family hx of thyroid disease.     Menopause at age 30 - 40, she has 3 kids of her own.     Reviewed primary care physician's/consulting physician documentation and lab results :     I have reviewed the patient's allergies, medicines, past medical hx, family hx and social hx in detail.    Past Medical History:   Diagnosis Date   • Acute bronchitis    • Acute renal insufficiency    • Allergic rhinitis    • Anemia in chronic kidney disease    • Arrhythmia    • Atrial fibrillation (CMS/HCC)     chronic   • Brachycephaly    • Breast pain, right    • Chest pain    • CHF (congestive heart failure) (CMS/HCC)    • Chronic combined systolic and diastolic CHF (congestive heart failure) (CMS/HCC)    • Chronic renal insufficiency    • CKD (chronic kidney disease), stage IV (CMS/HCC)    • Cough    • Depression    • Diabetes mellitus (CMS/HCC)     TYPE 2   • Diverticulosis    • ROTIZ (dyspnea on exertion)    • Dyspnea    •  Esophageal reflux    • Essential hypertension    • Fatigue    • GERD (gastroesophageal reflux disease)    • Gout    • Head injury    • Headache    • Hoarseness    • Hypercalcemia    • Hyperlipidemia    • Hypertension    • Influenza A (H1N1)    • Iron deficiency anemia    • Itching    • Leg swelling    • Lightheadedness    • Macular degeneration    • Malaise and fatigue    • Mitral valve regurgitation     moderate to severe   • Nontoxic multinodular goiter     RIGHT 2.0 CM  LEFT  2.5 CM   • Obesity    • JOSELUIS on CPAP    • Osteoarthritis    • PAF (paroxysmal atrial fibrillation) (CMS/HCC)    • Palpitations    • Paresthesias    • Proteinuria    • Pulmonary nodule    • Pulmonic valve regurgitation     mild   • Sebaceous cyst    • SOBOE (shortness of breath on exertion)    • Solitary thyroid nodule    • Subclinical hypothyroidism    • Tachycardia    • TR (tricuspid regurgitation)     mild to moderate   • Type 2 diabetes mellitus (CMS/HCC)    • 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)    • UTI (urinary tract infection)        Family History   Problem Relation Age of Onset   • Emphysema Mother    • Heart disease Father    • Lung cancer Father    • Prostate cancer Father    • Asthma Sister    • Lung cancer Daughter    • Colon cancer Other    • No Known Problems Maternal Grandmother    • No Known Problems Maternal Grandfather    • Arthritis Paternal Grandmother    • No Known Problems Paternal Grandfather        Social History     Socioeconomic History   • Marital status:      Spouse name: Not on file   • Number of children: Not on file   • Years of education: Not on file   • Highest education level: Not on file   Tobacco Use   • Smoking status: Former Smoker     Packs/day: 1.50     Years: 20.00     Pack years: 30.00     Start date: 6/14/1970   • Smokeless tobacco: Never Used   • Tobacco comment: D/C 1970 SMOKING 1 1/2 PPD FOR 13 YRS BEFORE QUITTING   Substance and Sexual Activity   • Alcohol use:  No     Comment: caffeine use   • Drug use: No   • Sexual activity: Defer       Allergies   Allergen Reactions   • Cephalexin Swelling   • Meperidine Swelling   • Penicillins Swelling         Current Outpatient Medications:   •  acetaminophen (TYLENOL) 325 MG tablet, Take 2 tablets by mouth Every 6 (Six) Hours As Needed for Mild Pain (1-3)., Disp: , Rfl: 0  •  Alcohol Swabs (B-D SINGLE USE SWABS REGULAR) pads, Inject 1 each under the skin Daily., Disp: 100 each, Rfl: 11  •  Blood Glucose Monitoring Suppl (TRUE METRIX AIR GLUCOSE METER) device, 1 each Daily., Disp: 1 each, Rfl: 0  •  cholecalciferol (VITAMIN D3) 1000 UNITS tablet, Take 1,000 Units by mouth daily., Disp: , Rfl:   •  ferrous sulfate 325 (65 FE) MG tablet, Take 1 tablet by mouth Daily With Breakfast., Disp: 30 tablet, Rfl: 5  •  furosemide (LASIX) 40 MG tablet, Take 1 tablet by mouth 3 (Three) Times a Day., Disp: 270 tablet, Rfl: 2  •  hydrALAZINE (APRESOLINE) 100 MG tablet, Take 1 tablet by mouth 3 (Three) Times a Day., Disp: 270 tablet, Rfl: 1  •  Incontinence Supply Disposable (BLADDER CONTROL PADS EX ABSORB) misc, , Disp: , Rfl:   •  Lancets 28G misc, To check sugar qd. E11.9 DM, Disp: 100 each, Rfl: 12  •  nystatin-triamcinolone (MYCOLOG) 437140-0.1 UNIT/GM-% ointment, APPLY TOPICALLY 2 TIMES DAILY, Disp: 30 g, Rfl: 3  •  pramipexole (MIRAPEX) 0.25 MG tablet, TAKE 1 TABLET BY MOUTH THREE TIMES DAILY, Disp: 270 tablet, Rfl: 1  •  raNITIdine (ZANTAC) 150 MG tablet, TAKE 1 TABLET TWICE DAILY, Disp: 180 tablet, Rfl: 1  •  rivaroxaban (XARELTO) 15 MG tablet, Take 1 tablet by mouth Daily With Dinner., Disp: 56 tablet, Rfl: 0  •  Semaglutide (OZEMPIC) 0.25 or 0.5 MG/DOSE solution pen-injector, Inject 0.5 mg under the skin into the appropriate area as directed 1 (One) Time Per Week., Disp: 3 pen, Rfl: 4  •  TRUE METRIX BLOOD GLUCOSE TEST test strip, CHECK BLOOD SUGAR TWICE DAILY, Disp: 100 each, Rfl: 3  •  TRUEPLUS LANCETS 28G misc, E11.9 DM to check sugar  "QD, Disp: 100 each, Rfl: 12  •  vitamin B-12 (CYANOCOBALAMIN) 1000 MCG tablet, Take 1 tablet by mouth Daily., Disp: 90 tablet, Rfl: 3  •  Vitamin E 400 UNITS chewable tablet, Chew 400 Units Daily. Two daily, Disp: , Rfl:   •  Vitamins A & D (VITAMIN A & D) 16546-796 UNITS tablet, , Disp: , Rfl:   •  hydrOXYzine (ATARAX) 25 MG tablet, TAKE 1 TABLET AT NIGHT AS NEEDED FOR ITCHING, Disp: 90 tablet, Rfl: 1  •  Multiple Vitamins-Minerals (CENTRUM SILVER PO), Take 1 tablet by mouth Daily., Disp: , Rfl:   •  sertraline (ZOLOFT) 50 MG tablet, Take 1 tablet by mouth Daily., Disp: 90 tablet, Rfl: 1     Review of Systems   Constitutional: Positive for appetite change, fatigue and unexpected weight change. Negative for fever.   Eyes: Negative for visual disturbance.   Respiratory: Positive for shortness of breath.    Cardiovascular: Positive for leg swelling. Negative for palpitations.   Gastrointestinal: Negative for abdominal pain and vomiting.   Endocrine: Negative for polydipsia and polyuria.   Musculoskeletal: Negative for joint swelling and neck pain.   Skin: Negative for rash.   Neurological: Positive for numbness (feet burning). Negative for weakness.   Psychiatric/Behavioral: Negative for behavioral problems.       Objective:    /82   Pulse 80   Ht 157.5 cm (62\")   Wt 70.3 kg (155 lb)   SpO2 100%   BMI 28.35 kg/m²     Physical Exam   Constitutional: She is oriented to person, place, and time. She appears well-nourished.   HENT:   Head: Normocephalic and atraumatic.   Eyes: Conjunctivae and EOM are normal. No scleral icterus.   Neck: Normal range of motion. Neck supple. No thyromegaly present.   Cardiovascular: Normal rate. Exam reveals no friction rub.   Murmur heard.  Pulmonary/Chest: Effort normal and breath sounds normal. No stridor. She has no wheezes. She has no rales.   Abdominal: Soft. Bowel sounds are normal. She exhibits no distension. There is no tenderness.   Musculoskeletal: She exhibits no edema " or tenderness.   Lymphadenopathy:     She has no cervical adenopathy.   Neurological: She is alert and oriented to person, place, and time.   Skin: Skin is warm and dry. She is not diaphoretic.   Psychiatric: She has a normal mood and affect.   Vitals reviewed.      Results Review:    I reviewed the patient's new clinical results.    Office Visit on 08/19/2019   Component Date Value Ref Range Status   • WBC 08/19/2019 6.71  3.40 - 10.80 10*3/mm3 Final   • RBC 08/19/2019 3.05* 3.77 - 5.28 10*6/mm3 Final   • Hemoglobin 08/19/2019 9.2* 12.0 - 15.9 g/dL Final   • Hematocrit 08/19/2019 29.8* 34.0 - 46.6 % Final   • MCV 08/19/2019 97.7* 79.0 - 97.0 fL Final   • MCH 08/19/2019 30.2  26.6 - 33.0 pg Final   • MCHC 08/19/2019 30.9* 31.5 - 35.7 g/dL Final   • RDW 08/19/2019 16.4* 12.3 - 15.4 % Final   • Platelets 08/19/2019 306  140 - 450 10*3/mm3 Final   • Neutrophil Rel % 08/19/2019 64.7  42.7 - 76.0 % Final   • Lymphocyte Rel % 08/19/2019 23.2  19.6 - 45.3 % Final   • Monocyte Rel % 08/19/2019 7.3  5.0 - 12.0 % Final   • Eosinophil Rel % 08/19/2019 3.3  0.3 - 6.2 % Final   • Basophil Rel % 08/19/2019 0.9  0.0 - 1.5 % Final   • Neutrophils Absolute 08/19/2019 4.34  1.70 - 7.00 10*3/mm3 Final   • Lymphocytes Absolute 08/19/2019 1.56  0.70 - 3.10 10*3/mm3 Final   • Monocytes Absolute 08/19/2019 0.49  0.10 - 0.90 10*3/mm3 Final   • Eosinophils Absolute 08/19/2019 0.22  0.00 - 0.40 10*3/mm3 Final   • Basophils Absolute 08/19/2019 0.06  0.00 - 0.20 10*3/mm3 Final   • Immature Granulocyte Rel % 08/19/2019 0.6* 0.0 - 0.5 % Final   • Immature Grans Absolute 08/19/2019 0.04  0.00 - 0.05 10*3/mm3 Final   • nRBC 08/19/2019 0.0  0.0 - 0.2 /100 WBC Final   • Glucose 08/19/2019 196* 65 - 99 mg/dL Final   • BUN 08/19/2019 47* 8 - 23 mg/dL Final   • Creatinine 08/19/2019 2.18* 0.57 - 1.00 mg/dL Final   • eGFR Non African Am 08/19/2019 21* >60 mL/min/1.73 Final    Comment: The MDRD GFR formula is only valid for adults with stable  renal  function between ages 18 and 70.     • eGFR  Am 08/19/2019 26* >60 mL/min/1.73 Final   • BUN/Creatinine Ratio 08/19/2019 21.6  7.0 - 25.0 Final   • Sodium 08/19/2019 138  136 - 145 mmol/L Final   • Potassium 08/19/2019 3.8  3.5 - 5.2 mmol/L Final   • Chloride 08/19/2019 97* 98 - 107 mmol/L Final   • Total CO2 08/19/2019 24.8  22.0 - 29.0 mmol/L Final   • Calcium 08/19/2019 9.4  8.6 - 10.5 mg/dL Final   • Total Protein 08/19/2019 6.6  6.0 - 8.5 g/dL Final   • Albumin 08/19/2019 3.90  3.50 - 5.20 g/dL Final   • Globulin 08/19/2019 2.7  gm/dL Final   • A/G Ratio 08/19/2019 1.4  g/dL Final   • Total Bilirubin 08/19/2019 0.3  0.2 - 1.2 mg/dL Final   • Alkaline Phosphatase 08/19/2019 163* 39 - 117 U/L Final   • AST (SGOT) 08/19/2019 23  1 - 32 U/L Final   • ALT (SGPT) 08/19/2019 24  1 - 33 U/L Final   • TSH 08/19/2019 0.085* 0.270 - 4.200 mIU/mL Final   • Thyroid Peroxidase Antibody 08/19/2019 214* 0 - 34 IU/mL Final   • Thyroglobulin Ab 08/19/2019 76.8* 0.0 - 0.9 IU/mL Final    Thyroglobulin Antibody measured by [facility]   • Free T4 08/19/2019 1.22  0.93 - 1.70 ng/dL Final   • Hemoglobin A1C 08/19/2019 6.80* 4.80 - 5.60 % Final    Comment: Hemoglobin A1C Ranges:  Increased Risk for Diabetes  5.7% to 6.4%  Diabetes                     >= 6.5%  Diabetic Goal                < 7.0%     • Uric Acid 08/19/2019 3.9  2.4 - 5.7 mg/dL Final       Veronica was seen today for hyperthyroidism.    Diagnoses and all orders for this visit:    Hyperthyroidism  -     Cancel: TSH; Future  -     Cancel: T4, Free; Future  -     Cancel: T3, Free; Future  -     Cancel: Thyroid Stimulating Immunoglobulin; Future  -     Cancel: T3, Free; Future  -     Cancel: T4, Free; Future  -     Cancel: TSH; Future  -     T4, Free; Future  -     T3, Free; Future  -     Thyroid Stimulating Immunoglobulin; Future  -     T3, Free; Future  -     T4, Free; Future  -     TSH; Future  -     TSH; Future      Hyperthyroidism  Check TSH, free  "T4, free T3  Check TSI levels  Based on her thyroid panel will start the patient on methimazole  Discussed the benefits and the side effects of the medication  Repeat blood work-up in 6 weeks to further adjust the methimazole dosage.  Would also consider proceeding with the uptake scan and might consider ablation based on the scan results.    Explained the side effects of hyperthyroidism especially the cardiac risk.    Type 2 diabetes mellitus  Continue ozempic  HbA1c is decently controlled.    Thank you for asking me to see your patient, Veronica Henao in consultation.         Bethany Ibanez MD  09/24/19    [facility] Dragon / transcription disclaimer:     \"Dictated utilizing Dragon dictation\".        "

## 2019-09-25 ENCOUNTER — RESULTS ENCOUNTER (OUTPATIENT)
Dept: ENDOCRINOLOGY | Age: 84
End: 2019-09-25

## 2019-09-25 DIAGNOSIS — E05.90 HYPERTHYROIDISM: ICD-10-CM

## 2019-09-25 LAB
ALBUMIN SERPL-MCNC: 4 G/DL (ref 3.5–5.2)
ALBUMIN/GLOB SERPL: 1.3 G/DL
ALP SERPL-CCNC: 160 U/L (ref 39–117)
ALT SERPL-CCNC: 13 U/L (ref 1–33)
AST SERPL-CCNC: 18 U/L (ref 1–32)
BASOPHILS # BLD AUTO: 0.05 10*3/MM3 (ref 0–0.2)
BASOPHILS NFR BLD AUTO: 0.8 % (ref 0–1.5)
BILIRUB SERPL-MCNC: 0.3 MG/DL (ref 0.2–1.2)
BUN SERPL-MCNC: 55 MG/DL (ref 8–23)
BUN/CREAT SERPL: 19.3 (ref 7–25)
CALCIUM SERPL-MCNC: 9.3 MG/DL (ref 8.6–10.5)
CHLORIDE SERPL-SCNC: 98 MMOL/L (ref 98–107)
CHOLEST SERPL-MCNC: 150 MG/DL (ref 0–200)
CO2 SERPL-SCNC: 25 MMOL/L (ref 22–29)
CREAT SERPL-MCNC: 2.85 MG/DL (ref 0.57–1)
EOSINOPHIL # BLD AUTO: 0.25 10*3/MM3 (ref 0–0.4)
EOSINOPHIL NFR BLD AUTO: 3.9 % (ref 0.3–6.2)
ERYTHROCYTE [DISTWIDTH] IN BLOOD BY AUTOMATED COUNT: 14.5 % (ref 12.3–15.4)
GLOBULIN SER CALC-MCNC: 3 GM/DL
GLUCOSE SERPL-MCNC: 152 MG/DL (ref 65–99)
HBA1C MFR BLD: 7.4 % (ref 4.8–5.6)
HCT VFR BLD AUTO: 27.9 % (ref 34–46.6)
HDLC SERPL-MCNC: 58 MG/DL (ref 40–60)
HGB BLD-MCNC: 9.1 G/DL (ref 12–15.9)
IMM GRANULOCYTES # BLD AUTO: 0.03 10*3/MM3 (ref 0–0.05)
IMM GRANULOCYTES NFR BLD AUTO: 0.5 % (ref 0–0.5)
LDLC SERPL CALC-MCNC: 72 MG/DL (ref 0–100)
LYMPHOCYTES # BLD AUTO: 1.11 10*3/MM3 (ref 0.7–3.1)
LYMPHOCYTES NFR BLD AUTO: 17.4 % (ref 19.6–45.3)
MCH RBC QN AUTO: 30.3 PG (ref 26.6–33)
MCHC RBC AUTO-ENTMCNC: 32.6 G/DL (ref 31.5–35.7)
MCV RBC AUTO: 93 FL (ref 79–97)
MONOCYTES # BLD AUTO: 0.53 10*3/MM3 (ref 0.1–0.9)
MONOCYTES NFR BLD AUTO: 8.3 % (ref 5–12)
NEUTROPHILS # BLD AUTO: 4.4 10*3/MM3 (ref 1.7–7)
NEUTROPHILS NFR BLD AUTO: 69.1 % (ref 42.7–76)
NRBC BLD AUTO-RTO: 0 /100 WBC (ref 0–0.2)
PLATELET # BLD AUTO: 319 10*3/MM3 (ref 140–450)
POTASSIUM SERPL-SCNC: 4.6 MMOL/L (ref 3.5–5.2)
PROT SERPL-MCNC: 7 G/DL (ref 6–8.5)
RBC # BLD AUTO: 3 10*6/MM3 (ref 3.77–5.28)
SODIUM SERPL-SCNC: 138 MMOL/L (ref 136–145)
TRIGL SERPL-MCNC: 99 MG/DL (ref 0–150)
TSH SERPL DL<=0.005 MIU/L-ACNC: 0.48 UIU/ML (ref 0.27–4.2)
UNABLE TO VOID: NORMAL
VLDLC SERPL CALC-MCNC: 19.8 MG/DL
WBC # BLD AUTO: 6.37 10*3/MM3 (ref 3.4–10.8)

## 2019-09-27 ENCOUNTER — OFFICE VISIT (OUTPATIENT)
Dept: INTERNAL MEDICINE | Facility: CLINIC | Age: 84
End: 2019-09-27

## 2019-09-27 VITALS
DIASTOLIC BLOOD PRESSURE: 78 MMHG | HEART RATE: 91 BPM | WEIGHT: 153 LBS | SYSTOLIC BLOOD PRESSURE: 180 MMHG | HEIGHT: 62 IN | OXYGEN SATURATION: 98 % | BODY MASS INDEX: 28.16 KG/M2

## 2019-09-27 DIAGNOSIS — R35.0 URINARY FREQUENCY: ICD-10-CM

## 2019-09-27 DIAGNOSIS — I50.9 ACUTE ON CHRONIC CONGESTIVE HEART FAILURE, UNSPECIFIED HEART FAILURE TYPE (HCC): ICD-10-CM

## 2019-09-27 DIAGNOSIS — N28.9 RENAL INSUFFICIENCY: ICD-10-CM

## 2019-09-27 DIAGNOSIS — N18.4 ANEMIA DUE TO STAGE 4 CHRONIC KIDNEY DISEASE (HCC): ICD-10-CM

## 2019-09-27 DIAGNOSIS — E05.90 HYPERTHYROIDISM: ICD-10-CM

## 2019-09-27 DIAGNOSIS — Z00.00 HEALTHCARE MAINTENANCE: Primary | ICD-10-CM

## 2019-09-27 DIAGNOSIS — D63.1 ANEMIA DUE TO STAGE 4 CHRONIC KIDNEY DISEASE (HCC): ICD-10-CM

## 2019-09-27 PROCEDURE — 96160 PT-FOCUSED HLTH RISK ASSMT: CPT | Performed by: INTERNAL MEDICINE

## 2019-09-27 PROCEDURE — G0439 PPPS, SUBSEQ VISIT: HCPCS | Performed by: INTERNAL MEDICINE

## 2019-09-27 PROCEDURE — G0008 ADMIN INFLUENZA VIRUS VAC: HCPCS | Performed by: INTERNAL MEDICINE

## 2019-09-27 PROCEDURE — 99214 OFFICE O/P EST MOD 30 MIN: CPT | Performed by: INTERNAL MEDICINE

## 2019-09-27 PROCEDURE — 90653 IIV ADJUVANT VACCINE IM: CPT | Performed by: INTERNAL MEDICINE

## 2019-09-27 RX ORDER — CIPROFLOXACIN 250 MG/1
250 TABLET, FILM COATED ORAL DAILY
Qty: 7 TABLET | Refills: 0 | Status: SHIPPED | OUTPATIENT
Start: 2019-09-27 | End: 2020-01-03

## 2019-09-27 RX ORDER — FERROUS SULFATE 325(65) MG
325 TABLET ORAL
Qty: 90 TABLET | Refills: 3 | Status: SHIPPED | OUTPATIENT
Start: 2019-09-27 | End: 2019-09-27

## 2019-09-27 NOTE — PROGRESS NOTES
"Chief Complaint   Patient presents with   • Annual Exam   • Hypertension   • Chronic Renal Failure   • Anemia   • Urinary Frequency   • Leg Swelling       History of Present Illness   Veronica Henao is a 84 y.o. female presents for AWV, to discuss chronic issues, and to discuss acute needs. Patient has CRI w/ most recent GFR 2.85. She is having increased left>right lower extremity edema and feels \"like I'm getting full\". This started about 3 days ago and she has taken 2 lasix in am and one pm (up from one am and one pm) last 2 days but one tablet this am. Reports left is always greater than right and has vascular testing on this neg for dvt in the past. She is having increasing urinary frequency as well but this is in the last 2 days w/ increased lasix. To have u/a but dropped the specimen in the toilet. Does feel like \"I might have a little bit of an infection\". She has dm and glucose is at goal for age. bp elevated on arrival. Reports that it was 132/85 at endocrinology this week. She has an iron deficiency and renal insufficiency related anemia. Reports that oral iron constipates her. She has not had iron infusions or epo injections.         The following portions of the patient's history were reviewed and updated as appropriate: allergies, current medications, past family history, past medical history, past social history, past surgical history and problem list.  Current Outpatient Medications on File Prior to Visit   Medication Sig Dispense Refill   • acetaminophen (TYLENOL) 325 MG tablet Take 2 tablets by mouth Every 6 (Six) Hours As Needed for Mild Pain (1-3).  0   • Alcohol Swabs (B-D SINGLE USE SWABS REGULAR) pads Inject 1 each under the skin Daily. 100 each 11   • Blood Glucose Monitoring Suppl (TRUE METRIX AIR GLUCOSE METER) device 1 each Daily. 1 each 0   • cholecalciferol (VITAMIN D3) 1000 UNITS tablet Take 1,000 Units by mouth daily.     • furosemide (LASIX) 40 MG tablet Take 1 tablet by mouth 3 (Three) " Times a Day. 270 tablet 2   • hydrALAZINE (APRESOLINE) 100 MG tablet Take 1 tablet by mouth 3 (Three) Times a Day. 270 tablet 1   • hydrOXYzine (ATARAX) 25 MG tablet TAKE 1 TABLET AT NIGHT AS NEEDED FOR ITCHING 90 tablet 1   • Incontinence Supply Disposable (BLADDER CONTROL PADS EX ABSORB) misc      • Lancets 28G misc To check sugar qd. E11.9  each 12   • nystatin-triamcinolone (MYCOLOG) 509125-5.1 UNIT/GM-% ointment APPLY TOPICALLY 2 TIMES DAILY 30 g 3   • pramipexole (MIRAPEX) 0.25 MG tablet TAKE 1 TABLET BY MOUTH THREE TIMES DAILY 270 tablet 1   • raNITIdine (ZANTAC) 150 MG tablet TAKE 1 TABLET TWICE DAILY 180 tablet 1   • rivaroxaban (XARELTO) 15 MG tablet Take 1 tablet by mouth Daily With Dinner. 56 tablet 0   • Semaglutide (OZEMPIC) 0.25 or 0.5 MG/DOSE solution pen-injector Inject 0.5 mg under the skin into the appropriate area as directed 1 (One) Time Per Week. 3 pen 4   • sertraline (ZOLOFT) 50 MG tablet Take 1 tablet by mouth Daily. 90 tablet 1   • TRUE METRIX BLOOD GLUCOSE TEST test strip CHECK BLOOD SUGAR TWICE DAILY 100 each 3   • TRUEPLUS LANCETS 28G misc E11.9 DM to check sugar  each 12   • vitamin B-12 (CYANOCOBALAMIN) 1000 MCG tablet Take 1 tablet by mouth Daily. 90 tablet 3   • Vitamin E 400 UNITS chewable tablet Chew 400 Units Daily. Two daily     • Vitamins A & D (VITAMIN A & D) 05576-038 UNITS tablet      • [DISCONTINUED] Multiple Vitamins-Minerals (CENTRUM SILVER PO) Take 1 tablet by mouth Daily.     • [DISCONTINUED] ferrous sulfate 325 (65 FE) MG tablet Take 1 tablet by mouth Daily With Breakfast. 30 tablet 5     No current facility-administered medications on file prior to visit.      Review of Systems   Constitutional: Positive for fatigue.   HENT: Negative.    Eyes: Negative.         Eye exam last month. Macular degeneration.    Respiratory: Positive for shortness of breath.    Cardiovascular: Positive for leg swelling.   Gastrointestinal: Negative.    Endocrine: Negative.   "  Genitourinary: Positive for frequency and urgency.   Musculoskeletal: Positive for arthralgias.        B knee pain but \"I do alright\"   Skin: Negative.    Allergic/Immunologic: Negative.    Neurological: Negative.    Hematological: Negative.    Psychiatric/Behavioral: Negative.        Objective   Physical Exam   Constitutional: She is oriented to person, place, and time. She appears well-developed.   Chronically ill appearing in no acute distress. Alert and conversing easily and well. Pleasant.    HENT:   Head: Normocephalic and atraumatic.   Right Ear: External ear normal.   Left Ear: External ear normal.   Mouth/Throat: Oropharynx is clear and moist.   Eyes: Conjunctivae and EOM are normal. Pupils are equal, round, and reactive to light.   Neck: Normal range of motion. Neck supple.   Cardiovascular: Normal rate, regular rhythm and normal heart sounds.   L >right peripheral edema   Pulmonary/Chest: Effort normal and breath sounds normal.   Abdominal: Soft. Bowel sounds are normal.   Musculoskeletal: Normal range of motion.   Neurological: She is alert and oriented to person, place, and time.   Skin: Skin is warm and dry.   Psychiatric: She has a normal mood and affect. Her behavior is normal. Judgment and thought content normal.   Nursing note and vitals reviewed.       /78   Pulse 91   Ht 157.5 cm (62\")   Wt 69.4 kg (153 lb)   SpO2 98%   BMI 27.98 kg/m²     Assessment/Plan   Diagnoses and all orders for this visit:    Healthcare maintenance    Hyperthyroidism  -     T3, free  -     Thyroid Stimulating Immunoglobulin    Anemia due to stage 4 chronic kidney disease (CMS/HCC)  -     Vitamin B12  -     Folate  -     Iron Profile  -     Ferritin  -     Ambulatory Referral to Hematology / Oncology    Urinary frequency  -     Cancel: POC Urinalysis Dipstick, Automated    Renal insufficiency  -     Basic Metabolic Panel    Acute on chronic congestive heart failure, unspecified heart failure type (CMS/HCC)  -  "    proBNP    Other orders  -     Fluad Quad >65 years ([id number])  -     Discontinue: ferrous sulfate 325 (65 FE) MG tablet; Take 1 tablet by mouth Daily With Breakfast.  -     ciprofloxacin (CIPRO) 250 MG tablet; Take 1 tablet by mouth Daily.    AWV  Patient has no signs of memory changes. She was counseled on fitness, nutirition, and fall precautions. She is current on hcm and will receive a flu vac today. To continue routine eye, dental, etc.     Patient w/ signs of chf. Will increase furosemide to 2 am 1 hs. She will track bp after having next dose lasix. Will repeat bmp and test bnp. She has suspicion of uti and will empyrically treat w/ cipro and send urine for c/s. She anemia of chronic disease w/ some iron deficiency. Will try to replace iron orally but if unable will refer to hematology for iron infusions and possibly procrit therapy. She had a cscope recently. She is not noticing stooling changes. She will f/u in 1 week or prn.

## 2019-09-29 LAB
FERRITIN SERPL-MCNC: 36.8 NG/ML (ref 13–150)
FOLATE SERPL-MCNC: 9.15 NG/ML (ref 4.78–24.2)
IRON SATN MFR SERPL: 9 % (ref 20–50)
IRON SERPL-MCNC: 37 MCG/DL (ref 37–145)
T3FREE SERPL-MCNC: 2.2 PG/ML (ref 2–4.4)
TIBC SERPL-MCNC: 405 MCG/DL
TSI SER-ACNC: <0.1 IU/L (ref 0–0.55)
UIBC SERPL-MCNC: 368 MCG/DL (ref 112–346)
VIT B12 SERPL-MCNC: >2000 PG/ML (ref 211–946)

## 2019-10-03 ENCOUNTER — TELEPHONE (OUTPATIENT)
Dept: ENDOCRINOLOGY | Age: 84
End: 2019-10-03

## 2019-10-03 NOTE — TELEPHONE ENCOUNTER
PT SAYS SHE IS STILL WAITING TO HEAR ABOUT HER LABS AND MEDICATIONS FROM September VISIT  PLEASE CALL   [phone]

## 2019-10-03 NOTE — TELEPHONE ENCOUNTER
Please let the patient know that her thyroid levels have normalized.  TSH, free T3 are normal  TSI levels are also normal.  Advised the patient to repeat her blood work-up in 6 to 8 weeks to make sure that she continues to remain within the normal limits.  No need of thyroid medication or antithyroid medication at this time

## 2019-10-14 ENCOUNTER — OFFICE VISIT (OUTPATIENT)
Dept: INTERNAL MEDICINE | Facility: CLINIC | Age: 84
End: 2019-10-14

## 2019-10-14 VITALS
WEIGHT: 154 LBS | HEART RATE: 86 BPM | HEIGHT: 62 IN | DIASTOLIC BLOOD PRESSURE: 76 MMHG | SYSTOLIC BLOOD PRESSURE: 172 MMHG | BODY MASS INDEX: 28.34 KG/M2

## 2019-10-14 DIAGNOSIS — N28.9 RENAL INSUFFICIENCY: ICD-10-CM

## 2019-10-14 DIAGNOSIS — I10 ESSENTIAL HYPERTENSION: Primary | ICD-10-CM

## 2019-10-14 DIAGNOSIS — N18.4 ANEMIA IN STAGE 4 CHRONIC KIDNEY DISEASE (HCC): ICD-10-CM

## 2019-10-14 DIAGNOSIS — D63.1 ANEMIA IN STAGE 4 CHRONIC KIDNEY DISEASE (HCC): ICD-10-CM

## 2019-10-14 LAB
BASOPHILS # BLD AUTO: 0.07 10*3/MM3 (ref 0–0.2)
BASOPHILS NFR BLD AUTO: 0.8 % (ref 0–1.5)
BUN SERPL-MCNC: 44 MG/DL (ref 8–23)
BUN/CREAT SERPL: 18.4 (ref 7–25)
CALCIUM SERPL-MCNC: 9.5 MG/DL (ref 8.6–10.5)
CHLORIDE SERPL-SCNC: 100 MMOL/L (ref 98–107)
CO2 SERPL-SCNC: 22.3 MMOL/L (ref 22–29)
CREAT SERPL-MCNC: 2.39 MG/DL (ref 0.57–1)
EOSINOPHIL # BLD AUTO: 0.22 10*3/MM3 (ref 0–0.4)
EOSINOPHIL NFR BLD AUTO: 2.6 % (ref 0.3–6.2)
ERYTHROCYTE [DISTWIDTH] IN BLOOD BY AUTOMATED COUNT: 14.2 % (ref 12.3–15.4)
GLUCOSE SERPL-MCNC: 146 MG/DL (ref 65–99)
HCT VFR BLD AUTO: 29.2 % (ref 34–46.6)
HGB BLD-MCNC: 9.4 G/DL (ref 12–15.9)
IMM GRANULOCYTES # BLD AUTO: 0.03 10*3/MM3 (ref 0–0.05)
IMM GRANULOCYTES NFR BLD AUTO: 0.4 % (ref 0–0.5)
LYMPHOCYTES # BLD AUTO: 1.26 10*3/MM3 (ref 0.7–3.1)
LYMPHOCYTES NFR BLD AUTO: 15.2 % (ref 19.6–45.3)
MCH RBC QN AUTO: 30.1 PG (ref 26.6–33)
MCHC RBC AUTO-ENTMCNC: 32.2 G/DL (ref 31.5–35.7)
MCV RBC AUTO: 93.6 FL (ref 79–97)
MONOCYTES # BLD AUTO: 0.42 10*3/MM3 (ref 0.1–0.9)
MONOCYTES NFR BLD AUTO: 5.1 % (ref 5–12)
NEUTROPHILS # BLD AUTO: 6.31 10*3/MM3 (ref 1.7–7)
NEUTROPHILS NFR BLD AUTO: 75.9 % (ref 42.7–76)
NRBC BLD AUTO-RTO: 0 /100 WBC (ref 0–0.2)
PLATELET # BLD AUTO: 300 10*3/MM3 (ref 140–450)
POTASSIUM SERPL-SCNC: 3.7 MMOL/L (ref 3.5–5.2)
RBC # BLD AUTO: 3.12 10*6/MM3 (ref 3.77–5.28)
SODIUM SERPL-SCNC: 138 MMOL/L (ref 136–145)
WBC # BLD AUTO: 8.31 10*3/MM3 (ref 3.4–10.8)

## 2019-10-14 PROCEDURE — 99214 OFFICE O/P EST MOD 30 MIN: CPT | Performed by: INTERNAL MEDICINE

## 2019-10-14 NOTE — PROGRESS NOTES
Chief Complaint   Patient presents with   • Hypertension   • Anemia       History of Present Illness   Veronica Henao is a 84 y.o. female presents for f/u evaluation. She has hypertension. bp was elevated at last visit and she has increased furosemide to 2 am and 1 at night. She reports that she is taking hydralazine only 2 times daily although it is rx 3 times daily.   She reports that metoprolol gave her fatigue. Amlodipine resulted in peripheral edema. She has contraindication for ace/ arb related to renal dysfunction.     The following portions of the patient's history were reviewed and updated as appropriate: allergies, current medications, past family history, past medical history, past social history, past surgical history and problem list.  Current Outpatient Medications on File Prior to Visit   Medication Sig Dispense Refill   • acetaminophen (TYLENOL) 325 MG tablet Take 2 tablets by mouth Every 6 (Six) Hours As Needed for Mild Pain (1-3).  0   • Alcohol Swabs (B-D SINGLE USE SWABS REGULAR) pads Inject 1 each under the skin Daily. 100 each 11   • Blood Glucose Monitoring Suppl (TRUE METRIX AIR GLUCOSE METER) device 1 each Daily. 1 each 0   • cholecalciferol (VITAMIN D3) 1000 UNITS tablet Take 1,000 Units by mouth daily.     • furosemide (LASIX) 40 MG tablet Take 1 tablet by mouth 3 (Three) Times a Day. 270 tablet 2   • hydrALAZINE (APRESOLINE) 100 MG tablet Take 1 tablet by mouth 3 (Three) Times a Day. 270 tablet 1   • hydrOXYzine (ATARAX) 25 MG tablet TAKE 1 TABLET AT NIGHT AS NEEDED FOR ITCHING 90 tablet 1   • Incontinence Supply Disposable (BLADDER CONTROL PADS EX ABSORB) misc      • Lancets 28G misc To check sugar qd. E11.9  each 12   • nystatin-triamcinolone (MYCOLOG) 674175-0.1 UNIT/GM-% ointment APPLY TOPICALLY 2 TIMES DAILY 30 g 3   • pramipexole (MIRAPEX) 0.25 MG tablet TAKE 1 TABLET BY MOUTH THREE TIMES DAILY 270 tablet 1   • raNITIdine (ZANTAC) 150 MG tablet TAKE 1 TABLET TWICE DAILY 180  tablet 1   • rivaroxaban (XARELTO) 15 MG tablet Take 1 tablet by mouth Daily With Dinner. 56 tablet 0   • Semaglutide (OZEMPIC) 0.25 or 0.5 MG/DOSE solution pen-injector Inject 0.5 mg under the skin into the appropriate area as directed 1 (One) Time Per Week. 3 pen 4   • sertraline (ZOLOFT) 50 MG tablet Take 1 tablet by mouth Daily. 90 tablet 1   • TRUE METRIX BLOOD GLUCOSE TEST test strip CHECK BLOOD SUGAR TWICE DAILY 100 each 3   • TRUEPLUS LANCETS 28G misc E11.9 DM to check sugar  each 12   • vitamin B-12 (CYANOCOBALAMIN) 1000 MCG tablet Take 1 tablet by mouth Daily. 90 tablet 3   • Vitamin E 400 UNITS chewable tablet Chew 400 Units Daily. Two daily     • Vitamins A & D (VITAMIN A & D) 22356-386 UNITS tablet      • ciprofloxacin (CIPRO) 250 MG tablet Take 1 tablet by mouth Daily. 7 tablet 0     No current facility-administered medications on file prior to visit.      Review of Systems   Constitutional: Negative.    HENT: Negative.    Eyes: Negative.    Respiratory: Negative.    Cardiovascular: Negative.    Gastrointestinal: Negative.    Endocrine: Negative.    Genitourinary: Negative.    Musculoskeletal: Negative.    Skin: Negative.    Allergic/Immunologic: Negative.    Neurological: Negative.    Hematological: Negative.    Psychiatric/Behavioral: Negative.        Objective   Physical Exam   Constitutional: She is oriented to person, place, and time. She appears well-developed and well-nourished.   HENT:   Head: Normocephalic and atraumatic.   Right Ear: External ear normal.   Left Ear: External ear normal.   Nose: Nose normal.   Mouth/Throat: Oropharynx is clear and moist.   Eyes: Conjunctivae and EOM are normal. Pupils are equal, round, and reactive to light.   Neck: Normal range of motion. Neck supple.   Cardiovascular: Normal rate, regular rhythm, normal heart sounds and intact distal pulses.   Pulmonary/Chest: Effort normal and breath sounds normal. No respiratory distress.   Abdominal: Soft. Bowel  "sounds are normal.   Musculoskeletal: Normal range of motion.   Neurological: She is alert and oriented to person, place, and time.   Skin: Skin is warm and dry.   Psychiatric: She has a normal mood and affect. Her behavior is normal. Judgment and thought content normal.   Nursing note and vitals reviewed.       BP (!) 184/84   Pulse 86   Ht 157.5 cm (62\")   Wt 69.9 kg (154 lb)   BMI 28.17 kg/m²     Assessment/Plan   Diagnoses and all orders for this visit:    Essential hypertension  -     CBC & Differential  -     Basic Metabolic Panel    Anemia in stage 4 chronic kidney disease (CMS/HCC)  -     CBC & Differential  -     Basic Metabolic Panel    Renal insufficiency  -     Basic Metabolic Panel      Patient w htn, anemia, and renal insufficiency. She will increase hydralazine to tid. Continue furosemide at current dosing. Test listed labs. She may need procrit for anemia. She will hydrate well. She will eat a low sodium diet. Close follow up.            "

## 2019-10-14 NOTE — PATIENT INSTRUCTIONS
"d  DASH Eating Plan  DASH stands for \"Dietary Approaches to Stop Hypertension.\" The DASH eating plan is a healthy eating plan that has been shown to reduce high blood pressure (hypertension). It may also reduce your risk for type 2 diabetes, heart disease, and stroke. The DASH eating plan may also help with weight loss.  What are tips for following this plan?    General guidelines  · Avoid eating more than 2,300 mg (milligrams) of salt (sodium) a day. If you have hypertension, you may need to reduce your sodium intake to 1,500 mg a day.  · Limit alcohol intake to no more than 1 drink a day for nonpregnant women and 2 drinks a day for men. One drink equals 12 oz of beer, 5 oz of wine, or 1½ oz of hard liquor.  · Work with your health care provider to maintain a healthy body weight or to lose weight. Ask what an ideal weight is for you.  · Get at least 30 minutes of exercise that causes your heart to beat faster (aerobic exercise) most days of the week. Activities may include walking, swimming, or biking.  · Work with your health care provider or diet and nutrition specialist (dietitian) to adjust your eating plan to your individual calorie needs.  Reading food labels    · Check food labels for the amount of sodium per serving. Choose foods with less than 5 percent of the Daily Value of sodium. Generally, foods with less than 300 mg of sodium per serving fit into this eating plan.  · To find whole grains, look for the word \"whole\" as the first word in the ingredient list.  Shopping  · Buy products labeled as \"low-sodium\" or \"no salt added.\"  · Buy fresh foods. Avoid canned foods and premade or frozen meals.  Cooking  · Avoid adding salt when cooking. Use salt-free seasonings or herbs instead of table salt or sea salt. Check with your health care provider or pharmacist before using salt substitutes.  · Do not gannon foods. Cook foods using healthy methods such as baking, boiling, grilling, and broiling instead.  · Cook " with heart-healthy oils, such as olive, canola, soybean, or sunflower oil.  Meal planning  · Eat a balanced diet that includes:  ? 5 or more servings of fruits and vegetables each day. At each meal, try to fill half of your plate with fruits and vegetables.  ? Up to 6-8 servings of whole grains each day.  ? Less than 6 oz of lean meat, poultry, or fish each day. A 3-oz serving of meat is about the same size as a deck of cards. One egg equals 1 oz.  ? 2 servings of low-fat dairy each day.  ? A serving of nuts, seeds, or beans 5 times each week.  ? Heart-healthy fats. Healthy fats called Omega-3 fatty acids are found in foods such as flaxseeds and coldwater fish, like sardines, salmon, and mackerel.  · Limit how much you eat of the following:  ? Canned or prepackaged foods.  ? Food that is high in trans fat, such as fried foods.  ? Food that is high in saturated fat, such as fatty meat.  ? Sweets, desserts, sugary drinks, and other foods with added sugar.  ? Full-fat dairy products.  · Do not salt foods before eating.  · Try to eat at least 2 vegetarian meals each week.  · Eat more home-cooked food and less restaurant, buffet, and fast food.  · When eating at a restaurant, ask that your food be prepared with less salt or no salt, if possible.  What foods are recommended?  The items listed may not be a complete list. Talk with your dietitian about what dietary choices are best for you.  Grains  Whole-grain or whole-wheat bread. Whole-grain or whole-wheat pasta. Brown rice. Oatmeal. Quinoa. Bulgur. Whole-grain and low-sodium cereals. Anjana bread. Low-fat, low-sodium crackers. Whole-wheat flour tortillas.  Vegetables  Fresh or frozen vegetables (raw, steamed, roasted, or grilled). Low-sodium or reduced-sodium tomato and vegetable juice. Low-sodium or reduced-sodium tomato sauce and tomato paste. Low-sodium or reduced-sodium canned vegetables.  Fruits  All fresh, dried, or frozen fruit. Canned fruit in natural juice  (without added sugar).  Meat and other protein foods  Skinless chicken or turkey. Ground chicken or turkey. Pork with fat trimmed off. Fish and seafood. Egg whites. Dried beans, peas, or lentils. Unsalted nuts, nut butters, and seeds. Unsalted canned beans. Lean cuts of beef with fat trimmed off. Low-sodium, lean deli meat.  Dairy  Low-fat (1%) or fat-free (skim) milk. Fat-free, low-fat, or reduced-fat cheeses. Nonfat, low-sodium ricotta or cottage cheese. Low-fat or nonfat yogurt. Low-fat, low-sodium cheese.  Fats and oils  Soft margarine without trans fats. Vegetable oil. Low-fat, reduced-fat, or light mayonnaise and salad dressings (reduced-sodium). Canola, safflower, olive, soybean, and sunflower oils. Avocado.  Seasoning and other foods  Herbs. Spices. Seasoning mixes without salt. Unsalted popcorn and pretzels. Fat-free sweets.  What foods are not recommended?  The items listed may not be a complete list. Talk with your dietitian about what dietary choices are best for you.  Grains  Baked goods made with fat, such as croissants, muffins, or some breads. Dry pasta or rice meal packs.  Vegetables  Creamed or fried vegetables. Vegetables in a cheese sauce. Regular canned vegetables (not low-sodium or reduced-sodium). Regular canned tomato sauce and paste (not low-sodium or reduced-sodium). Regular tomato and vegetable juice (not low-sodium or reduced-sodium). Pickles. Olives.  Fruits  Canned fruit in a light or heavy syrup. Fried fruit. Fruit in cream or butter sauce.  Meat and other protein foods  Fatty cuts of meat. Ribs. Fried meat. Cisneros. Sausage. Bologna and other processed lunch meats. Salami. Fatback. Hotdogs. Bratwurst. Salted nuts and seeds. Canned beans with added salt. Canned or smoked fish. Whole eggs or egg yolks. Chicken or turkey with skin.  Dairy  Whole or 2% milk, cream, and half-and-half. Whole or full-fat cream cheese. Whole-fat or sweetened yogurt. Full-fat cheese. Nondairy creamers. Whipped  toppings. Processed cheese and cheese spreads.  Fats and oils  Butter. Stick margarine. Lard. Shortening. Ghee. Cisneros fat. Tropical oils, such as coconut, palm kernel, or palm oil.  Seasoning and other foods  Salted popcorn and pretzels. Onion salt, garlic salt, seasoned salt, table salt, and sea salt. Worcestershire sauce. Tartar sauce. Barbecue sauce. Teriyaki sauce. Soy sauce, including reduced-sodium. Steak sauce. Canned and packaged gravies. Fish sauce. Oyster sauce. Cocktail sauce. Horseradish that you find on the shelf. Ketchup. Mustard. Meat flavorings and tenderizers. Bouillon cubes. Hot sauce and Tabasco sauce. Premade or packaged marinades. Premade or packaged taco seasonings. Relishes. Regular salad dressings.  Where to find more information:  · National Heart, Lung, and Blood [place]: www.nhlbi.nih.gov  · American Heart Association: www.heart.org  Summary  · The DASH eating plan is a healthy eating plan that has been shown to reduce high blood pressure (hypertension). It may also reduce your risk for type 2 diabetes, heart disease, and stroke.  · With the DASH eating plan, you should limit salt (sodium) intake to 2,300 mg a day. If you have hypertension, you may need to reduce your sodium intake to 1,500 mg a day.  · When on the DASH eating plan, aim to eat more fresh fruits and vegetables, whole grains, lean proteins, low-fat dairy, and heart-healthy fats.  · Work with your health care provider or diet and nutrition specialist (dietitian) to adjust your eating plan to your individual calorie needs.  This information is not intended to replace advice given to you by your health care provider. Make sure you discuss any questions you have with your health care provider.  Document Released: 12/06/2012 Document Revised: 12/11/2017 Document Reviewed: 12/11/2017  [employer] Interactive Patient Education © 2019 [employer]

## 2019-10-29 ENCOUNTER — APPOINTMENT (OUTPATIENT)
Dept: OTHER | Facility: HOSPITAL | Age: 84
End: 2019-10-29

## 2019-10-29 ENCOUNTER — APPOINTMENT (OUTPATIENT)
Dept: ONCOLOGY | Facility: CLINIC | Age: 84
End: 2019-10-29

## 2019-11-19 ENCOUNTER — TELEPHONE (OUTPATIENT)
Dept: ONCOLOGY | Facility: CLINIC | Age: 84
End: 2019-11-19

## 2019-12-03 ENCOUNTER — TELEPHONE (OUTPATIENT)
Dept: INTERNAL MEDICINE | Facility: CLINIC | Age: 84
End: 2019-12-03

## 2019-12-03 NOTE — TELEPHONE ENCOUNTER
Pt is taking zantac bid asking you to change to something else  She states she is having acid reflux and vomiting every night from acid.  Pt has an appt Monday

## 2019-12-05 RX ORDER — OMEPRAZOLE 40 MG/1
40 CAPSULE, DELAYED RELEASE ORAL DAILY
Qty: 90 CAPSULE | Refills: 1 | Status: SHIPPED | OUTPATIENT
Start: 2019-12-05 | End: 2019-12-09 | Stop reason: ALTCHOICE

## 2019-12-09 ENCOUNTER — OFFICE VISIT (OUTPATIENT)
Dept: INTERNAL MEDICINE | Facility: CLINIC | Age: 84
End: 2019-12-09

## 2019-12-09 VITALS
BODY MASS INDEX: 28.34 KG/M2 | OXYGEN SATURATION: 99 % | HEIGHT: 62 IN | DIASTOLIC BLOOD PRESSURE: 62 MMHG | WEIGHT: 154 LBS | SYSTOLIC BLOOD PRESSURE: 156 MMHG | HEART RATE: 84 BPM

## 2019-12-09 DIAGNOSIS — I50.32 CHRONIC DIASTOLIC CONGESTIVE HEART FAILURE (HCC): Primary | ICD-10-CM

## 2019-12-09 DIAGNOSIS — N18.4 CHRONIC KIDNEY DISEASE, STAGE IV (SEVERE) (HCC): ICD-10-CM

## 2019-12-09 DIAGNOSIS — I10 ESSENTIAL HYPERTENSION: ICD-10-CM

## 2019-12-09 DIAGNOSIS — E11.22 TYPE 2 DIABETES MELLITUS WITH CHRONIC KIDNEY DISEASE, WITH LONG-TERM CURRENT USE OF INSULIN, UNSPECIFIED CKD STAGE (HCC): ICD-10-CM

## 2019-12-09 DIAGNOSIS — D63.1 ANEMIA IN STAGE 4 CHRONIC KIDNEY DISEASE (HCC): ICD-10-CM

## 2019-12-09 DIAGNOSIS — N18.4 ANEMIA IN STAGE 4 CHRONIC KIDNEY DISEASE (HCC): ICD-10-CM

## 2019-12-09 DIAGNOSIS — K21.9 GASTROESOPHAGEAL REFLUX DISEASE WITHOUT ESOPHAGITIS: ICD-10-CM

## 2019-12-09 DIAGNOSIS — Z79.4 TYPE 2 DIABETES MELLITUS WITH CHRONIC KIDNEY DISEASE, WITH LONG-TERM CURRENT USE OF INSULIN, UNSPECIFIED CKD STAGE (HCC): ICD-10-CM

## 2019-12-09 PROCEDURE — 99214 OFFICE O/P EST MOD 30 MIN: CPT | Performed by: INTERNAL MEDICINE

## 2019-12-09 RX ORDER — PANTOPRAZOLE SODIUM 40 MG/1
40 TABLET, DELAYED RELEASE ORAL DAILY
Qty: 90 TABLET | Refills: 3 | Status: SHIPPED | OUTPATIENT
Start: 2019-12-09 | End: 2019-12-13 | Stop reason: SDUPTHER

## 2019-12-10 LAB
BASOPHILS # BLD AUTO: 0.04 10*3/MM3 (ref 0–0.2)
BASOPHILS NFR BLD AUTO: 0.6 % (ref 0–1.5)
BUN SERPL-MCNC: 46 MG/DL (ref 8–23)
BUN/CREAT SERPL: 17 (ref 7–25)
CALCIUM SERPL-MCNC: 9.2 MG/DL (ref 8.6–10.5)
CHLORIDE SERPL-SCNC: 99 MMOL/L (ref 98–107)
CO2 SERPL-SCNC: 23.8 MMOL/L (ref 22–29)
CREAT SERPL-MCNC: 2.7 MG/DL (ref 0.57–1)
EOSINOPHIL # BLD AUTO: 0.19 10*3/MM3 (ref 0–0.4)
EOSINOPHIL NFR BLD AUTO: 2.7 % (ref 0.3–6.2)
ERYTHROCYTE [DISTWIDTH] IN BLOOD BY AUTOMATED COUNT: 14.8 % (ref 12.3–15.4)
GLUCOSE SERPL-MCNC: 160 MG/DL (ref 65–99)
HBA1C MFR BLD: 7 % (ref 4.8–5.6)
HCT VFR BLD AUTO: 28 % (ref 34–46.6)
HGB BLD-MCNC: 9.2 G/DL (ref 12–15.9)
IMM GRANULOCYTES # BLD AUTO: 0.05 10*3/MM3 (ref 0–0.05)
IMM GRANULOCYTES NFR BLD AUTO: 0.7 % (ref 0–0.5)
LYMPHOCYTES # BLD AUTO: 1.15 10*3/MM3 (ref 0.7–3.1)
LYMPHOCYTES NFR BLD AUTO: 16.2 % (ref 19.6–45.3)
MCH RBC QN AUTO: 30.1 PG (ref 26.6–33)
MCHC RBC AUTO-ENTMCNC: 32.9 G/DL (ref 31.5–35.7)
MCV RBC AUTO: 91.5 FL (ref 79–97)
MONOCYTES # BLD AUTO: 0.41 10*3/MM3 (ref 0.1–0.9)
MONOCYTES NFR BLD AUTO: 5.8 % (ref 5–12)
NEUTROPHILS # BLD AUTO: 5.28 10*3/MM3 (ref 1.7–7)
NEUTROPHILS NFR BLD AUTO: 74 % (ref 42.7–76)
NRBC BLD AUTO-RTO: 0 /100 WBC (ref 0–0.2)
PLATELET # BLD AUTO: 256 10*3/MM3 (ref 140–450)
POTASSIUM SERPL-SCNC: 3.8 MMOL/L (ref 3.5–5.2)
RBC # BLD AUTO: 3.06 10*6/MM3 (ref 3.77–5.28)
SODIUM SERPL-SCNC: 139 MMOL/L (ref 136–145)
TSH SERPL DL<=0.005 MIU/L-ACNC: 2 UIU/ML (ref 0.27–4.2)
WBC # BLD AUTO: 7.12 10*3/MM3 (ref 3.4–10.8)

## 2019-12-12 ENCOUNTER — CLINICAL SUPPORT NO REQUIREMENTS (OUTPATIENT)
Dept: CARDIOLOGY | Facility: CLINIC | Age: 84
End: 2019-12-12

## 2019-12-12 DIAGNOSIS — I44.2 COMPLETE HEART BLOCK (HCC): Primary | ICD-10-CM

## 2019-12-12 PROCEDURE — 93296 REM INTERROG EVL PM/IDS: CPT | Performed by: INTERNAL MEDICINE

## 2019-12-12 PROCEDURE — 93294 REM INTERROG EVL PM/LDLS PM: CPT | Performed by: INTERNAL MEDICINE

## 2019-12-13 RX ORDER — PANTOPRAZOLE SODIUM 40 MG/1
40 TABLET, DELAYED RELEASE ORAL DAILY
Qty: 90 TABLET | Refills: 3 | Status: SHIPPED | OUTPATIENT
Start: 2019-12-13 | End: 2020-01-01 | Stop reason: ALTCHOICE

## 2019-12-23 ENCOUNTER — RESULTS ENCOUNTER (OUTPATIENT)
Dept: ENDOCRINOLOGY | Age: 84
End: 2019-12-23

## 2019-12-23 DIAGNOSIS — E05.90 HYPERTHYROIDISM: ICD-10-CM

## 2020-01-01 ENCOUNTER — TELEPHONE (OUTPATIENT)
Dept: PHARMACY | Facility: HOSPITAL | Age: 85
End: 2020-01-01

## 2020-01-01 ENCOUNTER — TELEPHONE (OUTPATIENT)
Dept: CARDIOLOGY | Facility: CLINIC | Age: 85
End: 2020-01-01

## 2020-01-01 ENCOUNTER — ANTICOAGULATION VISIT (OUTPATIENT)
Dept: PHARMACY | Facility: HOSPITAL | Age: 85
End: 2020-01-01

## 2020-01-01 ENCOUNTER — OFFICE VISIT (OUTPATIENT)
Dept: INTERNAL MEDICINE | Facility: CLINIC | Age: 85
End: 2020-01-01

## 2020-01-01 ENCOUNTER — TELEPHONE (OUTPATIENT)
Dept: INTERNAL MEDICINE | Facility: CLINIC | Age: 85
End: 2020-01-01

## 2020-01-01 ENCOUNTER — DOCUMENTATION (OUTPATIENT)
Dept: PHYSICAL THERAPY | Facility: HOSPITAL | Age: 85
End: 2020-01-01

## 2020-01-01 ENCOUNTER — OFFICE VISIT (OUTPATIENT)
Dept: CARDIOLOGY | Facility: CLINIC | Age: 85
End: 2020-01-01

## 2020-01-01 VITALS
DIASTOLIC BLOOD PRESSURE: 65 MMHG | BODY MASS INDEX: 24.11 KG/M2 | SYSTOLIC BLOOD PRESSURE: 116 MMHG | HEIGHT: 62 IN | WEIGHT: 131 LBS

## 2020-01-01 VITALS
BODY MASS INDEX: 24.92 KG/M2 | HEART RATE: 83 BPM | DIASTOLIC BLOOD PRESSURE: 56 MMHG | TEMPERATURE: 97.3 F | RESPIRATION RATE: 16 BRPM | SYSTOLIC BLOOD PRESSURE: 121 MMHG | HEIGHT: 61 IN | WEIGHT: 132 LBS

## 2020-01-01 VITALS
SYSTOLIC BLOOD PRESSURE: 120 MMHG | BODY MASS INDEX: 25.95 KG/M2 | OXYGEN SATURATION: 96 % | DIASTOLIC BLOOD PRESSURE: 64 MMHG | RESPIRATION RATE: 18 BRPM | TEMPERATURE: 96.8 F | HEIGHT: 62 IN | WEIGHT: 141 LBS | HEART RATE: 70 BPM

## 2020-01-01 DIAGNOSIS — N18.4 CHRONIC KIDNEY DISEASE, STAGE IV (SEVERE) (HCC): ICD-10-CM

## 2020-01-01 DIAGNOSIS — Z79.01 CHRONIC ANTICOAGULATION: ICD-10-CM

## 2020-01-01 DIAGNOSIS — I50.32 CHRONIC DIASTOLIC CONGESTIVE HEART FAILURE (HCC): ICD-10-CM

## 2020-01-01 DIAGNOSIS — R73.09 ELEVATED GLUCOSE: ICD-10-CM

## 2020-01-01 DIAGNOSIS — I10 ESSENTIAL HYPERTENSION: Primary | ICD-10-CM

## 2020-01-01 DIAGNOSIS — N18.4 ANEMIA IN STAGE 4 CHRONIC KIDNEY DISEASE (HCC): ICD-10-CM

## 2020-01-01 DIAGNOSIS — G62.9 PERIPHERAL POLYNEUROPATHY: ICD-10-CM

## 2020-01-01 DIAGNOSIS — E11.22 TYPE 2 DIABETES MELLITUS WITH CHRONIC KIDNEY DISEASE, WITH LONG-TERM CURRENT USE OF INSULIN, UNSPECIFIED CKD STAGE (HCC): Primary | ICD-10-CM

## 2020-01-01 DIAGNOSIS — D63.1 ANEMIA IN STAGE 4 CHRONIC KIDNEY DISEASE (HCC): ICD-10-CM

## 2020-01-01 DIAGNOSIS — I48.92 ATRIAL FLUTTER, PAROXYSMAL (HCC): ICD-10-CM

## 2020-01-01 DIAGNOSIS — R42 DIZZINESS: Primary | ICD-10-CM

## 2020-01-01 DIAGNOSIS — K29.50 CHRONIC GASTRITIS WITHOUT BLEEDING, UNSPECIFIED GASTRITIS TYPE: Primary | ICD-10-CM

## 2020-01-01 DIAGNOSIS — H83.2X9 VESTIBULAR HYPOFUNCTION, UNSPECIFIED LATERALITY: ICD-10-CM

## 2020-01-01 DIAGNOSIS — I48.20 CHRONIC ATRIAL FIBRILLATION (HCC): ICD-10-CM

## 2020-01-01 DIAGNOSIS — G25.81 RESTLESS LEGS: ICD-10-CM

## 2020-01-01 DIAGNOSIS — E11.42 TYPE 2 DIABETES MELLITUS WITH DIABETIC POLYNEUROPATHY, WITHOUT LONG-TERM CURRENT USE OF INSULIN (HCC): ICD-10-CM

## 2020-01-01 DIAGNOSIS — Z79.4 TYPE 2 DIABETES MELLITUS WITH CHRONIC KIDNEY DISEASE, WITH LONG-TERM CURRENT USE OF INSULIN, UNSPECIFIED CKD STAGE (HCC): Primary | ICD-10-CM

## 2020-01-01 DIAGNOSIS — I48.20 CHRONIC ATRIAL FIBRILLATION (HCC): Primary | ICD-10-CM

## 2020-01-01 DIAGNOSIS — G47.33 OSA (OBSTRUCTIVE SLEEP APNEA): Primary | ICD-10-CM

## 2020-01-01 DIAGNOSIS — I10 ESSENTIAL HYPERTENSION: ICD-10-CM

## 2020-01-01 DIAGNOSIS — D64.9 ANEMIA, UNSPECIFIED TYPE: Primary | ICD-10-CM

## 2020-01-01 DIAGNOSIS — H81.10 BPPV (BENIGN PAROXYSMAL POSITIONAL VERTIGO), UNSPECIFIED LATERALITY: ICD-10-CM

## 2020-01-01 DIAGNOSIS — E78.49 OTHER HYPERLIPIDEMIA: ICD-10-CM

## 2020-01-01 DIAGNOSIS — K21.9 GASTROESOPHAGEAL REFLUX DISEASE WITHOUT ESOPHAGITIS: ICD-10-CM

## 2020-01-01 DIAGNOSIS — Z95.0 CONTROL OF ATRIAL FIBRILLATION WITH PACEMAKER (HCC): Primary | Chronic | ICD-10-CM

## 2020-01-01 DIAGNOSIS — I48.91 CONTROL OF ATRIAL FIBRILLATION WITH PACEMAKER (HCC): Primary | Chronic | ICD-10-CM

## 2020-01-01 LAB
25(OH)D3+25(OH)D2 SERPL-MCNC: 29.5 NG/ML (ref 30–100)
ALBUMIN SERPL-MCNC: 4.3 G/DL (ref 3.5–5.2)
ALBUMIN SERPL-MCNC: 4.4 G/DL (ref 3.5–5.2)
ALBUMIN/GLOB SERPL: 1.3 G/DL
ALBUMIN/GLOB SERPL: 1.6 G/DL
ALP SERPL-CCNC: 190 U/L (ref 39–117)
ALP SERPL-CCNC: 191 U/L (ref 39–117)
ALT SERPL-CCNC: 10 U/L (ref 1–33)
ALT SERPL-CCNC: 9 U/L (ref 1–33)
AST SERPL-CCNC: 14 U/L (ref 1–32)
AST SERPL-CCNC: 14 U/L (ref 1–32)
BASOPHILS # BLD AUTO: 0.06 10*3/MM3 (ref 0–0.2)
BASOPHILS # BLD AUTO: 0.08 10*3/MM3 (ref 0–0.2)
BASOPHILS NFR BLD AUTO: 0.4 % (ref 0–1.5)
BASOPHILS NFR BLD AUTO: 1 % (ref 0–1.5)
BILIRUB SERPL-MCNC: 0.3 MG/DL (ref 0–1.2)
BILIRUB SERPL-MCNC: 0.4 MG/DL (ref 0.2–1.2)
BUN SERPL-MCNC: 29 MG/DL (ref 8–23)
BUN SERPL-MCNC: 35 MG/DL (ref 8–23)
BUN/CREAT SERPL: 8.1 (ref 7–25)
BUN/CREAT SERPL: 9.6 (ref 7–25)
CALCIUM SERPL-MCNC: 10.8 MG/DL (ref 8.6–10.5)
CALCIUM SERPL-MCNC: 9.6 MG/DL (ref 8.6–10.5)
CHLORIDE SERPL-SCNC: 91 MMOL/L (ref 98–107)
CHLORIDE SERPL-SCNC: 93 MMOL/L (ref 98–107)
CO2 SERPL-SCNC: 26.4 MMOL/L (ref 22–29)
CO2 SERPL-SCNC: 31.3 MMOL/L (ref 22–29)
CREAT SERPL-MCNC: 3.02 MG/DL (ref 0.57–1)
CREAT SERPL-MCNC: 4.34 MG/DL (ref 0.57–1)
EOSINOPHIL # BLD AUTO: 0.07 10*3/MM3 (ref 0–0.4)
EOSINOPHIL # BLD AUTO: 0.2 10*3/MM3 (ref 0–0.4)
EOSINOPHIL NFR BLD AUTO: 0.5 % (ref 0.3–6.2)
EOSINOPHIL NFR BLD AUTO: 2.5 % (ref 0.3–6.2)
ERYTHROCYTE [DISTWIDTH] IN BLOOD BY AUTOMATED COUNT: 13.5 % (ref 12.3–15.4)
ERYTHROCYTE [DISTWIDTH] IN BLOOD BY AUTOMATED COUNT: 13.8 % (ref 12.3–15.4)
GLOBULIN SER CALC-MCNC: 2.7 GM/DL
GLOBULIN SER CALC-MCNC: 3.3 GM/DL
GLUCOSE SERPL-MCNC: 176 MG/DL (ref 65–99)
GLUCOSE SERPL-MCNC: 194 MG/DL (ref 65–99)
HBA1C MFR BLD: 6.5 % (ref 4.8–5.6)
HBA1C MFR BLD: 7 % (ref 4.8–5.6)
HCT VFR BLD AUTO: 33.4 % (ref 34–46.6)
HCT VFR BLD AUTO: 39.5 % (ref 34–46.6)
HGB BLD-MCNC: 11.1 G/DL (ref 12–15.9)
HGB BLD-MCNC: 13.3 G/DL (ref 12–15.9)
IMM GRANULOCYTES # BLD AUTO: 0.05 10*3/MM3 (ref 0–0.05)
IMM GRANULOCYTES # BLD AUTO: 0.07 10*3/MM3 (ref 0–0.05)
IMM GRANULOCYTES NFR BLD AUTO: 0.5 % (ref 0–0.5)
IMM GRANULOCYTES NFR BLD AUTO: 0.6 % (ref 0–0.5)
INR PPP: 1.1
INR PPP: 1.5
INR PPP: 1.6
LYMPHOCYTES # BLD AUTO: 1.06 10*3/MM3 (ref 0.7–3.1)
LYMPHOCYTES # BLD AUTO: 1.71 10*3/MM3 (ref 0.7–3.1)
LYMPHOCYTES NFR BLD AUTO: 21.6 % (ref 19.6–45.3)
LYMPHOCYTES NFR BLD AUTO: 7.4 % (ref 19.6–45.3)
MCH RBC QN AUTO: 32.6 PG (ref 26.6–33)
MCH RBC QN AUTO: 32.6 PG (ref 26.6–33)
MCHC RBC AUTO-ENTMCNC: 33.2 G/DL (ref 31.5–35.7)
MCHC RBC AUTO-ENTMCNC: 33.7 G/DL (ref 31.5–35.7)
MCV RBC AUTO: 96.8 FL (ref 79–97)
MCV RBC AUTO: 97.9 FL (ref 79–97)
MONOCYTES # BLD AUTO: 0.6 10*3/MM3 (ref 0.1–0.9)
MONOCYTES # BLD AUTO: 0.66 10*3/MM3 (ref 0.1–0.9)
MONOCYTES NFR BLD AUTO: 4.2 % (ref 5–12)
MONOCYTES NFR BLD AUTO: 8.3 % (ref 5–12)
NEUTROPHILS # BLD AUTO: 12.38 10*3/MM3 (ref 1.7–7)
NEUTROPHILS # BLD AUTO: 5.23 10*3/MM3 (ref 1.7–7)
NEUTROPHILS NFR BLD AUTO: 66 % (ref 42.7–76)
NEUTROPHILS NFR BLD AUTO: 87 % (ref 42.7–76)
NRBC BLD AUTO-RTO: 0 /100 WBC (ref 0–0.2)
NRBC BLD AUTO-RTO: 0 /100 WBC (ref 0–0.2)
PHOSPHATE SERPL-MCNC: 2.3 MG/DL (ref 2.5–4.5)
PLATELET # BLD AUTO: 195 10*3/MM3 (ref 140–450)
PLATELET # BLD AUTO: 220 10*3/MM3 (ref 140–450)
POTASSIUM SERPL-SCNC: 4 MMOL/L (ref 3.5–5.2)
POTASSIUM SERPL-SCNC: 4 MMOL/L (ref 3.5–5.2)
PROT SERPL-MCNC: 7.1 G/DL (ref 6–8.5)
PROT SERPL-MCNC: 7.6 G/DL (ref 6–8.5)
RBC # BLD AUTO: 3.41 10*6/MM3 (ref 3.77–5.28)
RBC # BLD AUTO: 4.08 10*6/MM3 (ref 3.77–5.28)
SODIUM SERPL-SCNC: 130 MMOL/L (ref 136–145)
SODIUM SERPL-SCNC: 136 MMOL/L (ref 136–145)
TSH SERPL DL<=0.005 MIU/L-ACNC: 3.43 UIU/ML (ref 0.27–4.2)
WBC # BLD AUTO: 14.24 10*3/MM3 (ref 3.4–10.8)
WBC # BLD AUTO: 7.93 10*3/MM3 (ref 3.4–10.8)

## 2020-01-01 PROCEDURE — 99214 OFFICE O/P EST MOD 30 MIN: CPT | Performed by: INTERNAL MEDICINE

## 2020-01-01 PROCEDURE — 99441 PR PHYS/QHP TELEPHONE EVALUATION 5-10 MIN: CPT | Performed by: NURSE PRACTITIONER

## 2020-01-01 PROCEDURE — 99443 PR PHYS/QHP TELEPHONE EVALUATION 21-30 MIN: CPT | Performed by: INTERNAL MEDICINE

## 2020-01-01 RX ORDER — HYDROXYZINE HYDROCHLORIDE 25 MG/1
25 TABLET, FILM COATED ORAL 3 TIMES DAILY PRN
COMMUNITY
End: 2020-01-01

## 2020-01-01 RX ORDER — OFLOXACIN 3 MG/ML
2 SOLUTION/ DROPS OPHTHALMIC 4 TIMES DAILY
Qty: 5 ML | Refills: 1 | Status: SHIPPED | OUTPATIENT
Start: 2020-01-01 | End: 2021-01-01 | Stop reason: SDUPTHER

## 2020-01-01 RX ORDER — PANTOPRAZOLE SODIUM 40 MG/1
40 TABLET, DELAYED RELEASE ORAL DAILY
Qty: 90 TABLET | Refills: 3 | Status: SHIPPED | OUTPATIENT
Start: 2020-01-01 | End: 2020-01-01 | Stop reason: SDUPTHER

## 2020-01-01 RX ORDER — PRAMIPEXOLE DIHYDROCHLORIDE 0.25 MG/1
TABLET ORAL
Qty: 270 TABLET | Refills: 1 | Status: SHIPPED | OUTPATIENT
Start: 2020-01-01 | End: 2021-01-01

## 2020-01-01 RX ORDER — PRAMIPEXOLE DIHYDROCHLORIDE 0.25 MG/1
0.25 TABLET ORAL 3 TIMES DAILY
Qty: 180 TABLET | Refills: 1 | Status: SHIPPED | OUTPATIENT
Start: 2020-01-01 | End: 2020-01-01

## 2020-01-01 RX ORDER — PANTOPRAZOLE SODIUM 40 MG/1
40 TABLET, DELAYED RELEASE ORAL NIGHTLY
Qty: 90 TABLET | Refills: 2 | Status: SHIPPED | OUTPATIENT
Start: 2020-01-01 | End: 2020-01-01

## 2020-01-01 RX ORDER — HYDRALAZINE HYDROCHLORIDE 25 MG/1
25 TABLET, FILM COATED ORAL 2 TIMES DAILY
Qty: 180 TABLET | Refills: 2 | Status: SHIPPED | OUTPATIENT
Start: 2020-01-01 | End: 2020-01-01

## 2020-01-01 RX ORDER — GABAPENTIN 300 MG/1
300 CAPSULE ORAL 2 TIMES DAILY
Qty: 180 CAPSULE | Refills: 1 | Status: SHIPPED | OUTPATIENT
Start: 2020-01-01 | End: 2020-01-01 | Stop reason: SDUPTHER

## 2020-01-01 RX ORDER — FAMOTIDINE 40 MG/1
40 TABLET, FILM COATED ORAL DAILY
Qty: 90 TABLET | Refills: 3 | Status: SHIPPED | OUTPATIENT
Start: 2020-01-01 | End: 2020-01-01 | Stop reason: SDUPTHER

## 2020-01-01 RX ORDER — GABAPENTIN 300 MG/1
300 CAPSULE ORAL 2 TIMES DAILY
Qty: 180 CAPSULE | Refills: 1 | Status: SHIPPED | OUTPATIENT
Start: 2020-01-01 | End: 2021-01-01 | Stop reason: HOSPADM

## 2020-01-01 RX ORDER — FAMOTIDINE 40 MG/1
40 TABLET, FILM COATED ORAL DAILY
Qty: 90 TABLET | Refills: 1 | Status: SHIPPED | OUTPATIENT
Start: 2020-01-01 | End: 2020-01-01 | Stop reason: SDUPTHER

## 2020-01-01 RX ORDER — LATANOPROST 50 UG/ML
SOLUTION/ DROPS OPHTHALMIC
COMMUNITY
Start: 2020-01-01

## 2020-01-01 RX ORDER — FAMOTIDINE 40 MG/1
40 TABLET, FILM COATED ORAL DAILY
Qty: 90 TABLET | Refills: 3 | Status: SHIPPED | OUTPATIENT
Start: 2020-01-01 | End: 2021-01-01 | Stop reason: HOSPADM

## 2020-01-01 RX ORDER — OMEPRAZOLE 40 MG/1
40 CAPSULE, DELAYED RELEASE ORAL DAILY
Qty: 90 CAPSULE | Refills: 1 | Status: SHIPPED | OUTPATIENT
Start: 2020-01-01 | End: 2020-01-01

## 2020-01-01 RX ORDER — OMEPRAZOLE 40 MG/1
40 CAPSULE, DELAYED RELEASE ORAL DAILY
Qty: 30 CAPSULE | Refills: 1 | Status: SHIPPED | OUTPATIENT
Start: 2020-01-01 | End: 2020-01-01

## 2020-01-01 RX ORDER — OMEPRAZOLE 40 MG/1
40 CAPSULE, DELAYED RELEASE ORAL DAILY
Qty: 90 CAPSULE | Refills: 1 | Status: SHIPPED | OUTPATIENT
Start: 2020-01-01 | End: 2020-01-01 | Stop reason: SDUPTHER

## 2020-01-01 RX ORDER — PRAMIPEXOLE DIHYDROCHLORIDE 0.25 MG/1
TABLET ORAL
Qty: 180 TABLET | Refills: 1 | Status: SHIPPED | OUTPATIENT
Start: 2020-01-01 | End: 2020-01-01 | Stop reason: SDUPTHER

## 2020-01-03 ENCOUNTER — OFFICE VISIT (OUTPATIENT)
Dept: INTERNAL MEDICINE | Facility: CLINIC | Age: 85
End: 2020-01-03

## 2020-01-03 VITALS
BODY MASS INDEX: 30.18 KG/M2 | HEART RATE: 88 BPM | WEIGHT: 164 LBS | DIASTOLIC BLOOD PRESSURE: 64 MMHG | SYSTOLIC BLOOD PRESSURE: 161 MMHG | HEIGHT: 62 IN

## 2020-01-03 DIAGNOSIS — N18.4 CHRONIC KIDNEY DISEASE, STAGE IV (SEVERE) (HCC): ICD-10-CM

## 2020-01-03 DIAGNOSIS — I50.9 CHRONIC CONGESTIVE HEART FAILURE, UNSPECIFIED HEART FAILURE TYPE (HCC): Primary | ICD-10-CM

## 2020-01-03 PROCEDURE — 99214 OFFICE O/P EST MOD 30 MIN: CPT | Performed by: INTERNAL MEDICINE

## 2020-01-03 RX ORDER — BUMETANIDE 1 MG/1
2 TABLET ORAL 2 TIMES DAILY
Qty: 120 TABLET | Refills: 2 | Status: SHIPPED | OUTPATIENT
Start: 2020-01-03 | End: 2020-01-14 | Stop reason: HOSPADM

## 2020-01-03 NOTE — PROGRESS NOTES
Chief Complaint   Patient presents with   • Edema     legs       History of Present Illness   Veronica Henao is a 84 y.o. female presents for acute care. She has increased leg swelling recently. She has gained about 10 pounds since last visit. She feels fatigue. Has increased activity during the holidays w/ long term standing. She has noticed some peripheral neuropathy. Self administered gabapentin from grandson for peripheral neuropathy and did have some benefit with this.     The following portions of the patient's history were reviewed and updated as appropriate: allergies, current medications, past family history, past medical history, past social history, past surgical history and problem list.  Current Outpatient Medications on File Prior to Visit   Medication Sig Dispense Refill   • acetaminophen (TYLENOL) 325 MG tablet Take 2 tablets by mouth Every 6 (Six) Hours As Needed for Mild Pain (1-3).  0   • Alcohol Swabs (B-D SINGLE USE SWABS REGULAR) pads Inject 1 each under the skin Daily. 100 each 11   • Blood Glucose Monitoring Suppl (TRUE METRIX AIR GLUCOSE METER) device 1 each Daily. 1 each 0   • cholecalciferol (VITAMIN D3) 1000 UNITS tablet Take 1,000 Units by mouth daily.     • hydrALAZINE (APRESOLINE) 100 MG tablet Take 1 tablet by mouth 3 (Three) Times a Day. 270 tablet 1   • hydrOXYzine (ATARAX) 25 MG tablet TAKE 1 TABLET AT NIGHT AS NEEDED FOR ITCHING 90 tablet 1   • Incontinence Supply Disposable (BLADDER CONTROL PADS EX ABSORB) misc      • Lancets 28G misc To check sugar qd. E11.9  each 12   • nystatin-triamcinolone (MYCOLOG) 592519-9.1 UNIT/GM-% ointment APPLY TOPICALLY 2 TIMES DAILY 30 g 3   • pantoprazole (PROTONIX) 40 MG EC tablet Take 1 tablet by mouth Daily. 90 tablet 3   • pramipexole (MIRAPEX) 0.25 MG tablet TAKE 1 TABLET BY MOUTH THREE TIMES DAILY 270 tablet 1   • rivaroxaban (XARELTO) 15 MG tablet Take 1 tablet by mouth Daily With Dinner. 38 tablet 0   • Semaglutide (OZEMPIC) 0.25 or 0.5  MG/DOSE solution pen-injector Inject 0.5 mg under the skin into the appropriate area as directed 1 (One) Time Per Week. 3 pen 4   • sertraline (ZOLOFT) 50 MG tablet Take 1 tablet by mouth Daily. 90 tablet 1   • TRUE METRIX BLOOD GLUCOSE TEST test strip CHECK BLOOD SUGAR TWICE DAILY 100 each 3   • TRUEPLUS LANCETS 28G misc E11.9 DM to check sugar  each 12   • vitamin B-12 (CYANOCOBALAMIN) 1000 MCG tablet Take 1 tablet by mouth Daily. 90 tablet 3   • Vitamin E 400 UNITS chewable tablet Chew 400 Units Daily. Two daily     • Vitamins A & D (VITAMIN A & D) 28560-825 UNITS tablet      • [DISCONTINUED] ciprofloxacin (CIPRO) 250 MG tablet Take 1 tablet by mouth Daily. 7 tablet 0   • [DISCONTINUED] furosemide (LASIX) 40 MG tablet Take 1 tablet by mouth 3 (Three) Times a Day. 270 tablet 2     No current facility-administered medications on file prior to visit.      Review of Systems   Constitutional: Positive for fatigue.   HENT: Negative.    Eyes: Negative.    Respiratory: Positive for shortness of breath.    Cardiovascular: Positive for leg swelling. Negative for chest pain and palpitations.   Gastrointestinal: Negative.    Endocrine: Negative.    Genitourinary: Negative.    Musculoskeletal: Positive for arthralgias.   Skin: Negative.    Allergic/Immunologic: Negative.    Neurological: Negative.    Hematological: Negative.    Psychiatric/Behavioral: Negative.        Objective   Physical Exam   Constitutional: She is oriented to person, place, and time. She appears well-developed and well-nourished.   HENT:   Head: Normocephalic and atraumatic.   Right Ear: External ear normal.   Left Ear: External ear normal.   Nose: Nose normal.   Mouth/Throat: Oropharynx is clear and moist.   Eyes: Pupils are equal, round, and reactive to light. Conjunctivae and EOM are normal.   Neck: Normal range of motion. Neck supple.   Cardiovascular: Normal rate, regular rhythm, normal heart sounds and intact distal pulses.   2+ pitting  "edema     Pulmonary/Chest: Effort normal and breath sounds normal.   Abdominal: Soft. Bowel sounds are normal.   Musculoskeletal: Normal range of motion.   Neurological: She is alert and oriented to person, place, and time.   Skin: Skin is warm and dry.   Psychiatric: She has a normal mood and affect. Her behavior is normal. Judgment and thought content normal.   Nursing note and vitals reviewed.       /64   Pulse 88   Ht 157.5 cm (62\")   Wt 74.4 kg (164 lb)   BMI 30.00 kg/m²     Assessment/Plan   Diagnoses and all orders for this visit:    Chronic congestive heart failure, unspecified heart failure type (CMS/HCC)  -     Basic Metabolic Panel  -     proBNP    Chronic kidney disease, stage IV (severe) (CMS/HCC)    Other orders  -     bumetanide (BUMEX) 1 MG tablet; Take 2 tablets by mouth 2 (Two) Times a Day.      Patient w/ acute on chronic chf. Swelling exacerbated by sleeping in recliner w/ legs down and taking unprescribed gabapentin. She is to try lidocaine for peripheral neuropathic pain. Can consider gabapentin once edema improved. She will switch from lasix to bumex. Start w/ 2 po bid and switch to 2 am 1 afternoon after swelling reduced and water weight decreases. She is to monitor her weight routinely. Will test bmp and bnp today and will repeat and f/u in 2-3 weeks.            "

## 2020-01-04 ENCOUNTER — DOCUMENTATION (OUTPATIENT)
Dept: INTERNAL MEDICINE | Facility: CLINIC | Age: 85
End: 2020-01-04

## 2020-01-04 LAB
BUN SERPL-MCNC: 105 MG/DL (ref 8–23)
BUN/CREAT SERPL: 18.3 (ref 7–25)
CALCIUM SERPL-MCNC: 9.2 MG/DL (ref 8.6–10.5)
CHLORIDE SERPL-SCNC: 97 MMOL/L (ref 98–107)
CO2 SERPL-SCNC: 18.2 MMOL/L (ref 22–29)
CREAT SERPL-MCNC: 5.75 MG/DL (ref 0.57–1)
GLUCOSE SERPL-MCNC: 180 MG/DL (ref 65–99)
NT-PROBNP SERPL-MCNC: 3275 PG/ML (ref 0–738)
POTASSIUM SERPL-SCNC: 4.1 MMOL/L (ref 3.5–5.2)
SODIUM SERPL-SCNC: 137 MMOL/L (ref 136–145)

## 2020-01-05 ENCOUNTER — APPOINTMENT (OUTPATIENT)
Dept: GENERAL RADIOLOGY | Facility: HOSPITAL | Age: 85
End: 2020-01-05

## 2020-01-05 ENCOUNTER — HOSPITAL ENCOUNTER (INPATIENT)
Facility: HOSPITAL | Age: 85
LOS: 9 days | Discharge: HOME | End: 2020-01-14
Attending: EMERGENCY MEDICINE | Admitting: SURGERY

## 2020-01-05 DIAGNOSIS — R77.8 ELEVATED TROPONIN: ICD-10-CM

## 2020-01-05 DIAGNOSIS — N18.9 ACUTE RENAL FAILURE SUPERIMPOSED ON CHRONIC KIDNEY DISEASE, UNSPECIFIED CKD STAGE, UNSPECIFIED ACUTE RENAL FAILURE TYPE (HCC): Primary | ICD-10-CM

## 2020-01-05 DIAGNOSIS — I50.43 ACUTE ON CHRONIC COMBINED SYSTOLIC AND DIASTOLIC CHF (CONGESTIVE HEART FAILURE) (HCC): ICD-10-CM

## 2020-01-05 DIAGNOSIS — N17.9 ACUTE RENAL FAILURE SUPERIMPOSED ON CHRONIC KIDNEY DISEASE, UNSPECIFIED CKD STAGE, UNSPECIFIED ACUTE RENAL FAILURE TYPE (HCC): Primary | ICD-10-CM

## 2020-01-05 DIAGNOSIS — J90 PLEURAL EFFUSION ON LEFT: ICD-10-CM

## 2020-01-05 DIAGNOSIS — I48.92 ATRIAL FLUTTER, PAROXYSMAL (HCC): ICD-10-CM

## 2020-01-05 PROBLEM — N18.30 ACUTE RENAL FAILURE SUPERIMPOSED ON STAGE 3 CHRONIC KIDNEY DISEASE (HCC): Status: ACTIVE | Noted: 2020-01-05

## 2020-01-05 PROBLEM — N18.4 ACUTE RENAL FAILURE SUPERIMPOSED ON STAGE 4 CHRONIC KIDNEY DISEASE (HCC): Status: ACTIVE | Noted: 2020-01-05

## 2020-01-05 PROBLEM — I50.42 CHRONIC COMBINED SYSTOLIC AND DIASTOLIC CHF (CONGESTIVE HEART FAILURE) (HCC): Status: ACTIVE | Noted: 2017-07-29

## 2020-01-05 LAB
ALBUMIN SERPL-MCNC: 3.2 G/DL (ref 3.5–5.2)
ALBUMIN/GLOB SERPL: 0.8 G/DL
ALP SERPL-CCNC: 186 U/L (ref 39–117)
ALT SERPL W P-5'-P-CCNC: 7 U/L (ref 1–33)
ANION GAP SERPL CALCULATED.3IONS-SCNC: 20.6 MMOL/L (ref 5–15)
AST SERPL-CCNC: 8 U/L (ref 1–32)
BASOPHILS # BLD AUTO: 0.03 10*3/MM3 (ref 0–0.2)
BASOPHILS NFR BLD AUTO: 0.3 % (ref 0–1.5)
BILIRUB SERPL-MCNC: 0.2 MG/DL (ref 0.2–1.2)
BUN BLD-MCNC: 100 MG/DL (ref 8–23)
BUN/CREAT SERPL: 17.5 (ref 7–25)
CALCIUM SPEC-SCNC: 8.8 MG/DL (ref 8.6–10.5)
CHLORIDE SERPL-SCNC: 97 MMOL/L (ref 98–107)
CO2 SERPL-SCNC: 17.4 MMOL/L (ref 22–29)
CREAT BLD-MCNC: 5.71 MG/DL (ref 0.57–1)
DEPRECATED RDW RBC AUTO: 55 FL (ref 37–54)
EOSINOPHIL # BLD AUTO: 0.21 10*3/MM3 (ref 0–0.4)
EOSINOPHIL NFR BLD AUTO: 2 % (ref 0.3–6.2)
ERYTHROCYTE [DISTWIDTH] IN BLOOD BY AUTOMATED COUNT: 15.9 % (ref 12.3–15.4)
GFR SERPL CREATININE-BSD FRML MDRD: 7 ML/MIN/1.73
GFR SERPL CREATININE-BSD FRML MDRD: ABNORMAL ML/MIN/{1.73_M2}
GLOBULIN UR ELPH-MCNC: 3.8 GM/DL
GLUCOSE BLD-MCNC: 122 MG/DL (ref 65–99)
GLUCOSE BLDC GLUCOMTR-MCNC: 118 MG/DL (ref 70–130)
HCT VFR BLD AUTO: 26.3 % (ref 34–46.6)
HGB BLD-MCNC: 8.1 G/DL (ref 12–15.9)
HOLD SPECIMEN: NORMAL
HOLD SPECIMEN: NORMAL
IMM GRANULOCYTES # BLD AUTO: 0.05 10*3/MM3 (ref 0–0.05)
IMM GRANULOCYTES NFR BLD AUTO: 0.5 % (ref 0–0.5)
LYMPHOCYTES # BLD AUTO: 0.82 10*3/MM3 (ref 0.7–3.1)
LYMPHOCYTES NFR BLD AUTO: 7.9 % (ref 19.6–45.3)
MCH RBC QN AUTO: 28.7 PG (ref 26.6–33)
MCHC RBC AUTO-ENTMCNC: 30.8 G/DL (ref 31.5–35.7)
MCV RBC AUTO: 93.3 FL (ref 79–97)
MONOCYTES # BLD AUTO: 0.5 10*3/MM3 (ref 0.1–0.9)
MONOCYTES NFR BLD AUTO: 4.8 % (ref 5–12)
NEUTROPHILS # BLD AUTO: 8.82 10*3/MM3 (ref 1.7–7)
NEUTROPHILS NFR BLD AUTO: 84.5 % (ref 42.7–76)
NRBC BLD AUTO-RTO: 0 /100 WBC (ref 0–0.2)
NT-PROBNP SERPL-MCNC: 4911 PG/ML (ref 5–1800)
PLATELET # BLD AUTO: 305 10*3/MM3 (ref 140–450)
PMV BLD AUTO: 9.8 FL (ref 6–12)
POTASSIUM BLD-SCNC: 3.5 MMOL/L (ref 3.5–5.2)
PROT SERPL-MCNC: 7 G/DL (ref 6–8.5)
RBC # BLD AUTO: 2.82 10*6/MM3 (ref 3.77–5.28)
SODIUM BLD-SCNC: 135 MMOL/L (ref 136–145)
TROPONIN T SERPL-MCNC: 0.05 NG/ML (ref 0–0.03)
WBC NRBC COR # BLD: 10.43 10*3/MM3 (ref 3.4–10.8)
WHOLE BLOOD HOLD SPECIMEN: NORMAL
WHOLE BLOOD HOLD SPECIMEN: NORMAL

## 2020-01-05 PROCEDURE — 25010000002 CHLOROTHIAZIDE PER 500 MG: Performed by: INTERNAL MEDICINE

## 2020-01-05 PROCEDURE — 85025 COMPLETE CBC W/AUTO DIFF WBC: CPT | Performed by: NURSE PRACTITIONER

## 2020-01-05 PROCEDURE — 25010000002 FUROSEMIDE PER 20 MG: Performed by: INTERNAL MEDICINE

## 2020-01-05 PROCEDURE — 84484 ASSAY OF TROPONIN QUANT: CPT | Performed by: NURSE PRACTITIONER

## 2020-01-05 PROCEDURE — 71046 X-RAY EXAM CHEST 2 VIEWS: CPT

## 2020-01-05 PROCEDURE — 82962 GLUCOSE BLOOD TEST: CPT

## 2020-01-05 PROCEDURE — 80053 COMPREHEN METABOLIC PANEL: CPT | Performed by: NURSE PRACTITIONER

## 2020-01-05 PROCEDURE — 99284 EMERGENCY DEPT VISIT MOD MDM: CPT

## 2020-01-05 PROCEDURE — 83880 ASSAY OF NATRIURETIC PEPTIDE: CPT | Performed by: NURSE PRACTITIONER

## 2020-01-05 PROCEDURE — 93005 ELECTROCARDIOGRAM TRACING: CPT

## 2020-01-05 PROCEDURE — 25010000002 ONDANSETRON PER 1 MG: Performed by: NURSE PRACTITIONER

## 2020-01-05 PROCEDURE — 87340 HEPATITIS B SURFACE AG IA: CPT | Performed by: INTERNAL MEDICINE

## 2020-01-05 PROCEDURE — 93010 ELECTROCARDIOGRAM REPORT: CPT | Performed by: INTERNAL MEDICINE

## 2020-01-05 PROCEDURE — 25010000002 FUROSEMIDE PER 20 MG: Performed by: EMERGENCY MEDICINE

## 2020-01-05 PROCEDURE — 93005 ELECTROCARDIOGRAM TRACING: CPT | Performed by: EMERGENCY MEDICINE

## 2020-01-05 RX ORDER — FUROSEMIDE 10 MG/ML
40 INJECTION INTRAMUSCULAR; INTRAVENOUS ONCE
Status: COMPLETED | OUTPATIENT
Start: 2020-01-05 | End: 2020-01-05

## 2020-01-05 RX ORDER — FUROSEMIDE 10 MG/ML
60 INJECTION INTRAMUSCULAR; INTRAVENOUS ONCE
Status: DISCONTINUED | OUTPATIENT
Start: 2020-01-05 | End: 2020-01-05

## 2020-01-05 RX ORDER — FUROSEMIDE 10 MG/ML
40 INJECTION INTRAMUSCULAR; INTRAVENOUS EVERY 8 HOURS
Status: DISCONTINUED | OUTPATIENT
Start: 2020-01-05 | End: 2020-01-05

## 2020-01-05 RX ORDER — PANTOPRAZOLE SODIUM 40 MG/1
40 TABLET, DELAYED RELEASE ORAL DAILY
Status: DISCONTINUED | OUTPATIENT
Start: 2020-01-05 | End: 2020-01-14 | Stop reason: HOSPADM

## 2020-01-05 RX ORDER — ACETAMINOPHEN 325 MG/1
650 TABLET ORAL EVERY 4 HOURS PRN
Status: DISCONTINUED | OUTPATIENT
Start: 2020-01-05 | End: 2020-01-14 | Stop reason: HOSPADM

## 2020-01-05 RX ORDER — SODIUM CHLORIDE 0.9 % (FLUSH) 0.9 %
10 SYRINGE (ML) INJECTION AS NEEDED
Status: DISCONTINUED | OUTPATIENT
Start: 2020-01-05 | End: 2020-01-14 | Stop reason: HOSPADM

## 2020-01-05 RX ORDER — HYDROXYZINE HYDROCHLORIDE 25 MG/1
25 TABLET, FILM COATED ORAL NIGHTLY PRN
Status: DISCONTINUED | OUTPATIENT
Start: 2020-01-05 | End: 2020-01-14 | Stop reason: HOSPADM

## 2020-01-05 RX ORDER — POTASSIUM CHLORIDE 750 MG/1
40 CAPSULE, EXTENDED RELEASE ORAL ONCE
Status: DISCONTINUED | OUTPATIENT
Start: 2020-01-05 | End: 2020-01-09

## 2020-01-05 RX ORDER — SODIUM CHLORIDE 0.9 % (FLUSH) 0.9 %
10 SYRINGE (ML) INJECTION EVERY 12 HOURS SCHEDULED
Status: DISCONTINUED | OUTPATIENT
Start: 2020-01-05 | End: 2020-01-14 | Stop reason: HOSPADM

## 2020-01-05 RX ORDER — ACETAMINOPHEN 650 MG/1
650 SUPPOSITORY RECTAL EVERY 4 HOURS PRN
Status: DISCONTINUED | OUTPATIENT
Start: 2020-01-05 | End: 2020-01-14 | Stop reason: HOSPADM

## 2020-01-05 RX ORDER — NITROGLYCERIN 0.4 MG/1
0.4 TABLET SUBLINGUAL
Status: DISCONTINUED | OUTPATIENT
Start: 2020-01-05 | End: 2020-01-14 | Stop reason: HOSPADM

## 2020-01-05 RX ORDER — ASPIRIN 81 MG/1
324 TABLET, CHEWABLE ORAL ONCE
Status: COMPLETED | OUTPATIENT
Start: 2020-01-05 | End: 2020-01-05

## 2020-01-05 RX ORDER — NICOTINE POLACRILEX 4 MG
15 LOZENGE BUCCAL
Status: DISCONTINUED | OUTPATIENT
Start: 2020-01-05 | End: 2020-01-14 | Stop reason: HOSPADM

## 2020-01-05 RX ORDER — HYDRALAZINE HYDROCHLORIDE 50 MG/1
100 TABLET, FILM COATED ORAL 3 TIMES DAILY
Status: DISCONTINUED | OUTPATIENT
Start: 2020-01-05 | End: 2020-01-10

## 2020-01-05 RX ORDER — DEXTROSE MONOHYDRATE 25 G/50ML
25 INJECTION, SOLUTION INTRAVENOUS
Status: DISCONTINUED | OUTPATIENT
Start: 2020-01-05 | End: 2020-01-14 | Stop reason: HOSPADM

## 2020-01-05 RX ORDER — ACETAMINOPHEN 160 MG/5ML
650 SOLUTION ORAL EVERY 4 HOURS PRN
Status: DISCONTINUED | OUTPATIENT
Start: 2020-01-05 | End: 2020-01-14 | Stop reason: HOSPADM

## 2020-01-05 RX ORDER — CHLOROTHIAZIDE SODIUM 500 MG/1
500 INJECTION INTRAVENOUS ONCE
Status: COMPLETED | OUTPATIENT
Start: 2020-01-05 | End: 2020-01-05

## 2020-01-05 RX ORDER — PRAMIPEXOLE DIHYDROCHLORIDE 0.25 MG/1
0.25 TABLET ORAL EVERY 8 HOURS SCHEDULED
Status: DISCONTINUED | OUTPATIENT
Start: 2020-01-05 | End: 2020-01-14 | Stop reason: HOSPADM

## 2020-01-05 RX ORDER — FUROSEMIDE 10 MG/ML
40 INJECTION INTRAMUSCULAR; INTRAVENOUS ONCE
Status: DISCONTINUED | OUTPATIENT
Start: 2020-01-05 | End: 2020-01-05

## 2020-01-05 RX ORDER — ONDANSETRON 2 MG/ML
4 INJECTION INTRAMUSCULAR; INTRAVENOUS EVERY 6 HOURS PRN
Status: DISCONTINUED | OUTPATIENT
Start: 2020-01-05 | End: 2020-01-14 | Stop reason: HOSPADM

## 2020-01-05 RX ADMIN — FUROSEMIDE 40 MG: 20 INJECTION, SOLUTION INTRAMUSCULAR; INTRAVENOUS at 12:33

## 2020-01-05 RX ADMIN — SODIUM CHLORIDE, PRESERVATIVE FREE 10 ML: 5 INJECTION INTRAVENOUS at 20:45

## 2020-01-05 RX ADMIN — SODIUM CHLORIDE, PRESERVATIVE FREE 10 ML: 5 INJECTION INTRAVENOUS at 11:11

## 2020-01-05 RX ADMIN — ONDANSETRON 4 MG: 2 INJECTION INTRAMUSCULAR; INTRAVENOUS at 20:38

## 2020-01-05 RX ADMIN — ASPIRIN 324 MG: 81 TABLET, CHEWABLE ORAL at 12:33

## 2020-01-05 RX ADMIN — FUROSEMIDE 10 MG/HR: 10 INJECTION, SOLUTION INTRAMUSCULAR; INTRAVENOUS at 20:38

## 2020-01-05 RX ADMIN — CHLOROTHIAZIDE SODIUM 500 MG: 500 INJECTION, POWDER, LYOPHILIZED, FOR SOLUTION INTRAVENOUS at 20:38

## 2020-01-05 RX ADMIN — PANTOPRAZOLE SODIUM 40 MG: 40 TABLET, DELAYED RELEASE ORAL at 20:50

## 2020-01-05 NOTE — PROGRESS NOTES
Checked patients labs. Noted that her creatinine is quite elevated. BNP also elevated. Advised patient to go ER. She understands that she is in kidney failure and is advised to go to the ER. She voiced understanding.   MICHELLE

## 2020-01-06 LAB
ANION GAP SERPL CALCULATED.3IONS-SCNC: 17.9 MMOL/L (ref 5–15)
BILIRUB UR QL STRIP: NEGATIVE
BUN BLD-MCNC: 99 MG/DL (ref 8–23)
BUN/CREAT SERPL: 18.8 (ref 7–25)
CALCIUM SPEC-SCNC: 8.7 MG/DL (ref 8.6–10.5)
CHLORIDE SERPL-SCNC: 102 MMOL/L (ref 98–107)
CLARITY UR: CLEAR
CO2 SERPL-SCNC: 18.1 MMOL/L (ref 22–29)
COLOR UR: ABNORMAL
CREAT BLD-MCNC: 5.27 MG/DL (ref 0.57–1)
CREAT UR-MCNC: 32.1 MG/DL
DEPRECATED RDW RBC AUTO: 53.6 FL (ref 37–54)
ERYTHROCYTE [DISTWIDTH] IN BLOOD BY AUTOMATED COUNT: 15.8 % (ref 12.3–15.4)
GFR SERPL CREATININE-BSD FRML MDRD: 8 ML/MIN/1.73
GFR SERPL CREATININE-BSD FRML MDRD: ABNORMAL ML/MIN/{1.73_M2}
GLUCOSE BLD-MCNC: 73 MG/DL (ref 65–99)
GLUCOSE BLDC GLUCOMTR-MCNC: 107 MG/DL (ref 70–130)
GLUCOSE BLDC GLUCOMTR-MCNC: 114 MG/DL (ref 70–130)
GLUCOSE BLDC GLUCOMTR-MCNC: 140 MG/DL (ref 70–130)
GLUCOSE BLDC GLUCOMTR-MCNC: 173 MG/DL (ref 70–130)
GLUCOSE BLDC GLUCOMTR-MCNC: 63 MG/DL (ref 70–130)
GLUCOSE UR STRIP-MCNC: NEGATIVE MG/DL
HBA1C MFR BLD: 7.04 % (ref 4.8–5.6)
HBV SURFACE AG SERPL QL IA: NORMAL
HCT VFR BLD AUTO: 23.5 % (ref 34–46.6)
HGB BLD-MCNC: 7.4 G/DL (ref 12–15.9)
HGB UR QL STRIP.AUTO: NEGATIVE
KETONES UR QL STRIP: NEGATIVE
LEUKOCYTE ESTERASE UR QL STRIP.AUTO: NEGATIVE
MCH RBC QN AUTO: 29.1 PG (ref 26.6–33)
MCHC RBC AUTO-ENTMCNC: 31.5 G/DL (ref 31.5–35.7)
MCV RBC AUTO: 92.5 FL (ref 79–97)
NITRITE UR QL STRIP: NEGATIVE
PH UR STRIP.AUTO: <=5 [PH] (ref 5–8)
PLATELET # BLD AUTO: 289 10*3/MM3 (ref 140–450)
PMV BLD AUTO: 10.5 FL (ref 6–12)
POTASSIUM BLD-SCNC: 3.7 MMOL/L (ref 3.5–5.2)
PROT UR QL STRIP: ABNORMAL
RBC # BLD AUTO: 2.54 10*6/MM3 (ref 3.77–5.28)
SODIUM BLD-SCNC: 138 MMOL/L (ref 136–145)
SODIUM UR-SCNC: 90 MMOL/L
SP GR UR STRIP: 1.01 (ref 1–1.03)
UROBILINOGEN UR QL STRIP: ABNORMAL
WBC NRBC COR # BLD: 8.91 10*3/MM3 (ref 3.4–10.8)

## 2020-01-06 PROCEDURE — 25010000002 FUROSEMIDE PER 20 MG: Performed by: INTERNAL MEDICINE

## 2020-01-06 PROCEDURE — 80048 BASIC METABOLIC PNL TOTAL CA: CPT | Performed by: NURSE PRACTITIONER

## 2020-01-06 PROCEDURE — 82962 GLUCOSE BLOOD TEST: CPT

## 2020-01-06 PROCEDURE — 25010000002 ONDANSETRON PER 1 MG: Performed by: NURSE PRACTITIONER

## 2020-01-06 PROCEDURE — 36415 COLL VENOUS BLD VENIPUNCTURE: CPT | Performed by: NURSE PRACTITIONER

## 2020-01-06 PROCEDURE — 82570 ASSAY OF URINE CREATININE: CPT | Performed by: INTERNAL MEDICINE

## 2020-01-06 PROCEDURE — 81003 URINALYSIS AUTO W/O SCOPE: CPT | Performed by: INTERNAL MEDICINE

## 2020-01-06 PROCEDURE — 83036 HEMOGLOBIN GLYCOSYLATED A1C: CPT | Performed by: NURSE PRACTITIONER

## 2020-01-06 PROCEDURE — 85027 COMPLETE CBC AUTOMATED: CPT | Performed by: NURSE PRACTITIONER

## 2020-01-06 PROCEDURE — 99222 1ST HOSP IP/OBS MODERATE 55: CPT | Performed by: INTERNAL MEDICINE

## 2020-01-06 PROCEDURE — 63710000001 INSULIN LISPRO (HUMAN) PER 5 UNITS: Performed by: NURSE PRACTITIONER

## 2020-01-06 PROCEDURE — 84300 ASSAY OF URINE SODIUM: CPT | Performed by: INTERNAL MEDICINE

## 2020-01-06 RX ADMIN — PANTOPRAZOLE SODIUM 40 MG: 40 TABLET, DELAYED RELEASE ORAL at 09:37

## 2020-01-06 RX ADMIN — PRAMIPEXOLE DIHYDROCHLORIDE 0.25 MG: 0.25 TABLET ORAL at 15:54

## 2020-01-06 RX ADMIN — ONDANSETRON 4 MG: 2 INJECTION INTRAMUSCULAR; INTRAVENOUS at 04:46

## 2020-01-06 RX ADMIN — RIVAROXABAN 15 MG: 15 TABLET, FILM COATED ORAL at 18:18

## 2020-01-06 RX ADMIN — HYDRALAZINE HYDROCHLORIDE 100 MG: 50 TABLET, FILM COATED ORAL at 15:54

## 2020-01-06 RX ADMIN — SODIUM CHLORIDE, PRESERVATIVE FREE 10 ML: 5 INJECTION INTRAVENOUS at 13:03

## 2020-01-06 RX ADMIN — INSULIN LISPRO 2 UNITS: 100 INJECTION, SOLUTION INTRAVENOUS; SUBCUTANEOUS at 20:50

## 2020-01-06 RX ADMIN — FUROSEMIDE 10 MG/HR: 10 INJECTION, SOLUTION INTRAMUSCULAR; INTRAVENOUS at 15:51

## 2020-01-06 RX ADMIN — ONDANSETRON 4 MG: 2 INJECTION INTRAMUSCULAR; INTRAVENOUS at 13:03

## 2020-01-06 RX ADMIN — HYDRALAZINE HYDROCHLORIDE 100 MG: 50 TABLET, FILM COATED ORAL at 20:24

## 2020-01-06 RX ADMIN — PRAMIPEXOLE DIHYDROCHLORIDE 0.25 MG: 0.25 TABLET ORAL at 20:24

## 2020-01-06 RX ADMIN — FUROSEMIDE 10 MG/HR: 10 INJECTION, SOLUTION INTRAMUSCULAR; INTRAVENOUS at 05:47

## 2020-01-06 RX ADMIN — HYDRALAZINE HYDROCHLORIDE 100 MG: 50 TABLET, FILM COATED ORAL at 09:37

## 2020-01-06 RX ADMIN — PRAMIPEXOLE DIHYDROCHLORIDE 0.25 MG: 0.25 TABLET ORAL at 09:37

## 2020-01-06 RX ADMIN — SODIUM CHLORIDE, PRESERVATIVE FREE 10 ML: 5 INJECTION INTRAVENOUS at 20:24

## 2020-01-07 ENCOUNTER — APPOINTMENT (OUTPATIENT)
Dept: CARDIOLOGY | Facility: HOSPITAL | Age: 85
End: 2020-01-07

## 2020-01-07 LAB
ALBUMIN SERPL-MCNC: 2.8 G/DL (ref 3.5–5.2)
ANION GAP SERPL CALCULATED.3IONS-SCNC: 17.9 MMOL/L (ref 5–15)
AORTIC DIMENSIONLESS INDEX: 0.7 (DI)
BH CV ECHO MEAS - ACS: 1.6 CM
BH CV ECHO MEAS - AO MAX PG: 16 MMHG
BH CV ECHO MEAS - AO MEAN PG (FULL): 4.5 MMHG
BH CV ECHO MEAS - AO MEAN PG: 7 MMHG
BH CV ECHO MEAS - AO ROOT AREA (BSA CORRECTED): 1.5
BH CV ECHO MEAS - AO ROOT AREA: 5.3 CM^2
BH CV ECHO MEAS - AO ROOT DIAM: 2.6 CM
BH CV ECHO MEAS - AO V2 MAX: 198 CM/SEC
BH CV ECHO MEAS - AO V2 MEAN: 123 CM/SEC
BH CV ECHO MEAS - AO V2 VTI: 36.6 CM
BH CV ECHO MEAS - AVA(I,A): 2 CM^2
BH CV ECHO MEAS - AVA(I,D): 2 CM^2
BH CV ECHO MEAS - BSA(HAYCOCK): 1.8 M^2
BH CV ECHO MEAS - BSA: 1.7 M^2
BH CV ECHO MEAS - BZI_BMI: 28.2 KILOGRAMS/M^2
BH CV ECHO MEAS - BZI_METRIC_HEIGHT: 157.5 CM
BH CV ECHO MEAS - BZI_METRIC_WEIGHT: 69.9 KG
BH CV ECHO MEAS - EDV(MOD-SP2): 70 ML
BH CV ECHO MEAS - EDV(MOD-SP4): 48 ML
BH CV ECHO MEAS - EDV(TEICH): 86.3 ML
BH CV ECHO MEAS - EF(CUBED): 72.8 %
BH CV ECHO MEAS - EF(MOD-BP): 57 %
BH CV ECHO MEAS - EF(MOD-SP2): 60 %
BH CV ECHO MEAS - EF(MOD-SP4): 54.2 %
BH CV ECHO MEAS - EF(TEICH): 64.8 %
BH CV ECHO MEAS - ESV(MOD-SP2): 28 ML
BH CV ECHO MEAS - ESV(MOD-SP4): 22 ML
BH CV ECHO MEAS - ESV(TEICH): 30.3 ML
BH CV ECHO MEAS - FS: 35.2 %
BH CV ECHO MEAS - IVS/LVPW: 1
BH CV ECHO MEAS - IVSD: 0.86 CM
BH CV ECHO MEAS - LA DIMENSION: 4.3 CM
BH CV ECHO MEAS - LA/AO: 1.7
BH CV ECHO MEAS - LAT PEAK E' VEL: 9 CM/SEC
BH CV ECHO MEAS - LV DIASTOLIC VOL/BSA (35-75): 28.1 ML/M^2
BH CV ECHO MEAS - LV MASS(C)D: 115.7 GRAMS
BH CV ECHO MEAS - LV MASS(C)DI: 67.6 GRAMS/M^2
BH CV ECHO MEAS - LV MEAN PG: 2.5 MMHG
BH CV ECHO MEAS - LV SYSTOLIC VOL/BSA (12-30): 12.9 ML/M^2
BH CV ECHO MEAS - LV V1 MAX: 115 CM/SEC
BH CV ECHO MEAS - LV V1 MEAN: 74.3 CM/SEC
BH CV ECHO MEAS - LV V1 VTI: 23.2 CM
BH CV ECHO MEAS - LVIDD: 4.4 CM
BH CV ECHO MEAS - LVIDS: 2.8 CM
BH CV ECHO MEAS - LVLD AP2: 7 CM
BH CV ECHO MEAS - LVLD AP4: 6.9 CM
BH CV ECHO MEAS - LVLS AP2: 6.1 CM
BH CV ECHO MEAS - LVLS AP4: 6.1 CM
BH CV ECHO MEAS - LVOT AREA (M): 3.1 CM^2
BH CV ECHO MEAS - LVOT AREA: 3.1 CM^2
BH CV ECHO MEAS - LVOT DIAM: 2 CM
BH CV ECHO MEAS - LVPWD: 0.82 CM
BH CV ECHO MEAS - MED PEAK E' VEL: 4 CM/SEC
BH CV ECHO MEAS - MR MAX PG: 43.8 MMHG
BH CV ECHO MEAS - MR MAX VEL: 331 CM/SEC
BH CV ECHO MEAS - MV DEC SLOPE: 912.5 CM/SEC^2
BH CV ECHO MEAS - MV DEC TIME: 0.28 SEC
BH CV ECHO MEAS - MV E MAX VEL: 187 CM/SEC
BH CV ECHO MEAS - MV MEAN PG: 7 MMHG
BH CV ECHO MEAS - MV P1/2T MAX VEL: 231 CM/SEC
BH CV ECHO MEAS - MV P1/2T: 74.1 MSEC
BH CV ECHO MEAS - MV V2 MEAN: 116 CM/SEC
BH CV ECHO MEAS - MV V2 VTI: 53.4 CM
BH CV ECHO MEAS - MVA P1/2T LCG: 0.95 CM^2
BH CV ECHO MEAS - MVA(P1/2T): 3 CM^2
BH CV ECHO MEAS - MVA(VTI): 1.4 CM^2
BH CV ECHO MEAS - PA MAX PG: 6.4 MMHG
BH CV ECHO MEAS - PA V2 MAX: 126 CM/SEC
BH CV ECHO MEAS - PULM A REVS DUR: 0.07 SEC
BH CV ECHO MEAS - PULM A REVS VEL: 21.7 CM/SEC
BH CV ECHO MEAS - PULM DIAS VEL: 36.8 CM/SEC
BH CV ECHO MEAS - PULM S/D: 0.76
BH CV ECHO MEAS - PULM SYS VEL: 28.2 CM/SEC
BH CV ECHO MEAS - QP/QS: 0.49
BH CV ECHO MEAS - RAP SYSTOLE: 15 MMHG
BH CV ECHO MEAS - RV MEAN PG: 1 MMHG
BH CV ECHO MEAS - RV V1 MEAN: 38.5 CM/SEC
BH CV ECHO MEAS - RV V1 VTI: 10.4 CM
BH CV ECHO MEAS - RVOT AREA: 3.5 CM^2
BH CV ECHO MEAS - RVOT DIAM: 2.1 CM
BH CV ECHO MEAS - RVSP: 63.4 MMHG
BH CV ECHO MEAS - SI(AO): 113.6 ML/M^2
BH CV ECHO MEAS - SI(CUBED): 35.5 ML/M^2
BH CV ECHO MEAS - SI(LVOT): 42.6 ML/M^2
BH CV ECHO MEAS - SI(MOD-SP2): 24.6 ML/M^2
BH CV ECHO MEAS - SI(MOD-SP4): 15.2 ML/M^2
BH CV ECHO MEAS - SI(TEICH): 32.7 ML/M^2
BH CV ECHO MEAS - SV(AO): 194.3 ML
BH CV ECHO MEAS - SV(CUBED): 60.8 ML
BH CV ECHO MEAS - SV(LVOT): 72.9 ML
BH CV ECHO MEAS - SV(MOD-SP2): 42 ML
BH CV ECHO MEAS - SV(MOD-SP4): 26 ML
BH CV ECHO MEAS - SV(RVOT): 36 ML
BH CV ECHO MEAS - SV(TEICH): 56 ML
BH CV ECHO MEAS - TR MAX VEL: 348 CM/SEC
BH CV ECHO MEASUREMENTS AVERAGE E/E' RATIO: 28.77
BH CV XLRA - TDI S': 10 CM/SEC
BUN BLD-MCNC: 102 MG/DL (ref 8–23)
BUN/CREAT SERPL: 18.6 (ref 7–25)
CALCIUM SPEC-SCNC: 8.4 MG/DL (ref 8.6–10.5)
CHLORIDE SERPL-SCNC: 102 MMOL/L (ref 98–107)
CO2 SERPL-SCNC: 18.1 MMOL/L (ref 22–29)
CREAT BLD-MCNC: 5.48 MG/DL (ref 0.57–1)
DEPRECATED RDW RBC AUTO: 53.5 FL (ref 37–54)
ERYTHROCYTE [DISTWIDTH] IN BLOOD BY AUTOMATED COUNT: 15.8 % (ref 12.3–15.4)
FERRITIN SERPL-MCNC: 35.7 NG/ML (ref 13–150)
GFR SERPL CREATININE-BSD FRML MDRD: 7 ML/MIN/1.73
GFR SERPL CREATININE-BSD FRML MDRD: ABNORMAL ML/MIN/{1.73_M2}
GLUCOSE BLD-MCNC: 109 MG/DL (ref 65–99)
GLUCOSE BLDC GLUCOMTR-MCNC: 125 MG/DL (ref 70–130)
GLUCOSE BLDC GLUCOMTR-MCNC: 148 MG/DL (ref 70–130)
GLUCOSE BLDC GLUCOMTR-MCNC: 195 MG/DL (ref 70–130)
GLUCOSE BLDC GLUCOMTR-MCNC: 218 MG/DL (ref 70–130)
HCT VFR BLD AUTO: 24.8 % (ref 34–46.6)
HGB BLD-MCNC: 7.8 G/DL (ref 12–15.9)
IRON 24H UR-MRATE: 23 MCG/DL (ref 37–145)
IRON SATN MFR SERPL: 9 % (ref 20–50)
LEFT ATRIUM VOLUME INDEX: 56 ML/M2
MAGNESIUM SERPL-MCNC: 2.1 MG/DL (ref 1.6–2.4)
MAXIMAL PREDICTED HEART RATE: 136 BPM
MCH RBC QN AUTO: 29.3 PG (ref 26.6–33)
MCHC RBC AUTO-ENTMCNC: 31.5 G/DL (ref 31.5–35.7)
MCV RBC AUTO: 93.2 FL (ref 79–97)
PHOSPHATE SERPL-MCNC: 6.8 MG/DL (ref 2.5–4.5)
PLATELET # BLD AUTO: 283 10*3/MM3 (ref 140–450)
PMV BLD AUTO: 10.1 FL (ref 6–12)
POTASSIUM BLD-SCNC: 3.8 MMOL/L (ref 3.5–5.2)
RBC # BLD AUTO: 2.66 10*6/MM3 (ref 3.77–5.28)
SINUS: 2.9 CM
SODIUM BLD-SCNC: 138 MMOL/L (ref 136–145)
STJ: 2.8 CM
STRESS TARGET HR: 116 BPM
TIBC SERPL-MCNC: 265 MCG/DL (ref 298–536)
TRANSFERRIN SERPL-MCNC: 178 MG/DL (ref 200–360)
TV MEAN GRADIENT: 6 MMHG
TV PEAK GRADIENT: 14 MMHG
WBC NRBC COR # BLD: 10.32 10*3/MM3 (ref 3.4–10.8)

## 2020-01-07 PROCEDURE — 93306 TTE W/DOPPLER COMPLETE: CPT | Performed by: INTERNAL MEDICINE

## 2020-01-07 PROCEDURE — 63710000001 INSULIN LISPRO (HUMAN) PER 5 UNITS: Performed by: NURSE PRACTITIONER

## 2020-01-07 PROCEDURE — 25010000002 IRON SUCROSE PER 1 MG: Performed by: INTERNAL MEDICINE

## 2020-01-07 PROCEDURE — 84466 ASSAY OF TRANSFERRIN: CPT | Performed by: INTERNAL MEDICINE

## 2020-01-07 PROCEDURE — 80069 RENAL FUNCTION PANEL: CPT | Performed by: INTERNAL MEDICINE

## 2020-01-07 PROCEDURE — 82728 ASSAY OF FERRITIN: CPT | Performed by: INTERNAL MEDICINE

## 2020-01-07 PROCEDURE — 82962 GLUCOSE BLOOD TEST: CPT

## 2020-01-07 PROCEDURE — 85027 COMPLETE CBC AUTOMATED: CPT | Performed by: NURSE PRACTITIONER

## 2020-01-07 PROCEDURE — 83540 ASSAY OF IRON: CPT | Performed by: INTERNAL MEDICINE

## 2020-01-07 PROCEDURE — 25010000002 FUROSEMIDE PER 20 MG: Performed by: INTERNAL MEDICINE

## 2020-01-07 PROCEDURE — 83735 ASSAY OF MAGNESIUM: CPT | Performed by: INTERNAL MEDICINE

## 2020-01-07 PROCEDURE — 99232 SBSQ HOSP IP/OBS MODERATE 35: CPT | Performed by: NURSE PRACTITIONER

## 2020-01-07 PROCEDURE — 93306 TTE W/DOPPLER COMPLETE: CPT

## 2020-01-07 RX ADMIN — FUROSEMIDE 10 MG/HR: 10 INJECTION, SOLUTION INTRAMUSCULAR; INTRAVENOUS at 23:27

## 2020-01-07 RX ADMIN — INSULIN LISPRO 2 UNITS: 100 INJECTION, SOLUTION INTRAVENOUS; SUBCUTANEOUS at 12:36

## 2020-01-07 RX ADMIN — PRAMIPEXOLE DIHYDROCHLORIDE 0.25 MG: 0.25 TABLET ORAL at 21:41

## 2020-01-07 RX ADMIN — IRON SUCROSE 100 MG: 20 INJECTION, SOLUTION INTRAVENOUS at 18:08

## 2020-01-07 RX ADMIN — INSULIN LISPRO 3 UNITS: 100 INJECTION, SOLUTION INTRAVENOUS; SUBCUTANEOUS at 21:53

## 2020-01-07 RX ADMIN — PRAMIPEXOLE DIHYDROCHLORIDE 0.25 MG: 0.25 TABLET ORAL at 16:06

## 2020-01-07 RX ADMIN — METOLAZONE 7.5 MG: 2.5 TABLET ORAL at 18:09

## 2020-01-07 RX ADMIN — HYDRALAZINE HYDROCHLORIDE 100 MG: 50 TABLET, FILM COATED ORAL at 21:41

## 2020-01-07 RX ADMIN — PRAMIPEXOLE DIHYDROCHLORIDE 0.25 MG: 0.25 TABLET ORAL at 06:08

## 2020-01-07 RX ADMIN — RIVAROXABAN 15 MG: 15 TABLET, FILM COATED ORAL at 18:09

## 2020-01-07 RX ADMIN — FUROSEMIDE 10 MG/HR: 10 INJECTION, SOLUTION INTRAMUSCULAR; INTRAVENOUS at 12:44

## 2020-01-07 RX ADMIN — SODIUM CHLORIDE, PRESERVATIVE FREE 10 ML: 5 INJECTION INTRAVENOUS at 21:41

## 2020-01-07 RX ADMIN — HYDRALAZINE HYDROCHLORIDE 100 MG: 50 TABLET, FILM COATED ORAL at 08:51

## 2020-01-07 RX ADMIN — PANTOPRAZOLE SODIUM 40 MG: 40 TABLET, DELAYED RELEASE ORAL at 08:51

## 2020-01-07 RX ADMIN — SODIUM CHLORIDE, PRESERVATIVE FREE 10 ML: 5 INJECTION INTRAVENOUS at 08:52

## 2020-01-07 RX ADMIN — HYDRALAZINE HYDROCHLORIDE 100 MG: 50 TABLET, FILM COATED ORAL at 16:06

## 2020-01-07 RX ADMIN — FUROSEMIDE 10 MG/HR: 10 INJECTION, SOLUTION INTRAMUSCULAR; INTRAVENOUS at 02:54

## 2020-01-08 LAB
ABO GROUP BLD: NORMAL
ALBUMIN SERPL-MCNC: 3 G/DL (ref 3.5–5.2)
ALBUMIN/GLOB SERPL: 0.9 G/DL
ALP SERPL-CCNC: 135 U/L (ref 39–117)
ALT SERPL W P-5'-P-CCNC: 6 U/L (ref 1–33)
ANION GAP SERPL CALCULATED.3IONS-SCNC: 19.1 MMOL/L (ref 5–15)
AST SERPL-CCNC: 10 U/L (ref 1–32)
BILIRUB SERPL-MCNC: 0.3 MG/DL (ref 0.2–1.2)
BLD GP AB SCN SERPL QL: NEGATIVE
BUN BLD-MCNC: 107 MG/DL (ref 8–23)
BUN/CREAT SERPL: 18.4 (ref 7–25)
CALCIUM SPEC-SCNC: 8.8 MG/DL (ref 8.6–10.5)
CHLORIDE SERPL-SCNC: 96 MMOL/L (ref 98–107)
CO2 SERPL-SCNC: 17.9 MMOL/L (ref 22–29)
CREAT BLD-MCNC: 5.82 MG/DL (ref 0.57–1)
DEPRECATED RDW RBC AUTO: 52.7 FL (ref 37–54)
ERYTHROCYTE [DISTWIDTH] IN BLOOD BY AUTOMATED COUNT: 16.1 % (ref 12.3–15.4)
GFR SERPL CREATININE-BSD FRML MDRD: 7 ML/MIN/1.73
GFR SERPL CREATININE-BSD FRML MDRD: ABNORMAL ML/MIN/{1.73_M2}
GLOBULIN UR ELPH-MCNC: 3.3 GM/DL
GLUCOSE BLD-MCNC: 101 MG/DL (ref 65–99)
GLUCOSE BLDC GLUCOMTR-MCNC: 121 MG/DL (ref 70–130)
GLUCOSE BLDC GLUCOMTR-MCNC: 151 MG/DL (ref 70–130)
GLUCOSE BLDC GLUCOMTR-MCNC: 190 MG/DL (ref 70–130)
GLUCOSE BLDC GLUCOMTR-MCNC: 96 MG/DL (ref 70–130)
HCT VFR BLD AUTO: 23 % (ref 34–46.6)
HGB BLD-MCNC: 7.4 G/DL (ref 12–15.9)
MCH RBC QN AUTO: 29.2 PG (ref 26.6–33)
MCHC RBC AUTO-ENTMCNC: 32.2 G/DL (ref 31.5–35.7)
MCV RBC AUTO: 90.9 FL (ref 79–97)
PLATELET # BLD AUTO: 270 10*3/MM3 (ref 140–450)
PMV BLD AUTO: 10.4 FL (ref 6–12)
POTASSIUM BLD-SCNC: 3.7 MMOL/L (ref 3.5–5.2)
PROT SERPL-MCNC: 6.3 G/DL (ref 6–8.5)
RBC # BLD AUTO: 2.53 10*6/MM3 (ref 3.77–5.28)
RH BLD: POSITIVE
SODIUM BLD-SCNC: 133 MMOL/L (ref 136–145)
T&S EXPIRATION DATE: NORMAL
WBC NRBC COR # BLD: 8.83 10*3/MM3 (ref 3.4–10.8)

## 2020-01-08 PROCEDURE — 80053 COMPREHEN METABOLIC PANEL: CPT | Performed by: INTERNAL MEDICINE

## 2020-01-08 PROCEDURE — P9016 RBC LEUKOCYTES REDUCED: HCPCS

## 2020-01-08 PROCEDURE — 86900 BLOOD TYPING SEROLOGIC ABO: CPT | Performed by: INTERNAL MEDICINE

## 2020-01-08 PROCEDURE — 86850 RBC ANTIBODY SCREEN: CPT | Performed by: INTERNAL MEDICINE

## 2020-01-08 PROCEDURE — 25010000002 FUROSEMIDE PER 20 MG: Performed by: INTERNAL MEDICINE

## 2020-01-08 PROCEDURE — 86923 COMPATIBILITY TEST ELECTRIC: CPT

## 2020-01-08 PROCEDURE — 82962 GLUCOSE BLOOD TEST: CPT

## 2020-01-08 PROCEDURE — 86900 BLOOD TYPING SEROLOGIC ABO: CPT

## 2020-01-08 PROCEDURE — 85027 COMPLETE CBC AUTOMATED: CPT | Performed by: INTERNAL MEDICINE

## 2020-01-08 PROCEDURE — 63710000001 INSULIN LISPRO (HUMAN) PER 5 UNITS: Performed by: NURSE PRACTITIONER

## 2020-01-08 PROCEDURE — 36430 TRANSFUSION BLD/BLD COMPNT: CPT

## 2020-01-08 PROCEDURE — 86901 BLOOD TYPING SEROLOGIC RH(D): CPT | Performed by: INTERNAL MEDICINE

## 2020-01-08 PROCEDURE — 99232 SBSQ HOSP IP/OBS MODERATE 35: CPT | Performed by: INTERNAL MEDICINE

## 2020-01-08 RX ORDER — ALBUMIN (HUMAN) 12.5 G/50ML
12.5 SOLUTION INTRAVENOUS AS NEEDED
Status: CANCELLED | OUTPATIENT
Start: 2020-01-08 | End: 2020-01-09

## 2020-01-08 RX ORDER — VANCOMYCIN HYDROCHLORIDE 1 G/200ML
1000 INJECTION, SOLUTION INTRAVENOUS
Status: DISCONTINUED | OUTPATIENT
Start: 2020-01-09 | End: 2020-01-09

## 2020-01-08 RX ADMIN — INSULIN LISPRO 2 UNITS: 100 INJECTION, SOLUTION INTRAVENOUS; SUBCUTANEOUS at 18:38

## 2020-01-08 RX ADMIN — FUROSEMIDE 10 MG/HR: 10 INJECTION, SOLUTION INTRAMUSCULAR; INTRAVENOUS at 21:05

## 2020-01-08 RX ADMIN — PRAMIPEXOLE DIHYDROCHLORIDE 0.25 MG: 0.25 TABLET ORAL at 16:12

## 2020-01-08 RX ADMIN — PANTOPRAZOLE SODIUM 40 MG: 40 TABLET, DELAYED RELEASE ORAL at 08:20

## 2020-01-08 RX ADMIN — PRAMIPEXOLE DIHYDROCHLORIDE 0.25 MG: 0.25 TABLET ORAL at 21:02

## 2020-01-08 RX ADMIN — HYDRALAZINE HYDROCHLORIDE 100 MG: 50 TABLET, FILM COATED ORAL at 16:12

## 2020-01-08 RX ADMIN — HYDRALAZINE HYDROCHLORIDE 100 MG: 50 TABLET, FILM COATED ORAL at 08:20

## 2020-01-08 RX ADMIN — SODIUM CHLORIDE, PRESERVATIVE FREE 10 ML: 5 INJECTION INTRAVENOUS at 08:20

## 2020-01-08 RX ADMIN — PRAMIPEXOLE DIHYDROCHLORIDE 0.25 MG: 0.25 TABLET ORAL at 06:42

## 2020-01-08 RX ADMIN — METOLAZONE 7.5 MG: 2.5 TABLET ORAL at 08:20

## 2020-01-08 RX ADMIN — FUROSEMIDE 10 MG/HR: 10 INJECTION, SOLUTION INTRAMUSCULAR; INTRAVENOUS at 10:11

## 2020-01-08 RX ADMIN — SODIUM CHLORIDE, PRESERVATIVE FREE 10 ML: 5 INJECTION INTRAVENOUS at 21:03

## 2020-01-08 RX ADMIN — HYDRALAZINE HYDROCHLORIDE 100 MG: 50 TABLET, FILM COATED ORAL at 21:02

## 2020-01-08 RX ADMIN — INSULIN LISPRO 2 UNITS: 100 INJECTION, SOLUTION INTRAVENOUS; SUBCUTANEOUS at 12:01

## 2020-01-09 ENCOUNTER — TELEPHONE (OUTPATIENT)
Dept: INTERNAL MEDICINE | Facility: CLINIC | Age: 85
End: 2020-01-09

## 2020-01-09 LAB
ALBUMIN SERPL-MCNC: 2.8 G/DL (ref 3.5–5.2)
ANION GAP SERPL CALCULATED.3IONS-SCNC: 18 MMOL/L (ref 5–15)
BUN BLD-MCNC: 106 MG/DL (ref 8–23)
BUN/CREAT SERPL: 17.2 (ref 7–25)
CALCIUM SPEC-SCNC: 8.5 MG/DL (ref 8.6–10.5)
CHLORIDE SERPL-SCNC: 101 MMOL/L (ref 98–107)
CO2 SERPL-SCNC: 17 MMOL/L (ref 22–29)
CREAT BLD-MCNC: 6.18 MG/DL (ref 0.57–1)
DEPRECATED RDW RBC AUTO: 56.9 FL (ref 37–54)
ERYTHROCYTE [DISTWIDTH] IN BLOOD BY AUTOMATED COUNT: 17.5 % (ref 12.3–15.4)
GFR SERPL CREATININE-BSD FRML MDRD: 6 ML/MIN/1.73
GFR SERPL CREATININE-BSD FRML MDRD: ABNORMAL ML/MIN/{1.73_M2}
GLUCOSE BLD-MCNC: 117 MG/DL (ref 65–99)
GLUCOSE BLDC GLUCOMTR-MCNC: 114 MG/DL (ref 70–130)
GLUCOSE BLDC GLUCOMTR-MCNC: 125 MG/DL (ref 70–130)
GLUCOSE BLDC GLUCOMTR-MCNC: 151 MG/DL (ref 70–130)
GLUCOSE BLDC GLUCOMTR-MCNC: 186 MG/DL (ref 70–130)
HCT VFR BLD AUTO: 25.4 % (ref 34–46.6)
HGB BLD-MCNC: 8.2 G/DL (ref 12–15.9)
MCH RBC QN AUTO: 28.9 PG (ref 26.6–33)
MCHC RBC AUTO-ENTMCNC: 32.3 G/DL (ref 31.5–35.7)
MCV RBC AUTO: 89.4 FL (ref 79–97)
PHOSPHATE SERPL-MCNC: 6.6 MG/DL (ref 2.5–4.5)
PLATELET # BLD AUTO: 271 10*3/MM3 (ref 140–450)
PMV BLD AUTO: 10.4 FL (ref 6–12)
POTASSIUM BLD-SCNC: 3.6 MMOL/L (ref 3.5–5.2)
RBC # BLD AUTO: 2.84 10*6/MM3 (ref 3.77–5.28)
SODIUM BLD-SCNC: 136 MMOL/L (ref 136–145)
WBC NRBC COR # BLD: 7.91 10*3/MM3 (ref 3.4–10.8)

## 2020-01-09 PROCEDURE — 82962 GLUCOSE BLOOD TEST: CPT

## 2020-01-09 PROCEDURE — 85027 COMPLETE CBC AUTOMATED: CPT | Performed by: INTERNAL MEDICINE

## 2020-01-09 PROCEDURE — 25010000002 FUROSEMIDE PER 20 MG: Performed by: INTERNAL MEDICINE

## 2020-01-09 PROCEDURE — 63710000001 INSULIN LISPRO (HUMAN) PER 5 UNITS: Performed by: NURSE PRACTITIONER

## 2020-01-09 PROCEDURE — 80069 RENAL FUNCTION PANEL: CPT | Performed by: INTERNAL MEDICINE

## 2020-01-09 RX ORDER — CLINDAMYCIN PHOSPHATE 900 MG/50ML
900 INJECTION INTRAVENOUS
Status: COMPLETED | OUTPATIENT
Start: 2020-01-10 | End: 2020-01-10

## 2020-01-09 RX ORDER — ALBUMIN (HUMAN) 12.5 G/50ML
12.5 SOLUTION INTRAVENOUS AS NEEDED
Status: CANCELLED | OUTPATIENT
Start: 2020-01-10 | End: 2020-01-10

## 2020-01-09 RX ADMIN — HYDRALAZINE HYDROCHLORIDE 100 MG: 50 TABLET, FILM COATED ORAL at 17:26

## 2020-01-09 RX ADMIN — FUROSEMIDE 10 MG/HR: 10 INJECTION, SOLUTION INTRAMUSCULAR; INTRAVENOUS at 08:48

## 2020-01-09 RX ADMIN — INSULIN LISPRO 2 UNITS: 100 INJECTION, SOLUTION INTRAVENOUS; SUBCUTANEOUS at 12:36

## 2020-01-09 RX ADMIN — PRAMIPEXOLE DIHYDROCHLORIDE 0.25 MG: 0.25 TABLET ORAL at 13:56

## 2020-01-09 RX ADMIN — HYDRALAZINE HYDROCHLORIDE 100 MG: 50 TABLET, FILM COATED ORAL at 08:48

## 2020-01-09 RX ADMIN — PRAMIPEXOLE DIHYDROCHLORIDE 0.25 MG: 0.25 TABLET ORAL at 06:14

## 2020-01-09 RX ADMIN — PANTOPRAZOLE SODIUM 40 MG: 40 TABLET, DELAYED RELEASE ORAL at 08:48

## 2020-01-09 RX ADMIN — PRAMIPEXOLE DIHYDROCHLORIDE 0.25 MG: 0.25 TABLET ORAL at 21:01

## 2020-01-09 RX ADMIN — FUROSEMIDE 10 MG/HR: 10 INJECTION, SOLUTION INTRAMUSCULAR; INTRAVENOUS at 18:42

## 2020-01-09 RX ADMIN — HYDRALAZINE HYDROCHLORIDE 100 MG: 50 TABLET, FILM COATED ORAL at 21:01

## 2020-01-09 RX ADMIN — SODIUM CHLORIDE, PRESERVATIVE FREE 10 ML: 5 INJECTION INTRAVENOUS at 08:48

## 2020-01-09 RX ADMIN — METOLAZONE 7.5 MG: 2.5 TABLET ORAL at 08:51

## 2020-01-09 RX ADMIN — INSULIN LISPRO 2 UNITS: 100 INJECTION, SOLUTION INTRAVENOUS; SUBCUTANEOUS at 21:01

## 2020-01-09 RX ADMIN — SODIUM CHLORIDE, PRESERVATIVE FREE 10 ML: 5 INJECTION INTRAVENOUS at 21:02

## 2020-01-10 ENCOUNTER — ANESTHESIA (OUTPATIENT)
Dept: PERIOP | Facility: HOSPITAL | Age: 85
End: 2020-01-10

## 2020-01-10 ENCOUNTER — APPOINTMENT (OUTPATIENT)
Dept: GENERAL RADIOLOGY | Facility: HOSPITAL | Age: 85
End: 2020-01-10

## 2020-01-10 ENCOUNTER — ANESTHESIA EVENT (OUTPATIENT)
Dept: PERIOP | Facility: HOSPITAL | Age: 85
End: 2020-01-10

## 2020-01-10 LAB
ALBUMIN SERPL-MCNC: 3.1 G/DL (ref 3.5–5.2)
ALBUMIN/GLOB SERPL: 0.9 G/DL
ALP SERPL-CCNC: 136 U/L (ref 39–117)
ALT SERPL W P-5'-P-CCNC: 6 U/L (ref 1–33)
ANION GAP SERPL CALCULATED.3IONS-SCNC: 19.7 MMOL/L (ref 5–15)
AST SERPL-CCNC: 7 U/L (ref 1–32)
BILIRUB SERPL-MCNC: 0.3 MG/DL (ref 0.2–1.2)
BUN BLD-MCNC: 109 MG/DL (ref 8–23)
BUN/CREAT SERPL: 17.8 (ref 7–25)
CALCIUM SPEC-SCNC: 8.4 MG/DL (ref 8.6–10.5)
CHLORIDE SERPL-SCNC: 95 MMOL/L (ref 98–107)
CO2 SERPL-SCNC: 19.3 MMOL/L (ref 22–29)
CREAT BLD-MCNC: 6.13 MG/DL (ref 0.57–1)
DEPRECATED RDW RBC AUTO: 55.5 FL (ref 37–54)
ERYTHROCYTE [DISTWIDTH] IN BLOOD BY AUTOMATED COUNT: 17.1 % (ref 12.3–15.4)
GFR SERPL CREATININE-BSD FRML MDRD: 7 ML/MIN/1.73
GFR SERPL CREATININE-BSD FRML MDRD: ABNORMAL ML/MIN/{1.73_M2}
GLOBULIN UR ELPH-MCNC: 3.3 GM/DL
GLUCOSE BLD-MCNC: 104 MG/DL (ref 65–99)
GLUCOSE BLDC GLUCOMTR-MCNC: 117 MG/DL (ref 70–130)
GLUCOSE BLDC GLUCOMTR-MCNC: 121 MG/DL (ref 70–130)
GLUCOSE BLDC GLUCOMTR-MCNC: 133 MG/DL (ref 70–130)
GLUCOSE BLDC GLUCOMTR-MCNC: 161 MG/DL (ref 70–130)
GLUCOSE BLDC GLUCOMTR-MCNC: 170 MG/DL (ref 70–130)
GLUCOSE BLDC GLUCOMTR-MCNC: 92 MG/DL (ref 70–130)
HCT VFR BLD AUTO: 26.5 % (ref 34–46.6)
HGB BLD-MCNC: 8.6 G/DL (ref 12–15.9)
MAGNESIUM SERPL-MCNC: 2.1 MG/DL (ref 1.6–2.4)
MCH RBC QN AUTO: 29.3 PG (ref 26.6–33)
MCHC RBC AUTO-ENTMCNC: 32.5 G/DL (ref 31.5–35.7)
MCV RBC AUTO: 90.1 FL (ref 79–97)
PLATELET # BLD AUTO: 301 10*3/MM3 (ref 140–450)
PMV BLD AUTO: 10.3 FL (ref 6–12)
POTASSIUM BLD-SCNC: 3.5 MMOL/L (ref 3.5–5.2)
PROT SERPL-MCNC: 6.4 G/DL (ref 6–8.5)
RBC # BLD AUTO: 2.94 10*6/MM3 (ref 3.77–5.28)
SODIUM BLD-SCNC: 134 MMOL/L (ref 136–145)
WBC NRBC COR # BLD: 8.39 10*3/MM3 (ref 3.4–10.8)

## 2020-01-10 PROCEDURE — 25010000002 HEPARIN (PORCINE) PER 1000 UNITS: Performed by: SURGERY

## 2020-01-10 PROCEDURE — 5A1D70Z PERFORMANCE OF URINARY FILTRATION, INTERMITTENT, LESS THAN 6 HOURS PER DAY: ICD-10-PCS | Performed by: INTERNAL MEDICINE

## 2020-01-10 PROCEDURE — 82962 GLUCOSE BLOOD TEST: CPT

## 2020-01-10 PROCEDURE — B5181ZA FLUOROSCOPY OF SUPERIOR VENA CAVA USING LOW OSMOLAR CONTRAST, GUIDANCE: ICD-10-PCS | Performed by: SURGERY

## 2020-01-10 PROCEDURE — 25010000002 PROPOFOL 10 MG/ML EMULSION: Performed by: NURSE ANESTHETIST, CERTIFIED REGISTERED

## 2020-01-10 PROCEDURE — 02HV33Z INSERTION OF INFUSION DEVICE INTO SUPERIOR VENA CAVA, PERCUTANEOUS APPROACH: ICD-10-PCS | Performed by: SURGERY

## 2020-01-10 PROCEDURE — 83735 ASSAY OF MAGNESIUM: CPT | Performed by: INTERNAL MEDICINE

## 2020-01-10 PROCEDURE — 25010000002 MIDAZOLAM PER 1 MG: Performed by: ANESTHESIOLOGY

## 2020-01-10 PROCEDURE — 85027 COMPLETE CBC AUTOMATED: CPT | Performed by: INTERNAL MEDICINE

## 2020-01-10 PROCEDURE — 25010000003 LIDOCAINE 1 % SOLUTION 20 ML VIAL: Performed by: SURGERY

## 2020-01-10 PROCEDURE — C1750 CATH, HEMODIALYSIS,LONG-TERM: HCPCS | Performed by: SURGERY

## 2020-01-10 PROCEDURE — 0JH63XZ INSERTION OF TUNNELED VASCULAR ACCESS DEVICE INTO CHEST SUBCUTANEOUS TISSUE AND FASCIA, PERCUTANEOUS APPROACH: ICD-10-PCS | Performed by: SURGERY

## 2020-01-10 PROCEDURE — 63710000001 INSULIN LISPRO (HUMAN) PER 5 UNITS: Performed by: SURGERY

## 2020-01-10 PROCEDURE — 99232 SBSQ HOSP IP/OBS MODERATE 35: CPT | Performed by: INTERNAL MEDICINE

## 2020-01-10 PROCEDURE — 25010000002 FENTANYL CITRATE (PF) 100 MCG/2ML SOLUTION: Performed by: NURSE ANESTHETIST, CERTIFIED REGISTERED

## 2020-01-10 PROCEDURE — 76000 FLUOROSCOPY <1 HR PHYS/QHP: CPT

## 2020-01-10 PROCEDURE — 80053 COMPREHEN METABOLIC PANEL: CPT | Performed by: HOSPITALIST

## 2020-01-10 RX ORDER — PROPOFOL 10 MG/ML
VIAL (ML) INTRAVENOUS CONTINUOUS PRN
Status: DISCONTINUED | OUTPATIENT
Start: 2020-01-10 | End: 2020-01-10 | Stop reason: SURG

## 2020-01-10 RX ORDER — FENTANYL CITRATE 50 UG/ML
INJECTION, SOLUTION INTRAMUSCULAR; INTRAVENOUS AS NEEDED
Status: DISCONTINUED | OUTPATIENT
Start: 2020-01-10 | End: 2020-01-10 | Stop reason: SURG

## 2020-01-10 RX ORDER — LIDOCAINE HYDROCHLORIDE 10 MG/ML
0.5 INJECTION, SOLUTION EPIDURAL; INFILTRATION; INTRACAUDAL; PERINEURAL ONCE AS NEEDED
Status: DISCONTINUED | OUTPATIENT
Start: 2020-01-10 | End: 2020-01-10 | Stop reason: HOSPADM

## 2020-01-10 RX ORDER — MIDAZOLAM HYDROCHLORIDE 1 MG/ML
2 INJECTION INTRAMUSCULAR; INTRAVENOUS
Status: DISCONTINUED | OUTPATIENT
Start: 2020-01-10 | End: 2020-01-10 | Stop reason: HOSPADM

## 2020-01-10 RX ORDER — HYDRALAZINE HYDROCHLORIDE 50 MG/1
50 TABLET, FILM COATED ORAL 3 TIMES DAILY
Status: DISCONTINUED | OUTPATIENT
Start: 2020-01-10 | End: 2020-01-14 | Stop reason: HOSPADM

## 2020-01-10 RX ORDER — SODIUM CHLORIDE, SODIUM LACTATE, POTASSIUM CHLORIDE, CALCIUM CHLORIDE 600; 310; 30; 20 MG/100ML; MG/100ML; MG/100ML; MG/100ML
9 INJECTION, SOLUTION INTRAVENOUS CONTINUOUS
Status: DISCONTINUED | OUTPATIENT
Start: 2020-01-10 | End: 2020-01-10

## 2020-01-10 RX ORDER — PROMETHAZINE HYDROCHLORIDE 25 MG/1
25 TABLET ORAL ONCE AS NEEDED
Status: DISCONTINUED | OUTPATIENT
Start: 2020-01-10 | End: 2020-01-10 | Stop reason: HOSPADM

## 2020-01-10 RX ORDER — OXYCODONE AND ACETAMINOPHEN 7.5; 325 MG/1; MG/1
1 TABLET ORAL ONCE AS NEEDED
Status: DISCONTINUED | OUTPATIENT
Start: 2020-01-10 | End: 2020-01-10 | Stop reason: HOSPADM

## 2020-01-10 RX ORDER — PROMETHAZINE HYDROCHLORIDE 25 MG/ML
6.25 INJECTION, SOLUTION INTRAMUSCULAR; INTRAVENOUS
Status: DISCONTINUED | OUTPATIENT
Start: 2020-01-10 | End: 2020-01-10 | Stop reason: HOSPADM

## 2020-01-10 RX ORDER — PROMETHAZINE HYDROCHLORIDE 25 MG/1
25 SUPPOSITORY RECTAL ONCE AS NEEDED
Status: DISCONTINUED | OUTPATIENT
Start: 2020-01-10 | End: 2020-01-10 | Stop reason: HOSPADM

## 2020-01-10 RX ORDER — HEPARIN SODIUM 1000 [USP'U]/ML
INJECTION, SOLUTION INTRAVENOUS; SUBCUTANEOUS AS NEEDED
Status: DISCONTINUED | OUTPATIENT
Start: 2020-01-10 | End: 2020-01-10 | Stop reason: HOSPADM

## 2020-01-10 RX ORDER — FAMOTIDINE 10 MG/ML
20 INJECTION, SOLUTION INTRAVENOUS ONCE
Status: COMPLETED | OUTPATIENT
Start: 2020-01-10 | End: 2020-01-10

## 2020-01-10 RX ORDER — ONDANSETRON 2 MG/ML
4 INJECTION INTRAMUSCULAR; INTRAVENOUS ONCE AS NEEDED
Status: DISCONTINUED | OUTPATIENT
Start: 2020-01-10 | End: 2020-01-10 | Stop reason: HOSPADM

## 2020-01-10 RX ORDER — PROMETHAZINE HYDROCHLORIDE 25 MG/ML
12.5 INJECTION, SOLUTION INTRAMUSCULAR; INTRAVENOUS ONCE AS NEEDED
Status: DISCONTINUED | OUTPATIENT
Start: 2020-01-10 | End: 2020-01-10 | Stop reason: HOSPADM

## 2020-01-10 RX ORDER — FENTANYL CITRATE 50 UG/ML
50 INJECTION, SOLUTION INTRAMUSCULAR; INTRAVENOUS
Status: DISCONTINUED | OUTPATIENT
Start: 2020-01-10 | End: 2020-01-10 | Stop reason: HOSPADM

## 2020-01-10 RX ORDER — HYDRALAZINE HYDROCHLORIDE 20 MG/ML
5 INJECTION INTRAMUSCULAR; INTRAVENOUS
Status: DISCONTINUED | OUTPATIENT
Start: 2020-01-10 | End: 2020-01-10 | Stop reason: HOSPADM

## 2020-01-10 RX ORDER — NALOXONE HCL 0.4 MG/ML
0.2 VIAL (ML) INJECTION AS NEEDED
Status: DISCONTINUED | OUTPATIENT
Start: 2020-01-10 | End: 2020-01-10 | Stop reason: HOSPADM

## 2020-01-10 RX ORDER — DIPHENHYDRAMINE HYDROCHLORIDE 50 MG/ML
12.5 INJECTION INTRAMUSCULAR; INTRAVENOUS
Status: DISCONTINUED | OUTPATIENT
Start: 2020-01-10 | End: 2020-01-10 | Stop reason: HOSPADM

## 2020-01-10 RX ORDER — FLUMAZENIL 0.1 MG/ML
0.2 INJECTION INTRAVENOUS AS NEEDED
Status: DISCONTINUED | OUTPATIENT
Start: 2020-01-10 | End: 2020-01-10 | Stop reason: HOSPADM

## 2020-01-10 RX ORDER — LIDOCAINE HYDROCHLORIDE 20 MG/ML
INJECTION, SOLUTION INFILTRATION; PERINEURAL AS NEEDED
Status: DISCONTINUED | OUTPATIENT
Start: 2020-01-10 | End: 2020-01-10 | Stop reason: SURG

## 2020-01-10 RX ORDER — IPRATROPIUM BROMIDE AND ALBUTEROL SULFATE 2.5; .5 MG/3ML; MG/3ML
3 SOLUTION RESPIRATORY (INHALATION) ONCE AS NEEDED
Status: DISCONTINUED | OUTPATIENT
Start: 2020-01-10 | End: 2020-01-10 | Stop reason: HOSPADM

## 2020-01-10 RX ORDER — DIPHENHYDRAMINE HCL 25 MG
25 CAPSULE ORAL
Status: DISCONTINUED | OUTPATIENT
Start: 2020-01-10 | End: 2020-01-10 | Stop reason: HOSPADM

## 2020-01-10 RX ORDER — SODIUM CHLORIDE 0.9 % (FLUSH) 0.9 %
3-10 SYRINGE (ML) INJECTION AS NEEDED
Status: DISCONTINUED | OUTPATIENT
Start: 2020-01-10 | End: 2020-01-10 | Stop reason: HOSPADM

## 2020-01-10 RX ORDER — SODIUM CHLORIDE 9 MG/ML
INJECTION, SOLUTION INTRAVENOUS CONTINUOUS PRN
Status: DISCONTINUED | OUTPATIENT
Start: 2020-01-10 | End: 2020-01-10 | Stop reason: SURG

## 2020-01-10 RX ORDER — HYDROMORPHONE HYDROCHLORIDE 1 MG/ML
0.5 INJECTION, SOLUTION INTRAMUSCULAR; INTRAVENOUS; SUBCUTANEOUS
Status: DISCONTINUED | OUTPATIENT
Start: 2020-01-10 | End: 2020-01-10 | Stop reason: HOSPADM

## 2020-01-10 RX ORDER — ALBUMIN (HUMAN) 12.5 G/50ML
12.5 SOLUTION INTRAVENOUS AS NEEDED
Status: CANCELLED | OUTPATIENT
Start: 2020-01-11 | End: 2020-01-11

## 2020-01-10 RX ORDER — PROPOFOL 10 MG/ML
VIAL (ML) INTRAVENOUS AS NEEDED
Status: DISCONTINUED | OUTPATIENT
Start: 2020-01-10 | End: 2020-01-10 | Stop reason: SURG

## 2020-01-10 RX ORDER — EPHEDRINE SULFATE 50 MG/ML
5 INJECTION, SOLUTION INTRAVENOUS ONCE AS NEEDED
Status: DISCONTINUED | OUTPATIENT
Start: 2020-01-10 | End: 2020-01-10 | Stop reason: HOSPADM

## 2020-01-10 RX ORDER — HYDROCODONE BITARTRATE AND ACETAMINOPHEN 7.5; 325 MG/1; MG/1
1 TABLET ORAL ONCE AS NEEDED
Status: DISCONTINUED | OUTPATIENT
Start: 2020-01-10 | End: 2020-01-10 | Stop reason: HOSPADM

## 2020-01-10 RX ORDER — SODIUM CHLORIDE 0.9 % (FLUSH) 0.9 %
3 SYRINGE (ML) INJECTION EVERY 12 HOURS SCHEDULED
Status: DISCONTINUED | OUTPATIENT
Start: 2020-01-10 | End: 2020-01-10 | Stop reason: HOSPADM

## 2020-01-10 RX ORDER — LABETALOL HYDROCHLORIDE 5 MG/ML
5 INJECTION, SOLUTION INTRAVENOUS
Status: DISCONTINUED | OUTPATIENT
Start: 2020-01-10 | End: 2020-01-10 | Stop reason: HOSPADM

## 2020-01-10 RX ORDER — ACETAMINOPHEN 325 MG/1
650 TABLET ORAL ONCE AS NEEDED
Status: DISCONTINUED | OUTPATIENT
Start: 2020-01-10 | End: 2020-01-10 | Stop reason: HOSPADM

## 2020-01-10 RX ORDER — MIDAZOLAM HYDROCHLORIDE 1 MG/ML
1 INJECTION INTRAMUSCULAR; INTRAVENOUS
Status: DISCONTINUED | OUTPATIENT
Start: 2020-01-10 | End: 2020-01-10 | Stop reason: HOSPADM

## 2020-01-10 RX ADMIN — PRAMIPEXOLE DIHYDROCHLORIDE 0.25 MG: 0.25 TABLET ORAL at 06:26

## 2020-01-10 RX ADMIN — INSULIN LISPRO 2 UNITS: 100 INJECTION, SOLUTION INTRAVENOUS; SUBCUTANEOUS at 13:27

## 2020-01-10 RX ADMIN — CLINDAMYCIN PHOSPHATE 900 MG: 18 INJECTION, SOLUTION INTRAMUSCULAR; INTRAVENOUS at 11:33

## 2020-01-10 RX ADMIN — PROPOFOL 30 MG: 10 INJECTION, EMULSION INTRAVENOUS at 11:33

## 2020-01-10 RX ADMIN — PRAMIPEXOLE DIHYDROCHLORIDE 0.25 MG: 0.25 TABLET ORAL at 21:13

## 2020-01-10 RX ADMIN — SODIUM CHLORIDE: 9 INJECTION, SOLUTION INTRAVENOUS at 11:25

## 2020-01-10 RX ADMIN — LIDOCAINE HYDROCHLORIDE 60 MG: 20 INJECTION, SOLUTION INFILTRATION; PERINEURAL at 11:33

## 2020-01-10 RX ADMIN — SODIUM CHLORIDE, PRESERVATIVE FREE 10 ML: 5 INJECTION INTRAVENOUS at 09:05

## 2020-01-10 RX ADMIN — FAMOTIDINE 20 MG: 10 INJECTION INTRAVENOUS at 10:59

## 2020-01-10 RX ADMIN — FENTANYL CITRATE 25 MCG: 50 INJECTION INTRAMUSCULAR; INTRAVENOUS at 11:29

## 2020-01-10 RX ADMIN — MIDAZOLAM 1 MG: 1 INJECTION INTRAMUSCULAR; INTRAVENOUS at 11:09

## 2020-01-10 RX ADMIN — HYDRALAZINE HYDROCHLORIDE 100 MG: 50 TABLET, FILM COATED ORAL at 09:05

## 2020-01-10 RX ADMIN — PANTOPRAZOLE SODIUM 40 MG: 40 TABLET, DELAYED RELEASE ORAL at 09:05

## 2020-01-10 RX ADMIN — SODIUM CHLORIDE, PRESERVATIVE FREE 10 ML: 5 INJECTION INTRAVENOUS at 20:37

## 2020-01-10 RX ADMIN — HYDRALAZINE HYDROCHLORIDE 50 MG: 50 TABLET, FILM COATED ORAL at 20:37

## 2020-01-10 RX ADMIN — ACETAMINOPHEN 650 MG: 325 TABLET, FILM COATED ORAL at 23:46

## 2020-01-10 RX ADMIN — PROPOFOL 100 MCG/KG/MIN: 10 INJECTION, EMULSION INTRAVENOUS at 11:33

## 2020-01-10 RX ADMIN — PRAMIPEXOLE DIHYDROCHLORIDE 0.25 MG: 0.25 TABLET ORAL at 13:27

## 2020-01-10 RX ADMIN — INSULIN LISPRO 2 UNITS: 100 INJECTION, SOLUTION INTRAVENOUS; SUBCUTANEOUS at 20:45

## 2020-01-10 NOTE — ANESTHESIA POSTPROCEDURE EVALUATION
"Patient: Veronica Henao    Procedure Summary     Date:  01/10/20 Room / Location:  [facility] OR  / [facility] MAIN OR    Anesthesia Start:  1126 Anesthesia Stop:  1209    Procedure:  TUNNEL DIALYSIS CATHETER (N/A ) Diagnosis:      Surgeon:  Carlos Manuel Jenrigan MD Provider:  Bony Mcnally MD    Anesthesia Type:  MAC ASA Status:  3          Anesthesia Type: MAC    Vitals  Vitals Value Taken Time   /69 1/10/2020 12:50 PM   Temp 36.9 °C (98.5 °F) 1/10/2020 12:05 PM   Pulse 69 1/10/2020 12:55 PM   Resp 16 1/10/2020 12:10 PM   SpO2 100 % 1/10/2020 12:55 PM   Vitals shown include unvalidated device data.        Post Anesthesia Care and Evaluation    Patient location during evaluation: bedside  Patient participation: complete - patient participated  Level of consciousness: awake and alert  Pain score: 0  Pain management: adequate  Airway patency: patent  Anesthetic complications: No anesthetic complications  PONV Status: none  Cardiovascular status: acceptable and hemodynamically stable  Respiratory status: acceptable, nasal cannula and spontaneous ventilation  Hydration status: acceptable    Comments: /64   Pulse 69   Temp 36.9 °C (98.5 °F) (Oral)   Resp 16   Ht 170.2 cm (67\")   Wt 67.8 kg (149 lb 6.4 oz)   SpO2 99%   BMI 23.40 kg/m²         "

## 2020-01-10 NOTE — ANESTHESIA PREPROCEDURE EVALUATION
Anesthesia Evaluation     NPO Solid Status: > 8 hours             Airway   Dental      Pulmonary    (+) a smoker Former, sleep apnea on CPAP,   Cardiovascular     Rhythm: regular    (+) pacemaker pacemaker, hypertension, valvular problems/murmurs MS and MR, dysrhythmias Atrial Fib, CHF , ORTIZ, murmur, hyperlipidemia,       Neuro/Psych  (+) psychiatric history Depression,     GI/Hepatic/Renal/Endo    (+)  GERD,  renal disease, diabetes mellitus type 2,     Musculoskeletal     Abdominal    Substance History      OB/GYN          Other                        Anesthesia Plan    ASA 3     MAC       Anesthetic plan, all risks, benefits, and alternatives have been provided, discussed and informed consent has been obtained with: patient.

## 2020-01-11 LAB
ALBUMIN SERPL-MCNC: 3.1 G/DL (ref 3.5–5.2)
ANION GAP SERPL CALCULATED.3IONS-SCNC: 15.1 MMOL/L (ref 5–15)
BUN BLD-MCNC: 48 MG/DL (ref 8–23)
BUN/CREAT SERPL: 14.5 (ref 7–25)
CALCIUM SPEC-SCNC: 8.4 MG/DL (ref 8.6–10.5)
CHLORIDE SERPL-SCNC: 100 MMOL/L (ref 98–107)
CO2 SERPL-SCNC: 23.9 MMOL/L (ref 22–29)
CREAT BLD-MCNC: 3.31 MG/DL (ref 0.57–1)
DEPRECATED RDW RBC AUTO: 54.2 FL (ref 37–54)
ERYTHROCYTE [DISTWIDTH] IN BLOOD BY AUTOMATED COUNT: 16.8 % (ref 12.3–15.4)
GFR SERPL CREATININE-BSD FRML MDRD: 13 ML/MIN/1.73
GFR SERPL CREATININE-BSD FRML MDRD: ABNORMAL ML/MIN/{1.73_M2}
GLUCOSE BLD-MCNC: 102 MG/DL (ref 65–99)
GLUCOSE BLDC GLUCOMTR-MCNC: 107 MG/DL (ref 70–130)
GLUCOSE BLDC GLUCOMTR-MCNC: 108 MG/DL (ref 70–130)
GLUCOSE BLDC GLUCOMTR-MCNC: 136 MG/DL (ref 70–130)
GLUCOSE BLDC GLUCOMTR-MCNC: 153 MG/DL (ref 70–130)
HCT VFR BLD AUTO: 26.3 % (ref 34–46.6)
HGB BLD-MCNC: 8.3 G/DL (ref 12–15.9)
MCH RBC QN AUTO: 28.3 PG (ref 26.6–33)
MCHC RBC AUTO-ENTMCNC: 31.6 G/DL (ref 31.5–35.7)
MCV RBC AUTO: 89.8 FL (ref 79–97)
PHOSPHATE SERPL-MCNC: 3.8 MG/DL (ref 2.5–4.5)
PLATELET # BLD AUTO: 266 10*3/MM3 (ref 140–450)
PMV BLD AUTO: 10.2 FL (ref 6–12)
POTASSIUM BLD-SCNC: 3.1 MMOL/L (ref 3.5–5.2)
RBC # BLD AUTO: 2.93 10*6/MM3 (ref 3.77–5.28)
SODIUM BLD-SCNC: 139 MMOL/L (ref 136–145)
WBC NRBC COR # BLD: 7.18 10*3/MM3 (ref 3.4–10.8)

## 2020-01-11 PROCEDURE — 63710000001 INSULIN LISPRO (HUMAN) PER 5 UNITS: Performed by: SURGERY

## 2020-01-11 PROCEDURE — 25010000002 ONDANSETRON PER 1 MG: Performed by: SURGERY

## 2020-01-11 PROCEDURE — 85027 COMPLETE CBC AUTOMATED: CPT | Performed by: SURGERY

## 2020-01-11 PROCEDURE — 82962 GLUCOSE BLOOD TEST: CPT

## 2020-01-11 PROCEDURE — 80069 RENAL FUNCTION PANEL: CPT | Performed by: HOSPITALIST

## 2020-01-11 RX ORDER — CHOLECALCIFEROL (VITAMIN D3) 125 MCG
5 CAPSULE ORAL NIGHTLY
Status: DISCONTINUED | OUTPATIENT
Start: 2020-01-11 | End: 2020-01-14 | Stop reason: HOSPADM

## 2020-01-11 RX ORDER — POTASSIUM CHLORIDE 750 MG/1
20 CAPSULE, EXTENDED RELEASE ORAL ONCE
Status: COMPLETED | OUTPATIENT
Start: 2020-01-11 | End: 2020-01-11

## 2020-01-11 RX ADMIN — ONDANSETRON 4 MG: 2 INJECTION INTRAMUSCULAR; INTRAVENOUS at 21:13

## 2020-01-11 RX ADMIN — HYDRALAZINE HYDROCHLORIDE 50 MG: 50 TABLET, FILM COATED ORAL at 09:37

## 2020-01-11 RX ADMIN — HYDRALAZINE HYDROCHLORIDE 50 MG: 50 TABLET, FILM COATED ORAL at 17:53

## 2020-01-11 RX ADMIN — PANTOPRAZOLE SODIUM 40 MG: 40 TABLET, DELAYED RELEASE ORAL at 10:32

## 2020-01-11 RX ADMIN — HYDRALAZINE HYDROCHLORIDE 50 MG: 50 TABLET, FILM COATED ORAL at 21:59

## 2020-01-11 RX ADMIN — PRAMIPEXOLE DIHYDROCHLORIDE 0.25 MG: 0.25 TABLET ORAL at 17:53

## 2020-01-11 RX ADMIN — SODIUM CHLORIDE, PRESERVATIVE FREE 10 ML: 5 INJECTION INTRAVENOUS at 21:59

## 2020-01-11 RX ADMIN — INSULIN LISPRO 2 UNITS: 100 INJECTION, SOLUTION INTRAVENOUS; SUBCUTANEOUS at 12:26

## 2020-01-11 RX ADMIN — Medication 5 MG: at 21:59

## 2020-01-11 RX ADMIN — PRAMIPEXOLE DIHYDROCHLORIDE 0.25 MG: 0.25 TABLET ORAL at 21:59

## 2020-01-11 RX ADMIN — ONDANSETRON 4 MG: 2 INJECTION INTRAMUSCULAR; INTRAVENOUS at 09:37

## 2020-01-11 RX ADMIN — POTASSIUM CHLORIDE 20 MEQ: 750 CAPSULE, EXTENDED RELEASE ORAL at 17:53

## 2020-01-11 RX ADMIN — PRAMIPEXOLE DIHYDROCHLORIDE 0.25 MG: 0.25 TABLET ORAL at 06:57

## 2020-01-11 RX ADMIN — SODIUM CHLORIDE, PRESERVATIVE FREE 10 ML: 5 INJECTION INTRAVENOUS at 09:37

## 2020-01-12 LAB
ABO + RH BLD: NORMAL
ABO + RH BLD: NORMAL
ALBUMIN SERPL-MCNC: 3.2 G/DL (ref 3.5–5.2)
ANION GAP SERPL CALCULATED.3IONS-SCNC: 13 MMOL/L (ref 5–15)
BASOPHILS # BLD AUTO: 0.06 10*3/MM3 (ref 0–0.2)
BASOPHILS NFR BLD AUTO: 0.8 % (ref 0–1.5)
BH BB BLOOD EXPIRATION DATE: NORMAL
BH BB BLOOD EXPIRATION DATE: NORMAL
BH BB BLOOD TYPE BARCODE: 5100
BH BB BLOOD TYPE BARCODE: 5100
BH BB DISPENSE STATUS: NORMAL
BH BB DISPENSE STATUS: NORMAL
BH BB PRODUCT CODE: NORMAL
BH BB PRODUCT CODE: NORMAL
BH BB UNIT NUMBER: NORMAL
BH BB UNIT NUMBER: NORMAL
BUN BLD-MCNC: 24 MG/DL (ref 8–23)
BUN/CREAT SERPL: 9.3 (ref 7–25)
CALCIUM SPEC-SCNC: 8.6 MG/DL (ref 8.6–10.5)
CHLORIDE SERPL-SCNC: 102 MMOL/L (ref 98–107)
CO2 SERPL-SCNC: 25 MMOL/L (ref 22–29)
CREAT BLD-MCNC: 2.58 MG/DL (ref 0.57–1)
DEPRECATED RDW RBC AUTO: 57.7 FL (ref 37–54)
EOSINOPHIL # BLD AUTO: 0.39 10*3/MM3 (ref 0–0.4)
EOSINOPHIL NFR BLD AUTO: 5.2 % (ref 0.3–6.2)
ERYTHROCYTE [DISTWIDTH] IN BLOOD BY AUTOMATED COUNT: 17.2 % (ref 12.3–15.4)
GFR SERPL CREATININE-BSD FRML MDRD: 18 ML/MIN/1.73
GLUCOSE BLD-MCNC: 105 MG/DL (ref 65–99)
GLUCOSE BLDC GLUCOMTR-MCNC: 103 MG/DL (ref 70–130)
GLUCOSE BLDC GLUCOMTR-MCNC: 116 MG/DL (ref 70–130)
GLUCOSE BLDC GLUCOMTR-MCNC: 118 MG/DL (ref 70–130)
GLUCOSE BLDC GLUCOMTR-MCNC: 119 MG/DL (ref 70–130)
HCT VFR BLD AUTO: 28.4 % (ref 34–46.6)
HGB BLD-MCNC: 8.9 G/DL (ref 12–15.9)
IMM GRANULOCYTES # BLD AUTO: 0.03 10*3/MM3 (ref 0–0.05)
IMM GRANULOCYTES NFR BLD AUTO: 0.4 % (ref 0–0.5)
LYMPHOCYTES # BLD AUTO: 1.42 10*3/MM3 (ref 0.7–3.1)
LYMPHOCYTES NFR BLD AUTO: 19 % (ref 19.6–45.3)
MAGNESIUM SERPL-MCNC: 1.9 MG/DL (ref 1.6–2.4)
MCH RBC QN AUTO: 28.8 PG (ref 26.6–33)
MCHC RBC AUTO-ENTMCNC: 31.3 G/DL (ref 31.5–35.7)
MCV RBC AUTO: 91.9 FL (ref 79–97)
MONOCYTES # BLD AUTO: 0.72 10*3/MM3 (ref 0.1–0.9)
MONOCYTES NFR BLD AUTO: 9.6 % (ref 5–12)
NEUTROPHILS # BLD AUTO: 4.85 10*3/MM3 (ref 1.7–7)
NEUTROPHILS NFR BLD AUTO: 65 % (ref 42.7–76)
NRBC BLD AUTO-RTO: 0.1 /100 WBC (ref 0–0.2)
PHOSPHATE SERPL-MCNC: 3.4 MG/DL (ref 2.5–4.5)
PLATELET # BLD AUTO: 252 10*3/MM3 (ref 140–450)
PMV BLD AUTO: 10.3 FL (ref 6–12)
POTASSIUM BLD-SCNC: 3.9 MMOL/L (ref 3.5–5.2)
RBC # BLD AUTO: 3.09 10*6/MM3 (ref 3.77–5.28)
SODIUM BLD-SCNC: 140 MMOL/L (ref 136–145)
UNIT  ABO: NORMAL
UNIT  ABO: NORMAL
UNIT  RH: NORMAL
UNIT  RH: NORMAL
WBC NRBC COR # BLD: 7.47 10*3/MM3 (ref 3.4–10.8)

## 2020-01-12 PROCEDURE — 25010000002 ONDANSETRON PER 1 MG: Performed by: SURGERY

## 2020-01-12 PROCEDURE — 80069 RENAL FUNCTION PANEL: CPT | Performed by: INTERNAL MEDICINE

## 2020-01-12 PROCEDURE — 85025 COMPLETE CBC W/AUTO DIFF WBC: CPT | Performed by: INTERNAL MEDICINE

## 2020-01-12 PROCEDURE — 83735 ASSAY OF MAGNESIUM: CPT | Performed by: INTERNAL MEDICINE

## 2020-01-12 PROCEDURE — 82962 GLUCOSE BLOOD TEST: CPT

## 2020-01-12 RX ADMIN — ONDANSETRON 4 MG: 2 INJECTION INTRAMUSCULAR; INTRAVENOUS at 08:04

## 2020-01-12 RX ADMIN — PRAMIPEXOLE DIHYDROCHLORIDE 0.25 MG: 0.25 TABLET ORAL at 21:34

## 2020-01-12 RX ADMIN — SODIUM CHLORIDE, PRESERVATIVE FREE 10 ML: 5 INJECTION INTRAVENOUS at 08:04

## 2020-01-12 RX ADMIN — PRAMIPEXOLE DIHYDROCHLORIDE 0.25 MG: 0.25 TABLET ORAL at 07:47

## 2020-01-12 RX ADMIN — Medication 5 MG: at 21:34

## 2020-01-12 RX ADMIN — PANTOPRAZOLE SODIUM 40 MG: 40 TABLET, DELAYED RELEASE ORAL at 08:04

## 2020-01-12 RX ADMIN — ACETAMINOPHEN 650 MG: 325 TABLET, FILM COATED ORAL at 11:06

## 2020-01-12 RX ADMIN — SODIUM CHLORIDE, PRESERVATIVE FREE 10 ML: 5 INJECTION INTRAVENOUS at 21:34

## 2020-01-12 RX ADMIN — HYDRALAZINE HYDROCHLORIDE 50 MG: 50 TABLET, FILM COATED ORAL at 21:34

## 2020-01-12 RX ADMIN — HYDRALAZINE HYDROCHLORIDE 50 MG: 50 TABLET, FILM COATED ORAL at 08:04

## 2020-01-12 RX ADMIN — PRAMIPEXOLE DIHYDROCHLORIDE 0.25 MG: 0.25 TABLET ORAL at 15:02

## 2020-01-12 RX ADMIN — HYDRALAZINE HYDROCHLORIDE 50 MG: 50 TABLET, FILM COATED ORAL at 17:14

## 2020-01-13 LAB
ALBUMIN SERPL-MCNC: 3 G/DL (ref 3.5–5.2)
ANION GAP SERPL CALCULATED.3IONS-SCNC: 13.9 MMOL/L (ref 5–15)
BASOPHILS # BLD AUTO: 0.07 10*3/MM3 (ref 0–0.2)
BASOPHILS NFR BLD AUTO: 0.9 % (ref 0–1.5)
BUN BLD-MCNC: 31 MG/DL (ref 8–23)
BUN/CREAT SERPL: 10.1 (ref 7–25)
CALCIUM SPEC-SCNC: 8.7 MG/DL (ref 8.6–10.5)
CHLORIDE SERPL-SCNC: 103 MMOL/L (ref 98–107)
CO2 SERPL-SCNC: 24.1 MMOL/L (ref 22–29)
CREAT BLD-MCNC: 3.08 MG/DL (ref 0.57–1)
DEPRECATED RDW RBC AUTO: 54.5 FL (ref 37–54)
EOSINOPHIL # BLD AUTO: 0.48 10*3/MM3 (ref 0–0.4)
EOSINOPHIL NFR BLD AUTO: 6.2 % (ref 0.3–6.2)
ERYTHROCYTE [DISTWIDTH] IN BLOOD BY AUTOMATED COUNT: 16.9 % (ref 12.3–15.4)
GFR SERPL CREATININE-BSD FRML MDRD: 14 ML/MIN/1.73
GFR SERPL CREATININE-BSD FRML MDRD: ABNORMAL ML/MIN/{1.73_M2}
GLUCOSE BLD-MCNC: 106 MG/DL (ref 65–99)
GLUCOSE BLDC GLUCOMTR-MCNC: 104 MG/DL (ref 70–130)
GLUCOSE BLDC GLUCOMTR-MCNC: 118 MG/DL (ref 70–130)
GLUCOSE BLDC GLUCOMTR-MCNC: 128 MG/DL (ref 70–130)
GLUCOSE BLDC GLUCOMTR-MCNC: 147 MG/DL (ref 70–130)
HCT VFR BLD AUTO: 28.2 % (ref 34–46.6)
HGB BLD-MCNC: 8.9 G/DL (ref 12–15.9)
IMM GRANULOCYTES # BLD AUTO: 0.03 10*3/MM3 (ref 0–0.05)
IMM GRANULOCYTES NFR BLD AUTO: 0.4 % (ref 0–0.5)
INR PPP: 1.05 (ref 0.9–1.1)
LYMPHOCYTES # BLD AUTO: 1.27 10*3/MM3 (ref 0.7–3.1)
LYMPHOCYTES NFR BLD AUTO: 16.3 % (ref 19.6–45.3)
MCH RBC QN AUTO: 28.3 PG (ref 26.6–33)
MCHC RBC AUTO-ENTMCNC: 31.6 G/DL (ref 31.5–35.7)
MCV RBC AUTO: 89.8 FL (ref 79–97)
MONOCYTES # BLD AUTO: 0.75 10*3/MM3 (ref 0.1–0.9)
MONOCYTES NFR BLD AUTO: 9.7 % (ref 5–12)
NEUTROPHILS # BLD AUTO: 5.17 10*3/MM3 (ref 1.7–7)
NEUTROPHILS NFR BLD AUTO: 66.5 % (ref 42.7–76)
NRBC BLD AUTO-RTO: 0 /100 WBC (ref 0–0.2)
PHOSPHATE SERPL-MCNC: 4.1 MG/DL (ref 2.5–4.5)
PLATELET # BLD AUTO: 235 10*3/MM3 (ref 140–450)
PMV BLD AUTO: 10.2 FL (ref 6–12)
POTASSIUM BLD-SCNC: 4.2 MMOL/L (ref 3.5–5.2)
PROTHROMBIN TIME: 13.4 SECONDS (ref 11.7–14.2)
RBC # BLD AUTO: 3.14 10*6/MM3 (ref 3.77–5.28)
SODIUM BLD-SCNC: 141 MMOL/L (ref 136–145)
WBC NRBC COR # BLD: 7.77 10*3/MM3 (ref 3.4–10.8)

## 2020-01-13 PROCEDURE — 85025 COMPLETE CBC W/AUTO DIFF WBC: CPT | Performed by: INTERNAL MEDICINE

## 2020-01-13 PROCEDURE — 25010000002 ONDANSETRON PER 1 MG: Performed by: SURGERY

## 2020-01-13 PROCEDURE — 82962 GLUCOSE BLOOD TEST: CPT

## 2020-01-13 PROCEDURE — 85610 PROTHROMBIN TIME: CPT | Performed by: INTERNAL MEDICINE

## 2020-01-13 PROCEDURE — 80069 RENAL FUNCTION PANEL: CPT | Performed by: INTERNAL MEDICINE

## 2020-01-13 PROCEDURE — 99232 SBSQ HOSP IP/OBS MODERATE 35: CPT | Performed by: INTERNAL MEDICINE

## 2020-01-13 RX ORDER — LEVOCETIRIZINE DIHYDROCHLORIDE 5 MG/1
5 TABLET, FILM COATED ORAL DAILY
COMMUNITY
End: 2020-01-01

## 2020-01-13 RX ORDER — WARFARIN SODIUM 5 MG/1
5 TABLET ORAL ONCE
Status: COMPLETED | OUTPATIENT
Start: 2020-01-13 | End: 2020-01-13

## 2020-01-13 RX ORDER — WARFARIN SODIUM 3 MG/1
3 TABLET ORAL
Status: DISCONTINUED | OUTPATIENT
Start: 2020-01-13 | End: 2020-01-13 | Stop reason: DRUGHIGH

## 2020-01-13 RX ORDER — ALBUMIN (HUMAN) 12.5 G/50ML
12.5 SOLUTION INTRAVENOUS AS NEEDED
Status: CANCELLED | OUTPATIENT
Start: 2020-01-14 | End: 2020-01-14

## 2020-01-13 RX ORDER — LATANOPROST 50 UG/ML
1 SOLUTION/ DROPS OPHTHALMIC NIGHTLY
Status: DISCONTINUED | OUTPATIENT
Start: 2020-01-13 | End: 2020-01-14 | Stop reason: HOSPADM

## 2020-01-13 RX ORDER — WARFARIN SODIUM 1 MG/1
1 TABLET ORAL
COMMUNITY
End: 2020-01-14 | Stop reason: HOSPADM

## 2020-01-13 RX ORDER — LATANOPROST 50 UG/ML
1 SOLUTION/ DROPS OPHTHALMIC NIGHTLY
COMMUNITY
End: 2020-01-01

## 2020-01-13 RX ADMIN — HYDRALAZINE HYDROCHLORIDE 50 MG: 50 TABLET, FILM COATED ORAL at 10:16

## 2020-01-13 RX ADMIN — WARFARIN SODIUM 5 MG: 5 TABLET ORAL at 21:31

## 2020-01-13 RX ADMIN — PRAMIPEXOLE DIHYDROCHLORIDE 0.25 MG: 0.25 TABLET ORAL at 07:01

## 2020-01-13 RX ADMIN — PRAMIPEXOLE DIHYDROCHLORIDE 0.25 MG: 0.25 TABLET ORAL at 14:58

## 2020-01-13 RX ADMIN — Medication 5 MG: at 21:31

## 2020-01-13 RX ADMIN — SODIUM CHLORIDE, PRESERVATIVE FREE 10 ML: 5 INJECTION INTRAVENOUS at 10:18

## 2020-01-13 RX ADMIN — ONDANSETRON 4 MG: 2 INJECTION INTRAMUSCULAR; INTRAVENOUS at 10:16

## 2020-01-13 RX ADMIN — PRAMIPEXOLE DIHYDROCHLORIDE 0.25 MG: 0.25 TABLET ORAL at 21:31

## 2020-01-13 RX ADMIN — HYDRALAZINE HYDROCHLORIDE 50 MG: 50 TABLET, FILM COATED ORAL at 21:31

## 2020-01-13 RX ADMIN — HYDRALAZINE HYDROCHLORIDE 50 MG: 50 TABLET, FILM COATED ORAL at 16:30

## 2020-01-13 RX ADMIN — LATANOPROST 1 DROP: 50 SOLUTION OPHTHALMIC at 21:31

## 2020-01-13 RX ADMIN — SODIUM CHLORIDE, PRESERVATIVE FREE 10 ML: 5 INJECTION INTRAVENOUS at 21:32

## 2020-01-13 RX ADMIN — PANTOPRAZOLE SODIUM 40 MG: 40 TABLET, DELAYED RELEASE ORAL at 10:16

## 2020-01-14 ENCOUNTER — TELEPHONE (OUTPATIENT)
Dept: CARDIOLOGY | Facility: CLINIC | Age: 85
End: 2020-01-14

## 2020-01-14 VITALS
TEMPERATURE: 97.5 F | RESPIRATION RATE: 18 BRPM | HEART RATE: 70 BPM | SYSTOLIC BLOOD PRESSURE: 159 MMHG | DIASTOLIC BLOOD PRESSURE: 70 MMHG | OXYGEN SATURATION: 97 % | HEIGHT: 67 IN | WEIGHT: 139.4 LBS | BODY MASS INDEX: 21.88 KG/M2

## 2020-01-14 LAB
ALBUMIN SERPL-MCNC: 2.9 G/DL (ref 3.5–5.2)
ANION GAP SERPL CALCULATED.3IONS-SCNC: 11.3 MMOL/L (ref 5–15)
BASOPHILS # BLD AUTO: 0.06 10*3/MM3 (ref 0–0.2)
BASOPHILS NFR BLD AUTO: 0.8 % (ref 0–1.5)
BUN BLD-MCNC: 35 MG/DL (ref 8–23)
BUN/CREAT SERPL: 11.3 (ref 7–25)
CALCIUM SPEC-SCNC: 8.9 MG/DL (ref 8.6–10.5)
CHLORIDE SERPL-SCNC: 97 MMOL/L (ref 98–107)
CO2 SERPL-SCNC: 24.7 MMOL/L (ref 22–29)
CREAT BLD-MCNC: 3.09 MG/DL (ref 0.57–1)
DEPRECATED RDW RBC AUTO: 54.5 FL (ref 37–54)
EOSINOPHIL # BLD AUTO: 0.52 10*3/MM3 (ref 0–0.4)
EOSINOPHIL NFR BLD AUTO: 6.9 % (ref 0.3–6.2)
ERYTHROCYTE [DISTWIDTH] IN BLOOD BY AUTOMATED COUNT: 16.8 % (ref 12.3–15.4)
GFR SERPL CREATININE-BSD FRML MDRD: 14 ML/MIN/1.73
GFR SERPL CREATININE-BSD FRML MDRD: ABNORMAL ML/MIN/{1.73_M2}
GLUCOSE BLD-MCNC: 100 MG/DL (ref 65–99)
GLUCOSE BLDC GLUCOMTR-MCNC: 119 MG/DL (ref 70–130)
GLUCOSE BLDC GLUCOMTR-MCNC: 200 MG/DL (ref 70–130)
HCT VFR BLD AUTO: 28 % (ref 34–46.6)
HGB BLD-MCNC: 8.7 G/DL (ref 12–15.9)
IMM GRANULOCYTES # BLD AUTO: 0.04 10*3/MM3 (ref 0–0.05)
IMM GRANULOCYTES NFR BLD AUTO: 0.5 % (ref 0–0.5)
INR PPP: 1.12 (ref 0.9–1.1)
LYMPHOCYTES # BLD AUTO: 1.24 10*3/MM3 (ref 0.7–3.1)
LYMPHOCYTES NFR BLD AUTO: 16.5 % (ref 19.6–45.3)
MCH RBC QN AUTO: 28.2 PG (ref 26.6–33)
MCHC RBC AUTO-ENTMCNC: 31.1 G/DL (ref 31.5–35.7)
MCV RBC AUTO: 90.9 FL (ref 79–97)
MONOCYTES # BLD AUTO: 0.49 10*3/MM3 (ref 0.1–0.9)
MONOCYTES NFR BLD AUTO: 6.5 % (ref 5–12)
NEUTROPHILS # BLD AUTO: 5.18 10*3/MM3 (ref 1.7–7)
NEUTROPHILS NFR BLD AUTO: 68.8 % (ref 42.7–76)
NRBC BLD AUTO-RTO: 0 /100 WBC (ref 0–0.2)
PHOSPHATE SERPL-MCNC: 4.3 MG/DL (ref 2.5–4.5)
PLATELET # BLD AUTO: 249 10*3/MM3 (ref 140–450)
PMV BLD AUTO: 10.5 FL (ref 6–12)
POTASSIUM BLD-SCNC: 3.7 MMOL/L (ref 3.5–5.2)
PROTHROMBIN TIME: 14.1 SECONDS (ref 11.7–14.2)
RBC # BLD AUTO: 3.08 10*6/MM3 (ref 3.77–5.28)
SODIUM BLD-SCNC: 133 MMOL/L (ref 136–145)
WBC NRBC COR # BLD: 7.53 10*3/MM3 (ref 3.4–10.8)

## 2020-01-14 PROCEDURE — 85025 COMPLETE CBC W/AUTO DIFF WBC: CPT | Performed by: INTERNAL MEDICINE

## 2020-01-14 PROCEDURE — 85610 PROTHROMBIN TIME: CPT | Performed by: INTERNAL MEDICINE

## 2020-01-14 PROCEDURE — 80069 RENAL FUNCTION PANEL: CPT | Performed by: INTERNAL MEDICINE

## 2020-01-14 PROCEDURE — 82962 GLUCOSE BLOOD TEST: CPT

## 2020-01-14 RX ORDER — WARFARIN SODIUM 5 MG/1
5 TABLET ORAL
Status: DISCONTINUED | OUTPATIENT
Start: 2020-01-14 | End: 2020-01-14 | Stop reason: HOSPADM

## 2020-01-14 RX ORDER — WARFARIN SODIUM 5 MG/1
TABLET ORAL
Qty: 30 TABLET | Refills: 0 | Status: SHIPPED | OUTPATIENT
Start: 2020-01-14 | End: 2020-01-29 | Stop reason: SDUPTHER

## 2020-01-14 RX ORDER — HYDRALAZINE HYDROCHLORIDE 50 MG/1
50 TABLET, FILM COATED ORAL 3 TIMES DAILY
Qty: 90 TABLET | Refills: 0 | Status: SHIPPED | OUTPATIENT
Start: 2020-01-14 | End: 2020-01-01 | Stop reason: ALTCHOICE

## 2020-01-14 RX ADMIN — SODIUM CHLORIDE, PRESERVATIVE FREE 10 ML: 5 INJECTION INTRAVENOUS at 08:44

## 2020-01-14 RX ADMIN — PRAMIPEXOLE DIHYDROCHLORIDE 0.25 MG: 0.25 TABLET ORAL at 14:23

## 2020-01-14 RX ADMIN — PRAMIPEXOLE DIHYDROCHLORIDE 0.25 MG: 0.25 TABLET ORAL at 06:36

## 2020-01-14 RX ADMIN — PANTOPRAZOLE SODIUM 40 MG: 40 TABLET, DELAYED RELEASE ORAL at 08:44

## 2020-01-14 NOTE — TELEPHONE ENCOUNTER
----- Message from Epifanio Johns RN sent at 1/13/2020 11:52 AM EST -----  Regarding: follow up  Please schedule a follow up with FL[facility] for 2-4 weeks. Thank you!

## 2020-01-15 ENCOUNTER — READMISSION MANAGEMENT (OUTPATIENT)
Dept: CALL CENTER | Facility: HOSPITAL | Age: 85
End: 2020-01-15

## 2020-01-16 ENCOUNTER — OFFICE VISIT (OUTPATIENT)
Dept: INTERNAL MEDICINE | Facility: CLINIC | Age: 85
End: 2020-01-16

## 2020-01-16 VITALS
DIASTOLIC BLOOD PRESSURE: 60 MMHG | HEART RATE: 91 BPM | SYSTOLIC BLOOD PRESSURE: 140 MMHG | WEIGHT: 140 LBS | OXYGEN SATURATION: 98 % | BODY MASS INDEX: 21.97 KG/M2 | HEIGHT: 67 IN

## 2020-01-16 DIAGNOSIS — N18.4 CHRONIC KIDNEY DISEASE, STAGE IV (SEVERE) (HCC): Primary | ICD-10-CM

## 2020-01-16 DIAGNOSIS — I50.32 CHRONIC DIASTOLIC CONGESTIVE HEART FAILURE (HCC): ICD-10-CM

## 2020-01-16 DIAGNOSIS — N18.4 ANEMIA IN STAGE 4 CHRONIC KIDNEY DISEASE (HCC): ICD-10-CM

## 2020-01-16 DIAGNOSIS — D63.1 ANEMIA IN STAGE 4 CHRONIC KIDNEY DISEASE (HCC): ICD-10-CM

## 2020-01-16 DIAGNOSIS — I48.92 ATRIAL FLUTTER, PAROXYSMAL (HCC): ICD-10-CM

## 2020-01-16 DIAGNOSIS — E11.42 DIABETIC PERIPHERAL NEUROPATHY ASSOCIATED WITH TYPE 2 DIABETES MELLITUS (HCC): ICD-10-CM

## 2020-01-16 DIAGNOSIS — I10 ESSENTIAL HYPERTENSION: ICD-10-CM

## 2020-01-16 PROCEDURE — 99214 OFFICE O/P EST MOD 30 MIN: CPT | Performed by: INTERNAL MEDICINE

## 2020-01-16 RX ORDER — GABAPENTIN 100 MG/1
100 CAPSULE ORAL 3 TIMES DAILY
Qty: 90 CAPSULE | Refills: 4 | Status: SHIPPED | OUTPATIENT
Start: 2020-01-16 | End: 2020-02-24 | Stop reason: ALTCHOICE

## 2020-01-16 NOTE — PROGRESS NOTES
Chief Complaint   Patient presents with   • follow up from hospital   • Foot Pain     asking for gabapentin   • Kidney Follow-up       History of Present Illness   Veronica Henao is a 84 y.o. female presents for hospital follow up. She was noted to be in kidney failure at her last visit and admitted to the hospital. She has started dialysis and is attending 3 times weekly at this time. She was switched to warfarin for this reason. She would like to get inr testing at dialysis center and managed by her cardiologist. She has continued foot pain and would like to use gabapentin for this. She is feeling fatigued after discharge. She does not feel like she needs physical therapy at this time. Has temporary catheter access for dialysis. BP has been normotensive. She is kept euvolemic with dialysis.     The following portions of the patient's history were reviewed and updated as appropriate: allergies, current medications, past family history, past medical history, past social history, past surgical history and problem list.  Current Outpatient Medications on File Prior to Visit   Medication Sig Dispense Refill   • acetaminophen (TYLENOL) 325 MG tablet Take 2 tablets by mouth Every 6 (Six) Hours As Needed for Mild Pain (1-3).  0   • Alcohol Swabs (B-D SINGLE USE SWABS REGULAR) pads Inject 1 each under the skin Daily. 100 each 11   • Blood Glucose Monitoring Suppl (TRUE METRIX AIR GLUCOSE METER) device 1 each Daily. 1 each 0   • hydrALAZINE (APRESOLINE) 50 MG tablet Take 1 tablet by mouth 3 (Three) Times a Day. 90 tablet 0   • hydrOXYzine (ATARAX) 25 MG tablet TAKE 1 TABLET AT NIGHT AS NEEDED FOR ITCHING (Patient taking differently: Take 25 mg by mouth At Night As Needed for Itching.) 90 tablet 1   • Incontinence Supply Disposable (BLADDER CONTROL PADS EX ABSORB) misc      • Lancets 28G misc To check sugar qd. E11.9  each 12   • latanoprost (XALATAN) 0.005 % ophthalmic solution Administer 1 drop to both eyes Every  "Night.     • levocetirizine (XYZAL) 5 MG tablet Take 5 mg by mouth Daily.     • pantoprazole (PROTONIX) 40 MG EC tablet Take 1 tablet by mouth Daily. 90 tablet 3   • pramipexole (MIRAPEX) 0.25 MG tablet TAKE 1 TABLET BY MOUTH THREE TIMES DAILY (Patient taking differently: Take 0.25 mg by mouth 3 (Three) Times a Day.) 270 tablet 1   • Semaglutide (OZEMPIC) 0.25 or 0.5 MG/DOSE solution pen-injector Inject 0.5 mg under the skin into the appropriate area as directed 1 (One) Time Per Week. (Patient taking differently: Inject 0.5 mg under the skin into the appropriate area as directed 1 (One) Time Per Week. +ON FRIDAYS) 3 pen 4   • sertraline (ZOLOFT) 50 MG tablet Take 50 mg by mouth Daily.     • TRUE METRIX BLOOD GLUCOSE TEST test strip CHECK BLOOD SUGAR TWICE DAILY 100 each 3   • TRUEPLUS LANCETS 28G misc E11.9 DM to check sugar  each 12   • warfarin (COUMADIN) 5 MG tablet Check INR with PCP in the next 2 to 3 days. 30 tablet 0     No current facility-administered medications on file prior to visit.      Review of Systems   Constitutional: Positive for fatigue.   HENT: Negative.    Eyes: Negative.    Respiratory: Negative.         Much improved   Cardiovascular: Negative.    Gastrointestinal: Negative.    Genitourinary: Negative.    Musculoskeletal:        Physical deconditioning   Allergic/Immunologic: Negative.    Neurological: Negative.    Hematological: Bruises/bleeds easily.   Psychiatric/Behavioral: Negative.        Objective   Physical Exam   Constitutional:   Thin, chronically ill, no acute distress   HENT:   Head: Normocephalic and atraumatic.   Right Ear: External ear normal.   Left Ear: External ear normal.   Mouth/Throat: Oropharynx is clear and moist.   Eyes: Pupils are equal, round, and reactive to light. Conjunctivae and EOM are normal.   Neck: Normal range of motion. Neck supple.   Nursing note and vitals reviewed.       /60   Pulse 91   Ht 170.2 cm (67\")   Wt 63.5 kg (140 lb)   SpO2 98%  "  BMI 21.93 kg/m²     Assessment/Plan   Diagnoses and all orders for this visit:    Chronic kidney disease, stage IV (severe) (CMS/HCC)    Atrial flutter, paroxysmal (CMS/HCC)    Chronic diastolic congestive heart failure (CMS/HCC)    Essential hypertension    Anemia in stage 4 chronic kidney disease (CMS/HCC)        Patient w/ CKD, now dialysis dependent. She is transitioning well to this. Will refer to vascular for shunt placement. Will start getting INR testing at dialysis center and will have this tracked by cardiology through their clinic. She will have bp and labs monitored routinely. She has peripheral neuropathy and will start low dose gabapentin for this. She was advised of the risks and benefits of this medication. Will repeat cbc today given ckd related anemia. Close follow up.   Current outpatient and discharge medications have been reconciled for the patient.  Reviewed by: Nelly Price MD

## 2020-01-16 NOTE — OUTREACH NOTE
Prep Survey      Responses   Facility patient discharged from?  [facility]   Is patient eligible?  Yes   Discharge diagnosis  Acute renal failure superimposed on stage 4 chronic kidney disease    Does the patient have one of the following disease processes/diagnoses(primary or secondary)?  Other   Does the patient have Home health ordered?  No   Is there a DME ordered?  No   Comments regarding appointments  Dr Price Jan 21 @ 10:30   Medication alerts for this patient  See AVS for changes   General alerts for this patient  Dialysis at [facility]   Prep survey completed?  Yes          Angie Cheung LPN

## 2020-01-17 ENCOUNTER — READMISSION MANAGEMENT (OUTPATIENT)
Dept: CALL CENTER | Facility: HOSPITAL | Age: 85
End: 2020-01-17

## 2020-01-17 NOTE — OUTREACH NOTE
Medical Week 1 Survey      Responses   Facility patient discharged from?  [facility]   Does the patient have one of the following disease processes/diagnoses(primary or secondary)?  Other   Is there a successful TCM telephone encounter documented?  No   Week 1 attempt successful?  Yes   Call start time  1000   Call end time  1006   Discharge diagnosis  Acute renal failure superimposed on stage 4 chronic kidney disease    Meds reviewed with patient/caregiver?  Yes   Is the patient having any side effects they believe may be caused by any medication additions or changes?  No   Does the patient have all medications ordered at discharge?  Yes   Is the patient taking all medications as directed (includes completed medication regime)?  Yes   Comments regarding appointments  saw Dr Price on 1/16/20, will need INH to be scheduled, Matt Olivier, Sat   Does the patient have a primary care provider?   Yes   Does the patient have an appointment with their PCP within 7 days of discharge?  Yes   Has the patient kept scheduled appointments due by today?  Yes   Has home health visited the patient within 72 hours of discharge?  N/A   Psychosocial issues?  No   Comments  states appetite is still a problem, but otherwise is feeling better   Did the patient receive a copy of their discharge instructions?  Yes   Nursing interventions  Reviewed instructions with patient   What is the patient's perception of their health status since discharge?  Improving   Is the patient/caregiver able to teach back signs and symptoms related to disease process for when to call PCP?  Yes   Is the patient/caregiver able to teach back signs and symptoms related to disease process for when to call 911?  Yes   Is the patient/caregiver able to teach back the hierarchy of who to call/visit for symptoms/problems? PCP, Specialist, Home health nurse, Urgent Care, ED, 911  Yes   Week 1 call completed?  Yes          Nahomy Harris RN

## 2020-01-20 ENCOUNTER — ANTICOAGULATION VISIT (OUTPATIENT)
Dept: PHARMACY | Facility: HOSPITAL | Age: 85
End: 2020-01-20

## 2020-01-20 DIAGNOSIS — I48.92 ATRIAL FLUTTER, PAROXYSMAL (HCC): ICD-10-CM

## 2020-01-20 LAB
INR PPP: 1.3 (ref 0.91–1.09)
PROTHROMBIN TIME: 15.8 SECONDS (ref 10–13.8)

## 2020-01-20 PROCEDURE — 85610 PROTHROMBIN TIME: CPT

## 2020-01-20 PROCEDURE — G0463 HOSPITAL OUTPT CLINIC VISIT: HCPCS

## 2020-01-20 PROCEDURE — 36416 COLLJ CAPILLARY BLOOD SPEC: CPT

## 2020-01-20 NOTE — PROGRESS NOTES
Anticoagulation Clinic Progress Note  Anticoagulation Summary  As of 2020    INR goal:   2.0-2.5   TTR:   --   INR used for dosin.3! (2020)   Warfarin maintenance plan:   5 mg every day   Weekly warfarin total:   35 mg   Plan last modified:   Jam Goldsmith RPH (2020)   Next INR check:   2020   Target end date:       Indications    Atrial flutter  paroxysmal (CMS/HCC) [I48.92]             Anticoagulation Episode Summary     INR check location:       Preferred lab:       Send INR reminders to:    [facility]    Comments:   rivaroxaban d/c'd in favor of warfarin 20 r/t ESRD; prev on warfarin w/ labile INRs per Dr. Husain; 5mg on , 15,  and 2.5mg , ,  per phone call      Anticoagulation Care Providers     Provider Role Specialty Phone number    Maria Luz Husain MD Referring Cardiology [phone]          Clinic Interview:  Patient Findings     Positives:   Other complaints    Negatives:   Signs/symptoms of thrombosis, Signs/symptoms of bleeding,   Laboratory test error suspected, Change in health, Change in alcohol use,   Change in activity, Upcoming invasive procedure, Emergency department   visit, Upcoming dental procedure, Missed doses, Extra doses, Change in   medications, Change in diet/appetite, Hospital admission, Bruising    Comments:   Based on medical record, appears pt was rx'd 5 mg daily in   0068-2635. Reports low, inconsistent appetite.Start drinking small amount   of Muscle Milk yesterday, which she thinks she may continue.   Newly started dialysis Tues/Thurs/Sat.     Clinical Outcomes     Negatives:   Major bleeding event, Thromboembolic event,   Anticoagulation-related hospital admission, Anticoagulation-related ED   visit, Anticoagulation-related fatality    Comments:   Based on medical record, appears pt was rx'd 5 mg daily in   5085-1583. Reports low, inconsistent appetite.Start drinking small amount   of Muscle Milk yesterday, which she  thinks she may continue.   Newly started dialysis Tues/Thurs/Sat.       Education:  Veronica Henao is a new start in the [facility]. We discussed the followin) Warfarin's indication, mechanism, and dosing  2) Enforced the importance of taking warfarin as instructed and at the same time every day, preferably in the evening so that we can make dose adjustments more easily following subsequent clinic visits  3) What she should do about a missed dose; pts can take missed doses within about 12 hours of their usual scheduled dose, but she was instructed on the importance of not doubling up on doses unless told to do so by the [facility]  4) Explained possible side effects of warfarin therapy, including increased risk of bleeding, s/sx of bleeding and s/sx of any additional clots/PE/CVA.   5) Discussed monitoring of warfarin, the INR, goal INR range, and the frequency of monitoring  6) Reviewed drug/food/tobacco/EtOH interactions and provided written information covering these topics in more detail, explaining that green, leafy vegetables interact most heavily with warfarin  7) Instructed the pt not to take or discontinue any medications without informing her physician/pharmacist and reminded her to inform us of any dietary changes, as well  8) Explained that she would be coming into the clinic more frequently in these first few weeks of therapy as we try to adjust her dose and achieve a therapeutic INR x 2 consecutive readings. Once that is achieved, patient will follow up in clinic every 4 weeks, on average.    She stated no problems with transportation or scheduling clinic appts in this manner. she expressed understanding of the information provided and has no additional questions at this time.    Veronica Henao was presented with a copy of the Patients Rights and Responsibilities. she expressed verbal consent and agreement to receive care in the [facility] under  the current collaborative care agreement with [facility].       INR History:  1.3 (1/20/20)    Plan:  1. INR is Subtherapeutic today- see above in Anticoagulation Summary.   Will instruct Veroncia Henao to Increase their warfarin regimen- see above in Anticoagulation Summary.  2. Follow up in 4 days  3. Patient declines warfarin refills.  4. Verbal and written information provided. Patient expresses understanding and has no further questions at this time.    Jam Goldsmith RPH

## 2020-01-24 ENCOUNTER — ANTICOAGULATION VISIT (OUTPATIENT)
Dept: PHARMACY | Facility: HOSPITAL | Age: 85
End: 2020-01-24

## 2020-01-24 DIAGNOSIS — I48.92 ATRIAL FLUTTER, PAROXYSMAL (HCC): ICD-10-CM

## 2020-01-24 DIAGNOSIS — N18.6 ESRD (END STAGE RENAL DISEASE) (HCC): Primary | ICD-10-CM

## 2020-01-24 LAB
INR PPP: 2.9 (ref 0.91–1.09)
PROTHROMBIN TIME: 34.7 SECONDS (ref 10–13.8)

## 2020-01-24 PROCEDURE — G0463 HOSPITAL OUTPT CLINIC VISIT: HCPCS

## 2020-01-24 PROCEDURE — 85610 PROTHROMBIN TIME: CPT

## 2020-01-24 PROCEDURE — 36416 COLLJ CAPILLARY BLOOD SPEC: CPT

## 2020-01-24 NOTE — PROGRESS NOTES
Anticoagulation Clinic Progress Note    Anticoagulation Summary  As of 2020    INR goal:   2.0-2.5   TTR:   27.4 % (1 d)   INR used for dosin.9! (2020)   Warfarin maintenance plan:   5 mg every Sun, Tue, Thu; 2.5 mg all other days   Weekly warfarin total:   25 mg   Plan last modified:   Jam Goldsmith RPH (2020)   Next INR check:   2020   Target end date:       Indications    Atrial flutter  paroxysmal (CMS/HCC) [I48.92]             Anticoagulation Episode Summary     INR check location:       Preferred lab:       [facility]ers [facility]    [facility]    Comments:   rivaroxaban d/c'd in favor of warfarin 20 r/t ESRD; prev on warfarin w/ labile INRs per Dr. Husain      Anticoagulation Care Providers     Provider Role Specialty Phone number    Maria Luz Husain MD Referring Cardiology [phone]          Clinic Interview:  Patient Findings     Positives:   Change in diet/appetite    Negatives:   Signs/symptoms of thrombosis, Signs/symptoms of bleeding,   Laboratory test error suspected, Change in health, Change in alcohol use,   Change in activity, Upcoming invasive procedure, Emergency department   visit, Upcoming dental procedure, Missed doses, Extra doses, Change in   medications, Hospital admission, Bruising, Other complaints    Comments:   Per Dietitian guidance, she is cutting back on potassium in   diet, including stopping Muscle Milk.      Clinical Outcomes     Negatives:   Major bleeding event, Thromboembolic event,   Anticoagulation-related hospital admission, Anticoagulation-related ED   visit, Anticoagulation-related fatality    Comments:   Per Dietitian guidance, she is cutting back on potassium in   diet, including stopping Muscle Milk.        INR History:  Anticoagulation Monitoring 2020   INR 1.3 2.9   INR Date 2020   INR Goal 2.0-2.5 2.0-2.5   Trend - Down   Last Week Total 27.5 mg 27.5 mg   Next Week Total 35 mg 25 mg   Sun  - 5 mg   Mon 5 mg 2.5 mg   Tue 5 mg 5 mg   Wed 5 mg -   Thu 5 mg -   Fri - 2.5 mg   Sat - 2.5 mg   Visit Report - -       Plan:  1. INR is Supratherapeutic today- see above in Anticoagulation Summary.  Will instruct Veronica Henao to Change their warfarin regimen- see above in Anticoagulation Summary.  2. Follow up in 5 days  3. Patient declines warfarin refills.  4. Verbal and written information provided. Patient expresses understanding and has no further questions at this time.    Jam Goldsmith [facility]

## 2020-01-26 ENCOUNTER — READMISSION MANAGEMENT (OUTPATIENT)
Dept: CALL CENTER | Facility: HOSPITAL | Age: 85
End: 2020-01-26

## 2020-01-26 NOTE — OUTREACH NOTE
Medical Week 2 Survey      Responses   Facility patient discharged from?  [facility]   Does the patient have one of the following disease processes/diagnoses(primary or secondary)?  Other   Week 2 attempt successful?  No   Unsuccessful attempts  Attempt 1          Maria Luz Pratt RN

## 2020-01-27 ENCOUNTER — TELEPHONE (OUTPATIENT)
Dept: INTERNAL MEDICINE | Facility: CLINIC | Age: 85
End: 2020-01-27

## 2020-01-27 RX ORDER — ISOPROPYL ALCOHOL 0.75 G/1
1 SWAB TOPICAL DAILY
Qty: 100 EACH | Refills: 3 | Status: SHIPPED | OUTPATIENT
Start: 2020-01-27

## 2020-01-27 RX ORDER — BLOOD-GLUCOSE METER
1 EACH MISCELLANEOUS DAILY
Qty: 1 EACH | Refills: 0 | Status: SHIPPED | OUTPATIENT
Start: 2020-01-27

## 2020-01-27 RX ORDER — GLUCOSAM/CHON-MSM1/C/MANG/BOSW 500-416.6
TABLET ORAL
Qty: 100 EACH | Refills: 3 | Status: SHIPPED | OUTPATIENT
Start: 2020-01-27

## 2020-01-27 NOTE — TELEPHONE ENCOUNTER
Pt states he does not have to see a kidney dr because she is on dialysis. She use to see Dr Maria Luz May, and she said they told her they don't need to see her. If she does go in the  Hospital she  Wants a dr that goes to hospital. Dr Rodriguez??

## 2020-01-28 ENCOUNTER — READMISSION MANAGEMENT (OUTPATIENT)
Dept: CALL CENTER | Facility: HOSPITAL | Age: 85
End: 2020-01-28

## 2020-01-28 NOTE — OUTREACH NOTE
Medical Week 2 Survey      Responses   Facility patient discharged from?  [facility]   Does the patient have one of the following disease processes/diagnoses(primary or secondary)?  Other   Week 2 attempt successful?  Yes   Call start time  1759   General alerts for this patient  Dialysis at [facility]   Discharge diagnosis  Acute renal failure superimposed on stage 4 chronic kidney disease    Call end time  1800   Is patient permission given to speak with other caregiver?  Yes   Person spoke with today (if not patient) and relationship  Edward/son   Meds reviewed with patient/caregiver?  Yes   Is the patient having any side effects they believe may be caused by any medication additions or changes?  No   Does the patient have all medications ordered at discharge?  Yes   Is the patient taking all medications as directed (includes completed medication regime)?  Yes   Does the patient have a primary care provider?   Yes   Does the patient have an appointment with their PCP within 7 days of discharge?  Yes   Has the patient kept scheduled appointments due by today?  Yes   Has home health visited the patient within 72 hours of discharge?  N/A   Psychosocial issues?  No   Did the patient receive a copy of their discharge instructions?  Yes   Nursing interventions  Reviewed instructions with patient   What is the patient's perception of their health status since discharge?  Improving   Is the patient/caregiver able to teach back signs and symptoms related to disease process for when to call PCP?  Yes   Is the patient/caregiver able to teach back signs and symptoms related to disease process for when to call 911?  Yes   Is the patient/caregiver able to teach back the hierarchy of who to call/visit for symptoms/problems? PCP, Specialist, Home health nurse, Urgent Care, ED, 911  Yes   Week 2 Call Completed?  Yes          Artis Griffiths RN

## 2020-01-29 ENCOUNTER — ANTICOAGULATION VISIT (OUTPATIENT)
Dept: PHARMACY | Facility: HOSPITAL | Age: 85
End: 2020-01-29

## 2020-01-29 ENCOUNTER — TELEPHONE (OUTPATIENT)
Dept: CARDIOLOGY | Facility: CLINIC | Age: 85
End: 2020-01-29

## 2020-01-29 DIAGNOSIS — I48.92 ATRIAL FLUTTER, PAROXYSMAL (HCC): ICD-10-CM

## 2020-01-29 LAB
INR PPP: 1.7 (ref 0.91–1.09)
PROTHROMBIN TIME: 20.1 SECONDS (ref 10–13.8)

## 2020-01-29 PROCEDURE — 85610 PROTHROMBIN TIME: CPT

## 2020-01-29 PROCEDURE — 36416 COLLJ CAPILLARY BLOOD SPEC: CPT

## 2020-01-29 PROCEDURE — G0463 HOSPITAL OUTPT CLINIC VISIT: HCPCS

## 2020-01-29 RX ORDER — WARFARIN SODIUM 5 MG/1
TABLET ORAL
Qty: 80 TABLET | Refills: 0 | Status: SHIPPED | OUTPATIENT
Start: 2020-01-29 | End: 2020-03-23 | Stop reason: SDUPTHER

## 2020-01-29 NOTE — PROGRESS NOTES
Anticoagulation Clinic Progress Note    Anticoagulation Summary  As of 2020    INR goal:   2.0-2.5   TTR:   38.6 % (6 d)   INR used for dosin.7! (2020)   Warfarin maintenance plan:   2.5 mg every Mon, Wed, Fri; 5 mg all other days   Weekly warfarin total:   27.5 mg   Plan last modified:   Joy Mora RPH (2020)   Next INR check:   2020   Target end date:       Indications    Atrial flutter  paroxysmal (CMS/HCC) [I48.92]             Anticoagulation Episode Summary     INR check location:       Preferred lab:       Send INR reminders to:    [facility] CL[facility] POOL    Comments:   rivaroxaban d/c'd in favor of warfarin 20 r/t ESRD; prev on warfarin w/ labile INRs per Dr. Husain      Anticoagulation Care Providers     Provider Role Specialty Phone number    Maria Luz Husain MD Referring Cardiology [phone]          Clinic Interview:      INR History:  Anticoagulation Monitoring 2020   INR 1.3 2.9 1.7   INR Date 2020   INR Goal 2.0-2.5 2.0-2.5 2.0-2.5   Trend - Down Up   Last Week Total 27.5 mg 27.5 mg 27.5 mg   Next Week Total 35 mg 25 mg 30 mg   Sun - 5 mg 5 mg   Mon 5 mg 2.5 mg 2.5 mg   Tue 5 mg 5 mg 5 mg   Wed 5 mg - 5 mg ()   Thu 5 mg - 5 mg   Fri - 2.5 mg 2.5 mg   Sat - 2.5 mg 5 mg   Visit Report - - -   Some recent data might be hidden       Plan:  1. INR is Subtherapeutic today- see above in Anticoagulation Summary.  Will instruct Veronica Henao to Change their warfarin regimen- see above in Anticoagulation Summary.  2. Follow up in 1 week  3. Patient declines warfarin refills.  4. Verbal and written information provided. Patient expresses understanding and has no further questions at this time.    Joy Mora RPH

## 2020-01-30 NOTE — TELEPHONE ENCOUNTER
She may have a nephrologist at the dialysis center? If not I put in for dr. Pack who goes to [facility]. Although I think dr. barboza also goes to [facility]?

## 2020-01-31 DIAGNOSIS — E11.42 DIABETIC PERIPHERAL NEUROPATHY ASSOCIATED WITH TYPE 2 DIABETES MELLITUS (HCC): ICD-10-CM

## 2020-02-03 ENCOUNTER — ANTICOAGULATION VISIT (OUTPATIENT)
Dept: PHARMACY | Facility: HOSPITAL | Age: 85
End: 2020-02-03

## 2020-02-03 DIAGNOSIS — I48.92 ATRIAL FLUTTER, PAROXYSMAL (HCC): ICD-10-CM

## 2020-02-03 NOTE — PROGRESS NOTES
"Anticoagulation Clinic Progress Note    Anticoagulation Summary  As of 2/3/2020    INR goal:   2.0-2.5   TTR:   38.6 % (6 d)   INR used for dosing:   No new INR was available at the time of this encounter.   Warfarin maintenance plan:   2.5 mg every Mon, Wed, Fri; 5 mg all other days   Weekly warfarin total:   27.5 mg   Plan last modified:   Joy Mora RPH (1/29/2020)   Next INR check:   2/12/2020   Target end date:       Indications    Atrial flutter  paroxysmal (CMS/HCC) [I48.92]             Anticoagulation Episode Summary     INR check location:       Preferred lab:       [facility]    Comments:   rivaroxaban d/c'd in favor of warfarin 1/13/20 r/t ESRD; prev on warfarin w/ labile INRs per Dr. Husain      Anticoagulation Care Providers     Provider Role Specialty Phone number    Maria Luz Husain MD Referring Cardiology [phone]            Clinic Interview:  Patient Findings     Positives:   Upcoming invasive procedure, Missed doses    Negatives:   Signs/symptoms of thrombosis, Signs/symptoms of bleeding,   Laboratory test error suspected, Change in health, Change in alcohol use,   Change in activity, Emergency department visit, Upcoming dental procedure,   Extra doses, Change in medications, Change in diet/appetite, Hospital   admission, Bruising, Other complaints    Comments:   Pt called to report procedure 2/5 (LEFT BRACHIAL ARTERY   AXILLARY VEIN GORTEX SHUNT\"). Instructed to hold x 3 days per   proceduralist.      Clinical Outcomes     Negatives:   Major bleeding event, Thromboembolic event,   Anticoagulation-related hospital admission, Anticoagulation-related ED   visit, Anticoagulation-related fatality    Comments:   Pt called to report procedure 2/5 (LEFT BRACHIAL ARTERY   AXILLARY VEIN GORTEX SHUNT\"). Instructed to hold x 3 days per   proceduralist.        INR History:  Anticoagulation Monitoring 1/24/2020 1/29/2020 2/3/2020   INR 2.9 1.7 -   INR Date " 1/24/2020 1/29/2020 -   INR Goal 2.0-2.5 2.0-2.5 2.0-2.5   Trend Down Up Same   Last Week Total 27.5 mg 27.5 mg 25 mg   Next Week Total 25 mg 30 mg 22.5 mg   Sun 5 mg 5 mg 5 mg   Mon 2.5 mg 2.5 mg Hold (2/3); Otherwise 2.5 mg   Tue 5 mg 5 mg Hold (2/4); Otherwise 5 mg   Wed - 5 mg (1/29) 5 mg (2/5)   Thu - 5 mg 5 mg   Fri 2.5 mg 2.5 mg 2.5 mg   Sat 2.5 mg 5 mg 5 mg   Visit Report - - -   Some recent data might be hidden       Plan:  1. INR is unavailable today; Pt called to report upcoming procedure in 2 days - see above in Anticoagulation Summary.   Will instruct Veronica Henao to Change their warfarin regimen- see above in Anticoagulation Summary.  2. Follow up in 1 week in clinic  3. They have been instructed to call if any changes in medications, doses, concerns, etc. Patient expresses understanding and has no further questions at this time.    Jam Goldsmith [facility]

## 2020-02-05 ENCOUNTER — ANESTHESIA EVENT (OUTPATIENT)
Dept: PERIOP | Facility: HOSPITAL | Age: 85
End: 2020-02-05

## 2020-02-05 ENCOUNTER — READMISSION MANAGEMENT (OUTPATIENT)
Dept: CALL CENTER | Facility: HOSPITAL | Age: 85
End: 2020-02-05

## 2020-02-05 ENCOUNTER — ANESTHESIA (OUTPATIENT)
Dept: PERIOP | Facility: HOSPITAL | Age: 85
End: 2020-02-05

## 2020-02-05 ENCOUNTER — HOSPITAL ENCOUNTER (OUTPATIENT)
Facility: HOSPITAL | Age: 85
Setting detail: HOSPITAL OUTPATIENT SURGERY
Discharge: HOME | End: 2020-02-05
Attending: SURGERY | Admitting: SURGERY

## 2020-02-05 VITALS
HEART RATE: 72 BPM | TEMPERATURE: 97.5 F | RESPIRATION RATE: 16 BRPM | HEIGHT: 67 IN | SYSTOLIC BLOOD PRESSURE: 108 MMHG | WEIGHT: 140.13 LBS | BODY MASS INDEX: 21.99 KG/M2 | DIASTOLIC BLOOD PRESSURE: 49 MMHG | OXYGEN SATURATION: 97 %

## 2020-02-05 DIAGNOSIS — N18.6 ESRD ON HEMODIALYSIS (HCC): Primary | Chronic | ICD-10-CM

## 2020-02-05 DIAGNOSIS — Z99.2 ESRD ON HEMODIALYSIS (HCC): Primary | Chronic | ICD-10-CM

## 2020-02-05 PROBLEM — I48.91 CONTROL OF ATRIAL FIBRILLATION WITH PACEMAKER (HCC): Chronic | Status: ACTIVE | Noted: 2020-02-05

## 2020-02-05 PROBLEM — Z95.0 CONTROL OF ATRIAL FIBRILLATION WITH PACEMAKER (HCC): Chronic | Status: ACTIVE | Noted: 2020-02-05

## 2020-02-05 LAB
ANION GAP SERPL CALCULATED.3IONS-SCNC: 12.4 MMOL/L (ref 5–15)
APTT PPP: 27.8 SECONDS (ref 22.7–35.4)
BUN BLD-MCNC: 19 MG/DL (ref 8–23)
BUN/CREAT SERPL: 9.1 (ref 7–25)
CALCIUM SPEC-SCNC: 8.9 MG/DL (ref 8.6–10.5)
CHLORIDE SERPL-SCNC: 101 MMOL/L (ref 98–107)
CO2 SERPL-SCNC: 24.6 MMOL/L (ref 22–29)
CREAT BLD-MCNC: 2.08 MG/DL (ref 0.57–1)
GFR SERPL CREATININE-BSD FRML MDRD: 23 ML/MIN/1.73
GLUCOSE BLD-MCNC: 161 MG/DL (ref 65–99)
GLUCOSE BLDC GLUCOMTR-MCNC: 136 MG/DL (ref 70–130)
GLUCOSE BLDC GLUCOMTR-MCNC: 141 MG/DL (ref 70–130)
GLUCOSE BLDC GLUCOMTR-MCNC: 182 MG/DL (ref 70–130)
INR PPP: 1.15 (ref 0.9–1.1)
POTASSIUM BLD-SCNC: 4.2 MMOL/L (ref 3.5–5.2)
PROTHROMBIN TIME: 14.4 SECONDS (ref 11.7–14.2)
SODIUM BLD-SCNC: 138 MMOL/L (ref 136–145)

## 2020-02-05 PROCEDURE — C1768 GRAFT, VASCULAR: HCPCS | Performed by: SURGERY

## 2020-02-05 PROCEDURE — 25010000002 VANCOMYCIN PER 500 MG: Performed by: SURGERY

## 2020-02-05 PROCEDURE — 25010000003 LIDOCAINE 1 % SOLUTION 20 ML VIAL: Performed by: SURGERY

## 2020-02-05 PROCEDURE — 25010000002 HEPARIN (PORCINE) PER 1000 UNITS: Performed by: SURGERY

## 2020-02-05 PROCEDURE — 25010000002 PROTAMINE SULFATE PER 10 MG: Performed by: NURSE ANESTHETIST, CERTIFIED REGISTERED

## 2020-02-05 PROCEDURE — 25010000002 FENTANYL CITRATE (PF) 100 MCG/2ML SOLUTION: Performed by: NURSE ANESTHETIST, CERTIFIED REGISTERED

## 2020-02-05 PROCEDURE — 85610 PROTHROMBIN TIME: CPT | Performed by: SURGERY

## 2020-02-05 PROCEDURE — 25010000002 ONDANSETRON PER 1 MG: Performed by: NURSE ANESTHETIST, CERTIFIED REGISTERED

## 2020-02-05 PROCEDURE — 25010000002 PROPOFOL 10 MG/ML EMULSION: Performed by: NURSE ANESTHETIST, CERTIFIED REGISTERED

## 2020-02-05 PROCEDURE — 82962 GLUCOSE BLOOD TEST: CPT

## 2020-02-05 PROCEDURE — 80048 BASIC METABOLIC PNL TOTAL CA: CPT | Performed by: SURGERY

## 2020-02-05 PROCEDURE — 25010000002 HEPARIN (PORCINE) PER 1000 UNITS: Performed by: NURSE ANESTHETIST, CERTIFIED REGISTERED

## 2020-02-05 PROCEDURE — 85730 THROMBOPLASTIN TIME PARTIAL: CPT | Performed by: SURGERY

## 2020-02-05 PROCEDURE — 25010000002 MIDAZOLAM PER 1 MG: Performed by: ANESTHESIOLOGY

## 2020-02-05 DEVICE — GRFT VASC PROPAT HEP IR 4/7 38 45CM: Type: IMPLANTABLE DEVICE | Site: ARTERIAL | Status: FUNCTIONAL

## 2020-02-05 RX ORDER — MIDAZOLAM HYDROCHLORIDE 1 MG/ML
2 INJECTION INTRAMUSCULAR; INTRAVENOUS
Status: DISCONTINUED | OUTPATIENT
Start: 2020-02-05 | End: 2020-02-05 | Stop reason: HOSPADM

## 2020-02-05 RX ORDER — SODIUM CHLORIDE 0.9 % (FLUSH) 0.9 %
10 SYRINGE (ML) INJECTION EVERY 12 HOURS SCHEDULED
Status: DISCONTINUED | OUTPATIENT
Start: 2020-02-05 | End: 2020-02-05 | Stop reason: HOSPADM

## 2020-02-05 RX ORDER — NALOXONE HCL 0.4 MG/ML
0.2 VIAL (ML) INJECTION AS NEEDED
Status: DISCONTINUED | OUTPATIENT
Start: 2020-02-05 | End: 2020-02-05 | Stop reason: HOSPADM

## 2020-02-05 RX ORDER — DIPHENHYDRAMINE HYDROCHLORIDE 50 MG/ML
12.5 INJECTION INTRAMUSCULAR; INTRAVENOUS
Status: DISCONTINUED | OUTPATIENT
Start: 2020-02-05 | End: 2020-02-05 | Stop reason: HOSPADM

## 2020-02-05 RX ORDER — PROMETHAZINE HYDROCHLORIDE 25 MG/1
25 TABLET ORAL ONCE AS NEEDED
Status: DISCONTINUED | OUTPATIENT
Start: 2020-02-05 | End: 2020-02-05 | Stop reason: HOSPADM

## 2020-02-05 RX ORDER — PROMETHAZINE HYDROCHLORIDE 25 MG/ML
6.25 INJECTION, SOLUTION INTRAMUSCULAR; INTRAVENOUS
Status: DISCONTINUED | OUTPATIENT
Start: 2020-02-05 | End: 2020-02-05 | Stop reason: HOSPADM

## 2020-02-05 RX ORDER — HYDROMORPHONE HYDROCHLORIDE 1 MG/ML
0.5 INJECTION, SOLUTION INTRAMUSCULAR; INTRAVENOUS; SUBCUTANEOUS
Status: DISCONTINUED | OUTPATIENT
Start: 2020-02-05 | End: 2020-02-05 | Stop reason: HOSPADM

## 2020-02-05 RX ORDER — ACETAMINOPHEN 325 MG/1
650 TABLET ORAL ONCE AS NEEDED
Status: DISCONTINUED | OUTPATIENT
Start: 2020-02-05 | End: 2020-02-05 | Stop reason: HOSPADM

## 2020-02-05 RX ORDER — HEPARIN SODIUM 1000 [USP'U]/ML
INJECTION, SOLUTION INTRAVENOUS; SUBCUTANEOUS AS NEEDED
Status: DISCONTINUED | OUTPATIENT
Start: 2020-02-05 | End: 2020-02-05 | Stop reason: SURG

## 2020-02-05 RX ORDER — EPHEDRINE SULFATE 50 MG/ML
5 INJECTION, SOLUTION INTRAVENOUS ONCE AS NEEDED
Status: DISCONTINUED | OUTPATIENT
Start: 2020-02-05 | End: 2020-02-05 | Stop reason: HOSPADM

## 2020-02-05 RX ORDER — FLUMAZENIL 0.1 MG/ML
0.2 INJECTION INTRAVENOUS AS NEEDED
Status: DISCONTINUED | OUTPATIENT
Start: 2020-02-05 | End: 2020-02-05 | Stop reason: HOSPADM

## 2020-02-05 RX ORDER — LABETALOL HYDROCHLORIDE 5 MG/ML
5 INJECTION, SOLUTION INTRAVENOUS
Status: DISCONTINUED | OUTPATIENT
Start: 2020-02-05 | End: 2020-02-05 | Stop reason: HOSPADM

## 2020-02-05 RX ORDER — HYDRALAZINE HYDROCHLORIDE 20 MG/ML
5 INJECTION INTRAMUSCULAR; INTRAVENOUS
Status: DISCONTINUED | OUTPATIENT
Start: 2020-02-05 | End: 2020-02-05 | Stop reason: HOSPADM

## 2020-02-05 RX ORDER — ONDANSETRON 2 MG/ML
INJECTION INTRAMUSCULAR; INTRAVENOUS AS NEEDED
Status: DISCONTINUED | OUTPATIENT
Start: 2020-02-05 | End: 2020-02-05 | Stop reason: SURG

## 2020-02-05 RX ORDER — OXYCODONE AND ACETAMINOPHEN 7.5; 325 MG/1; MG/1
1 TABLET ORAL ONCE AS NEEDED
Status: DISCONTINUED | OUTPATIENT
Start: 2020-02-05 | End: 2020-02-05 | Stop reason: HOSPADM

## 2020-02-05 RX ORDER — PROMETHAZINE HYDROCHLORIDE 25 MG/1
25 SUPPOSITORY RECTAL ONCE AS NEEDED
Status: DISCONTINUED | OUTPATIENT
Start: 2020-02-05 | End: 2020-02-05 | Stop reason: HOSPADM

## 2020-02-05 RX ORDER — FENTANYL CITRATE 50 UG/ML
INJECTION, SOLUTION INTRAMUSCULAR; INTRAVENOUS AS NEEDED
Status: DISCONTINUED | OUTPATIENT
Start: 2020-02-05 | End: 2020-02-05 | Stop reason: SURG

## 2020-02-05 RX ORDER — PROTAMINE SULFATE 10 MG/ML
INJECTION, SOLUTION INTRAVENOUS AS NEEDED
Status: DISCONTINUED | OUTPATIENT
Start: 2020-02-05 | End: 2020-02-05 | Stop reason: SURG

## 2020-02-05 RX ORDER — DIPHENHYDRAMINE HCL 25 MG
25 CAPSULE ORAL
Status: DISCONTINUED | OUTPATIENT
Start: 2020-02-05 | End: 2020-02-05 | Stop reason: HOSPADM

## 2020-02-05 RX ORDER — SODIUM CHLORIDE 9 MG/ML
9 INJECTION, SOLUTION INTRAVENOUS CONTINUOUS PRN
Status: DISCONTINUED | OUTPATIENT
Start: 2020-02-05 | End: 2020-02-05 | Stop reason: HOSPADM

## 2020-02-05 RX ORDER — FENTANYL CITRATE 50 UG/ML
50 INJECTION, SOLUTION INTRAMUSCULAR; INTRAVENOUS
Status: DISCONTINUED | OUTPATIENT
Start: 2020-02-05 | End: 2020-02-05 | Stop reason: HOSPADM

## 2020-02-05 RX ORDER — PROMETHAZINE HYDROCHLORIDE 25 MG/ML
12.5 INJECTION, SOLUTION INTRAMUSCULAR; INTRAVENOUS ONCE AS NEEDED
Status: DISCONTINUED | OUTPATIENT
Start: 2020-02-05 | End: 2020-02-05 | Stop reason: HOSPADM

## 2020-02-05 RX ORDER — PROPOFOL 10 MG/ML
VIAL (ML) INTRAVENOUS CONTINUOUS PRN
Status: DISCONTINUED | OUTPATIENT
Start: 2020-02-05 | End: 2020-02-05 | Stop reason: SURG

## 2020-02-05 RX ORDER — HYDROCODONE BITARTRATE AND ACETAMINOPHEN 5; 325 MG/1; MG/1
1 TABLET ORAL EVERY 6 HOURS PRN
Qty: 10 TABLET | Refills: 0 | Status: SHIPPED | OUTPATIENT
Start: 2020-02-05 | End: 2020-01-01

## 2020-02-05 RX ORDER — FAMOTIDINE 10 MG/ML
20 INJECTION, SOLUTION INTRAVENOUS
Status: COMPLETED | OUTPATIENT
Start: 2020-02-05 | End: 2020-02-05

## 2020-02-05 RX ORDER — ONDANSETRON 2 MG/ML
4 INJECTION INTRAMUSCULAR; INTRAVENOUS ONCE AS NEEDED
Status: DISCONTINUED | OUTPATIENT
Start: 2020-02-05 | End: 2020-02-05 | Stop reason: HOSPADM

## 2020-02-05 RX ORDER — VANCOMYCIN HYDROCHLORIDE 1 G/200ML
15 INJECTION, SOLUTION INTRAVENOUS ONCE
Status: COMPLETED | OUTPATIENT
Start: 2020-02-05 | End: 2020-02-05

## 2020-02-05 RX ORDER — LIDOCAINE HYDROCHLORIDE 20 MG/ML
INJECTION, SOLUTION INFILTRATION; PERINEURAL AS NEEDED
Status: DISCONTINUED | OUTPATIENT
Start: 2020-02-05 | End: 2020-02-05 | Stop reason: SURG

## 2020-02-05 RX ORDER — SODIUM CHLORIDE 0.9 % (FLUSH) 0.9 %
10 SYRINGE (ML) INJECTION AS NEEDED
Status: DISCONTINUED | OUTPATIENT
Start: 2020-02-05 | End: 2020-02-05 | Stop reason: HOSPADM

## 2020-02-05 RX ORDER — MIDAZOLAM HYDROCHLORIDE 1 MG/ML
1 INJECTION INTRAMUSCULAR; INTRAVENOUS
Status: DISCONTINUED | OUTPATIENT
Start: 2020-02-05 | End: 2020-02-05 | Stop reason: HOSPADM

## 2020-02-05 RX ORDER — PROPOFOL 10 MG/ML
VIAL (ML) INTRAVENOUS AS NEEDED
Status: DISCONTINUED | OUTPATIENT
Start: 2020-02-05 | End: 2020-02-05 | Stop reason: SURG

## 2020-02-05 RX ORDER — HYDROCODONE BITARTRATE AND ACETAMINOPHEN 7.5; 325 MG/1; MG/1
1 TABLET ORAL ONCE AS NEEDED
Status: DISCONTINUED | OUTPATIENT
Start: 2020-02-05 | End: 2020-02-05 | Stop reason: HOSPADM

## 2020-02-05 RX ORDER — MAGNESIUM HYDROXIDE 1200 MG/15ML
LIQUID ORAL AS NEEDED
Status: DISCONTINUED | OUTPATIENT
Start: 2020-02-05 | End: 2020-02-05 | Stop reason: HOSPADM

## 2020-02-05 RX ADMIN — HEPARIN SODIUM 5000 UNITS: 1000 INJECTION, SOLUTION INTRAVENOUS; SUBCUTANEOUS at 10:33

## 2020-02-05 RX ADMIN — MIDAZOLAM 1 MG: 1 INJECTION INTRAMUSCULAR; INTRAVENOUS at 10:02

## 2020-02-05 RX ADMIN — PROTAMINE SULFATE 30 MG: 10 INJECTION, SOLUTION INTRAVENOUS at 11:09

## 2020-02-05 RX ADMIN — MIDAZOLAM 1 MG: 1 INJECTION INTRAMUSCULAR; INTRAVENOUS at 09:59

## 2020-02-05 RX ADMIN — PROPOFOL 50 MG: 10 INJECTION, EMULSION INTRAVENOUS at 10:07

## 2020-02-05 RX ADMIN — LIDOCAINE HYDROCHLORIDE 40 MG: 20 INJECTION, SOLUTION INFILTRATION; PERINEURAL at 10:05

## 2020-02-05 RX ADMIN — PROPOFOL 50 MCG/KG/MIN: 10 INJECTION, EMULSION INTRAVENOUS at 10:05

## 2020-02-05 RX ADMIN — ONDANSETRON HYDROCHLORIDE 4 MG: 2 SOLUTION INTRAMUSCULAR; INTRAVENOUS at 10:19

## 2020-02-05 RX ADMIN — FAMOTIDINE 20 MG: 10 INJECTION INTRAVENOUS at 09:33

## 2020-02-05 RX ADMIN — FENTANYL CITRATE 25 MCG: 50 INJECTION INTRAMUSCULAR; INTRAVENOUS at 10:02

## 2020-02-05 RX ADMIN — SODIUM CHLORIDE: 9 INJECTION, SOLUTION INTRAVENOUS at 09:47

## 2020-02-05 RX ADMIN — Medication 10 ML: at 07:50

## 2020-02-05 RX ADMIN — VANCOMYCIN HYDROCHLORIDE 1000 MG: 1 INJECTION, SOLUTION INTRAVENOUS at 08:52

## 2020-02-05 NOTE — ANESTHESIA POSTPROCEDURE EVALUATION
Patient: Veronica Henao    Procedure Summary     Date:  02/05/20 Room / Location:  [facility] OR  / [facility] MAIN OR    Anesthesia Start:  0956 Anesthesia Stop:  1130    Procedure:  LEFT BRACHIAL ARTERY AXILLARY VEIN GORTEX SHUNT (Left Head) Diagnosis:  ESRD on hemodialysis (CMS/[facility])    Surgeon:  Kaveh Mcdonnell MD Provider:  Cecelia Pruitt MD    Anesthesia Type:  MAC ASA Status:  4          Anesthesia Type: MAC    Vitals  Vitals Value Taken Time   /57 2/5/2020 12:12 PM   Temp 36.4 °C (97.5 °F) 2/5/2020 11:27 AM   Pulse 74 2/5/2020 12:12 PM   Resp 16 2/5/2020 12:12 PM   SpO2 95 % 2/5/2020 12:12 PM           Post Anesthesia Care and Evaluation    Patient location during evaluation: bedside  Patient participation: complete - patient participated  Level of consciousness: awake  Pain management: adequate  Airway patency: patent  Anesthetic complications: No anesthetic complications    Cardiovascular status: acceptable  Respiratory status: acceptable  Hydration status: acceptable

## 2020-02-05 NOTE — OUTREACH NOTE
Medical Week 3 Survey      Responses   Facility patient discharged from?  [facility]   Does the patient have one of the following disease processes/diagnoses(primary or secondary)?  Other   Week 3 attempt successful?  No   Revoke  Readmitted          Brooklynn Thomas RN

## 2020-02-05 NOTE — ANESTHESIA PREPROCEDURE EVALUATION
Anesthesia Evaluation     Patient summary reviewed                Airway   Mallampati: II  No difficulty expected  Dental      Pulmonary    (+) sleep apnea,   Cardiovascular     Rhythm: regular    (+) hypertension, dysrhythmias (ablated),       Neuro/Psych  GI/Hepatic/Renal/Endo    (+)   renal disease, diabetes mellitus,   (-)  obesity (negative), morbid obesity    Musculoskeletal     Abdominal    Substance History      OB/GYN          Other                        Anesthesia Plan    ASA 4     MAC       Anesthetic plan, all risks, benefits, and alternatives have been provided, discussed and informed consent has been obtained with: patient.  Use of blood products discussed with patient .

## 2020-02-05 NOTE — OP NOTE
Veronica Henao  Admission date: 2/5/2020  Date of operation: 2/5/2020    Location: [facility]    Pre-op Diagnosis:      * ESRD on hemodialysis (CMS/HCC) [N18.6, Z99.2]    Post-Op Diagnosis Codes:     * ESRD on hemodialysis (CMS/HCC) [N18.6, Z99.2]    Procedure performed: Left brachial artery axillary vein Weogufka-Stas shunt    Surgeon: Dr. Kaveh Mcdonnell    Assistants:  Minal LENNON , Provided critical assistance in exposure, retraction, and suction that overall decrease blood loss and operative time.    Anesthesia: General    Staff:   Circulator: Alida Cole RN  Scrub Person: Kobe Gurrola  Assistant: Minal Huynh CSA    Indications: Pleasant female end-stage renal disease on hemodialysis with tunneled dialysis catheter.  No adequate vein for fistula creation.  Plan left arm Weogufka-Stas shunt.  Risk discussed with patient and family and agreed to proceed.       Procedure Details left arm prepped and draped in usual fashion.  Supplemental 1% Xylocaine with Marcaine used local anesthesia throughout.  Incision made in antecubital fossa through skin subcutaneous tissue.  Bleeders controlled electrocautery.  Crossing vessels controlled with hemoclips.  Brachial artery circumferentially controlled.  It was a 2.5 mm vessel and soft.  There was a posterior branch at the site of anastomosis noted.  Incision made in axilla through skin and subcutaneous tissue.  Bleeders controlled electrocautery.  Crossing vessels controlled with hemoclips electrocautery.  Axillary vein circumferentially controlled.  Nerves were protected.  Axillary vein was a 4 mm vessel.  Weogufka tunneler utilized to create tunnel tract between the 2 incisions anterolaterally on the left upper arm.  Patient given 5000 units of heparin.  4 x 7 entering Weogufka-Stas graft chosen and tunneled.  Axillary vein clamped proximally and distally.  There was a valve at that site which was removed under direct vision.  End of Weogufka-Stas graft  to side of axillary vein anastomosis performed with running 5-0 Prolene on HS 7 needle without problems.  Air flushed and flow restored back to the axillary vein.  Lee-Stas shunt flushed with heparin saline and clamped.  Brachial artery clamped proximally and distally.  Posterior branch controlled with hemoclips.  It was removed prior to completion of case.  Arteriotomy made in the brachial artery.  End of Lee-Stas graft to side of brachial artery anastomosis performed with running 5-0 Prolene suture on HS 7 needle without problems.  Air flushed prior to completion.  Patient with normal Doppler signals with and without graft compression of the axillary vein and brachial artery.  Strong Doppler radial and ulnar pulses present with and without compression of the graft.  Heparin reversed with 30 mg of protamine.  Hemostasis achieved.  Wounds irrigated with antibiotic solution.  Counts correct.  Patient with very thin skin.  Subcutaneous tissue closed with 3-0 Vicryl sutures in both wounds.  Both wounds closed with staples and dressing applied.  Patient tolerated procedure well without problems and taken to recovery room in stable condition.    Radiographic interpretation: Not applicable    Findings: See above    Estimated Blood Loss: minimal    Specimens:   Order Name Source Comment Collection Info Order Time   BASIC METABOLIC PANEL   Collected By: Naye Vargas RN 2/5/2020  7:47 AM   PROTIME-INR   Collected By: Naye Vargas RN 2/5/2020  8:00 AM   APTT   Collected By: Naye Vargas RN 2/5/2020  8:00 AM         Drains: * No LDAs found *    Complications: None    Condition: stable    Disposition: To recovery room    Kaveh Mcdonnell MD     Date: 2/5/2020  Time: 11:28 AM    Active Hospital Problems    Diagnosis  POA   • **ESRD on hemodialysis (CMS/HCC) [N18.6, Z99.2]  Not Applicable   • Control of atrial fibrillation with pacemaker (CMS/HCC) [I48.91, Z95.0]  Yes   • Pulmonary hypertension (CMS/HCC)  [I27.20]  Yes   • JOSELUIS on CPAP [G47.33, Z99.89]  Not Applicable   • Gastroesophageal reflux disease [K21.9]  Yes   • Essential hypertension [I10]  Yes   • 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

## 2020-02-05 NOTE — DISCHARGE INSTRUCTIONS
[facility]  Kirill Boateng, Jordan Robles Rachel, Scherrer Thomas  [address], [address]  [phone]    Post-Operative Instructions for AV Fistula / Graft   Diet: Regular Diet    Medications: Take your regularly scheduled medications on the day of your surgery, unless your doctor has directed you otherwise. You may be sent home with a prescription for pain medication, follow the directions as prescribed.    Activity Restrictions / Driving: Avoid lifting more than 15 pounds or other activities that stress or compress the access area. No driving for the remainder of the day after surgery. You may drive when you no longer are taking narcotic pain medications. If a nerve block was done to numb your arm for surgery, you will be placed in an arm sling.  This numbness and inability to move the arm can last for as little as 6 hours but as many as 18.  The sling should be used during this time but can be removed when sensation and movement of your arm is normal and does not need to be used after that. Use of the arm is encouraged after the surgery.    Incision Care: Some bruising is normal. If you have drainage from the incision please notify the office. Dressing should be removed in 48 hours. After dressing is removed, it is OK to shower. Do not submerge incision until cleared by your surgeon (bath or swimming).    Bathing and Showering: You may shower after you remove your dressing.    Follow-up Appointments: You will need to return to the office for a follow-up visit within 1-3 weeks after your surgery. Please make sure you have your appointment scheduled, call [phone].    The patient (you) should:  1. Avoid wearing tight constrictive clothing over that arm.  2. Avoid wearing jewelry that is tight, such as a watch on the access arm.  3. Avoid carrying heavy objects.  4. Avoid purse straps over the fistula.  5. Avoid sleeping on the arm or keeping it bent for extended periods of time.  6.  "Each day, using your opposite hand, feel over the fistula for the \"thrill\" or vibration that is normally present.    Fistula Information / Care:  ·  It is normal to have swelling in the surgical area. To help control this swelling, you should elevate your arm on a pillow.  ·  Wiggle your fingers and clinch your fist 10 times every hour, while awake, for the first 5-7 days. Also, bend and straighten at the elbow to regain normal range of motion. These exercises are designed to promote circulation in the fingers and aid in draining away the excess fluid accumulation in the immediate area.  · No blood pressures or needle sticks in the arm with your access.    Call the office for the followin. Fever greater than 101.0  2. Uncontrolled pain. This is on a scale of 1-10 (10 being the worst pain imaginable) your pain is a level 7 or above.  3. It is important that you notify our office if you are having numbness and significant pain in the extremity in which you have just had surgery!  4. Decreased or absent thrill.  5. Nausea, diarrhea, and/or vomiting that continue for 12-24 hours.  6. Signs of an infection: redness, increased swelling, drainage, fever and/or chills.  7. Chest pain or difficulty breathing.    The fistula or graft CAN NOT be used until the MD has given written approval. Generally, a graft will be ready to use in 2 weeks, and a fistula will be ready to use in 6-8 weeks.     If you have further questions after reading this handout, the office is open from 8:30am to 5:00pm Monday through Friday. Call [phone].  "

## 2020-02-05 NOTE — ANESTHESIA PROCEDURE NOTES
Airway  Urgency: elective    Date/Time: 2/5/2020 10:08 AM  Airway not difficult    General Information and Staff    Patient location during procedure: OR  Anesthesiologist: Cecelia Pruitt MD  CRNA: Kenia Slaughter CRNA    Indications and Patient Condition  Indications for airway management: airway protection    Preoxygenated: yes  Mask difficulty assessment: 1 - vent by mask    Final Airway Details  Final airway type: supraglottic airway      Successful airway: unique  Size 4    Number of attempts at approach: 1  Assessment: lips, teeth, and gum same as pre-op    Additional Comments  Smooth IV/mask induction and placement of LMA. Atraumatic, lips/teeth/mouth intact, as preop. +ETCO2, bilateral breath sounds and equal.

## 2020-02-12 ENCOUNTER — OFFICE VISIT (OUTPATIENT)
Dept: CARDIOLOGY | Facility: CLINIC | Age: 85
End: 2020-02-12

## 2020-02-12 ENCOUNTER — ANTICOAGULATION VISIT (OUTPATIENT)
Dept: PHARMACY | Facility: HOSPITAL | Age: 85
End: 2020-02-12

## 2020-02-12 VITALS
HEIGHT: 61 IN | DIASTOLIC BLOOD PRESSURE: 58 MMHG | WEIGHT: 142 LBS | HEART RATE: 72 BPM | SYSTOLIC BLOOD PRESSURE: 120 MMHG | BODY MASS INDEX: 26.81 KG/M2

## 2020-02-12 DIAGNOSIS — I48.0 PAF (PAROXYSMAL ATRIAL FIBRILLATION) (HCC): ICD-10-CM

## 2020-02-12 DIAGNOSIS — I50.32 CHRONIC DIASTOLIC HEART FAILURE (HCC): Primary | ICD-10-CM

## 2020-02-12 DIAGNOSIS — I10 ESSENTIAL HYPERTENSION: ICD-10-CM

## 2020-02-12 DIAGNOSIS — I48.92 ATRIAL FLUTTER, PAROXYSMAL (HCC): ICD-10-CM

## 2020-02-12 DIAGNOSIS — I27.20 PULMONARY HYPERTENSION (HCC): ICD-10-CM

## 2020-02-12 LAB
INR PPP: 1.3 (ref 0.91–1.09)
PROTHROMBIN TIME: 15 SECONDS (ref 10–13.8)

## 2020-02-12 PROCEDURE — 36416 COLLJ CAPILLARY BLOOD SPEC: CPT

## 2020-02-12 PROCEDURE — 99214 OFFICE O/P EST MOD 30 MIN: CPT | Performed by: NURSE PRACTITIONER

## 2020-02-12 PROCEDURE — G0463 HOSPITAL OUTPT CLINIC VISIT: HCPCS

## 2020-02-12 PROCEDURE — 85610 PROTHROMBIN TIME: CPT

## 2020-02-12 PROCEDURE — 93000 ELECTROCARDIOGRAM COMPLETE: CPT | Performed by: NURSE PRACTITIONER

## 2020-02-12 NOTE — PROGRESS NOTES
Date of Office Visit: 2020  Encounter Provider: Leydi Pack, HANS, APRN  Place of Service: [facility] CARDIOLOGY  Patient Name: Veronica Henao  :1935        Subjective:     Chief Complaint:  Follow-up, chronic diastolic heart failure, atrial fibrillation, pacemaker.      History of Present Illness:  Veronica Henao is a 84 y.o. female patient of Dr. Husain.  I am seeing this patient in the office today and I have reviewed her records.     Patient has a history of atrial fibrillation on Xarelto, diastolic heart failure, AV node ablation status post pacemaker, diabetes, hypertension, hyperlipidemia, obstructive sleep apnea on CPAP, chronic kidney disease followed by Dr. Honeycutt, history of mitral regurgitation status post mitral valve repair, history of tricuspid regurgitation status post tricuspid repair, severe pulmonary hypertension followed by Dr. Ferrer.     Patient has been followed by Dr. Husain for many years.  In  patient was seen in the ER and diagnosed with atrial fibrillation with RVR.  She had a normal echocardiogram showing normal LV function and no significant valvular heart disease.  She transferred care to Dr. Husain 2014.  She had recurrence of atrial fibrillation 2015 associated with diastolic heart failure.  In 2016 she was complaining of chest pain and shortness of breath and echocardiogram showed EF of 51%, elevated left atrial pressure, severe left atrial enlargement, moderate to severe mitral regurgitation, moderate to severe tricuspid regurgitation.  She had a nuclear stress test 2016 that showed no evidence of ischemia.  Transesophageal echocardiogram 10/2016 showed normal left ventricular systolic function, severe left atrial enlargement, mild to moderate mitral regurgitation with moderate to severe tricuspid regurgitation.  She had heart catheterization 2016 showing no significant coronary artery disease.  2016 she had a mitral valve repair  with a 23 mm ATF band posterior angioplasty and P2 portal repair.  Tricuspid valve was repaired with a 26 mm ring and plication of the anterior and septal leaflet commissure.  She also underwent right and left Maze procedure left atrial appendage ligation. 3/2017 patient was found to be in rapid atrial flutter.  She was cardioverted but went back in atrial flutter the next day.  3/28/17 Dr. Ward performed atrial flutter ablation.  She had an echocardiogram 5/2017 showing EF of 70%, severe left atrial enlargement, mild mitral stenosis from valve repair, but no regurgitation.  There was also mild tricuspid regurgitation with moderate pulmonary hypertension.  She went back in atrial flutter 6/2017 and was cardioverted back to sinus rhythm but again went back into atrial flutter and Dr. Ward performed an AV node ablation with pacemaker, which was performed on 7/5/17.  Pacemaker was placed with ventricular lead at the His bundle.  Patient was readmitted 7/25/17 with heart failure.  She diuresed well.  Pacemaker interrogation 8/2017 showed high thresholds in the his lead.  Dr. Ward made some adjustments and said that she may feel some muscular pacing and had right ventricular lead for backup.  Patient was hospitalized August 2018 with shortness of breath.  She had a normal lower extremity venous Doppler at that time.  She had a right heart catheterization done which showed PA pressure of 59/16 with a ware pressure of 14 and large V waves.  Pulmonary pressures did not change with adenosine.  Transthoracic echocardiogram showed EF of 65%, moderate left atrial enlargement, mitral annuloplasty ring with mildly increased gradient of 7 mmHg with trace regurgitation.  Tricuspid valve also had annuloplasty ring with mild to moderate tricuspid regurgitation and RVSP of 63 mmHg.     Patient presented to the hospital 1/5/2020 with worsening shortness of breath and increased lower extremity swelling as well as weight  gain, dry cough, and orthopnea and diagnosed with a CHF exacerbation.  PCP had changed Lasix to Bumex 2 days prior.  She was in acute renal failure and placed on a Lasix drip by nephrology.  Xarelto was held given her renal failure.  She was started on dialysis.  She was later changed to warfarin.  Echo 1/7/2020 showed normal left ventricular systolic function with EF of 57%, probable severe diastolic dysfunction with significantly elevated left atrial filling pressures, surgically repaired tricuspid and mitral valves, elevated RVSP of greater than 50 mmHg.      Patient presents to office today for follow-up appointment.  Patient reports she has been doing well since hospital discharge.  She feels that her fluid status has improved greatly since she has been on hemodialysis.  She got a new dialysis port.  She feels like her shortness of breath has improved.  She gets some shortness of breath with walking long distances but reports that 3 times a week she walks around [employer] or [place] pushing a basket and she feels much better after doing this.  She gets some occasional tiredness by the end of the day if she is out of her house all day however this does not sound anything out of proportion to her activities.  Blood pressure and heart rate are well controlled in the office today.  She reports her blood pressure remained stable through dialysis.  She reports that she really does not mind dialysis and is very pleased with the care that she is receiving there.  She has not been using her CPAP recently and will follow up with Dr. Ferrer on this.  She denies any chest pain or discomfort, palpitations, racing heartbeat sensation, lower extremity edema, dizziness, syncope, near syncope, falls, or abnormal bleeding.  INRs are managed through anticoagulation clinic.        Past Medical History:   Diagnosis Date   • Acute bronchitis    • Acute renal insufficiency    • Allergic rhinitis    • Anemia in chronic kidney disease    •  Arrhythmia    • Atrial fibrillation (CMS/HCC)     chronic   • Brachycephaly    • Breast pain, right    • Chest pain    • CHF (congestive heart failure) (CMS/HCC)    • Chronic combined systolic and diastolic CHF (congestive heart failure) (CMS/HCC)    • Chronic renal insufficiency    • CKD (chronic kidney disease), stage IV (CMS/HCC)    • Cough    • Depression    • Diabetes mellitus (CMS/HCC)     TYPE 2   • Dialysis patient (CMS/HCC)    • Diverticulosis    • ORTIZ (dyspnea on exertion)    • Dyspnea    • Esophageal reflux    • Essential hypertension    • Fatigue    • GERD (gastroesophageal reflux disease)    • Gout    • Head injury    • Headache    • Hoarseness    • Hypercalcemia    • Hyperlipidemia    • Hypertension    • Influenza A (H1N1)    • Iron deficiency anemia    • Itching    • Leg swelling    • Lightheadedness    • Macular degeneration    • Malaise and fatigue    • Mitral valve regurgitation     moderate to severe   • Nontoxic multinodular goiter     RIGHT 2.0 CM  LEFT  2.5 CM   • Obesity    • JOSELUIS on CPAP    • Osteoarthritis    • PAF (paroxysmal atrial fibrillation) (CMS/HCC)    • Palpitations    • Paresthesias    • Proteinuria    • Pulmonary hypertension (CMS/HCC)    • Pulmonary nodule    • Pulmonic valve regurgitation     mild   • Sebaceous cyst    • SOBOE (shortness of breath on exertion)    • Solitary thyroid nodule    • Subclinical hypothyroidism    • Tachycardia    • TR (tricuspid regurgitation)     mild to moderate   • Type 2 diabetes mellitus (CMS/HCC)    • 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)    • UTI (urinary tract infection)      Past Surgical History:   Procedure Laterality Date   • APPENDECTOMY     • ARTERIOVENOUS FISTULA/SHUNT SURGERY Left 2/5/2020    Procedure: LEFT BRACHIAL ARTERY AXILLARY VEIN GORTEX SHUNT;  Surgeon: Kaveh Mcdonnell MD;  Location: [facility];  Service: Vascular;  Laterality: Left;   • CARDIAC CATHETERIZATION  1986    procedure outcome:    •  CARDIAC CATHETERIZATION N/A 11/21/2016    Procedure: Coronary angiography;  Surgeon: Marcin العراقي MD;  Location: [facility] CATH INVASIVE LOCATION;  Service:    • CARDIAC CATHETERIZATION N/A 11/21/2016    Procedure: Left Heart Cath;  Surgeon: Marcin العراقي MD;  Location: [facility] CATH INVASIVE LOCATION;  Service:    • CARDIAC CATHETERIZATION N/A 8/29/2018    Procedure: Right Heart Cath with adenosine challenge if wedge pressure is normal.;  Surgeon: Paulo Decker MD;  Location: [facility] CATH INVASIVE LOCATION;  Service: Cardiovascular   • CARDIAC ELECTROPHYSIOLOGY PROCEDURE N/A 3/28/2017    Procedure: Ablation atrial flutter;  Surgeon: Rodríguez Ward MD;  Location: [facility] CATH INVASIVE LOCATION;  Service:    • CARDIAC ELECTROPHYSIOLOGY PROCEDURE N/A 7/3/2017    Procedure: AV node ablation;  Surgeon: Rodríguez Ward MD;  Location: [facility] CATH INVASIVE LOCATION;  Service:    • CARDIAC ELECTROPHYSIOLOGY PROCEDURE N/A 7/3/2017    Procedure: Pacemaker DC new  [employer]tr[employer] and will need 3830 lead-I did chase Meneses ;  Surgeon: Rodríguez Ward MD;  Location: [facility] CATH INVASIVE LOCATION;  Service:    • CARDIAC SURGERY     • CHOLECYSTECTOMY     • CLAVICLE SURGERY Left    • COLONOSCOPY N/A 11/14/2017    Procedure: COLONOSCOPY INTO CECUM/ TERMINAL ILEUM WITH POLYPECTOMY  X 8 AND CLIP X 2;  Surgeon: Jacy Browning MD;  Location: [facility] ENDOSCOPY;  Service:    • ENDOSCOPY N/A 11/14/2017    Procedure: ESOPHAGOGASTRODUODENOSCOPY WITH BIOPSIES;  Surgeon: Jacy Browning MD;  Location: [facility] ENDOSCOPY;  Service:    • GASTRIC RESTRICTION SURGERY     • HYSTERECTOMY     • INSERTION HEMODIALYSIS CATHETER N/A 1/10/2020    Procedure: TUNNEL DIALYSIS CATHETER;  Surgeon: Carlos Manuel Jernigan MD;  Location: [facility] MAIN OR;  Service: Vascular   • JOINT REPLACEMENT     • LUMBAR DECOMPRESSION      L4-5   • MITRAL VALVE REPAIR/REPLACEMENT N/A 12/14/2016    Procedure: INTRAOPERATIVE KATELYN, MIDLINE STERNOTOMY, MITRAL VALVE  REPAIR, TRICUSPID VALVE REPAIR, MAZE PROCEDURE;  Surgeon: Young Vital MD;  Location: [facility];  Service:    • SHOULDER SURGERY Left     AFTER SURGERY FOR FRACTURED CLAVICLE   • TONSILLECTOMY     • TOTAL KNEE ARTHROPLASTY      bilateral     Outpatient Medications Prior to Visit   Medication Sig Dispense Refill   • acetaminophen (TYLENOL) 325 MG tablet Take 2 tablets by mouth Every 6 (Six) Hours As Needed for Mild Pain (1-3).  0   • Alcohol Swabs (B-D SINGLE USE SWABS REGULAR) pads Inject 1 each under the skin into the appropriate area as directed Daily. 100 each 3   • Blood Glucose Monitoring Suppl (TRUE METRIX AIR GLUCOSE METER) device 1 each Daily. 1 each 0   • gabapentin (NEURONTIN) 100 MG capsule Take 1 capsule by mouth 3 (Three) Times a Day. 90 capsule 4   • glucose blood (TRUE METRIX BLOOD GLUCOSE TEST) test strip 1 each by Other route 2 (Two) Times a Day. Use as instructed 100 each 3   • hydrALAZINE (APRESOLINE) 50 MG tablet Take 1 tablet by mouth 3 (Three) Times a Day. 90 tablet 0   • HYDROcodone-acetaminophen (NORCO) 5-325 MG per tablet Take 1 tablet by mouth Every 6 (Six) Hours As Needed for Moderate Pain  for up to 10 doses. 10 tablet 0   • Incontinence Supply Disposable (BLADDER CONTROL PADS EX ABSORB) misc      • Lancets 28G misc To check sugar qd. E11.9  each 12   • latanoprost (XALATAN) 0.005 % ophthalmic solution Administer 1 drop to both eyes Every Night.     • levocetirizine (XYZAL) 5 MG tablet Take 5 mg by mouth Daily.     • pantoprazole (PROTONIX) 40 MG EC tablet Take 1 tablet by mouth Daily. 90 tablet 3   • pramipexole (MIRAPEX) 0.25 MG tablet TAKE 1 TABLET BY MOUTH THREE TIMES DAILY (Patient taking differently: Take 0.25 mg by mouth 3 (Three) Times a Day.) 270 tablet 1   • Semaglutide (OZEMPIC) 0.25 or 0.5 MG/DOSE solution pen-injector Inject 0.5 mg under the skin into the appropriate area as directed 1 (One) Time Per Week. (Patient taking differently: Inject 0.5 mg under the  skin into the appropriate area as directed 1 (One) Time Per Week. +ON FRIDAYS) 3 pen 4   • TRUEPLUS LANCETS 28G misc E11.9 DM to check sugar  each 3   • warfarin (COUMADIN) 5 MG tablet Take one-half tablet (2.5mg) by mouth daily on Mon, Wed & Fri and take one tablet (5mg) on all other days or as directed by Med Management. 80 tablet 0   • sertraline (ZOLOFT) 50 MG tablet Take 50 mg by mouth Daily.       No facility-administered medications prior to visit.        Allergies as of 02/12/2020 - Reviewed 02/12/2020   Allergen Reaction Noted   • Cephalexin Swelling 02/06/2016   • Meperidine Swelling 02/06/2016   • Penicillins Swelling 02/06/2016     Social History     Socioeconomic History   • Marital status:      Spouse name: Not on file   • Number of children: Not on file   • Years of education: Not on file   • Highest education level: Not on file   Occupational History     Employer: RETIRED   Tobacco Use   • Smoking status: Former Smoker     Packs/day: 1.50     Years: 20.00     Pack years: 30.00     Start date: 6/14/1970   • Smokeless tobacco: Never Used   • Tobacco comment: D/C 1970 SMOKING 1 1/2 PPD FOR 13 YRS BEFORE QUITTING   Substance and Sexual Activity   • Alcohol use: No     Comment: caffeine use   • Drug use: No   • Sexual activity: Defer     Family History   Problem Relation Age of Onset   • Emphysema Mother    • Heart disease Father    • Lung cancer Father    • Prostate cancer Father    • Asthma Sister    • Lung cancer Daughter    • Colon cancer Other    • No Known Problems Maternal Grandmother    • No Known Problems Maternal Grandfather    • Arthritis Paternal Grandmother    • No Known Problems Paternal Grandfather    • Malig Hyperthermia Neg Hx        Review of Systems   Constitution: Positive for decreased appetite and malaise/fatigue. Negative for chills, fever, weight gain and weight loss.   HENT: Negative for ear pain, hearing loss, nosebleeds and sore throat.    Eyes: Negative for blurred  "vision, double vision, redness, vision loss in left eye and vision loss in right eye.   Cardiovascular: Positive for dyspnea on exertion.        SEE HPI   Respiratory: Negative for cough, shortness of breath, snoring and wheezing.    Endocrine: Negative for cold intolerance and heat intolerance.   Skin: Negative for itching, rash and suspicious lesions.   Musculoskeletal: Negative for joint pain, joint swelling and myalgias.   Gastrointestinal: Negative for abdominal pain, diarrhea, hematemesis, melena, nausea and vomiting.   Genitourinary: Negative for dysuria, frequency and hematuria.   Neurological: Negative for dizziness, headaches, numbness and seizures.   Psychiatric/Behavioral: Negative for altered mental status and depression. The patient is not nervous/anxious.           Objective:     Vitals:    02/12/20 1105   BP: 120/58   BP Location: Right arm   Pulse: 72   Weight: 64.4 kg (142 lb)   Height: 154.9 cm (61\")     Body mass index is 26.83 kg/m².      PHYSICAL EXAM:  Physical Exam   Constitutional: She is oriented to person, place, and time. She appears well-developed and well-nourished. No distress.   HENT:   Head: Normocephalic and atraumatic.   Eyes: Pupils are equal, round, and reactive to light.   Neck: Neck supple. No JVD present. Carotid bruit is not present.   Cardiovascular: Normal rate, regular rhythm and intact distal pulses. Exam reveals no gallop and no friction rub.   Murmur (I/VI, systolic ) heard.  Pulses:       Radial pulses are 2+ on the right side, and 2+ on the left side.        Posterior tibial pulses are 2+ on the right side, and 2+ on the left side.   Pulmonary/Chest: Effort normal and breath sounds normal. No respiratory distress. She has no wheezes. She has no rales.   Abdominal: Soft. Bowel sounds are normal.   Musculoskeletal: She exhibits no edema, tenderness or deformity.   Neurological: She is alert and oriented to person, place, and time.   Skin: Skin is warm and dry. No rash " noted. She is not diaphoretic. No erythema.   Psychiatric: She has a normal mood and affect. Her behavior is normal. Judgment normal.           ECG 12 Lead  Date/Time: 2/12/2020 11:27 AM  Performed by: Leydi Pack DNP, APRN  Authorized by: Leydi Pack DNP, APRN   Comparison: compared with previous ECG from 1/5/2020  Similar to previous ECG  Rhythm comments: Ventricular paced rhythm  Rate: normal  BPM: 72    Clinical impression: abnormal EKG              Assessment:       Diagnosis Plan   1. Chronic diastolic heart failure (CMS/[facility])     2. Essential hypertension     3. Pulmonary hypertension (CMS/HCC)     4. PAF (paroxysmal atrial fibrillation) (CMS/[facility])           Plan:     1. Chronic diastolic heart failure: On dialysis.  Follows with nephrology.  Appears euvolemic in the office today.  Reports swelling has been well controlled ever since she has been on dialysis.  2. Hypertension: Blood pressure well controlled in the office today.  She reports her remains controlled through dialysis.  3. Atrial fibrillation: Anticoagulated with warfarin.  Follows with anticoagulation clinic.  4. Pacemaker: Continue with routine device checks.  Patient scheduled for pacemaker check next month.  5. Obstructive sleep apnea: Not currently using CPAP.  Will follow-up with Dr. Ferrer on this.   6. Severe pulmonary hypertension: Follows with Dr. Ferrer.  Did not tolerate Letairis  7. Hyperlipidemia: Managed by outside provider  8. Diabetes: Managed by outside provider  9. Renal failure: On dialysis.  Follows with nephrology.  Fluid status managed through dialysis.    Patient is feeling really well at this point and does not want to come back sooner than 6 months however she will call right away if she develops any issues or concerns.    Patient to schedule 6 month hospital follow-up appointment with Dr. Husain or follow-up sooner if needed for any new, recurrent, or worsening symptoms or other problems/concerns.           Your  medication list           Accurate as of February 12, 2020 11:44 AM. If you have any questions, ask your nurse or doctor.               CHANGE how you take these medications      Instructions Last Dose Given Next Dose Due   Semaglutide(0.25 or 0.5MG/DOS) 2 MG/1.5ML solution pen-injector  Commonly known as:  OZEMPIC (0.25 OR 0.5 MG/DOSE)  What changed:  additional instructions      Inject 0.5 mg under the skin into the appropriate area as directed 1 (One) Time Per Week.          CONTINUE taking these medications      Instructions Last Dose Given Next Dose Due   acetaminophen 325 MG tablet  Commonly known as:  TYLENOL      Take 2 tablets by mouth Every 6 (Six) Hours As Needed for Mild Pain (1-3).       B-D SINGLE USE SWABS REGULAR pads      Inject 1 each under the skin into the appropriate area as directed Daily.       Bladder Control Pads Ex Absorb misc           gabapentin 100 MG capsule  Commonly known as:  NEURONTIN      Take 1 capsule by mouth 3 (Three) Times a Day.       glucose blood test strip  Commonly known as:  TRUE METRIX BLOOD GLUCOSE TEST      1 each by Other route 2 (Two) Times a Day. Use as instructed       hydrALAZINE 50 MG tablet  Commonly known as:  APRESOLINE      Take 1 tablet by mouth 3 (Three) Times a Day.       HYDROcodone-acetaminophen 5-325 MG per tablet  Commonly known as:  NORCO      Take 1 tablet by mouth Every 6 (Six) Hours As Needed for Moderate Pain  for up to 10 doses.       Lancets 28G misc      To check sugar qd. E11.9 DM       TRUEPLUS LANCETS 28G misc      E11.9 DM to check sugar QD       latanoprost 0.005 % ophthalmic solution  Commonly known as:  XALATAN      Administer 1 drop to both eyes Every Night.       levocetirizine 5 MG tablet  Commonly known as:  XYZAL      Take 5 mg by mouth Daily.       pantoprazole 40 MG EC tablet  Commonly known as:  PROTONIX      Take 1 tablet by mouth Daily.       pramipexole 0.25 MG tablet  Commonly known as:  MIRAPEX      TAKE 1 TABLET BY MOUTH  THREE TIMES DAILY       TRUE METRIX AIR GLUCOSE METER device      1 each Daily.       warfarin 5 MG tablet  Commonly known as:  COUMADIN      Take one-half tablet (2.5mg) by mouth daily on Mon, Wed & Fri and take one tablet (5mg) on all other days or as directed by Med Management.              I did not stop or change the above medications.  Patient's medication list was updated to reflect medications they are currently taking including medication changes made by other providers.        Thanks,    Leydi Pack, DNP, APRN  02/12/2020         Dictated utilizing Dragon dictation

## 2020-02-12 NOTE — PROGRESS NOTES
Anticoagulation Clinic Progress Note    Anticoagulation Summary  As of 2020    INR goal:   2.0-2.5   TTR:   12.0 % (2.9 wk)   INR used for dosin.3! (2020)   Warfarin maintenance plan:   2.5 mg every Mon, Wed, Fri; 5 mg all other days   Weekly warfarin total:   27.5 mg   Plan last modified:   Rosalba Winters [facility] (2020)   Next INR check:   2020   Target end date:       Indications    Atrial flutter  paroxysmal (CMS/HCC) [I48.92]             Anticoagulation Episode Summary     INR check location:       Preferred lab:       Send INR reminders to:    [facility]    Comments:   rivaroxaban d/c'd in favor of warfarin 20 r/t ESRD; prev on warfarin w/ labile INRs per Dr. Husain      Anticoagulation Care Providers     Provider Role Specialty Phone number    Maria Luz Husain MD Referring Cardiology [phone]          Clinic Interview:  Patient Findings     Negatives:   Signs/symptoms of thrombosis, Signs/symptoms of bleeding,   Laboratory test error suspected, Change in health, Change in alcohol use,   Change in activity, Upcoming invasive procedure, Emergency department   visit, Upcoming dental procedure, Missed doses, Extra doses, Change in   medications, Change in diet/appetite, Hospital admission, Bruising, Other   complaints      Clinical Outcomes     Negatives:   Major bleeding event, Thromboembolic event,   Anticoagulation-related hospital admission, Anticoagulation-related ED   visit, Anticoagulation-related fatality        INR History:  Anticoagulation Monitoring 2020 2/3/2020 2020   INR 1.7 - 1.3   INR Date 2020 - 2020   INR Goal 2.0-2.5 2.0-2.5 2.0-2.5   Trend Up Same Same   Last Week Total 27.5 mg 25 mg 30 mg   Next Week Total 30 mg 22.5 mg 32.5 mg   Sun 5 mg 5 mg 5 mg   Mon 2.5 mg Hold (2/3); Otherwise 2.5 mg -   Tue 5 mg Hold (); Otherwise 5 mg -   Wed 5 mg () 5 mg (/) 7.5 mg ()   Thu 5 mg 5 mg 5 mg   Fri 2.5 mg 2.5 mg 2.5 mg    Sat 5 mg 5 mg 5 mg   Visit Report - - -   Some recent data might be hidden       Plan:  1. INR is Subtherapeutic today- see above in Anticoagulation Summary.  Will instruct Veronica Henao to take a booster dose today of 7.5mg, and 5mg tomorrow, then resume her 27.5mg/week warfarin dosage regimen.  Will recheck INR on Monday.  2. Follow up in 5 days  3. Patient declines warfarin refills.  4. Verbal and written information provided. Patient expresses understanding and has no further questions at this time.    Rosalba Winters, [facility]

## 2020-02-19 ENCOUNTER — ANTICOAGULATION VISIT (OUTPATIENT)
Dept: PHARMACY | Facility: HOSPITAL | Age: 85
End: 2020-02-19

## 2020-02-19 DIAGNOSIS — I48.92 ATRIAL FLUTTER, PAROXYSMAL (HCC): ICD-10-CM

## 2020-02-19 LAB
INR PPP: 1.8 (ref 0.91–1.09)
PROTHROMBIN TIME: 22.2 SECONDS (ref 10–13.8)

## 2020-02-19 PROCEDURE — 85610 PROTHROMBIN TIME: CPT

## 2020-02-19 PROCEDURE — G0463 HOSPITAL OUTPT CLINIC VISIT: HCPCS

## 2020-02-19 PROCEDURE — 36416 COLLJ CAPILLARY BLOOD SPEC: CPT

## 2020-02-19 NOTE — PROGRESS NOTES
Anticoagulation Clinic Progress Note    Anticoagulation Summary  As of 2020    INR goal:   2.0-2.5   TTR:   9.0 % (3.9 wk)   INR used for dosin.8! (2020)   Warfarin maintenance plan:   2.5 mg every Mon, Fri; 5 mg all other days   Weekly warfarin total:   30 mg   Plan last modified:   Joy Mora RPH (2020)   Next INR check:   2020   Target end date:       Indications    Atrial flutter  paroxysmal (CMS/HCC) [I48.92]             Anticoagulation Episode Summary     INR check location:       Preferred lab:       Send INR reminders to:    [facility]    Comments:   rivaroxaban d/c'd in favor of warfarin 20 r/t ESRD; prev on warfarin w/ labile INRs per Dr. Husain      Anticoagulation Care Providers     Provider Role Specialty Phone number    Maria Luz Husain MD Referring Cardiology [phone]          Clinic Interview:  Patient Findings     Positives:   Bruising    Negatives:   Signs/symptoms of thrombosis, Signs/symptoms of bleeding,   Laboratory test error suspected, Change in health, Change in alcohol use,   Change in activity, Upcoming invasive procedure, Emergency department   visit, Upcoming dental procedure, Missed doses, Extra doses, Change in   medications, Change in diet/appetite, Hospital admission, Other complaints        Clinical Outcomes     Negatives:   Major bleeding event, Thromboembolic event,   Anticoagulation-related hospital admission, Anticoagulation-related ED   visit, Anticoagulation-related fatality        INR History:  Anticoagulation Monitoring 2/3/2020 2020 2020   INR - 1.3 1.8   INR Date - 2020   INR Goal 2.0-2.5 2.0-2.5 2.0-2.5   Trend Same Same Up   Last Week Total 25 mg 30 mg 32.5 mg   Next Week Total 22.5 mg 32.5 mg 30 mg   Sun 5 mg 5 mg 5 mg   Mon Hold (2/3); Otherwise 2.5 mg - 2.5 mg   Tue Hold (); Otherwise 5 mg - 5 mg   Wed 5 mg () 7.5 mg () 5 mg   Thu 5 mg 5 mg 5 mg   Fri 2.5 mg 2.5 mg 2.5 mg   Sat 5  mg 5 mg 5 mg   Visit Report - - -   Some recent data might be hidden       Plan:  1. INR is Subtherapeutic today- see above in Anticoagulation Summary.  Will instruct Veronica Henao to Change their warfarin regimen- see above in Anticoagulation Summary.  2. Follow up in 1 week  3. Patient declines warfarin refills.  4. Verbal and written information provided. Patient expresses understanding and has no further questions at this time.    Gloria Tom, Pharmacy Intern

## 2020-02-19 NOTE — PROGRESS NOTES
I have supervised and reviewed the notes, assessments, and/or procedures performed by our PharmD Candidate. The documented assessment and plan were developed cooperatively, and the plan was implemented in my presence. I concur with the documentation of this patient encounter.    Joy Mora [facility]

## 2020-02-24 ENCOUNTER — OFFICE VISIT (OUTPATIENT)
Dept: INTERNAL MEDICINE | Facility: CLINIC | Age: 85
End: 2020-02-24

## 2020-02-24 VITALS
BODY MASS INDEX: 26.24 KG/M2 | HEIGHT: 61 IN | DIASTOLIC BLOOD PRESSURE: 86 MMHG | WEIGHT: 139 LBS | HEART RATE: 89 BPM | SYSTOLIC BLOOD PRESSURE: 156 MMHG | OXYGEN SATURATION: 98 %

## 2020-02-24 DIAGNOSIS — N18.4 CHRONIC KIDNEY DISEASE, STAGE IV (SEVERE) (HCC): ICD-10-CM

## 2020-02-24 DIAGNOSIS — G62.9 PERIPHERAL POLYNEUROPATHY: ICD-10-CM

## 2020-02-24 DIAGNOSIS — J40 BRONCHITIS: Primary | ICD-10-CM

## 2020-02-24 DIAGNOSIS — J01.00 ACUTE NON-RECURRENT MAXILLARY SINUSITIS: ICD-10-CM

## 2020-02-24 PROBLEM — I50.9 CHF (CONGESTIVE HEART FAILURE) (HCC): Status: RESOLVED | Noted: 2017-05-25 | Resolved: 2020-02-24

## 2020-02-24 PROBLEM — N17.9 ACUTE RENAL FAILURE SUPERIMPOSED ON STAGE 4 CHRONIC KIDNEY DISEASE (HCC): Status: RESOLVED | Noted: 2020-01-05 | Resolved: 2020-02-24

## 2020-02-24 PROBLEM — I50.32 CHRONIC DIASTOLIC HEART FAILURE (HCC): Status: RESOLVED | Noted: 2017-07-29 | Resolved: 2020-02-24

## 2020-02-24 PROCEDURE — 99214 OFFICE O/P EST MOD 30 MIN: CPT | Performed by: INTERNAL MEDICINE

## 2020-02-24 RX ORDER — GABAPENTIN 300 MG/1
300 CAPSULE ORAL 2 TIMES DAILY
Qty: 60 CAPSULE | Refills: 5 | Status: SHIPPED | OUTPATIENT
Start: 2020-02-24 | End: 2020-01-01 | Stop reason: SDUPTHER

## 2020-02-24 RX ORDER — DOXYCYCLINE 100 MG/1
100 CAPSULE ORAL 2 TIMES DAILY
Qty: 14 CAPSULE | Refills: 0 | Status: SHIPPED | OUTPATIENT
Start: 2020-02-24 | End: 2020-03-23

## 2020-02-24 RX ORDER — PREDNISONE 10 MG/1
10 TABLET ORAL DAILY
Qty: 10 TABLET | Refills: 0 | Status: SHIPPED | OUTPATIENT
Start: 2020-02-24 | End: 2020-01-01

## 2020-02-24 RX ORDER — TRIAMCINOLONE ACETONIDE 55 UG/1
2 SPRAY, METERED NASAL DAILY
Qty: 16.5 G | Refills: 11 | Status: SHIPPED | OUTPATIENT
Start: 2020-02-24 | End: 2021-01-01

## 2020-02-24 RX ORDER — BENZONATATE 100 MG/1
100 CAPSULE ORAL 3 TIMES DAILY PRN
Qty: 30 CAPSULE | Refills: 1 | Status: SHIPPED | OUTPATIENT
Start: 2020-02-24 | End: 2020-01-01

## 2020-02-24 NOTE — PROGRESS NOTES
Chief Complaint   Patient presents with   • URI   • Cough   • Peripheral Neuropathy   • Acute Renal Failure       History of Present Illness   Veronica Henao is a 84 y.o. female presents for acute care w/ chronic needs. Patient reports a cough. Started 3-5 days ago. Gradually getting more pronounced. No fever. She notes wheezing at times. Patient has renal failure and is in dialysis 3 times weekly. She reports that she feels cold at the facility. She has peripheral neuropathy. Taking gabapentin 100 mg am and 200 mg hs. She also has restless legs and reports minimal benefit with pramipexole.25 mg tid.      The following portions of the patient's history were reviewed and updated as appropriate: allergies, current medications, past family history, past medical history, past social history, past surgical history and problem list.  Current Outpatient Medications on File Prior to Visit   Medication Sig Dispense Refill   • acetaminophen (TYLENOL) 325 MG tablet Take 2 tablets by mouth Every 6 (Six) Hours As Needed for Mild Pain (1-3).  0   • Alcohol Swabs (B-D SINGLE USE SWABS REGULAR) pads Inject 1 each under the skin into the appropriate area as directed Daily. 100 each 3   • Blood Glucose Monitoring Suppl (TRUE METRIX AIR GLUCOSE METER) device 1 each Daily. 1 each 0   • glucose blood (TRUE METRIX BLOOD GLUCOSE TEST) test strip 1 each by Other route 2 (Two) Times a Day. Use as instructed 100 each 3   • hydrALAZINE (APRESOLINE) 50 MG tablet Take 1 tablet by mouth 3 (Three) Times a Day. 90 tablet 0   • HYDROcodone-acetaminophen (NORCO) 5-325 MG per tablet Take 1 tablet by mouth Every 6 (Six) Hours As Needed for Moderate Pain  for up to 10 doses. 10 tablet 0   • Incontinence Supply Disposable (BLADDER CONTROL PADS EX ABSORB) misc      • Lancets 28G misc To check sugar qd. E11.9  each 12   • latanoprost (XALATAN) 0.005 % ophthalmic solution Administer 1 drop to both eyes Every Night.     • levocetirizine (XYZAL) 5 MG  tablet Take 5 mg by mouth Daily.     • pantoprazole (PROTONIX) 40 MG EC tablet Take 1 tablet by mouth Daily. 90 tablet 3   • pramipexole (MIRAPEX) 0.25 MG tablet TAKE 1 TABLET BY MOUTH THREE TIMES DAILY (Patient taking differently: Take 0.25 mg by mouth 3 (Three) Times a Day.) 270 tablet 1   • Semaglutide (OZEMPIC) 0.25 or 0.5 MG/DOSE solution pen-injector Inject 0.5 mg under the skin into the appropriate area as directed 1 (One) Time Per Week. (Patient taking differently: Inject 0.5 mg under the skin into the appropriate area as directed 1 (One) Time Per Week. +ON FRIDAYS) 3 pen 4   • TRUEPLUS LANCETS 28G misc E11.9 DM to check sugar  each 3   • warfarin (COUMADIN) 5 MG tablet Take one-half tablet (2.5mg) by mouth daily on Mon, Wed & Fri and take one tablet (5mg) on all other days or as directed by Med Management. 80 tablet 0   • [DISCONTINUED] gabapentin (NEURONTIN) 100 MG capsule Take 1 capsule by mouth 3 (Three) Times a Day. 90 capsule 4     No current facility-administered medications on file prior to visit.      Review of Systems   Constitutional: Positive for fatigue.   HENT: Positive for congestion.    Eyes: Negative.    Respiratory: Positive for cough and shortness of breath.    Cardiovascular: Negative.    Gastrointestinal: Negative.    Endocrine: Negative.    Genitourinary: Negative.         She has some urine function   Musculoskeletal: Positive for arthralgias.   Skin: Negative.    Allergic/Immunologic: Negative.    Neurological: Negative.    Hematological: Negative.    Psychiatric/Behavioral: Negative.        Objective   Physical Exam   Constitutional: She is oriented to person, place, and time. She appears well-developed and well-nourished.   HENT:   Head: Normocephalic and atraumatic.   Right Ear: External ear normal.   Left Ear: External ear normal.   Pharyngeal erythema   Eyes: Pupils are equal, round, and reactive to light. Conjunctivae and EOM are normal.   Neck: Normal range of motion.  "Neck supple.   Cardiovascular: Normal rate, regular rhythm and normal heart sounds.   Pulmonary/Chest: Effort normal and breath sounds normal.   Abdominal: Soft. Bowel sounds are normal.   Musculoskeletal: Normal range of motion.   Neurological: She is alert and oriented to person, place, and time.   Skin: Skin is warm and dry.   Psychiatric: She has a normal mood and affect. Her behavior is normal. Thought content normal.   Nursing note and vitals reviewed.       /86   Pulse 89   Ht 154.9 cm (61\")   Wt 63 kg (139 lb)   SpO2 98%   BMI 26.26 kg/m²     Assessment/Plan   Diagnoses and all orders for this visit:    Bronchitis    Acute non-recurrent maxillary sinusitis    Chronic kidney disease, stage IV (severe) (CMS/HCC)    Peripheral polyneuropathy  -     gabapentin (NEURONTIN) 300 MG capsule; Take 1 capsule by mouth 2 (Two) Times a Day.    Other orders  -     doxycycline (MONODOX) 100 MG capsule; Take 1 capsule by mouth 2 (Two) Times a Day.  -     predniSONE (DELTASONE) 10 MG tablet; Take 1 tablet by mouth Daily.  -     benzonatate (TESSALON PERLES) 100 MG capsule; Take 1 capsule by mouth 3 (Three) Times a Day As Needed for Cough.        Patient w/ acute bronchitis. Will start doxycycline. She will have repeat inr tested on wed and again on Friday if needed. She will use tessalon prn cough suppresant. She will increase gabapentin to 300 mg bid for peripheral neuropathy. Patient will use nasacort 2 spray daily. She will follow up if no improvement or worsening of symptoms .            "

## 2020-02-26 ENCOUNTER — ANTICOAGULATION VISIT (OUTPATIENT)
Dept: PHARMACY | Facility: HOSPITAL | Age: 85
End: 2020-02-26

## 2020-02-26 DIAGNOSIS — I48.92 ATRIAL FLUTTER, PAROXYSMAL (HCC): ICD-10-CM

## 2020-02-26 LAB
INR PPP: 1.8 (ref 0.91–1.09)
PROTHROMBIN TIME: 22 SECONDS (ref 10–13.8)

## 2020-02-26 PROCEDURE — 85610 PROTHROMBIN TIME: CPT

## 2020-02-26 PROCEDURE — G0463 HOSPITAL OUTPT CLINIC VISIT: HCPCS

## 2020-02-26 PROCEDURE — 36416 COLLJ CAPILLARY BLOOD SPEC: CPT

## 2020-02-26 NOTE — PROGRESS NOTES
"Anticoagulation Clinic Progress Note    Anticoagulation Summary  As of 2020    INR goal:   2.0-2.5   TTR:   7.1 % (1.1 mo)   INR used for dosin.8! (2020)   Warfarin maintenance plan:   2.5 mg every Mon, Fri; 5 mg all other days   Weekly warfarin total:   30 mg   Plan last modified:   Joy Mora RPH (2020)   Next INR check:   3/4/2020   Target end date:       Indications    Atrial flutter  paroxysmal (CMS/HCC) [I48.92]             Anticoagulation Episode Summary     INR check location:       Preferred lab:       Send INR reminders to:    [facility]    Comments:   rivaroxaban d/c'd in favor of warfarin 20 r/t ESRD; prev on warfarin w/ labile INRs per Dr. Husain      Anticoagulation Care Providers     Provider Role Specialty Phone number    Maria Luz Husain MD Referring Cardiology [phone]          Clinic Interview:  Patient Findings     Positives:   Change in diet/appetite    Negatives:   Signs/symptoms of thrombosis, Signs/symptoms of bleeding,   Laboratory test error suspected, Change in health, Change in alcohol use,   Change in activity, Upcoming invasive procedure, Emergency department   visit, Upcoming dental procedure, Missed doses, Extra doses, Change in   medications, Hospital admission, Bruising, Other complaints    Comments:   Pt reports starting to drink new protein drink but has only   take 3-4 \"sips\"; so likely isn't source of a lot of vitamin K      Clinical Outcomes     Negatives:   Major bleeding event, Thromboembolic event,   Anticoagulation-related hospital admission, Anticoagulation-related ED   visit, Anticoagulation-related fatality    Comments:   Pt reports starting to drink new protein drink but has only   take 3-4 \"sips\"; so likely isn't source of a lot of vitamin K        INR History:  Anticoagulation Monitoring 2020   INR 1.3 1.8 1.8   INR Date 2020   INR Goal 2.0-2.5 2.0-2.5 2.0-2.5   Trend " Same Up Same   Last Week Total 30 mg 32.5 mg 30 mg   Next Week Total 32.5 mg 30 mg 32.5 mg   Sun 5 mg 5 mg 5 mg   Mon - 2.5 mg 2.5 mg   Tue - 5 mg 5 mg   Wed 7.5 mg (2/12) 5 mg 7.5 mg (2/26)   Thu 5 mg 5 mg 5 mg   Fri 2.5 mg 2.5 mg 2.5 mg   Sat 5 mg 5 mg 5 mg   Visit Report - - -   Some recent data might be hidden       Plan:  1. INR is Subtherapeutic today- see above in Anticoagulation Summary.  Will instruct Veronica Henao to Change their warfarin regimen and give 7.5 mg today instead of 5 mg; may want to consider giving the patient only 2.5 mg once weekly if pt INR within range at next visit.- see above in Anticoagulation Summary.  2. Follow up in 1 week  3. Patient declines warfarin refills.  4. Verbal and written information provided. Patient expresses understanding and has no further questions at this time.    Newton Hanna [facility]

## 2020-03-04 ENCOUNTER — APPOINTMENT (OUTPATIENT)
Dept: PHARMACY | Facility: HOSPITAL | Age: 85
End: 2020-03-04

## 2020-03-04 DIAGNOSIS — I48.92 ATRIAL FLUTTER, PAROXYSMAL (HCC): ICD-10-CM

## 2020-03-06 ENCOUNTER — ANTICOAGULATION VISIT (OUTPATIENT)
Dept: PHARMACY | Facility: HOSPITAL | Age: 85
End: 2020-03-06

## 2020-03-06 DIAGNOSIS — I48.92 ATRIAL FLUTTER, PAROXYSMAL (HCC): ICD-10-CM

## 2020-03-06 LAB
INR PPP: 1.5 (ref 0.91–1.09)
PROTHROMBIN TIME: 17.5 SECONDS (ref 10–13.8)

## 2020-03-06 PROCEDURE — G0463 HOSPITAL OUTPT CLINIC VISIT: HCPCS

## 2020-03-06 PROCEDURE — 36416 COLLJ CAPILLARY BLOOD SPEC: CPT

## 2020-03-06 PROCEDURE — 85610 PROTHROMBIN TIME: CPT

## 2020-03-06 NOTE — PROGRESS NOTES
Anticoagulation Clinic Progress Note    Anticoagulation Summary  As of 3/6/2020    INR goal:   2.0-2.5   TTR:   5.7 % (1.4 mo)   INR used for dosin.5! (3/6/2020)   Warfarin maintenance plan:   2.5 mg every Fri; 5 mg all other days   Weekly warfarin total:   32.5 mg   Plan last modified:   Joy Mora RPH (3/6/2020)   Next INR check:   3/17/2020   Target end date:       Indications    Atrial flutter  paroxysmal (CMS/HCC) [I48.92]             Anticoagulation Episode Summary     INR check location:       Preferred lab:       Send INR reminders to:    [facility] PO[facility]    Comments:   rivaroxaban d/c'd in favor of warfarin 20 r/t ESRD; prev on warfarin w/ labile INRs per Dr. Husain      Anticoagulation Care Providers     Provider Role Specialty Phone number    Maria Luz Husain MD Referring Cardiology [phone]          Clinic Interview:      INR History:  Anticoagulation Monitoring 2020 2020 3/6/2020   INR 1.8 1.8 1.5   INR Date 2020 2020 3/6/2020   INR Goal 2.0-2.5 2.0-2.5 2.0-2.5   Trend Up Same Up   Last Week Total 32.5 mg 30 mg 30 mg   Next Week Total 30 mg 32.5 mg 35 mg   Sun 5 mg 5 mg 5 mg   Mon 2.5 mg 2.5 mg 5 mg   Tue 5 mg 5 mg 5 mg   Wed 5 mg 7.5 mg () 5 mg   Thu 5 mg 5 mg 5 mg   Fri 2.5 mg 2.5 mg 5 mg (3/6); Otherwise 2.5 mg   Sat 5 mg 5 mg 5 mg   Visit Report - - -   Some recent data might be hidden       Plan:  1. INR is Subtherapeutic today- see above in Anticoagulation Summary.  Will instruct Veronica Henao to Increase their warfarin regimen- see above in Anticoagulation Summary.  2. Follow up in 1.5 weeks  3. Patient declines warfarin refills.  4. Verbal and written information provided. Patient expresses understanding and has no further questions at this time.    Joy Mora RPH

## 2020-03-13 ENCOUNTER — ANTICOAGULATION VISIT (OUTPATIENT)
Dept: PHARMACY | Facility: HOSPITAL | Age: 85
End: 2020-03-13

## 2020-03-13 DIAGNOSIS — I48.92 ATRIAL FLUTTER, PAROXYSMAL (HCC): ICD-10-CM

## 2020-03-13 LAB
INR PPP: 6.7 (ref 0.91–1.09)
PROTHROMBIN TIME: 79.9 SECONDS (ref 10–13.8)

## 2020-03-13 PROCEDURE — 36416 COLLJ CAPILLARY BLOOD SPEC: CPT

## 2020-03-13 PROCEDURE — G0463 HOSPITAL OUTPT CLINIC VISIT: HCPCS

## 2020-03-13 PROCEDURE — 85610 PROTHROMBIN TIME: CPT

## 2020-03-13 NOTE — PROGRESS NOTES
I have supervised and reviewed the notes, assessments, and/or procedures performed by our PharmD Candidate. Ms. Henao reported she has already taken her 2.5-mg dose of warfarin today. HOLD x 2 days, then recheck INR. She was instructed to seek immediate medical attention if s/sx of bleeding develop of fall occurs. The documented assessment and plan were developed cooperatively, and the plan was implemented in my presence. I concur with the documentation of this patient encounter.     Jam Goldsmith, [facility]

## 2020-03-13 NOTE — PROGRESS NOTES
Anticoagulation Clinic Progress Note    Anticoagulation Summary  As of 3/13/2020    INR goal:   2.0-2.5   TTR:   6.2 % (1.7 mo)   INR used for dosin.7! (3/13/2020)   Warfarin maintenance plan:   2.5 mg every Fri; 5 mg all other days   Weekly warfarin total:   32.5 mg   Plan last modified:   Joy Mora RPH (3/6/2020)   Next INR check:   3/16/2020   Target end date:       Indications    Atrial flutter  paroxysmal (CMS/HCC) [I48.92]             Anticoagulation Episode Summary     INR check location:       Preferred lab:       Send INR reminders to:    [facility]    Comments:   rivaroxaban d/c'd in favor of warfarin 20 r/t ESRD; prev on warfarin w/ labile INRs per Dr. Husain      Anticoagulation Care Providers     Provider Role Specialty Phone number    Maria Luz Husain MD Referring Cardiology [phone]          Clinic Interview:  Patient Findings     Positives:   Change in alcohol use, Bruising, Other complaints    Negatives:   Signs/symptoms of thrombosis, Signs/symptoms of bleeding,   Laboratory test error suspected, Change in health, Change in activity,   Upcoming invasive procedure, Emergency department visit, Upcoming dental   procedure, Missed doses, Extra doses, Change in medications, Change in   diet/appetite, Hospital admission    Comments:   Pt refuses venous INR. Pt reports having a Hep B vaccine   yesterday, 1 danii on 3/3.      Clinical Outcomes     Negatives:   Major bleeding event, Thromboembolic event,   Anticoagulation-related hospital admission, Anticoagulation-related ED   visit, Anticoagulation-related fatality    Comments:   Pt refuses venous INR. Pt reports having a Hep B vaccine   yesterday, 1 danii on 3/3.        INR History:  Anticoagulation Monitoring 2020 3/6/2020 3/13/2020   INR 1.8 1.5 6.7   INR Date 2020 3/6/2020 3/13/2020   INR Goal 2.0-2.5 2.0-2.5 2.0-2.5   Trend Same Up Same   Last Week Total 30 mg 30 mg 35 mg   Next Week Total 32.5 mg  35 mg 22.5 mg   Sun 5 mg 5 mg Hold (3/15)   Mon 2.5 mg 5 mg -   Tue 5 mg 5 mg -   Wed 7.5 mg (2/26) 5 mg -   Thu 5 mg 5 mg -   Fri 2.5 mg 5 mg (3/6); Otherwise 2.5 mg 2.5 mg   Sat 5 mg 5 mg Hold (3/14)   Visit Report - - -   Some recent data might be hidden       Plan:  1. INR is Supratherapeutic today (POC 6.7/pt refuses venous INR)- see above in Anticoagulation Summary.  Will instruct Veronica Henao to Change their warfarin regimen- see above in Anticoagulation Summary.  2. Follow up in 3 days  3. Patient declines warfarin refills.  4. Verbal and written information provided. Counseled pt to seek immediate medical attention if she has s/sx of bleeding or falls. Patient expresses understanding and has no further questions at this time.    Gloria Tom, Pharmacy Intern

## 2020-03-16 ENCOUNTER — ANTICOAGULATION VISIT (OUTPATIENT)
Dept: PHARMACY | Facility: HOSPITAL | Age: 85
End: 2020-03-16

## 2020-03-16 DIAGNOSIS — I48.92 ATRIAL FLUTTER, PAROXYSMAL (HCC): ICD-10-CM

## 2020-03-16 LAB
INR PPP: 1.6 (ref 0.91–1.09)
PROTHROMBIN TIME: 19.5 SECONDS (ref 10–13.8)

## 2020-03-16 PROCEDURE — G0463 HOSPITAL OUTPT CLINIC VISIT: HCPCS

## 2020-03-16 PROCEDURE — 85610 PROTHROMBIN TIME: CPT

## 2020-03-16 PROCEDURE — 36416 COLLJ CAPILLARY BLOOD SPEC: CPT

## 2020-03-16 NOTE — PROGRESS NOTES
Anticoagulation Clinic Progress Note    Anticoagulation Summary  As of 3/16/2020    INR goal:   2.0-2.5   TTR:   6.4 % (1.8 mo)   INR used for dosin.6! (3/16/2020)   Warfarin maintenance plan:   2.5 mg every Mon, Fri; 5 mg all other days   Weekly warfarin total:   30 mg   Plan last modified:   Jam Goldsmith RPH (3/16/2020)   Next INR check:   3/27/2020   Target end date:       Indications    Atrial flutter  paroxysmal (CMS/HCC) [I48.92]             Anticoagulation Episode Summary     INR check location:       Preferred lab:       Send INR reminders to:    [facility]    Comments:   rivaroxaban d/c'd in favor of warfarin 20 r/t ESRD; prev on warfarin w/ labile INRs per Dr. Husain      Anticoagulation Care Providers     Provider Role Specialty Phone number    Maria Luz Husain MD Referring Cardiology [phone]          Clinic Interview:  Patient Findings     Negatives:   Signs/symptoms of thrombosis, Signs/symptoms of bleeding,   Laboratory test error suspected, Change in health, Change in alcohol use,   Change in activity, Upcoming invasive procedure, Emergency department   visit, Upcoming dental procedure, Missed doses, Extra doses, Change in   medications, Change in diet/appetite, Hospital admission, Bruising, Other   complaints      Clinical Outcomes     Negatives:   Major bleeding event, Thromboembolic event,   Anticoagulation-related hospital admission, Anticoagulation-related ED   visit, Anticoagulation-related fatality        INR History:  Anticoagulation Monitoring 3/6/2020 3/13/2020 3/16/2020   INR 1.5 6.7 1.6   INR Date 3/6/2020 3/13/2020 3/16/2020   INR Goal 2.0-2.5 2.0-2.5 2.0-2.5   Trend Up Same Down   Last Week Total 30 mg 35 mg 22.5 mg   Next Week Total 35 mg 22.5 mg 30 mg   Sun 5 mg Hold (3/15) 5 mg   Mon 5 mg - 2.5 mg   Tue 5 mg - 5 mg   Wed 5 mg - 5 mg   Thu 5 mg - 5 mg   Fri 5 mg (3/6); Otherwise 2.5 mg 2.5 mg 2.5 mg   Sat 5 mg Hold (3/14) 5 mg   Visit Report - - -    Some recent data might be hidden       Plan:  1. INR is Subtherapeutic today- see above in Anticoagulation Summary.  Will instruct Veronica Henao to Change their warfarin regimen- see above in Anticoagulation Summary.  2. Follow up in 1.5 weeks  3. Patient declines warfarin refills.  4. Verbal and written information provided. Patient expresses understanding and has no further questions at this time.    Jam Goldsmith [facility]

## 2020-03-20 ENCOUNTER — TELEPHONE (OUTPATIENT)
Dept: CARDIOLOGY | Facility: CLINIC | Age: 85
End: 2020-03-20

## 2020-03-20 DIAGNOSIS — I48.0 PAF (PAROXYSMAL ATRIAL FIBRILLATION) (HCC): Primary | ICD-10-CM

## 2020-03-20 NOTE — TELEPHONE ENCOUNTER
The patient called and would like to discuss using something other that Warfarin. She is having having issues getting her blood stabilized. She is also having issues getting into the clinic as well. When I talked to her she stated that she has been on warfarin since January of this year.  She feels that she would like to try something else.

## 2020-03-23 RX ORDER — WARFARIN SODIUM 5 MG/1
TABLET ORAL
Qty: 90 TABLET | Refills: 1 | Status: SHIPPED | OUTPATIENT
Start: 2020-03-23 | End: 2020-01-01

## 2020-03-23 NOTE — TELEPHONE ENCOUNTER
Pt calling back today looking for update on what other alternatives to warfarin she can take. She confirms that she is currently undergoing hemodialysis.    I advised the Pt that she needs to contact Medical Management, she advises that she has ran out of warfarin last Thursday.     Med Management Pt will need you to contact her in regards to an appointment or what your new protocols for COVID-19 are. Thank you

## 2020-03-23 NOTE — TELEPHONE ENCOUNTER
I called refill in to pharmacy and told her to follow instructions from [facility] clinic on when to take full tablet vs. Half tablet.    We discussed that given dialysis warfarin is the best option.  However she wants to discuss with her nephrologist whether other AC therapy would be ok with her being on dialysis and then give us a call back.     Of note, she completed ABX a couple weeks ago so I took this out of her chart.  I asked her to notify [facility] clinic anytime she is prescribed an ABX as this can affect blood levels of warfarin.  She verbalized understanding.

## 2020-03-25 ENCOUNTER — CLINICAL SUPPORT NO REQUIREMENTS (OUTPATIENT)
Dept: CARDIOLOGY | Facility: CLINIC | Age: 85
End: 2020-03-25

## 2020-03-25 DIAGNOSIS — I44.2 COMPLETE HEART BLOCK (HCC): Primary | ICD-10-CM

## 2020-03-25 PROCEDURE — 93294 REM INTERROG EVL PM/LDLS PM: CPT | Performed by: INTERNAL MEDICINE

## 2020-03-25 PROCEDURE — 93296 REM INTERROG EVL PM/IDS: CPT | Performed by: INTERNAL MEDICINE

## 2020-03-27 ENCOUNTER — TELEPHONE (OUTPATIENT)
Dept: CARDIOLOGY | Facility: CLINIC | Age: 85
End: 2020-03-27

## 2020-03-27 NOTE — TELEPHONE ENCOUNTER
I spoke with the patient and explained that eliquis is not for dialysis patients. I also explained that we could try and get her setup for home testing. She verbalized that she understood. I will copy the coag clinic in this thread so that they can look into it and start the paperwork.

## 2020-03-27 NOTE — TELEPHONE ENCOUNTER
The patient called and stated that she would like to try and start taking Eliquis. She states that since she is on dialysis she is tired of going back and forth for the PT/INR test.

## 2020-03-30 ENCOUNTER — ANTICOAGULATION VISIT (OUTPATIENT)
Dept: PHARMACY | Facility: HOSPITAL | Age: 85
End: 2020-03-30

## 2020-03-30 DIAGNOSIS — I48.92 ATRIAL FLUTTER, PAROXYSMAL (HCC): ICD-10-CM

## 2020-03-30 LAB
INR PPP: 2.5 (ref 0.91–1.09)
PROTHROMBIN TIME: 30.4 SECONDS (ref 10–13.8)

## 2020-03-30 PROCEDURE — 36416 COLLJ CAPILLARY BLOOD SPEC: CPT

## 2020-03-30 PROCEDURE — 85610 PROTHROMBIN TIME: CPT

## 2020-03-30 NOTE — PROGRESS NOTES
Anticoagulation Clinic Progress Note    Anticoagulation Summary  As of 3/30/2020    INR goal:   2.0-2.5   TTR:   16.7 % (2.2 mo)   INR used for dosin.5 (3/30/2020)   Warfarin maintenance plan:   2.5 mg every Mon, Fri; 5 mg all other days   Weekly warfarin total:   30 mg   Plan last modified:   Jam Goldsmith RP (3/16/2020)   Next INR check:   2020   Target end date:       Indications    Atrial flutter  paroxysmal (CMS/HCC) [I48.92]             Anticoagulation Episode Summary     INR check location:       Preferred lab:       Send INR reminders to:   [facility] CLINICAL POOL    Comments:   rivaroxaban d/c'd in favor of warfarin 20 r/t ESRD; prev on warfarin w/ labile INRs per Dr. Husain      Anticoagulation Care Providers     Provider Role Specialty Phone number    Maria Luz Husain MD Referring Cardiology [phone]          Clinic Interview:      INR History:  Anticoagulation Monitoring 3/13/2020 3/16/2020 3/30/2020   INR 6.7 1.6 2.5   INR Date 3/13/2020 3/16/2020 3/30/2020   INR Goal 2.0-2.5 2.0-2.5 2.0-2.5   Trend Same Down Same   Last Week Total 35 mg 22.5 mg 30 mg   Next Week Total 22.5 mg 30 mg 30 mg   Sun Hold (3/15) 5 mg 5 mg   Mon - 2.5 mg 2.5 mg   Tue - 5 mg 5 mg   Wed - 5 mg 5 mg   Thu - 5 mg 5 mg   Fri 2.5 mg 2.5 mg 2.5 mg   Sat Hold (3/14) 5 mg 5 mg   Visit Report - - -   Some recent data might be hidden       Plan:  1. INR is Therapeutic today- see above in Anticoagulation Summary.  Will instruct Veronica Henao to Continue their warfarin regimen- see above in Anticoagulation Summary.  2. Follow up in 2 weeks  3. Patient declines warfarin refills.  4. Verbal and written information provided. Patient expresses understanding and has no further questions at this time.    Joy Mora [facility]

## 2020-03-30 NOTE — TELEPHONE ENCOUNTER
Will try to see if she can have her dialysis center draw INR and fax to us.  Will start process for home testing but that will likely be at least six weeks out.

## 2020-04-17 NOTE — TELEPHONE ENCOUNTER
Schedule Veronica with [facility] on 04/24, make sure  calls the daughter (Nesha) [phone] for arrival time, patient won't answer phone.

## 2020-04-24 NOTE — TELEPHONE ENCOUNTER
INR 1.1     Pt has not been eating much     Dialysis -  started her on Ca acetate yesterday   Drink Net pro (19 mgm vit K) stated this am

## 2020-04-24 NOTE — PROGRESS NOTES
Anticoagulation Clinic Progress Note    Anticoagulation Summary  As of 2020    INR goal:   2.0-2.5   TTR:   21.7 % (3.1 mo)   INR used for dosin.10! (2020)   Warfarin maintenance plan:   5 mg every day   Weekly warfarin total:   35 mg   Plan last modified:   Jam Goldsmith RPH (2020)   Next INR check:   2020   Target end date:       Indications    Atrial flutter  paroxysmal (CMS/HCC) [I48.92]             Anticoagulation Episode Summary     INR check location:       Preferred lab:       Send INR reminders to:   [facility] CLINICAL POOL    Comments:   rivaroxaban d/c'd in favor of warfarin 20 r/t ESRD; prev on warfarin w/ labile INRs per Dr. Husain      Anticoagulation Care Providers     Provider Role Specialty Phone number    Maria Luz Husain MD Referring Cardiology [phone]          Clinic Interview:  Patient Findings     Positives:   Change in medications, Change in diet/appetite    Negatives:   Signs/symptoms of thrombosis, Signs/symptoms of bleeding,   Laboratory test error suspected, Change in health, Change in alcohol use,   Change in activity, Upcoming invasive procedure, Emergency department   visit, Upcoming dental procedure, Missed doses, Extra doses, Hospital   admission, Bruising, Other complaints    Comments:   Denies missed doses. Denies changes in diet. Started calcium   acetate yesterday. Started new drink supplement this am - Nepro (20 mcg   vit k).      Clinical Outcomes     Negatives:   Major bleeding event, Thromboembolic event,   Anticoagulation-related hospital admission, Anticoagulation-related ED   visit, Anticoagulation-related fatality    Comments:   Denies missed doses. Denies changes in diet. Started calcium   acetate yesterday. Started new drink supplement this am - Nepro (20 mcg   vit k).        INR History:  Anticoagulation Monitoring 3/16/2020 3/30/2020 2020   INR 1.6 2.5 1.10   INR Date 3/16/2020 3/30/2020 2020   INR Goal 2.0-2.5 2.0-2.5  2.0-2.5   Trend Down Same Up   Last Week Total 22.5 mg 30 mg 30 mg   Next Week Total 30 mg 30 mg 35 mg   Sun 5 mg 5 mg 5 mg   Mon 2.5 mg 2.5 mg 5 mg   Tue 5 mg 5 mg 5 mg   Wed 5 mg 5 mg 5 mg   Thu 5 mg 5 mg 5 mg   Fri 2.5 mg 2.5 mg 5 mg   Sat 5 mg 5 mg 5 mg   Visit Report - - -   Some recent data might be hidden       Plan:  1. INR is Subtherapeutic today- see above in Anticoagulation Summary.   Will instruct Veronica Henao to Increase their warfarin regimen- see above in Anticoagulation Summary.  2. Follow up in 1 week w/ [facility] INR  3. They have been instructed to call if any changes in medications, doses, concerns, etc. Patient expresses understanding and has no further questions at this time.    Jam Goldsmith, [facility]

## 2020-04-27 NOTE — TELEPHONE ENCOUNTER
I spoke with the patients daughter and she said the Dialysis center is telling them That eliquis would be ok. The patient has been joint pin from the warfarin according to her daughter. Her daughter would like to discuss this with you if possible. She can be reached at [phone].

## 2020-04-27 NOTE — TELEPHONE ENCOUNTER
[id number]  I contacted patient's daughter and reviewed the information provided by Dr. Husain with her.  Patient can start taking Eliquis 2.5 mg bid, once her INR is less than 2.0.  INR is currently being checked at the dialysis center.  The Eliquis is not to start till INR is below 2.0.  Daughter verified understanding and will  prescription from pharmacy.  DAVID García RN

## 2020-04-27 NOTE — TELEPHONE ENCOUNTER
Please let the patient know that I spoke with Dr. Con Frias who is the nephrologist who is taking care of her dialysis.  He is okay with her being on Eliquis 2.5 mg twice a day and feels it is probably a better option with less complications than warfarin.  I sent in a prescription for this dose of medication.  She should stop her warfarin and continue to get her INR checked at the dialysis center and when her INR is less than 2, start the Eliquis.    I spoke with the daughter this morning and said I was going to reach out to her nephrologist to discuss this.  He can just let her know that he and I reached an agreement to try the Eliquis.

## 2020-05-01 NOTE — TELEPHONE ENCOUNTER
40 mg daily is max dosing of omeprazole. This was sent to her [facility] pharmacy and I will also send gabapentin. Please schedule a video visit on Monday to discuss symptoms.   MICHELLE

## 2020-05-01 NOTE — TELEPHONE ENCOUNTER
Okay  Patient would also like a 10 day supply of omeprazole sent to local pharmacy as she is completley out.

## 2020-05-01 NOTE — TELEPHONE ENCOUNTER
Patient's daughter is calling to request a call from Angie to discuss medications.    Daughter states that [facility] can call the patient.    Patient call back [phone]      Brayan kumar

## 2020-05-01 NOTE — PROGRESS NOTES
Anticoagulation Clinic Progress Note    Anticoagulation Summary  As of 2020    INR goal:   2.0-2.5   TTR:   20.2 % (3.3 mo)   INR used for dosin.50! (2020)   Warfarin maintenance plan:   5 mg every day   Weekly warfarin total:   35 mg   No change documented:   Jam Goldsmith RPH   Plan last modified:   Jam Goldsmith RPH (2020)   Next INR check:   2020   Target end date:       Indications    Atrial flutter  paroxysmal (CMS/HCC) [I48.92]             Anticoagulation Episode Summary     INR check location:       Preferred lab:       Send INR reminders to:    [facility] CLINICAL POOL    Comments:   rivaroxaban d/c'd in favor of warfarin 20 r/t ESRD; prev on warfarin w/ labile INRs per Dr. Husain      Anticoagulation Care Providers     Provider Role Specialty Phone number    Maria Luz Husain MD Referring Cardiology [phone]          Clinic Interview:  Patient Findings     Negatives:   Signs/symptoms of thrombosis, Signs/symptoms of bleeding,   Laboratory test error suspected, Change in health, Change in alcohol use,   Change in activity, Upcoming invasive procedure, Emergency department   visit, Upcoming dental procedure, Missed doses, Extra doses, Change in   medications, Change in diet/appetite, Hospital admission, Bruising, Other   complaints      Clinical Outcomes     Negatives:   Major bleeding event, Thromboembolic event,   Anticoagulation-related hospital admission, Anticoagulation-related ED   visit, Anticoagulation-related fatality        INR History:  Anticoagulation Monitoring 3/30/2020 2020 2020   INR 2.5 1.10 1.50   INR Date 3/30/2020 2020 2020   INR Goal 2.0-2.5 2.0-2.5 2.0-2.5   Trend Same Up Same   Last Week Total 30 mg 30 mg 35 mg   Next Week Total 30 mg 35 mg 35 mg   Sun 5 mg 5 mg 5 mg   Mon 2.5 mg 5 mg 5 mg   Tue 5 mg 5 mg 5 mg   Wed 5 mg 5 mg 5 mg   Thu 5 mg 5 mg 5 mg   Fri 2.5 mg 5 mg 5 mg   Sat 5 mg 5 mg 5 mg   Visit Report - - -   Some recent data  might be hidden       Plan:  1. INR is Subtherapeutic today- see above in Anticoagulation Summary. Previously was 6.7 (3/13/20) on 5 mg daily, so will not increase dose yet.   Will instruct Veronica Henao to Continue their warfarin regimen- see above in Anticoagulation Summary.  2. Follow up in 1 week w/ [facility] INR  3. They have been instructed to call if any changes in medications, doses, concerns, etc. Patient expresses understanding and has no further questions at this time.    Jam Goldsmith, [facility]

## 2020-05-04 NOTE — PROGRESS NOTES
Chief Complaint   Patient presents with   • Heartburn       History of Present Illness   Veronica Henao is a 85 y.o. female presents for acute needs by telemedicine in place of office visit due to covid-19 pandemic. Patient consents for visit by telemedicine today., Patient has longstanding history of ZAFAR. She reports that she is having increasing heartburn. She is taking omeprazole once daily for this. She notes that this is no longer working. She reports not having much taste at this time. She is now a dialysis patient given ESRD. She has tried TUMs without any real benefit. Last EGD 2017. She had gastric reduction surgery in the 80s she was noted to have gastritis and has been on omeprazole since that time.     .   The following portions of the patient's history were reviewed and updated as appropriate: allergies, current medications, past family history, past medical history, past social history, past surgical history and problem list.  Current Outpatient Medications on File Prior to Visit   Medication Sig Dispense Refill   • acetaminophen (TYLENOL) 325 MG tablet Take 2 tablets by mouth Every 6 (Six) Hours As Needed for Mild Pain (1-3).  0   • Alcohol Swabs (B-D SINGLE USE SWABS REGULAR) pads Inject 1 each under the skin into the appropriate area as directed Daily. 100 each 3   • apixaban (ELIQUIS) 2.5 MG tablet tablet Take 1 tablet by mouth Every 12 (Twelve) Hours. 180 tablet 3   • benzonatate (TESSALON PERLES) 100 MG capsule Take 1 capsule by mouth 3 (Three) Times a Day As Needed for Cough. 30 capsule 1   • Blood Glucose Monitoring Suppl (TRUE METRIX AIR GLUCOSE METER) device 1 each Daily. 1 each 0   • gabapentin (NEURONTIN) 300 MG capsule Take 1 capsule by mouth 2 (Two) Times a Day. 180 capsule 1   • glucose blood (TRUE METRIX BLOOD GLUCOSE TEST) test strip 1 each by Other route 2 (Two) Times a Day. Use as instructed 100 each 3   • hydrALAZINE (APRESOLINE) 50 MG tablet Take 1 tablet by mouth 3 (Three) Times a  "Day. 90 tablet 0   • HYDROcodone-acetaminophen (NORCO) 5-325 MG per tablet Take 1 tablet by mouth Every 6 (Six) Hours As Needed for Moderate Pain  for up to 10 doses. 10 tablet 0   • Incontinence Supply Disposable (BLADDER CONTROL PADS EX ABSORB) misc      • Lancets 28G misc To check sugar qd. E11.9  each 12   • latanoprost (XALATAN) 0.005 % ophthalmic solution Administer 1 drop to both eyes Every Night.     • levocetirizine (XYZAL) 5 MG tablet Take 5 mg by mouth Daily.     • pramipexole (MIRAPEX) 0.25 MG tablet TAKE 1 TABLET BY MOUTH THREE TIMES DAILY (Patient taking differently: Take 0.25 mg by mouth 3 (Three) Times a Day.) 270 tablet 1   • predniSONE (DELTASONE) 10 MG tablet Take 1 tablet by mouth Daily. 10 tablet 0   • Semaglutide (OZEMPIC) 0.25 or 0.5 MG/DOSE solution pen-injector Inject 0.5 mg under the skin into the appropriate area as directed 1 (One) Time Per Week. (Patient taking differently: Inject 0.5 mg under the skin into the appropriate area as directed 1 (One) Time Per Week. +ON FRIDAYS) 3 pen 4   • Triamcinolone Acetonide (NASACORT) 55 MCG/ACT nasal inhaler 2 sprays into the nostril(s) as directed by provider Daily. 16.5 g 11   • TRUEPLUS LANCETS 28G misc E11.9 DM to check sugar  each 3   • [DISCONTINUED] omeprazole (priLOSEC) 40 MG capsule TAKE 1 CAPSULE BY MOUTH DAILY 90 capsule 1     No current facility-administered medications on file prior to visit.      Review of Systems   Constitutional: Negative.    HENT: Negative.    Eyes: Negative.    Respiratory: Negative.    Cardiovascular: Negative.         NO chest pain   Gastrointestinal: Negative for abdominal pain, diarrhea, nausea and vomiting.        Reflux daily. Sometimes in day sometimes at night. Can be before or after eating. Notes \"up in my throat\".    Musculoskeletal: Negative.    Skin: Negative.    Neurological: Negative.    Hematological: Negative.    Psychiatric/Behavioral: Negative.        Objective   Physical Exam "   Constitutional: She is oriented to person, place, and time.   No distress. Conversing easily and well   Pulmonary/Chest: Effort normal.   Neurological: She is alert and oriented to person, place, and time.   Psychiatric: She has a normal mood and affect. Her behavior is normal. Judgment and thought content normal.        There were no vitals taken for this visit.    Assessment/Plan   Diagnoses and all orders for this visit:    Chronic gastritis without bleeding, unspecified gastritis type  -     Ambulatory Referral to Gastroenterology    Gastroesophageal reflux disease without esophagitis  -     Ambulatory Referral to Gastroenterology    Other orders  -     pantoprazole (Protonix) 40 MG EC tablet; Take 1 tablet by mouth Every Night.  -     famotidine (Pepcid) 40 MG tablet; Take 1 tablet by mouth Daily.      Patient with ZAFAR/ gastritis. Will d/c omeprazole and switch to pantoprazole qhs. Will take famotidine in the morning. She will f/u w/ her GI md. She does have esrd so may need to explore alternative med in the future. D/w patient lifestyle modifications in terms of diet etc for this. She will f/u here in June as scheduled. Phone visit total 27 min.

## 2020-05-08 NOTE — TELEPHONE ENCOUNTER
Spoke with daughter Nesha today.  Patient has continued on warfarin with INR 1.6 today.  Daughter and patient thought INR had to be up to 2 in order to switch to Eliquis rather than <2.  Per telephone message on 4/24/20 Dr. Husain ok'd change to Eliquis when INR <2.      They already picked up Eliquis and will STOP warfarin today.  Eliquis to start tonight.  [facility] will no longer need to follow this patient.

## 2020-05-08 NOTE — PROGRESS NOTES
This visit is for documentation purposes only: Patient is now going to start Eliquis.  Per telephone message on 4/24/20 she can start Eliquis when INR <2 and INR today is 1.6. Spoke with daughter Nesha today. No longer following in the [facility]. It has been a pleasure being part of her care.

## 2020-05-18 NOTE — TELEPHONE ENCOUNTER
Patient should follow up with her sleep medicine specialist dr. Reanna castro to discuss alternate therapy for restless legs. Will need to ensure her sleep apnea is being maximally treated w/ cpap.. A referral is in her chart. She can bring records of cpap usage with her as well.

## 2020-05-18 NOTE — TELEPHONE ENCOUNTER
PATIENT'S DAUGHTER CICI COSTA (POA) CALLED STATING THAT   pramipexole (MIRAPEX) 0.25 MG tablet HAS NOT BEEN HELPING WITH RESTLESS LEGS, WOULD LIKE TO KNOW IF SOMETHING ELSE CAN BE PRESCRIBED OR INCREASE THE DOSAGE.  PLEASE ADVISE    PREFERRED PHARMACY:  [id number] - [place], [place] - [address] [address] AT [facility] [address] [place] [address] - [phone]  - [phone] JUAN     #: [phone]

## 2020-06-02 NOTE — TELEPHONE ENCOUNTER
RODRIGUEZ....Sent over sleep med referral and office contacted me and said patient wanted to wait until after she sees Dr. Price on 6/8/20 to schedule appt. Wants to discuss with Dr. Price prior.

## 2020-06-08 NOTE — PROGRESS NOTES
Chief Complaint   Patient presents with   • Med Check   • Hypertension   • Diabetes   • Acute Renal Failure   • vision impairment       History of Present Illness   Veronica Henao is a 85 y.o. female presents for follow up evaluation w/ several acute needs. Patient has ESRD and is dialysis dependent. She has had weight loss both intentional and nonintentional. Reports that she has a low appetitie. She has dm and has been sporadically taking ozempic for this. Patient reports that she had some upset stomach but this has improved w/ daily pepcid. She has macular degen w/ severe visual impairment. She has discharge,redness, and discomfort in the left eye now. She has decreased strength. She has B peripheral neuropathy and this is well managed w/ bid gabapentin. She is taking hydroxyzine am and pm for itching.     The following portions of the patient's history were reviewed and updated as appropriate: allergies, current medications, past family history, past medical history, past social history, past surgical history and problem list.  Current Outpatient Medications on File Prior to Visit   Medication Sig Dispense Refill   • acetaminophen (TYLENOL) 325 MG tablet Take 2 tablets by mouth Every 6 (Six) Hours As Needed for Mild Pain (1-3).  0   • Alcohol Swabs (B-D SINGLE USE SWABS REGULAR) pads Inject 1 each under the skin into the appropriate area as directed Daily. 100 each 3   • apixaban (ELIQUIS) 2.5 MG tablet tablet Take 1 tablet by mouth Every 12 (Twelve) Hours. 180 tablet 3   • Blood Glucose Monitoring Suppl (TRUE METRIX AIR GLUCOSE METER) device 1 each Daily. 1 each 0   • calcium acetate (PHOSLO) 667 MG capsule Take 1,334 mg by mouth 3 (Three) Times a Day With Meals.     • famotidine (Pepcid) 40 MG tablet Take 1 tablet by mouth Daily. 90 tablet 1   • gabapentin (NEURONTIN) 300 MG capsule Take 1 capsule by mouth 2 (Two) Times a Day. 180 capsule 1   • glucose blood (TRUE METRIX BLOOD GLUCOSE TEST) test strip 1 each by  Other route 2 (Two) Times a Day. Use as instructed 100 each 3   • Lancets 28G misc To check sugar qd. E11.9  each 12   • pramipexole (MIRAPEX) 0.25 MG tablet TAKE 1 TABLET THREE TIMES DAILY 180 tablet 1   • sertraline (ZOLOFT) 50 MG tablet Take 50 mg by mouth Daily.     • Triamcinolone Acetonide (NASACORT) 55 MCG/ACT nasal inhaler 2 sprays into the nostril(s) as directed by provider Daily. 16.5 g 11   • TRUEPLUS LANCETS 28G misc E11.9 DM to check sugar  each 3   • [DISCONTINUED] hydrALAZINE (APRESOLINE) 50 MG tablet Take 1 tablet by mouth 3 (Three) Times a Day. 90 tablet 0   • [DISCONTINUED] hydrOXYzine (ATARAX) 25 MG tablet Take 25 mg by mouth 3 (Three) Times a Day As Needed for Itching.     • [DISCONTINUED] Semaglutide (OZEMPIC) 0.25 or 0.5 MG/DOSE solution pen-injector Inject 0.5 mg under the skin into the appropriate area as directed 1 (One) Time Per Week. (Patient taking differently: Inject 0.5 mg under the skin into the appropriate area as directed 1 (One) Time Per Week. +ON FRIDAYS) 3 pen 4   • [DISCONTINUED] benzonatate (TESSALON PERLES) 100 MG capsule Take 1 capsule by mouth 3 (Three) Times a Day As Needed for Cough. 30 capsule 1   • [DISCONTINUED] HYDROcodone-acetaminophen (NORCO) 5-325 MG per tablet Take 1 tablet by mouth Every 6 (Six) Hours As Needed for Moderate Pain  for up to 10 doses. 10 tablet 0   • [DISCONTINUED] Incontinence Supply Disposable (BLADDER CONTROL PADS EX ABSORB) misc      • [DISCONTINUED] latanoprost (XALATAN) 0.005 % ophthalmic solution Administer 1 drop to both eyes Every Night.     • [DISCONTINUED] levocetirizine (XYZAL) 5 MG tablet Take 5 mg by mouth Daily.     • [DISCONTINUED] pantoprazole (Protonix) 40 MG EC tablet Take 1 tablet by mouth Every Night. 90 tablet 2   • [DISCONTINUED] predniSONE (DELTASONE) 10 MG tablet Take 1 tablet by mouth Daily. 10 tablet 0     No current facility-administered medications on file prior to visit.      Review of Systems  "  Constitutional: Positive for fatigue.   HENT: Negative.    Eyes: Positive for pain, discharge and redness.   Respiratory: Negative.    Cardiovascular: Negative.    Gastrointestinal: Positive for nausea.   Endocrine: Negative.    Genitourinary: Negative.    Musculoskeletal: Positive for gait problem.   Skin: Negative.    Allergic/Immunologic: Negative.    Hematological: Bruises/bleeds easily.   Psychiatric/Behavioral: The patient is not nervous/anxious.         Decreased mood related to dialysis and vision impairment. Improving now on zoloft.        Objective   Physical Exam   Constitutional: She is oriented to person, place, and time.   Thin elderly wf not acutely distressed   HENT:   Head: Normocephalic and atraumatic.   Right Ear: External ear normal.   Left Ear: External ear normal.   Mouth/Throat: Oropharynx is clear and moist.   Eyes: Pupils are equal, round, and reactive to light.   Left eye w/ conjunctival erythema and drainage   Neck: Normal range of motion. Neck supple.   Cardiovascular: Normal rate, regular rhythm and normal heart sounds.   Pulmonary/Chest: Effort normal and breath sounds normal.   Abdominal: Soft. Bowel sounds are normal.   Musculoskeletal: Normal range of motion.   Neurological: She is alert and oriented to person, place, and time.   Skin: Skin is warm and dry.   Psychiatric: She has a normal mood and affect. Her behavior is normal. Judgment and thought content normal.   Nursing note and vitals reviewed.       /56   Pulse 83   Temp 97.3 °F (36.3 °C) (Temporal)   Resp 16   Ht 154.9 cm (61\")   Wt 59.9 kg (132 lb)   BMI 24.94 kg/m²     Assessment/Plan   Diagnoses and all orders for this visit:    Type 2 diabetes mellitus with chronic kidney disease, with long-term current use of insulin, unspecified CKD stage (CMS/[facility])    Essential hypertension    Anemia in stage 4 chronic kidney disease (CMS/HCC)    Chronic kidney disease, stage IV (severe) (CMS/[facility])    Other orders  -     " Discontinue: hydrOXYzine (ATARAX) 25 MG tablet; Take 25 mg by mouth 3 (Three) Times a Day As Needed for Itching.  -     calcium acetate (PHOSLO) 667 MG capsule; Take 1,334 mg by mouth 3 (Three) Times a Day With Meals.  -     sertraline (ZOLOFT) 50 MG tablet; Take 50 mg by mouth Daily.  -     hydrALAZINE (APRESOLINE) 25 MG tablet; Take 1 tablet by mouth 2 (Two) Times a Day.  -     ofloxacin (Ocuflox) 0.3 % ophthalmic solution; Administer 2 drops into the left eye 4 (Four) Times a Day.    patient w/ dm. She will stop ozempic and monitor glucose levels. Suspect this is affecting her appetite. bp is low and will adjust hydroxyzine 25mg bid. She will continue zoloft for mood. Will start ocuflox for ocular conjunctivitis. She will f/u w/ her ophthalmologist asap. She will f/u w/ her nephrologist routinely. F/u here in 4 months or prn.

## 2020-07-06 PROBLEM — I50.32 CHRONIC DIASTOLIC CONGESTIVE HEART FAILURE (HCC): Status: ACTIVE | Noted: 2018-10-31

## 2020-07-06 PROBLEM — Z79.01 CHRONIC ANTICOAGULATION: Status: ACTIVE | Noted: 2020-01-01

## 2020-07-06 PROBLEM — I48.20 CHRONIC ATRIAL FIBRILLATION (HCC): Status: ACTIVE | Noted: 2020-01-01

## 2020-07-06 NOTE — PROGRESS NOTES
Date of Office Visit: 2020  Encounter Provider: Leydi Pack, HANS, APRN  Place of Service: [facility] CARDIOLOGY  Patient Name: Veronica Henao  :1935        Subjective:     Chief Complaint:  Follow-up, chronic diastolic heart failure, atrial fibrillation, chronic anticoagulation therapy.       History of Present Illness:  Veronica Henao is a 85 y.o. female patient of Dr. Husain.  I am doing a telehealth visit with patient today and I have reviewed her records.     Patient has a history of atrial fibrillation on Xarelto, diastolic heart failure, AV node ablation status post pacemaker, diabetes, hypertension, hyperlipidemia, obstructive sleep apnea on CPAP, chronic kidney disease followed by Dr. Honeycutt, history of mitral regurgitation status post mitral valve repair, history of tricuspid regurgitation status post tricuspid repair, severe pulmonary hypertension followed by Dr. Ferrer.      Patient has been followed by Dr. Husain for many years.  In  patient was seen in the ER and diagnosed with atrial fibrillation with RVR.  She had a normal echocardiogram showing normal LV function and no significant valvular heart disease.  She transferred care to Dr. Husain 2014.  She had recurrence of atrial fibrillation 2015 associated with diastolic heart failure.  In 2016 she was complaining of chest pain and shortness of breath and echocardiogram showed EF of 51%, elevated left atrial pressure, severe left atrial enlargement, moderate to severe mitral regurgitation, moderate to severe tricuspid regurgitation.  She had a nuclear stress test 2016 that showed no evidence of ischemia.  Transesophageal echocardiogram 10/2016 showed normal left ventricular systolic function, severe left atrial enlargement, mild to moderate mitral regurgitation with moderate to severe tricuspid regurgitation.  She had heart catheterization 2016 showing no significant coronary artery disease.  2016  she had a mitral valve repair with a 23 mm ATF band posterior angioplasty and P2 portal repair.  Tricuspid valve was repaired with a 26 mm ring and plication of the anterior and septal leaflet commissure.  She also underwent right and left Maze procedure left atrial appendage ligation. 3/2017 patient was found to be in rapid atrial flutter.  She was cardioverted but went back in atrial flutter the next day.  3/28/17 Dr. Ward performed atrial flutter ablation.  She had an echocardiogram 5/2017 showing EF of 70%, severe left atrial enlargement, mild mitral stenosis from valve repair, but no regurgitation.  There was also mild tricuspid regurgitation with moderate pulmonary hypertension.  She went back in atrial flutter 6/2017 and was cardioverted back to sinus rhythm but again went back into atrial flutter and Dr. Ward performed an AV node ablation with pacemaker, which was performed on 7/5/17.  Pacemaker was placed with ventricular lead at the His bundle.  Patient was readmitted 7/25/17 with heart failure.  She diuresed well.  Pacemaker interrogation 8/2017 showed high thresholds in the his lead.  Dr. Ward made some adjustments and said that she may feel some muscular pacing and had right ventricular lead for backup.  Patient was hospitalized August 2018 with shortness of breath.  She had a normal lower extremity venous Doppler at that time.  She had a right heart catheterization done which showed PA pressure of 59/16 with a ware pressure of 14 and large V waves.  Pulmonary pressures did not change with adenosine.  Transthoracic echocardiogram showed EF of 65%, moderate left atrial enlargement, mitral annuloplasty ring with mildly increased gradient of 7 mmHg with trace regurgitation.  Tricuspid valve also had annuloplasty ring with mild to moderate tricuspid regurgitation and RVSP of 63 mmHg. Echo 1/7/2020 showed normal left ventricular systolic function with EF of 57%, probable severe diastolic  dysfunction with significantly elevated left atrial filling pressures, surgically repaired tricuspid and mitral valves, elevated RVSP of greater than 50 mmHg.      Patient has called into the office today for a telehealth phone follow-up appointment.  Patient reports she is feeling well since last visit.  She is getting dialysis three days a week.  She gets a little fatigue after dialysis but otherwise feels her every levels are good and no issues with fatigue.  Stays active around the house without exertional symptoms or concerns.  Denies chest pain/ discomfort, shortness of breath, shortness of breath with exertion, palpitations, edema, dizziness, syncope, falls, abnormal bleeding. Reports BP and HR remain well controlled at dialysis. Denies issues or concerns at this time.         Past Medical History:   Diagnosis Date   • Acute bronchitis    • Acute renal insufficiency    • Allergic rhinitis    • Anemia in chronic kidney disease    • Arrhythmia    • Atrial fibrillation (CMS/HCC)     chronic   • Brachycephaly    • Breast pain, right    • Chest pain    • CHF (congestive heart failure) (CMS/HCC)    • Chronic combined systolic and diastolic CHF (congestive heart failure) (CMS/HCC)    • Chronic renal insufficiency    • CKD (chronic kidney disease), stage IV (CMS/HCC)    • Cough    • Depression    • Diabetes mellitus (CMS/HCC)     TYPE 2   • Dialysis patient (CMS/HCC)    • Diverticulosis    • ORTIZ (dyspnea on exertion)    • Dyspnea    • Esophageal reflux    • Essential hypertension    • Fatigue    • GERD (gastroesophageal reflux disease)    • Gout    • Head injury    • Headache    • Hoarseness    • Hypercalcemia    • Hyperlipidemia    • Hypertension    • Influenza A (H1N1)    • Iron deficiency anemia    • Itching    • Leg swelling    • Lightheadedness    • Macular degeneration    • Malaise and fatigue    • Mitral valve regurgitation     moderate to severe   • Nontoxic multinodular goiter     RIGHT 2.0 CM  LEFT  2.5 CM    • Obesity    • JOSELUIS on CPAP    • Osteoarthritis    • PAF (paroxysmal atrial fibrillation) (CMS/HCC)    • Palpitations    • Paresthesias    • Proteinuria    • Pulmonary hypertension (CMS/HCC)    • Pulmonary nodule    • Pulmonic valve regurgitation     mild   • Sebaceous cyst    • SOBOE (shortness of breath on exertion)    • Solitary thyroid nodule    • Subclinical hypothyroidism    • Tachycardia    • TR (tricuspid regurgitation)     mild to moderate   • Type 2 diabetes mellitus (CMS/HCC)    • 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)    • UTI (urinary tract infection)      Past Surgical History:   Procedure Laterality Date   • APPENDECTOMY     • ARTERIOVENOUS FISTULA/SHUNT SURGERY Left 2/5/2020    Procedure: LEFT BRACHIAL ARTERY AXILLARY VEIN GORTEX SHUNT;  Surgeon: Kaveh Mcdonnell MD;  Location: [facility] OR;  Service: Vascular;  Laterality: Left;   • CARDIAC CATHETERIZATION  1986    procedure outcome:    • CARDIAC CATHETERIZATION N/A 11/21/2016    Procedure: Coronary angiography;  Surgeon: Marcin العراقي MD;  Location: [facility] CATH INVASIVE LOCATION;  Service:    • CARDIAC CATHETERIZATION N/A 11/21/2016    Procedure: Left Heart Cath;  Surgeon: Marcin العراقي MD;  Location: [facility] CATH INVASIVE LOCATION;  Service:    • CARDIAC CATHETERIZATION N/A 8/29/2018    Procedure: Right Heart Cath with adenosine challenge if wedge pressure is normal.;  Surgeon: Paulo Decker MD;  Location: [facility] CATH INVASIVE LOCATION;  Service: Cardiovascular   • CARDIAC ELECTROPHYSIOLOGY PROCEDURE N/A 3/28/2017    Procedure: Ablation atrial flutter;  Surgeon: Rodríguez Ward MD;  Location: [facility] CATH INVASIVE LOCATION;  Service:    • CARDIAC ELECTROPHYSIOLOGY PROCEDURE N/A 7/3/2017    Procedure: AV node ablation;  Surgeon: Rodríguez Ward MD;  Location: [facility] CATH INVASIVE LOCATION;  Service:    • CARDIAC ELECTROPHYSIOLOGY PROCEDURE N/A 7/3/2017    Procedure: Pacemaker DC new  [employer] and  will need 3830 lead-I did text Rachael Meneses ;  Surgeon: Rodríguez Ward MD;  Location: [facility] CATH INVASIVE LOCATION;  Service:    • CARDIAC SURGERY     • CHOLECYSTECTOMY     • CLAVICLE SURGERY Left    • COLONOSCOPY N/A 11/14/2017    Procedure: COLONOSCOPY INTO CECUM/ TERMINAL ILEUM WITH POLYPECTOMY  X 8 AND CLIP X 2;  Surgeon: Jacy Browning MD;  Location: [facility] ENDOSCOPY;  Service:    • ENDOSCOPY N/A 11/14/2017    Procedure: ESOPHAGOGASTRODUODENOSCOPY WITH BIOPSIES;  Surgeon: Jacy Browning MD;  Location: [facility] ENDOSCOPY;  Service:    • GASTRIC RESTRICTION SURGERY     • HYSTERECTOMY     • INSERTION HEMODIALYSIS CATHETER N/A 1/10/2020    Procedure: TUNNEL DIALYSIS CATHETER;  Surgeon: Carlos Manuel Jernigan MD;  Location: [facility] MAIN OR;  Service: Vascular   • JOINT REPLACEMENT     • LUMBAR DECOMPRESSION      L4-5   • MITRAL VALVE REPAIR/REPLACEMENT N/A 12/14/2016    Procedure: INTRAOPERATIVE KATELYN, MIDLINE STERNOTOMY, MITRAL VALVE REPAIR, TRICUSPID VALVE REPAIR, MAZE PROCEDURE;  Surgeon: Young Vital MD;  Location: [facility] MAIN OR;  Service:    • SHOULDER SURGERY Left     AFTER SURGERY FOR FRACTURED CLAVICLE   • TONSILLECTOMY     • TOTAL KNEE ARTHROPLASTY      bilateral     Outpatient Medications Prior to Visit   Medication Sig Dispense Refill   • acetaminophen (TYLENOL) 325 MG tablet Take 2 tablets by mouth Every 6 (Six) Hours As Needed for Mild Pain (1-3).  0   • Alcohol Swabs (B-D SINGLE USE SWABS REGULAR) pads Inject 1 each under the skin into the appropriate area as directed Daily. 100 each 3   • apixaban (ELIQUIS) 2.5 MG tablet tablet Take 1 tablet by mouth Every 12 (Twelve) Hours. 180 tablet 3   • Blood Glucose Monitoring Suppl (TRUE METRIX AIR GLUCOSE METER) device 1 each Daily. 1 each 0   • famotidine (Pepcid) 40 MG tablet Take 1 tablet by mouth Daily. 90 tablet 1   • gabapentin (NEURONTIN) 300 MG capsule Take 1 capsule by mouth 2 (Two) Times a Day. 180 capsule 1   • glucose blood (TRUE METRIX BLOOD  GLUCOSE TEST) test strip 1 each by Other route 2 (Two) Times a Day. Use as instructed 100 each 3   • hydrALAZINE (APRESOLINE) 25 MG tablet Take 1 tablet by mouth 2 (Two) Times a Day. 180 tablet 2   • Lancets 28G misc To check sugar qd. E11.9  each 12   • latanoprost (XALATAN) 0.005 % ophthalmic solution      • ofloxacin (Ocuflox) 0.3 % ophthalmic solution Administer 2 drops into the left eye 4 (Four) Times a Day. 5 mL 1   • pramipexole (MIRAPEX) 0.25 MG tablet TAKE 1 TABLET THREE TIMES DAILY 180 tablet 1   • sertraline (ZOLOFT) 50 MG tablet Take 50 mg by mouth Daily.     • Triamcinolone Acetonide (NASACORT) 55 MCG/ACT nasal inhaler 2 sprays into the nostril(s) as directed by provider Daily. 16.5 g 11   • TRUEPLUS LANCETS 28G misc E11.9 DM to check sugar  each 3   • calcium acetate (PHOSLO) 667 MG capsule Take 1,334 mg by mouth 3 (Three) Times a Day With Meals.       No facility-administered medications prior to visit.        Allergies as of 07/06/2020 - Reviewed 07/06/2020   Allergen Reaction Noted   • Cephalexin Swelling 02/06/2016   • Meperidine Swelling 02/06/2016   • Penicillins Swelling 02/06/2016     Social History     Socioeconomic History   • Marital status:      Spouse name: Not on file   • Number of children: Not on file   • Years of education: Not on file   • Highest education level: Not on file   Occupational History     Employer: RETIRED   Tobacco Use   • Smoking status: Former Smoker     Packs/day: 1.50     Years: 20.00     Pack years: 30.00     Start date: 6/14/1970   • Smokeless tobacco: Never Used   • Tobacco comment: D/C 1970 SMOKING 1 1/2 PPD FOR 13 YRS BEFORE QUITTING   Substance and Sexual Activity   • Alcohol use: No     Comment: caffeine use   • Drug use: No   • Sexual activity: Defer     Family History   Problem Relation Age of Onset   • Emphysema Mother    • Heart disease Father    • Lung cancer Father    • Prostate cancer Father    • Asthma Sister    • Lung cancer  "Daughter    • Colon cancer Other    • No Known Problems Maternal Grandmother    • No Known Problems Maternal Grandfather    • Arthritis Paternal Grandmother    • No Known Problems Paternal Grandfather    • Malig Hyperthermia Neg Hx        Review of Systems   Constitution:        Tired after dialysis but feels great on non-dialysis days   Eyes:        Poor vision, chronic, follows with eye doctor, does not drive.    Cardiovascular: Negative for chest pain, dyspnea on exertion, leg swelling (Resolved with dialysis ), palpitations and syncope.   Respiratory: Negative for shortness of breath.    Hematologic/Lymphatic: Negative for bleeding problem.   Musculoskeletal: Negative for falls.   Gastrointestinal: Negative for melena.   Genitourinary: Negative for hematuria.   Neurological: Negative for dizziness.   Psychiatric/Behavioral: Negative for altered mental status.          Objective:     Vitals:    07/06/20 0936   BP: 116/65   Weight: 59.4 kg (131 lb)   Height: 157.5 cm (62\")     Body mass index is 23.96 kg/m².      PHYSICAL EXAM:  Normal voice.  Normal affect.  Does not sound short of breath.         Assessment:       Diagnosis Plan   1. Essential hypertension     2. Chronic diastolic congestive heart failure (CMS/HCC)     3. Chronic atrial fibrillation (CMS/HCC)           Plan:     1. Chronic diastolic heart failure: on dialysis with fluid status managed through dialysis.  Denies any signs/ symptoms of heart failure.   2. Atrial fibrillation:  Denies palpitations.  Feels much better on eliquis.  Denies falls or abnormal bleeding.   3. Hypertension: reports she is told BP is well controlled at dialysis.    4. Pacemaker: getting check today at noon.     5. History of valve repairs: mild regurgitation noted on last echo.   6. Pulmonary hypertension: managed by Dr. Ferrer.  Did not tolerate Letairis.  Denies SOA symptoms.   7. Hyperlipidemia: PCP managing, per patient.   8. Diabetes: PCP managing.   9. CKD: on dilaysis " T, Th, Sa. Follows with Dr. Frias.      Patient to follow-up with Dr. Husain in 6 months or sooner if needed for any new, recurrent, or worsening symptoms or other problems/ concerns.       This patient has consented to a telehealth visit via phone. The visit was scheduled as a telehealth phone visit to comply with patient safety concerns in accordance with [facility] recommendations.  All vitals recorded within this visit are reported by the patient.  I spent 15 minutes in total including but not limited to the 9 minutes spent in direct conversation with this patient.            Your medication list           Accurate as of July 6, 2020 12:56 PM. If you have any questions, ask your nurse or doctor.               CONTINUE taking these medications      Instructions Last Dose Given Next Dose Due   acetaminophen 325 MG tablet  Commonly known as:  TYLENOL      Take 2 tablets by mouth Every 6 (Six) Hours As Needed for Mild Pain (1-3).       apixaban 2.5 MG tablet tablet  Commonly known as:  ELIQUIS      Take 1 tablet by mouth Every 12 (Twelve) Hours.       B-D SINGLE USE SWABS REGULAR pads      Inject 1 each under the skin into the appropriate area as directed Daily.       famotidine 40 MG tablet  Commonly known as:  Pepcid      Take 1 tablet by mouth Daily.       gabapentin 300 MG capsule  Commonly known as:  NEURONTIN      Take 1 capsule by mouth 2 (Two) Times a Day.       glucose blood test strip  Commonly known as:  True Metrix Blood Glucose Test      1 each by Other route 2 (Two) Times a Day. Use as instructed       hydrALAZINE 25 MG tablet  Commonly known as:  APRESOLINE      Take 1 tablet by mouth 2 (Two) Times a Day.       Lancets 28G misc      To check sugar qd. E11.9 DM       TRUEplus Lancets 28G misc      E11.9 DM to check sugar QD       latanoprost 0.005 % ophthalmic solution  Commonly known as:  XALATAN           ofloxacin 0.3 % ophthalmic solution  Commonly known as:  Ocuflox      Administer 2 drops into the left  eye 4 (Four) Times a Day.       pramipexole 0.25 MG tablet  Commonly known as:  MIRAPEX      TAKE 1 TABLET THREE TIMES DAILY       sertraline 50 MG tablet  Commonly known as:  ZOLOFT      Take 50 mg by mouth Daily.       Triamcinolone Acetonide 55 MCG/ACT nasal inhaler  Commonly known as:  NASACORT      2 sprays into the nostril(s) as directed by provider Daily.       True Metrix Air Glucose Meter device      1 each Daily.          STOP taking these medications    calcium acetate 667 MG capsule  Commonly known as:  PHOSLO  Stopped by:  Leydi Pack DNP, APRN               I did not stop or change the above medications.  Patient's medication list was updated to reflect medications they are currently taking including medication changes made by other providers.        Thanks,    Leydi Pack DNP, APRN  07/06/2020         Dictated utilizing Dragon dictation

## 2020-08-11 NOTE — TELEPHONE ENCOUNTER
Patient needs refill on famotidine pepcid 40mg and zoloft sertraline 50mg needs sent to [facility] at [facility]. Patient will be out tomorrow. Please advise.

## 2020-08-28 PROBLEM — J01.00 ACUTE NON-RECURRENT MAXILLARY SINUSITIS: Status: RESOLVED | Noted: 2017-06-02 | Resolved: 2020-01-01

## 2020-08-28 NOTE — PROGRESS NOTES
Chief Complaint   Patient presents with   • Chronic Kidney Disease   • Hypotension   • Restless Legs Syndrome   • Peripheral Neuropathy       History of Present Illness   Veronica Henao is a 85 y.o. female presents for routine follow up evaluation. She is doing well today. She has atrial fibrillation, renal failure, w/ history of hypertension. She is off of all antihypertensives at this time. She has been noted to be hypotensive at dialysis center. She had shunt placement for dialysis recently. She has peripheral neuropathy that is well managed with gabapentin. She is taking mirapex for restless legs. She has a low appetite at this time but is weight stable.     The following portions of the patient's history were reviewed and updated as appropriate: allergies, current medications, past family history, past medical history, past social history, past surgical history and problem list.  Current Outpatient Medications on File Prior to Visit   Medication Sig Dispense Refill   • acetaminophen (TYLENOL) 325 MG tablet Take 2 tablets by mouth Every 6 (Six) Hours As Needed for Mild Pain (1-3).  0   • Alcohol Swabs (B-D SINGLE USE SWABS REGULAR) pads Inject 1 each under the skin into the appropriate area as directed Daily. 100 each 3   • apixaban (ELIQUIS) 2.5 MG tablet tablet Take 1 tablet by mouth Every 12 (Twelve) Hours. 180 tablet 3   • Blood Glucose Monitoring Suppl (TRUE METRIX AIR GLUCOSE METER) device 1 each Daily. 1 each 0   • famotidine (Pepcid) 40 MG tablet Take 1 tablet by mouth Daily. 90 tablet 3   • gabapentin (NEURONTIN) 300 MG capsule Take 1 capsule by mouth 2 (Two) Times a Day. 180 capsule 1   • glucose blood (TRUE METRIX BLOOD GLUCOSE TEST) test strip 1 each by Other route 2 (Two) Times a Day. Use as instructed 100 each 3   • Lancets 28G misc To check sugar qd. E11.9  each 12   • latanoprost (XALATAN) 0.005 % ophthalmic solution      • ofloxacin (Ocuflox) 0.3 % ophthalmic solution Administer 2 drops into  the left eye 4 (Four) Times a Day. 5 mL 1   • pramipexole (MIRAPEX) 0.25 MG tablet TAKE 1 TABLET THREE TIMES DAILY 180 tablet 1   • sertraline (ZOLOFT) 50 MG tablet Take 1 tablet by mouth Daily. 90 tablet 3   • Triamcinolone Acetonide (NASACORT) 55 MCG/ACT nasal inhaler 2 sprays into the nostril(s) as directed by provider Daily. 16.5 g 11   • TRUEPLUS LANCETS 28G misc E11.9 DM to check sugar  each 3   • [DISCONTINUED] hydrALAZINE (APRESOLINE) 25 MG tablet Take 1 tablet by mouth 2 (Two) Times a Day. 180 tablet 2     No current facility-administered medications on file prior to visit.      Review of Systems   Constitutional: Negative.    HENT: Negative.    Eyes: Negative.    Respiratory: Negative.    Cardiovascular: Negative for leg swelling.   Gastrointestinal: Negative.    Endocrine: Negative.    Genitourinary: Negative.    Musculoskeletal:        Ambulating with a walker   Skin: Negative.    Allergic/Immunologic: Negative.    Neurological: Negative.    Hematological: Bruises/bleeds easily.   Psychiatric/Behavioral: Negative.         Mood improved with zoloft daily.        Objective   Physical Exam   Constitutional: She is oriented to person, place, and time. She appears well-developed and well-nourished.   HENT:   Head: Normocephalic and atraumatic.   Right Ear: External ear normal.   Left Ear: External ear normal.   Nose: Nose normal.   Mouth/Throat: Oropharynx is clear and moist.   Eyes: Pupils are equal, round, and reactive to light. EOM are normal.   Neck: Neck supple.   Cardiovascular: Normal rate, regular rhythm and normal heart sounds.   Pulmonary/Chest: Effort normal and breath sounds normal. No respiratory distress.   Abdominal: Soft.   Musculoskeletal: Normal range of motion.   Neurological: She is alert and oriented to person, place, and time.   Skin: Skin is warm and dry.   Psychiatric: She has a normal mood and affect. Her behavior is normal. Judgment and thought content normal.   Nursing note  "and vitals reviewed.       /64   Pulse 70   Temp 96.8 °F (36 °C) (Temporal)   Resp 18   Ht 157.5 cm (62\")   Wt 64 kg (141 lb)   SpO2 96%   BMI 25.79 kg/m²     Assessment/Plan   Diagnoses and all orders for this visit:    Chronic atrial fibrillation (CMS/[facility])  -     Comprehensive Metabolic Panel    Chronic kidney disease, stage IV (severe) (CMS/[facility])  -     CBC & Differential  -     Comprehensive Metabolic Panel  -     Hemoglobin A1c    Other hyperlipidemia    Type 2 diabetes mellitus with diabetic polyneuropathy, without long-term current use of insulin (CMS/[facility])  -     Comprehensive Metabolic Panel  -     Hemoglobin A1c      Patient w/ ESRD. Doing well today. bp has been low so will remain off of all antihypertensives. Will test listed labs. She will continue a low carbohydrate diet. She will continue dialysis. Encouraged general strengthening. She will f/u routinely.            "

## 2020-10-05 NOTE — ADDENDUM NOTE
Addended by: TELLY LOPEZ on: 6/29/2017 03:57 PM     Modules accepted: Orders    
NAUSEA/CHEST DISCOMFORT/CHEST PAIN/CHEST TIGHTNESS

## 2021-01-01 ENCOUNTER — TELEPHONE (OUTPATIENT)
Dept: INTERNAL MEDICINE | Facility: CLINIC | Age: 86
End: 2021-01-01

## 2021-01-01 ENCOUNTER — APPOINTMENT (OUTPATIENT)
Dept: GENERAL RADIOLOGY | Facility: HOSPITAL | Age: 86
End: 2021-01-01

## 2021-01-01 ENCOUNTER — OFFICE VISIT (OUTPATIENT)
Dept: INTERNAL MEDICINE | Facility: CLINIC | Age: 86
End: 2021-01-01

## 2021-01-01 ENCOUNTER — APPOINTMENT (OUTPATIENT)
Dept: CT IMAGING | Facility: HOSPITAL | Age: 86
End: 2021-01-01

## 2021-01-01 ENCOUNTER — HOSPITAL ENCOUNTER (OUTPATIENT)
Facility: HOSPITAL | Age: 86
Setting detail: OBSERVATION
Discharge: NURSING, REHAB OR LONG-TERM CARE | End: 2021-04-09
Attending: EMERGENCY MEDICINE | Admitting: EMERGENCY MEDICINE

## 2021-01-01 ENCOUNTER — OFFICE VISIT (OUTPATIENT)
Dept: WOUND CARE | Facility: HOSPITAL | Age: 86
End: 2021-01-01

## 2021-01-01 ENCOUNTER — APPOINTMENT (OUTPATIENT)
Dept: CARDIOLOGY | Facility: HOSPITAL | Age: 86
End: 2021-01-01

## 2021-01-01 ENCOUNTER — HOSPITAL ENCOUNTER (INPATIENT)
Facility: HOSPITAL | Age: 86
LOS: 2 days | End: 2021-04-17
Attending: EMERGENCY MEDICINE | Admitting: INTERNAL MEDICINE

## 2021-01-01 ENCOUNTER — HOSPITAL ENCOUNTER (EMERGENCY)
Facility: HOSPITAL | Age: 86
Discharge: HOME | End: 2021-04-03
Attending: EMERGENCY MEDICINE | Admitting: EMERGENCY MEDICINE

## 2021-01-01 ENCOUNTER — DOCUMENTATION (OUTPATIENT)
Dept: INTERNAL MEDICINE | Facility: CLINIC | Age: 86
End: 2021-01-01

## 2021-01-01 ENCOUNTER — HOSPITAL ENCOUNTER (OUTPATIENT)
Dept: CT IMAGING | Facility: HOSPITAL | Age: 86
Discharge: HOME | End: 2021-03-26
Admitting: INTERNAL MEDICINE

## 2021-01-01 VITALS
OXYGEN SATURATION: 97 % | HEART RATE: 86 BPM | WEIGHT: 141 LBS | DIASTOLIC BLOOD PRESSURE: 56 MMHG | TEMPERATURE: 98.6 F | HEIGHT: 62 IN | SYSTOLIC BLOOD PRESSURE: 124 MMHG | BODY MASS INDEX: 25.95 KG/M2

## 2021-01-01 VITALS
WEIGHT: 132.5 LBS | HEART RATE: 69 BPM | RESPIRATION RATE: 16 BRPM | HEIGHT: 63 IN | DIASTOLIC BLOOD PRESSURE: 54 MMHG | BODY MASS INDEX: 23.48 KG/M2 | SYSTOLIC BLOOD PRESSURE: 112 MMHG | OXYGEN SATURATION: 95 % | TEMPERATURE: 96.6 F

## 2021-01-01 VITALS — DIASTOLIC BLOOD PRESSURE: 46 MMHG | SYSTOLIC BLOOD PRESSURE: 100 MMHG | HEART RATE: 74 BPM

## 2021-01-01 VITALS
DIASTOLIC BLOOD PRESSURE: 51 MMHG | SYSTOLIC BLOOD PRESSURE: 103 MMHG | HEART RATE: 72 BPM | RESPIRATION RATE: 18 BRPM | TEMPERATURE: 96.2 F | OXYGEN SATURATION: 98 %

## 2021-01-01 VITALS
HEIGHT: 62 IN | DIASTOLIC BLOOD PRESSURE: 54 MMHG | TEMPERATURE: 97.6 F | SYSTOLIC BLOOD PRESSURE: 98 MMHG | OXYGEN SATURATION: 88 % | BODY MASS INDEX: 23.55 KG/M2 | WEIGHT: 128 LBS

## 2021-01-01 VITALS — SYSTOLIC BLOOD PRESSURE: 86 MMHG | HEART RATE: 72 BPM | DIASTOLIC BLOOD PRESSURE: 40 MMHG

## 2021-01-01 DIAGNOSIS — L89.202 PRESSURE INJURY OF THIGH, STAGE 2, UNSPECIFIED LATERALITY (HCC): ICD-10-CM

## 2021-01-01 DIAGNOSIS — R51.9 CHRONIC NONINTRACTABLE HEADACHE, UNSPECIFIED HEADACHE TYPE: ICD-10-CM

## 2021-01-01 DIAGNOSIS — N18.6 ESRD ON HEMODIALYSIS (HCC): ICD-10-CM

## 2021-01-01 DIAGNOSIS — K21.9 GASTROESOPHAGEAL REFLUX DISEASE, UNSPECIFIED WHETHER ESOPHAGITIS PRESENT: ICD-10-CM

## 2021-01-01 DIAGNOSIS — G89.29 CHRONIC NONINTRACTABLE HEADACHE, UNSPECIFIED HEADACHE TYPE: ICD-10-CM

## 2021-01-01 DIAGNOSIS — N18.4 CHRONIC KIDNEY DISEASE, STAGE IV (SEVERE) (HCC): Primary | ICD-10-CM

## 2021-01-01 DIAGNOSIS — Z79.01 ANTICOAGULATED: ICD-10-CM

## 2021-01-01 DIAGNOSIS — L03.114 CELLULITIS OF LEFT UPPER EXTREMITY: Primary | ICD-10-CM

## 2021-01-01 DIAGNOSIS — R53.1 GENERALIZED WEAKNESS: ICD-10-CM

## 2021-01-01 DIAGNOSIS — I99.8 ISCHEMIA OF DIGITS OF HAND: ICD-10-CM

## 2021-01-01 DIAGNOSIS — S09.90XA TRAUMATIC INJURY OF HEAD, INITIAL ENCOUNTER: ICD-10-CM

## 2021-01-01 DIAGNOSIS — R51.9 NONINTRACTABLE EPISODIC HEADACHE, UNSPECIFIED HEADACHE TYPE: ICD-10-CM

## 2021-01-01 DIAGNOSIS — D72.829 LEUKOCYTOSIS, UNSPECIFIED TYPE: ICD-10-CM

## 2021-01-01 DIAGNOSIS — Z99.2 ESRD ON HEMODIALYSIS (HCC): ICD-10-CM

## 2021-01-01 DIAGNOSIS — N18.6 ESRD (END STAGE RENAL DISEASE) (HCC): Primary | ICD-10-CM

## 2021-01-01 DIAGNOSIS — E11.65 TYPE 2 DIABETES MELLITUS WITH HYPERGLYCEMIA, WITHOUT LONG-TERM CURRENT USE OF INSULIN (HCC): ICD-10-CM

## 2021-01-01 DIAGNOSIS — R29.6 RECURRENT FALLS: Primary | ICD-10-CM

## 2021-01-01 DIAGNOSIS — N18.6 ESRD (END STAGE RENAL DISEASE) ON DIALYSIS (HCC): ICD-10-CM

## 2021-01-01 DIAGNOSIS — L89.159 PRESSURE INJURY OF SKIN OF SACRAL REGION, UNSPECIFIED INJURY STAGE: Primary | ICD-10-CM

## 2021-01-01 DIAGNOSIS — K63.5 POLYP OF COLON, UNSPECIFIED PART OF COLON, UNSPECIFIED TYPE: ICD-10-CM

## 2021-01-01 DIAGNOSIS — R13.10 DYSPHAGIA, UNSPECIFIED TYPE: Primary | ICD-10-CM

## 2021-01-01 DIAGNOSIS — L89.209 PRESSURE INJURY OF SKIN OF HIP, UNSPECIFIED INJURY STAGE, UNSPECIFIED LATERALITY: ICD-10-CM

## 2021-01-01 DIAGNOSIS — R29.898 WEAKNESS OF BOTH LOWER EXTREMITIES: ICD-10-CM

## 2021-01-01 DIAGNOSIS — M54.50 LOW BACK PAIN, UNSPECIFIED BACK PAIN LATERALITY, UNSPECIFIED CHRONICITY, UNSPECIFIED WHETHER SCIATICA PRESENT: ICD-10-CM

## 2021-01-01 DIAGNOSIS — Z99.2 ESRD (END STAGE RENAL DISEASE) ON DIALYSIS (HCC): ICD-10-CM

## 2021-01-01 DIAGNOSIS — L97.521 ULCER OF BOTH FEET, LIMITED TO BREAKDOWN OF SKIN (HCC): ICD-10-CM

## 2021-01-01 DIAGNOSIS — L97.511 ULCER OF BOTH FEET, LIMITED TO BREAKDOWN OF SKIN (HCC): ICD-10-CM

## 2021-01-01 LAB
ALBUMIN SERPL-MCNC: 2.5 G/DL (ref 3.5–5.2)
ALBUMIN SERPL-MCNC: 2.6 G/DL (ref 3.5–5.2)
ALBUMIN SERPL-MCNC: 2.9 G/DL (ref 3.5–5.2)
ALBUMIN SERPL-MCNC: 3.6 G/DL (ref 3.5–5.2)
ALBUMIN/GLOB SERPL: 0.7 G/DL
ALBUMIN/GLOB SERPL: 0.8 G/DL
ALBUMIN/GLOB SERPL: 0.9 G/DL
ALP SERPL-CCNC: 143 U/L (ref 39–117)
ALP SERPL-CCNC: 158 U/L (ref 39–117)
ALP SERPL-CCNC: 188 U/L (ref 39–117)
ALT SERPL W P-5'-P-CCNC: 11 U/L (ref 1–33)
ALT SERPL W P-5'-P-CCNC: 11 U/L (ref 1–33)
ALT SERPL W P-5'-P-CCNC: 9 U/L (ref 1–33)
ANION GAP SERPL CALCULATED.3IONS-SCNC: 12.6 MMOL/L (ref 5–15)
ANION GAP SERPL CALCULATED.3IONS-SCNC: 15.9 MMOL/L (ref 5–15)
ANION GAP SERPL CALCULATED.3IONS-SCNC: 16.1 MMOL/L (ref 5–15)
ANION GAP SERPL CALCULATED.3IONS-SCNC: 16.5 MMOL/L (ref 5–15)
ANION GAP SERPL CALCULATED.3IONS-SCNC: 17.7 MMOL/L (ref 5–15)
AST SERPL-CCNC: 10 U/L (ref 1–32)
AST SERPL-CCNC: 22 U/L (ref 1–32)
AST SERPL-CCNC: 7 U/L (ref 1–32)
BASOPHILS # BLD AUTO: 0.04 10*3/MM3 (ref 0–0.2)
BASOPHILS # BLD AUTO: 0.04 10*3/MM3 (ref 0–0.2)
BASOPHILS # BLD AUTO: 0.05 10*3/MM3 (ref 0–0.2)
BASOPHILS # BLD AUTO: 0.1 10*3/MM3 (ref 0–0.2)
BASOPHILS NFR BLD AUTO: 0.3 % (ref 0–1.5)
BASOPHILS NFR BLD AUTO: 0.4 % (ref 0–1.5)
BH CV UPPER ARTERIAL LEFT 1ST DIGIT SYS MAX: 0 MMHG
BH CV UPPER ARTERIAL LEFT 2ND DIGIT SYS MAX: 0 MMHG
BH CV UPPER ARTERIAL LEFT 3RD DIGIT SYS MAX: 0 MMHG
BH CV UPPER ARTERIAL LEFT 4TH DIGIT SYS MAX: 0 MMHG
BH CV UPPER ARTERIAL LEFT 5TH DIGIT SYS MAX: 0 MMHG
BH CV UPPER ARTERIAL RIGHT 1ST DIGIT SYS MAX: 0 MMHG
BH CV UPPER ARTERIAL RIGHT 2ND DIGIT SYS MAX: 0 MMHG
BH CV UPPER ARTERIAL RIGHT 3RD DIGIT SYS MAX: 0 MMHG
BH CV UPPER ARTERIAL RIGHT 4TH DIGIT SYS MAX: 0 MMHG
BH CV UPPER ARTERIAL RIGHT 5TH DIGIT SYS MAX: 0 MMHG
BH CV UPPER VENOUS LEFT AXILLARY AUGMENT: NORMAL
BH CV UPPER VENOUS LEFT AXILLARY COMPETENT: NORMAL
BH CV UPPER VENOUS LEFT AXILLARY COMPRESS: NORMAL
BH CV UPPER VENOUS LEFT AXILLARY PHASIC: NORMAL
BH CV UPPER VENOUS LEFT AXILLARY SPONT: NORMAL
BH CV UPPER VENOUS LEFT BASILIC FOREARM COMPRESS: NORMAL
BH CV UPPER VENOUS LEFT BASILIC UPPER COMPRESS: NORMAL
BH CV UPPER VENOUS LEFT BRACHIAL COMPRESS: NORMAL
BH CV UPPER VENOUS LEFT CEPHALIC FOREARM COMPRESS: NORMAL
BH CV UPPER VENOUS LEFT CEPHALIC UPPER COMPRESS: NORMAL
BH CV UPPER VENOUS LEFT INTERNAL JUGULAR AUGMENT: NORMAL
BH CV UPPER VENOUS LEFT INTERNAL JUGULAR COMPETENT: NORMAL
BH CV UPPER VENOUS LEFT INTERNAL JUGULAR COMPRESS: NORMAL
BH CV UPPER VENOUS LEFT INTERNAL JUGULAR PHASIC: NORMAL
BH CV UPPER VENOUS LEFT INTERNAL JUGULAR SPONT: NORMAL
BH CV UPPER VENOUS LEFT RADIAL COMPRESS: NORMAL
BH CV UPPER VENOUS LEFT SUBCLAVIAN AUGMENT: NORMAL
BH CV UPPER VENOUS LEFT SUBCLAVIAN COMPETENT: NORMAL
BH CV UPPER VENOUS LEFT SUBCLAVIAN COMPRESS: NORMAL
BH CV UPPER VENOUS LEFT SUBCLAVIAN PHASIC: NORMAL
BH CV UPPER VENOUS LEFT SUBCLAVIAN SPONT: NORMAL
BH CV UPPER VENOUS LEFT ULNAR COMPRESS: NORMAL
BH CV UPPER VENOUS RIGHT INTERNAL JUGULAR AUGMENT: NORMAL
BH CV UPPER VENOUS RIGHT INTERNAL JUGULAR COMPETENT: NORMAL
BH CV UPPER VENOUS RIGHT INTERNAL JUGULAR COMPRESS: NORMAL
BH CV UPPER VENOUS RIGHT INTERNAL JUGULAR PHASIC: NORMAL
BH CV UPPER VENOUS RIGHT INTERNAL JUGULAR SPONT: NORMAL
BH CV UPPER VENOUS RIGHT SUBCLAVIAN AUGMENT: NORMAL
BH CV UPPER VENOUS RIGHT SUBCLAVIAN COMPETENT: NORMAL
BH CV UPPER VENOUS RIGHT SUBCLAVIAN COMPRESS: NORMAL
BH CV UPPER VENOUS RIGHT SUBCLAVIAN PHASIC: NORMAL
BH CV UPPER VENOUS RIGHT SUBCLAVIAN SPONT: NORMAL
BH CV VAS DIALYSIS ARTERIAL ANASTOMOSIS DIAMETER: 0.46 CM
BH CV VAS DIALYSIS ARTERIAL ANASTOMOSIS EDV: 21 CM/SEC
BH CV VAS DIALYSIS ARTERIAL ANASTOMOSIS PSV: 103 CM/SEC
BH CV VAS DIALYSIS CONDUIT DIST DEPTH: 0.18 CM
BH CV VAS DIALYSIS CONDUIT DIST DIAMETER: 0.51 CM
BH CV VAS DIALYSIS CONDUIT DIST EDV: 30 CM/SEC
BH CV VAS DIALYSIS CONDUIT DIST FLOW VOL: 312 ML/MIN
BH CV VAS DIALYSIS CONDUIT DIST PSV: 106 CM/SEC
BH CV VAS DIALYSIS CONDUIT MID DEPTH: 0.31 CM
BH CV VAS DIALYSIS CONDUIT MID DIAMETER: 0.54 CM
BH CV VAS DIALYSIS CONDUIT MID EDV: 27 CM/SEC
BH CV VAS DIALYSIS CONDUIT MID FLOW VOL: 693 ML/MIN
BH CV VAS DIALYSIS CONDUIT MID PSV: 133 CM/SEC
BH CV VAS DIALYSIS CONDUIT MID/DIST DEPTH: 0.31 CM
BH CV VAS DIALYSIS CONDUIT MID/DIST DIAMETER: 0.54 CM
BH CV VAS DIALYSIS CONDUIT MID/DIST EDV: 24 CM/SEC
BH CV VAS DIALYSIS CONDUIT MID/DIST FLOW VOL: 311 ML/MIN
BH CV VAS DIALYSIS CONDUIT MID/DIST PSV: 64 CM/SEC
BH CV VAS DIALYSIS CONDUIT PROX DEPTH: 0.15 CM
BH CV VAS DIALYSIS CONDUIT PROX DIAMETER: 0.3 CM
BH CV VAS DIALYSIS CONDUIT PROX EDV: 81 CM/SEC
BH CV VAS DIALYSIS CONDUIT PROX FLOW VOL: 164 ML/MIN
BH CV VAS DIALYSIS CONDUIT PROX PSV: 320 CM/SEC
BH CV VAS DIALYSIS CONDUIT PROX/MID DEPTH: 0.14 CM
BH CV VAS DIALYSIS CONDUIT PROX/MID DIAMETER: 0.25 CM
BH CV VAS DIALYSIS CONDUIT PROX/MID EDV: 76 CM/SEC
BH CV VAS DIALYSIS CONDUIT PROX/MID FLOW VOL: 305 ML/MIN
BH CV VAS DIALYSIS CONDUIT PROX/MID PSV: 208 CM/SEC
BH CV VAS DIALYSIS PRE-INFLOW BRACHIAL DIAMETER: 0.44 CM
BH CV VAS DIALYSIS PRE-INFLOW BRACHIAL EDV: 49 CM/SEC
BH CV VAS DIALYSIS PRE-INFLOW BRACHIAL FLOW VOL: 292 ML/MIN
BH CV VAS DIALYSIS PRE-INFLOW BRACHIAL PSV: 158 CM/SEC
BH CV VAS DIALYSIS PRE-INFLOW RADIAL DIAMETER: 0.12 CM
BH CV VAS DIALYSIS PRE-INFLOW RADIAL EDV: 0 CM/SEC
BH CV VAS DIALYSIS PRE-INFLOW RADIAL PSV: 43 CM/SEC
BH CV VAS DIALYSIS VENOUS ANASTOMOSIS DIAMETER: 0.51 CM
BH CV VAS DIALYSIS VENOUS ANASTOMOSIS EDV: 101 CM/SEC
BH CV VAS DIALYSIS VENOUS ANASTOMOSIS PSV: 327 CM/SEC
BH CV VAS DIALYSIS VENOUS OUTFLOW AXILLARY DIAMETER: 0.69 CM
BH CV VAS DIALYSIS VENOUS OUTFLOW AXILLARY EDV: 106 CM/SEC
BH CV VAS DIALYSIS VENOUS OUTFLOW AXILLARY PSV: 423 CM/SEC
BH CV VAS DIALYSIS VENOUS OUTFLOW SUBCLAVIAN DIAMETER: 0.48 CM
BH CV VAS DIALYSIS VENOUS OUTFLOW SUBCLAVIAN EDV: 13 CM/SEC
BH CV VAS DIALYSIS VENOUS OUTFLOW SUBCLAVIAN PSV: 35 CM/SEC
BILIRUB SERPL-MCNC: 0.3 MG/DL (ref 0–1.2)
BUN SERPL-MCNC: 27 MG/DL (ref 8–23)
BUN SERPL-MCNC: 30 MG/DL (ref 8–23)
BUN SERPL-MCNC: 45 MG/DL (ref 8–23)
BUN SERPL-MCNC: 47 MG/DL (ref 8–23)
BUN SERPL-MCNC: 65 MG/DL (ref 8–23)
BUN/CREAT SERPL: 12.2 (ref 7–25)
BUN/CREAT SERPL: 6.6 (ref 7–25)
BUN/CREAT SERPL: 6.8 (ref 7–25)
BUN/CREAT SERPL: 7.9 (ref 7–25)
BUN/CREAT SERPL: 8.1 (ref 7–25)
CALCIUM SPEC-SCNC: 8.5 MG/DL (ref 8.6–10.5)
CALCIUM SPEC-SCNC: 8.6 MG/DL (ref 8.6–10.5)
CALCIUM SPEC-SCNC: 9 MG/DL (ref 8.6–10.5)
CALCIUM SPEC-SCNC: 9.1 MG/DL (ref 8.6–10.5)
CALCIUM SPEC-SCNC: 9.7 MG/DL (ref 8.6–10.5)
CHLORIDE SERPL-SCNC: 94 MMOL/L (ref 98–107)
CHLORIDE SERPL-SCNC: 95 MMOL/L (ref 98–107)
CHLORIDE SERPL-SCNC: 96 MMOL/L (ref 98–107)
CHLORIDE SERPL-SCNC: 97 MMOL/L (ref 98–107)
CHLORIDE SERPL-SCNC: 99 MMOL/L (ref 98–107)
CK SERPL-CCNC: 39 U/L (ref 20–180)
CO2 SERPL-SCNC: 21.9 MMOL/L (ref 22–29)
CO2 SERPL-SCNC: 23.1 MMOL/L (ref 22–29)
CO2 SERPL-SCNC: 23.3 MMOL/L (ref 22–29)
CO2 SERPL-SCNC: 25.5 MMOL/L (ref 22–29)
CO2 SERPL-SCNC: 26.4 MMOL/L (ref 22–29)
CREAT SERPL-MCNC: 4.1 MG/DL (ref 0.57–1)
CREAT SERPL-MCNC: 4.43 MG/DL (ref 0.57–1)
CREAT SERPL-MCNC: 5.31 MG/DL (ref 0.57–1)
CREAT SERPL-MCNC: 5.54 MG/DL (ref 0.57–1)
CREAT SERPL-MCNC: 5.97 MG/DL (ref 0.57–1)
D-LACTATE SERPL-SCNC: 1 MMOL/L (ref 0.5–2)
DEPRECATED RDW RBC AUTO: 48.4 FL (ref 37–54)
DEPRECATED RDW RBC AUTO: 49.2 FL (ref 37–54)
DEPRECATED RDW RBC AUTO: 49.5 FL (ref 37–54)
DEPRECATED RDW RBC AUTO: 49.7 FL (ref 37–54)
EOSINOPHIL # BLD AUTO: 0.02 10*3/MM3 (ref 0–0.4)
EOSINOPHIL # BLD AUTO: 0.03 10*3/MM3 (ref 0–0.4)
EOSINOPHIL # BLD AUTO: 0.03 10*3/MM3 (ref 0–0.4)
EOSINOPHIL # BLD AUTO: 0.09 10*3/MM3 (ref 0–0.4)
EOSINOPHIL NFR BLD AUTO: 0.1 % (ref 0.3–6.2)
EOSINOPHIL NFR BLD AUTO: 0.1 % (ref 0.3–6.2)
EOSINOPHIL NFR BLD AUTO: 0.3 % (ref 0.3–6.2)
EOSINOPHIL NFR BLD AUTO: 0.8 % (ref 0.3–6.2)
ERYTHROCYTE [DISTWIDTH] IN BLOOD BY AUTOMATED COUNT: 13 % (ref 12.3–15.4)
ERYTHROCYTE [DISTWIDTH] IN BLOOD BY AUTOMATED COUNT: 13.1 % (ref 12.3–15.4)
ERYTHROCYTE [DISTWIDTH] IN BLOOD BY AUTOMATED COUNT: 13.2 % (ref 12.3–15.4)
ERYTHROCYTE [DISTWIDTH] IN BLOOD BY AUTOMATED COUNT: 13.3 % (ref 12.3–15.4)
GFR SERPL CREATININE-BSD FRML MDRD: 10 ML/MIN/1.73
GFR SERPL CREATININE-BSD FRML MDRD: 7 ML/MIN/1.73
GFR SERPL CREATININE-BSD FRML MDRD: 7 ML/MIN/1.73
GFR SERPL CREATININE-BSD FRML MDRD: 8 ML/MIN/1.73
GFR SERPL CREATININE-BSD FRML MDRD: 9 ML/MIN/1.73
GFR SERPL CREATININE-BSD FRML MDRD: ABNORMAL ML/MIN/{1.73_M2}
GLOBULIN UR ELPH-MCNC: 3.2 GM/DL
GLOBULIN UR ELPH-MCNC: 3.6 GM/DL
GLOBULIN UR ELPH-MCNC: 4.2 GM/DL
GLUCOSE BLDC GLUCOMTR-MCNC: 101 MG/DL (ref 70–130)
GLUCOSE BLDC GLUCOMTR-MCNC: 102 MG/DL (ref 70–130)
GLUCOSE BLDC GLUCOMTR-MCNC: 104 MG/DL (ref 70–130)
GLUCOSE BLDC GLUCOMTR-MCNC: 108 MG/DL (ref 70–130)
GLUCOSE BLDC GLUCOMTR-MCNC: 115 MG/DL (ref 70–130)
GLUCOSE BLDC GLUCOMTR-MCNC: 128 MG/DL (ref 70–130)
GLUCOSE BLDC GLUCOMTR-MCNC: 139 MG/DL (ref 70–130)
GLUCOSE BLDC GLUCOMTR-MCNC: 143 MG/DL (ref 70–130)
GLUCOSE BLDC GLUCOMTR-MCNC: 143 MG/DL (ref 70–130)
GLUCOSE BLDC GLUCOMTR-MCNC: 184 MG/DL (ref 70–130)
GLUCOSE BLDC GLUCOMTR-MCNC: 206 MG/DL (ref 70–130)
GLUCOSE BLDC GLUCOMTR-MCNC: 228 MG/DL (ref 70–130)
GLUCOSE BLDC GLUCOMTR-MCNC: 234 MG/DL (ref 70–130)
GLUCOSE BLDC GLUCOMTR-MCNC: 40 MG/DL (ref 70–130)
GLUCOSE BLDC GLUCOMTR-MCNC: 54 MG/DL (ref 70–130)
GLUCOSE BLDC GLUCOMTR-MCNC: 67 MG/DL (ref 70–130)
GLUCOSE BLDC GLUCOMTR-MCNC: 76 MG/DL (ref 70–130)
GLUCOSE BLDC GLUCOMTR-MCNC: 82 MG/DL (ref 70–130)
GLUCOSE BLDC GLUCOMTR-MCNC: 84 MG/DL (ref 70–130)
GLUCOSE BLDC GLUCOMTR-MCNC: 87 MG/DL (ref 70–130)
GLUCOSE BLDC GLUCOMTR-MCNC: 90 MG/DL (ref 70–130)
GLUCOSE BLDC GLUCOMTR-MCNC: 92 MG/DL (ref 70–130)
GLUCOSE BLDC GLUCOMTR-MCNC: 93 MG/DL (ref 70–130)
GLUCOSE BLDC GLUCOMTR-MCNC: 98 MG/DL (ref 70–130)
GLUCOSE BLDC GLUCOMTR-MCNC: 99 MG/DL (ref 70–130)
GLUCOSE SERPL-MCNC: 100 MG/DL (ref 65–99)
GLUCOSE SERPL-MCNC: 148 MG/DL (ref 65–99)
GLUCOSE SERPL-MCNC: 170 MG/DL (ref 65–99)
GLUCOSE SERPL-MCNC: 241 MG/DL (ref 65–99)
GLUCOSE SERPL-MCNC: 246 MG/DL (ref 65–99)
HBV SURFACE AG SERPL QL IA: NORMAL
HCT VFR BLD AUTO: 36.7 % (ref 34–46.6)
HCT VFR BLD AUTO: 38.1 % (ref 34–46.6)
HCT VFR BLD AUTO: 38.2 % (ref 34–46.6)
HCT VFR BLD AUTO: 42.8 % (ref 34–46.6)
HGB BLD-MCNC: 12.2 G/DL (ref 12–15.9)
HGB BLD-MCNC: 12.4 G/DL (ref 12–15.9)
HGB BLD-MCNC: 12.7 G/DL (ref 12–15.9)
HGB BLD-MCNC: 13.9 G/DL (ref 12–15.9)
HOLD SPECIMEN: NORMAL
IMM GRANULOCYTES # BLD AUTO: 0.05 10*3/MM3 (ref 0–0.05)
IMM GRANULOCYTES # BLD AUTO: 0.08 10*3/MM3 (ref 0–0.05)
IMM GRANULOCYTES # BLD AUTO: 0.08 10*3/MM3 (ref 0–0.05)
IMM GRANULOCYTES # BLD AUTO: 0.31 10*3/MM3 (ref 0–0.05)
IMM GRANULOCYTES NFR BLD AUTO: 0.5 % (ref 0–0.5)
IMM GRANULOCYTES NFR BLD AUTO: 0.6 % (ref 0–0.5)
IMM GRANULOCYTES NFR BLD AUTO: 0.7 % (ref 0–0.5)
IMM GRANULOCYTES NFR BLD AUTO: 1.2 % (ref 0–0.5)
LYMPHOCYTES # BLD AUTO: 0.76 10*3/MM3 (ref 0.7–3.1)
LYMPHOCYTES # BLD AUTO: 0.85 10*3/MM3 (ref 0.7–3.1)
LYMPHOCYTES # BLD AUTO: 1.13 10*3/MM3 (ref 0.7–3.1)
LYMPHOCYTES # BLD AUTO: 1.17 10*3/MM3 (ref 0.7–3.1)
LYMPHOCYTES NFR BLD AUTO: 10.4 % (ref 19.6–45.3)
LYMPHOCYTES NFR BLD AUTO: 3 % (ref 19.6–45.3)
LYMPHOCYTES NFR BLD AUTO: 7.4 % (ref 19.6–45.3)
LYMPHOCYTES NFR BLD AUTO: 8.4 % (ref 19.6–45.3)
MCH RBC QN AUTO: 32.9 PG (ref 26.6–33)
MCH RBC QN AUTO: 33.1 PG (ref 26.6–33)
MCH RBC QN AUTO: 33.5 PG (ref 26.6–33)
MCH RBC QN AUTO: 34.4 PG (ref 26.6–33)
MCHC RBC AUTO-ENTMCNC: 32.5 G/DL (ref 31.5–35.7)
MCHC RBC AUTO-ENTMCNC: 32.5 G/DL (ref 31.5–35.7)
MCHC RBC AUTO-ENTMCNC: 33.2 G/DL (ref 31.5–35.7)
MCHC RBC AUTO-ENTMCNC: 33.2 G/DL (ref 31.5–35.7)
MCV RBC AUTO: 100.8 FL (ref 79–97)
MCV RBC AUTO: 101.4 FL (ref 79–97)
MCV RBC AUTO: 101.6 FL (ref 79–97)
MCV RBC AUTO: 103.4 FL (ref 79–97)
MONOCYTES # BLD AUTO: 0.5 10*3/MM3 (ref 0.1–0.9)
MONOCYTES # BLD AUTO: 0.53 10*3/MM3 (ref 0.1–0.9)
MONOCYTES # BLD AUTO: 0.57 10*3/MM3 (ref 0.1–0.9)
MONOCYTES # BLD AUTO: 0.78 10*3/MM3 (ref 0.1–0.9)
MONOCYTES NFR BLD AUTO: 3.1 % (ref 5–12)
MONOCYTES NFR BLD AUTO: 4.1 % (ref 5–12)
MONOCYTES NFR BLD AUTO: 4.6 % (ref 5–12)
MONOCYTES NFR BLD AUTO: 4.6 % (ref 5–12)
NEUTROPHILS NFR BLD AUTO: 12.1 10*3/MM3 (ref 1.7–7)
NEUTROPHILS NFR BLD AUTO: 23.28 10*3/MM3 (ref 1.7–7)
NEUTROPHILS NFR BLD AUTO: 83.3 % (ref 42.7–76)
NEUTROPHILS NFR BLD AUTO: 86.4 % (ref 42.7–76)
NEUTROPHILS NFR BLD AUTO: 86.7 % (ref 42.7–76)
NEUTROPHILS NFR BLD AUTO: 9.02 10*3/MM3 (ref 1.7–7)
NEUTROPHILS NFR BLD AUTO: 9.92 10*3/MM3 (ref 1.7–7)
NEUTROPHILS NFR BLD AUTO: 92.2 % (ref 42.7–76)
NRBC BLD AUTO-RTO: 0 /100 WBC (ref 0–0.2)
NT-PROBNP SERPL-MCNC: ABNORMAL PG/ML (ref 0–1800)
PHOSPHATE SERPL-MCNC: 3.2 MG/DL (ref 2.5–4.5)
PHOSPHATE SERPL-MCNC: 3.6 MG/DL (ref 2.5–4.5)
PLATELET # BLD AUTO: 191 10*3/MM3 (ref 140–450)
PLATELET # BLD AUTO: 211 10*3/MM3 (ref 140–450)
PLATELET # BLD AUTO: 213 10*3/MM3 (ref 140–450)
PLATELET # BLD AUTO: 296 10*3/MM3 (ref 140–450)
PMV BLD AUTO: 9.3 FL (ref 6–12)
PMV BLD AUTO: 9.5 FL (ref 6–12)
PMV BLD AUTO: 9.5 FL (ref 6–12)
PMV BLD AUTO: 9.7 FL (ref 6–12)
POTASSIUM SERPL-SCNC: 3.4 MMOL/L (ref 3.5–5.2)
POTASSIUM SERPL-SCNC: 3.5 MMOL/L (ref 3.5–5.2)
POTASSIUM SERPL-SCNC: 3.7 MMOL/L (ref 3.5–5.2)
POTASSIUM SERPL-SCNC: 3.7 MMOL/L (ref 3.5–5.2)
POTASSIUM SERPL-SCNC: 4.8 MMOL/L (ref 3.5–5.2)
PROT SERPL-MCNC: 5.8 G/DL (ref 6–8.5)
PROT SERPL-MCNC: 6.1 G/DL (ref 6–8.5)
PROT SERPL-MCNC: 7.8 G/DL (ref 6–8.5)
QT INTERVAL: 426 MS
RBC # BLD AUTO: 3.55 10*6/MM3 (ref 3.77–5.28)
RBC # BLD AUTO: 3.75 10*6/MM3 (ref 3.77–5.28)
RBC # BLD AUTO: 3.79 10*6/MM3 (ref 3.77–5.28)
RBC # BLD AUTO: 4.22 10*6/MM3 (ref 3.77–5.28)
SARS-COV-2 ORF1AB RESP QL NAA+PROBE: NOT DETECTED
SARS-COV-2 ORF1AB RESP QL NAA+PROBE: NOT DETECTED
SODIUM SERPL-SCNC: 135 MMOL/L (ref 136–145)
SODIUM SERPL-SCNC: 135 MMOL/L (ref 136–145)
SODIUM SERPL-SCNC: 136 MMOL/L (ref 136–145)
SODIUM SERPL-SCNC: 137 MMOL/L (ref 136–145)
SODIUM SERPL-SCNC: 137 MMOL/L (ref 136–145)
TROPONIN T SERPL-MCNC: 0.04 NG/ML (ref 0–0.03)
TSH SERPL DL<=0.05 MIU/L-ACNC: 1.71 UIU/ML (ref 0.27–4.2)
UPPER ARTERIAL RIGHT ARM BRACHIAL SYS MAX: 124 MMHG
VIT B12 BLD-MCNC: 679 PG/ML (ref 211–946)
WBC # BLD AUTO: 10.83 10*3/MM3 (ref 3.4–10.8)
WBC # BLD AUTO: 11.45 10*3/MM3 (ref 3.4–10.8)
WBC # BLD AUTO: 13.99 10*3/MM3 (ref 3.4–10.8)
WBC # BLD AUTO: 25.26 10*3/MM3 (ref 3.4–10.8)
WHOLE BLOOD HOLD SPECIMEN: NORMAL
WHOLE BLOOD HOLD SPECIMEN: NORMAL

## 2021-01-01 PROCEDURE — 71045 X-RAY EXAM CHEST 1 VIEW: CPT

## 2021-01-01 PROCEDURE — G0378 HOSPITAL OBSERVATION PER HR: HCPCS

## 2021-01-01 PROCEDURE — 5A1D70Z PERFORMANCE OF URINARY FILTRATION, INTERMITTENT, LESS THAN 6 HOURS PER DAY: ICD-10-PCS | Performed by: INTERNAL MEDICINE

## 2021-01-01 PROCEDURE — 25010000002 LORAZEPAM PER 2 MG: Performed by: INTERNAL MEDICINE

## 2021-01-01 PROCEDURE — 85025 COMPLETE CBC W/AUTO DIFF WBC: CPT | Performed by: EMERGENCY MEDICINE

## 2021-01-01 PROCEDURE — 73523 X-RAY EXAM HIPS BI 5/> VIEWS: CPT

## 2021-01-01 PROCEDURE — 70450 CT HEAD/BRAIN W/O DYE: CPT

## 2021-01-01 PROCEDURE — 84484 ASSAY OF TROPONIN QUANT: CPT | Performed by: EMERGENCY MEDICINE

## 2021-01-01 PROCEDURE — 97110 THERAPEUTIC EXERCISES: CPT

## 2021-01-01 PROCEDURE — 25010000002 HEPARIN (PORCINE) PER 1000 UNITS: Performed by: INTERNAL MEDICINE

## 2021-01-01 PROCEDURE — G0257 UNSCHED DIALYSIS ESRD PT HOS: HCPCS

## 2021-01-01 PROCEDURE — 84100 ASSAY OF PHOSPHORUS: CPT | Performed by: EMERGENCY MEDICINE

## 2021-01-01 PROCEDURE — 99283 EMERGENCY DEPT VISIT LOW MDM: CPT

## 2021-01-01 PROCEDURE — 82962 GLUCOSE BLOOD TEST: CPT

## 2021-01-01 PROCEDURE — 63710000001 INSULIN LISPRO (HUMAN) PER 5 UNITS: Performed by: INTERNAL MEDICINE

## 2021-01-01 PROCEDURE — C9803 HOPD COVID-19 SPEC COLLECT: HCPCS

## 2021-01-01 PROCEDURE — 80053 COMPREHEN METABOLIC PANEL: CPT | Performed by: EMERGENCY MEDICINE

## 2021-01-01 PROCEDURE — 36415 COLL VENOUS BLD VENIPUNCTURE: CPT

## 2021-01-01 PROCEDURE — 25010000002 VANCOMYCIN PER 500 MG: Performed by: INTERNAL MEDICINE

## 2021-01-01 PROCEDURE — 93971 EXTREMITY STUDY: CPT

## 2021-01-01 PROCEDURE — 87340 HEPATITIS B SURFACE AG IA: CPT | Performed by: INTERNAL MEDICINE

## 2021-01-01 PROCEDURE — 87040 BLOOD CULTURE FOR BACTERIA: CPT | Performed by: EMERGENCY MEDICINE

## 2021-01-01 PROCEDURE — 25010000002 MORPHINE PER 10 MG: Performed by: INTERNAL MEDICINE

## 2021-01-01 PROCEDURE — 72125 CT NECK SPINE W/O DYE: CPT

## 2021-01-01 PROCEDURE — 80069 RENAL FUNCTION PANEL: CPT | Performed by: EMERGENCY MEDICINE

## 2021-01-01 PROCEDURE — 99214 OFFICE O/P EST MOD 30 MIN: CPT | Performed by: INTERNAL MEDICINE

## 2021-01-01 PROCEDURE — 93010 ELECTROCARDIOGRAM REPORT: CPT | Performed by: INTERNAL MEDICINE

## 2021-01-01 PROCEDURE — 97166 OT EVAL MOD COMPLEX 45 MIN: CPT

## 2021-01-01 PROCEDURE — 82607 VITAMIN B-12: CPT | Performed by: INTERNAL MEDICINE

## 2021-01-01 PROCEDURE — 93990 DOPPLER FLOW TESTING: CPT

## 2021-01-01 PROCEDURE — 80048 BASIC METABOLIC PNL TOTAL CA: CPT | Performed by: INTERNAL MEDICINE

## 2021-01-01 PROCEDURE — 25010000002 HYDROMORPHONE PER 4 MG: Performed by: INTERNAL MEDICINE

## 2021-01-01 PROCEDURE — 82550 ASSAY OF CK (CPK): CPT | Performed by: EMERGENCY MEDICINE

## 2021-01-01 PROCEDURE — 83880 ASSAY OF NATRIURETIC PEPTIDE: CPT | Performed by: EMERGENCY MEDICINE

## 2021-01-01 PROCEDURE — 93005 ELECTROCARDIOGRAM TRACING: CPT | Performed by: EMERGENCY MEDICINE

## 2021-01-01 PROCEDURE — P9047 ALBUMIN (HUMAN), 25%, 50ML: HCPCS | Performed by: INTERNAL MEDICINE

## 2021-01-01 PROCEDURE — 73560 X-RAY EXAM OF KNEE 1 OR 2: CPT

## 2021-01-01 PROCEDURE — U0004 COV-19 TEST NON-CDC HGH THRU: HCPCS | Performed by: EMERGENCY MEDICINE

## 2021-01-01 PROCEDURE — 85025 COMPLETE CBC W/AUTO DIFF WBC: CPT | Performed by: INTERNAL MEDICINE

## 2021-01-01 PROCEDURE — 99284 EMERGENCY DEPT VISIT MOD MDM: CPT

## 2021-01-01 PROCEDURE — 80053 COMPREHEN METABOLIC PANEL: CPT | Performed by: INTERNAL MEDICINE

## 2021-01-01 PROCEDURE — 83605 ASSAY OF LACTIC ACID: CPT | Performed by: EMERGENCY MEDICINE

## 2021-01-01 PROCEDURE — 36415 COLL VENOUS BLD VENIPUNCTURE: CPT | Performed by: EMERGENCY MEDICINE

## 2021-01-01 PROCEDURE — 84443 ASSAY THYROID STIM HORMONE: CPT | Performed by: INTERNAL MEDICINE

## 2021-01-01 PROCEDURE — 99285 EMERGENCY DEPT VISIT HI MDM: CPT

## 2021-01-01 PROCEDURE — 25010000002 ALBUMIN HUMAN 25% PER 50 ML: Performed by: INTERNAL MEDICINE

## 2021-01-01 PROCEDURE — 97162 PT EVAL MOD COMPLEX 30 MIN: CPT

## 2021-01-01 PROCEDURE — 97110 THERAPEUTIC EXERCISES: CPT | Performed by: OCCUPATIONAL THERAPIST

## 2021-01-01 PROCEDURE — 93922 UPR/L XTREMITY ART 2 LEVELS: CPT

## 2021-01-01 PROCEDURE — 97530 THERAPEUTIC ACTIVITIES: CPT

## 2021-01-01 RX ORDER — VANCOMYCIN HYDROCHLORIDE 1 G/200ML
1000 INJECTION, SOLUTION INTRAVENOUS ONCE
Status: COMPLETED | OUTPATIENT
Start: 2021-01-01 | End: 2021-01-01

## 2021-01-01 RX ORDER — SODIUM CHLORIDE 0.9 % (FLUSH) 0.9 %
10 SYRINGE (ML) INJECTION AS NEEDED
Status: DISCONTINUED | OUTPATIENT
Start: 2021-01-01 | End: 2021-01-01 | Stop reason: HOSPADM

## 2021-01-01 RX ORDER — SODIUM CHLORIDE 0.9 % (FLUSH) 0.9 %
10 SYRINGE (ML) INJECTION AS NEEDED
Status: DISCONTINUED | OUTPATIENT
Start: 2021-01-01 | End: 2021-04-18 | Stop reason: HOSPADM

## 2021-01-01 RX ORDER — BACITRACIN ZINC 500 [USP'U]/G
OINTMENT TOPICAL EVERY 12 HOURS SCHEDULED
Status: DISCONTINUED | OUTPATIENT
Start: 2021-01-01 | End: 2021-01-01

## 2021-01-01 RX ORDER — DIPHENHYDRAMINE HYDROCHLORIDE 50 MG/ML
25 INJECTION INTRAMUSCULAR; INTRAVENOUS EVERY 6 HOURS PRN
Status: DISCONTINUED | OUTPATIENT
Start: 2021-01-01 | End: 2021-04-18 | Stop reason: HOSPADM

## 2021-01-01 RX ORDER — ACETAMINOPHEN 325 MG/1
650 TABLET ORAL 3 TIMES DAILY
Status: DISCONTINUED | OUTPATIENT
Start: 2021-01-01 | End: 2021-01-01 | Stop reason: HOSPADM

## 2021-01-01 RX ORDER — PANTOPRAZOLE SODIUM 40 MG/1
40 TABLET, DELAYED RELEASE ORAL EVERY MORNING
Refills: 1 | Status: DISCONTINUED | OUTPATIENT
Start: 2021-01-01 | End: 2021-01-01

## 2021-01-01 RX ORDER — INSULIN LISPRO 100 [IU]/ML
0-9 INJECTION, SOLUTION INTRAVENOUS; SUBCUTANEOUS
Status: DISCONTINUED | OUTPATIENT
Start: 2021-01-01 | End: 2021-01-01

## 2021-01-01 RX ORDER — ECHINACEA PURPUREA EXTRACT 125 MG
2 TABLET ORAL AS NEEDED
Status: DISCONTINUED | OUTPATIENT
Start: 2021-01-01 | End: 2021-04-18 | Stop reason: HOSPADM

## 2021-01-01 RX ORDER — HYDROCODONE BITARTRATE AND ACETAMINOPHEN 5; 325 MG/1; MG/1
1 TABLET ORAL EVERY 8 HOURS PRN
Qty: 30 TABLET | Refills: 0 | Status: SHIPPED | OUTPATIENT
Start: 2021-01-01 | End: 2021-01-01 | Stop reason: HOSPADM

## 2021-01-01 RX ORDER — ACETAMINOPHEN 650 MG/1
650 SUPPOSITORY RECTAL EVERY 4 HOURS PRN
Status: DISCONTINUED | OUTPATIENT
Start: 2021-01-01 | End: 2021-01-01

## 2021-01-01 RX ORDER — ACETAMINOPHEN 160 MG/5ML
650 SOLUTION ORAL EVERY 4 HOURS PRN
Status: DISCONTINUED | OUTPATIENT
Start: 2021-01-01 | End: 2021-01-01

## 2021-01-01 RX ORDER — HYDROCODONE BITARTRATE AND ACETAMINOPHEN 7.5; 325 MG/1; MG/1
1 TABLET ORAL EVERY 4 HOURS PRN
Qty: 20 TABLET | Refills: 0 | Status: SHIPPED | OUTPATIENT
Start: 2021-01-01

## 2021-01-01 RX ORDER — MIDODRINE HYDROCHLORIDE 5 MG/1
5 TABLET ORAL
Status: DISCONTINUED | OUTPATIENT
Start: 2021-01-01 | End: 2021-01-01 | Stop reason: HOSPADM

## 2021-01-01 RX ORDER — ACETAMINOPHEN 325 MG/1
650 TABLET ORAL EVERY 6 HOURS PRN
Status: DISCONTINUED | OUTPATIENT
Start: 2021-01-01 | End: 2021-04-18 | Stop reason: HOSPADM

## 2021-01-01 RX ORDER — GABAPENTIN 100 MG/1
100 CAPSULE ORAL EVERY 12 HOURS SCHEDULED
Status: DISCONTINUED | OUTPATIENT
Start: 2021-01-01 | End: 2021-01-01

## 2021-01-01 RX ORDER — LORAZEPAM 2 MG/ML
2 INJECTION INTRAMUSCULAR
Status: DISCONTINUED | OUTPATIENT
Start: 2021-01-01 | End: 2021-04-18 | Stop reason: HOSPADM

## 2021-01-01 RX ORDER — GLYCOPYRROLATE 0.2 MG/ML
0.4 INJECTION INTRAMUSCULAR; INTRAVENOUS
Status: DISCONTINUED | OUTPATIENT
Start: 2021-01-01 | End: 2021-04-18 | Stop reason: HOSPADM

## 2021-01-01 RX ORDER — BACITRACIN ZINC 500 [USP'U]/G
OINTMENT TOPICAL EVERY 12 HOURS SCHEDULED
Start: 2021-01-01

## 2021-01-01 RX ORDER — ACETAMINOPHEN 160 MG/5ML
650 SOLUTION ORAL EVERY 4 HOURS PRN
Status: DISCONTINUED | OUTPATIENT
Start: 2021-01-01 | End: 2021-04-18 | Stop reason: HOSPADM

## 2021-01-01 RX ORDER — DIPHENHYDRAMINE HCL 25 MG
25 CAPSULE ORAL EVERY 6 HOURS PRN
Status: DISCONTINUED | OUTPATIENT
Start: 2021-01-01 | End: 2021-04-18 | Stop reason: HOSPADM

## 2021-01-01 RX ORDER — LORAZEPAM 2 MG/ML
0.5 INJECTION INTRAMUSCULAR
Status: DISCONTINUED | OUTPATIENT
Start: 2021-01-01 | End: 2021-04-18 | Stop reason: HOSPADM

## 2021-01-01 RX ORDER — MORPHINE SULFATE 20 MG/ML
10 SOLUTION ORAL
Status: DISCONTINUED | OUTPATIENT
Start: 2021-01-01 | End: 2021-04-18 | Stop reason: HOSPADM

## 2021-01-01 RX ORDER — NICOTINE POLACRILEX 4 MG
15 LOZENGE BUCCAL
Status: DISCONTINUED | OUTPATIENT
Start: 2021-01-01 | End: 2021-01-01

## 2021-01-01 RX ORDER — LORAZEPAM 2 MG/ML
2 CONCENTRATE ORAL
Status: DISCONTINUED | OUTPATIENT
Start: 2021-01-01 | End: 2021-04-18 | Stop reason: HOSPADM

## 2021-01-01 RX ORDER — OMEPRAZOLE 20 MG/1
20 CAPSULE, DELAYED RELEASE ORAL DAILY
Qty: 90 CAPSULE | Refills: 1 | Status: SHIPPED | OUTPATIENT
Start: 2021-01-01

## 2021-01-01 RX ORDER — HYDROCODONE BITARTRATE AND ACETAMINOPHEN 5; 325 MG/1; MG/1
1 TABLET ORAL EVERY 4 HOURS PRN
Status: DISCONTINUED | OUTPATIENT
Start: 2021-01-01 | End: 2021-01-01

## 2021-01-01 RX ORDER — GLYCOPYRROLATE 0.2 MG/ML
0.2 INJECTION INTRAMUSCULAR; INTRAVENOUS
Status: DISCONTINUED | OUTPATIENT
Start: 2021-01-01 | End: 2021-04-18 | Stop reason: HOSPADM

## 2021-01-01 RX ORDER — NICOTINE POLACRILEX 4 MG
15 LOZENGE BUCCAL
Status: DISCONTINUED | OUTPATIENT
Start: 2021-01-01 | End: 2021-01-01 | Stop reason: HOSPADM

## 2021-01-01 RX ORDER — HALOPERIDOL 1 MG/1
1 TABLET ORAL EVERY 4 HOURS PRN
Status: DISCONTINUED | OUTPATIENT
Start: 2021-01-01 | End: 2021-04-18 | Stop reason: HOSPADM

## 2021-01-01 RX ORDER — HALOPERIDOL 5 MG/ML
1 INJECTION INTRAMUSCULAR EVERY 4 HOURS PRN
Status: DISCONTINUED | OUTPATIENT
Start: 2021-01-01 | End: 2021-04-18 | Stop reason: HOSPADM

## 2021-01-01 RX ORDER — GABAPENTIN 100 MG/1
100 CAPSULE ORAL EVERY 12 HOURS SCHEDULED
Status: DISCONTINUED | OUTPATIENT
Start: 2021-01-01 | End: 2021-01-01 | Stop reason: HOSPADM

## 2021-01-01 RX ORDER — LIDOCAINE HYDROCHLORIDE 20 MG/ML
5 SOLUTION OROPHARYNGEAL EVERY 4 HOURS PRN
Status: DISCONTINUED | OUTPATIENT
Start: 2021-01-01 | End: 2021-04-18 | Stop reason: HOSPADM

## 2021-01-01 RX ORDER — GABAPENTIN 100 MG/1
100 CAPSULE ORAL 2 TIMES DAILY
Qty: 10 CAPSULE | Refills: 0 | Status: SHIPPED | OUTPATIENT
Start: 2021-01-01

## 2021-01-01 RX ORDER — MIRTAZAPINE 15 MG/1
15 TABLET, FILM COATED ORAL NIGHTLY
Status: DISCONTINUED | OUTPATIENT
Start: 2021-01-01 | End: 2021-01-01

## 2021-01-01 RX ORDER — PRAMIPEXOLE DIHYDROCHLORIDE 0.25 MG/1
TABLET ORAL
Qty: 270 TABLET | Refills: 1 | Status: SHIPPED | OUTPATIENT
Start: 2021-01-01 | End: 2021-01-01 | Stop reason: HOSPADM

## 2021-01-01 RX ORDER — ALBUMIN (HUMAN) 12.5 G/50ML
12.5 SOLUTION INTRAVENOUS AS NEEDED
Status: DISPENSED | OUTPATIENT
Start: 2021-01-01 | End: 2021-01-01

## 2021-01-01 RX ORDER — ACETAMINOPHEN 650 MG/1
650 SUPPOSITORY RECTAL EVERY 4 HOURS PRN
Status: DISCONTINUED | OUTPATIENT
Start: 2021-01-01 | End: 2021-04-18 | Stop reason: HOSPADM

## 2021-01-01 RX ORDER — LORAZEPAM 2 MG/ML
0.5 CONCENTRATE ORAL
Status: DISCONTINUED | OUTPATIENT
Start: 2021-01-01 | End: 2021-04-18 | Stop reason: HOSPADM

## 2021-01-01 RX ORDER — NITROGLYCERIN 0.4 MG/1
0.4 TABLET SUBLINGUAL
Status: DISCONTINUED | OUTPATIENT
Start: 2021-01-01 | End: 2021-01-01 | Stop reason: HOSPADM

## 2021-01-01 RX ORDER — DEXTROSE MONOHYDRATE 25 G/50ML
25 INJECTION, SOLUTION INTRAVENOUS
Status: DISCONTINUED | OUTPATIENT
Start: 2021-01-01 | End: 2021-01-01

## 2021-01-01 RX ORDER — MIDODRINE HYDROCHLORIDE 5 MG/1
5 TABLET ORAL
Status: DISCONTINUED | OUTPATIENT
Start: 2021-01-01 | End: 2021-01-01

## 2021-01-01 RX ORDER — MORPHINE SULFATE 10 MG/ML
6 INJECTION INTRAMUSCULAR; INTRAVENOUS; SUBCUTANEOUS
Status: DISCONTINUED | OUTPATIENT
Start: 2021-01-01 | End: 2021-04-18 | Stop reason: HOSPADM

## 2021-01-01 RX ORDER — LORAZEPAM 2 MG/ML
1 CONCENTRATE ORAL
Status: DISCONTINUED | OUTPATIENT
Start: 2021-01-01 | End: 2021-04-18 | Stop reason: HOSPADM

## 2021-01-01 RX ORDER — HYDROCODONE BITARTRATE AND ACETAMINOPHEN 5; 325 MG/1; MG/1
1 TABLET ORAL EVERY 4 HOURS PRN
Qty: 12 TABLET | Refills: 0 | Status: SHIPPED | OUTPATIENT
Start: 2021-01-01 | End: 2021-01-01 | Stop reason: HOSPADM

## 2021-01-01 RX ORDER — INSULIN LISPRO 100 [IU]/ML
0-9 INJECTION, SOLUTION INTRAVENOUS; SUBCUTANEOUS
Status: DISCONTINUED | OUTPATIENT
Start: 2021-01-01 | End: 2021-01-01 | Stop reason: HOSPADM

## 2021-01-01 RX ORDER — DEXTROSE AND SODIUM CHLORIDE 5; .45 G/100ML; G/100ML
100 INJECTION, SOLUTION INTRAVENOUS CONTINUOUS
Status: DISCONTINUED | OUTPATIENT
Start: 2021-01-01 | End: 2021-01-01

## 2021-01-01 RX ORDER — MORPHINE SULFATE 4 MG/ML
4 INJECTION, SOLUTION INTRAMUSCULAR; INTRAVENOUS
Status: DISCONTINUED | OUTPATIENT
Start: 2021-01-01 | End: 2021-04-18 | Stop reason: HOSPADM

## 2021-01-01 RX ORDER — HYDROCODONE BITARTRATE AND ACETAMINOPHEN 7.5; 325 MG/1; MG/1
1 TABLET ORAL EVERY 4 HOURS PRN
Status: DISCONTINUED | OUTPATIENT
Start: 2021-01-01 | End: 2021-01-01

## 2021-01-01 RX ORDER — HYDROCODONE BITARTRATE AND ACETAMINOPHEN 7.5; 325 MG/1; MG/1
1 TABLET ORAL EVERY 4 HOURS PRN
Status: DISCONTINUED | OUTPATIENT
Start: 2021-01-01 | End: 2021-01-01 | Stop reason: HOSPADM

## 2021-01-01 RX ORDER — PANTOPRAZOLE SODIUM 40 MG/1
40 TABLET, DELAYED RELEASE ORAL EVERY MORNING
Status: DISCONTINUED | OUTPATIENT
Start: 2021-01-01 | End: 2021-01-01 | Stop reason: HOSPADM

## 2021-01-01 RX ORDER — OFLOXACIN 3 MG/ML
2 SOLUTION/ DROPS OPHTHALMIC 4 TIMES DAILY
Qty: 5 ML | Refills: 1 | Status: SHIPPED | OUTPATIENT
Start: 2021-01-01

## 2021-01-01 RX ORDER — HYDROMORPHONE HYDROCHLORIDE 1 MG/ML
0.5 INJECTION, SOLUTION INTRAMUSCULAR; INTRAVENOUS; SUBCUTANEOUS
Status: DISCONTINUED | OUTPATIENT
Start: 2021-01-01 | End: 2021-01-01

## 2021-01-01 RX ORDER — SODIUM CHLORIDE 0.9 % (FLUSH) 0.9 %
10 SYRINGE (ML) INJECTION EVERY 12 HOURS SCHEDULED
Status: DISCONTINUED | OUTPATIENT
Start: 2021-01-01 | End: 2021-01-01 | Stop reason: HOSPADM

## 2021-01-01 RX ORDER — BACITRACIN ZINC 500 [USP'U]/G
OINTMENT TOPICAL EVERY 12 HOURS SCHEDULED
Status: DISCONTINUED | OUTPATIENT
Start: 2021-01-01 | End: 2021-01-01 | Stop reason: HOSPADM

## 2021-01-01 RX ORDER — OFLOXACIN 3 MG/ML
2 SOLUTION/ DROPS OPHTHALMIC 4 TIMES DAILY
Status: DISCONTINUED | OUTPATIENT
Start: 2021-01-01 | End: 2021-01-01 | Stop reason: HOSPADM

## 2021-01-01 RX ORDER — SODIUM CHLORIDE 0.9 % (FLUSH) 0.9 %
10 SYRINGE (ML) INJECTION EVERY 12 HOURS SCHEDULED
Status: DISCONTINUED | OUTPATIENT
Start: 2021-01-01 | End: 2021-01-01

## 2021-01-01 RX ORDER — DEXTROSE MONOHYDRATE 25 G/50ML
25 INJECTION, SOLUTION INTRAVENOUS
Status: DISCONTINUED | OUTPATIENT
Start: 2021-01-01 | End: 2021-01-01 | Stop reason: HOSPADM

## 2021-01-01 RX ORDER — LORAZEPAM 2 MG/ML
1 INJECTION INTRAMUSCULAR
Status: DISCONTINUED | OUTPATIENT
Start: 2021-01-01 | End: 2021-04-18 | Stop reason: HOSPADM

## 2021-01-01 RX ORDER — MORPHINE SULFATE 20 MG/ML
20 SOLUTION ORAL
Status: DISCONTINUED | OUTPATIENT
Start: 2021-01-01 | End: 2021-04-18 | Stop reason: HOSPADM

## 2021-01-01 RX ORDER — MIRTAZAPINE 15 MG/1
15 TABLET, FILM COATED ORAL NIGHTLY
COMMUNITY

## 2021-01-01 RX ORDER — LATANOPROST 50 UG/ML
1 SOLUTION/ DROPS OPHTHALMIC NIGHTLY
Status: DISCONTINUED | OUTPATIENT
Start: 2021-01-01 | End: 2021-01-01

## 2021-01-01 RX ORDER — OFLOXACIN 3 MG/ML
2 SOLUTION/ DROPS OPHTHALMIC 4 TIMES DAILY
Status: DISCONTINUED | OUTPATIENT
Start: 2021-01-01 | End: 2021-04-18 | Stop reason: HOSPADM

## 2021-01-01 RX ORDER — ONDANSETRON 2 MG/ML
4 INJECTION INTRAMUSCULAR; INTRAVENOUS EVERY 6 HOURS PRN
Status: DISCONTINUED | OUTPATIENT
Start: 2021-01-01 | End: 2021-01-01 | Stop reason: HOSPADM

## 2021-01-01 RX ORDER — ACETAMINOPHEN 325 MG/1
650 TABLET ORAL EVERY 4 HOURS PRN
Status: DISCONTINUED | OUTPATIENT
Start: 2021-01-01 | End: 2021-01-01

## 2021-01-01 RX ORDER — HYDROMORPHONE HCL 110MG/55ML
1.5 PATIENT CONTROLLED ANALGESIA SYRINGE INTRAVENOUS
Status: DISCONTINUED | OUTPATIENT
Start: 2021-01-01 | End: 2021-04-18 | Stop reason: HOSPADM

## 2021-01-01 RX ORDER — NITROGLYCERIN 0.4 MG/1
0.4 TABLET SUBLINGUAL
Status: DISCONTINUED | OUTPATIENT
Start: 2021-01-01 | End: 2021-01-01

## 2021-01-01 RX ORDER — HYDROCODONE BITARTRATE AND ACETAMINOPHEN 7.5; 325 MG/1; MG/1
1 TABLET ORAL ONCE
Status: COMPLETED | OUTPATIENT
Start: 2021-01-01 | End: 2021-01-01

## 2021-01-01 RX ORDER — LATANOPROST 50 UG/ML
1 SOLUTION/ DROPS OPHTHALMIC DAILY
Status: DISCONTINUED | OUTPATIENT
Start: 2021-01-01 | End: 2021-01-01 | Stop reason: HOSPADM

## 2021-01-01 RX ORDER — HYDROCODONE BITARTRATE AND ACETAMINOPHEN 5; 325 MG/1; MG/1
1 TABLET ORAL EVERY 8 HOURS PRN
Status: DISCONTINUED | OUTPATIENT
Start: 2021-01-01 | End: 2021-01-01

## 2021-01-01 RX ORDER — ALBUMIN (HUMAN) 12.5 G/50ML
12.5 SOLUTION INTRAVENOUS AS NEEDED
Status: CANCELLED | OUTPATIENT
Start: 2021-01-01 | End: 2021-01-01

## 2021-01-01 RX ORDER — HALOPERIDOL 2 MG/ML
1 SOLUTION ORAL EVERY 4 HOURS PRN
Status: DISCONTINUED | OUTPATIENT
Start: 2021-01-01 | End: 2021-04-18 | Stop reason: HOSPADM

## 2021-01-01 RX ORDER — HEPARIN SODIUM 5000 [USP'U]/ML
5000 INJECTION, SOLUTION INTRAVENOUS; SUBCUTANEOUS EVERY 12 HOURS SCHEDULED
Status: DISCONTINUED | OUTPATIENT
Start: 2021-01-01 | End: 2021-01-01

## 2021-01-01 RX ORDER — ONDANSETRON 2 MG/ML
4 INJECTION INTRAMUSCULAR; INTRAVENOUS EVERY 6 HOURS PRN
Status: DISCONTINUED | OUTPATIENT
Start: 2021-01-01 | End: 2021-04-18 | Stop reason: HOSPADM

## 2021-01-01 RX ORDER — MIDODRINE HYDROCHLORIDE 5 MG/1
1 TABLET ORAL 3 TIMES DAILY
COMMUNITY
Start: 2021-01-01

## 2021-01-01 RX ORDER — ACETAMINOPHEN 325 MG/1
650 TABLET ORAL EVERY 6 HOURS PRN
Status: DISCONTINUED | OUTPATIENT
Start: 2021-01-01 | End: 2021-01-01

## 2021-01-01 RX ORDER — ACETAMINOPHEN 325 MG/1
650 TABLET ORAL EVERY 4 HOURS PRN
Status: DISCONTINUED | OUTPATIENT
Start: 2021-01-01 | End: 2021-04-18 | Stop reason: HOSPADM

## 2021-01-01 RX ADMIN — SODIUM CHLORIDE, PRESERVATIVE FREE 10 ML: 5 INJECTION INTRAVENOUS at 23:26

## 2021-01-01 RX ADMIN — SODIUM CHLORIDE, PRESERVATIVE FREE 10 ML: 5 INJECTION INTRAVENOUS at 20:14

## 2021-01-01 RX ADMIN — MIDODRINE HYDROCHLORIDE 5 MG: 5 TABLET ORAL at 06:52

## 2021-01-01 RX ADMIN — DEXTROSE MONOHYDRATE 25 G: 500 INJECTION PARENTERAL at 21:32

## 2021-01-01 RX ADMIN — PANTOPRAZOLE SODIUM 40 MG: 40 TABLET, DELAYED RELEASE ORAL at 06:51

## 2021-01-01 RX ADMIN — DEXTROSE AND SODIUM CHLORIDE 100 ML/HR: 5; 450 INJECTION, SOLUTION INTRAVENOUS at 07:37

## 2021-01-01 RX ADMIN — MORPHINE SULFATE 4 MG: 4 INJECTION, SOLUTION INTRAMUSCULAR; INTRAVENOUS at 09:50

## 2021-01-01 RX ADMIN — HYDROCODONE BITARTRATE AND ACETAMINOPHEN 1 TABLET: 5; 325 TABLET ORAL at 00:39

## 2021-01-01 RX ADMIN — MIDODRINE HYDROCHLORIDE 5 MG: 5 TABLET ORAL at 16:39

## 2021-01-01 RX ADMIN — HYDROCODONE BITARTRATE AND ACETAMINOPHEN 1 TABLET: 7.5; 325 TABLET ORAL at 09:46

## 2021-01-01 RX ADMIN — GABAPENTIN 100 MG: 100 CAPSULE ORAL at 20:14

## 2021-01-01 RX ADMIN — OFLOXACIN 2 DROP: 3 SOLUTION/ DROPS OPHTHALMIC at 08:38

## 2021-01-01 RX ADMIN — PANTOPRAZOLE SODIUM 40 MG: 40 TABLET, DELAYED RELEASE ORAL at 06:43

## 2021-01-01 RX ADMIN — GABAPENTIN 100 MG: 100 CAPSULE ORAL at 08:37

## 2021-01-01 RX ADMIN — LATANOPROST 1 DROP: 50 SOLUTION/ DROPS OPHTHALMIC at 10:48

## 2021-01-01 RX ADMIN — MORPHINE SULFATE 4 MG: 4 INJECTION, SOLUTION INTRAMUSCULAR; INTRAVENOUS at 16:39

## 2021-01-01 RX ADMIN — ACETAMINOPHEN 650 MG: 325 TABLET, FILM COATED ORAL at 20:41

## 2021-01-01 RX ADMIN — HYDROCODONE BITARTRATE AND ACETAMINOPHEN 1 TABLET: 5; 325 TABLET ORAL at 23:58

## 2021-01-01 RX ADMIN — OFLOXACIN 2 DROP: 3 SOLUTION/ DROPS OPHTHALMIC at 17:42

## 2021-01-01 RX ADMIN — SERTRALINE HYDROCHLORIDE 50 MG: 50 TABLET, FILM COATED ORAL at 09:46

## 2021-01-01 RX ADMIN — HYDROCODONE BITARTRATE AND ACETAMINOPHEN 1 TABLET: 5; 325 TABLET ORAL at 23:29

## 2021-01-01 RX ADMIN — PANTOPRAZOLE SODIUM 40 MG: 40 TABLET, DELAYED RELEASE ORAL at 06:32

## 2021-01-01 RX ADMIN — ACETAMINOPHEN 650 MG: 325 TABLET, FILM COATED ORAL at 09:46

## 2021-01-01 RX ADMIN — SODIUM CHLORIDE, PRESERVATIVE FREE 10 ML: 5 INJECTION INTRAVENOUS at 20:47

## 2021-01-01 RX ADMIN — BACITRACIN ZINC: 500 OINTMENT TOPICAL at 20:14

## 2021-01-01 RX ADMIN — MIDODRINE HYDROCHLORIDE 5 MG: 5 TABLET ORAL at 06:35

## 2021-01-01 RX ADMIN — OFLOXACIN 2 DROP: 3 SOLUTION/ DROPS OPHTHALMIC at 23:26

## 2021-01-01 RX ADMIN — HEPARIN SODIUM 5000 UNITS: 5000 INJECTION INTRAVENOUS; SUBCUTANEOUS at 20:47

## 2021-01-01 RX ADMIN — MIDODRINE HYDROCHLORIDE 5 MG: 5 TABLET ORAL at 10:47

## 2021-01-01 RX ADMIN — SODIUM CHLORIDE, PRESERVATIVE FREE 10 ML: 5 INJECTION INTRAVENOUS at 08:52

## 2021-01-01 RX ADMIN — HYDROCODONE BITARTRATE AND ACETAMINOPHEN 1 TABLET: 5; 325 TABLET ORAL at 16:52

## 2021-01-01 RX ADMIN — SODIUM CHLORIDE, PRESERVATIVE FREE 10 ML: 5 INJECTION INTRAVENOUS at 08:49

## 2021-01-01 RX ADMIN — SODIUM CHLORIDE, PRESERVATIVE FREE 10 ML: 5 INJECTION INTRAVENOUS at 20:32

## 2021-01-01 RX ADMIN — SERTRALINE HYDROCHLORIDE 50 MG: 50 TABLET, FILM COATED ORAL at 10:48

## 2021-01-01 RX ADMIN — DEXTROSE AND SODIUM CHLORIDE 100 ML/HR: 5; 450 INJECTION, SOLUTION INTRAVENOUS at 22:05

## 2021-01-01 RX ADMIN — HYDROCODONE BITARTRATE AND ACETAMINOPHEN 1 TABLET: 7.5; 325 TABLET ORAL at 14:22

## 2021-01-01 RX ADMIN — ACETAMINOPHEN 650 MG: 325 TABLET, FILM COATED ORAL at 16:52

## 2021-01-01 RX ADMIN — SODIUM CHLORIDE, PRESERVATIVE FREE 10 ML: 5 INJECTION INTRAVENOUS at 20:56

## 2021-01-01 RX ADMIN — HYDROCODONE BITARTRATE AND ACETAMINOPHEN 1 TABLET: 7.5; 325 TABLET ORAL at 15:12

## 2021-01-01 RX ADMIN — HYDROCODONE BITARTRATE AND ACETAMINOPHEN 1 TABLET: 5; 325 TABLET ORAL at 17:42

## 2021-01-01 RX ADMIN — OFLOXACIN 2 DROP: 3 SOLUTION/ DROPS OPHTHALMIC at 21:36

## 2021-01-01 RX ADMIN — PANTOPRAZOLE SODIUM 40 MG: 40 TABLET, DELAYED RELEASE ORAL at 06:39

## 2021-01-01 RX ADMIN — MORPHINE SULFATE 4 MG: 4 INJECTION, SOLUTION INTRAMUSCULAR; INTRAVENOUS at 19:34

## 2021-01-01 RX ADMIN — HYDROCODONE BITARTRATE AND ACETAMINOPHEN 1 TABLET: 5; 325 TABLET ORAL at 05:37

## 2021-01-01 RX ADMIN — HYDROCODONE BITARTRATE AND ACETAMINOPHEN 1 TABLET: 5; 325 TABLET ORAL at 20:14

## 2021-01-01 RX ADMIN — ACETAMINOPHEN 650 MG: 325 TABLET ORAL at 11:32

## 2021-01-01 RX ADMIN — BACITRACIN ZINC: 500 OINTMENT TOPICAL at 23:25

## 2021-01-01 RX ADMIN — ACETAMINOPHEN 650 MG: 325 TABLET, FILM COATED ORAL at 15:12

## 2021-01-01 RX ADMIN — LATANOPROST 1 DROP: 50 SOLUTION/ DROPS OPHTHALMIC at 09:47

## 2021-01-01 RX ADMIN — HYDROCODONE BITARTRATE AND ACETAMINOPHEN 1 TABLET: 7.5; 325 TABLET ORAL at 18:04

## 2021-01-01 RX ADMIN — HYDROCODONE BITARTRATE AND ACETAMINOPHEN 1 TABLET: 5; 325 TABLET ORAL at 12:01

## 2021-01-01 RX ADMIN — INSULIN LISPRO 2 UNITS: 100 INJECTION, SOLUTION INTRAVENOUS; SUBCUTANEOUS at 14:04

## 2021-01-01 RX ADMIN — GABAPENTIN 100 MG: 100 CAPSULE ORAL at 09:46

## 2021-01-01 RX ADMIN — ALBUMIN HUMAN 12.5 G: 0.25 SOLUTION INTRAVENOUS at 10:28

## 2021-01-01 RX ADMIN — OFLOXACIN 2 DROP: 3 SOLUTION/ DROPS OPHTHALMIC at 17:47

## 2021-01-01 RX ADMIN — OFLOXACIN 2 DROP: 3 SOLUTION/ DROPS OPHTHALMIC at 12:01

## 2021-01-01 RX ADMIN — HYDROCODONE BITARTRATE AND ACETAMINOPHEN 1 TABLET: 5; 325 TABLET ORAL at 05:41

## 2021-01-01 RX ADMIN — SERTRALINE HYDROCHLORIDE 50 MG: 50 TABLET, FILM COATED ORAL at 08:38

## 2021-01-01 RX ADMIN — OFLOXACIN 2 DROP: 3 SOLUTION/ DROPS OPHTHALMIC at 20:30

## 2021-01-01 RX ADMIN — SERTRALINE HYDROCHLORIDE 50 MG: 50 TABLET, FILM COATED ORAL at 08:37

## 2021-01-01 RX ADMIN — MORPHINE SULFATE 4 MG: 4 INJECTION, SOLUTION INTRAMUSCULAR; INTRAVENOUS at 20:27

## 2021-01-01 RX ADMIN — MIDODRINE HYDROCHLORIDE 5 MG: 5 TABLET ORAL at 17:47

## 2021-01-01 RX ADMIN — OFLOXACIN 2 DROP: 3 SOLUTION/ DROPS OPHTHALMIC at 20:47

## 2021-01-01 RX ADMIN — MIDODRINE HYDROCHLORIDE 5 MG: 5 TABLET ORAL at 16:52

## 2021-01-01 RX ADMIN — MORPHINE SULFATE 2 MG: 4 INJECTION, SOLUTION INTRAMUSCULAR; INTRAVENOUS at 18:21

## 2021-01-01 RX ADMIN — BACITRACIN ZINC: 500 OINTMENT TOPICAL at 09:47

## 2021-01-01 RX ADMIN — SODIUM CHLORIDE 500 ML: 9 INJECTION, SOLUTION INTRAVENOUS at 17:56

## 2021-01-01 RX ADMIN — OFLOXACIN 2 DROP: 3 SOLUTION/ DROPS OPHTHALMIC at 18:14

## 2021-01-01 RX ADMIN — LORAZEPAM 1 MG: 2 INJECTION INTRAMUSCULAR; INTRAVENOUS at 12:39

## 2021-01-01 RX ADMIN — BACITRACIN ZINC: 500 OINTMENT TOPICAL at 08:38

## 2021-01-01 RX ADMIN — HYDROCODONE BITARTRATE AND ACETAMINOPHEN 1 TABLET: 5; 325 TABLET ORAL at 11:53

## 2021-01-01 RX ADMIN — BACITRACIN ZINC: 500 OINTMENT TOPICAL at 20:41

## 2021-01-01 RX ADMIN — LORAZEPAM 0.5 MG: 2 INJECTION INTRAMUSCULAR; INTRAVENOUS at 22:40

## 2021-01-01 RX ADMIN — GABAPENTIN 100 MG: 100 CAPSULE ORAL at 16:39

## 2021-01-01 RX ADMIN — OFLOXACIN 2 DROP: 3 SOLUTION/ DROPS OPHTHALMIC at 20:13

## 2021-01-01 RX ADMIN — OFLOXACIN 2 DROP: 3 SOLUTION/ DROPS OPHTHALMIC at 18:10

## 2021-01-01 RX ADMIN — VANCOMYCIN HYDROCHLORIDE 1000 MG: 1 INJECTION, SOLUTION INTRAVENOUS at 14:13

## 2021-01-01 RX ADMIN — ACETAMINOPHEN 650 MG: 325 TABLET, FILM COATED ORAL at 08:36

## 2021-01-01 RX ADMIN — ACETAMINOPHEN 650 MG: 325 TABLET, FILM COATED ORAL at 18:00

## 2021-01-01 RX ADMIN — OFLOXACIN 2 DROP: 3 SOLUTION/ DROPS OPHTHALMIC at 09:35

## 2021-01-01 RX ADMIN — MIDODRINE HYDROCHLORIDE 5 MG: 5 TABLET ORAL at 08:40

## 2021-01-01 RX ADMIN — OFLOXACIN 2 DROP: 3 SOLUTION/ DROPS OPHTHALMIC at 18:00

## 2021-01-01 RX ADMIN — MORPHINE SULFATE 4 MG: 4 INJECTION, SOLUTION INTRAMUSCULAR; INTRAVENOUS at 22:29

## 2021-01-01 RX ADMIN — HYDROCODONE BITARTRATE AND ACETAMINOPHEN 1 TABLET: 5; 325 TABLET ORAL at 16:04

## 2021-01-01 RX ADMIN — PANTOPRAZOLE SODIUM 40 MG: 40 TABLET, DELAYED RELEASE ORAL at 05:41

## 2021-01-01 RX ADMIN — LATANOPROST 1 DROP: 50 SOLUTION/ DROPS OPHTHALMIC at 20:47

## 2021-01-01 RX ADMIN — OFLOXACIN 2 DROP: 3 SOLUTION/ DROPS OPHTHALMIC at 09:50

## 2021-01-01 RX ADMIN — HYDROCODONE BITARTRATE AND ACETAMINOPHEN 1 TABLET: 5; 325 TABLET ORAL at 10:47

## 2021-01-01 RX ADMIN — OFLOXACIN 2 DROP: 3 SOLUTION/ DROPS OPHTHALMIC at 10:48

## 2021-01-01 RX ADMIN — ACETAMINOPHEN 650 MG: 325 TABLET, FILM COATED ORAL at 08:38

## 2021-01-01 RX ADMIN — SODIUM CHLORIDE, PRESERVATIVE FREE 10 ML: 5 INJECTION INTRAVENOUS at 08:38

## 2021-01-01 RX ADMIN — OFLOXACIN 2 DROP: 3 SOLUTION/ DROPS OPHTHALMIC at 18:22

## 2021-01-01 RX ADMIN — INSULIN LISPRO 4 UNITS: 100 INJECTION, SOLUTION INTRAVENOUS; SUBCUTANEOUS at 18:25

## 2021-01-01 RX ADMIN — MORPHINE SULFATE 4 MG: 4 INJECTION, SOLUTION INTRAMUSCULAR; INTRAVENOUS at 04:33

## 2021-01-01 RX ADMIN — MIDODRINE HYDROCHLORIDE 5 MG: 5 TABLET ORAL at 18:00

## 2021-01-01 RX ADMIN — MORPHINE SULFATE 4 MG: 4 INJECTION, SOLUTION INTRAMUSCULAR; INTRAVENOUS at 12:39

## 2021-01-01 RX ADMIN — MIDODRINE HYDROCHLORIDE 5 MG: 5 TABLET ORAL at 12:01

## 2021-01-01 RX ADMIN — LORAZEPAM 1 MG: 2 INJECTION INTRAMUSCULAR; INTRAVENOUS at 16:39

## 2021-01-01 RX ADMIN — PANTOPRAZOLE SODIUM 40 MG: 40 TABLET, DELAYED RELEASE ORAL at 06:01

## 2021-01-01 RX ADMIN — MIDODRINE HYDROCHLORIDE 5 MG: 5 TABLET ORAL at 06:32

## 2021-01-01 RX ADMIN — MIDODRINE HYDROCHLORIDE 5 MG: 5 TABLET ORAL at 08:37

## 2021-01-01 RX ADMIN — OFLOXACIN 2 DROP: 3 SOLUTION/ DROPS OPHTHALMIC at 20:57

## 2021-01-01 RX ADMIN — DEXTROSE MONOHYDRATE 25 G: 500 INJECTION PARENTERAL at 20:41

## 2021-01-01 RX ADMIN — HYDROCODONE BITARTRATE AND ACETAMINOPHEN 1 TABLET: 7.5; 325 TABLET ORAL at 18:21

## 2021-01-01 RX ADMIN — HYDROCODONE BITARTRATE AND ACETAMINOPHEN 1 TABLET: 5; 325 TABLET ORAL at 01:37

## 2021-01-01 RX ADMIN — OFLOXACIN 2 DROP: 3 SOLUTION/ DROPS OPHTHALMIC at 20:33

## 2021-01-01 RX ADMIN — DEXTROSE MONOHYDRATE 25 G: 500 INJECTION PARENTERAL at 21:00

## 2021-01-01 RX ADMIN — HYDROCODONE BITARTRATE AND ACETAMINOPHEN 1 TABLET: 5; 325 TABLET ORAL at 06:34

## 2021-01-01 RX ADMIN — HYDROCODONE BITARTRATE AND ACETAMINOPHEN 1 TABLET: 5; 325 TABLET ORAL at 05:47

## 2021-01-01 RX ADMIN — BACITRACIN ZINC: 500 OINTMENT TOPICAL at 08:37

## 2021-01-01 RX ADMIN — HYDROCODONE BITARTRATE AND ACETAMINOPHEN 1 TABLET: 5; 325 TABLET ORAL at 21:55

## 2021-01-01 RX ADMIN — HEPARIN SODIUM 5000 UNITS: 5000 INJECTION INTRAVENOUS; SUBCUTANEOUS at 09:35

## 2021-01-01 RX ADMIN — SODIUM CHLORIDE, PRESERVATIVE FREE 10 ML: 5 INJECTION INTRAVENOUS at 10:48

## 2021-01-01 RX ADMIN — OFLOXACIN 2 DROP: 3 SOLUTION/ DROPS OPHTHALMIC at 08:37

## 2021-01-01 RX ADMIN — HYDROMORPHONE HYDROCHLORIDE 0.5 MG: 1 INJECTION, SOLUTION INTRAMUSCULAR; INTRAVENOUS; SUBCUTANEOUS at 10:52

## 2021-01-01 RX ADMIN — OFLOXACIN 2 DROP: 3 SOLUTION/ DROPS OPHTHALMIC at 09:46

## 2021-01-01 RX ADMIN — LORAZEPAM 0.5 MG: 2 INJECTION INTRAMUSCULAR; INTRAVENOUS at 09:50

## 2021-01-01 RX ADMIN — SODIUM CHLORIDE, PRESERVATIVE FREE 10 ML: 5 INJECTION INTRAVENOUS at 13:06

## 2021-01-01 RX ADMIN — LORAZEPAM 1 MG: 2 INJECTION INTRAMUSCULAR; INTRAVENOUS at 20:28

## 2021-02-01 NOTE — PROGRESS NOTES
Chief Complaint   Patient presents with   • Hypotension     low blood pressure   • Chronic Renal Failure   • Heartburn   • Eye Pain       History of Present Illness   Veronica Henao is a 85 y.o. female presents for acute care. Patient reports that she is struggling w/ hypotension recently. Particularly prominent when she goes to dialysis. May be as low as 70s/50s. Started midodrine about 1 week ago.   Patient having left eye discomfort and erythema recently. Cleared  w ocuflox June last year.   She takes gabapentin bid for painful peripheral neuropathy. This works very well.   Patient is questioning when she should have her next cscope and egd should be performed. She has persistent ZAFAR.   Has renal failure. Dialysis 3 times weekly.       The following portions of the patient's history were reviewed and updated as appropriate: allergies, current medications, past family history, past medical history, past social history, past surgical history and problem list.  Current Outpatient Medications on File Prior to Visit   Medication Sig Dispense Refill   • acetaminophen (TYLENOL) 325 MG tablet Take 2 tablets by mouth Every 6 (Six) Hours As Needed for Mild Pain (1-3).  0   • Alcohol Swabs (B-D SINGLE USE SWABS REGULAR) pads Inject 1 each under the skin into the appropriate area as directed Daily. 100 each 3   • apixaban (ELIQUIS) 2.5 MG tablet tablet Take 1 tablet by mouth Every 12 (Twelve) Hours. 180 tablet 3   • Blood Glucose Monitoring Suppl (TRUE METRIX AIR GLUCOSE METER) device 1 each Daily. 1 each 0   • famotidine (Pepcid) 40 MG tablet Take 1 tablet by mouth Daily. 90 tablet 3   • gabapentin (NEURONTIN) 300 MG capsule Take 1 capsule by mouth 2 (Two) Times a Day. 180 capsule 1   • glucose blood (TRUE METRIX BLOOD GLUCOSE TEST) test strip 1 each by Other route 2 (Two) Times a Day. Use as instructed 100 each 3   • Lancets 28G misc To check sugar qd. E11.9  each 12   • latanoprost (XALATAN) 0.005 % ophthalmic  solution      • midodrine (PROAMATINE) 5 MG tablet Take 1 tablet by mouth 3 (Three) Times a Day.     • pramipexole (MIRAPEX) 0.25 MG tablet TAKE 1 TABLET THREE TIMES DAILY 270 tablet 1   • sertraline (ZOLOFT) 50 MG tablet Take 1 tablet by mouth Daily. 90 tablet 3   • Triamcinolone Acetonide (NASACORT) 55 MCG/ACT nasal inhaler 2 sprays into the nostril(s) as directed by provider Daily. 16.5 g 11   • TRUEPLUS LANCETS 28G misc E11.9 DM to check sugar  each 3   • [DISCONTINUED] ofloxacin (Ocuflox) 0.3 % ophthalmic solution Administer 2 drops into the left eye 4 (Four) Times a Day. 5 mL 1     No current facility-administered medications on file prior to visit.      Review of Systems   Constitutional: Positive for fatigue.   HENT: Negative.    Eyes: Positive for discharge and redness.   Respiratory: Negative.  Negative for shortness of breath.    Cardiovascular: Negative.    Gastrointestinal:        Reflux in the evening   Endocrine: Negative.    Genitourinary: Negative.    Musculoskeletal: Positive for arthralgias and gait problem.   Skin: Negative.    Allergic/Immunologic: Negative.    Hematological: Negative.    Psychiatric/Behavioral: Negative.        Objective   Physical Exam  Vitals signs and nursing note reviewed.   Constitutional:       Appearance: Normal appearance.   HENT:      Head: Normocephalic and atraumatic.      Right Ear: Tympanic membrane normal.      Left Ear: Tympanic membrane normal.      Nose: Nose normal.      Mouth/Throat:      Mouth: Mucous membranes are moist.   Eyes:      Extraocular Movements: Extraocular movements intact.      Pupils: Pupils are equal, round, and reactive to light.   Neck:      Musculoskeletal: Normal range of motion.   Cardiovascular:      Rate and Rhythm: Normal rate and regular rhythm.      Pulses: Normal pulses.      Heart sounds: Normal heart sounds.   Pulmonary:      Effort: Pulmonary effort is normal.      Breath sounds: Normal breath sounds.   Abdominal:       "General: Abdomen is flat.      Palpations: Abdomen is soft.   Musculoskeletal: Normal range of motion.   Skin:     General: Skin is warm and dry.   Neurological:      General: No focal deficit present.      Mental Status: She is alert and oriented to person, place, and time.   Psychiatric:         Mood and Affect: Mood normal.         Behavior: Behavior normal.         Thought Content: Thought content normal.         Judgment: Judgment normal.          /56 (BP Location: Left arm, Patient Position: Sitting, Cuff Size: Adult)   Pulse 86   Temp 98.6 °F (37 °C) (Temporal)   Ht 157.5 cm (62.01\")   Wt 64 kg (141 lb)   SpO2 97%   BMI 25.78 kg/m²     Assessment/Plan   Diagnoses and all orders for this visit:    Dysphagia, unspecified type  -     Ambulatory Referral to Gastroenterology    Polyp of colon, unspecified part of colon, unspecified type  -     Ambulatory Referral to Gastroenterology    Gastroesophageal reflux disease, unspecified whether esophagitis present  -     Ambulatory Referral to Gastroenterology    Other orders  -     midodrine (PROAMATINE) 5 MG tablet; Take 1 tablet by mouth 3 (Three) Times a Day.  -     ofloxacin (Ocuflox) 0.3 % ophthalmic solution; Administer 2 drops into the left eye 4 (Four) Times a Day.  -     omeprazole (PrilOSEC) 20 MG capsule; Take 1 capsule by mouth Daily.    patient w/ hypotension. She is responding well to midodrine and will continue this. She is on fluid restriction and sodium restriction which makes increasing bp difficult. She is at a safe level today. Do not have access to lab results from dialysis center. Will give lab orders for dialysis tomorrow. Will start omeprazole in am and continue famotidine hs. Will start ocuflox eye gtt left eye. She will monitor bp and continue midodrine. Encouraged healthy movement as well. She will f/u w/ gi when needed and will request lab results from dialysis center.                "

## 2021-02-19 NOTE — TELEPHONE ENCOUNTER
Caller: Nesha Young    Relationship to patient: Emergency Contact    Best call back number:[phone]     Patient is needing: Patients emergency contact called in stating that the patient was placed on midodrine (PROAMATINE) 5 MG tablet for her Blood pressure, and it is helping keep her blood pressure stable during her dialysis, but its making her feel like she can not walk otherwise. Stated that it makes her feel likes her legs are heavy and they are having to push her around in her walker. Please advise on what she should do.

## 2021-02-19 NOTE — PROGRESS NOTES
"Patient taking midodrine for hypotension. Notes that this allows for a healthy benefit when she has dialysis. However, she states she \"can't move her legs\" when she is not on a dialysis day. She is taking 3/day usually or 6 on dialysis days. Daughter did not give any last night or this morning and she is moving better than before. Will try midodrine just on dialysis days and monitor.   jw  "

## 2021-02-26 NOTE — TELEPHONE ENCOUNTER
----- Message from Nelly Price MD sent at 2/24/2021  3:42 PM EST -----  Please advise patient her glucose level is diabetic/ elevated. We will discuss options for treatment at her office visit 3/1  jw

## 2021-03-01 NOTE — PROGRESS NOTES
Chief Complaint   Patient presents with   • other     needs ramp and wheelchair orders   • Chronic Kidney Disease   • Diabetes   • Headache   • Other     hyponatremia       History of Present Illness   Veronica Henao is a 86 y.o. female presents for acute care. Patient has dialysis dependent ESRD. She has associated hypotension during dialysis. She started midodrine for this she takes 2 tabs twice during the sessions. She then develops a headache about 3 hours into her session.   She has elevated glucose at 220. hgb a1c of 7.8. she has a low appetite. She is drinking renal specific supplemental shakes and she is awaiting these to arrive from [employer].   Patient has gait impairment. She can only walk short distances and requires assistance to safely ambulate.         The following portions of the patient's history were reviewed and updated as appropriate: allergies, current medications, past family history, past medical history, past social history, past surgical history and problem list.  Current Outpatient Medications on File Prior to Visit   Medication Sig Dispense Refill   • acetaminophen (TYLENOL) 325 MG tablet Take 2 tablets by mouth Every 6 (Six) Hours As Needed for Mild Pain (1-3).  0   • Alcohol Swabs (B-D SINGLE USE SWABS REGULAR) pads Inject 1 each under the skin into the appropriate area as directed Daily. 100 each 3   • apixaban (ELIQUIS) 2.5 MG tablet tablet Take 1 tablet by mouth Every 12 (Twelve) Hours. 180 tablet 3   • Blood Glucose Monitoring Suppl (TRUE METRIX AIR GLUCOSE METER) device 1 each Daily. 1 each 0   • gabapentin (NEURONTIN) 300 MG capsule Take 1 capsule by mouth 2 (Two) Times a Day. 180 capsule 1   • glucose blood (TRUE METRIX BLOOD GLUCOSE TEST) test strip 1 each by Other route 2 (Two) Times a Day. Use as instructed 100 each 3   • Lancets 28G misc To check sugar qd. E11.9  each 12   • latanoprost (XALATAN) 0.005 % ophthalmic solution      • midodrine (PROAMATINE) 5 MG tablet Take 1  tablet by mouth 3 (Three) Times a Day.     • ofloxacin (Ocuflox) 0.3 % ophthalmic solution Administer 2 drops into the left eye 4 (Four) Times a Day. 5 mL 1   • omeprazole (PrilOSEC) 20 MG capsule Take 1 capsule by mouth Daily. 90 capsule 1   • sertraline (ZOLOFT) 50 MG tablet Take 1 tablet by mouth Daily. 90 tablet 3   • TRUEPLUS LANCETS 28G misc E11.9 DM to check sugar  each 3   • famotidine (Pepcid) 40 MG tablet Take 1 tablet by mouth Daily. 90 tablet 3     No current facility-administered medications on file prior to visit.     Review of Systems   Constitutional: Positive for fatigue.   Eyes: Negative.    Respiratory: Negative.    Cardiovascular: Negative.    Gastrointestinal: Negative.    Endocrine: Negative.    Genitourinary: Negative.    Musculoskeletal: Positive for arthralgias and back pain.        Leg weakness   Skin: Negative.    Neurological: Positive for headaches.   Hematological: Negative.    Psychiatric/Behavioral: Negative.        Objective   Physical Exam  Vitals and nursing note reviewed.   Constitutional:       Comments: Chronically ill appearing   Wheelchair bound   HENT:      Head: Normocephalic and atraumatic.      Right Ear: Tympanic membrane normal.      Left Ear: Tympanic membrane normal.      Nose: Nose normal.      Mouth/Throat:      Mouth: Mucous membranes are moist.   Eyes:      Extraocular Movements: Extraocular movements intact.      Pupils: Pupils are equal, round, and reactive to light.   Cardiovascular:      Rate and Rhythm: Normal rate and regular rhythm.      Pulses: Normal pulses.   Pulmonary:      Effort: Pulmonary effort is normal.      Breath sounds: Normal breath sounds.   Musculoskeletal:      Cervical back: Normal range of motion.      Comments: Decreased leg strength bilateral. Unstead, requires assistance sit to stand. Unable to safely transfer onto exam table.      Neurological:      General: No focal deficit present.      Mental Status: She is alert and oriented  to person, place, and time.   Psychiatric:         Mood and Affect: Mood normal.         Behavior: Behavior normal.         Thought Content: Thought content normal.         Judgment: Judgment normal.          /46   Pulse 74     Assessment/Plan   Diagnoses and all orders for this visit:    Chronic kidney disease, stage IV (severe) (CMS/[facility])    ESRD on hemodialysis (CMS/HCC)    Type 2 diabetes mellitus with hyperglycemia, without long-term current use of insulin (CMS/HCC)    Nonintractable episodic headache, unspecified headache type    Weakness of both lower extremities      Patient with ESRD. She is having headache during dialysis. ? If related to hypotension or electrolyte shift. Will take second midodrine tabs 2 hours into dialysis and may repeat again if needed. She will take 500 mg tylenol as needed. She will try midodrine bid during the week. She has elevated glucose and will test this at home and call w/ readings. Consider low dose insulin if needed. She has leg weakness w/ impaired gait and needs a wheelchair with ramp due to very high risk for a fall. Will order this for the patient. She will follow up routinely.

## 2021-03-10 NOTE — TELEPHONE ENCOUNTER
PATIENT STATES: that she needs a wheel chair and handicap rap would like a call back about it please advise      PATIENT CAN BE REACHED ON:[phone]

## 2021-03-11 NOTE — TELEPHONE ENCOUNTER
Spoke with pts daughter she will call insurance to send form for wheel chair and ramp  Also called Saida with organization to see if they can help   [phone] per dr dowd

## 2021-03-19 NOTE — TELEPHONE ENCOUNTER
Pt daughter is asking for a referral for at home rehab to strengthen pts legs.     Also info sent to [facility]ight with [facility]  Can you make a note to wheel chair and ramp needs on last office note

## 2021-03-25 NOTE — TELEPHONE ENCOUNTER
PATIENT DAUGHTER STATES MOM FELL  SEVERAL WEEKS AGO AND SHE IS NOW FEELING SHE HAS DONE SOMETHING TO HER HEAR WHEN SHE FELL. SHE IS ALSO HAVING A COUGH AND WANTS TO KNOW WHAT SHE SHOULD TAKE FOR THAT. SHE IS ASKING IF SHE COULD HAVE A TEST ON HER HEAD TO MAKE SURE THERE WAS NO INJURY.    PLEASE ADVISE  [phone]     [id number] - [place], [place] - [address] [place] [address] AT [facility] [address] [place] [address]  - [phone]  - [phone] FX

## 2021-03-26 NOTE — PROGRESS NOTES
Chief Complaint   Patient presents with   • Cough   • Headache   • Hypotension       History of Present Illness   Veronica Henao is a 86 y.o. female presents for follow up evaluation w/ acute needs. She has many chronic medical needs. She has esrd. She has hypotension w/ dialysis. She is having some wounds on her legs from pressure w/ commode seat. She has a headache at the base of her skull radiating up into her head. She has a pacemaker and B TKR and dental implants.     The following portions of the patient's history were reviewed and updated as appropriate: allergies, current medications, past family history, past medical history, past social history, past surgical history and problem list.  Current Outpatient Medications on File Prior to Visit   Medication Sig Dispense Refill   • acetaminophen (TYLENOL) 325 MG tablet Take 2 tablets by mouth Every 6 (Six) Hours As Needed for Mild Pain (1-3).  0   • Alcohol Swabs (B-D SINGLE USE SWABS REGULAR) pads Inject 1 each under the skin into the appropriate area as directed Daily. 100 each 3   • apixaban (ELIQUIS) 2.5 MG tablet tablet Take 1 tablet by mouth Every 12 (Twelve) Hours. 180 tablet 3   • Blood Glucose Monitoring Suppl (TRUE METRIX AIR GLUCOSE METER) device 1 each Daily. 1 each 0   • famotidine (Pepcid) 40 MG tablet Take 1 tablet by mouth Daily. 90 tablet 3   • gabapentin (NEURONTIN) 300 MG capsule Take 1 capsule by mouth 2 (Two) Times a Day. 180 capsule 1   • glucose blood (TRUE METRIX BLOOD GLUCOSE TEST) test strip 1 each by Other route 2 (Two) Times a Day. Use as instructed 100 each 3   • Lancets 28G misc To check sugar qd. E11.9  each 12   • latanoprost (XALATAN) 0.005 % ophthalmic solution      • midodrine (PROAMATINE) 5 MG tablet Take 1 tablet by mouth 3 (Three) Times a Day.     • ofloxacin (Ocuflox) 0.3 % ophthalmic solution Administer 2 drops into the left eye 4 (Four) Times a Day. 5 mL 1   • omeprazole (PrilOSEC) 20 MG capsule Take 1 capsule by mouth  Daily. 90 capsule 1   • pramipexole (MIRAPEX) 0.25 MG tablet TAKE 1 TABLET THREE TIMES DAILY 270 tablet 1   • sertraline (ZOLOFT) 50 MG tablet Take 1 tablet by mouth Daily. 90 tablet 3   • TRUEPLUS LANCETS 28G misc E11.9 DM to check sugar  each 3     No current facility-administered medications on file prior to visit.     Review of Systems   Constitutional: Negative.    HENT: Negative.    Eyes: Negative.    Respiratory: Negative.    Cardiovascular: Negative.    Gastrointestinal: Negative.    Endocrine: Negative.    Genitourinary: Negative.    Skin: Positive for wound.   Allergic/Immunologic: Negative.    Neurological: Negative.    Hematological: Negative.    Psychiatric/Behavioral: Negative.        Objective   Physical Exam  Vitals and nursing note reviewed.   Constitutional:       Comments: Chronically ill appearing/ frail   HENT:      Head: Normocephalic and atraumatic.      Right Ear: Tympanic membrane normal.      Left Ear: Tympanic membrane normal.      Nose: Nose normal.      Mouth/Throat:      Mouth: Mucous membranes are moist.   Eyes:      Extraocular Movements: Extraocular movements intact.      Pupils: Pupils are equal, round, and reactive to light.   Cardiovascular:      Rate and Rhythm: Normal rate and regular rhythm.      Comments: Decreased pulse  Pulmonary:      Effort: Pulmonary effort is normal.      Breath sounds: Normal breath sounds.   Abdominal:      General: Abdomen is flat.      Palpations: Abdomen is soft.   Musculoskeletal:      Cervical back: Normal range of motion.   Neurological:      Mental Status: She is alert. Mental status is at baseline.   Psychiatric:         Mood and Affect: Mood normal.         Behavior: Behavior normal.         Thought Content: Thought content normal.         Judgment: Judgment normal.          BP (!) 86/40   Pulse 72     Assessment/Plan   Diagnoses and all orders for this visit:    ESRD (end stage renal disease) (CMS/[facility])    Traumatic injury of head,  initial encounter  -     CT Head Without Contrast; Future  -     CT cervical spine wo contrast; Future    Chronic nonintractable headache, unspecified headache type  -     CT Head Without Contrast; Future  -     CT cervical spine wo contrast; Future  -     HYDROcodone-acetaminophen (NORCO) 5-325 MG per tablet; Take 1 tablet by mouth Every 8 (Eight) Hours As Needed for Moderate Pain .    Anticoagulated  -     CT Head Without Contrast; Future  -     CT cervical spine wo contrast; Future    Pressure injury of thigh, stage 2, unspecified laterality (CMS/[facility])  -     Cancel: Ambulatory Referral to Wound Clinic  -     Ambulatory Referral to Wound Clinic    Ulcer of both feet, limited to breakdown of skin (CMS/[facility])  -     Ambulatory Referral to Wound Clinic      Patient w/ ESRD. Very frail w/ hypotension and subsequent decreased perfusion related to midodrine as noted in distal digits. She has pressure wounds and bruising. Falls at least once a month. Will hold eliquis due to risk. Advise increased vitamin c. To wound care clinic to evaluate pressure wounds on thighs as well as skin break down in feet bilaterally. Will test stat ct head and neck given headache after fall on anti coagulation. Patient may be appropriate for a [facility] referral in the future and did advise daughter of this. Will give hydrocodone for headache and attempt to optimize her comfort at this time.   brennon

## 2021-03-26 NOTE — NURSING NOTE
Dr. Watson called and stated patient may go, nothing acute on scan. Patient and daughter escorted out to main entrance,no issues or concerns.

## 2021-03-29 NOTE — TELEPHONE ENCOUNTER
Pts daughter informed      ----- Message from Nelly Price MD sent at 3/28/2021  5:49 PM EDT -----  Please advise patients daughter/ patient that cardiology agrees she should stop eliquis.   jw

## 2021-03-29 NOTE — TELEPHONE ENCOUNTER
----- Message from Nelly Price MD sent at 3/29/2021  8:23 AM EDT -----  Patient has arthritic changes on her ct cervical spine. No findings of fracture. No signs of intracranial hemorrhage. She can continue with fall precautions and remain off of eliquis. Will follow up in April as scheduled.  Gucci

## 2021-04-03 NOTE — ED NOTES
Pt daughter at bedside who reports pt has also been having diarrhea since December. Pt reports the increased usage of her bedside commode is what caused her current hip bruising.     Dante Fisher, RN  04/03/21 3946

## 2021-04-03 NOTE — ED NOTES
"Pt reports she coughed while at dialysis today due to a dry throat and they requested she get tested for coronavirus before they work with her further. Pt has bilateral hip wounds that she stated \"started as a bruise but the skin peeled off\" no open wounds or active bleeding at this time.      Dante Fisher, RN  04/03/21 1154    "

## 2021-04-03 NOTE — ED NOTES
Spoke with pt daughter who reports pt was referred to wound care 1 week ago and she was coughing in the waiting room so they had her go get a Covid test to be seen. Pt had a unwitnessed  fall this morning but she does not hit her head. Pt has had her first Covid vaccine shot 1.5 weeks ago. Pt due for dialysis today.     Dante Fisher, RN  04/03/21 1729

## 2021-04-03 NOTE — ED NOTES
Pts hands are cold. I was unable to get the Pulse ox to read at this time. Pt appears in no distress.      Odette Gomez, RN  04/03/21 2256

## 2021-04-03 NOTE — ED PROVIDER NOTES
EMERGENCY DEPARTMENT ENCOUNTER    Room Number:  26/26  Date seen:  4/3/2021  PCP: Nelly Price MD  Historian: Patient      HPI:  Chief Complaint: Bilateral hip pain and wounds  A complete HPI/ROS/PMH/PSH/SH/FH are unobtainable due to: Nothing  Context: Veronica Henao is a 86 y.o. female who presents to the ED c/o pain over her hips bilaterally.  She reports that she fell about 2 weeks ago and she has had swelling over the outer hips bilaterally.  She has developed some wounds there and she was unable to be evaluated by wound care because she had a dry cough in the office.  She denies any fever or chills.  She denies shortness of air.  She reportedly fell again last night.  She denies head trauma or loss of consciousness.  She denies neck pain.  Currently she is only complaining of the pain over her hips bilaterally which is mild, constant, nonradiating.  She has a history of ESRD on hemodialysis Tuesday, Thursday, Saturday.  She has not yet had dialysis today.            PAST MEDICAL HISTORY  Active Ambulatory Problems     Diagnosis Date Noted   • Atopic rhinitis 02/01/2016   • Gout 02/01/2016   • Cephalalgia 02/01/2016   • Hypercalcemia 02/01/2016   • Hyperlipidemia 02/01/2016   • Lung mass 03/07/2016   • Gastroesophageal reflux disease 08/29/2016   • Tricuspid insufficiency 09/16/2016   • Morbid obesity (CMS/HCC) 11/11/2016   • JOSELUIS on CPAP 11/11/2016   • Chronic venous insufficiency 11/11/2016   • Mitral valve insufficiency 12/14/2016   • S/P Maze operation for atrial fibrillation 12/29/2016   • S/P TVR (tricuspid valve repair) 12/29/2016   • S/P MVR (mitral valve repair) 12/29/2016   • Atrial flutter, paroxysmal (CMS/HCC) 06/09/2017   • Third degree atrioventricular block (CMS/HCC) 07/04/2017   • Chronic kidney disease, stage IV (severe) (CMS/HCC) 07/25/2017   • Anemia in chronic kidney disease 07/25/2017   • Iron deficiency anemia 07/25/2017   • Pulmonary hypertension (CMS/HCC) 08/28/2018   • Chronic diastolic  congestive heart failure (CMS/HCC) 10/31/2018   • ESRD on hemodialysis (CMS/HCC) 02/05/2020   • Control of atrial fibrillation with pacemaker (CMS/HCC) 02/05/2020   • Chronic atrial fibrillation (CMS/HCC) 07/06/2020   • Chronic anticoagulation 07/06/2020     Resolved Ambulatory Problems     Diagnosis Date Noted   • Atrial fibrillation (CMS/HCC) 02/01/2016   • Chronic renal insufficiency 02/01/2016   • Subclinical hypothyroidism 02/01/2016   • 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) 02/01/2016   • Urinary tract infection 02/01/2016   • Essential hypertension 03/07/2016   • Mitral regurgitation 09/16/2016   • Benign essential hypertension 11/11/2016   • Diabetes mellitus (CMS/HCC) 11/11/2016   • IDDM (insulin dependent diabetes mellitus) 01/17/2017   • CHF (congestive heart failure) (CMS/HCC) 05/25/2017   • Acute non-recurrent maxillary sinusitis 06/02/2017   • Chronic diastolic heart failure (CMS/HCC) 07/29/2017   • Paroxysmal atrial fibrillation (CMS/HCC) 08/27/2018   • SOBOE (shortness of breath on exertion) 10/30/2018   • Acute renal failure superimposed on stage 4 chronic kidney disease (CMS/HCC) 01/05/2020     Past Medical History:   Diagnosis Date   • Acute bronchitis    • Acute renal insufficiency    • Allergic rhinitis    • Arrhythmia    • Brachycephaly    • Breast pain, right    • Chest pain    • Chronic combined systolic and diastolic CHF (congestive heart failure) (CMS/HCC)    • CKD (chronic kidney disease), stage IV (CMS/HCC)    • Cough    • Depression    • Dialysis patient (CMS/HCC)    • Diverticulosis    • ORTIZ (dyspnea on exertion)    • Dyspnea    • Esophageal reflux    • Fatigue    • GERD (gastroesophageal reflux disease)    • Head injury    • Headache    • Hoarseness    • Hypertension    • Influenza A (H1N1)    • Itching    • Leg swelling    • Lightheadedness    • Macular degeneration    • Malaise and fatigue    • Mitral valve regurgitation    • Nontoxic multinodular  goiter    • Obesity    • Osteoarthritis    • PAF (paroxysmal atrial fibrillation) (CMS/[facility])    • Palpitations    • Paresthesias    • Proteinuria    • Pulmonary nodule    • Pulmonic valve regurgitation    • Sebaceous cyst    • Solitary thyroid nodule    • Tachycardia    • TR (tricuspid regurgitation)    • Type 2 diabetes mellitus (CMS/[facility])    • Type 2 diabetes mellitus with chronic kidney disease (CMS/[facility])    • UTI (urinary tract infection)          PAST SURGICAL HISTORY  Past Surgical History:   Procedure Laterality Date   • APPENDECTOMY     • ARTERIOVENOUS FISTULA/SHUNT SURGERY Left 2/5/2020    Procedure: LEFT BRACHIAL ARTERY AXILLARY VEIN GORTEX SHUNT;  Surgeon: Kaveh Mcdonnell MD;  Location: [facility] MAIN OR;  Service: Vascular;  Laterality: Left;   • CARDIAC CATHETERIZATION  1986    procedure outcome:    • CARDIAC CATHETERIZATION N/A 11/21/2016    Procedure: Coronary angiography;  Surgeon: Marcin العراقي MD;  Location: [facility] CATH INVASIVE LOCATION;  Service:    • CARDIAC CATHETERIZATION N/A 11/21/2016    Procedure: Left Heart Cath;  Surgeon: Marcin العراقي MD;  Location: [facility] CATH INVASIVE LOCATION;  Service:    • CARDIAC CATHETERIZATION N/A 8/29/2018    Procedure: Right Heart Cath with adenosine challenge if wedge pressure is normal.;  Surgeon: Paulo Decker MD;  Location: [facility] CATH INVASIVE LOCATION;  Service: Cardiovascular   • CARDIAC ELECTROPHYSIOLOGY PROCEDURE N/A 3/28/2017    Procedure: Ablation atrial flutter;  Surgeon: Rodríguez Ward MD;  Location: [facility] CATH INVASIVE LOCATION;  Service:    • CARDIAC ELECTROPHYSIOLOGY PROCEDURE N/A 7/3/2017    Procedure: AV node ablation;  Surgeon: Rodríguez Ward MD;  Location: [facility] CATH INVASIVE LOCATION;  Service:    • CARDIAC ELECTROPHYSIOLOGY PROCEDURE N/A 7/3/2017    Procedure: Pacemaker DC new  [employer] and will need 3830 lead-I did chase Meneses ;  Surgeon: Rodríguez Ward MD;  Location: [facility] CATH INVASIVE LOCATION;  Service:     • CARDIAC SURGERY     • CHOLECYSTECTOMY     • CLAVICLE SURGERY Left    • COLONOSCOPY N/A 11/14/2017    Procedure: COLONOSCOPY INTO CECUM/ TERMINAL ILEUM WITH POLYPECTOMY  X 8 AND CLIP X 2;  Surgeon: Jacy Browning MD;  [facility] END[facility];  Service:    • ENDOSCOPY N/A 11/14/2017    Procedure: ESOPHAGOGASTRODUODENOSCOPY WITH BIOPSIES;  Surgeon: Jacy Browning MD;  Location: [facility] ENDOSCOPY;  Service:    • GASTRIC RESTRICTION SURGERY     • HYSTERECTOMY     • INSERTION HEMODIALYSIS CATHETER N/A 1/10/2020    Procedure: TUNNEL DIALYSIS CATHETER;  Surgeon: Carlos Manuel Jernigan MD;  Location: [facility] MAIN OR;  Service: Vascular   • JOINT REPLACEMENT     • LUMBAR DECOMPRESSION      L4-5   • MITRAL VALVE REPAIR/REPLACEMENT N/A 12/14/2016    Procedure: INTRAOPERATIVE KATELYN, MIDLINE STERNOTOMY, MITRAL VALVE REPAIR, TRICUSPID VALVE REPAIR, MAZE PROCEDURE;  Surgeon: Young Vital MD;  Location: [facility] MAIN OR;  Service:    • SHOULDER SURGERY Left     AFTER SURGERY FOR FRACTURED CLAVICLE   • TONSILLECTOMY     • TOTAL KNEE ARTHROPLASTY      bilateral         FAMILY HISTORY  Family History   Problem Relation Age of Onset   • Emphysema Mother    • Heart disease Father    • Lung cancer Father    • Prostate cancer Father    • Asthma Sister    • Lung cancer Daughter    • Colon cancer Other    • No Known Problems Maternal Grandmother    • No Known Problems Maternal Grandfather    • Arthritis Paternal Grandmother    • No Known Problems Paternal Grandfather    • Malig Hyperthermia Neg Hx          SOCIAL HISTORY  Social History     Socioeconomic History   • Marital status:      Spouse name: Not on file   • Number of children: Not on file   • Years of education: Not on file   • Highest education level: Not on file   Tobacco Use   • Smoking status: Former Smoker     Packs/day: 1.50     Years: 20.00     Pack years: 30.00     Start date: 6/14/1970   • Smokeless tobacco: Never Used   • Tobacco comment: D/C 1970 SMOKING 1  1/2 PPD FOR 13 YRS BEFORE QUITTING   Substance and Sexual Activity   • Alcohol use: No     Comment: caffeine use   • Drug use: No   • Sexual activity: Defer         ALLERGIES  Cephalexin, Meperidine, and Penicillins        REVIEW OF SYSTEMS  Review of Systems   Review of all 14 systems is negative other than stated in the HPI above.      PHYSICAL EXAM  ED Triage Vitals   Temp Heart Rate Resp BP SpO2   04/03/21 1038 04/03/21 1038 04/03/21 1038 04/03/21 1038 04/03/21 1141   96.2 °F (35.7 °C) 77 18 97/74 94 %      Temp src Heart Rate Source Patient Position BP Location FiO2 (%)   04/03/21 1038 04/03/21 1038 -- -- --   Tympanic Monitor            GENERAL: Awake alert, no acute distress  HENT: nares patent, no scalp hematoma  EYES: no scleral icterus, pupils 3 mm reactive bilaterally  CV: regular rhythm, normal rate  RESPIRATORY: normal effort, lungs clear to auscultation bilaterally  ABDOMEN: soft, nondistended, nontender throughout  MUSCULOSKELETAL: no deformity.  There is moderate soft tissue swelling over the lateral aspect of the proximal thigh bilaterally.  2+ DP pulses bilateral lower extremities.  There is 2+ right radial pulse.  Left radial pulse is diminished and there is delayed cap refill in the digits of the left hand with some purple discoloration of the fingers of the left hand.  There is a fistula present left upper extremity with palpable thrill.  Mild point tenderness present over the greater trochanter bilaterally.  NEURO: alert, moves all extremities, follows commands  PSYCH:  calm, cooperative  SKIN: warm, dry.  There is mild eschar formation less than 1 cm with some associated ecchymosis over the lateral thigh bilaterally.  There is also a small area measuring approximately 4 cm of ecchymosis on the left medial thigh.    Vital signs and nursing notes reviewed.          LAB RESULTS  Recent Results (from the past 24 hour(s))   Comprehensive Metabolic Panel    Collection Time: 04/03/21 12:56 PM     Specimen: Blood   Result Value Ref Range    Glucose 241 (H) 65 - 99 mg/dL    BUN 30 (H) 8 - 23 mg/dL    Creatinine 4.43 (H) 0.57 - 1.00 mg/dL    Sodium 137 136 - 145 mmol/L    Potassium 3.7 3.5 - 5.2 mmol/L    Chloride 95 (L) 98 - 107 mmol/L    CO2 25.5 22.0 - 29.0 mmol/L    Calcium 9.7 8.6 - 10.5 mg/dL    Total Protein 7.8 6.0 - 8.5 g/dL    Albumin 3.60 3.50 - 5.20 g/dL    ALT (SGPT) 11 1 - 33 U/L    AST (SGOT) 22 1 - 32 U/L    Alkaline Phosphatase 188 (H) 39 - 117 U/L    Total Bilirubin 0.3 0.0 - 1.2 mg/dL    eGFR Non African Amer 9 (L) >60 mL/min/1.73    eGFR  African Amer      Globulin 4.2 gm/dL    A/G Ratio 0.9 g/dL    BUN/Creatinine Ratio 6.8 (L) 7.0 - 25.0    Anion Gap 16.5 (H) 5.0 - 15.0 mmol/L   CK    Collection Time: 04/03/21 12:56 PM    Specimen: Blood   Result Value Ref Range    Creatine Kinase 39 20 - 180 U/L   Phosphorus    Collection Time: 04/03/21 12:56 PM    Specimen: Blood   Result Value Ref Range    Phosphorus 3.2 2.5 - 4.5 mg/dL   CBC Auto Differential    Collection Time: 04/03/21 12:56 PM    Specimen: Blood   Result Value Ref Range    WBC 13.99 (H) 3.40 - 10.80 10*3/mm3    RBC 4.22 3.77 - 5.28 10*6/mm3    Hemoglobin 13.9 12.0 - 15.9 g/dL    Hematocrit 42.8 34.0 - 46.6 %    .4 (H) 79.0 - 97.0 fL    MCH 32.9 26.6 - 33.0 pg    MCHC 32.5 31.5 - 35.7 g/dL    RDW 13.1 12.3 - 15.4 %    RDW-SD 49.2 37.0 - 54.0 fl    MPV 9.5 6.0 - 12.0 fL    Platelets 211 140 - 450 10*3/mm3    Neutrophil % 86.4 (H) 42.7 - 76.0 %    Lymphocyte % 8.4 (L) 19.6 - 45.3 %    Monocyte % 4.1 (L) 5.0 - 12.0 %    Eosinophil % 0.1 (L) 0.3 - 6.2 %    Basophil % 0.4 0.0 - 1.5 %    Immature Grans % 0.6 (H) 0.0 - 0.5 %    Neutrophils, Absolute 12.10 (H) 1.70 - 7.00 10*3/mm3    Lymphocytes, Absolute 1.17 0.70 - 3.10 10*3/mm3    Monocytes, Absolute 0.57 0.10 - 0.90 10*3/mm3    Eosinophils, Absolute 0.02 0.00 - 0.40 10*3/mm3    Basophils, Absolute 0.05 0.00 - 0.20 10*3/mm3    Immature Grans, Absolute 0.08 (H) 0.00 - 0.05 10*3/mm3     nRBC 0.0 0.0 - 0.2 /100 WBC       Ordered the above labs and reviewed the results.        RADIOLOGY  XR Hips Bilateral With or Without Pelvis 5 View    Result Date: 4/3/2021  FOUR-VIEW BILATERAL HIPS AND ONE VIEW AP PELVIS  HISTORY: Fell. Bilateral hip pain.  There is severe diffuse osteoporosis. This somewhat obscures bony detail but no definite acute fracture is seen involving the hips or pelvis.  This report was finalized on 4/3/2021 3:35 PM by Dr. Charles Ramírez M.D.        Ordered the above noted radiological studies. Reviewed by me in PACS.            PROCEDURES  Procedures              MEDICATIONS GIVEN IN ER  Medications   sodium chloride 0.9 % flush 10 mL (has no administration in time range)                   MEDICAL DECISION MAKING, PROGRESS, and CONSULTS    All labs have been independently reviewed by me.  All radiology studies have been reviewed by me and discussed with radiologist dictating the report.   EKG's independently viewed and interpreted by me.  Discussion below represents my analysis of pertinent findings related to patient's condition, differential diagnosis, treatment plan and final disposition.    ED Course as of Apr 03 1616   Sat Apr 03, 2021   1358 BUN(!): 30 [JR]   1358 HD patient   Creatinine(!): 4.43 [JR]   1358 Potassium: 3.7 [JR]   1358 Hemoglobin: 13.9 [JR]   1358 WBC(!): 13.99 [JR]   1358 Creatine Kinase: 39 [JR]   1526 HD chair arranged for 615 Monday morning.     [JR]      ED Course User Index  [JR] Edson Allison MD     Labs reviewed and reassuring today.  She has no urgent indication for hemodialysis.  I have arranged for her to receive hemodialysis on Monday morning at 6:15 AM.  She has an appointment with wound care on Wednesday.  Mepilex dressings were placed over the wounds on her lateral hips bilaterally.  She also had a small area on her sacrum which would probably be more amenable to a barrier cream as I discussed with her daughter until she can be evaluated  by wound care.         I wore a mask, face shield, and gloves during this patient encounter.  Patient also wearing a surgical mask.  Hand hygeine performed before and after seeing the patient.    DIAGNOSIS  Final diagnoses:   Pressure injury of skin of sacral region, unspecified injury stage   ESRD on hemodialysis (CMS/[facility])   Pressure injury of skin of hip, unspecified injury stage, unspecified laterality         DISPOSITION  DISCHARGE    Patient discharged in stable condition.    Reviewed implications of results, diagnosis, meds, responsibility to follow up, warning signs and symptoms of possible worsening, potential complications and reasons to return to ER.    Patient/Family voiced understanding of above instructions.    Discussed plan for discharge, as there is no emergent indication for admission. Patient referred to primary care provider for BP management due to today's BP. Pt/family is agreeable and understands need for follow up and repeat testing.  Pt is aware that discharge does not mean that nothing is wrong but it indicates no emergency is present that requires admission and they must continue care with follow-up as given below or physician of their choice.     FOLLOW-UP  Nelly Price MD  [id number] [id number] [phone]    Schedule an appointment as soon as possible for a visit            Medication List      No changes were made to your prescriptions during this visit.                   Latest Documented Vital Signs:  As of 16:16 EDT  BP- 95/54 HR- 70 Temp- 96.2 °F (35.7 °C) (Tympanic) O2 sat- 100%        --    Please note that portions of this were completed with a voice recognition program.          Edson Allison MD  04/03/21 [id number]

## 2021-04-03 NOTE — ED TRIAGE NOTES
Pt arrived with daughter with complaints of bilateral wounds on her hips. Pt wasn't able to see wound care due to a cough she has had. Family reports she always has a cough and recently tested negative for covid.     Pt was wearing a mask during assessment.  This RN wore appropriate PPE

## 2021-04-04 PROBLEM — R29.6 RECURRENT FALLS: Status: ACTIVE | Noted: 2021-01-01

## 2021-04-04 NOTE — ED NOTES
Pt to this ED from home via EMS c/o mechanical fall while walking into house.  Per EMS, pt was seen at this ED yesterday d/t wounds on back; pt has experienced increase in generalized weakness, progressive loss of appetite over several months, family is concerned about pt's independence.  Pt aox4, NAD at this time.  Pt receives dialysis through shunt on L side, was supposed receive treatment yesterday, but did not.     Raudel Hirsch RN  04/04/21 1733       Raudel Hirsch RN  04/04/21 1738

## 2021-04-04 NOTE — ED PROVIDER NOTES
EMERGENCY DEPARTMENT ENCOUNTER    Room Number:  P593/1  Date of encounter:  4/4/2021  PCP: Nelly Price MD  Historian: Patient, EMS      HPI:  Chief Complaint: Fall, knee pain, weakness  A complete HPI/ROS/PMH/PSH/SH/FH are unobtainable due to: None    Context: Veronica Henao is a 86 y.o. female who presents to the ED via [facility] from home after mechanical fall while walking when her legs felt weak and gave out on her.  She went down the ground under her knees, states that she did strike the left side of her head.  No LOC.  Reports some mild left-sided neck pain.  Denies any numbness or specific focal weakness other than generalized weakness.  Patient states that she is been having recurrent falls for some time now and that her family can no longer take care of her.  She is falling even while using walker and with assistance.  Patient denies any chest pain, shortness of breath, nausea, vomiting, does report some mild diarrhea.  She is a dialysis patient, missed dialysis yesterday due to ER visit stay, is scheduled for Monday.      MEDICAL RECORD REVIEW    ER visit note reviewed from yesterday, she was arranged to have dialysis scheduled for Monday    PAST MEDICAL HISTORY  Active Ambulatory Problems     Diagnosis Date Noted   • Atopic rhinitis 02/01/2016   • Gout 02/01/2016   • Cephalalgia 02/01/2016   • Hypercalcemia 02/01/2016   • Hyperlipidemia 02/01/2016   • Lung mass 03/07/2016   • Gastroesophageal reflux disease 08/29/2016   • Tricuspid insufficiency 09/16/2016   • Morbid obesity (CMS/HCC) 11/11/2016   • JOSELUIS on CPAP 11/11/2016   • Chronic venous insufficiency 11/11/2016   • Mitral valve insufficiency 12/14/2016   • S/P Maze operation for atrial fibrillation 12/29/2016   • S/P TVR (tricuspid valve repair) 12/29/2016   • S/P MVR (mitral valve repair) 12/29/2016   • Atrial flutter, paroxysmal (CMS/HCC) 06/09/2017   • Third degree atrioventricular block (CMS/HCC) 07/04/2017   • Chronic kidney disease,  stage IV (severe) (CMS/HCC) 07/25/2017   • Anemia in chronic kidney disease 07/25/2017   • Iron deficiency anemia 07/25/2017   • Pulmonary hypertension (CMS/HCC) 08/28/2018   • Chronic diastolic congestive heart failure (CMS/HCC) 10/31/2018   • ESRD on hemodialysis (CMS/HCC) 02/05/2020   • Control of atrial fibrillation with pacemaker (CMS/HCC) 02/05/2020   • Chronic atrial fibrillation (CMS/HCC) 07/06/2020   • Chronic anticoagulation 07/06/2020     Resolved Ambulatory Problems     Diagnosis Date Noted   • Atrial fibrillation (CMS/HCC) 02/01/2016   • Chronic renal insufficiency 02/01/2016   • Subclinical hypothyroidism 02/01/2016   • 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) 02/01/2016   • Urinary tract infection 02/01/2016   • Essential hypertension 03/07/2016   • Mitral regurgitation 09/16/2016   • Benign essential hypertension 11/11/2016   • Diabetes mellitus (CMS/HCC) 11/11/2016   • IDDM (insulin dependent diabetes mellitus) 01/17/2017   • CHF (congestive heart failure) (CMS/HCC) 05/25/2017   • Acute non-recurrent maxillary sinusitis 06/02/2017   • Chronic diastolic heart failure (CMS/HCC) 07/29/2017   • Paroxysmal atrial fibrillation (CMS/HCC) 08/27/2018   • SOBOE (shortness of breath on exertion) 10/30/2018   • Acute renal failure superimposed on stage 4 chronic kidney disease (CMS/HCC) 01/05/2020     Past Medical History:   Diagnosis Date   • Acute bronchitis    • Acute renal insufficiency    • Allergic rhinitis    • Arrhythmia    • Brachycephaly    • Breast pain, right    • Chest pain    • Chronic combined systolic and diastolic CHF (congestive heart failure) (CMS/HCC)    • CKD (chronic kidney disease), stage IV (CMS/HCC)    • Cough    • Depression    • Dialysis patient (CMS/HCC)    • Diverticulosis    • ORTIZ (dyspnea on exertion)    • Dyspnea    • Esophageal reflux    • Fatigue    • GERD (gastroesophageal reflux disease)    • Head injury    • Headache    • Hoarseness    •  Hypertension    • Influenza A (H1N1)    • Itching    • Leg swelling    • Lightheadedness    • Macular degeneration    • Malaise and fatigue    • Mitral valve regurgitation    • Nontoxic multinodular goiter    • Obesity    • Osteoarthritis    • PAF (paroxysmal atrial fibrillation) (CMS/HCC)    • Palpitations    • Paresthesias    • Proteinuria    • Pulmonary nodule    • Pulmonic valve regurgitation    • Sebaceous cyst    • Solitary thyroid nodule    • Tachycardia    • TR (tricuspid regurgitation)    • Type 2 diabetes mellitus (CMS/HCC)    • 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)    • UTI (urinary tract infection)          PAST SURGICAL HISTORY  Past Surgical History:   Procedure Laterality Date   • APPENDECTOMY     • ARTERIOVENOUS FISTULA/SHUNT SURGERY Left 2/5/2020    Procedure: LEFT BRACHIAL ARTERY AXILLARY VEIN GORTEX SHUNT;  Surgeon: Kaveh Mcdonnell MD;  Location: [facility];  Service: Vascular;  Laterality: Left;   • CARDIAC CATHETERIZATION  1986    procedure outcome:    • CARDIAC CATHETERIZATION N/A 11/21/2016    Procedure: Coronary angiography;  Surgeon: Marcin العراقي MD;  Location: [facility] CATH INVASIVE LOCATION;  Service:    • CARDIAC CATHETERIZATION N/A 11/21/2016    Procedure: Left Heart Cath;  Surgeon: Marcin العراقي MD;  Location: [facility] CATH INVASIVE LOCATION;  Service:    • CARDIAC CATHETERIZATION N/A 8/29/2018    Procedure: Right Heart Cath with adenosine challenge if wedge pressure is normal.;  Surgeon: Paulo Decker MD;  Location: [facility] CATH INVASIVE LOCATION;  Service: Cardiovascular   • CARDIAC ELECTROPHYSIOLOGY PROCEDURE N/A 3/28/2017    Procedure: Ablation atrial flutter;  Surgeon: Rodríguez Ward MD;  Location: [facility] CATH INVASIVE LOCATION;  Service:    • CARDIAC ELECTROPHYSIOLOGY PROCEDURE N/A 7/3/2017    Procedure: AV node ablation;  Surgeon: Rodríguez Ward MD;  Location: [facility] CATH INVASIVE LOCATION;  Service:    • CARDIAC  ELECTROPHYSIOLOGY PROCEDURE N/A 7/3/2017    Procedure: Pacemaker DC new  [employer] and will need 3830 lead-I did text Rachael Gideon ;  Surgeon: Rodríguez Ward MD;  Location: [facility] CATH INVASIVE LOCATION;  Service:    • CARDIAC SURGERY     • CHOLECYSTECTOMY     • CLAVICLE SURGERY Left    • COLONOSCOPY N/A 11/14/2017    Procedure: COLONOSCOPY INTO CECUM/ TERMINAL ILEUM WITH POLYPECTOMY  X 8 AND CLIP X 2;  Surgeon: Jacy Browning MD;  Location: [facility] ENDOSCOPY;  Service:    • ENDOSCOPY N/A 11/14/2017    Procedure: ESOPHAGOGASTRODUODENOSCOPY WITH BIOPSIES;  Surgeon: Jacy Browning MD;  Location: [facility] ENDOSCOPY;  Service:    • GASTRIC RESTRICTION SURGERY     • HYSTERECTOMY     • INSERTION HEMODIALYSIS CATHETER N/A 1/10/2020    Procedure: TUNNEL DIALYSIS CATHETER;  Surgeon: Carlos Manuel Jernigan MD;  Location: [facility] MAIN OR;  Service: Vascular   • JOINT REPLACEMENT     • LUMBAR DECOMPRESSION      L4-5   • MITRAL VALVE REPAIR/REPLACEMENT N/A 12/14/2016    Procedure: INTRAOPERATIVE KATELYN, MIDLINE STERNOTOMY, MITRAL VALVE REPAIR, TRICUSPID VALVE REPAIR, MAZE PROCEDURE;  Surgeon: Young Vital MD;  Location: [facility] MAIN OR;  Service:    • SHOULDER SURGERY Left     AFTER SURGERY FOR FRACTURED CLAVICLE   • TONSILLECTOMY     • TOTAL KNEE ARTHROPLASTY      bilateral         FAMILY HISTORY  Family History   Problem Relation Age of Onset   • Emphysema Mother    • Heart disease Father    • Lung cancer Father    • Prostate cancer Father    • Asthma Sister    • Lung cancer Daughter    • Colon cancer Other    • No Known Problems Maternal Grandmother    • No Known Problems Maternal Grandfather    • Arthritis Paternal Grandmother    • No Known Problems Paternal Grandfather    • Malig Hyperthermia Neg Hx          SOCIAL HISTORY  Social History     Socioeconomic History   • Marital status:      Spouse name: Not on file   • Number of children: Not on file   • Years of education: Not on file   • Highest education level: Not on  file   Tobacco Use   • Smoking status: Former Smoker     Packs/day: 1.50     Years: 20.00     Pack years: 30.00     Start date: 6/14/1970   • Smokeless tobacco: Never Used   • Tobacco comment: D/C 1970 SMOKING 1 1/2 PPD FOR 13 YRS BEFORE QUITTING   Substance and Sexual Activity   • Alcohol use: No     Comment: caffeine use   • Drug use: No   • Sexual activity: Defer         ALLERGIES  Cephalexin, Meperidine, and Penicillins        REVIEW OF SYSTEMS  Review of Systems     All systems reviewed and negative except for those discussed in HPI.       PHYSICAL EXAM    I have reviewed the triage vital signs and nursing notes.    ED Triage Vitals [04/04/21 1735]   Temp Heart Rate Resp BP SpO2   97.1 °F (36.2 °C) 64 14 (!) 84/46 98 %      Temp src Heart Rate Source Patient Position BP Location FiO2 (%)   Infrared -- -- -- --       Physical Exam  General: Awake, alert, no acute distress, nontoxic, nondiaphoretic  HEENT: Mucous membranes dry, atraumatic, EOMI  Neck: Full ROM, mild left-sided paraspinal cervical muscle tenderness without midline pain to palpation  Pulm: Symmetric chest rise, nonlabored, lungs CTAB  Cardiovascular: Regular rate and rhythm, intact distal pulses, palpable thrill in left upper extremity fistula  GI: Soft, nontender, nondistended, no rebound, no guarding, bowel sounds present  MSK: Full range of motion of hips, knees, ankles bilaterally  Skin: Warm, dry  Neuro: Alert and oriented x 3, GCS 15, cranial nerves II through XII intact, 5-5 strength sensation bilateral upper and lower extremities, moving all extremities, no focal deficits  Psych: Calm, cooperative      Surgical mask, protective eye goggles, and gloves used during this encounter. Patient in surgical mask.      LAB RESULTS  Recent Results (from the past 24 hour(s))   Renal Function Panel    Collection Time: 04/04/21  5:55 PM    Specimen: Arm, Right; Blood   Result Value Ref Range    Glucose 246 (H) 65 - 99 mg/dL    BUN 45 (H) 8 - 23 mg/dL     Creatinine 5.54 (H) 0.57 - 1.00 mg/dL    Sodium 135 (L) 136 - 145 mmol/L    Potassium 3.4 (L) 3.5 - 5.2 mmol/L    Chloride 96 (L) 98 - 107 mmol/L    CO2 23.1 22.0 - 29.0 mmol/L    Calcium 9.0 8.6 - 10.5 mg/dL    Albumin 2.90 (L) 3.50 - 5.20 g/dL    Phosphorus 3.6 2.5 - 4.5 mg/dL    Anion Gap 15.9 (H) 5.0 - 15.0 mmol/L    BUN/Creatinine Ratio 8.1 7.0 - 25.0    eGFR Non African Amer 7 (L) >60 mL/min/1.73    eGFR  African Amer     CBC Auto Differential    Collection Time: 04/04/21  5:55 PM    Specimen: Arm, Right; Blood   Result Value Ref Range    WBC 11.45 (H) 3.40 - 10.80 10*3/mm3    RBC 3.75 (L) 3.77 - 5.28 10*6/mm3    Hemoglobin 12.4 12.0 - 15.9 g/dL    Hematocrit 38.1 34.0 - 46.6 %    .6 (H) 79.0 - 97.0 fL    MCH 33.1 (H) 26.6 - 33.0 pg    MCHC 32.5 31.5 - 35.7 g/dL    RDW 13.2 12.3 - 15.4 %    RDW-SD 48.4 37.0 - 54.0 fl    MPV 9.5 6.0 - 12.0 fL    Platelets 213 140 - 450 10*3/mm3    Neutrophil % 86.7 (H) 42.7 - 76.0 %    Lymphocyte % 7.4 (L) 19.6 - 45.3 %    Monocyte % 4.6 (L) 5.0 - 12.0 %    Eosinophil % 0.3 0.3 - 6.2 %    Basophil % 0.3 0.0 - 1.5 %    Immature Grans % 0.7 (H) 0.0 - 0.5 %    Neutrophils, Absolute 9.92 (H) 1.70 - 7.00 10*3/mm3    Lymphocytes, Absolute 0.85 0.70 - 3.10 10*3/mm3    Monocytes, Absolute 0.53 0.10 - 0.90 10*3/mm3    Eosinophils, Absolute 0.03 0.00 - 0.40 10*3/mm3    Basophils, Absolute 0.04 0.00 - 0.20 10*3/mm3    Immature Grans, Absolute 0.08 (H) 0.00 - 0.05 10*3/mm3    nRBC 0.0 0.0 - 0.2 /100 WBC       Ordered the above labs and independently reviewed the results.        RADIOLOGY  CT Head Without Contrast    Result Date: 4/4/2021  CT HEAD WITHOUT CONTRAST  HISTORY: Fall with head injury  COMPARISON: 03/29/2021  TECHNIQUE: Axial CT imaging was obtained through the brain. No IV contrast was administered.  FINDINGS: No acute intracranial hemorrhage is seen. There is diffuse atrophy. There is periventricular and deep white matter microangiopathic change. There is no midline  shift or mass effect. No calvarial fracture is seen. The paranasal sinuses and mastoid air cells appear clear.      No acute intracranial findings.  Radiation dose reduction techniques were utilized, including automated exposure control and exposure modulation based on body size.  This report was finalized on 4/4/2021 7:57 PM by Dr. Vee Vergara M.D.      CT Cervical Spine Without Contrast    Result Date: 4/4/2021  CT OF THE CERVICAL SPINE WITHOUT CONTRAST  HISTORY: Left-sided neck pain  COMPARISON: None available.  TECHNIQUE: Axial CT imaging was obtained through the cervical spine. Coronal and sagittal reformatted images were obtained.  FINDINGS: No acute fracture or subluxation of the cervical spine is identified. There is no prevertebral soft tissue swelling. Intervertebral disc space narrowing is noted most significantly at C5-6 days. There is mild anterolisthesis of C6 on C7. Please see the report from 03/26/2021 for full description of degenerative findings. Visualized portions of the lungs appear unremarkable. There is a nodule arising from the left lobe of the thyroid gland, measuring 1.9 x 1.1 cm. This could be better assessed with dedicated thyroid ultrasound.      No acute fracture or subluxation identified.  Radiation dose reduction techniques were utilized, including automated exposure control and exposure modulation based on body size.  This report was finalized on 4/4/2021 8:03 PM by Dr. Vee Vergara M.D.      XR Knee 1 or 2 View Bilateral    Result Date: 4/4/2021  Patient: JANNETH LO  Time Out: 18:59 Exam(s): FILM BILATERAL KNEES EXAM:   XR Bilateral Knees, 3 Views CLINICAL HISTORY:    fall, pain  Reason for exam: fall, pain. TECHNIQUE:   Three views of the bilateral knees. COMPARISON:   No relevant prior studies available. FINDINGS:   Bones joints:  Previous bilateral total knee arthroplasty.  No acute fracture.  No dislocation.   Soft tissues:  Unremarkable.   Vasculature:  Moderate  vascular calcifications. IMPRESSION:     1.  No acute findings. 2.  Previous bilateral total knee arthroplasty.     Electronically signed by Juan Mills MD on 04-04-21 at 1859      I ordered the above noted radiological studies. Reviewed by me.  See dictation for official radiology interpretation.      PROCEDURES    Procedures      MEDICATIONS GIVEN IN ER    Medications   sodium chloride 0.9 % bolus 500 mL (0 mL Intravenous Stopped 4/4/21 1830)         PROGRESS, DATA ANALYSIS, CONSULTS, AND MEDICAL DECISION MAKING    All labs have been independently reviewed by me.  All radiology studies have been reviewed by me and discussed with radiologist dictating the report.   EKG's independently viewed and interpreted by me.  Discussion below represents my analysis of pertinent findings related to patient's condition, differential diagnosis, treatment plan and final disposition.    Initial concern for worsening immobility, recurrent falls, will eval CT head and neck for any possible intracranial hemorrhage or cervical fracture of the thing these are less likely.  X-rays of the knees but low suspicion for fracture.  Plan for hospitalization.    ED Course as of Apr 04 2142   Sun Apr 04, 2021 1813 improved   WBC(!): 11.45 [DC]   1813 Hemoglobin: 12.4 [DC]   1845 Creatinine(!): 5.54 [DC]   1845 Potassium(!): 3.4 [DC]   2010 Discussed with Dr. Huizar, [facility], discussed patient's clinical course and findings today and need for hospitalization.    [DC]   2142 CT Cervical Spine Without Contrast [DC]   2142 CT Head Without Contrast [DC]   2142 XR Knee 1 or 2 View Bilateral [DC]      ED Course User Index  [DC] Teddy Velázquez MD       AS OF 21:42 EDT VITALS:    BP - 118/54  HR - 75  TEMP - 97.1 °F (36.2 °C) (Infrared)  02 SATS - 96%        DIAGNOSIS  Final diagnoses:   Recurrent falls   Generalized weakness   ESRD (end stage renal disease) on dialysis (CMS/[facility])         DISPOSITION  HOSPITALIZATION    Discussed treatment plan and  reason for hospitalization with pt/family and hospitalizing physician.  Pt/family voiced understanding of the plan for hospitalization for further testing/treatment as needed.                  Teddy Velázquez MD  04/04/21 0629

## 2021-04-05 PROBLEM — Z66 DNR (DO NOT RESUSCITATE): Status: ACTIVE | Noted: 2021-01-01

## 2021-04-05 PROBLEM — R54 AGE-RELATED PHYSICAL DEBILITY: Status: ACTIVE | Noted: 2021-01-01

## 2021-04-05 NOTE — THERAPY EVALUATION
Patient Name: Veronica Henao  : 1935    MRN: [id number]                              Today's Date: 2021       Admit Date: 2021    Visit Dx:     ICD-10-CM ICD-9-CM   1. Recurrent falls  R29.6 V15.88   2. Generalized weakness  R53.1 780.79   3. ESRD (end stage renal disease) on dialysis (CMS/[facility])  N18.6 585.6    Z99.2 V45.11     Patient Active Problem List   Diagnosis   • Atopic rhinitis   • Gout   • Cephalalgia   • Hypercalcemia   • Hyperlipidemia   • Type 2 diabetes mellitus with diabetic chronic kidney disease (CMS/[facility])   • Lung mass   • Gastroesophageal reflux disease   • Tricuspid insufficiency   • Morbid obesity (CMS/[facility])   • JOSELUIS on CPAP   • Chronic venous insufficiency   • Mitral valve insufficiency   • S/P Maze operation for atrial fibrillation   • S/P TVR (tricuspid valve repair)   • S/P MVR (mitral valve repair)   • Atrial flutter, paroxysmal (CMS/[facility])   • Third degree atrioventricular block (CMS/[facility])   • Chronic kidney disease, stage IV (severe) (CMS/[facility])   • Anemia in chronic kidney disease   • Iron deficiency anemia   • Pulmonary hypertension (CMS/[facility])   • Chronic diastolic congestive heart failure (CMS/[facility])   • ESRD on hemodialysis (CMS/[facility])   • Control of atrial fibrillation with pacemaker (CMS/[facility])   • Chronic atrial fibrillation (CMS/[facility])   • Chronic anticoagulation   • Recurrent falls   • Macular degeneration   • DNR (do not resuscitate)   • Age-related physical debility     Past Medical History:   Diagnosis Date   • Acute bronchitis    • Acute renal insufficiency    • Allergic rhinitis    • Anemia in chronic kidney disease    • Arrhythmia    • Atrial fibrillation (CMS/[facility])     chronic   • Brachycephaly    • Breast pain, right    • Chest pain    • CHF (congestive heart failure) (CMS/[facility])    • Chronic combined systolic and diastolic CHF (congestive heart failure) (CMS/[facility])    • Chronic renal insufficiency    • CKD (chronic kidney disease), stage IV (CMS/[facility])    • Cough    • Depression     • Diabetes mellitus (CMS/HCC)     TYPE 2   • Dialysis patient (CMS/HCC)    • Diverticulosis    • ORTIZ (dyspnea on exertion)    • Dyspnea    • Esophageal reflux    • Essential hypertension    • Fatigue    • GERD (gastroesophageal reflux disease)    • Gout    • Head injury    • Headache    • Hoarseness    • Hypercalcemia    • Hyperlipidemia    • Hypertension    • Influenza A (H1N1)    • Iron deficiency anemia    • Itching    • Leg swelling    • Lightheadedness    • Macular degeneration    • Malaise and fatigue    • Mitral valve regurgitation     moderate to severe   • Nontoxic multinodular goiter     RIGHT 2.0 CM  LEFT  2.5 CM   • Obesity    • JOSELUIS on CPAP    • Osteoarthritis    • PAF (paroxysmal atrial fibrillation) (CMS/HCC)    • Palpitations    • Paresthesias    • Proteinuria    • Pulmonary hypertension (CMS/HCC)    • Pulmonary nodule    • Pulmonic valve regurgitation     mild   • Sebaceous cyst    • SOBOE (shortness of breath on exertion)    • Solitary thyroid nodule    • Subclinical hypothyroidism    • Tachycardia    • TR (tricuspid regurgitation)     mild to moderate   • Type 2 diabetes mellitus (CMS/HCC)    • 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)    • UTI (urinary tract infection)      Past Surgical History:   Procedure Laterality Date   • APPENDECTOMY     • ARTERIOVENOUS FISTULA/SHUNT SURGERY Left 2/5/2020    Procedure: LEFT BRACHIAL ARTERY AXILLARY VEIN GORTEX SHUNT;  Surgeon: Kaveh Mcdonnell MD;  Location: [facility];  Service: Vascular;  Laterality: Left;   • CARDIAC CATHETERIZATION  1986    procedure outcome:    • CARDIAC CATHETERIZATION N/A 11/21/2016    Procedure: Coronary angiography;  Surgeon: Marcin العراقي MD;  Location: [facility] CATH INVASIVE LOCATION;  Service:    • CARDIAC CATHETERIZATION N/A 11/21/2016    Procedure: Left Heart Cath;  Surgeon: Marcin العراقي MD;  Location: [facility] INVASIVE LOCATION;  Service:    • CARDIAC CATHETERIZATION N/A 8/29/2018     Procedure: Right Heart Cath with adenosine challenge if wedge pressure is normal.;  Surgeon: Paulo Decker MD;  Location: [facility] CATH INVASIVE LOCATION;  Service: Cardiovascular   • CARDIAC ELECTROPHYSIOLOGY PROCEDURE N/A 3/28/2017    Procedure: Ablation atrial flutter;  Surgeon: Rodríguez Ward MD;  Location: [facility] CATH INVASIVE LOCATION;  Service:    • CARDIAC ELECTROPHYSIOLOGY PROCEDURE N/A 7/3/2017    Procedure: AV node ablation;  Surgeon: Rodríguez Ward MD;  Location: [facility] CATH INVASIVE LOCATION;  Service:    • CARDIAC ELECTROPHYSIOLOGY PROCEDURE N/A 7/3/2017    Procedure: Pacemaker DC new  [employer] and will need 3830 lead-I did text Rachael Meneses ;  Surgeon: Rodríguez Ward MD;  Location: [facility] CATH INVASIVE LOCATION;  Service:    • CARDIAC SURGERY     • CHOLECYSTECTOMY     • CLAVICLE SURGERY Left    • COLONOSCOPY N/A 11/14/2017    Procedure: COLONOSCOPY INTO CECUM/ TERMINAL ILEUM WITH POLYPECTOMY  X 8 AND CLIP X 2;  Surgeon: Jacy Browning MD;  Location: [facility] ENDOSCOPY;  Service:    • ENDOSCOPY N/A 11/14/2017    Procedure: ESOPHAGOGASTRODUODENOSCOPY WITH BIOPSIES;  Surgeon: Jacy Browning MD;  Location: [facility] ENDOSCOPY;  Service:    • GASTRIC RESTRICTION SURGERY     • HYSTERECTOMY     • INSERTION HEMODIALYSIS CATHETER N/A 1/10/2020    Procedure: TUNNEL DIALYSIS CATHETER;  Surgeon: Carlos Manuel Jernigan MD;  Location: [facility] MAIN OR;  Service: Vascular   • JOINT REPLACEMENT     • LUMBAR DECOMPRESSION      L4-5   • MITRAL VALVE REPAIR/REPLACEMENT N/A 12/14/2016    Procedure: INTRAOPERATIVE KATELYN, MIDLINE STERNOTOMY, MITRAL VALVE REPAIR, TRICUSPID VALVE REPAIR, MAZE PROCEDURE;  Surgeon: Young Vital MD;  Location: [facility] MAIN OR;  Service:    • SHOULDER SURGERY Left     AFTER SURGERY FOR FRACTURED CLAVICLE   • TONSILLECTOMY     • TOTAL KNEE ARTHROPLASTY      bilateral     General Information     Row Name 04/05/21 2104          OT Time and Intention    Document Type  evaluation;therapy note  (daily note)  -LE     Mode of Treatment  individual therapy;occupational therapy  -     Row Name 04/05/21 1445          General Information    Patient Profile Reviewed  yes  -LE     Prior Level of Function  -- lives with son, uses a walker recently, usually cooks and can do ADL.  dght assist with shower.  -LE     Existing Precautions/Restrictions  fall  -     Row Name 04/05/21 1445          Occupational Profile    Reason for Services/Referral (Occupational Profile)  pt admit after fall at home when legs gave out on concrete steps.  family present during fall.  pt a dialysis pt.  -     Row Name 04/05/21 1445          Living Environment    Lives With  child(afshin), adult son. dght comes in each day  -     Row Name 04/05/21 1445          Cognition    Orientation Status (Cognition)  oriented x 4  -     Row Name 04/05/21 1445          Safety Issues, Functional Mobility    Impairments Affecting Function (Mobility)  pain;strength;endurance/activity tolerance  -LE       User Key  (r) = Recorded By, (t) = Taken By, (c) = Cosigned By    Initials Name Provider Type    Cecelia Maddox OTR Occupational Therapist          Mobility/ADL's     Row Name 04/05/21 1446          Bed Mobility    Supine-Sit [place] (Bed Mobility)  moderate assist (50% patient effort)  -LE     Sit-Supine [place] (Bed Mobility)  moderate assist (50% patient effort)  -LE     Assistive Device (Bed Mobility)  bed rails;head of bed elevated  -ROXANE     Comment (Bed Mobility)  slow moving due to pt anticipation and experience of pain and first time up,  times to rest between transition movements and encouragement given.  -     Row Name 04/05/21 1446          Transfers    Transfers  sit-stand transfer;bed-chair transfer  -ROXANE     Comment (Transfers)  pt declined to xfer to chair.  -LE     Sit-Stand [place] (Transfers)  moderate assist (50% patient effort);set up;verbal cues  -LE     Row Name 04/05/21 1446          Sit-Stand Transfer     Assistive Device (Sit-Stand Transfers)  walker, front-wheeled  -     Row Name 04/05/21 [facility]6          Functional Mobility    Functional Mobility- Ind. Level  -- sidestep with short shuffle/slide less than foot.  pt did not want to try taking steps forward.  -LE     Functional Mobility- Device  rolling walker  -LE     Functional Mobility- Safety Issues  sequencing ability decreased;step length decreased;weight-shifting ability decreased  -     Row Name 04/05/21 [facility]6          Activities of Daily Living    BADL Assessment/Intervention  feeding;toileting  -[facility] Name 04/05/21 [facility]6          Self-Feeding Assessment/Training    [place] Level (Feeding)  feeding skills;set up  -LE     Position (Self-Feeding)  sitting up in bed  -LE     Comment (Feeding)  assist pt in upright position.  aid/RN aware pt need lunch tray reheated.  -     Row Name 04/05/21 [facility]6          Toileting Assessment/Training    Comment (Toileting)  pt with a brief.  -LE       User Key  (r) = Recorded By, (t) = Taken By, (c) = Cosigned By    Initials Name Provider Type    Cecelia Maddox OTR Occupational Therapist        Obj/Interventions     Row Name 04/05/21 [facility]7          Vision Assessment/Intervention    Visual Impairment/Limitations  -- h/o low vision per pt report.  OT cues pt on items on lunch tray and setup for ease of locating utensisl.  -     Row Name 04/05/21 1447          Range of Motion Comprehensive    Comment, General Range of Motion  B UE 2/3 AROM except R shld 1/2due to chronic limiation  -[facility] Name 04/05/21 [facility]7          Balance    Balance Assessment  sitting static balance;standing static balance  -LE     Static Sitting Balance  mild impairment imrpoves to close SBA with assist to reposition at EOB.  -LE     Static Standing Balance  mild impairment;supported  -LE     Balance Interventions  supported;static;standing  -LE     Comment, Balance  cues for posture,hand placement during xfer and standing balance.  -LE        User Key  (r) = Recorded By, (t) = Taken By, (c) = Cosigned By    Initials Name Provider Type    Cecelia Maddox OTR Occupational Therapist        Goals/Plan     Row Name 04/05/21 1515          Transfer Goal 1 (OT)    Activity/Assistive Device (Transfer Goal 1, OT)  sit-to-stand/stand-to-sit;bed-to-chair/chair-to-bed;toilet  -LE     [place]oster Level/Cues Needed (Transfer Goal 1, OT)  contact guard assist  -LE     Time Frame (Transfer Goal 1, OT)  2 weeks  -LE     Progress/Outcome (Transfer Goal 1, OT)  goal ongoing  -LE     Row Name 04/05/21 1515          Toileting Goal 1 (OT)    Activity/Device (Toileting Goal 1, OT)  adjust/manage clothing;perform perineal hygiene;commode, 3-in-1  -LE     [place] Level/Cues Needed (Toileting Goal 1, OT)  contact guard assist  -LE     Time Frame (Toileting Goal 1, OT)  2 weeks  -LE     Progress/Outcome (Toileting Goal 1, OT)  goal ongoing  -     Row Name 04/05/21 1515          Grooming Goal 1 (OT)    Activity/Device (Grooming Goal 1, OT)  oral care;wash face, hands standing at sink  -LE     [place] (Grooming Goal 1, OT)  contact guard assist  -LE     Time Frame (Grooming Goal 1, OT)  2 weeks  -LE     Progress/Outcome (Grooming Goal 1, OT)  goal ongoing  -     Row Name 04/05/21 1515          Therapy Assessment/Plan (OT)    Planned Therapy Interventions (OT)  activity tolerance training;adaptive equipment training;BADL retraining;occupation/activity based interventions;patient/caregiver education/training;transfer/mobility retraining;functional balance retraining  -LE       User Key  (r) = Recorded By, (t) = Taken By, (c) = Cosigned By    Initials Name Provider Type    Cecelia Maddox OTR Occupational Therapist        Clinical Impression     Row Name 04/05/21 1449          Pain Scale: Numbers Pre/Post-Treatment    Pretreatment Pain Rating  6/10  -LE     Posttreatment Pain Rating  6/10  -LE     Pre/Posttreatment Pain Comment  bottom, legs from scrapes where fell.   -     Row Name 04/05/21 1449          Plan of Care Review    Plan of Care Reviewed With  patient  -LE     Outcome Summary  Pt presents from home after fall. Imaging negative for fractures but pt c/o B LE and bottom pain.  With encouragement pt moves to sit and stand at EOB with up to mod A.  Pt with functional B UE and is alert and oriented.  Pt feels will need rehab at d/c.  -     Row Name 04/05/21 1449          Therapy Assessment/Plan (OT)    Patient/Family Therapy Goal Statement (OT)  decrease pain with movement.  -LE     Rehab Potential (OT)  good, to achieve stated therapy goals  -     Criteria for Skilled Therapeutic Interventions Met (OT)  meets criteria;yes  -LE     Therapy Frequency (OT)  5 times/wk  -     Row Name 04/05/21 1449          Therapy Plan Review/Discharge Plan (OT)    Equipment Needs Upon Discharge (OT)  -- owns walker, shower chair.  -LE     Anticipated Discharge Disposition (OT)  skilled nursing facility  -     Row Name 04/05/21 1449          Vital Signs    O2 Delivery Pre Treatment  room air  -LE     O2 Delivery Intra Treatment  room air  -LE     O2 Delivery Post Treatment  room air  -LE     Pre Patient Position  Supine  -LE     Intra Patient Position  Standing  -LE     Post Patient Position  Supine  -LE     Activity Duration  -- fatigue with stand EOB and declines to sit up in chair to eat. recnelty return from dialysis.  -     Row Name 04/05/21 144          Positioning and Restraints    Pre-Treatment Position  in bed  -LE     Post Treatment Position  bed  -LE     In Bed  notified nsg;fowlers;call light within reach;encouraged to call for assist;exit alarm on;heels elevated  -LE       User Key  (r) = Recorded By, (t) = Taken By, (c) = Cosigned By    Initials Name Provider Type    Cecelia Maddox OTR Occupational Therapist        Outcome Measures     Row Name 04/05/21 1337          How much help from another is currently needed...    Putting on and taking off regular lower body  clothing?  2  -LE     Bathing (including washing, rinsing, and drying)  2  -LE     Toileting (which includes using toilet bed pan or urinal)  1  -LE     Putting on and taking off regular upper body clothing  2  -LE     Taking care of personal grooming (such as brushing teeth)  3  -LE     Eating meals  3  -LE     AM-PAC 6 Clicks Score (OT)  13  -LE     Row Name 04/05/21 1516          Modified [place]yin Scale    Modified Colbert Scale  4 - Moderately severe disability.  Unable to walk without assistance, and unable to attend to own bodily needs without assistance.  -LE     Row Name 04/05/21 1516          Functional Assessment    Outcome Measure Options  Modified Colbert;AM-PAC 6 Clicks Daily Activity (OT)  -LE       User Key  (r) = Recorded By, (t) = Taken By, (c) = Cosigned By    Initials Name Provider Type    Cecelia Maddox OTR Occupational Therapist        Occupational Therapy Education                 Title: PT OT SLP Therapies (In Progress)     Topic: Occupational Therapy (Done)     Point: ADL training (Done)     Description:   Instruct learner(s) on proper safety adaptation and remediation techniques during self care or transfers.   Instruct in proper use of assistive devices.              Learning Progress Summary           Patient Acceptance, D,E, DU,VU by ROXANE at 4/5/2021 1516    Comment: role of OT,p sanket of care, body mechanics and hand placmeent for xfers.   posture cues.  call light use and fall risk.                   Point: Precautions (Done)     Description:   Instruct learner(s) on prescribed precautions during self-care and functional transfers.              Learning Progress Summary           Patient Acceptance, D,E, DU,VU by ROXANE at 4/5/2021 1516    Comment: role of OT,p sanket of care, body mechanics and hand placmeent for xfers.   posture cues.  call light use and fall risk.                   Point: Body mechanics (Done)     Description:   Instruct learner(s) on proper positioning and spine alignment during  self-care, functional mobility activities and/or exercises.              Learning Progress Summary           Patient Acceptance, D,E, DU,VU by ROXANE at 4/5/2021 1516    Comment: role of OT,p sanket of care, body mechanics and hand placmeent for xfers.   posture cues.  call light use and fall risk.                               User Key     Initials Effective Dates Name Provider Type Discipline    LE 06/08/18 -  Cecelia Ocasio OTR Occupational Therapist OT              OT Recommendation and Plan  Planned Therapy Interventions (OT): activity tolerance training, adaptive equipment training, BADL retraining, occupation/activity based interventions, patient/caregiver education/training, transfer/mobility retraining, functional balance retraining  Therapy Frequency (OT): 5 times/wk  Plan of Care Review  Plan of Care Reviewed With: patient  Outcome Summary: Pt presents from home after fall. Imaging negative for fractures but pt c/o B LE and bottom pain.  With encouragement pt moves to sit and stand at EOB with up to mod A.  Pt with functional B UE and is alert and oriented.  Pt feels will need rehab at d/c.     Time Calculation:   Time Calculation- OT     Row Name 04/05/21 1517 04/05/21 1256          Time Calculation- OT    OT Start Time  1442  -LE  --     OT Stop Time  1504  -LE  --     OT Time Calculation (min)  22 min  -LE  --     Total Timed Code Minutes- OT  16 minute(s)  -LE  --     OT Received On  04/05/21  -LE  --     OT - Next Appointment  04/06/21  -LE  04/06/21  -LE     OT Goal Re-Cert Due Date  04/19/21  -LE  --       User Key  (r) = Recorded By, (t) = Taken By, (c) = Cosigned By    Initials Name Provider Type    LE Cecelia Ocasio OTR Occupational Therapist        Therapy Charges for Today     Code Description Service Date Service Provider Modifiers Qty    67333152772 HC OT EVAL MOD COMPLEXITY 2 4/5/2021 Cecelia Ocasio OTR GO 1    58840628845 HC OT THERAPEUTIC ACT EA 15 MIN 4/5/2021 Cecelia Ocasio OTR GO 1                Cecelia Ocasio, OTR  4/5/2021

## 2021-04-05 NOTE — THERAPY EVALUATION
Patient Name: Veronica Henao  : 1935    MRN: [id number]                              Today's Date: 2021       Admit Date: 2021    Visit Dx:     ICD-10-CM ICD-9-CM   1. Recurrent falls  R29.6 V15.88   2. Generalized weakness  R53.1 780.79   3. ESRD (end stage renal disease) on dialysis (CMS/[facility])  N18.6 585.6    Z99.2 V45.11     Patient Active Problem List   Diagnosis   • Atopic rhinitis   • Gout   • Cephalalgia   • Hypercalcemia   • Hyperlipidemia   • Lung mass   • Gastroesophageal reflux disease   • Tricuspid insufficiency   • Morbid obesity (CMS/[facility])   • JOSELUIS on CPAP   • Chronic venous insufficiency   • Mitral valve insufficiency   • S/P Maze operation for atrial fibrillation   • S/P TVR (tricuspid valve repair)   • S/P MVR (mitral valve repair)   • Atrial flutter, paroxysmal (CMS/[facility])   • Third degree atrioventricular block (CMS/[facility])   • Chronic kidney disease, stage IV (severe) (CMS/[facility])   • Anemia in chronic kidney disease   • Iron deficiency anemia   • Pulmonary hypertension (CMS/[facility])   • Chronic diastolic congestive heart failure (CMS/[facility])   • ESRD on hemodialysis (CMS/[facility])   • Control of atrial fibrillation with pacemaker (CMS/[facility])   • Chronic atrial fibrillation (CMS/[facility])   • Chronic anticoagulation   • Recurrent falls   • Macular degeneration     Past Medical History:   Diagnosis Date   • Acute bronchitis    • Acute renal insufficiency    • Allergic rhinitis    • Anemia in chronic kidney disease    • Arrhythmia    • Atrial fibrillation (CMS/[facility])     chronic   • Brachycephaly    • Breast pain, right    • Chest pain    • CHF (congestive heart failure) (CMS/[facility])    • Chronic combined systolic and diastolic CHF (congestive heart failure) (CMS/[facility])    • Chronic renal insufficiency    • CKD (chronic kidney disease), stage IV (CMS/[facility])    • Cough    • Depression    • Diabetes mellitus (CMS/[facility])     TYPE 2   • Dialysis patient (CMS/[facility])    • Diverticulosis    • ORTIZ (dyspnea on exertion)    • Dyspnea     • Esophageal reflux    • Essential hypertension    • Fatigue    • GERD (gastroesophageal reflux disease)    • Gout    • Head injury    • Headache    • Hoarseness    • Hypercalcemia    • Hyperlipidemia    • Hypertension    • Influenza A (H1N1)    • Iron deficiency anemia    • Itching    • Leg swelling    • Lightheadedness    • Macular degeneration    • Malaise and fatigue    • Mitral valve regurgitation     moderate to severe   • Nontoxic multinodular goiter     RIGHT 2.0 CM  LEFT  2.5 CM   • Obesity    • JOSELUIS on CPAP    • Osteoarthritis    • PAF (paroxysmal atrial fibrillation) (CMS/HCC)    • Palpitations    • Paresthesias    • Proteinuria    • Pulmonary hypertension (CMS/HCC)    • Pulmonary nodule    • Pulmonic valve regurgitation     mild   • Sebaceous cyst    • SOBOE (shortness of breath on exertion)    • Solitary thyroid nodule    • Subclinical hypothyroidism    • Tachycardia    • TR (tricuspid regurgitation)     mild to moderate   • Type 2 diabetes mellitus (CMS/HCC)    • 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)    • UTI (urinary tract infection)      Past Surgical History:   Procedure Laterality Date   • APPENDECTOMY     • ARTERIOVENOUS FISTULA/SHUNT SURGERY Left 2/5/2020    Procedure: LEFT BRACHIAL ARTERY AXILLARY VEIN GORTEX SHUNT;  Surgeon: Kaveh Mcdonnell MD;  Location: [facility];  Service: Vascular;  Laterality: Left;   • CARDIAC CATHETERIZATION  1986    procedure outcome:    • CARDIAC CATHETERIZATION N/A 11/21/2016    Procedure: Coronary angiography;  Surgeon: Marcin العراقي MD;  Location: [facility] INVASIVE LOCATION;  Service:    • CARDIAC CATHETERIZATION N/A 11/21/2016    Procedure: Left Heart Cath;  Surgeon: Marcin العراقي MD;  Location: [facility] CATH INVASIVE LOCATION;  Service:    • CARDIAC CATHETERIZATION N/A 8/29/2018    Procedure: Right Heart Cath with adenosine challenge if wedge pressure is normal.;  Surgeon: Paulo Decker MD;  Location:   [facility] CATH INVASIVE LOCATION;  Service: Cardiovascular   • CARDIAC ELECTROPHYSIOLOGY PROCEDURE N/A 3/28/2017    Procedure: Ablation atrial flutter;  Surgeon: Rodríguez Ward MD;  Location: [facility] CATH INVASIVE LOCATION;  Service:    • CARDIAC ELECTROPHYSIOLOGY PROCEDURE N/A 7/3/2017    Procedure: AV node ablation;  Surgeon: Rodríguez Ward MD;  [facility] CATH [facility]VASIVE LOC[facility];  Service:    • CARDIAC ELECTROPHYSIOLOGY PROCEDURE N/A 7/3/2017    Procedure: Pacemaker DC new  [employer] and will need 3830 lead-I did chase Rachael Marinelliach ;  Surgeon: Rodríguez Ward MD;  [facility]: [facility] CATH INVASIVE LOCATION;  Service:    • CARDIAC SURGERY     • CHOLECYSTECTOMY     • CLAVICLE SURGERY Left    • COLONOSCOPY N/A 11/14/2017    Procedure: COLONOSCOPY INTO CECUM/ TERMINAL ILEUM WITH POLYPECTOMY  X 8 AND CLIP X 2;  Surgeon: Jacy Browning MD;  [facility] ENDOSCOPY;  Service:    • ENDOSCOPY N/A 11/14/2017    Procedure: ESOPHAGOGASTRODUODENOSCOPY WITH BIOPSIES;  Surgeon: Jacy Browning MD;  Location: [facility] ENDOSCOPY;  Service:    • GASTRIC RESTRICTION SURGERY     • HYSTERECTOMY     • INSERTION HEMODIALYSIS CATHETER N/A 1/10/2020    Procedure: TUNNEL DIALYSIS CATHETER;  Surgeon: Carlos Manuel Jernigan MD;  Location: [facility] MAIN OR;  Service: Vascular   • JOINT REPLACEMENT     • LUMBAR DECOMPRESSION      L4-5   • MITRAL VALVE REPAIR/REPLACEMENT N/A 12/14/2016    Procedure: INTRAOPERATIVE KATELYN, MIDLINE STERNOTOMY, MITRAL VALVE REPAIR, TRICUSPID VALVE REPAIR, MAZE PROCEDURE;  Surgeon: Young Vital MD;  Location: [facility] MAIN OR;  Service:    • SHOULDER SURGERY Left     AFTER SURGERY FOR FRACTURED CLAVICLE   • TONSILLECTOMY     • TOTAL KNEE ARTHROPLASTY      bilateral     General Information     Row Name 04/05/21 1045          Physical Therapy Time and Intention    Document Type  evaluation Pt. admitted after a fall at home with c/o general weakness and loss of appetite.  -MS     Mode of Treatment  physical  therapy;individual therapy  -MS     Row Name 04/05/21 1045          General Information    Patient Profile Reviewed  yes  -MS     Prior Level of Function  independent:  -MS     Existing Precautions/Restrictions  (S) fall Exit alarm  -MS     Barriers to Rehab  none identified  -MS     Row Name 04/05/21 1045          Cognition    Orientation Status (Cognition)  oriented to;person  -MS     Row Name 04/05/21 1045          Safety Issues, Functional Mobility    Comment, Safety Issues/Impairments (Mobility)  Gait belt used for safety.  -MS       User Key  (r) = Recorded By, (t) = Taken By, (c) = Cosigned By    Initials Name Provider Type    Bony Sterling PT Physical Therapist        Mobility     Row Name 04/05/21 1046          Bed Mobility    Bed Mobility  supine-sit;sit-supine  -MS     Supine-Sit [place] (Bed Mobility)  moderate assist (50% patient effort);2 person assist  -MS     Sit-Supine [place] (Bed Mobility)  moderate assist (50% patient effort);2 person assist  -MS     Assistive Device (Bed Mobility)  draw sheet  -MS     Row Name 04/05/21 1046          Transfers    Comment (Transfers)  Posterior lean throughout.  Pt. only able to stand ~ 10 seconds before having to sit back down due to fatigue.  At that point, transport arrived to take pt. to Dialysis.  -MS     Row Name 04/05/21 1046          Sit-Stand Transfer    Sit-Stand [place] (Transfers)  moderate assist (50% patient effort);2 person assist  -MS     Assistive Device (Sit-Stand Transfers)  -- HHA x 2  -MS     Row Name 04/05/21 1046          Gait/Stairs (Locomotion)    Distance in Feet (Gait)  Unable to attempt gait this date as pt. unable to maintain a safe standing position due to fatigue/weakness.  -MS       User Key  (r) = Recorded By, (t) = Taken By, (c) = Cosigned By    Initials Name Provider Type    Bony Sterling PT Physical Therapist        Obj/Interventions     Row Name 04/05/21 1048          Range of Motion Comprehensive  "   Comment, General Range of Motion  BUE/LE grossly WFL's  -MS     Row Name 04/05/21 1048          Strength Comprehensive (MMT)    Comment, General Manual Muscle Testing (MMT) Assessment  BUE/LE (>/= 3/5)  -MS       User Key  (r) = Recorded By, (t) = Taken By, (c) = Cosigned By    Initials Name Provider Type    Bony Sterling KELSEA, PT Physical Therapist        Goals/Plan     Row Name 04/05/21 1050          Bed Mobility Goal 1 (PT)    Activity/Assistive Device (Bed Mobility Goal 1, PT)  bed mobility activities, all  -MS     [place] Level/Cues Needed (Bed Mobility Goal 1, PT)  contact guard assist  -MS     Time Frame (Bed Mobility Goal 1, PT)  long term goal (LTG);1 week  -MS     Row Name 04/05/21 1050          Transfer Goal 1 (PT)    Activity/Assistive Device (Transfer Goal 1, PT)  transfers, all With AAD  -MS     [place] Level/Cues Needed (Transfer Goal 1, PT)  contact guard assist  -MS     Time Frame (Transfer Goal 1, PT)  long term goal (LTG);1 week  -MS     Row Name 04/05/21 1050          Gait Training Goal 1 (PT)    Activity/Assistive Device (Gait Training Goal 1, PT)  gait (walking locomotion) With AAD  -MS     [place] Level (Gait Training Goal 1, PT)  contact guard assist  -MS     Distance (Gait Training Goal 1, PT)  30 feet  -MS     Time Frame (Gait Training Goal 1, PT)  long term goal (LTG);1 week  -MS       User Key  (r) = Recorded By, (t) = Taken By, (c) = Cosigned By    Initials Name Provider Type    Bony Sterling KELSEA, PT Physical Therapist        Clinical Impression     Row Name 04/05/21 1049          Pain    Additional Documentation  Pain Scale: Numbers Pre/Post-Treatment (Group)  -MS     Row Name 04/05/21 1049          Pain Scale: Numbers Pre/Post-Treatment    Pretreatment Pain Rating  3/10  -MS     Posttreatment Pain Rating  3/10  -MS     Pre/Posttreatment Pain Comment  Pt. reports her \"butt\" is sore.  -MS     Row Name 04/05/21 1046          Plan of Care Review    Plan of Care " Reviewed With  patient  -MS     Row Name 04/05/21 1049          Therapy Assessment/Plan (PT)    Rehab Potential (PT)  good, to achieve stated therapy goals  -MS     Criteria for Skilled Interventions Met (PT)  skilled treatment is necessary  -MS     Row Name 04/05/21 1049          Positioning and Restraints    Pre-Treatment Position  in bed  -MS     Post Treatment Position  bed  -MS     In Bed  notified nsg;supine;call light within reach;encouraged to call for assist;exit alarm on;with other staff  -MS       User Key  (r) = Recorded By, (t) = Taken By, (c) = Cosigned By    Initials Name Provider Type    Bony Sterling, PT Physical Therapist        Outcome Measures     Row Name 04/05/21 1050          How much help from another person do you currently need...    Turning from your back to your side while in flat bed without using bedrails?  2  -MS     Moving from lying on back to sitting on the side of a flat bed without bedrails?  2  -MS     Moving to and from a bed to a chair (including a wheelchair)?  2  -MS     Standing up from a chair using your arms (e.g., wheelchair, bedside chair)?  2  -MS     Climbing 3-5 steps with a railing?  2  -MS     To walk in hospital room?  2  -MS     AM-PAC 6 Clicks Score (PT)  12  -MS     Row Name 04/05/21 1050          Functional Assessment    Outcome Measure Options  AM-PAC 6 Clicks Basic Mobility (PT)  -MS       User Key  (r) = Recorded By, (t) = Taken By, (c) = Cosigned By    Initials Name Provider Type    Bony Sterling, PT Physical Therapist        Physical Therapy Education                 Title: PT OT SLP Therapies (In Progress)     Topic: Physical Therapy (In Progress)     Point: Mobility training (Done)     Learning Progress Summary           Patient Acceptance, E,D, VU,NR by MS at 4/5/2021 1051                   Point: Home exercise program (Not Started)     Learner Progress:  Not documented in this visit.          Point: Body mechanics (Done)     Learning  Progress Summary           Patient Acceptance, E,D, VU,NR by MS at 4/5/2021 1051                   Point: Precautions (Done)     Learning Progress Summary           Patient Acceptance, E,D, VU,NR by MS at 4/5/2021 1051                               User Key     Initials Effective Dates Name Provider Type Discipline    MS 04/03/18 -  Bony Evans, PT Physical Therapist PT              PT Recommendation and Plan  Planned Therapy Interventions (PT): balance training, bed mobility training, gait training, home exercise program, patient/family education, postural re-education, transfer training, strengthening  Plan of Care Reviewed With: patient  Outcome Summary: Pt. is an 86 year old Female admitted to the hospital after a fall at home with c/o general weakness and loss of apetite. Pt. currently presents with decreased strength, decreased balance, and decreased tolerance to functional activity. This AM, pt. requires Mod. assist x 2 for bed mobility and Mod. assist x 2 for sit <-> stand transfers.  Pt. only able to stand ~ 10 seconds due to fatigue/weakness, with posterior lean throughout. Unable to attempt gait this date as pt. unable to maintain a safe upright standing position.  Pt. will benefit from continued skilled inpt. P.T. to address her functional deficits and to assist pt. in regaining her maximum level of independence with functional mobility.     Time Calculation:   PT Charges     Row Name 04/05/21 1054             Time Calculation    Start Time  0859  -MS      Stop Time  0910  -MS      Time Calculation (min)  11 min  -MS      PT Received On  04/05/21  -MS      PT - Next Appointment  04/06/21  -MS      PT Goal Re-Cert Due Date  04/12/21  -MS         Time Calculation- PT    Total Timed Code Minutes- PT  10 minute(s)  -MS        User Key  (r) = Recorded By, (t) = Taken By, (c) = Cosigned By    Initials Name Provider Type    MS Bony Evans, PT Physical Therapist        Therapy Charges for Today      Code Description Service Date Service Provider Modifiers Qty    09747253516 HC PT EVAL MOD COMPLEXITY 1 4/5/2021 Bony Evans, PT GP 1    47307655419 HC PT THER PROC EA 15 MIN 4/5/2021 Bony Evans, PT GP 1    29222082518 HC PT THER SUPP EA 15 MIN 4/5/2021 Bony Evans, PT GP 1          PT G-Codes  Outcome Measure Options: AM-PAC 6 Clicks Basic Mobility (PT)  AM-PAC 6 Clicks Score (PT): 12    Bony Evans, PT  4/5/2021

## 2021-04-05 NOTE — PROGRESS NOTES
Discharge Planning Assessment  [facility]     Patient Name: Veronica Henao  MRN: [id number]  Today's Date: 4/5/2021    Admit Date: 4/4/2021    Discharge Needs Assessment     Row Name 04/05/21 1448       Living Environment    Lives With  child(afshin), adult    Name(s) of Who Lives With Patient  Son Cheo lives with her    Current Living Arrangements  home/apartment/condo    Primary Care Provided by  self    Provides Primary Care For  no one, unable/limited ability to care for self    Family Caregiver if Needed  child(afshin), adult    Family Caregiver Names  Cheo    Quality of Family Relationships  helpful;involved;supportive    Able to Return to Prior Arrangements  yes       Resource/Environmental Concerns    Resource/Environmental Concerns  none    Transportation Concerns  car, none       Transition Planning    Patient/Family Anticipates Transition to  inpatient rehabilitation facility    Patient/Family Anticipated Services at Transition      Transportation Anticipated  family or friend will provide       Discharge Needs Assessment    Readmission Within the Last 30 Days  no previous admission in last 30 days    Current Outpatient/Agency/Support Group  skilled nursing facility    Equipment Currently Used at Home  walker, rolling;shower chair;grab bar    Concerns to be Addressed  discharge planning    Anticipated Changes Related to Illness  none    Equipment Needed After Discharge  none    Outpatient/Agency/Support Group Needs  skilled nursing facility    Discharge Facility/Level of Care Needs  nursing facility, skilled        Discharge Plan     Row Name 04/05/21 1449       Plan    Plan  SNF, Referrals pending. Needs Pre-cert    Patient/Family in Agreement with Plan  yes    Plan Comments  Met with patient at the bedside. Explained CCP role and verified face sheet. Patient lives at home with two steps to enter. Her son Cheo lives with her. She has a shower chair, grab bars, and rolling walker. She is  current with PCP and pharmacy. She has HD T-TH-SAT at [facility]. Her son Cheo drives her as well as her daughter at times. Discussed the need for rehab and discharge and patient was agreeable. Discussed options for facilities that have in house HD and patient agreeable to [facility] 1st choice and [facility] as second choice. Message sent to [facility] [place] letting her know patient is ready for DC and needs cert and is current with [facility]. Partial packet in CCP office. Jelena Dougherty RN        Continued Care and Services - Admitted Since 4/4/2021     Destination     Service Provider Request Status Selected Services Address Phone Fax Patient Preferred    [facility]  Pending - [facility] N/A [address] [facility] [address] [phone] --       Internal Comment last updated by Jelena Dougherty RN 4/5/2021 1447    1st choice, [facility] HD T-TH-SAT               [facility]  Pending - [facility] N/A [address] [address] [phone] --                Demographic Summary     Row Name 04/05/21 1447       General Information    Admission Type  observation    Arrived From  emergency department    Referral Source  admission list    Reason for Consult  discharge planning    Preferred Language  English       Contact Information    Permission Granted to Share Info With  family/designee        Functional Status     Row Name 04/05/21 1447       Functional Status    Usual Activity Tolerance  moderate    Current Activity Tolerance  poor       Functional Status, IADL    Medications  independent    Meal Preparation  assistive person    Housekeeping  assistive person    Laundry  assistive person    Shopping  assistive person       Mental Status    General Appearance WDL  WDL       Mental Status Summary    Recent Changes in Mental Status/Cognitive Functioning  no changes        Psychosocial     Row Name 04/05/21 1447       Behavior  WDL    Behavior WDL  WDL       Emotion Mood WDL    Emotion/Mood/Affect WDL  WDL       Speech WDL    Speech WDL  WDL       Perceptual State WDL    Perceptual State WDL  WDL       Thought Process WDL    Thought Process WDL  WDL       Intellectual Performance WDL    Intellectual Performance WDL  WDL    Level of Consciousness  Alert       Coping/Stress    Major Change/Loss/Stressor  none    Patient Personal Strengths  able to adapt    Sources of Support  adult child(afshin)    Reaction to Health Status  accepting    Understanding of Condition and Treatment  adequate understanding of treatment;adequate understanding of medical condition       Developmental Stage (Eriksson's)    Developmental Stage  Stage 8 (65 years-death/Late Adulthood) Integrity vs. Despair        Abuse/Neglect     Row Name 04/05/21 8792       Personal Safety    Feels Unsafe at Home or Work/School  no    Feels Threatened by Someone  no    Does Anyone Try to Keep You From Having Contact with Others or Doing Things Outside Your Home?  no    Physical Signs of Abuse Present  no        Legal    No documentation.       Substance Abuse    No documentation.       Patient Forms    No documentation.           Jelena Dougherty RN

## 2021-04-05 NOTE — ED NOTES
Nursing report ED to floor  Veronica Henao  86 y.o.  female    HPI (triage note):   Chief Complaint   Patient presents with   • Weakness - Generalized   • Hypotension   • Fall       Admitting doctor:   Sridevi Huizar MD    Admitting diagnosis:   The primary encounter diagnosis was Recurrent falls. Diagnoses of Generalized weakness and ESRD (end stage renal disease) on dialysis (CMS/LTAC, located within [facility]) were also pertinent to this visit.    Code status:   Current Code Status     Date Active Code Status Order ID Comments User Context       4/4/2021 2046 CPR [id number]  Sridevi Huizar MD ED     Advance Care Planning Activity      Questions for Current Code Status     Question Answer Comment    Code Status CPR     Medical Interventions (Level of Support Prior to Arrest) Full           Allergies:   Cephalexin, Meperidine, and Penicillins    Weight:       04/04/21  1753   Weight: 63.5 kg (140 lb)       Most recent vitals:   Vitals:    04/04/21 2000 04/04/21 2015 04/04/21 2030 04/04/21 2045   BP: 105/50 (!) 83/49 92/42 91/45   Pulse: 69 69 69 69   Resp:       Temp:       TempSrc:       SpO2: 98% 96% 96% 96%   Weight:       Height:           Active LDAs/IV Access:   Lines, Drains & Airways    Active LDAs     Name:   Placement date:   Placement time:   Site:   Days:    Peripheral IV 04/04/21 1750 Right Forearm   04/04/21    1750    Forearm   less than 1    Hemodialysis Cath Double   01/10/20    1147    Subclavian   450                Labs (abnormal labs have a star):   Labs Reviewed   RENAL FUNCTION PANEL - Abnormal; Notable for the following components:       Result Value    Glucose 246 (*)     BUN 45 (*)     Creatinine 5.54 (*)     Sodium 135 (*)     Potassium 3.4 (*)     Chloride 96 (*)     Albumin 2.90 (*)     Anion Gap 15.9 (*)     eGFR Non  Amer 7 (*)     All other components within normal limits    Narrative:     GFR Normal >60  Chronic Kidney Disease <60  Kidney Failure <15     CBC WITH AUTO DIFFERENTIAL - Abnormal; Notable for the  following components:    WBC 11.45 (*)     RBC 3.75 (*)     .6 (*)     MCH 33.1 (*)     Neutrophil % 86.7 (*)     Lymphocyte % 7.4 (*)     Monocyte % 4.6 (*)     Immature Grans % 0.7 (*)     Neutrophils, Absolute 9.92 (*)     Immature Grans, Absolute 0.08 (*)     All other components within normal limits   COVID PRE-OP / PRE-PROCEDURE SCREENING ORDER (NO ISOLATION)    Narrative:     The following orders were created for panel order COVID PRE-OP / PRE-PROCEDURE SCREENING ORDER (NO ISOLATION) - Swab, Nasopharynx.  Procedure                               Abnormality         Status                     ---------                               -----------         ------                     COVID-19,APTIMA PANTHER,...[[id number]]                      In process                   Please view results for these tests on the individual orders.   COVID-19,APTIMA PANTHER,[facility] IN-HOUSE,NP/OP SWAB IN UTM/VTM/SALINE TRANSPORT MEDIA,24 HR TAT   CBC AND DIFFERENTIAL    Narrative:     The following orders were created for panel order CBC & Differential.  Procedure                               Abnormality         Status                     ---------                               -----------         ------                     CBC Auto Differential[[id number]]        Abnormal            Final result                 Please view results for these tests on the individual orders.       EKG:   No orders to display       Meds given in ED:   Medications   sodium chloride 0.9 % bolus 500 mL (0 mL Intravenous Stopped 4/4/21 1830)       Imaging results:  CT Head Without Contrast    Result Date: 4/4/2021  No acute intracranial findings.  Radiation dose reduction techniques were utilized, including automated exposure control and exposure modulation based on body size.  This report was finalized on 4/4/2021 7:57 PM by Dr. Vee Vergara M.D.      CT Cervical Spine Without Contrast    Result Date: 4/4/2021  No acute fracture or subluxation  identified.  Radiation dose reduction techniques were utilized, including automated exposure control and exposure modulation based on body size.  This report was finalized on 4/4/2021 8:03 PM by Dr. Vee Vergara M.D.      XR Knee 1 or 2 View Bilateral    Result Date: 4/4/2021  Electronically signed by Juan Mills MD on 04-04-21 at 1859      Ambulatory status:   - Up with assistance as tolerated    Social issues:   Social History     Socioeconomic History   • Marital status:      Spouse name: Not on file   • Number of children: Not on file   • Years of education: Not on file   • Highest education level: Not on file   Tobacco Use   • Smoking status: Former Smoker     Packs/day: 1.50     Years: 20.00     Pack years: 30.00     Start date: 6/14/1970   • Smokeless tobacco: Never Used   • Tobacco comment: D/C 1970 SMOKING 1 1/2 PPD FOR 13 YRS BEFORE QUITTING   Substance and Sexual Activity   • Alcohol use: No     Comment: caffeine use   • Drug use: No   • Sexual activity: Defer    Nursing report ED to floor     Abebe Billings RN  04/04/21 2048

## 2021-04-05 NOTE — PLAN OF CARE
Goal Outcome Evaluation:  Plan of Care Reviewed With: patient     Outcome Summary: Pt. is an 86 year old Female admitted to the hospital after a fall at home with c/o general weakness and loss of apetite. Pt. currently presents with decreased strength, decreased balance, and decreased tolerance to functional activity. This AM, pt. requires Mod. assist x 2 for bed mobility and Mod. assist x 2 for sit <-> stand transfers.  Pt. only able to stand ~ 10 seconds due to fatigue/weakness, with posterior lean throughout. Unable to attempt gait this date as pt. unable to maintain a safe upright standing position.  Pt. will benefit from continued skilled inpt. P.T. to address her functional deficits and to assist pt. in regaining her maximum level of independence with functional mobility.

## 2021-04-05 NOTE — PROGRESS NOTES
Name: Veronica Henao ADMIT: 2021   : 1935  PCP: Nelly Price MD    MRN: [id number] LOS: 0 days   AGE/SEX: 86 y.o. female  ROOM: [facility]   Subjective   Chief Complaint   Patient presents with   • Weakness - Generalized   • Hypotension   • Fall     Weakness on going  For a month or so  Increased falls    H/o ESRD  On HD MWF    Getting it today    ROS  No f/c  No n/v  No cp/palp  No soa/cough    Objective   Vital Signs  Temp:  [96.9 °F (36.1 °C)-97.9 °F (36.6 °C)] 96.9 °F (36.1 °C)  Heart Rate:  [64-75] 69  Resp:  [14-18] 16  BP: ()/(42-81) 95/52  SpO2:  [91 %-98 %] 91 %  on   ;   Device (Oxygen Therapy): room air  Body mass index is 24.8 kg/m².    Elderly  On HD currently  Physical Exam  Constitutional:       General: She is not in acute distress.     Appearance: She is well-developed.   HENT:      Head: Normocephalic and atraumatic.   Eyes:      General: No scleral icterus.  Cardiovascular:      Rate and Rhythm: Normal rate and regular rhythm.   Pulmonary:      Effort: Pulmonary effort is normal. No respiratory distress.   Abdominal:      General: There is no distension.      Palpations: Abdomen is soft.   Musculoskeletal:      Cervical back: Neck supple.   Skin:     Coloration: Skin is pale.   Neurological:      Mental Status: She is alert.      Cranial Nerves: No cranial nerve deficit.      Sensory: No sensory deficit.      Motor: Weakness (4/5 both lower extremities) present.   Psychiatric:         Behavior: Behavior normal.         Results Review:       I reviewed the patient's new clinical results.  Results from last 7 days   Lab Units 21  0538 21  1755 21  1256   WBC 10*3/mm3 10.83* 11.45* 13.99*   HEMOGLOBIN g/dL 12.2 12.4 13.9   PLATELETS 10*3/mm3 191 213 211     Results from last 7 days   Lab Units 21  0538 21  1755 21  1256   SODIUM mmol/L 137 135* 137   POTASSIUM mmol/L 3.5 3.4* 3.7   CHLORIDE mmol/L 99 96* 95*   CO2 mmol/L 21.9* 23.1 25.5   BUN  mg/dL 47* 45* 30*   CREATININE mg/dL 5.97* 5.54* 4.43*   GLUCOSE mg/dL 148* 246* 241*   Estimated Creatinine Clearance: 6.1 mL/min (A) (by C-G formula based on SCr of 5.97 mg/dL (H)).  Results from last 7 days   Lab Units 04/05/21 0538 04/04/21 1755 04/03/21  1256   ALBUMIN g/dL 2.60* 2.90* 3.60   BILIRUBIN mg/dL 0.3  --  0.3   ALK PHOS U/L 143*  --  188*   AST (SGOT) U/L 10  --  22   ALT (SGPT) U/L 11  --  11     Results from last 7 days   Lab Units 04/05/21 0538 04/04/21 1755 04/03/21  1256   CALCIUM mg/dL 8.5* 9.0 9.7   ALBUMIN g/dL 2.60* 2.90* 3.60   PHOSPHORUS mg/dL  --  3.6 3.2         Coag     HbA1C   Lab Results   Component Value Date    HGBA1C 7.00 (H) 08/28/2020    HGBA1C 6.50 (H) 06/08/2020    HGBA1C 7.04 (H) 01/06/2020     Infection     Radiology(recent) CT Head Without Contrast    Result Date: 4/4/2021  No acute intracranial findings.  Radiation dose reduction techniques were utilized, including automated exposure control and exposure modulation based on body size.  This report was finalized on 4/4/2021 7:57 PM by Dr. Vee Vergara M.D.      CT Cervical Spine Without Contrast    Result Date: 4/4/2021  No acute fracture or subluxation identified.  Radiation dose reduction techniques were utilized, including automated exposure control and exposure modulation based on body size.  This report was finalized on 4/4/2021 8:03 PM by Dr. Vee Vergara M.D.      XR Knee 1 or 2 View Bilateral    Result Date: 4/4/2021  Electronically signed by Juan Mills MD on 04-04-21 at 1859    No results found for: TROPONINT, TROPONINI, BNP  No components found for: TSH;2    insulin lispro, 0-9 Units, Subcutaneous, TID AC  latanoprost, 1 drop, Both Eyes, Daily  midodrine, 5 mg, Oral, TID AC  ofloxacin, 2 drop, Left Eye, 4x Daily  pantoprazole, 40 mg, Oral, QAM  sertraline, 50 mg, Oral, Daily  sodium chloride, 10 mL, Intravenous, Q12H       Diet Regular; Cardiac      Assessment/Plan      Active Hospital Problems     Diagnosis  POA   • **Recurrent falls [R29.6]  Not Applicable   • DNR (do not resuscitate) [Z66]  No   • Age-related physical debility [R54]  Yes   • Macular degeneration [H35.30]  Yes   • ESRD on hemodialysis (CMS/HCC) [N18.6, Z99.2]  Not Applicable   • Anemia in chronic kidney disease [N18.9, D63.1]  Yes   • Third degree atrioventricular block (CMS/HCC) [I44.2]  Yes   • S/P Maze operation for atrial fibrillation [Z98.890, Z86.79]  Not Applicable   • 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       86-year-old with history of end-stage renal disease on dialysis and other medical issues who presented with issues of recurrent falls and generalized weakness over the past couple of months.  She denies any focal weakness.    · Overall falls likely multifactorial. Potential impact of polypharmacy (with some medications held by Dr. Huizar), orthostatic hypotension, debility, and other issues. She denies any focal symptoms suggestive of CVA. PT consultation, CCP to see regarding rehab options.   · ESRD- HD today  · Monitor labs  · Continue many home medications  · Discussed code status with her- she would like to be DNR/DNI which is reasonable and will order      DW staff  Reviewed records      Ahmet Garcia MD  [facility]  04/05/21  14:26 EDT

## 2021-04-05 NOTE — DISCHARGE PLACEMENT REQUEST
"Veronica Henao (86 y.o. Female)     Date of Birth Social Security Number Address Home Phone MRN    [id number]  [id number]  [facility] [address] [phone] [id number]    Restoration Marital Status          Amish        Admission Date Admission Type Admitting Provider Attending Provider Department, Room/Bed    4/4/21 Emergency Sridevi Huizar MD Jackson, Alan David, MD [address], P593/1    Discharge Date Discharge Disposition Discharge Destination                       Attending Provider: Amhet Garcia MD    Allergies: Cephalexin, Meperidine, Penicillins    Isolation: None   Infection: None   Code Status: No CPR    Ht: 160 cm (63\")   Wt: 63.5 kg (140 lb)    Admission Cmt: None   Principal Problem: Recurrent falls [R29.6]                 Active Insurance as of 4/4/2021     Primary Coverage     Payor Plan Insurance Group Employer/Plan Group    HUMANA MEDICARE REPLACEMENT HUMANA MEDICARE REPLACEMENT [id number]     Payor Plan Address Payor Plan Phone Number Payor Plan Fax Number Effective Dates    [address] [address] [phone]  6/1/2011 - None Entered    [facility] [id number]       Subscriber Name Subscriber Birth Date Member ID       VERONICA HENAO 1935 [id number]                 Emergency Contacts      (Rel.) Home Phone Work Phone Mobile Phone    Nesha Young (Daughter) [phone] -- [phone]    Cheo Hester (Son) [phone] -- [phone]            {Outbreak/Travel/Exposure Documentation......;  Question Available Choices Patient Response   COVID-19 Outbreak Screen:  Do you currently have a new onset of the following symptoms?        Fever/Chills, Cough, Shortness of air, Loss of taste or smell, No, Unknown  No (04/04/21 4071)   COVID-19 Outbreak Screen: In the last 14 days, have you had contact with anyone who is ill, has show any of the symptoms listed above and/or has been diagnosis with the 2019 Novel Coronavirus? This includes any " immediate household members but excludes any patients with whom you have been in contact within your normal work duties wearing proper PPE, if you are a healthcare worker.  Yes, No, Unknown              No (04/04/21 1747)   COVID-19 Outbreak Screen: Who was notified? Free text (not recorded)   Ebola Screening Outbreak Screen: Have you traveled to the [place] or [place] within the past 21 days?  Yes, No, Unknown No (04/04/21 1747)   Ebola Screening Outbreak Screen: Do you have ANY of the following symptoms: Fever/Chills, Vomiting, Diarrhea, Fatigue, Headache, Muscle pain, Unexplained bleeding, Abdominal (stomach) pain, No, Unknown (not recorded)   Ebola Screening Outbreak Screen: Name of Person notified Free text (not recorded)   Travel Screen: Have you traveled in the last month? If so, to what country have you traveled? If [place] what state? Yes, No, Unknown  List of all countries  List of all States No (04/04/21 1754)  (not recorded)  (not recorded)   Infection Risk: Do you currently have the following symptoms?  (If cough is selected, the Tuberculosis Screen is performed.) Cough, Fever, Rash, No No (04/04/21 1754)   Tuberculosis Screen: Do you have any of the following Tuberculosis Risks?  · Have you lived or spent time with anyone who had or may have TB?  · Have you lived in or visited any of the following areas for more than one month: [place], [place], [place], [place] or [place], the [place] or [place]?  · Do you have HIV/AIDS?  · Have you lived in or worked in a nursing home, homeless shelter, correctional facility, or substance abuse treatment facility?   · No    If Yes do you have any of the following symptoms? Yes responses display to the right    If Yes, symptoms listed are:  Cough greater than or equal to 3 weeks, Loss of appetite, Unexplained weight loss, Night sweats, Bloody sputum or hemoptysis, Hoarseness, Fever, Fatigue, Chest pain, No (not recorded)  (not recorded)    Exposure Screen: Have you been exposed to any of these contagious diseases in the last month? Measles, Chickenpox, Meningitis, Pertussis, Whooping Cough, No No (04/04/21 0526)

## 2021-04-05 NOTE — NURSING NOTE
[facility] dept - patient came from home where she has been having multiple falls, most recently fell on the concrete. BLE has scattered bruises and abrasions with no skin loss. Bilateral hips have ecchymotic and abraded areas. L hip 2 areas 2x3 and 6x2 cm,soft scabbing; R hip 3x3 cm and firm black scab, eschar like. There were optifoams covering these sites. Recommend to leave open to air and apply antibiotic ointment 2 times daily. Coccyx was pink and blanchable.

## 2021-04-05 NOTE — H&P
Internal medicine history and physical  INTERNAL MEDICINE   [facility]       Patient Identification:  Name: Veronica Henao  Age: 86 y.o.  Sex: female  :  1935  MRN: [id number]                   Primary Care Physician: Nelly Price MD                               Date of admission:2021    Chief Complaint: Brought to the emergency room by family ambulance for recurrent falls despite using walker.  Patient to be evaluated for post hospital discharge placement in assisted/nursing home living condition as patient is unable to be cared for at home at this time.    History of Present Illness:   Patient is 86-year-old female who has complicated past medical history has been on multiple medications for anxiety depression as well as chronic kidney disease transforming into end-stage renal disease on hemodialysis, restless leg etc.  She is also has chronic back pain and neuropathy for which she is on gabapentin.  In this background patient presented to the emergency room from home for recurrent falls associated with closed head injury with no obvious bleeding.  Initial work-up in the emergency room did not reveal any acute abnormalities.  Patient was attempted to be placed but could not be accomplished is being admitted for this cold.  Patient denies passing out spells of focal weakness of arm or legs.  She has multiple ER visits including most recent visit to the emergency room for recurrent falls.  Patient denies any fever and chills.      Past Medical History:  Past Medical History:   Diagnosis Date   • Acute bronchitis    • Acute renal insufficiency    • Allergic rhinitis    • Anemia in chronic kidney disease    • Arrhythmia    • Atrial fibrillation (CMS/HCC)     chronic   • Brachycephaly    • Breast pain, right    • Chest pain    • CHF (congestive heart failure) (CMS/HCC)    • Chronic combined systolic and diastolic CHF (congestive heart failure) (CMS/HCC)    • Chronic renal insufficiency     • CKD (chronic kidney disease), stage IV (CMS/HCC)    • Cough    • Depression    • Diabetes mellitus (CMS/HCC)     TYPE 2   • Dialysis patient (CMS/HCC)    • Diverticulosis    • ORTIZ (dyspnea on exertion)    • Dyspnea    • Esophageal reflux    • Essential hypertension    • Fatigue    • GERD (gastroesophageal reflux disease)    • Gout    • Head injury    • Headache    • Hoarseness    • Hypercalcemia    • Hyperlipidemia    • Hypertension    • Influenza A (H1N1)    • Iron deficiency anemia    • Itching    • Leg swelling    • Lightheadedness    • Macular degeneration    • Malaise and fatigue    • Mitral valve regurgitation     moderate to severe   • Nontoxic multinodular goiter     RIGHT 2.0 CM  LEFT  2.5 CM   • Obesity    • JOSELUIS on CPAP    • Osteoarthritis    • PAF (paroxysmal atrial fibrillation) (CMS/HCC)    • Palpitations    • Paresthesias    • Proteinuria    • Pulmonary hypertension (CMS/HCC)    • Pulmonary nodule    • Pulmonic valve regurgitation     mild   • Sebaceous cyst    • SOBOE (shortness of breath on exertion)    • Solitary thyroid nodule    • Subclinical hypothyroidism    • Tachycardia    • TR (tricuspid regurgitation)     mild to moderate   • Type 2 diabetes mellitus (CMS/HCC)    • 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)    • UTI (urinary tract infection)      Past Surgical History:  Past Surgical History:   Procedure Laterality Date   • APPENDECTOMY     • ARTERIOVENOUS FISTULA/SHUNT SURGERY Left 2/5/2020    Procedure: LEFT BRACHIAL ARTERY AXILLARY VEIN GORTEX SHUNT;  Surgeon: Kaveh Mcdonnell MD;  Location: [facility] OR;  Service: Vascular;  Laterality: Left;   • CARDIAC CATHETERIZATION  1986    procedure outcome:    • CARDIAC CATHETERIZATION N/A 11/21/2016    Procedure: Coronary angiography;  Surgeon: Marcin العراقي MD;  Location: [facility] INVASIVE LOCATION;  Service:    • CARDIAC CATHETERIZATION N/A 11/21/2016    Procedure: Left Heart Cath;  Surgeon: Marcin ZAMORANO  MD Dulce Maria;  Location: [facility] CATH INVASIVE LOCATION;  Service:    • CARDIAC CATHETERIZATION N/A 8/29/2018    Procedure: Right Heart Cath with adenosine challenge if wedge pressure is normal.;  Surgeon: Paulo Decker MD;  Location: [facility] CATH INVASIVE LOCATION;  Service: Cardiovascular   • CARDIAC ELECTROPHYSIOLOGY PROCEDURE N/A 3/28/2017    Procedure: Ablation atrial flutter;  Surgeon: Rodríguez Ward MD;  Location: [facility] CATH INVASIVE LOCATION;  Service:    • CARDIAC ELECTROPHYSIOLOGY PROCEDURE N/A 7/3/2017    Procedure: AV node ablation;  Surgeon: Rodríguez Ward MD;  Location: [facility] CATH INVASIVE LOCATION;  Service:    • CARDIAC ELECTROPHYSIOLOGY PROCEDURE N/A 7/3/2017    Procedure: Pacemaker DC new  [employer] and will need 3830 lead-I did chase Meneses ;  Surgeon: Rodríguez Ward MD;  Location: [facility] CATH INVASIVE LOCATION;  Service:    • CARDIAC SURGERY     • CHOLECYSTECTOMY     • CLAVICLE SURGERY Left    • COLONOSCOPY N/A 11/14/2017    Procedure: COLONOSCOPY INTO CECUM/ TERMINAL ILEUM WITH POLYPECTOMY  X 8 AND CLIP X 2;  Surgeon: Jacy Browning MD;  Location: [facility] ENDOSCOPY;  Service:    • ENDOSCOPY N/A 11/14/2017    Procedure: ESOPHAGOGASTRODUODENOSCOPY WITH BIOPSIES;  Surgeon: Jacy Browning MD;  Location: [facility] ENDOSCOPY;  Service:    • GASTRIC RESTRICTION SURGERY     • HYSTERECTOMY     • INSERTION HEMODIALYSIS CATHETER N/A 1/10/2020    Procedure: TUNNEL DIALYSIS CATHETER;  Surgeon: Carlos Manuel Jernigan MD;  Location: [facility] MAIN OR;  Service: Vascular   • JOINT REPLACEMENT     • LUMBAR DECOMPRESSION      L4-5   • MITRAL VALVE REPAIR/REPLACEMENT N/A 12/14/2016    Procedure: INTRAOPERATIVE KATELYN, MIDLINE STERNOTOMY, MITRAL VALVE REPAIR, TRICUSPID VALVE REPAIR, MAZE PROCEDURE;  Surgeon: Young Vital MD;  Location: [facility] MAIN OR;  Service:    • SHOULDER SURGERY Left     AFTER SURGERY FOR FRACTURED CLAVICLE   • TONSILLECTOMY     • TOTAL KNEE ARTHROPLASTY      bilateral      Home  Meds:  (Not in a hospital admission)    Current Meds:   No current facility-administered medications for this encounter.    Current Outpatient Medications:   •  acetaminophen (TYLENOL) 325 MG tablet, Take 2 tablets by mouth Every 6 (Six) Hours As Needed for Mild Pain (1-3)., Disp: , Rfl: 0  •  Alcohol Swabs (B-D SINGLE USE SWABS REGULAR) pads, Inject 1 each under the skin into the appropriate area as directed Daily., Disp: 100 each, Rfl: 3  •  Blood Glucose Monitoring Suppl (TRUE METRIX AIR GLUCOSE METER) device, 1 each Daily., Disp: 1 each, Rfl: 0  •  butenafine (Mentax) 1 % cream, Apply  topically to the appropriate area as directed 2 (Two) Times a Day. To feet two times daily, Disp: 45 g, Rfl: 3  •  famotidine (Pepcid) 40 MG tablet, Take 1 tablet by mouth Daily., Disp: 90 tablet, Rfl: 3  •  gabapentin (NEURONTIN) 300 MG capsule, Take 1 capsule by mouth 2 (Two) Times a Day., Disp: 180 capsule, Rfl: 1  •  glucose blood (TRUE METRIX BLOOD GLUCOSE TEST) test strip, 1 each by Other route 2 (Two) Times a Day. Use as instructed, Disp: 100 each, Rfl: 3  •  HYDROcodone-acetaminophen (NORCO) 5-325 MG per tablet, Take 1 tablet by mouth Every 8 (Eight) Hours As Needed for Moderate Pain ., Disp: 30 tablet, Rfl: 0  •  Lancets 28G misc, To check sugar qd. E11.9 DM, Disp: 100 each, Rfl: 12  •  latanoprost (XALATAN) 0.005 % ophthalmic solution, , Disp: , Rfl:   •  midodrine (PROAMATINE) 5 MG tablet, Take 1 tablet by mouth 3 (Three) Times a Day., Disp: , Rfl:   •  ofloxacin (Ocuflox) 0.3 % ophthalmic solution, Administer 2 drops into the left eye 4 (Four) Times a Day., Disp: 5 mL, Rfl: 1  •  omeprazole (PrilOSEC) 20 MG capsule, Take 1 capsule by mouth Daily., Disp: 90 capsule, Rfl: 1  •  pramipexole (MIRAPEX) 0.25 MG tablet, TAKE 1 TABLET THREE TIMES DAILY, Disp: 270 tablet, Rfl: 1  •  sertraline (ZOLOFT) 50 MG tablet, Take 1 tablet by mouth Daily., Disp: 90 tablet, Rfl: 3  •  TRUEPLUS LANCETS 28G misc, E11.9 DM to check sugar QD,  "Disp: 100 each, Rfl: 3  Allergies:  Allergies   Allergen Reactions   • Cephalexin Hives and Swelling   • Meperidine Hives and Swelling   • Penicillins Hives and Swelling     Social History:   Social History     Tobacco Use   • Smoking status: Former Smoker     Packs/day: 1.50     Years: 20.00     Pack years: 30.00     Start date: 6/14/1970   • Smokeless tobacco: Never Used   • Tobacco comment: D/C 1970 SMOKING 1 1/2 PPD FOR 13 YRS BEFORE QUITTING   Substance Use Topics   • Alcohol use: No     Comment: caffeine use      Family History:  Family History   Problem Relation Age of Onset   • Emphysema Mother    • Heart disease Father    • Lung cancer Father    • Prostate cancer Father    • Asthma Sister    • Lung cancer Daughter    • Colon cancer Other    • No Known Problems Maternal Grandmother    • No Known Problems Maternal Grandfather    • Arthritis Paternal Grandmother    • No Known Problems Paternal Grandfather    • Malig Hyperthermia Neg Hx           Review of Systems  See history of present illness and past medical history.  Constitutional: Remarkable for no fever or chills  Cardiovascular: Remarkable for no chest pain or shortness of breath  GI: Remarkable for no nausea vomiting or diarrhea  : Remarkable for no burning in urination frequency urgency  Musculoskeletal: Remarkable for chronic back pain  Neurological: Remarkable for recurrent falls   but no focal weakness of arm or legs.    Remainder of ROS is negative.      Vitals:   BP (!) 83/49   Pulse 69   Temp 97.1 °F (36.2 °C) (Infrared)   Resp 14   Ht 160 cm (63\")   Wt 63.5 kg (140 lb)   SpO2 96%   BMI 24.80 kg/m²   I/O: No intake or output data in the 24 hours ending 04/04/21 2042  Exam:  Patient is examined using the personal protective equipment as per guidelines from infection control for this particular patient as enacted.  Hand washing was performed before and after patient interaction.  General Appearance:   Alert cooperative chronically " ill-appearing elderly female who does not appear to be in any acute distress.   Head:    Normocephalic, without obvious abnormality, atraumatic   Eyes:   Opacified cornea of the left eye.   Ears:    Normal external ear canals, both ears   Nose:   Nares normal, septum midline, mucosa normal, no drainage    or sinus tenderness   Throat:   Lips, tongue, gums normal; oral mucosa pink and moist   Neck:   Supple, symmetrical, trachea midline, no adenopathy;     thyroid:  no enlargement/tenderness/nodules; no carotid    bruit or JVD   Back:     Symmetric, no curvature, ROM normal, no CVA tenderness   Lungs:     Clear to auscultation bilaterally, respirations unlabored   Chest Wall:    No tenderness or deformity    Heart:    Regular rate and rhythm, S1 and S2 normal, no murmur, rub   or gallop   Abdomen:    S1-S2 regular no murmur heard   Extremities:   Extremities normal, atraumatic, no cyanosis or edema   Pulses:   Pulses palpable in all extremities; symmetric all extremities   Skin:   Skin color normal, Skin is warm and dry,  no rashes or palpable lesions   Neurologic:  Creased vision in the left eye otherwise of grossly nonfocal examination.       Data Review:      I reviewed the patient's new clinical results.  Results from last 7 days   Lab Units 04/04/21  1755 04/03/21  1256   WBC 10*3/mm3 11.45* 13.99*   HEMOGLOBIN g/dL 12.4 13.9   PLATELETS 10*3/mm3 213 211     Results from last 7 days   Lab Units 04/04/21  1755 04/03/21  1256   SODIUM mmol/L 135* 137   POTASSIUM mmol/L 3.4* 3.7   CHLORIDE mmol/L 96* 95*   CO2 mmol/L 23.1 25.5   BUN mg/dL 45* 30*   CREATININE mg/dL 5.54* 4.43*   CALCIUM mg/dL 9.0 9.7   GLUCOSE mg/dL 246* 241*     Microbiology Results (last 10 days)     Procedure Component Value - Date/Time    COVID PRE-OP / PRE-PROCEDURE SCREENING ORDER (NO ISOLATION) - Swab, Nasopharynx [[id number]]  (Normal) Collected: 04/04/21 1810    Lab Status: Final result Specimen: Swab from Nasopharynx Updated: 04/04/21 9948     Narrative:      The following orders were created for panel order COVID PRE-OP / PRE-PROCEDURE SCREENING ORDER (NO ISOLATION) - Swab, Nasopharynx.  Procedure                               Abnormality         Status                     ---------                               -----------         ------                     COVID-19,APTIMA PANTHER,...[[id number]]  Normal              Final result                 Please view results for these tests on the individual orders.    COVID-19,APTIMA PANTHER,[facility] IN-HOUSE, NP/OP SWAB IN UTM/VTM/SALINE TRANSPORT MEDIA,24 HR TAT - Swab, Nasopharynx [[id number]]  (Normal) Collected: 04/04/21 1810    Lab Status: Final result Specimen: Swab from Nasopharynx Updated: 04/04/21 2318     COVID19 Not Detected    Narrative:      Fact sheet for providers: https://www.fda.gov/media/402950/download     Fact sheet for patients: https://www.fda.gov/media/372617/download    Test performed by RT PCR.          Assessment:  Active Hospital Problems    Diagnosis  POA   • **Recurrent falls [R29.6]  Not Applicable   • Macular degeneration [H35.30]  Unknown   • ESRD on hemodialysis (CMS/[facility]) [N18.6, Z99.2]  Not Applicable   • Anemia in chronic kidney disease [N18.9, D63.1]  Yes   • Third degree atrioventricular block (CMS/[facility]) [I44.2]  Yes   • S/P Maze operation for atrial fibrillation [Z98.890, Z86.79]  Not Applicable   • S/P MVR (mitral valve repair) [Z98.890]  Not Applicable   • S/P TVR (tricuspid valve repair) [Z98.890]  Not Applicable   • JOSELUIS on CPAP [G47.33, Z99.89]  Not Applicable       Medical decision making:  Recurrent falls likely secondary to multiple factors and is looking at the database including medication list could very well be there polypharmacy such as multiple times a day dosing of Mirapex, gabapentin and other pain medications.  Plan is to hold Neurontin and Mirapex decrease the frequency of pain medication provided with orthostatics and fall precautions.  Consult neurology  service.  End-stage renal disease on hemodialysis-consult nephrologist and continue the dialysis program.  Patient has missed her dialysis on Friday but appears to be compensated and does not show any overt acidosis or hyperkalemia at this time.  Progressive decline in increasingly supportive care and reaching a point where she cannot be cared for appropriately at home.  Plan is to admit the patient CCP consultation for placement after orthopedic evaluation.  Obesity and obstructive sleep apnea-continues treatment with the same setting and provide her with continuous pulse ox monitoring.  Anemia multifactorial including anemia the setting of end-stage renal disease plan is to monitor.  Defer to nephrology service in terms of his appointment infection and iron replacement and vitamin D replacement.  Sridevi Huizar MD   4/4/2021  20:42 EDT  Much of this encounter note is an electronic transcription/translation of spoken language to printed text. The electronic translation of spoken language may permit erroneous, or at times, nonsensical words or phrases to be inadvertently transcribed; Although I have reviewed the note for such errors, some may still exist

## 2021-04-05 NOTE — ED NOTES
Please call daughter Nesha Young [phone] with room number once pt admitted.      Arlet Newman RN  04/04/21 2005

## 2021-04-05 NOTE — PLAN OF CARE
Goal Outcome Evaluation:  Plan of Care Reviewed With: patient     Outcome Summary: Pt presents from home after fall. Imaging negative for fractures but pt c/o B LE and bottom pain.  With encouragement pt moves to sit and stand at EOB with up to mod A.  Pt with functional B UE and is alert and oriented.  Pt feels will need rehab at d/c.        Patient was placed in a face mask during this therapy encounter. Therapist used appropriate personal protective equipment including surgical mask, eye shield and gloves during the entire therapy session. Hand hygiene was completed before and after therapy session. Patient is not in enhanced droplet precautions.

## 2021-04-05 NOTE — CONSULTS
Referring Provider: Ahmet Garcia MD  Reason for Consultation: ESRD    Subjective     Chief complaint   Chief Complaint   Patient presents with   • Weakness - Generalized   • Hypotension   • Fall       History of present illness:      83 y/o female with a past medical history of end-stage renal disease follows Dr. Honeycutt-Nephrologist), Tricuspid/Mitral Valve repair, A.fib sp MAZE, Pulm HTN/JOSELUIS, DMII, HTN, HLD who presented to the emergency department because of recurrent falls.   CT of the head in the emergency department was unremarkable. Patient denied loss of consciousness. She denied nausea vomiting shortness of air or chest pain. We were consulted to help with management of her end-stage renal disease    Past Medical History:   Diagnosis Date   • Acute bronchitis    • Acute renal insufficiency    • Allergic rhinitis    • Anemia in chronic kidney disease    • Arrhythmia    • Atrial fibrillation (CMS/HCC)     chronic   • Brachycephaly    • Breast pain, right    • Chest pain    • CHF (congestive heart failure) (CMS/HCC)    • Chronic combined systolic and diastolic CHF (congestive heart failure) (CMS/HCC)    • Chronic renal insufficiency    • CKD (chronic kidney disease), stage IV (CMS/HCC)    • Cough    • Depression    • Diabetes mellitus (CMS/HCC)     TYPE 2   • Dialysis patient (CMS/HCC)    • Diverticulosis    • ORTIZ (dyspnea on exertion)    • Dyspnea    • Esophageal reflux    • Essential hypertension    • Fatigue    • GERD (gastroesophageal reflux disease)    • Gout    • Head injury    • Headache    • Hoarseness    • Hypercalcemia    • Hyperlipidemia    • Hypertension    • Influenza A (H1N1)    • Iron deficiency anemia    • Itching    • Leg swelling    • Lightheadedness    • Macular degeneration    • Malaise and fatigue    • Mitral valve regurgitation     moderate to severe   • Nontoxic multinodular goiter     RIGHT 2.0 CM  LEFT  2.5 CM   • Obesity    • JOSELUIS on CPAP    • Osteoarthritis    • PAF  (paroxysmal atrial fibrillation) (CMS/HCC)    • Palpitations    • Paresthesias    • Proteinuria    • Pulmonary hypertension (CMS/HCC)    • Pulmonary nodule    • Pulmonic valve regurgitation     mild   • Sebaceous cyst    • SOBOE (shortness of breath on exertion)    • Solitary thyroid nodule    • Subclinical hypothyroidism    • Tachycardia    • TR (tricuspid regurgitation)     mild to moderate   • Type 2 diabetes mellitus (CMS/HCC)    • 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)    • UTI (urinary tract infection)      Past Surgical History:   Procedure Laterality Date   • APPENDECTOMY     • ARTERIOVENOUS FISTULA/SHUNT SURGERY Left 2/5/2020    Procedure: LEFT BRACHIAL ARTERY AXILLARY VEIN GORTEX SHUNT;  Surgeon: Kaveh Mcdonnell MD;  Location: [facility] OR;  Service: Vascular;  Laterality: Left;   • CARDIAC CATHETERIZATION  1986    procedure outcome:    • CARDIAC CATHETERIZATION N/A 11/21/2016    Procedure: Coronary angiography;  Surgeon: Marcin العراقي MD;  Location: [facility] CATH INVASIVE LOCATION;  Service:    • CARDIAC CATHETERIZATION N/A 11/21/2016    Procedure: Left Heart Cath;  Surgeon: Marcin العراقي MD;  Location: [facility] CATH INVASIVE LOCATION;  Service:    • CARDIAC CATHETERIZATION N/A 8/29/2018    Procedure: Right Heart Cath with adenosine challenge if wedge pressure is normal.;  Surgeon: Paulo Decker MD;  Location: [facility] CATH INVASIVE LOCATION;  Service: Cardiovascular   • CARDIAC ELECTROPHYSIOLOGY PROCEDURE N/A 3/28/2017    Procedure: Ablation atrial flutter;  Surgeon: Rodríguez Ward MD;  Location: [facility] CATH INVASIVE LOCATION;  Service:    • CARDIAC ELECTROPHYSIOLOGY PROCEDURE N/A 7/3/2017    Procedure: AV node ablation;  Surgeon: Rodríguez Ward MD;  Location: [facility] CATH INVASIVE LOCATION;  Service:    • CARDIAC ELECTROPHYSIOLOGY PROCEDURE N/A 7/3/2017    Procedure: Pacemaker DC new  [employer] and will need 3830 lead-I did chase Meneses ;  Surgeon: Rodríguez  MD Ed;  Location: [facility] CATH INVASIVE LOCATION;  Service:    • CARDIAC SURGERY     • CHOLECYSTECTOMY     • CLAVICLE SURGERY Left    • COLONOSCOPY N/A 11/14/2017    Procedure: COLONOSCOPY INTO CECUM/ TERMINAL ILEUM WITH POLYPECTOMY  X 8 AND CLIP X 2;  Surgeon: Jacy Browning MD;  Location[facility] ENDOSCOPY;  Service:    • ENDOSCOPY N/A 11/14/2017    Procedure: ESOPHAGOGASTRODUODENOSCOPY WITH BIOPSIES;  Surgeon: Jacy Browning MD;  Location: [facility] ENDOSCOPY;  Service:    • GASTRIC RESTRICTION SURGERY     • HYSTERECTOMY     • INSERTION HEMODIALYSIS CATHETER N/A 1/10/2020    Procedure: TUNNEL DIALYSIS CATHETER;  Surgeon: Carlos Manuel Jernigan MD;  Location: [facility] MAIN OR;  Service: Vascular   • JOINT REPLACEMENT     • LUMBAR DECOMPRESSION      L4-5   • MITRAL VALVE REPAIR/REPLACEMENT N/A 12/14/2016    Procedure: INTRAOPERATIVE KATELYN, MIDLINE STERNOTOMY, MITRAL VALVE REPAIR, TRICUSPID VALVE REPAIR, MAZE PROCEDURE;  Surgeon: Young Vital MD;  Location: [facility] MAIN OR;  Service:    • SHOULDER SURGERY Left     AFTER SURGERY FOR FRACTURED CLAVICLE   • TONSILLECTOMY     • TOTAL KNEE ARTHROPLASTY      bilateral     Family History   Problem Relation Age of Onset   • Emphysema Mother    • Heart disease Father    • Lung cancer Father    • Prostate cancer Father    • Asthma Sister    • Lung cancer Daughter    • Colon cancer Other    • No Known Problems Maternal Grandmother    • No Known Problems Maternal Grandfather    • Arthritis Paternal Grandmother    • No Known Problems Paternal Grandfather    • Malig Hyperthermia Neg Hx      Social History     Tobacco Use   • Smoking status: Former Smoker     Packs/day: 1.50     Years: 20.00     Pack years: 30.00     Start date: 6/14/1970   • Smokeless tobacco: Never Used   • Tobacco comment: D/C 1970 SMOKING 1 1/2 PPD FOR 13 YRS BEFORE QUITTING   Substance Use Topics   • Alcohol use: No     Comment: caffeine use   • Drug use: No     Medications Prior to Admission   Medication  Sig Dispense Refill Last Dose   • Alcohol Swabs (B-D SINGLE USE SWABS REGULAR) pads Inject 1 each under the skin into the appropriate area as directed Daily. 100 each 3 4/4/2021 at Unknown time   • Blood Glucose Monitoring Suppl (TRUE METRIX AIR GLUCOSE METER) device 1 each Daily. 1 each 0 4/4/2021 at Unknown time   • famotidine (Pepcid) 40 MG tablet Take 1 tablet by mouth Daily. 90 tablet 3 Past Month at Unknown time   • gabapentin (NEURONTIN) 300 MG capsule Take 1 capsule by mouth 2 (Two) Times a Day. 180 capsule 1 4/4/2021 at Unknown time   • glucose blood (TRUE METRIX BLOOD GLUCOSE TEST) test strip 1 each by Other route 2 (Two) Times a Day. Use as instructed 100 each 3 4/4/2021 at Unknown time   • HYDROcodone-acetaminophen (NORCO) 5-325 MG per tablet Take 1 tablet by mouth Every 8 (Eight) Hours As Needed for Moderate Pain . 30 tablet 0 4/4/2021 at Unknown time   • Lancets 28G misc To check sugar qd. E11.9  each 12 4/4/2021 at Unknown time   • latanoprost (XALATAN) 0.005 % ophthalmic solution    Past Week at Unknown time   • midodrine (PROAMATINE) 5 MG tablet Take 1 tablet by mouth 3 (Three) Times a Day.   4/4/2021 at Unknown time   • ofloxacin (Ocuflox) 0.3 % ophthalmic solution Administer 2 drops into the left eye 4 (Four) Times a Day. 5 mL 1 Past Month at Unknown time   • omeprazole (PrilOSEC) 20 MG capsule Take 1 capsule by mouth Daily. 90 capsule 1 4/4/2021 at Unknown time   • pramipexole (MIRAPEX) 0.25 MG tablet TAKE 1 TABLET THREE TIMES DAILY 270 tablet 1 4/4/2021 at Unknown time   • sertraline (ZOLOFT) 50 MG tablet Take 1 tablet by mouth Daily. 90 tablet 3 4/4/2021 at Unknown time   • TRUEPLUS LANCETS 28G misc E11.9 DM to check sugar  each 3 4/4/2021 at Unknown time   • acetaminophen (TYLENOL) 325 MG tablet Take 2 tablets by mouth Every 6 (Six) Hours As Needed for Mild Pain (1-3).  0    • butenafine (Mentax) 1 % cream Apply  topically to the appropriate area as directed 2 (Two) Times a Day. To  "feet two times daily 45 g 3  at Unknown time     Allergies:  Cephalexin, Meperidine, and Penicillins    Review of Systems  Pertinent items are noted in HPI.    Objective     Vital Signs  Temp:  [97.1 °F (36.2 °C)-97.9 °F (36.6 °C)] 97.6 °F (36.4 °C)  Heart Rate:  [64-75] 70  Resp:  [14-18] 16  BP: ()/(42-81) 99/81    Flowsheet Rows      First Filed Value   Admission Height  160 cm (63\") Documented at 04/04/2021 1753   Admission Weight  63.5 kg (140 lb) Documented at 04/04/2021 1753           No intake/output data recorded.  I/O last 3 completed shifts:  In: 500 [IV Piggyback:500]  Out: -     Intake/Output Summary (Last 24 hours) at 4/5/2021 0840  Last data filed at 4/4/2021 1830  Gross per 24 hour   Intake 500 ml   Output --   Net 500 ml       Physical Exam:     General Appearance:    Alert, cooperative, in no acute distress   Head:    Normocephalic, without obvious abnormality, atraumatic   Eyes:            Lids and lashes normal, conjunctivae and sclerae normal, no   icterus, no pallor, corneas clear, PERRLA   Ears:    Ears appear intact with no abnormalities noted   Throat:   No oral lesions, no thrush, oral mucosa moist   Neck:   No adenopathy, supple, trachea midline, no thyromegaly, no     carotid bruit, no JVD   Back:     No kyphosis present, no scoliosis present, no skin lesions,       erythema or scars, no tenderness to percussion or                   palpation,   range of motion normal   Lungs:     Clear to auscultation,respirations regular, even and                   unlabored    Heart:    Regular rhythm and normal rate, normal S1 and S2, no            murmur, no gallop, no rub, no click   Breast Exam:    Deferred   Abdomen:     Normal bowel sounds, no masses, no organomegaly, soft        non-tender, non-distended, no guarding, no rebound                 tenderness   Genitalia:    Deferred   Extremities:   Moves all extremities well, no edema, no cyanosis, no              redness   Pulses:   Pulses " palpable and equal bilaterally   Skin:   No bleeding, bruising or rash               Results Review:  Results from last 7 days   Lab Units 04/05/21  0538 04/04/21  1755 04/03/21  1256   SODIUM mmol/L 137 135* 137   POTASSIUM mmol/L 3.5 3.4* 3.7   CHLORIDE mmol/L 99 96* 95*   CO2 mmol/L 21.9* 23.1 25.5   BUN mg/dL 47* 45* 30*   CREATININE mg/dL 5.97* 5.54* 4.43*   CALCIUM mg/dL 8.5* 9.0 9.7   BILIRUBIN mg/dL 0.3  --  0.3   ALK PHOS U/L 143*  --  188*   ALT (SGPT) U/L 11  --  11   AST (SGOT) U/L 10  --  22   GLUCOSE mg/dL 148* 246* 241*       Estimated Creatinine Clearance: 6.1 mL/min (A) (by C-G formula based on SCr of 5.97 mg/dL (H)).    Results from last 7 days   Lab Units 04/04/21 1755 04/03/21  1256   PHOSPHORUS mg/dL 3.6 3.2       Results from last 7 days   Lab Units 04/05/21  0538 04/04/21  1755 04/03/21  1256   WBC 10*3/mm3 10.83* 11.45* 13.99*   HEMOGLOBIN g/dL 12.2 12.4 13.9   PLATELETS 10*3/mm3 191 213 211             Active Medications  insulin lispro, 0-9 Units, Subcutaneous, TID AC  latanoprost, 1 drop, Both Eyes, Daily  midodrine, 5 mg, Oral, TID AC  ofloxacin, 2 drop, Left Eye, 4x Daily  pantoprazole, 40 mg, Oral, QAM  sertraline, 50 mg, Oral, Daily  sodium chloride, 10 mL, Intravenous, Q12H           Assessment/Plan   Assessment    -End-stage renal disease on hemodialysis on hemodialysis Monday Wednesday Friday: Volume and lites are acceptable. Plan for hemodialysis today    -Recurrent falls: Multifactorial including polypharmacy. Check orthostatic blood pressure.  -Anemia of end-stage renal disease  -Secondary hyperparathyroidism      Sarthak Arnlod MD  04/05/21  08:40 EDT

## 2021-04-05 NOTE — PLAN OF CARE
Problem: Fall Injury Risk  Goal: Absence of Fall and Fall-Related Injury  Outcome: Ongoing, Progressing  Intervention: Identify and Manage Contributors to Fall Injury Risk  Recent Flowsheet Documentation  Taken 4/5/2021 1800 by Alex Carpenter RN  Medication Review/Management: medications reviewed  Taken 4/5/2021 0830 by Alex Carpenter RN  Medication Review/Management: medications reviewed  Self-Care Promotion: independence encouraged  Intervention: Promote Injury-Free Environment  Recent Flowsheet Documentation  Taken 4/5/2021 1800 by Alex Carpenter RN  Safety Promotion/Fall Prevention:   activity supervised   assistive device/personal items within reach   clutter free environment maintained   fall prevention program maintained   gait belt   lighting adjusted   mobility aid in reach   muscle strengthening facilitated   nonskid shoes/slippers when out of bed   room organization consistent   safety round/check completed  Taken 4/5/2021 1600 by Alex Carpenter RN  Safety Promotion/Fall Prevention:   activity supervised   assistive device/personal items within reach   clutter free environment maintained   fall prevention program maintained   gait belt   lighting adjusted   mobility aid in reach   muscle strengthening facilitated   nonskid shoes/slippers when out of bed   room organization consistent   safety round/check completed  Taken 4/5/2021 1400 by Alex Carpenter RN  Safety Promotion/Fall Prevention: patient off unit  Taken 4/5/2021 1200 by Alex Carpenter RN  Safety Promotion/Fall Prevention: patient off unit  Taken 4/5/2021 1000 by Alex Carpenter RN  Safety Promotion/Fall Prevention: patient off unit  Taken 4/5/2021 0830 by Alex Carpenter RN  Safety Promotion/Fall Prevention:   activity supervised   assistive device/personal items within reach   clutter free environment maintained   fall prevention program maintained   gait belt   lighting adjusted   mobility aid in reach   muscle  strengthening facilitated   nonskid shoes/slippers when out of bed   room organization consistent   safety round/check completed     Problem: Adult Inpatient Plan of Care  Goal: Plan of Care Review  Outcome: Ongoing, Progressing  Flowsheets (Taken 4/5/2021 1812)  Plan of Care Reviewed With:   patient   daughter  Outcome Summary: VSS, Vpaced. A&Ox4.  Norco for pain x1. Wound care visited. Had dialysis today. Discharge pending precert to dialysis bed at [facility]. Daughter visited.  Goal: Patient-Specific Goal (Individualized)  Outcome: Ongoing, Progressing  Goal: Absence of Hospital-Acquired Illness or Injury  Outcome: Ongoing, Progressing  Intervention: Identify and Manage Fall Risk  Recent Flowsheet Documentation  Taken 4/5/2021 1800 by Alex Carpenter RN  Safety Promotion/Fall Prevention:   activity supervised   assistive device/personal items within reach   clutter free environment maintained   fall prevention program maintained   gait belt   lighting adjusted   mobility aid in reach   muscle strengthening facilitated   nonskid shoes/slippers when out of bed   room organization consistent   safety round/check completed  Taken 4/5/2021 1600 by Alex Carpenter RN  Safety Promotion/Fall Prevention:   activity supervised   assistive device/personal items within reach   clutter free environment maintained   fall prevention program maintained   gait belt   lighting adjusted   mobility aid in reach   muscle strengthening facilitated   nonskid shoes/slippers when out of bed   room organization consistent   safety round/check completed  Taken 4/5/2021 1400 by Alex Carpenter RN  Safety Promotion/Fall Prevention: patient off unit  Taken 4/5/2021 1200 by Alex Carpenter RN  Safety Promotion/Fall Prevention: patient off unit  Taken 4/5/2021 1000 by Alex Carpenter RN  Safety Promotion/Fall Prevention: patient off unit  Taken 4/5/2021 0830 by Alex Carpenter RN  Safety Promotion/Fall Prevention:   activity  supervised   assistive device/personal items within reach   clutter free environment maintained   fall prevention program maintained   gait belt   lighting adjusted   mobility aid in reach   muscle strengthening facilitated   nonskid shoes/slippers when out of bed   room organization consistent   safety round/check completed  Intervention: Prevent Skin Injury  Recent Flowsheet Documentation  Taken 4/5/2021 0830 by Alex Carpenter RN  Body Position: supine  Goal: Optimal Comfort and Wellbeing  Outcome: Ongoing, Progressing  Intervention: Provide Person-Centered Care  Recent Flowsheet Documentation  Taken 4/5/2021 0830 by Alex Carpenter RN  Trust Relationship/Rapport:   care explained   choices provided   questions answered   questions encouraged  Goal: Readiness for Transition of Care  Outcome: Ongoing, Progressing     Problem: Skin Injury Risk Increased  Goal: Skin Health and Integrity  Outcome: Ongoing, Progressing  Intervention: Optimize Skin Protection  Recent Flowsheet Documentation  Taken 4/5/2021 0830 by Alex Carpenter RN  Head of Bed (HOB): HOB elevated     Problem: Diabetes Comorbidity  Goal: Blood Glucose Level Within Desired Range  Outcome: Ongoing, Progressing     Problem: Pain Chronic (Persistent) (Comorbidity Management)  Goal: Acceptable Pain Control and Functional Ability  Outcome: Ongoing, Progressing  Intervention: Manage Persistent Pain  Recent Flowsheet Documentation  Taken 4/5/2021 1800 by Alex Carpenter RN  Medication Review/Management: medications reviewed  Taken 4/5/2021 0830 by Alex Carpenter RN  Medication Review/Management: medications reviewed  Intervention: Optimize Psychosocial Wellbeing  Recent Flowsheet Documentation  Taken 4/5/2021 0830 by Alex Carpenter RN  Diversional Activities: television  Family/Support System Care:   self-care encouraged   support provided   Goal Outcome Evaluation:  Plan of Care Reviewed With: patient, daughter     Outcome Summary: VSSSimi.  A&Ox4.  Norco for pain x1. Wound care visited. Had dialysis today. Discharge pending precert to dialysis bed at [facility]. Daughter visited.

## 2021-04-06 NOTE — PLAN OF CARE
Goal Outcome Evaluation:  Plan of Care Reviewed With: patient  Progress: improving  Outcome Summary: pt completed bed mobility w. min A from sup to sit and min to mod sit to sup. Pt completed EOB sitting w. SBA while completing UE ex 10x2-3 to incr strength and endurance skills. Sat EOB about 10 min. pt already washed up this date and tired after a short session. cont OT to incr endurance,strength, balance, ADL and tsf.  OT wore all PPE, washed hands before/after. Pt wore mask.

## 2021-04-06 NOTE — PLAN OF CARE
Goal Outcome Evaluation:  Plan of Care Reviewed With: patient     Outcome Summary: Pt. able to ambulate 2 feet (forward/backward), Mod. assist x 2, with use of Rwx this date.  Pt. requires Min. assist x 2 for bed mobility and Mod. assist x 2 for sit <-> stand transfers.  BLE ther. ex. program x 10 reps completed for general strengthening. Full body posterior leaning throughout upright mobility requiring verbal/tactile cues for correction. Limited overall by fatigue/weakness.    Patient was wearing a face mask during this therapy encounter. Therapist used appropriate personal protective equipment including eye protection, mask, and gloves.  Mask used was standard procedure mask. Appropriate PPE was worn during the entire therapy session. Hand hygiene was completed before and after therapy session. Patient is not in enhanced droplet precautions.

## 2021-04-06 NOTE — PLAN OF CARE
Goal Outcome Evaluation:        Outcome Summary: Daughter Nesha was updated via phone and at bedside.Nephrology discussed Pt has the choice of stopping HD and allowing natural death. Pt declined and wants to continue HD. Pt to have HD tomorrow.

## 2021-04-06 NOTE — PROGRESS NOTES
Continued Stay Note  [facility]     Patient Name: Veronica Henao  MRN: [id number]  Today's Date: 4/6/2021    Admit Date: 4/4/2021    Discharge Plan     Row Name 04/06/21 0842       Plan    Plan Comments  Inbound VM from Lucrecia/Regine. Returned call and no answer. Message sent to Lucrecia. CCP will follow up. Jelena Dougherty RN        Discharge Codes    No documentation.             Jelena Dougherty RN

## 2021-04-06 NOTE — THERAPY TREATMENT NOTE
Patient Name: Veronica Henao  : 1935    MRN: [id number]                              Today's Date: 2021       Admit Date: 2021    Visit Dx:     ICD-10-CM ICD-9-CM   1. Recurrent falls  R29.6 V15.88   2. Generalized weakness  R53.1 780.79   3. ESRD (end stage renal disease) on dialysis (CMS/[facility])  N18.6 585.6    Z99.2 V45.11     Patient Active Problem List   Diagnosis   • Atopic rhinitis   • Gout   • Cephalalgia   • Hypercalcemia   • Hyperlipidemia   • Type 2 diabetes mellitus with diabetic chronic kidney disease (CMS/[facility])   • Lung mass   • Gastroesophageal reflux disease   • Tricuspid insufficiency   • Morbid obesity (CMS/[facility])   • JOSELUIS on CPAP   • Chronic venous insufficiency   • Mitral valve insufficiency   • S/P Maze operation for atrial fibrillation   • S/P TVR (tricuspid valve repair)   • S/P MVR (mitral valve repair)   • Atrial flutter, paroxysmal (CMS/[facility])   • Third degree atrioventricular block (CMS/[facility])   • Chronic kidney disease, stage IV (severe) (CMS/[facility])   • Anemia in chronic kidney disease   • Iron deficiency anemia   • Pulmonary hypertension (CMS/[facility])   • Chronic diastolic congestive heart failure (CMS/[facility])   • ESRD on hemodialysis (CMS/[facility])   • Control of atrial fibrillation with pacemaker (CMS/[facility])   • Chronic atrial fibrillation (CMS/[facility])   • Chronic anticoagulation   • Recurrent falls   • Macular degeneration   • DNR (do not resuscitate)   • Age-related physical debility     Past Medical History:   Diagnosis Date   • Acute bronchitis    • Acute renal insufficiency    • Allergic rhinitis    • Anemia in chronic kidney disease    • Arrhythmia    • Atrial fibrillation (CMS/[facility])     chronic   • Brachycephaly    • Breast pain, right    • Chest pain    • CHF (congestive heart failure) (CMS/[facility])    • Chronic combined systolic and diastolic CHF (congestive heart failure) (CMS/[facility])    • Chronic renal insufficiency    • CKD (chronic kidney disease), stage IV (CMS/[facility])    • Cough    • Depression     • Diabetes mellitus (CMS/HCC)     TYPE 2   • Dialysis patient (CMS/HCC)    • Diverticulosis    • ORTIZ (dyspnea on exertion)    • Dyspnea    • Esophageal reflux    • Essential hypertension    • Fatigue    • GERD (gastroesophageal reflux disease)    • Gout    • Head injury    • Headache    • Hoarseness    • Hypercalcemia    • Hyperlipidemia    • Hypertension    • Influenza A (H1N1)    • Iron deficiency anemia    • Itching    • Leg swelling    • Lightheadedness    • Macular degeneration    • Malaise and fatigue    • Mitral valve regurgitation     moderate to severe   • Nontoxic multinodular goiter     RIGHT 2.0 CM  LEFT  2.5 CM   • Obesity    • JOSELUIS on CPAP    • Osteoarthritis    • PAF (paroxysmal atrial fibrillation) (CMS/HCC)    • Palpitations    • Paresthesias    • Proteinuria    • Pulmonary hypertension (CMS/HCC)    • Pulmonary nodule    • Pulmonic valve regurgitation     mild   • Sebaceous cyst    • SOBOE (shortness of breath on exertion)    • Solitary thyroid nodule    • Subclinical hypothyroidism    • Tachycardia    • TR (tricuspid regurgitation)     mild to moderate   • Type 2 diabetes mellitus (CMS/HCC)    • 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)    • UTI (urinary tract infection)      Past Surgical History:   Procedure Laterality Date   • APPENDECTOMY     • ARTERIOVENOUS FISTULA/SHUNT SURGERY Left 2/5/2020    Procedure: LEFT BRACHIAL ARTERY AXILLARY VEIN GORTEX SHUNT;  Surgeon: Kaveh Mcdonnell MD;  Location: [facility];  Service: Vascular;  Laterality: Left;   • CARDIAC CATHETERIZATION  1986    procedure outcome:    • CARDIAC CATHETERIZATION N/A 11/21/2016    Procedure: Coronary angiography;  Surgeon: Marcin العراقي MD;  Location: [facility] CATH INVASIVE LOCATION;  Service:    • CARDIAC CATHETERIZATION N/A 11/21/2016    Procedure: Left Heart Cath;  Surgeon: Marcin العراقي MD;  Location: [facility] INVASIVE LOCATION;  Service:    • CARDIAC CATHETERIZATION N/A 8/29/2018     Procedure: Right Heart Cath with adenosine challenge if wedge pressure is normal.;  Surgeon: Paulo Decker MD;  Location: [facility] CATH INVASIVE LOCATION;  Service: Cardiovascular   • CARDIAC ELECTROPHYSIOLOGY PROCEDURE N/A 3/28/2017    Procedure: Ablation atrial flutter;  Surgeon: Rodríguez Ward MD;  Location: [facility] CATH INVASIVE LOCATION;  Service:    • CARDIAC ELECTROPHYSIOLOGY PROCEDURE N/A 7/3/2017    Procedure: AV node ablation;  Surgeon: Rodríguez Ward MD;  Location: [facility] CATH INVASIVE LOCATION;  Service:    • CARDIAC ELECTROPHYSIOLOGY PROCEDURE N/A 7/3/2017    Procedure: Pacemaker DC new  [employer] and will need 3830 lead-I did text Rachael Meneses ;  Surgeon: Rodríguez Ward MD;  Location: [facility] CATH INVASIVE LOCATION;  Service:    • CARDIAC SURGERY     • CHOLECYSTECTOMY     • CLAVICLE SURGERY Left    • COLONOSCOPY N/A 11/14/2017    Procedure: COLONOSCOPY INTO CECUM/ TERMINAL ILEUM WITH POLYPECTOMY  X 8 AND CLIP X 2;  Surgeon: Jacy Browning MD;  Location: [facility] ENDOSCOPY;  Service:    • ENDOSCOPY N/A 11/14/2017    Procedure: ESOPHAGOGASTRODUODENOSCOPY WITH BIOPSIES;  Surgeon: Jacy Browning MD;  Location: [facility] ENDOSCOPY;  Service:    • GASTRIC RESTRICTION SURGERY     • HYSTERECTOMY     • INSERTION HEMODIALYSIS CATHETER N/A 1/10/2020    Procedure: TUNNEL DIALYSIS CATHETER;  Surgeon: Carlos Manuel Jernigan MD;  Location: [facility] MAIN OR;  Service: Vascular   • JOINT REPLACEMENT     • LUMBAR DECOMPRESSION      L4-5   • MITRAL VALVE REPAIR/REPLACEMENT N/A 12/14/2016    Procedure: INTRAOPERATIVE KATELYN, MIDLINE STERNOTOMY, MITRAL VALVE REPAIR, TRICUSPID VALVE REPAIR, MAZE PROCEDURE;  Surgeon: Young Vital MD;  Location: [facility] MAIN OR;  Service:    • SHOULDER SURGERY Left     AFTER SURGERY FOR FRACTURED CLAVICLE   • TONSILLECTOMY     • TOTAL KNEE ARTHROPLASTY      bilateral     General Information     Row Name 04/06/21 1018          Physical Therapy Time and Intention    Document Type  therapy  note (daily note)  -MS     Mode of Treatment  physical therapy;individual therapy  -MS     Row Name 04/06/21 1018          General Information    Patient Profile Reviewed  yes  -MS     Existing Precautions/Restrictions  (S) fall Exit alarm  -MS     Barriers to Rehab  none identified  -MS     Row Name 04/06/21 1018          Cognition    Orientation Status (Cognition)  oriented to;person;place  -MS     Row Name 04/06/21 1018          Safety Issues, Functional Mobility    Comment, Safety Issues/Impairments (Mobility)  Gait belt used for safety.  -MS       User Key  (r) = Recorded By, (t) = Taken By, (c) = Cosigned By    Initials Name Provider Type    MS EvansBony, PT Physical Therapist        Mobility     Row Name 04/06/21 1018          Bed Mobility    Supine-Sit [place] (Bed Mobility)  minimum assist (75% patient effort);2 person assist  -MS     Sit-Supine [place] (Bed Mobility)  minimum assist (75% patient effort);2 person assist  -MS     Assistive Device (Bed Mobility)  draw sheet  -MS     Row Name 04/06/21 1018          Sit-Stand Transfer    Sit-Stand [place] (Transfers)  moderate assist (50% patient effort);2 person assist  -MS     Assistive Device (Sit-Stand Transfers)  walker, front-wheeled  -MS     Row Name 04/06/21 1018          Gait/Stairs (Locomotion)    [place]vert Level (Gait)  moderate assist (50% patient effort);2 person assist  -MS     Assistive Device (Gait)  walker, front-wheeled  -MS     Distance in Feet (Gait)  2 feet (Forward/Backward)  -MS     Deviations/Abnormal Patterns (Gait)  nicole decreased;stride length decreased;festinating/shuffling  -MS     Comment (Gait/Stairs)  Full body posterior lean during upright mobility requiring verbal/tactile cues to correct.  -MS       User Key  (r) = Recorded By, (t) = Taken By, (c) = Cosigned By    Initials Name Provider Type    Bony Sterling, PT Physical Therapist        Obj/Interventions     Row Name 04/06/21 1019           Motor Skills    Therapeutic Exercise  -- BLE ther. ex. program x 10 reps completed (Ankle Pumps, Hip Flexion, LAQ's)  -MS       User Key  (r) = Recorded By, (t) = Taken By, (c) = Cosigned By    Initials Name Provider Type    Bony Sterling, PT Physical Therapist        Goals/Plan    No documentation.       Clinical Impression     Row Name 04/06/21 1020          Pain    Additional Documentation  Pain Scale: Numbers Pre/Post-Treatment (Group)  -MS     Row Name 04/06/21 1020          Pain Scale: Numbers Pre/Post-Treatment    Pretreatment Pain Rating  3/10  -MS     Posttreatment Pain Rating  3/10  -MS     Pain Location - Side  Right  -MS     Pain Location  knee  -MS     Row Name 04/06/21 1020          Positioning and Restraints    Pre-Treatment Position  in bed  -MS     Post Treatment Position  bed  -MS     In Bed  notified nsg;supine;call light within reach;encouraged to call for assist;exit alarm on All lines intact.  -MS       User Key  (r) = Recorded By, (t) = Taken By, (c) = Cosigned By    Initials Name Provider Type    Bony Sterling, PT Physical Therapist        Outcome Measures     Row Name 04/06/21 1020          How much help from another person do you currently need...    Turning from your back to your side while in flat bed without using bedrails?  2  -MS     Moving from lying on back to sitting on the side of a flat bed without bedrails?  2  -MS     Moving to and from a bed to a chair (including a wheelchair)?  2  -MS     Standing up from a chair using your arms (e.g., wheelchair, bedside chair)?  2  -MS     Climbing 3-5 steps with a railing?  2  -MS     To walk in hospital room?  2  -MS     AM-PAC 6 Clicks Score (PT)  12  -MS     Row Name 04/06/21 1020          Functional Assessment    Outcome Measure Options  AM-PAC 6 Clicks Basic Mobility (PT)  -MS       User Key  (r) = Recorded By, (t) = Taken By, (c) = Cosigned By    Initials Name Provider Type    Bony Sterling, PT Physical Therapist         Physical Therapy Education                 Title: PT OT SLP Therapies (Done)     Topic: Physical Therapy (Done)     Point: Mobility training (Done)     Learning Progress Summary           Patient Acceptance, E,D, VU,NR by MS at 4/6/2021 1020    Acceptance, E,D, VU,NR by MS at 4/5/2021 1051                   Point: Home exercise program (Done)     Learning Progress Summary           Patient Acceptance, E,D, VU,NR by MS at 4/6/2021 1020                   Point: Body mechanics (Done)     Learning Progress Summary           Patient Acceptance, E,D, VU,NR by MS at 4/6/2021 1020    Acceptance, E,D, VU,NR by MS at 4/5/2021 1051                   Point: Precautions (Done)     Learning Progress Summary           Patient Acceptance, E,D, VU,NR by MS at 4/6/2021 1020    Acceptance, E,D, VU,NR by MS at 4/5/2021 1051                               User Key     Initials Effective Dates Name Provider Type Discipline    MS 04/03/18 -  Bony Evans, PT Physical Therapist PT              PT Recommendation and Plan  Planned Therapy Interventions (PT): balance training, bed mobility training, gait training, home exercise program, patient/family education, postural re-education, transfer training, strengthening  Plan of Care Reviewed With: patient  Outcome Summary: Pt. able to ambulate 2 feet (forward/backward), Mod. assist x 2, with use of Rwx this date.  Pt. requires Min. assist x 2 for bed mobility and Mod. assist x 2 for sit <-> stand transfers.  BLE ther. ex. program x 10 reps completed for general strengthening. Full body posterior leaning throughout upright mobility requiring verbal/tactile cues for correction. Limited overall by fatigue/weakness.     Time Calculation:   PT Charges     Row Name 04/06/21 1023             Time Calculation    Start Time  0920  -MS      Stop Time  0933  -MS      Time Calculation (min)  13 min  -MS      PT Received On  04/06/21  -MS      PT - Next Appointment  04/07/21  -MS         Time  Calculation- PT    Total Timed Code Minutes- PT  12 minute(s)  -MS        User Key  (r) = Recorded By, (t) = Taken By, (c) = Cosigned By    Initials Name Provider Type    MS Bony Evans, PT Physical Therapist        Therapy Charges for Today     Code Description Service Date Service Provider Modifiers Qty    75111595380  PT EVAL MOD COMPLEXITY 1 4/5/2021 Bony Evans, PT GP 1    03331878764 HC PT THER PROC EA 15 MIN 4/5/2021 Bony Evans, PT GP 1    29594132941 HC PT THER SUPP EA 15 MIN 4/5/2021 Bony Evans, PT GP 1    16716831532 HC PT THER PROC EA 15 MIN 4/6/2021 Bony Evans, PT GP 1    [id number]  PT THER SUPP EA 15 MIN 4/6/2021 Bony Evans, PT GP 1          PT G-Codes  Outcome Measure Options: AM-PAC 6 Clicks Basic Mobility (PT)  AM-PAC 6 Clicks Score (PT): 12  AM-PAC 6 Clicks Score (OT): 13  Modified Huseyin Scale: 4 - Moderately severe disability.  Unable to walk without assistance, and unable to attend to own bodily needs without assistance.    Bony Evans, PT  4/6/2021

## 2021-04-06 NOTE — THERAPY TREATMENT NOTE
Patient Name: Veronica Henao  : 1935    MRN: [id number]                              Today's Date: 2021       Admit Date: 2021    Visit Dx:     ICD-10-CM ICD-9-CM   1. Recurrent falls  R29.6 V15.88   2. Generalized weakness  R53.1 780.79   3. ESRD (end stage renal disease) on dialysis (CMS/[facility])  N18.6 585.6    Z99.2 V45.11     Patient Active Problem List   Diagnosis   • Atopic rhinitis   • Gout   • Cephalalgia   • Hypercalcemia   • Hyperlipidemia   • Type 2 diabetes mellitus with diabetic chronic kidney disease (CMS/[facility])   • Lung mass   • Gastroesophageal reflux disease   • Tricuspid insufficiency   • Morbid obesity (CMS/[facility])   • JOSELUIS on CPAP   • Chronic venous insufficiency   • Mitral valve insufficiency   • S/P Maze operation for atrial fibrillation   • S/P TVR (tricuspid valve repair)   • S/P MVR (mitral valve repair)   • Atrial flutter, paroxysmal (CMS/[facility])   • Third degree atrioventricular block (CMS/[facility])   • Chronic kidney disease, stage IV (severe) (CMS/[facility])   • Anemia in chronic kidney disease   • Iron deficiency anemia   • Pulmonary hypertension (CMS/[facility])   • Chronic diastolic congestive heart failure (CMS/[facility])   • ESRD on hemodialysis (CMS/[facility])   • Control of atrial fibrillation with pacemaker (CMS/[facility])   • Chronic atrial fibrillation (CMS/[facility])   • Chronic anticoagulation   • Recurrent falls   • Macular degeneration   • DNR (do not resuscitate)   • Age-related physical debility     Past Medical History:   Diagnosis Date   • Acute bronchitis    • Acute renal insufficiency    • Allergic rhinitis    • Anemia in chronic kidney disease    • Arrhythmia    • Atrial fibrillation (CMS/[facility])     chronic   • Brachycephaly    • Breast pain, right    • Chest pain    • CHF (congestive heart failure) (CMS/[facility])    • Chronic combined systolic and diastolic CHF (congestive heart failure) (CMS/[facility])    • Chronic renal insufficiency    • CKD (chronic kidney disease), stage IV (CMS/[facility])    • Cough    • Depression     • Diabetes mellitus (CMS/HCC)     TYPE 2   • Dialysis patient (CMS/HCC)    • Diverticulosis    • ORTIZ (dyspnea on exertion)    • Dyspnea    • Esophageal reflux    • Essential hypertension    • Fatigue    • GERD (gastroesophageal reflux disease)    • Gout    • Head injury    • Headache    • Hoarseness    • Hypercalcemia    • Hyperlipidemia    • Hypertension    • Influenza A (H1N1)    • Iron deficiency anemia    • Itching    • Leg swelling    • Lightheadedness    • Macular degeneration    • Malaise and fatigue    • Mitral valve regurgitation     moderate to severe   • Nontoxic multinodular goiter     RIGHT 2.0 CM  LEFT  2.5 CM   • Obesity    • JOSELUIS on CPAP    • Osteoarthritis    • PAF (paroxysmal atrial fibrillation) (CMS/HCC)    • Palpitations    • Paresthesias    • Proteinuria    • Pulmonary hypertension (CMS/HCC)    • Pulmonary nodule    • Pulmonic valve regurgitation     mild   • Sebaceous cyst    • SOBOE (shortness of breath on exertion)    • Solitary thyroid nodule    • Subclinical hypothyroidism    • Tachycardia    • TR (tricuspid regurgitation)     mild to moderate   • Type 2 diabetes mellitus (CMS/HCC)    • 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)    • UTI (urinary tract infection)      Past Surgical History:   Procedure Laterality Date   • APPENDECTOMY     • ARTERIOVENOUS FISTULA/SHUNT SURGERY Left 2/5/2020    Procedure: LEFT BRACHIAL ARTERY AXILLARY VEIN GORTEX SHUNT;  Surgeon: Kaveh Mcdonnell MD;  Location: [facility];  Service: Vascular;  Laterality: Left;   • CARDIAC CATHETERIZATION  1986    procedure outcome:    • CARDIAC CATHETERIZATION N/A 11/21/2016    Procedure: Coronary angiography;  Surgeon: Marcin العراقي MD;  Location: [facility] CATH INVASIVE LOCATION;  Service:    • CARDIAC CATHETERIZATION N/A 11/21/2016    Procedure: Left Heart Cath;  Surgeon: Marcin العراقي MD;  Location: [facility] INVASIVE LOCATION;  Service:    • CARDIAC CATHETERIZATION N/A 8/29/2018     Procedure: Right Heart Cath with adenosine challenge if wedge pressure is normal.;  Surgeon: Paulo Decker MD;  Location: [facility] CATH INVASIVE LOCATION;  Service: Cardiovascular   • CARDIAC ELECTROPHYSIOLOGY PROCEDURE N/A 3/28/2017    Procedure: Ablation atrial flutter;  Surgeon: Rodríguez Ward MD;  Location: [facility] CATH INVASIVE LOCATION;  Service:    • CARDIAC ELECTROPHYSIOLOGY PROCEDURE N/A 7/3/2017    Procedure: AV node ablation;  Surgeon: Rodríguez Ward MD;  Location: [facility] CATH INVASIVE LOCATION;  Service:    • CARDIAC ELECTROPHYSIOLOGY PROCEDURE N/A 7/3/2017    Procedure: Pacemaker DC new  [employer] and will need 3830 lead-I did text Rachael Meneses ;  Surgeon: Rodríguez Ward MD;  Location: [facility] CATH INVASIVE LOCATION;  Service:    • CARDIAC SURGERY     • CHOLECYSTECTOMY     • CLAVICLE SURGERY Left    • COLONOSCOPY N/A 11/14/2017    Procedure: COLONOSCOPY INTO CECUM/ TERMINAL ILEUM WITH POLYPECTOMY  X 8 AND CLIP X 2;  Surgeon: Jacy Browning MD;  Location: [facility] ENDOSCOPY;  Service:    • ENDOSCOPY N/A 11/14/2017    Procedure: ESOPHAGOGASTRODUODENOSCOPY WITH BIOPSIES;  Surgeon: Jacy Browning MD;  Location: [facility] ENDOSCOPY;  Service:    • GASTRIC RESTRICTION SURGERY     • HYSTERECTOMY     • INSERTION HEMODIALYSIS CATHETER N/A 1/10/2020    Procedure: TUNNEL DIALYSIS CATHETER;  Surgeon: Carlos Manuel Jernigan MD;  Location: [facility] MAIN OR;  Service: Vascular   • JOINT REPLACEMENT     • LUMBAR DECOMPRESSION      L4-5   • MITRAL VALVE REPAIR/REPLACEMENT N/A 12/14/2016    Procedure: INTRAOPERATIVE KATELYN, MIDLINE STERNOTOMY, MITRAL VALVE REPAIR, TRICUSPID VALVE REPAIR, MAZE PROCEDURE;  Surgeon: oYung Vital MD;  Location: [facility] MAIN OR;  Service:    • SHOULDER SURGERY Left     AFTER SURGERY FOR FRACTURED CLAVICLE   • TONSILLECTOMY     • TOTAL KNEE ARTHROPLASTY      bilateral     General Information     Row Name 04/06/21 1248          OT Time and Intention    Document Type  therapy note (daily  note)  -     Mode of Treatment  individual therapy;occupational therapy  -     Row Name 04/06/21 1248          General Information    Existing Precautions/Restrictions  fall  -     Row Name 04/06/21 1248          Cognition    Orientation Status (Cognition)  oriented to;person;place  -     Row Name 04/06/21 1248          Safety Issues, Functional Mobility    Impairments Affecting Function (Mobility)  balance;endurance/activity tolerance;strength  -       User Key  (r) = Recorded By, (t) = Taken By, (c) = Cosigned By    Initials Name Provider Type    Kinza Lee OTR Occupational Therapist          Mobility/ADL's     Row Name 04/06/21 1249          Bed Mobility    Supine-Sit [place] (Bed Mobility)  set up;verbal cues;minimum assist (75% patient effort)  -     Sit-Supine [place] (Bed Mobility)  set up;verbal cues;minimum assist (75% patient effort);moderate assist (50% patient effort)  -     Row Name 04/06/21 1249          Self-Feeding Assessment/Training    Comment (Feeding)  states already washed up  -       User Key  (r) = Recorded By, (t) = Taken By, (c) = Cosigned By    Initials Name Provider Type     Kinza Quinones OTR Occupational Therapist        Obj/Interventions     Row Name 04/06/21 1251          Shoulder (Therapeutic Exercise)    Shoulder (Therapeutic Exercise)  AROM (active range of motion)  -     Shoulder AROM (Therapeutic Exercise)  right;left;flexion;extension;10 repetitions;2 sets;sitting  -[facility] Name 04/06/21 1251          Elbow/Forearm (Therapeutic Exercise)    Elbow/Forearm (Therapeutic Exercise)  AROM (active range of motion)  -     Elbow/Forearm AROM (Therapeutic Exercise)  bilateral;flexion;extension;supination;pronation;sitting;3 sets  -     Row Name 04/06/21 1251          Balance    Balance Assessment  sitting static balance  -     Static Sitting Balance  WFL;sitting, edge of bed  -[facility] Name 04/06/21 1251          Therapeutic  Exercise    Therapeutic Exercise  elbow/forearm;shoulder  -KP       User Key  (r) = Recorded By, (t) = Taken By, (c) = Cosigned By    Initials Name Provider Type    Kinza Lee OTR Occupational Therapist        Goals/Plan    No documentation.       Clinical Impression     Row Name 04/06/21 1252          Pain Scale: Numbers Pre/Post-Treatment    Pretreatment Pain Rating  0/10 - no pain  -KP     Posttreatment Pain Rating  3/10  -KP     Pain Location - Side  Right  -KP     Pain Location  knee  -KP     Row Name 04/06/21 1252          Plan of Care Review    Plan of Care Reviewed With  patient  -KP     Progress  improving  -     Outcome Summary  pt completed bed mobility w. min A from sup to sit and min to mod sit to sup. Pt completed EOB sitting w. SBA while completing UE ex 10x2-3 to incr strength and endurance skills. Sat EOB about 10 min. pt already washed up this date and tired after a short session. cont OT to incr endurance,strength, balance, ADL and tsf.  -KP     Row Name 04/06/21 1252          Positioning and Restraints    Pre-Treatment Position  in bed  -KP     Post Treatment Position  bed  -KP     In Bed  supine;call light within reach;encouraged to call for assist;with other staff  -       User Key  (r) = Recorded By, (t) = Taken By, (c) = Cosigned By    Initials Name Provider Type    Kinza Lee OTR Occupational Therapist        Outcome Measures     Row Name 04/06/21 1254          How much help from another is currently needed...    Putting on and taking off regular lower body clothing?  2  -KP     Bathing (including washing, rinsing, and drying)  2  -KP     Toileting (which includes using toilet bed pan or urinal)  1  -KP     Putting on and taking off regular upper body clothing  2  -KP     Taking care of personal grooming (such as brushing teeth)  3  -KP     Eating meals  3  -KP     AM-PAC 6 Clicks Score (OT)  13  -KP       User Key  (r) = Recorded By, (t) = Taken By, (c) =  Cosigned By    Initials Name Provider Type    Kinza Lee, OTR Occupational Therapist        Occupational Therapy Education                 Title: PT OT SLP Therapies (Done)     Topic: Occupational Therapy (Done)     Point: ADL training (Done)     Description:   Instruct learner(s) on proper safety adaptation and remediation techniques during self care or transfers.   Instruct in proper use of assistive devices.              Learning Progress Summary           Patient Acceptance, D,E, DU,VU by ROXANE at 4/5/2021 1516    Comment: role of OT,p sanket of care, body mechanics and hand placmeent for xfers.   posture cues.  call light use and fall risk.                   Point: Precautions (Done)     Description:   Instruct learner(s) on prescribed precautions during self-care and functional transfers.              Learning Progress Summary           Patient Acceptance, D,E, DU,VU by ROXANE at 4/5/2021 1516    Comment: role of OT,p sanket of care, body mechanics and hand placmeent for xfers.   posture cues.  call light use and fall risk.                   Point: Body mechanics (Done)     Description:   Instruct learner(s) on proper positioning and spine alignment during self-care, functional mobility activities and/or exercises.              Learning Progress Summary           Patient Acceptance, D,E, DU,VU by ROXANE at 4/5/2021 1516    Comment: role of OT,p sanket of care, body mechanics and hand placmeent for xfers.   posture cues.  call light use and fall risk.                               User Key     Initials Effective Dates Name Provider Type [facility] 06/08/18 -  Cecelia Ocasio OTR Occupational Therapist OT              OT Recommendation and Plan     Plan of Care Review  Plan of Care Reviewed With: patient  Progress: improving  Outcome Summary: pt completed bed mobility w. min A from sup to sit and min to mod sit to sup. Pt completed EOB sitting w. SBA while completing UE ex 10x2-3 to incr strength and endurance  skills. Sat EOB about 10 min. pt already washed up this date and tired after a short session. cont OT to incr endurance,strength, balance, ADL and tsf.     Time Calculation:   Time Calculation- OT     Row Name 04/06/21 1255             Time Calculation- OT    OT Start Time  1024  -      OT Stop Time  1036  -      OT Time Calculation (min)  12 min  -      Total Timed Code Minutes- OT  12 minute(s)  -      OT Received On  04/06/21  -      OT - Next Appointment  04/07/21  -        User Key  (r) = Recorded By, (t) = Taken By, (c) = Cosigned By    Initials Name Provider Type    Kinza Lee OTR Occupational Therapist        Therapy Charges for Today     Code Description Service Date Service Provider Modifiers Qty    [id number] HC OT THER PROC EA 15 MIN 4/6/2021 Kinza Quinones OTR GO 1               DB Radford  4/6/2021

## 2021-04-06 NOTE — PROGRESS NOTES
Adult Nutrition  Assessment/PES    Patient Name:  Veronica Henao  YOB: 1935  MRN: [id number]  Admit Date:  4/4/2021    Assessment Date:  4/6/2021    Comments:  Nutri. Screen: MST 3. Currently on HH diet. Reported decreased intake prior to admission. Recurrent falls with macular degeneration and ESRD on HD. Possible d/c to LTC. Will follow for adequate intake.     Reason for Assessment     Row Name 04/06/21 1233          Reason for Assessment    Reason For Assessment  identified at risk by screening criteria     Diagnosis  renal disease;other (see comments) Recurrent falls, macular degeneration, ESRD on HD, CKD related anemia, 3rd degree AV block, s/p MVR, s/p TVR, and JOSELUIS on CPAP.     Identified At Risk by Screening Criteria  MST SCORE 2+;reduced oral intake over the last month         Nutrition/Diet History     Row Name 04/06/21 1234          Nutrition/Diet History    Typical Food/Fluid Intake  MST 3. Currently on HH diet. Reported decreased intake prior to admission. Recurrent falls with macular degeneration and ESRD on HD.           Labs/Tests/Procedures/Meds     Row Name 04/06/21 1238          Labs/Procedures/Meds    Lab Results Reviewed  reviewed     Lab Results Comments  Glu 101-184, CO2, AG, BUN 47, Cr 5.97, Ca, GFR 7, , Alb 2.6, WBC 10.83        Diagnostic Tests/Procedures    Diagnostic Test/Procedure Reviewed  reviewed        Medications    Pertinent Medications Reviewed  reviewed     Pertinent Medications Comments  Admelog, protonix         Physical Findings     Row Name 04/06/21 1239          Physical Findings    Overall Physical Appearance  other (see comments) Macular degeneration     Gastrointestinal  other (see comments) BM 4/2     Oral/Mouth Cavity  other (see comments) upper dentures     Skin  other (see comments) Bruising and scabs from falls to L elbow, R & L thighs, and sacral spine, B=19           Nutrition Prescription Ordered     Row Name 04/06/21 1240          Nutrition  Prescription PO    Current PO Diet  Regular     Fluid Consistency  Thin     Common Modifiers  Cardiac         Problem/Interventions:  Problem 1     Row Name 04/06/21 1240          Nutrition Diagnoses Problem 1    Problem 1  Predicted Suboptimal Intake     Etiology (related to)  Functional Diagnosis;Medical Diagnosis     Renal  ESRD;Hemodialysis     Functional Diagnosis  Mobility limitation;Vision deficit     Signs/Symptoms (evidenced by)  Report of Mnimal PO Intake           Intervention Goal     Row Name 04/06/21 1250          Intervention Goal    General  Maintain nutrition;Reduce/improve symptoms;Meet nutritional needs for age/condition;Disease management/therapy     PO  Increase intake;Meet estimated needs     Weight  Maintain weight         Nutrition Intervention     Row Name 04/06/21 1250          Nutrition Intervention    RD/Tech Action  Follow Tx progress;Care plan reviewd;Encourage intake           Education/Evaluation     Row Name 04/06/21 1251          Education    Education  Will Instruct as appropriate        Monitor/Evaluation    Monitor  Symptoms;Per protocol;PO intake;Pertinent labs;Weight           Electronically signed by:  Yadira Stephen MS,RD,LD  04/06/21 12:51 EDT

## 2021-04-06 NOTE — PROGRESS NOTES
Name: Veronica Henao ADMIT: 2021   : 1935  PCP: Nelly Price MD    MRN: [id number] LOS: 0 days   AGE/SEX: 86 y.o. female  ROOM: [facility]   Subjective   Chief Complaint   Patient presents with   • Weakness - Generalized   • Hypotension   • Fall     Weakness on going  For a month or so  Increased falls    H/o ESRD  On HD MWF  Had HD yesterday  Working with therapists    ROS  No f/c  No n/v  No cp/palp  No soa/cough    Objective   Vital Signs  Temp:  [96.9 °F (36.1 °C)-98.2 °F (36.8 °C)] 97.9 °F (36.6 °C)  Heart Rate:  [69-74] 74  Resp:  [16-18] 18  BP: ()/(45-56) 102/56  SpO2:  [95 %-99 %] 99 %  on  Flow (L/min):  [1] 1;   Device (Oxygen Therapy): room air  Body mass index is 24.8 kg/m².    Elderly  Physical Exam  Constitutional:       General: She is not in acute distress.     Appearance: She is well-developed.   HENT:      Head: Normocephalic and atraumatic.   Eyes:      General: No scleral icterus.  Cardiovascular:      Rate and Rhythm: Normal rate and regular rhythm.   Pulmonary:      Effort: Pulmonary effort is normal. No respiratory distress.   Abdominal:      General: There is no distension.      Palpations: Abdomen is soft.   Musculoskeletal:      Cervical back: Neck supple.   Skin:     Coloration: Skin is pale.   Neurological:      Mental Status: She is alert.      Cranial Nerves: No cranial nerve deficit.      Sensory: No sensory deficit.      Motor: Weakness (4/5 both lower extremities) present.   Psychiatric:         Behavior: Behavior normal.         Results Review:       I reviewed the patient's new clinical results.  Results from last 7 days   Lab Units 21  0538 21  1755 21  1256   WBC 10*3/mm3 10.83* 11.45* 13.99*   HEMOGLOBIN g/dL 12.2 12.4 13.9   PLATELETS 10*3/mm3 191 213 211     Results from last 7 days   Lab Units 21  0538 21  1755 21  1256   SODIUM mmol/L 137 135* 137   POTASSIUM mmol/L 3.5 3.4* 3.7   CHLORIDE mmol/L 99 96* 95*   CO2 mmol/L  21.9* 23.1 25.5   BUN mg/dL 47* 45* 30*   CREATININE mg/dL 5.97* 5.54* 4.43*   GLUCOSE mg/dL 148* 246* 241*   Estimated Creatinine Clearance: 6.1 mL/min (A) (by C-G formula based on SCr of 5.97 mg/dL (H)).  Results from last 7 days   Lab Units 04/05/21 0538 04/04/21 1755 04/03/21  1256   ALBUMIN g/dL 2.60* 2.90* 3.60   BILIRUBIN mg/dL 0.3  --  0.3   ALK PHOS U/L 143*  --  188*   AST (SGOT) U/L 10  --  22   ALT (SGPT) U/L 11  --  11     Results from last 7 days   Lab Units 04/05/21 0538 04/04/21 1755 04/03/21  1256   CALCIUM mg/dL 8.5* 9.0 9.7   ALBUMIN g/dL 2.60* 2.90* 3.60   PHOSPHORUS mg/dL  --  3.6 3.2         Coag     HbA1C   Lab Results   Component Value Date    HGBA1C 7.00 (H) 08/28/2020    HGBA1C 6.50 (H) 06/08/2020    HGBA1C 7.04 (H) 01/06/2020     Infection     Radiology(recent) CT Head Without Contrast    Result Date: 4/4/2021  No acute intracranial findings.  Radiation dose reduction techniques were utilized, including automated exposure control and exposure modulation based on body size.  This report was finalized on 4/4/2021 7:57 PM by Dr. Vee Vergara M.D.      CT Cervical Spine Without Contrast    Result Date: 4/4/2021  No acute fracture or subluxation identified.  Radiation dose reduction techniques were utilized, including automated exposure control and exposure modulation based on body size.  This report was finalized on 4/4/2021 8:03 PM by Dr. Vee Vergara M.D.      XR Knee 1 or 2 View Bilateral    Result Date: 4/4/2021  Electronically signed by Juan Mills MD on 04-04-21 at 1859    No results found for: TROPONINT, TROPONINI, BNP  No components found for: TSH;2    bacitracin, , Topical, Q12H  insulin lispro, 0-9 Units, Subcutaneous, TID AC  latanoprost, 1 drop, Both Eyes, Daily  midodrine, 5 mg, Oral, TID AC  ofloxacin, 2 drop, Left Eye, 4x Daily  pantoprazole, 40 mg, Oral, QAM  sertraline, 50 mg, Oral, Daily  sodium chloride, 10 mL, Intravenous, Q12H       Diet Regular;  Cardiac      Assessment/Plan      Active Hospital Problems    Diagnosis  POA   • **Recurrent falls [R29.6]  Not Applicable   • DNR (do not resuscitate) [Z66]  No   • Age-related physical debility [R54]  Yes   • Macular degeneration [H35.30]  Yes   • ESRD on hemodialysis (CMS/[facility]) [N18.6, Z99.2]  Not Applicable   • Anemia in chronic kidney disease [N18.9, D63.1]  Yes   • Third degree atrioventricular block (CMS/[facility]) [I44.2]  Yes   • S/P Maze operation for atrial fibrillation [Z98.890, Z86.79]  Not Applicable   • S/P MVR (mitral valve repair) [Z98.890]  Not Applicable   • S/P TVR (tricuspid valve repair) [Z98.890]  Not Applicable   • JOSELUIS on CPAP [G47.33, Z99.89]  Not Applicable   • Type 2 diabetes mellitus with diabetic chronic kidney disease (CMS/[facility]) [E11.22]  Yes      Resolved Hospital Problems   No resolved problems to display.       86-year-old with history of end-stage renal disease on dialysis and other medical issues who presented with issues of recurrent falls and generalized weakness over the past couple of months.  She denies any focal weakness.    · Overall falls likely multifactorial. Potential impact of polypharmacy (with some medications held by Dr. Huizar), orthostatic hypotension, debility, and other issues. She denies any focal symptoms suggestive of CVA. PT consultation, CCP to see regarding rehab options.   · ESRD- HD done on 4/5  · Monitor labs  · Continue many home medications  · Discussed code status with her- she would like to be DNR/DNI which is reasonable      DW staff  Reviewed records    Still awaiting rehab placement but she is doing fair otherwise    Ahmet Garcia MD  [facility]  04/06/21  14:26 EDT

## 2021-04-06 NOTE — PROGRESS NOTES
"   LOS: 0 days    Patient Care Team:  Nelly Price MD as PCP - General  Gino Gibbs MD as Consulting Physician (Cardiology)  Hayes Ferrer MD as Consulting Physician (Pulmonary Disease)  Berry Hunt II, MD as Consulting Physician (Hematology and Oncology)  Nelly Price MD as Referring Physician (Internal Medicine)  Bethany Ibanez MD as Consulting Physician (Endocrinology)  Joy Mora [facility] as Pharmacist (Pharmacy)  Jam Goldsmith [facility] as Pharmacist (Pharmacy)    Chief Complaint:    Chief Complaint   Patient presents with   • Weakness - Generalized   • Hypotension   • Fall     Follow UP ESRD  Subjective     Interval History:   Long discussion with patient and daughter at bedside.  Not eating .  Very poor appetite since December.  Chronic loose stools since January.  Severe HA 3 hours into dialysis as outpt.  Very weak.     Review of Systems:   As noted above    Objective     Vital Signs  Temp:  [97 °F (36.1 °C)-98.2 °F (36.8 °C)] 97.9 °F (36.6 °C)  Heart Rate:  [69-74] 70  Resp:  [16-18] 18  BP: ()/(45-77) 98/77    Flowsheet Rows      First Filed Value   Admission Height  160 cm (63\") Documented at 04/04/2021 1753   Admission Weight  63.5 kg (140 lb) Documented at 04/04/2021 1753          No intake/output data recorded.  I/O last 3 completed shifts:  In: 100 [IV Piggyback:100]  Out: 400 [Other:400]  No intake or output data in the 24 hours ending 04/06/21 1636    Physical Exam:  General Appearance: alert, frail.  [place].    Skin: warm and dry  HEENT: pupils round and reactive to light, oral mucosa dry nonicteric sclera  Neck: supple, no JVD, trachea midline  Lungs: Clear to auscultation. Unlabored.   Heart: RRR, normal S1 and S2, no S3, no rub  Abdomen: soft, nontender, +bs  Extremities: no edema, LUE AVF patent .  Neuro: normal speech and mental status. Flat affect       Results Review:    Results from last 7 days   Lab Units 04/05/21  0538 04/04/21  1755 04/03/21  1256   SODIUM mmol/L 137 135* " 137   POTASSIUM mmol/L 3.5 3.4* 3.7   CHLORIDE mmol/L 99 96* 95*   CO2 mmol/L 21.9* 23.1 25.5   BUN mg/dL 47* 45* 30*   CREATININE mg/dL 5.97* 5.54* 4.43*   CALCIUM mg/dL 8.5* 9.0 9.7   BILIRUBIN mg/dL 0.3  --  0.3   ALK PHOS U/L 143*  --  188*   ALT (SGPT) U/L 11  --  11   AST (SGOT) U/L 10  --  22   GLUCOSE mg/dL 148* 246* 241*       Estimated Creatinine Clearance: 6.1 mL/min (A) (by C-G formula based on SCr of 5.97 mg/dL (H)).    Results from last 7 days   Lab Units 04/04/21  1755 04/03/21  1256   PHOSPHORUS mg/dL 3.6 3.2             Results from last 7 days   Lab Units 04/05/21  0538 04/04/21  1755 04/03/21  1256   WBC 10*3/mm3 10.83* 11.45* 13.99*   HEMOGLOBIN g/dL 12.2 12.4 13.9   PLATELETS 10*3/mm3 191 213 211               Imaging Results (Last 24 Hours)     ** No results found for the last 24 hours. **        acetaminophen, 650 mg, Oral, TID  bacitracin, , Topical, Q12H  insulin lispro, 0-9 Units, Subcutaneous, TID AC  latanoprost, 1 drop, Both Eyes, Daily  midodrine, 5 mg, Oral, TID AC  ofloxacin, 2 drop, Left Eye, 4x Daily  pantoprazole, 40 mg, Oral, QAM  sertraline, 50 mg, Oral, Daily  sodium chloride, 10 mL, Intravenous, Q12H           Medication Review:   Current Facility-Administered Medications   Medication Dose Route Frequency Provider Last Rate Last Admin   • acetaminophen (TYLENOL) tablet 650 mg  650 mg Oral TID Ahmet Garcia MD       • albumin human 25 % IV SOLN 12.5 g  12.5 g Intravenous PRN Abebe Lundy MD   Stopped at 04/05/21 1045   • bacitracin ointment   Topical Q12H Ahmet Garcia MD   Stopped at 04/05/21 2055   • dextrose (D50W) 25 g/ 50mL Intravenous Solution 25 g  25 g Intravenous Q15 Min PRN Sridevi Huizar MD       • dextrose (GLUTOSE) oral gel 15 g  15 g Oral Q15 Min PRN Sridevi Huizar MD       • glucagon (human recombinant) (GLUCAGEN DIAGNOSTIC) injection 1 mg  1 mg Subcutaneous Q15 Min PRN Sridevi Huizar MD       • HYDROcodone-acetaminophen (NORCO) 5-325 MG  "per tablet 1 tablet  1 tablet Oral Q4H PRN Ahmet Garcia MD       • insulin lispro (ADMELOG) injection 0-9 Units  0-9 Units Subcutaneous TID AC Sridevi Huizar MD   2 Units at 04/06/21 1404   • latanoprost (XALATAN) 0.005 % ophthalmic solution 1 drop  1 drop Both Eyes Daily Sridevi Huizar MD   1 drop at 04/06/21 1048   • midodrine (PROAMATINE) tablet 5 mg  5 mg Oral TID AC Sridevi Huizar MD   5 mg at 04/06/21 1047   • nitroglycerin (NITROSTAT) SL tablet 0.4 mg  0.4 mg Sublingual Q5 Min PRN Sridevi Huizar MD       • ofloxacin (OCUFLOX) 0.3 % ophthalmic solution 2 drop  2 drop Left Eye 4x Daily Sridevi Huizar MD   2 drop at 04/06/21 1048   • ondansetron (ZOFRAN) injection 4 mg  4 mg Intravenous Q6H PRN Sridevi Huizar MD       • pantoprazole (PROTONIX) EC tablet 40 mg  40 mg Oral QAM Sridevi Huizar MD   40 mg at 04/06/21 0639   • sertraline (ZOLOFT) tablet 50 mg  50 mg Oral Daily Sridevi Huizar MD   50 mg at 04/06/21 1048   • sodium chloride 0.9 % flush 10 mL  10 mL Intravenous Q12H Sridevi Huizar MD   10 mL at 04/06/21 1048   • sodium chloride 0.9 % flush 10 mL  10 mL Intravenous PRN Sridevi Huizar MD           Assessment/Plan   1. ESRD.  Dialysis tomorrow.  I had deepthi discussion with patient and daughter.  Asked if she thought that quality of life had declined to point of wanting to stop dialysis.  She said she thought she \" had to do it\".  I explained that she would pass away if she stopped dialysis, but that this is her choice.  We would keep her comfortable with palliative care.  She is not ready to make decision now, but I think that she is declining and that dialysis is not adding to her quality of life.   2. Recurrent falls, debility  3. DM2  4. Chronic diastolic heart failure, pulm HTN  5. Persistent afib . SP MAZE. Paced.   6. JOSELUIS.   7. Chronic hypotension on midodrine .          Angie Caro MD  04/06/21  16:36 EDT    "

## 2021-04-07 PROBLEM — M54.50 LOW BACK PAIN: Status: ACTIVE | Noted: 2021-01-01

## 2021-04-07 NOTE — PROGRESS NOTES
Continued Stay Note  [facility]     Patient Name: Veronica Henao  MRN: [id number]  Today's Date: 4/7/2021    Admit Date: 4/4/2021    Discharge Plan     Row Name 04/07/21 1223       Plan    Plan  [facility] once precert obtained.  Need to fax lab result and flowsheet from todays dialysis treatment to Melany with [facility].      Patient/Family in Agreement with Plan  yes    Plan Comments  Email received from facility requesting updated HepBsAg, & flowsheet from today.  Patient is medically ready to d/c and Dr Garcia plans to d/c later today (pt currently in dialysis).  They will need to start precert today.  Called pts dtr Nesha Young @ [phone] and updated.  Nesha stated she knew of her pending d/c today but didnt know where the patient was going to.  Requested her first choice and was advised they prefer [facility] location.  Informed that [facility] has accepted pending lab/flowsheet and insurance precert.  Per review of PT note from yesterday, pt able to ambulate 2 feet, Mod. assist x 2, with use of Rwx.  Family plans to transport.             Expected Discharge Date and Time     Expected Discharge Date Expected Discharge Time    Apr 7, 2021             Palmer Adkins RN

## 2021-04-07 NOTE — PROGRESS NOTES
"   LOS: 0 days    Patient Care Team:  Nelly Price MD as PCP - General  Gino Gibbs MD as Consulting Physician (Cardiology)  Hayes Ferrer MD as Consulting Physician (Pulmonary Disease)  Berry Hunt II, MD as Consulting Physician (Hematology and Oncology)  Nelly Price MD as Referring Physician (Internal Medicine)  Bethany Ibanez MD as Consulting Physician (Endocrinology)  Joy Mora formerly [facility] as Pharmacist (Pharmacy)  Jam Goldsmith formerly [facility] as Pharmacist (Pharmacy)    Chief Complaint:    Chief Complaint   Patient presents with   • Weakness - Generalized   • Hypotension   • Fall     Follow UP ESRD  Subjective     Interval History:   On dialysis.  Developing HA.  BP 94 systolic.  100 cc NS given with repeat systolic 100.  Not eating.     Review of Systems:   As noted above    Objective     Vital Signs  Temp:  [97.7 °F (36.5 °C)-98.6 °F (37 °C)] 97.7 °F (36.5 °C)  Heart Rate:  [68-72] 70  Resp:  [14-18] 16  BP: ()/(55-77) 109/59    Flowsheet Rows      First Filed Value   Admission Height  160 cm (63\") Documented at 04/04/2021 1753   Admission Weight  63.5 kg (140 lb) Documented at 04/04/2021 1753          No intake/output data recorded.  I/O last 3 completed shifts:  In: 300 [P.O.:300]  Out: -     Intake/Output Summary (Last 24 hours) at 4/7/2021 1225  Last data filed at 4/7/2021 0543  Gross per 24 hour   Intake 300 ml   Output --   Net 300 ml       Physical Exam:  General Appearance: alert, frail.  [place].  On dialysis. Mask in place. Mild distress due to HA.   Skin: warm and dry  HEENT: pupils round and reactive to light, oral mucosa dry nonicteric sclera  Neck: supple, no JVD, trachea midline  Lungs: Clear to auscultation. Unlabored.   Heart: RRR, normal S1 and S2, no S3, no rub  Abdomen: soft, nontender, +bs  Extremities: no edema, LUE AVF patent .  Neuro: normal speech and mental status. Flat affect       Results Review:    Results from last 7 days   Lab Units 04/07/21  0627 04/05/21  0538 " 04/04/21  1755 04/03/21  1256   SODIUM mmol/L 136 137 135* 137   POTASSIUM mmol/L 3.7 3.5 3.4* 3.7   CHLORIDE mmol/L 97* 99 96* 95*   CO2 mmol/L 26.4 21.9* 23.1 25.5   BUN mg/dL 27* 47* 45* 30*   CREATININE mg/dL 4.10* 5.97* 5.54* 4.43*   CALCIUM mg/dL 8.6 8.5* 9.0 9.7   BILIRUBIN mg/dL  --  0.3  --  0.3   ALK PHOS U/L  --  143*  --  188*   ALT (SGPT) U/L  --  11  --  11   AST (SGOT) U/L  --  10  --  22   GLUCOSE mg/dL 100* 148* 246* 241*       Estimated Creatinine Clearance: 9.3 mL/min (A) (by C-G formula based on SCr of 4.1 mg/dL (H)).    Results from last 7 days   Lab Units 04/04/21  1755 04/03/21  1256   PHOSPHORUS mg/dL 3.6 3.2             Results from last 7 days   Lab Units 04/05/21  0538 04/04/21  1755 04/03/21  1256   WBC 10*3/mm3 10.83* 11.45* 13.99*   HEMOGLOBIN g/dL 12.2 12.4 13.9   PLATELETS 10*3/mm3 191 213 211               Imaging Results (Last 24 Hours)     ** No results found for the last 24 hours. **        acetaminophen, 650 mg, Oral, TID  bacitracin, , Topical, Q12H  insulin lispro, 0-9 Units, Subcutaneous, TID AC  latanoprost, 1 drop, Both Eyes, Daily  midodrine, 5 mg, Oral, TID AC  ofloxacin, 2 drop, Left Eye, 4x Daily  pantoprazole, 40 mg, Oral, QAM  sertraline, 50 mg, Oral, Daily  sodium chloride, 10 mL, Intravenous, Q12H           Medication Review:   Current Facility-Administered Medications   Medication Dose Route Frequency Provider Last Rate Last Admin   • acetaminophen (TYLENOL) tablet 650 mg  650 mg Oral TID Ahmet Garcia MD   650 mg at 04/07/21 0838   • bacitracin ointment   Topical Q12H Ahmet Garcia MD   Given at 04/07/21 0838   • dextrose (D50W) 25 g/ 50mL Intravenous Solution 25 g  25 g Intravenous Q15 Min PRN Sridevi Huizar MD       • dextrose (GLUTOSE) oral gel 15 g  15 g Oral Q15 Min PRN Sridevi Huizar MD       • glucagon (human recombinant) (GLUCAGEN DIAGNOSTIC) injection 1 mg  1 mg Subcutaneous Q15 Min PRN Sridevi Huizar MD       • HYDROcodone-acetaminophen (NORCO)  5-325 MG per tablet 1 tablet  1 tablet Oral Q4H PRN Ahmet Garcia MD   1 tablet at 04/07/21 1153   • insulin lispro (ADMELOG) injection 0-9 Units  0-9 Units Subcutaneous TID AC Sridevi Huizar MD   2 Units at 04/06/21 1404   • latanoprost (XALATAN) 0.005 % ophthalmic solution 1 drop  1 drop Both Eyes Daily Sridevi Huizar MD   1 drop at 04/06/21 1048   • midodrine (PROAMATINE) tablet 5 mg  5 mg Oral TID AC Sridevi Huizar MD   5 mg at 04/07/21 0840   • nitroglycerin (NITROSTAT) SL tablet 0.4 mg  0.4 mg Sublingual Q5 Min PRN Sridevi Huizar MD       • ofloxacin (OCUFLOX) 0.3 % ophthalmic solution 2 drop  2 drop Left Eye 4x Daily Sridevi Huizar MD   2 drop at 04/07/21 0838   • ondansetron (ZOFRAN) injection 4 mg  4 mg Intravenous Q6H PRN Sridevi Huizar MD       • pantoprazole (PROTONIX) EC tablet 40 mg  40 mg Oral QAM Sridevi Huizar MD   40 mg at 04/07/21 0541   • sertraline (ZOLOFT) tablet 50 mg  50 mg Oral Daily Sridevi Huizar MD   50 mg at 04/07/21 0838   • sodium chloride 0.9 % flush 10 mL  10 mL Intravenous Q12H Sridevi Huizar MD   10 mL at 04/07/21 0849   • sodium chloride 0.9 % flush 10 mL  10 mL Intravenous PRN Sridevi Huizar MD           Assessment/Plan   1. ESRD.  Dialysis tomorrow.  I had deepthi discussion with patient and daughter yesterday regarding stopping dialysis.  See note 4/6.  I think that she is declining and that dialysis is not adding to her quality of life. I do not have explanation for her chronic dialysis- related HA.  Not trying to take much volume off. It does seem to occur around hour 3.  I am afraid to reduce her time to 3 hours as her creatinine is already 4-5 and she has little muscle mass.  Has chronic hypotension, does not seem worse with dialysis. Maybe have her take Tylenol at 2.5 hours into treatment.  to see if it can help avoid HA.   2. Recurrent falls, debility  3. DM2  4. Chronic diastolic heart failure, pulm HTN  5. Persistent afib . SP MAZE. Paced.   6. JOSELUIS.   7. Chronic hypotension  on midodrine .      Angie Caro MD  04/07/21  12:25 EDT

## 2021-04-07 NOTE — PLAN OF CARE
Goal Outcome Evaluation:  Plan of Care Reviewed With: patient  Progress: no change  Outcome Summary: vital signs stable- BP soft. tolerating room air. complains of generalized pain- see mar. q 2 turns. skin care. alert and oriented. plan for HD today.

## 2021-04-07 NOTE — PROGRESS NOTES
Name: Veronica Henao ADMIT: 2021   : 1935  PCP: Nelly Price MD    MRN: [id number] LOS: 0 days   AGE/SEX: 86 y.o. female  ROOM: [facility]   Subjective   Chief Complaint   Patient presents with   • Weakness - Generalized   • Hypotension   • Fall     Weakness on going  For a month or so  Increased falls    Working with therapists  Pain is fair- partial improvement with meds    ROS  No f/c  No n/v  No cp/palp  No soa/cough    Objective   Vital Signs  Temp:  [97.3 °F (36.3 °C)-98.6 °F (37 °C)] 97.6 °F (36.4 °C)  Heart Rate:  [68-72] 69  Resp:  [14-18] 18  BP: ()/(52-69) 132/61  SpO2:  [81 %-99 %] 92 %  on  Flow (L/min):  [2] 2;   Device (Oxygen Therapy): nasal cannula  Body mass index is 23.47 kg/m².    Elderly  Physical Exam  Constitutional:       General: She is not in acute distress.     Appearance: She is well-developed.   HENT:      Head: Normocephalic and atraumatic.   Eyes:      General: No scleral icterus.  Cardiovascular:      Rate and Rhythm: Normal rate and regular rhythm.   Pulmonary:      Effort: Pulmonary effort is normal. No respiratory distress.   Abdominal:      General: There is no distension.      Palpations: Abdomen is soft.   Musculoskeletal:      Cervical back: Neck supple.   Skin:     Coloration: Skin is pale.   Neurological:      Mental Status: She is alert.      Cranial Nerves: No cranial nerve deficit.      Sensory: No sensory deficit.      Motor: Weakness (4/5 both lower extremities) present.   Psychiatric:         Behavior: Behavior normal.         Results Review:       I reviewed the patient's new clinical results.  Results from last 7 days   Lab Units 21  0538 21  1755 21  1256   WBC 10*3/mm3 10.83* 11.45* 13.99*   HEMOGLOBIN g/dL 12.2 12.4 13.9   PLATELETS 10*3/mm3 191 213 211     Results from last 7 days   Lab Units 21  0627 21  0538 21  1755 21  1256   SODIUM mmol/L 136 137 135* 137   POTASSIUM mmol/L 3.7 3.5 3.4* 3.7    CHLORIDE mmol/L 97* 99 96* 95*   CO2 mmol/L 26.4 21.9* 23.1 25.5   BUN mg/dL 27* 47* 45* 30*   CREATININE mg/dL 4.10* 5.97* 5.54* 4.43*   GLUCOSE mg/dL 100* 148* 246* 241*   Estimated Creatinine Clearance: 9.3 mL/min (A) (by C-G formula based on SCr of 4.1 mg/dL (H)).  Results from last 7 days   Lab Units 04/05/21  0538 04/04/21 1755 04/03/21  1256   ALBUMIN g/dL 2.60* 2.90* 3.60   BILIRUBIN mg/dL 0.3  --  0.3   ALK PHOS U/L 143*  --  188*   AST (SGOT) U/L 10  --  22   ALT (SGPT) U/L 11  --  11     Results from last 7 days   Lab Units 04/07/21  0627 04/05/21 0538 04/04/21 1755 04/03/21  1256   CALCIUM mg/dL 8.6 8.5* 9.0 9.7   ALBUMIN g/dL  --  2.60* 2.90* 3.60   PHOSPHORUS mg/dL  --   --  3.6 3.2         Coag     HbA1C   Lab Results   Component Value Date    HGBA1C 7.00 (H) 08/28/2020    HGBA1C 6.50 (H) 06/08/2020    HGBA1C 7.04 (H) 01/06/2020     Infection     Radiology(recent) No radiology results for the last day  No results found for: TROPONINT, TROPONINI, BNP  No components found for: TSH;2    acetaminophen, 650 mg, Oral, TID  bacitracin, , Topical, Q12H  gabapentin, 100 mg, Oral, Q12H  insulin lispro, 0-9 Units, Subcutaneous, TID AC  latanoprost, 1 drop, Both Eyes, Daily  midodrine, 5 mg, Oral, TID AC  ofloxacin, 2 drop, Left Eye, 4x Daily  pantoprazole, 40 mg, Oral, QAM  sertraline, 50 mg, Oral, Daily  sodium chloride, 10 mL, Intravenous, Q12H       Diet Regular      Assessment/Plan      Active Hospital Problems    Diagnosis  POA   • **Recurrent falls [R29.6]  Not Applicable   • Low back pain [M54.5]  Yes   • DNR (do not resuscitate) [Z66]  No   • Age-related physical debility [R54]  Yes   • Macular degeneration [H35.30]  Yes   • ESRD on hemodialysis (CMS/[facility]) [N18.6, Z99.2]  Not Applicable   • Anemia in chronic kidney disease [N18.9, D63.1]  Yes   • Third degree atrioventricular block (CMS/[facility]) [I44.2]  Yes   • S/P Maze operation for atrial fibrillation [Z98.890, Z86.79]  Not Applicable   • S/P MVR  (mitral valve repair) [Z98.890]  Not Applicable   • 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       86-year-old with history of end-stage renal disease on dialysis and other medical issues who presented with issues of recurrent falls and generalized weakness over the past couple of months.  She denies any focal weakness.    · Overall falls likely multifactorial. Potential impact of polypharmacy (with some medications held by Dr. Huizar), orthostatic hypotension, debility, and other issues. She denies any focal symptoms suggestive of CVA. PT consultation, CCP to see regarding rehab options.   · Agents for pain control  · ESRD- HD per nephrology. Some consideration of stopping this and doing palliative care but not at this point.   · Monitor labs  · Continue many home medications  · Discussed code status with her- she would like to be DNR/DNI which is reasonable      DW RN  Reviewed records    Potential rehab today or tomorrow  Greater than 36 minutes spent with greater than 50% counseling and coordinating care      Ahmet Garcia MD  [facility]  04/07/21  14:26 EDT

## 2021-04-07 NOTE — PROGRESS NOTES
Continued Stay Note  [facility]     Patient Name: Veronica Henao  MRN: [id number]  Today's Date: 4/7/2021    Admit Date: 4/4/2021    Discharge Plan     Row Name 04/07/21 1445       Plan    Plan Comments  Today's dialysis flow sheet faxed to Melany with [facility].  Awaiting HepB lab- CCP will need to fax to Melany once received at fax# [phone].          Expected Discharge Date and Time     Expected Discharge Date Expected Discharge Time    Apr 7, 2021             Palmer Adkins RN

## 2021-04-07 NOTE — DISCHARGE SUMMARY
NAME: Veronica Henao ADMIT: 2021   : 1935  PCP: Nelly Price MD    MRN: [id number] LOS: 0 days   AGE/SEX: 86 y.o. female  ROOM: P593/1     Date of Admission:  2021  Date of Discharge:  2021    PCP: Nelly Price MD    CHIEF COMPLAINT  Weakness - Generalized, Hypotension, and Fall      DISCHARGE DIAGNOSIS  Active Hospital Problems    Diagnosis  POA   • **Recurrent falls [R29.6]  Not Applicable   • Low back pain [M54.5]  Yes   • DNR (do not resuscitate) [Z66]  No   • Age-related physical debility [R54]  Yes   • Macular degeneration [H35.30]  Yes   • ESRD on hemodialysis (CMS/[facility]) [N18.6, Z99.2]  Not Applicable   • Anemia in chronic kidney disease [N18.9, D63.1]  Yes   • Third degree atrioventricular block (CMS/[facility]) [I44.2]  Yes   • S/P Maze operation for atrial fibrillation [Z98.890, Z86.79]  Not Applicable   • S/P MVR (mitral valve repair) [Z98.890]  Not Applicable   • S/P TVR (tricuspid valve repair) [Z98.890]  Not Applicable   • JOSELUIS on CPAP [G47.33, Z99.89]  Not Applicable   • Type 2 diabetes mellitus with diabetic chronic kidney disease (CMS/[facility]) [E11.22]  Yes      Resolved Hospital Problems   No resolved problems to display.       SECONDARY DIAGNOSES  Past Medical History:   Diagnosis Date   • Acute bronchitis    • Acute renal insufficiency    • Allergic rhinitis    • Anemia in chronic kidney disease    • Arrhythmia    • Atrial fibrillation (CMS/[facility])     chronic   • Brachycephaly    • Breast pain, right    • Chest pain    • CHF (congestive heart failure) (CMS/[facility])    • Chronic combined systolic and diastolic CHF (congestive heart failure) (CMS/[facility])    • Chronic renal insufficiency    • CKD (chronic kidney disease), stage IV (CMS/[facility])    • Cough    • Depression    • Diabetes mellitus (CMS/[facility])     TYPE 2   • Dialysis patient (CMS/[facility])    • Diverticulosis    • ORTIZ (dyspnea on exertion)    • Dyspnea    • Esophageal reflux    • Essential hypertension    • Fatigue    • GERD  (gastroesophageal reflux disease)    • Gout    • Head injury    • Headache    • Hoarseness    • Hypercalcemia    • Hyperlipidemia    • Hypertension    • Influenza A (H1N1)    • Iron deficiency anemia    • Itching    • Leg swelling    • Lightheadedness    • Macular degeneration    • Malaise and fatigue    • Mitral valve regurgitation     moderate to severe   • Nontoxic multinodular goiter     RIGHT 2.0 CM  LEFT  2.5 CM   • Obesity    • JOSELUIS on CPAP    • Osteoarthritis    • PAF (paroxysmal atrial fibrillation) (CMS/[facility])    • Palpitations    • Paresthesias    • Proteinuria    • Pulmonary hypertension (CMS/HCC)    • Pulmonary nodule    • Pulmonic valve regurgitation     mild   • Sebaceous cyst    • SOBOE (shortness of breath on exertion)    • Solitary thyroid nodule    • Subclinical hypothyroidism    • Tachycardia    • TR (tricuspid regurgitation)     mild to moderate   • Type 2 diabetes mellitus (CMS/[facility])    • Type 2 diabetes mellitus with chronic kidney disease (CMS/[facility])    • UTI (urinary tract infection)        CONSULTS   Nephrology    HOSPITAL COURSE  86-year-old with history of end-stage renal disease on dialysis and other medical issues who presented with issues of recurrent falls and generalized weakness over the past couple of months.  She denies any focal weakness. Overall falls likely multifactorial. Potential impact of polypharmacy (with some medications held by Dr. Huizar), orthostatic hypotension (she is already on chronic midodrine), debility, and other issues. She denies any focal symptoms suggestive of CVA. She was seen by PT and arrangements have been made for rehab.     Additionally with her significant decline Dr. Caro had a discussion with patient and daughter regarding potentially stopping dialysis as her health is declining and her quality of life has suffered. Patient is DNR/DNI and will have continued discussion with Nephrology and family if it gets to the point that she wants to stop dialysis  and do palliative care. She is stable for discharge on 4/9. Will increase her norco dose from 5/325mg to 7.5/325mg to help with pain.       DIAGNOSTICS  04/07/2021 0627 04/07/2021 0809 Vitamin B12 [[id number]]    Blood from Arm, Right    Final result Component Value Units   Vitamin B-12 679 pg/mL           04/07/2021 0627 04/07/2021 0759 TSH [[id number]]   Blood    Final result Component Value Units   TSH Baseline 1.710 uIU/mL           04/07/2021 0627 04/07/2021 0753 Basic Metabolic Panel [[id number]]    (Abnormal)   Blood    Final result Component Value Units   Glucose 100High  mg/dL   BUN 27High  mg/dL   Creatinine 4.10High  mg/dL   Sodium 136 mmol/L   Potassium 3.7 mmol/L   Chloride 97Low  mmol/L   CO2 26.4 mmol/L   Calcium 8.6 mg/dL   eGFR African Am --     eGFR Non African Am 10Low   mL/min/1.73   BUN/Creatinine Ratio 6.6Low     Anion Gap 12.6 mmol/L           04/05/2021 0538 04/05/2021 0639 Comprehensive Metabolic Panel [[id number]]    (Abnormal)   Blood from Hand, Right    Final result Component Value Units   Glucose 148High  mg/dL   BUN 47High  mg/dL   Creatinine 5.97High  mg/dL   Sodium 137 mmol/L   Potassium 3.5 mmol/L   Chloride 99 mmol/L   CO2 21.9Low  mmol/L   Calcium 8.5Low  mg/dL   Total Protein 5.8Low  g/dL   Albumin 2.60Low  g/dL   ALT (SGPT) 11 U/L   AST (SGOT) 10 U/L   Alkaline Phosphatase 143High  U/L   Total Bilirubin 0.3 mg/dL   eGFR Non African Am 7Low   mL/min/1.73   eGFR African Am --     Globulin 3.2 gm/dL   A/G Ratio 0.8 g/dL   BUN/Creatinine Ratio 7.9    Anion Gap 16.1High  mmol/L           04/05/2021 0538 04/05/2021 0616 CBC Auto Differential [[id number]]   (Abnormal)   Blood from Hand, Right    Final result Component Value Units   WBC 10.83High  10*3/mm3   RBC 3.55Low  10*6/mm3   Hemoglobin 12.2 g/dL   Hematocrit 36.7 %   .4High  fL   MCH 34.4High  pg   MCHC 33.2 g/dL   RDW 13.0 %   RDW-SD 49.7 fl   MPV 9.7 fL   Platelets 191 10*3/mm3        CT Head Without Contrast [[id number]] Gt  as Reviewed   Order Status: Completed Collected: 04/04/21 1954    Updated: 04/04/21 2000   Narrative:     CT HEAD WITHOUT CONTRAST       HISTORY: Fall with head injury       COMPARISON: 03/29/2021       TECHNIQUE: Axial CT imaging was obtained through the brain. No IV   contrast was administered.       FINDINGS:   No acute intracranial hemorrhage is seen. There is diffuse atrophy.   There is periventricular and deep white matter microangiopathic change.   There is no midline shift or mass effect. No calvarial fracture is seen.   The paranasal sinuses and mastoid air cells appear clear.       Impression:     No acute intracranial findings.       Radiation dose reduction techniques were utilized, including automated   exposure control and exposure modulation based on body size.       This report was finalized on 4/4/2021 7:57 PM by Dr. Vee Vergara M.D.       CT Cervical Spine Without Contrast [[id number]] Gt as Reviewed   Order Status: Completed Collected: 04/04/21 1958    Updated: 04/04/21 2006   Narrative:     CT OF THE CERVICAL SPINE WITHOUT CONTRAST       HISTORY: Left-sided neck pain       COMPARISON: None available.       TECHNIQUE: Axial CT imaging was obtained through the cervical spine.   Coronal and sagittal reformatted images were obtained.       FINDINGS:   No acute fracture or subluxation of the cervical spine is identified.   There is no prevertebral soft tissue swelling. Intervertebral disc space   narrowing is noted most significantly at C5-6 days. There is mild   anterolisthesis of C6 on C7. Please see the report from 03/26/2021 for   full description of degenerative findings. Visualized portions of the   lungs appear unremarkable. There is a nodule arising from the left lobe   of the thyroid gland, measuring 1.9 x 1.1 cm. This could be better   assessed with dedicated thyroid ultrasound.       Impression:     No acute fracture or subluxation identified.       Radiation dose reduction  techniques were utilized, including automated   exposure control and exposure modulation based on body size.       This report was finalized on 4/4/2021 8:03 PM by Dr. Vee Vergara M.D.       XR Knee 1 or 2 View Bilateral [[id number]] Gt as Reviewed   Order Status: Completed Collected: 04/04/21 1900    Updated: 04/04/21 1900   Narrative:       Patient: JANNETH LO  Time Out: 18:59   Exam(s): FILM BILATERAL KNEES     EXAM:     XR Bilateral Knees, 3 Views     CLINICAL HISTORY:      fall, pain  Reason for exam: fall, pain.     TECHNIQUE:     Three views of the bilateral knees.     COMPARISON:     No relevant prior studies available.     FINDINGS:     Bones joints:  Previous bilateral total knee arthroplasty.  No acute   fracture.  No dislocation.     Soft tissues:  Unremarkable.     Vasculature:  Moderate vascular calcifications.     IMPRESSION:       1.  No acute findings.   2.  Previous bilateral total knee arthroplasty.    Impression:         Electronically signed by Juan Mills MD on 04-04-21 at 1859   XR Hips Bilateral With or Without Pelvis 5 View [[id number]] Gt as Reviewed   Order Status: Completed Collected: 04/03/21 1427    Updated: 04/03/21 1539   Narrative:     FOUR-VIEW BILATERAL HIPS AND ONE VIEW AP PELVIS       HISTORY: Fell. Bilateral hip pain.       There is severe diffuse osteoporosis. This somewhat obscures bony detail   but no definite acute fracture is seen involving the hips or pelvis.       This report was finalized on 4/3/2021 3:35 PM by Dr. Charles Ramírez M.D.       CT Head Without Contrast [[id number]] Reviewed: 03/29/21 0824   Order Status: Completed Collected: 03/26/21 1617    Updated: 03/29/21 0813   Narrative:     CT HEAD WITHOUT CONTRAST AND CT CERVICAL SPINE WITHOUT CONTRAST       HISTORY: Neck pain, fall, head trauma.       A noncontrasted CT examination of the brain was performed. The brain   ventricles are symmetrical. There is no evidence of hemorrhage,   hydrocephalus or  of abnormal extra-axial fluid. No focal area of   decreased attenuation to suggest acute infarction is identified. A small   remote infarct involving the left cerebellar hemisphere is appreciated.   No focal area of decreased attenuation to suggest acute infarction or   contusion is appreciated. Bone windows showed no evidence of a calvarial   fracture.       Impression:     No evidence of fracture or intracranial hemorrhage. There is   age-appropriate atrophy. Mild to moderate vascular calcifications are   appreciated. A small remote left cerebellar infarct is noted.       CT EXAMINATION OF THE CERVICAL SPINE WITHOUT CONTRAST:       There is moderate loss of disc height at C5-6 and a grade 1   anterolisthesis of C6 upon C7. There is no evidence of fracture.   Multifocal areas of calcification involving the ligamentum flavum are   noted posteriorly extending from C3 to T2. This is most prominent at the   level of C5-6 where an area of calcification is appreciated measuring 8   x 5 x 9 mm in size. The calcification is new versus the PET examination   performed on 10/31/2014 and the upper thoracic calcifications are   slightly more prominent as compared to the CT examination of chest   performed on 04/21/2016.       C2-3: There is no evidence of disc bulge or herniation.       C3-4: There is a mild central disc osteophyte complex with no evidence   of herniation.       C4-5: There is a mild central disc osteophyte complex with no evidence   of herniation.       C5-6: A broad-based disc osteophyte complex is present which results in   mild flattening of the ventral surface of thecal sac.       C6-7: There is a mild central disc osteophyte complex with no evidence   of herniation. An area of decreased attenuation involving the left lobe   of thyroid gland is appreciated measuring 1.5 cm in size likely   representing a thyroid cyst similar to the CT examination of the chest   performed on 10/31/2014.       C7-T1: There  is no evidence of disc bulge or herniation.       IMPRESSION:   1. No evidence of fracture. Multifocal calcifications related to the   ligamentum flavum and dura posteriorly. These appear to be new versus   the PET examination performed on 10/31/2014 with the upper thoracic   calcifications being slightly more prominent as compared to the CT   examination of the chest from 04/21/2016. No dedicated CT examination of   the cervical spine is available for comparison. These calcifications are   nonspecific but likely degenerative in nature.   2. A 1.5 cm cyst involving the left lobe of thyroid gland, stable.       The above information was called to and discussed with Dr. Price.       Radiation dose reduction techniques were utilized, including automated   exposure control and exposure modulation based on body size.       This report was finalized on 3/29/2021 8:10 AM by Dr. Ha Pearce M.D.       CT Cervical Spine Without Contrast [[id number]] Reviewed: 03/29/21 0822   Order Status: Completed Collected: 03/26/21 1617    Updated: 03/29/21 0813   Narrative:     CT HEAD WITHOUT CONTRAST AND CT CERVICAL SPINE WITHOUT CONTRAST       HISTORY: Neck pain, fall, head trauma.       A noncontrasted CT examination of the brain was performed. The brain   ventricles are symmetrical. There is no evidence of hemorrhage,   hydrocephalus or of abnormal extra-axial fluid. No focal area of   decreased attenuation to suggest acute infarction is identified. A small   remote infarct involving the left cerebellar hemisphere is appreciated.   No focal area of decreased attenuation to suggest acute infarction or   contusion is appreciated. Bone windows showed no evidence of a calvarial   fracture.       Impression:     No evidence of fracture or intracranial hemorrhage. There is   age-appropriate atrophy. Mild to moderate vascular calcifications are   appreciated. A small remote left cerebellar infarct is noted.       CT EXAMINATION OF THE  CERVICAL SPINE WITHOUT CONTRAST:       There is moderate loss of disc height at C5-6 and a grade 1   anterolisthesis of C6 upon C7. There is no evidence of fracture.   Multifocal areas of calcification involving the ligamentum flavum are   noted posteriorly extending from C3 to T2. This is most prominent at the   level of C5-6 where an area of calcification is appreciated measuring 8   x 5 x 9 mm in size. The calcification is new versus the PET examination   performed on 10/31/2014 and the upper thoracic calcifications are   slightly more prominent as compared to the CT examination of chest   performed on 04/21/2016.       C2-3: There is no evidence of disc bulge or herniation.       C3-4: There is a mild central disc osteophyte complex with no evidence   of herniation.       C4-5: There is a mild central disc osteophyte complex with no evidence   of herniation.       C5-6: A broad-based disc osteophyte complex is present which results in   mild flattening of the ventral surface of thecal sac.       C6-7: There is a mild central disc osteophyte complex with no evidence   of herniation. An area of decreased attenuation involving the left lobe   of thyroid gland is appreciated measuring 1.5 cm in size likely   representing a thyroid cyst similar to the CT examination of the chest   performed on 10/31/2014.       C7-T1: There is no evidence of disc bulge or herniation.       IMPRESSION:   1. No evidence of fracture. Multifocal calcifications related to the   ligamentum flavum and dura posteriorly. These appear to be new versus   the PET examination performed on 10/31/2014 with the upper thoracic   calcifications being slightly more prominent as compared to the CT   examination of the chest from 04/21/2016. No dedicated CT examination of   the cervical spine is available for comparison. These calcifications are   nonspecific but likely degenerative in nature.   2. A 1.5 cm cyst involving the left lobe of thyroid gland,  stable.       The above information was called to and discussed with Dr. Price.            PHYSICAL EXAM  Objective    Alert  Decreased bs at bases  Chronically ill  No edema  Elderly, pale    CONDITION ON DISCHARGE  Stable.      DISCHARGE DISPOSITION   Skilled Nursing Facility (DC - External)      DISCHARGE MEDICATIONS       Your medication list      START taking these medications      Instructions Last Dose Given Next Dose Due   bacitracin 500 UNIT/GM ointment      Apply  topically to the appropriate area as directed Every 12 (Twelve) Hours. To hips       HYDROcodone-acetaminophen 7.5-325 MG per tablet  Commonly known as: NORCO  Replaces: HYDROcodone-acetaminophen 5-325 MG per tablet      Take 1 tablet by mouth Every 4 (Four) Hours As Needed for Moderate Pain .          CHANGE how you take these medications      Instructions Last Dose Given Next Dose Due   gabapentin 100 MG capsule  Commonly known as: NEURONTIN  What changed:   · medication strength  · how much to take  · when to take this      Take 1 capsule by mouth 2 (two) times a day.          CONTINUE taking these medications      Instructions Last Dose Given Next Dose Due   acetaminophen 325 MG tablet  Commonly known as: TYLENOL      Take 2 tablets by mouth Every 6 (Six) Hours As Needed for Mild Pain (1-3).       B-D SINGLE USE SWABS REGULAR pads      Inject 1 each under the skin into the appropriate area as directed Daily.       butenafine 1 % cream  Commonly known as: Mentax      Apply  topically to the appropriate area as directed 2 (Two) Times a Day. To feet two times daily       glucose blood test strip  Commonly known as: True Metrix Blood Glucose Test      1 each by Other route 2 (Two) Times a Day. Use as instructed       Lancets 28G misc      To check sugar qd. E11.9 DM       TRUEplus Lancets 28G misc      E11.9 DM to check sugar QD       latanoprost 0.005 % ophthalmic solution  Commonly known as: XALATAN           midodrine 5 MG tablet  Commonly  known as: PROAMATINE      Take 1 tablet by mouth 3 (Three) Times a Day.       ofloxacin 0.3 % ophthalmic solution  Commonly known as: Ocuflox      Administer 2 drops into the left eye 4 (Four) Times a Day.       omeprazole 20 MG capsule  Commonly known as: PrilOSEC      Take 1 capsule by mouth Daily.       sertraline 50 MG tablet  Commonly known as: ZOLOFT      Take 1 tablet by mouth Daily.       True Metrix Air Glucose Meter device      1 each Daily.          STOP taking these medications    famotidine 40 MG tablet  Commonly known as: Pepcid        HYDROcodone-acetaminophen 5-325 MG per tablet  Commonly known as: NORCO  Replaced by: HYDROcodone-acetaminophen 7.5-325 MG per tablet        pramipexole 0.25 MG tablet  Commonly known as: MIRAPEX              Where to Get Your Medications      You can get these medications from any pharmacy    Bring a paper prescription for each of these medications  · gabapentin 100 MG capsule  · HYDROcodone-acetaminophen 7.5-325 MG per tablet     Information about where to get these medications is not yet available    Ask your nurse or doctor about these medications  · bacitracin 500 UNIT/GM ointment          Future Appointments   Date Time Provider Department Center   4/26/2021  2:45 PM Nelly Price MD [facility]  [facility]      Contact information for follow-up providers     Nelly Price MD .    Specialty: Internal Medicine  Contact information:  [address] [address] [phone]                   Contact information for after-discharge care     Destination     [facility] .    Service: Skilled Nursing  Contact information:  [address] [facility] [address] [phone]                             TEST  RESULTS PENDING AT DISCHARGE         Ahmet Garcia MD  [facility]  04/09/21  08:29 EDT      Time: greater than 32 minutes on discharge  It was a pleasure taking care of this patient while in the hospital.

## 2021-04-07 NOTE — PROGRESS NOTES
Continued Stay Note  [facility]     Patient Name: Veronica Henao  MRN: [id number]  Today's Date: 4/7/2021    Admit Date: 4/4/2021    Discharge Plan     Row Name 04/07/21 1621       Plan    Plan Comments  Faxed hepb lab to [facility], Fax# [phone].  Pre-cert was started today.          Expected Discharge Date and Time     Expected Discharge Date Expected Discharge Time    Apr 7, 2021             Palmer Adkins RN

## 2021-04-08 NOTE — PLAN OF CARE
Goal Outcome Evaluation:  Plan of Care Reviewed With: patient     Patient able to ambulate 8 feet, Min. Assist x 2, with use of a Rwx this date.  Patient requires Min. Assist x 2 for bed mobility and Min. Assist x 2 for sit <-> stand transfers. BLE exercise program x 10 reps completed for general strengthening. Verbal/tactile cues given for posture correction and Rwx guidance during ambulation.  Limited in upright mobility due to fatigue and weakness.     Patient was wearing a face mask during this therapy encounter. Therapist used appropriate personal protective equipment including eye protection, mask, and gloves.  Mask used was standard procedure mask. Appropriate PPE was worn during the entire therapy session. Hand hygiene was completed before and after therapy session. Patient is not in enhanced droplet precautions.

## 2021-04-08 NOTE — PROGRESS NOTES
Continued Stay Note  [facility]     Patient Name: Veronica Henao  MRN: [id number]  Today's Date: 4/8/2021    Admit Date: 4/4/2021    Discharge Plan     Row Name 04/08/21 1258       Plan    Plan Comments  Message sent to [facility] to verify cert was started. Received message from [facility], pre-cert was started yesterday afternoon and is pending. Jelena Dougherty RN        Discharge Codes    No documentation.       Expected Discharge Date and Time     Expected Discharge Date Expected Discharge Time    Apr 8, 2021             Jelena Dougherty RN

## 2021-04-08 NOTE — THERAPY TREATMENT NOTE
Patient Name: Veronica Henao  : 1935    MRN: [id number]                              Today's Date: 2021       Admit Date: 2021    Visit Dx:     ICD-10-CM ICD-9-CM   1. Recurrent falls  R29.6 V15.88   2. Generalized weakness  R53.1 780.79   3. ESRD (end stage renal disease) on dialysis (CMS/[facility])  N18.6 585.6    Z99.2 V45.11   4. Low back pain, unspecified back pain laterality, unspecified chronicity, unspecified whether sciatica present  M54.5 724.2     Patient Active Problem List   Diagnosis   • Atopic rhinitis   • Gout   • Cephalalgia   • Hypercalcemia   • Hyperlipidemia   • Type 2 diabetes mellitus with diabetic chronic kidney disease (CMS/[facility])   • Lung mass   • Gastroesophageal reflux disease   • Tricuspid insufficiency   • Morbid obesity (CMS/HCC)   • JOSELUIS on CPAP   • Chronic venous insufficiency   • Mitral valve insufficiency   • S/P Maze operation for atrial fibrillation   • S/P TVR (tricuspid valve repair)   • S/P MVR (mitral valve repair)   • Atrial flutter, paroxysmal (CMS/HCC)   • Third degree atrioventricular block (CMS/HCC)   • Chronic kidney disease, stage IV (severe) (CMS/HCC)   • Anemia in chronic kidney disease   • Iron deficiency anemia   • Pulmonary hypertension (CMS/[facility])   • Chronic diastolic congestive heart failure (CMS/[facility])   • ESRD on hemodialysis (CMS/HCC)   • Control of atrial fibrillation with pacemaker (CMS/HCC)   • Chronic atrial fibrillation (CMS/[facility])   • Chronic anticoagulation   • Recurrent falls   • Macular degeneration   • DNR (do not resuscitate)   • Age-related physical debility   • Low back pain     Past Medical History:   Diagnosis Date   • Acute bronchitis    • Acute renal insufficiency    • Allergic rhinitis    • Anemia in chronic kidney disease    • Arrhythmia    • Atrial fibrillation (CMS/[facility])     chronic   • Brachycephaly    • Breast pain, right    • Chest pain    • CHF (congestive heart failure) (CMS/[facility])    • Chronic combined systolic and diastolic CHF  (congestive heart failure) (CMS/HCC)    • Chronic renal insufficiency    • CKD (chronic kidney disease), stage IV (CMS/HCC)    • Cough    • Depression    • Diabetes mellitus (CMS/HCC)     TYPE 2   • Dialysis patient (CMS/HCC)    • Diverticulosis    • ORTIZ (dyspnea on exertion)    • Dyspnea    • Esophageal reflux    • Essential hypertension    • Fatigue    • GERD (gastroesophageal reflux disease)    • Gout    • Head injury    • Headache    • Hoarseness    • Hypercalcemia    • Hyperlipidemia    • Hypertension    • Influenza A (H1N1)    • Iron deficiency anemia    • Itching    • Leg swelling    • Lightheadedness    • Macular degeneration    • Malaise and fatigue    • Mitral valve regurgitation     moderate to severe   • Nontoxic multinodular goiter     RIGHT 2.0 CM  LEFT  2.5 CM   • Obesity    • JOSELUIS on CPAP    • Osteoarthritis    • PAF (paroxysmal atrial fibrillation) (CMS/HCC)    • Palpitations    • Paresthesias    • Proteinuria    • Pulmonary hypertension (CMS/HCC)    • Pulmonary nodule    • Pulmonic valve regurgitation     mild   • Sebaceous cyst    • SOBOE (shortness of breath on exertion)    • Solitary thyroid nodule    • Subclinical hypothyroidism    • Tachycardia    • TR (tricuspid regurgitation)     mild to moderate   • Type 2 diabetes mellitus (CMS/HCC)    • 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)    • UTI (urinary tract infection)      Past Surgical History:   Procedure Laterality Date   • APPENDECTOMY     • ARTERIOVENOUS FISTULA/SHUNT SURGERY Left 2/5/2020    Procedure: LEFT BRACHIAL ARTERY AXILLARY VEIN GORTEX SHUNT;  Surgeon: Kaveh Mcdonnell MD;  Location: [facility] OR;  Service: Vascular;  Laterality: Left;   • CARDIAC CATHETERIZATION  1986    procedure outcome:    • CARDIAC CATHETERIZATION N/A 11/21/2016    Procedure: Coronary angiography;  Surgeon: Marcin العراقي MD;  Location: [facility] INVASIVE LOCATION;  Service:    • CARDIAC CATHETERIZATION N/A 11/21/2016     Procedure: Left Heart Cath;  Surgeon: Marcin العراقي MD;  Location: [facility] CATH INVASIVE LOCATION;  Service:    • CARDIAC CATHETERIZATION N/A 8/29/2018    Procedure: Right Heart Cath with adenosine challenge if wedge pressure is normal.;  Surgeon: Paulo Decker MD;  Location: [facility] CATH INVASIVE LOCATION;  Service: Cardiovascular   • CARDIAC ELECTROPHYSIOLOGY PROCEDURE N/A 3/28/2017    Procedure: Ablation atrial flutter;  Surgeon: Rodríguez Ward MD;  Location: [facility] CATH INVASIVE LOCATION;  Service:    • CARDIAC ELECTROPHYSIOLOGY PROCEDURE N/A 7/3/2017    Procedure: AV node ablation;  Surgeon: Rodríguez Ward MD;  Location: [facility] CATH INVASIVE LOCATION;  Service:    • CARDIAC ELECTROPHYSIOLOGY PROCEDURE N/A 7/3/2017    Procedure: Pacemaker DC new  [employer] and will need 3830 lead-I did chase Meneses ;  Surgeon: Rodríguez Ward MD;  Location: [facility] CATH INVASIVE LOCATION;  Service:    • CARDIAC SURGERY     • CHOLECYSTECTOMY     • CLAVICLE SURGERY Left    • COLONOSCOPY N/A 11/14/2017    Procedure: COLONOSCOPY INTO CECUM/ TERMINAL ILEUM WITH POLYPECTOMY  X 8 AND CLIP X 2;  Surgeon: Jacy Browning MD;  Location: [facility] ENDOSCOPY;  Service:    • ENDOSCOPY N/A 11/14/2017    Procedure: ESOPHAGOGASTRODUODENOSCOPY WITH BIOPSIES;  Surgeon: Jacy Browning MD;  Location: [facility] ENDOSCOPY;  Service:    • GASTRIC RESTRICTION SURGERY     • HYSTERECTOMY     • INSERTION HEMODIALYSIS CATHETER N/A 1/10/2020    Procedure: TUNNEL DIALYSIS CATHETER;  Surgeon: Carlos Manuel Jernigan MD;  Location: [facility] MAIN OR;  Service: Vascular   • JOINT REPLACEMENT     • LUMBAR DECOMPRESSION      L4-5   • MITRAL VALVE REPAIR/REPLACEMENT N/A 12/14/2016    Procedure: INTRAOPERATIVE KATELYN, MIDLINE STERNOTOMY, MITRAL VALVE REPAIR, TRICUSPID VALVE REPAIR, MAZE PROCEDURE;  Surgeon: Young Vital MD;  Location: [facility] MAIN OR;  Service:    • SHOULDER SURGERY Left     AFTER SURGERY FOR FRACTURED CLAVICLE   • TONSILLECTOMY     • TOTAL  KNEE ARTHROPLASTY      bilateral     General Information     Row Name 04/08/21 1604          Physical Therapy Time and Intention    Document Type  therapy note (daily note)  -MS     Mode of Treatment  physical therapy;individual therapy  -MS     Row Name 04/08/21 1604          General Information    Patient Profile Reviewed  yes  -MS     Existing Precautions/Restrictions  (S) fall Exit alarm  -MS     Barriers to Rehab  none identified  -MS     Row Name 04/08/21 1604          Safety Issues, Functional Mobility    Comment, Safety Issues/Impairments (Mobility)  Gait belt used for safety.  -MS       User Key  (r) = Recorded By, (t) = Taken By, (c) = Cosigned By    Initials Name Provider Type    Bony Sterling, PT Physical Therapist        Mobility     Row Name 04/08/21 1606          Bed Mobility    Supine-Sit [place] (Bed Mobility)  minimum assist (75% patient effort);2 person assist  -MS     Sit-Supine [place] (Bed Mobility)  minimum assist (75% patient effort);2 person assist  -MS     Row Name 04/08/21 1606          Sit-Stand Transfer    Sit-Stand [place] (Transfers)  minimum assist (75% patient effort);2 person assist  -MS     Assistive Device (Sit-Stand Transfers)  walker, front-wheeled  -MS     Row Name 04/08/21 1606          Gait/Stairs (Locomotion)    [place] Level (Gait)  minimum assist (75% patient effort);2 person assist  -MS     Assistive Device (Gait)  walker, front-wheeled  -MS     Distance in Feet (Gait)  8 feet (Forward/Backward)  -MS     Deviations/Abnormal Patterns (Gait)  nicole decreased;stride length decreased  -MS     Bilateral Gait Deviations  forward flexed posture  -MS     Comment (Gait/Stairs)  Verbal/tactile cues for posture correction and Rwx guidance. Limited by weakness/fatigue.  -MS       User Key  (r) = Recorded By, (t) = Taken By, (c) = Cosigned By    Initials Name Provider Type    Bony Sterling, PT Physical Therapist        Obj/Interventions     Row Name  "04/08/21 1607          Motor Skills    Therapeutic Exercise  -- BLE ther. ex. program x 10 reps completed (Ankle Pumps, Hip Flexion, LAQ's)  -MS       User Key  (r) = Recorded By, (t) = Taken By, (c) = Cosigned By    Initials Name Provider Type    Bony Sterling, PT Physical Therapist        Goals/Plan    No documentation.       Clinical Impression     Row Name 04/08/21 1607          Pain    Additional Documentation  Pain Scale: Numbers Pre/Post-Treatment (Group)  -MS     Row Name 04/08/21 1607          Pain Scale: Numbers Pre/Post-Treatment    Pretreatment Pain Rating  3/10  -MS     Posttreatment Pain Rating  3/10  -MS     Pre/Posttreatment Pain Comment  Pt. reports pain from \"scratches\" on her BLE's after many of her falls on concrete.   Pt. also reports feeling \"sore all over\".  -MS     Row Name 04/08/21 1607          Positioning and Restraints    Pre-Treatment Position  in bed  -MS     Post Treatment Position  bed  -MS     In Bed  notified nsg;supine;call light within reach;encouraged to call for assist;exit alarm on;with other staff All lines intact.  -MS       User Key  (r) = Recorded By, (t) = Taken By, (c) = Cosigned By    Initials Name Provider Type    Bony Sterling, PT Physical Therapist        Outcome Measures     Row Name 04/08/21 1608          How much help from another person do you currently need...    Turning from your back to your side while in flat bed without using bedrails?  2  -MS     Moving from lying on back to sitting on the side of a flat bed without bedrails?  2  -MS     Moving to and from a bed to a chair (including a wheelchair)?  2  -MS     Standing up from a chair using your arms (e.g., wheelchair, bedside chair)?  2  -MS     Climbing 3-5 steps with a railing?  2  -MS     To walk in hospital room?  2  -MS     AM-PAC 6 Clicks Score (PT)  12  -MS     Row Name 04/08/21 1608          Functional Assessment    Outcome Measure Options  AM-PAC 6 Clicks Basic Mobility (PT)  -MS     "   User Key  (r) = Recorded By, (t) = Taken By, (c) = Cosigned By    Initials Name Provider Type    MS Bony Evans, PT Physical Therapist        Physical Therapy Education                 Title: PT OT SLP Therapies (Done)     Topic: Physical Therapy (Resolved)     Point: Mobility training (Resolved)     Learning Progress Summary           Patient Acceptance, E,D, VU,NR by MS at 4/8/2021 1609    Acceptance, E,D, VU,NR by MS at 4/6/2021 1020    Acceptance, E,D, VU,NR by MS at 4/5/2021 1051                   Point: Home exercise program (Resolved)     Learning Progress Summary           Patient Acceptance, E,D, VU,NR by MS at 4/8/2021 1609    Acceptance, E,D, VU,NR by MS at 4/6/2021 1020                   Point: Body mechanics (Resolved)     Learning Progress Summary           Patient Acceptance, E,D, VU,NR by MS at 4/8/2021 1609    Acceptance, E,D, VU,NR by MS at 4/6/2021 1020    Acceptance, E,D, VU,NR by MS at 4/5/2021 1051                   Point: Precautions (Resolved)     Learning Progress Summary           Patient Acceptance, E,D, VU,NR by MS at 4/8/2021 1609    Acceptance, E,D, VU,NR by MS at 4/6/2021 1020    Acceptance, E,D, VU,NR by MS at 4/5/2021 1051                               User Key     Initials Effective Dates Name Provider Type Discipline    MS 04/03/18 -  Bony Evans PT Physical Therapist PT              PT Recommendation and Plan  Planned Therapy Interventions (PT): balance training, bed mobility training, gait training, home exercise program, patient/family education, postural re-education, transfer training, strengthening  Plan of Care Reviewed With: patient  Outcome Summary: Pt. able to ambulate 2 feet (forward/backward), Mod. assist x 2, with use of Rwx this date.  Pt. requires Min. assist x 2 for bed mobility and Mod. assist x 2 for sit <-> stand transfers.  BLE ther. ex. program x 10 reps completed for general strengthening. Full body posterior leaning throughout upright mobility  requiring verbal/tactile cues for correction. Limited overall by fatigue/weakness.     Time Calculation:   PT Charges     Row Name 04/08/21 1611             Time Calculation    Start Time  1427  -MS      Stop Time  1438  -MS      Time Calculation (min)  11 min  -MS      PT Received On  04/08/21  -MS      PT - Next Appointment  04/09/21  -MS         Time Calculation- PT    Total Timed Code Minutes- PT  10 minute(s)  -MS        User Key  (r) = Recorded By, (t) = Taken By, (c) = Cosigned By    Initials Name Provider Type    Bony Sterling, PT Physical Therapist        Therapy Charges for Today     Code Description Service Date Service Provider Modifiers Qty    81655220265 HC PT THER PROC EA 15 MIN 4/8/2021 Bony Evans, PT GP 1    [id number] HC PT THER SUPP EA 15 MIN 4/8/2021 Bony Evans, PT GP 1          PT G-Codes  Outcome Measure Options: AM-PAC 6 Clicks Basic Mobility (PT)  AM-PAC 6 Clicks Score (PT): 12  AM-PAC 6 Clicks Score (OT): 13  Modified Hill Scale: 4 - Moderately severe disability.  Unable to walk without assistance, and unable to attend to own bodily needs without assistance.    Bony Evans, PT  4/8/2021

## 2021-04-08 NOTE — PROGRESS NOTES
"   LOS: 0 days    Patient Care Team:  Nelly Price MD as PCP - General  Gino Gibbs MD as Consulting Physician (Cardiology)  Hayes Ferrer MD as Consulting Physician (Pulmonary Disease)  Berry Hunt II, MD as Consulting Physician (Hematology and Oncology)  Nelly Price MD as Referring Physician (Internal Medicine)  Bethany Ibanez MD as Consulting Physician (Endocrinology)  Joy Mora [facility] as Pharmacist (Pharmacy)  Jam Goldsmith [facility] as Pharmacist (Pharmacy)    Chief Complaint:    Chief Complaint   Patient presents with   • Weakness - Generalized   • Hypotension   • Fall     Follow UP ESRD  Subjective     Interval History:   Waiting on insurance approval for bed.  Still with significant pain in both hips from falls, \" scrapes\".  Not eating. Not soa. Did work with PT.     Review of Systems:   As noted above    Objective     Vital Signs  Temp:  [96.5 °F (35.8 °C)-97.8 °F (36.6 °C)] 96.6 °F (35.9 °C)  Heart Rate:  [69-83] 75  Resp:  [17-18] 18  BP: ()/(51-74) 94/58    Flowsheet Rows      First Filed Value   Admission Height  160 cm (63\") Documented at 04/04/2021 1753   Admission Weight  63.5 kg (140 lb) Documented at 04/04/2021 1753          No intake/output data recorded.  I/O last 3 completed shifts:  In: 300 [P.O.:300]  Out: 0   No intake or output data in the 24 hours ending 04/08/21 1450    Physical Exam:  General Appearance: alert, frail.  [place].   Mask in place.   Skin: warm and dry. Black eschar on thigh, lesions look like calciphylaxis.   HEENT: pupils round and reactive to light, oral mucosa dry nonicteric sclera  Neck: supple, no JVD, trachea midline  Lungs: Clear to auscultation. Unlabored.   Heart: RRR, normal S1 and S2, no S3, no rub  Abdomen: soft, nontender, +bs  Extremities: no edema, LUE AVF patent .  Neuro: normal speech and mental status. Flat affect       Results Review:    Results from last 7 days   Lab Units 04/07/21  0627 04/05/21  0538 04/04/21  1755 04/03/21  1256 "   SODIUM mmol/L 136 137 135* 137   POTASSIUM mmol/L 3.7 3.5 3.4* 3.7   CHLORIDE mmol/L 97* 99 96* 95*   CO2 mmol/L 26.4 21.9* 23.1 25.5   BUN mg/dL 27* 47* 45* 30*   CREATININE mg/dL 4.10* 5.97* 5.54* 4.43*   CALCIUM mg/dL 8.6 8.5* 9.0 9.7   BILIRUBIN mg/dL  --  0.3  --  0.3   ALK PHOS U/L  --  143*  --  188*   ALT (SGPT) U/L  --  11  --  11   AST (SGOT) U/L  --  10  --  22   GLUCOSE mg/dL 100* 148* 246* 241*       Estimated Creatinine Clearance: 9.3 mL/min (A) (by C-G formula based on SCr of 4.1 mg/dL (H)).    Results from last 7 days   Lab Units 04/04/21  1755 04/03/21  1256   PHOSPHORUS mg/dL 3.6 3.2             Results from last 7 days   Lab Units 04/05/21  0538 04/04/21  1755 04/03/21  1256   WBC 10*3/mm3 10.83* 11.45* 13.99*   HEMOGLOBIN g/dL 12.2 12.4 13.9   PLATELETS 10*3/mm3 191 213 211               Imaging Results (Last 24 Hours)     ** No results found for the last 24 hours. **        acetaminophen, 650 mg, Oral, TID  bacitracin, , Topical, Q12H  gabapentin, 100 mg, Oral, Q12H  insulin lispro, 0-9 Units, Subcutaneous, TID AC  latanoprost, 1 drop, Both Eyes, Daily  midodrine, 5 mg, Oral, TID AC  ofloxacin, 2 drop, Left Eye, 4x Daily  pantoprazole, 40 mg, Oral, QAM  sertraline, 50 mg, Oral, Daily  sodium chloride, 10 mL, Intravenous, Q12H           Medication Review:   Current Facility-Administered Medications   Medication Dose Route Frequency Provider Last Rate Last Admin   • acetaminophen (TYLENOL) tablet 650 mg  650 mg Oral TID Ahmet Garcia MD   650 mg at 04/08/21 0836   • bacitracin ointment   Topical Q12H Ahmet Garcia MD   Given at 04/08/21 0837   • dextrose (D50W) 25 g/ 50mL Intravenous Solution 25 g  25 g Intravenous Q15 Min PRN Sridevi Huizar MD       • dextrose (GLUTOSE) oral gel 15 g  15 g Oral Q15 Min PRN Sridevi Huizar MD       • gabapentin (NEURONTIN) capsule 100 mg  100 mg Oral Q12H Ahmet Garcia MD   100 mg at 04/08/21 0837   • glucagon (human recombinant) (GLUCAGEN  DIAGNOSTIC) injection 1 mg  1 mg Subcutaneous Q15 Min PRN Sridevi Huizar MD       • HYDROcodone-acetaminophen (NORCO) 5-325 MG per tablet 1 tablet  1 tablet Oral Q4H PRN Ahmet Garcia MD   1 tablet at 04/08/21 1201   • insulin lispro (ADMELOG) injection 0-9 Units  0-9 Units Subcutaneous TID AC Sridevi Huizar MD   2 Units at 04/06/21 1404   • latanoprost (XALATAN) 0.005 % ophthalmic solution 1 drop  1 drop Both Eyes Daily Sridevi Huizar MD   1 drop at 04/06/21 1048   • midodrine (PROAMATINE) tablet 5 mg  5 mg Oral TID AC Sridevi Huizar MD   5 mg at 04/08/21 1201   • nitroglycerin (NITROSTAT) SL tablet 0.4 mg  0.4 mg Sublingual Q5 Min PRN Sridevi Huizar MD       • ofloxacin (OCUFLOX) 0.3 % ophthalmic solution 2 drop  2 drop Left Eye 4x Daily Sridevi Huizar MD   2 drop at 04/08/21 1201   • ondansetron (ZOFRAN) injection 4 mg  4 mg Intravenous Q6H PRN Sridevi Huizar MD       • pantoprazole (PROTONIX) EC tablet 40 mg  40 mg Oral QAM Sridevi Huizar MD   40 mg at 04/08/21 0601   • sertraline (ZOLOFT) tablet 50 mg  50 mg Oral Daily Sridevi Huizar MD   50 mg at 04/08/21 0837   • sodium chloride 0.9 % flush 10 mL  10 mL Intravenous Q12H Sridevi Huizar MD   10 mL at 04/08/21 0838   • sodium chloride 0.9 % flush 10 mL  10 mL Intravenous PRN Sridevi Huizar MD           Assessment/Plan   1. ESRD.  Dialysis tomorrow.  I had deepthi discussion with patient and daughter this admission regarding stopping dialysis.  See note 4/6.  I think that she is declining and that dialysis is not adding to her quality of life. I do not have explanation for her chronic dialysis- related HA.  Not trying to take much volume off. It does seem to occur around hour 3.  I am afraid to reduce her time to 3 hours as her creatinine is already 4-5 and she has little muscle mass.  Has chronic hypotension, does not seem worse with dialysis. Maybe have her take Tylenol at 2.5 hours into treatment.  to see if it can help avoid HA. Try UF profile 2.   2. Recurrent  falls, debility.  3. DM2  4. Chronic diastolic heart failure, pulm HTN  5. Persistent afib . SP MAZE. Paced.   6. JOSELUIS.   7. Chronic hypotension on midodrine .  8. Wounds on thighs.  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      nAgie Caro MD  04/08/21  14:50 EDT

## 2021-04-09 NOTE — PROGRESS NOTES
Name: Veronica Henao ADMIT: 2021   : 1935  PCP: Nelly Price MD    MRN: [id number] LOS: 0 days   AGE/SEX: 86 y.o. female  ROOM: [facility]   Subjective   Chief Complaint   Patient presents with   • Weakness - Generalized   • Hypotension   • Fall     Weakness on going  For a month or so  Increased falls    Working with therapists  Pain is fair- partial improvement with meds  Still requiring a lot of norco - has been on in the past    Still awaiting pre-cert    ROS  No f/c  No n/v  No cp/palp  No soa/cough    Objective   Vital Signs  Temp:  [96.6 °F (35.9 °C)-98 °F (36.7 °C)] 98 °F (36.7 °C)  Heart Rate:  [69-78] 78  Resp:  [16-18] 16  BP: ()/(51-59) 125/58  SpO2:  [93 %-100 %] 98 %  on   ;   Device (Oxygen Therapy): room air  Body mass index is 23.47 kg/m².    Elderly  Physical Exam  Constitutional:       General: She is not in acute distress.     Appearance: She is well-developed.   HENT:      Head: Normocephalic and atraumatic.   Eyes:      General: No scleral icterus.  Cardiovascular:      Rate and Rhythm: Normal rate and regular rhythm.   Pulmonary:      Effort: Pulmonary effort is normal. No respiratory distress.   Abdominal:      General: There is no distension.      Palpations: Abdomen is soft.   Musculoskeletal:      Cervical back: Neck supple.   Skin:     Coloration: Skin is pale.   Neurological:      Mental Status: She is alert.      Cranial Nerves: No cranial nerve deficit.      Sensory: No sensory deficit.   Psychiatric:         Behavior: Behavior normal.         Results Review:       I reviewed the patient's new clinical results.  Results from last 7 days   Lab Units 21  0538 21  1755 21  1256   WBC 10*3/mm3 10.83* 11.45* 13.99*   HEMOGLOBIN g/dL 12.2 12.4 13.9   PLATELETS 10*3/mm3 191 213 211     Results from last 7 days   Lab Units 21  0627 21  0538 21  1755 21  1256   SODIUM mmol/L 136 137 135* 137   POTASSIUM mmol/L 3.7 3.5 3.4* 3.7    CHLORIDE mmol/L 97* 99 96* 95*   CO2 mmol/L 26.4 21.9* 23.1 25.5   BUN mg/dL 27* 47* 45* 30*   CREATININE mg/dL 4.10* 5.97* 5.54* 4.43*   GLUCOSE mg/dL 100* 148* 246* 241*   Estimated Creatinine Clearance: 9.3 mL/min (A) (by C-G formula based on SCr of 4.1 mg/dL (H)).  Results from last 7 days   Lab Units 04/05/21  0538 04/04/21 1755 04/03/21  1256   ALBUMIN g/dL 2.60* 2.90* 3.60   BILIRUBIN mg/dL 0.3  --  0.3   ALK PHOS U/L 143*  --  188*   AST (SGOT) U/L 10  --  22   ALT (SGPT) U/L 11  --  11     Results from last 7 days   Lab Units 04/07/21  0627 04/05/21 0538 04/04/21 1755 04/03/21  1256   CALCIUM mg/dL 8.6 8.5* 9.0 9.7   ALBUMIN g/dL  --  2.60* 2.90* 3.60   PHOSPHORUS mg/dL  --   --  3.6 3.2         Coag     HbA1C   Lab Results   Component Value Date    HGBA1C 7.00 (H) 08/28/2020    HGBA1C 6.50 (H) 06/08/2020    HGBA1C 7.04 (H) 01/06/2020     Infection     Radiology(recent) No radiology results for the last day  No results found for: TROPONINT, TROPONINI, BNP  No components found for: TSH;2    acetaminophen, 650 mg, Oral, TID  bacitracin, , Topical, Q12H  gabapentin, 100 mg, Oral, Q12H  insulin lispro, 0-9 Units, Subcutaneous, TID AC  latanoprost, 1 drop, Both Eyes, Daily  midodrine, 5 mg, Oral, TID AC  ofloxacin, 2 drop, Left Eye, 4x Daily  pantoprazole, 40 mg, Oral, QAM  sertraline, 50 mg, Oral, Daily  sodium chloride, 10 mL, Intravenous, Q12H       Diet Regular      Assessment/Plan      Active Hospital Problems    Diagnosis  POA   • **Recurrent falls [R29.6]  Not Applicable   • Low back pain [M54.5]  Yes   • DNR (do not resuscitate) [Z66]  No   • Age-related physical debility [R54]  Yes   • Macular degeneration [H35.30]  Yes   • ESRD on hemodialysis (CMS/LTAC, located within [facility]) [N18.6, Z99.2]  Not Applicable   • Anemia in chronic kidney disease [N18.9, D63.1]  Yes   • Third degree atrioventricular block (CMS/LTAC, located within 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       86-year-old with history of end-stage renal disease on dialysis and other medical issues who presented with issues of recurrent falls and generalized weakness over the past couple of months.  She denies any focal weakness.    · Overall falls likely multifactorial. Potential impact of polypharmacy (with some medications held by Dr. Huizar), orthostatic hypotension, debility, and other issues. She denies any focal symptoms suggestive of CVA. PT consultation, CCP to see regarding rehab options.   · Agents for pain control- probably will need increased dose  · ESRD- HD per nephrology. Some consideration of stopping this and doing palliative care but not at this point.   · Monitor labs  · Continue many home medications  · Discussed code status with her- she would like to be DNR/DNI which is reasonable      DW RN  Reviewed records    Potential rehab today or tomorrow  Still awaiting pre-cert      Ahmet Garcia MD  [facility]  04/09/21  14:26 EDT

## 2021-04-09 NOTE — PLAN OF CARE
Problem: Adult Inpatient Plan of Care  Goal: Plan of Care Review  Outcome: Ongoing, Progressing  Flowsheets (Taken 4/9/2021 3570)  Progress: no change  Plan of Care Reviewed With: patient  Outcome Summary: VSS, A&Ox4, bacitracin ointment to wounds on bilateral hips, norco given for pain, patient constantly moving legs and unable to rest, Vpaced on monitor, turned frequently, will continue to monitor.

## 2021-04-09 NOTE — PROGRESS NOTES
Continued Stay Note  [facility]     Patient Name: Veronica Henao  MRN: [id number]  Today's Date: 4/9/2021    Admit Date: 4/4/2021    Discharge Plan     Row Name 04/09/21 1027       Plan    Plan Comments  Spoke with daughter Nesha regarding DC for today after HD. She verbalized understanding and will provide transport this evening. Faxed DC summary to facility. Scripts in packet. Message sent to [facility]. Jelena Dougherty RN        Discharge Codes    No documentation.       Expected Discharge Date and Time     Expected Discharge Date Expected Discharge Time    Apr 9, 2021             Jelena Dougherty RN

## 2021-04-09 NOTE — PROGRESS NOTES
Continued Stay Note  [facility]     Patient Name: Veronica Henao  MRN: [id number]  Today's Date: 4/9/2021    Admit Date: 4/4/2021    Discharge Plan     Row Name 04/09/21 0728       Plan    Plan  [facility], Pre-cert obtained    Plan Comments  Inbound VM from [facility] stating pre-cert has been obtained. Patient scheduled for HD today. Daughter will transport. Awaiting time of HD to notify daughter of when patient will be ready. CORRY Bowles CCP        Discharge Codes    No documentation.       Expected Discharge Date and Time     Expected Discharge Date Expected Discharge Time    Apr 8, 2021             Jelena Dougherty RN

## 2021-04-12 NOTE — PROGRESS NOTES
Case Management Discharge Note      Final Note: Patient Dc'd to [facility]         Selected Continued Care - Discharged on 4/9/2021 Admission date: 4/4/2021 - Discharge disposition: Skilled Nursing Facility (DC - External)    Destination Coordination complete    Service Provider Selected Services Address Phone Fax Patient Preferred    [facility]  Skilled Nursing [address] [phone] --          Durable Medical Equipment    No services have been selected for the patient.              Dialysis/Infusion    No services have been selected for the patient.              Home Medical Care    No services have been selected for the patient.              Therapy    No services have been selected for the patient.              Community Resources    No services have been selected for the patient.                  Transportation Services  Private: Car    Final Discharge Disposition Code: 03 - skilled nursing facility (SNF)

## 2021-04-15 PROBLEM — L03.114 CELLULITIS OF LEFT UPPER EXTREMITY: Status: ACTIVE | Noted: 2021-01-01

## 2021-04-15 NOTE — ED PROVIDER NOTES
EMERGENCY DEPARTMENT ENCOUNTER    Room Number:  40/40  Date of encounter:  4/15/2021  PCP: Nelly Price MD  Historian: Patient and EMS    I used full protective equipment while examining this patient.  This includes face mask, gloves and protective eyewear.  I washed my hands before entering the room and immediately upon leaving the room.  Patient was wearing a surgical mask.      HPI:  Chief Complaint: Redness and swelling to left arm  A complete HPI/ROS/PMH/PSH/SH/FH are unobtainable due to: Poor historian    Context: Veronica Henao is a 86 y.o. female who presents to the ED c/o swelling and redness to her left arm.  It is unclear when this started.  Patient has a shunt in her left arm and was last dialyzed 3 days ago.  She reportedly refused dialysis yesterday.  EMS was told by the nursing home staff that the patient has had decreased p.o. intake as well.  Patient denies chest pain, arm pain, shortness of breath.  History is limited as she is a poor historian.      PAST MEDICAL HISTORY  Active Ambulatory Problems     Diagnosis Date Noted   • Atopic rhinitis 02/01/2016   • Gout 02/01/2016   • Cephalalgia 02/01/2016   • Hypercalcemia 02/01/2016   • Hyperlipidemia 02/01/2016   • Type 2 diabetes mellitus with diabetic chronic kidney disease (CMS/[facility]) 02/01/2016   • Lung mass 03/07/2016   • Gastroesophageal reflux disease 08/29/2016   • Tricuspid insufficiency 09/16/2016   • Morbid obesity (CMS/[facility]) 11/11/2016   • JOSELUIS on CPAP 11/11/2016   • Chronic venous insufficiency 11/11/2016   • Mitral valve insufficiency 12/14/2016   • S/P Maze operation for atrial fibrillation 12/29/2016   • S/P TVR (tricuspid valve repair) 12/29/2016   • S/P MVR (mitral valve repair) 12/29/2016   • Atrial flutter, paroxysmal (CMS/[facility]) 06/09/2017   • Third degree atrioventricular block (CMS/[facility]) 07/04/2017   • Chronic kidney disease, stage IV (severe) (CMS/[facility]) 07/25/2017   • Anemia in chronic kidney disease 07/25/2017   • Iron deficiency  anemia 07/25/2017   • Pulmonary hypertension (CMS/HCC) 08/28/2018   • Chronic diastolic congestive heart failure (CMS/HCC) 10/31/2018   • ESRD on hemodialysis (CMS/HCC) 02/05/2020   • Control of atrial fibrillation with pacemaker (CMS/HCC) 02/05/2020   • Chronic atrial fibrillation (CMS/HCC) 07/06/2020   • Chronic anticoagulation 07/06/2020   • Recurrent falls 04/04/2021   • Macular degeneration    • DNR (do not resuscitate) 04/05/2021   • Age-related physical debility 04/05/2021   • Low back pain 04/07/2021     Resolved Ambulatory Problems     Diagnosis Date Noted   • Atrial fibrillation (CMS/HCC) 02/01/2016   • Chronic renal insufficiency 02/01/2016   • Subclinical hypothyroidism 02/01/2016   • Urinary tract infection 02/01/2016   • Essential hypertension 03/07/2016   • Mitral regurgitation 09/16/2016   • Benign essential hypertension 11/11/2016   • Diabetes mellitus (CMS/HCC) 11/11/2016   • IDDM (insulin dependent diabetes mellitus) 01/17/2017   • CHF (congestive heart failure) (CMS/HCC) 05/25/2017   • Acute non-recurrent maxillary sinusitis 06/02/2017   • Chronic diastolic heart failure (CMS/HCC) 07/29/2017   • Paroxysmal atrial fibrillation (CMS/HCC) 08/27/2018   • SOBOE (shortness of breath on exertion) 10/30/2018   • Acute renal failure superimposed on stage 4 chronic kidney disease (CMS/HCC) 01/05/2020     Past Medical History:   Diagnosis Date   • Acute bronchitis    • Acute renal insufficiency    • Allergic rhinitis    • Arrhythmia    • Brachycephaly    • Breast pain, right    • Chest pain    • Chronic combined systolic and diastolic CHF (congestive heart failure) (CMS/HCC)    • CKD (chronic kidney disease), stage IV (CMS/HCC)    • Cough    • Depression    • Dialysis patient (CMS/HCC)    • Diverticulosis    • ORTIZ (dyspnea on exertion)    • Dyspnea    • Esophageal reflux    • Fatigue    • GERD (gastroesophageal reflux disease)    • Head injury    • Headache    • Hoarseness    • Hypertension    • Influenza A  (H1N1)    • Itching    • Leg swelling    • Lightheadedness    • Malaise and fatigue    • Mitral valve regurgitation    • Nontoxic multinodular goiter    • Obesity    • Osteoarthritis    • PAF (paroxysmal atrial fibrillation) (CMS/HCC)    • Palpitations    • Paresthesias    • Proteinuria    • Pulmonary nodule    • Pulmonic valve regurgitation    • Sebaceous cyst    • Solitary thyroid nodule    • Tachycardia    • TR (tricuspid regurgitation)    • Type 2 diabetes mellitus (CMS/HCC)    • 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)    • UTI (urinary tract infection)          PAST SURGICAL HISTORY  Past Surgical History:   Procedure Laterality Date   • APPENDECTOMY     • ARTERIOVENOUS FISTULA/SHUNT SURGERY Left 2/5/2020    Procedure: LEFT BRACHIAL ARTERY AXILLARY VEIN GORTEX SHUNT;  Surgeon: Kaveh Mcdonnell MD;  Location: [facility] MAIN OR;  Service: Vascular;  Laterality: Left;   • CARDIAC CATHETERIZATION  1986    procedure outcome:    • CARDIAC CATHETERIZATION N/A 11/21/2016    Procedure: Coronary angiography;  Surgeon: Marcin العراقي MD;  Location: [facility] CATH INVASIVE LOCATION;  Service:    • CARDIAC CATHETERIZATION N/A 11/21/2016    Procedure: Left Heart Cath;  Surgeon: Marcin العراقي MD;  Location: [facility] CATH INVASIVE LOCATION;  Service:    • CARDIAC CATHETERIZATION N/A 8/29/2018    Procedure: Right Heart Cath with adenosine challenge if wedge pressure is normal.;  Surgeon: Paulo Decker MD;  Location: [facility] CATH INVASIVE LOCATION;  Service: Cardiovascular   • CARDIAC ELECTROPHYSIOLOGY PROCEDURE N/A 3/28/2017    Procedure: Ablation atrial flutter;  Surgeon: Rodríguez Ward MD;  Location: [facility] CATH INVASIVE LOCATION;  Service:    • CARDIAC ELECTROPHYSIOLOGY PROCEDURE N/A 7/3/2017    Procedure: AV node ablation;  Surgeon: Rodríguez Ward MD;  Location: [facility] CATH INVASIVE LOCATION;  Service:    • CARDIAC ELECTROPHYSIOLOGY PROCEDURE N/A 7/3/2017    Procedure: Pacemaker DC new   [employer] and will need 3830 lead-I did text Rachael Meneses ;  Surgeon: Rodríguez Ward MD;  Location: [facility] CATH INVASIVE LOCATION;  Service:    • CARDIAC SURGERY     • CHOLECYSTECTOMY     • CLAVICLE SURGERY Left    • COLONOSCOPY N/A 11/14/2017    Procedure: COLONOSCOPY INTO CECUM/ TERMINAL ILEUM WITH POLYPECTOMY  X 8 AND CLIP X 2;  Surgeon: Jacy Browning MD;  [facility] END[facility];  Service:    • ENDOSCOPY N/A 11/14/2017    Procedure: ESOPHAGOGASTRODUODENOSCOPY WITH BIOPSIES;  Surgeon: Jacy Browning MD;  Location: [facility] ENDOSCOPY;  Service:    • GASTRIC RESTRICTION SURGERY     • HYSTERECTOMY     • INSERTION HEMODIALYSIS CATHETER N/A 1/10/2020    Procedure: TUNNEL DIALYSIS CATHETER;  Surgeon: Carlos Manuel Jernigan MD;  Location: [facility] MAIN OR;  Service: Vascular   • JOINT REPLACEMENT     • LUMBAR DECOMPRESSION      L4-5   • MITRAL VALVE REPAIR/REPLACEMENT N/A 12/14/2016    Procedure: INTRAOPERATIVE KATELYN, MIDLINE STERNOTOMY, MITRAL VALVE REPAIR, TRICUSPID VALVE REPAIR, MAZE PROCEDURE;  Surgeon: Young Vital MD;  Location: [facility] MAIN OR;  Service:    • SHOULDER SURGERY Left     AFTER SURGERY FOR FRACTURED CLAVICLE   • TONSILLECTOMY     • TOTAL KNEE ARTHROPLASTY      bilateral         FAMILY HISTORY  Family History   Problem Relation Age of Onset   • Emphysema Mother    • Heart disease Father    • Lung cancer Father    • Prostate cancer Father    • Asthma Sister    • Lung cancer Daughter    • Colon cancer Other    • No Known Problems Maternal Grandmother    • No Known Problems Maternal Grandfather    • Arthritis Paternal Grandmother    • No Known Problems Paternal Grandfather    • Malig Hyperthermia Neg Hx          SOCIAL HISTORY  Social History     Socioeconomic History   • Marital status:      Spouse name: Not on file   • Number of children: Not on file   • Years of education: Not on file   • Highest education level: Not on file   Tobacco Use   • Smoking status: Former Smoker     Packs/day: 1.50      Years: 20.00     Pack years: 30.00     Start date: 6/14/1970   • Smokeless tobacco: Never Used   • Tobacco comment: D/C 1970 SMOKING 1 1/2 PPD FOR 13 YRS BEFORE QUITTING   Substance and Sexual Activity   • Alcohol use: No     Comment: caffeine use   • Drug use: No   • Sexual activity: Defer         ALLERGIES  Cephalexin, Meperidine, and Penicillins       REVIEW OF SYSTEMS  Review of Systems   Unobtainable      PHYSICAL EXAM    I have reviewed the triage vital signs and nursing notes.    ED Triage Vitals [04/15/21 0925]   Temp Heart Rate Resp BP SpO2   98.1 °F (36.7 °C) 87 18 109/67 --      Temp src Heart Rate Source Patient Position BP Location FiO2 (%)   Tympanic -- -- -- --       Physical Exam  GENERAL: Awake, alert, chronically ill-appearing, oriented to person and place  HENT: NCAT, nares patent, dry mucous membranes  NECK: supple  EYES: Extraocular muscles intact, no scleral icterus  CV: regular rhythm, regular rate  RESPIRATORY: normal effort, rales bilaterally  ABDOMEN: soft, nontender  MUSCULOSKELETAL: There is a fistula in the upper left arm.  There is no palpable thrill.  There is an area of warmth, swelling, and erythema over the medial aspect of the distal left upper arm and proximal left forearm.  Fingers on both hands are dusky (worse on the left).  Capillary refill is 2 to 3 seconds.  Left radial pulse is palpable but weak  NEURO: Visual extremities.  Follows commands.  SKIN: There is an area of superficial skin breakdown and scabbing on the right posterior proximal thigh.  There is a superficial ulceration on the left buttock.  There is no surrounding cellulitis.  There is no fluctuance.        LAB RESULTS  Recent Results (from the past 24 hour(s))   Duplex venous upper extremity left    Collection Time: 04/15/21 12:56 PM   Result Value Ref Range    Right Internal Jugular Augment Y     Right Internal Jugular Competent Y     Right Internal Jugular Compress C     Right Internal Jugular Phasic Y      Right Internal Jugular Spont Y     Left Internal Jugular Augment Y     Left Internal Jugular Competent Y     Left Internal Jugular Compress C     Left Internal Jugular Phasic Y     Left Internal Jugular Spont Y     Right Subclavian Augment Y     Right Subclavian Competent Y     Right Subclavian Compress C     Right Subclavian Phasic Y     Right Subclavian Spont Y     Left Subclavian Augment Y     Left Subclavian Competent Y     Left Subclavian Compress C     Left Subclavian Phasic Y     Left Subclavian Spont Y     Left Axillary Augment Y     Left Axillary Competent Y     Left Axillary Compress C     Left Axillary Phasic Y     Left Axillary Spont Y     Left Brachial Compress C     Left Radial Compress C     Left Ulnar Compress C     Left Basilic Upper Compress C     Left Basilic Forearm Compress C     Left Cephalic Upper Compress C     Left Cephalic Forearm Compress C    Doppler Arterial Single Level Upper Extremity - Bilateral CAR    Collection Time: 04/15/21  1:02 PM   Result Value Ref Range    RIGHT 1ST DIGIT SYS MAX 0 mmHg    RIGHT 2ND DIGIT SYS MAX 0 mmHg    RIGHT 3RD DIGIT SYS MAX 0 mmHg    RIGHT 4TH DIGIT SYS MAX 0 mmHg    RIGHT 5TH DIGIT SYS MAX 0 mmHg    LEFT 1ST DIGIT SYS MAX 0 mmHg    LEFT 2ND DIGIT SYS MAX 0 mmHg    LEFT 3RD DIGIT SYS MAX 0 mmHg    LEFT 4TH DIGIT SYS MAX 0 mmHg    LEFT 5TH DIGIT SYS MAX 0 mmHg    Upper arterial right arm brachial sys max 124 mmHg   Duplex Hemodialysis Access CAR    Collection Time: 04/15/21  1:07 PM   Result Value Ref Range    PRE-INFLOW BRACHIAL  cm/sec    PRE-INFLOW BRACHIAL EDV 49 cm/sec    PRE-INFLOW BRACHIAL DIAMETER 0.44 cm    PRE-INFLOW BRACHIAL FLOW  mL/min    PRE-INFLOW RADIAL PSV 43 cm/sec    PRE-INFLOW RADIAL EDV 0 cm/sec    PRE-INFLOW RADIAL DIAMETER 0.12 cm    ARTERIAL ANASTOMOSIS  cm/sec    ARTERIAL ANASTOMOSIS EDV 21 cm/sec    ARTERIAL ANASTOMOSIS DIAMETER 0.46 cm    CONDUIT PROX  cm/sec    CONDUIT PROX EDV 81 cm/sec    CONDUIT  PROX DIAMETER 0.30 cm    CONDUIT PROX FLOW  mL/min    CONDUIT PROX DEPTH 0.15 cm    CONDUIT PROX/MID  cm/sec    CONDUIT PROX/MID EDV 76 cm/sec    CONDUIT PROX/MID DIAMETER 0.25 cm    CONDUIT PROX/MID FLOW  mL/min    CONDUIT PROX/MID DEPTH 0.14 cm    CONDUIT MID  cm/sec     CONDUIT MID EDV 27 cm/sec    CONDUIT MID DIAMETER 0.54 cm    CONDUIT MID FLOW  mL/min    CONDUIT MID DEPTH 0.31 cm    CONDUIT MID/DIST PSV 64 cm/sec    CONDUIT MID/DIST EDV 24 cm/sec    CONDUIT MID/DIST DIAMETER 0.54 cm    CONDUIT MID/DIST FLOW  mL/min    CONDUIT MID/DIST DEPTH 0.31 cm    CONDUIT DIST  cm/sec    CONDUIT DIST EDV 30 cm/sec    CONDUIT DIST DIAMETER 0.51 cm    CONDUIT DIST FLOW  mL/min    CONDUIT DIST DEPTH 0.18 cm    VENOUS ANASTOMOSIS  cm/sec    VENOUS ANASTOMOSIS  cm/sec    VENOUS ANASTOMOSIS DIAMETER 0.51 cm    VENOUS OUTFLOW AXILLARY  cm/sec    VENOUS OUTFLOW AXILLARY  cm/sec    VENOUS OUTFLOW AXILLARY DIAMETER 0.69 cm    VENOUS OUTFLOW SUBCLAVIAN PSV 35 cm/sec    VENOUS OUTFLOW SUBCLAVIAN EDV 13 cm/sec    VENOUS OUTFLOW SUBCLAVIAN DIAMETER 0.48 cm   ECG 12 Lead    Collection Time: 04/15/21  1:18 PM   Result Value Ref Range    QT Interval 426 ms   Comprehensive Metabolic Panel    Collection Time: 04/15/21  1:50 PM    Specimen: Blood   Result Value Ref Range    Glucose 170 (H) 65 - 99 mg/dL    BUN 65 (H) 8 - 23 mg/dL    Creatinine 5.31 (H) 0.57 - 1.00 mg/dL    Sodium 135 (L) 136 - 145 mmol/L    Potassium 4.8 3.5 - 5.2 mmol/L    Chloride 94 (L) 98 - 107 mmol/L    CO2 23.3 22.0 - 29.0 mmol/L    Calcium 9.1 8.6 - 10.5 mg/dL    Total Protein 6.1 6.0 - 8.5 g/dL    Albumin 2.50 (L) 3.50 - 5.20 g/dL    ALT (SGPT) 9 1 - 33 U/L    AST (SGOT) 7 1 - 32 U/L    Alkaline Phosphatase 158 (H) 39 - 117 U/L    Total Bilirubin 0.3 0.0 - 1.2 mg/dL    eGFR Non African Amer 8 (L) >60 mL/min/1.73    eGFR  African Amer      Globulin 3.6 gm/dL    A/G Ratio 0.7 g/dL     BUN/Creatinine Ratio 12.2 7.0 - 25.0    Anion Gap 17.7 (H) 5.0 - 15.0 mmol/L   BNP    Collection Time: 04/15/21  1:50 PM    Specimen: Blood   Result Value Ref Range    proBNP 21,502.0 (H) 0.0-1,800.0 pg/mL   Troponin    Collection Time: 04/15/21  1:50 PM    Specimen: Blood   Result Value Ref Range    Troponin T 0.040 (C) 0.000 - 0.030 ng/mL   CBC Auto Differential    Collection Time: 04/15/21  1:50 PM    Specimen: Blood   Result Value Ref Range    WBC 25.26 (H) 3.40 - 10.80 10*3/mm3    RBC 3.79 3.77 - 5.28 10*6/mm3    Hemoglobin 12.7 12.0 - 15.9 g/dL    Hematocrit 38.2 34.0 - 46.6 %    .8 (H) 79.0 - 97.0 fL    MCH 33.5 (H) 26.6 - 33.0 pg    MCHC 33.2 31.5 - 35.7 g/dL    RDW 13.3 12.3 - 15.4 %    RDW-SD 49.5 37.0 - 54.0 fl    MPV 9.3 6.0 - 12.0 fL    Platelets 296 140 - 450 10*3/mm3    Neutrophil % 92.2 (H) 42.7 - 76.0 %    Lymphocyte % 3.0 (L) 19.6 - 45.3 %    Monocyte % 3.1 (L) 5.0 - 12.0 %    Eosinophil % 0.1 (L) 0.3 - 6.2 %    Basophil % 0.4 0.0 - 1.5 %    Immature Grans % 1.2 (H) 0.0 - 0.5 %    Neutrophils, Absolute 23.28 (H) 1.70 - 7.00 10*3/mm3    Lymphocytes, Absolute 0.76 0.70 - 3.10 10*3/mm3    Monocytes, Absolute 0.78 0.10 - 0.90 10*3/mm3    Eosinophils, Absolute 0.03 0.00 - 0.40 10*3/mm3    Basophils, Absolute 0.10 0.00 - 0.20 10*3/mm3    Immature Grans, Absolute 0.31 (H) 0.00 - 0.05 10*3/mm3    nRBC 0.0 0.0 - 0.2 /100 WBC   Light Blue Top    Collection Time: 04/15/21  1:50 PM   Result Value Ref Range    Extra Tube hold for add-on    Green Top (Gel)    Collection Time: 04/15/21  1:50 PM   Result Value Ref Range    Extra Tube Hold for add-ons.    Lavender Top    Collection Time: 04/15/21  1:50 PM   Result Value Ref Range    Extra Tube hold for add-on    Lactic Acid, Plasma    Collection Time: 04/15/21  1:50 PM    Specimen: Blood   Result Value Ref Range    Lactate 1.0 0.5 - 2.0 mmol/L       Ordered the above labs and independently reviewed the results.      RADIOLOGY  XR Chest 1 View    Result Date:  4/15/2021  Portable chest radiograph  HISTORY:Shortness of air; elevated proBNP  TECHNIQUE: Single AP portable radiograph of the chest  COMPARISON:Chest radiograph 01/10/2020      FINDINGS AND IMPRESSION: Left sided pacemaker, median sternotomy wires and a prosthetic heart valve are present.. Chain sutures projecting over the left upper quadrant, as before.  Pulmonary vascular congestion is present with superimposed interstitial thickening within the bilateral mid to lower lungs, left greater then right. Findings are most suggestive of pulmonary edema in the appropriate clinical context. Atypical pneumonia is felt to be less likely. Correlation with patient history is recommended.  No pneumothorax or pleural effusion is seen. Cardiac silhouette is within normal limits for size. Moderate to severe bilateral glenohumeral osteoarthritis is present, left greater then right.  This report was finalized on 4/15/2021 2:33 PM by Dr. Tucker Casey M.D.      Duplex venous upper extremity left    Result Date: 4/15/2021  · Normal left upper extremity venous duplex scan.      Doppler Arterial Single Level Upper Extremity - Bilateral CAR    Addendum Date: 4/15/2021    · Arterial pressures falsely elevated on the right, but with normal waveforms. Severe digital ischemia noted on the right. Right digital ischemia is located in the global area of the digit. · Normal arterial pressures on the left. Severe digital ischemia noted on the left. Left digital ischemia is located in the global area of the digit.      Result Date: 4/15/2021  · Normal arterial pressures on the right. Severe digital ischemia noted on the right. Right digital ischemia is located in the global area of the digit. · Normal arterial pressures on the left. Severe digital ischemia noted on the left. Left digital ischemia is located in the global area of the digit.      Duplex Hemodialysis Access CAR    Result Date: 4/15/2021  · The left sided arteriovenous (AV) graft  is patent without significant defect. There is hemodynamically significant stenosis in the venous outflow vessel. The outflow stenosis is located in the axillary vein. · Left sided flow volumes are inadequate.        I ordered the above noted radiological studies. Reviewed by me and discussed with radiologist.  See dictation for official radiology interpretation.      PROCEDURES  Procedures      MEDICATIONS GIVEN IN ER    Medications   sodium chloride 0.9 % flush 10 mL (10 mL Intravenous Given 4/15/21 1306)   vancomycin 1250 mg/250 mL 0.9% NS IVPB ([facility]) (0 mg Intravenous Hold 4/15/21 1503)         PROGRESS, DATA ANALYSIS, CONSULTS, AND MEDICAL DECISION MAKING    All labs have been independently reviewed by me.  All radiology studies have been reviewed by me and discussed with radiologist dictating the report.   EKG's independently viewed and interpreted by me.  I have reviewed the nurse's notes, vital signs, past medical history, and medication list.  Discussion below represents my analysis of pertinent findings related to patient's condition, differential diagnosis, treatment plan and final disposition.      ED Course as of Apr 15 1542   Thu Apr 15, 2021   0930 Old records reviewed.  Patient was admitted here 4/4 through 4/9/2021 for generalized weakness, orthostatic hypotension, and recurrent falls.  Patient is a DNR.  There were discussions about possibly discontinuing dialysis and initiating palliative care measures.    [WH]   0940 Case discussed with Dr. Calvin, nephrology.  He has been her nephrologist at the rehab center.  She was last dialyzed 3 days ago but refused dialysis the past 2 days.  He states the patient has some swelling and redness of her left arm.  He also states the patient had a fever and an elevated white blood cell count.    [WH]   0945 O2 sats in the 70s on room air.  Patient has been placed on a high flow cannula    [WH]   1012 Nursing home records reviewed.  Patient had a white blood  cell count of 16.3.  Blood pressure was reportedly 82//46 earlier today.  Doppler ultrasound done yesterday was negative for DVT in the left arm.  She is a full code and is normally oriented x4.  She was last dialyzed 3 days ago.    [WH]   1115 Patient is still in vascular    [WH]   1241 Patient has still not returned from vascular    [WH]   1259 Patient has returned from the vascular lab    [WH]   1303 Preliminary results of the vascular studies were discussed with the vascular tech.  Venous Doppler was negative for DVT.  Fistula appears patent and unchanged from prior study.  The arterial flow in the ulnar and radial arteries appears normal.  There is no flow in the fingers of either hand.  Official interpretation is pending.  See full report for details.    [WH]   1345 EKG          EKG time: 1318  Rhythm/Rate: Ventricular paced rhythm, rate 80  QRS, axis: Normal  ST and T waves: Nonspecific ST changes laterally and inferiorly    Interpreted Contemporaneously by me at 1322, independently viewed  EKG is not significantly changed compared to prior EKG done 1/5/2020      [WH]   1409 WBC(!): 25.26 [WH]   1450 Patient's daughter is now at bedside.  Test results and plan for admission were discussed with her and the patient.  O2 sats 100% on 2 L.  Heart rate in the 70s.  Blood pressure is 99/52.    [WH]   1503 Case discussed with Dr. Huizar and he agrees to admit the patient.  Pertinent exam findings, test results, ED course, and diagnoses were discussed with him.    [WH]   1503 Patient was found to have an area of cellulitis on her left forearm.  White blood cell count was elevated.  Blood cultures are pending.  Patient was started on IV vancomycin.  Ultrasound of the left arm was negative for DVT.  There was severe digital ischemia in all fingers.  There is normal flow in the ulnar and radial arteries.  Patient has missed her most recent dialysis.  Chest x-ray showed increased vascular congestion.  Potassium was  normal.  Patient was hypoxic on room air.  Case was discussed with nephrology, vascular surgery, and the hospitalist.  Patient will be admitted.    [WH]   1519 Case discussed with Dr. Recio and he agrees to see the patient in consult.  Pertinent exam findings, test results, ED course, and diagnoses were discussed with him.    [WH]      ED Course User Index  [WH] Gino May MD       AS OF 15:42 EDT VITALS:    BP - 99/52  HR - 75  TEMP - 98.1 °F (36.7 °C) (Tympanic)  O2 SATS - 100%      DIAGNOSIS  Final diagnoses:   Cellulitis of left upper extremity   Leukocytosis, unspecified type   ESRD on hemodialysis (CMS/[facility])   Ischemia of digits of hand         DISPOSITION  Admission    ADMISSION    Discussed treatment plan and reason for admission with pt/family and admitting physician.  Pt/family voiced understanding of the plan for admission for further testing/treatment as needed.         Dictated utilizing Dragon dictation:  Much of this encounter note is an electronic transcription/translation of spoken language to printed text. The electronic translation of spoken language may permit erroneous, or at times, nonsensical words or phrases to be inadvertently transcribed; Although I have reviewed the note for such errors, some may still exist.     Gino May MD  04/15/21 1122

## 2021-04-15 NOTE — ED NOTES
Lab did come for blood draw, pt already in doppler. Will call them when she returns.      Maria Luz Barton, RN  04/15/21 6868

## 2021-04-15 NOTE — ED NOTES
Pt to ER by EMS from [facility] c/o redness and swelling to LUE distal to dialysis shunt. EMS reports weak pulse in left wrist. NH reports having had a hard time get eat and drink as well.     Abebe Billings RN  04/15/21 1750

## 2021-04-15 NOTE — ED NOTES
Unable to obtain labs from iv or addl stick. Changed to lab collect, dr wright aware.     Pt has raynauds, dusky fingertips, staff noted redness, edema and bruising to SCOTTY at fistula site, neg doppler at facility. No thrill noted on exam. Pt only reporting discomfort to bilat hips at baseline. Slightly sleepy on arrival.      Maria Luz Barton, RN  04/15/21 1000

## 2021-04-15 NOTE — CONSULTS
[facility] Consult Note       Patient Identification:  Name: Veronica Henao  Age: 86 y.o.  Sex: female  :  1935  MRN: [id number]    I would like to thank you for the opportunity to participate in care of this patient.    Date of Service: 4/15/2021                        Reason for Consult:         Esrd, on HD  Admitted for left arm cellulitis elevated wbc, fever      History of Present Illness:       Patient is 86-year-old female past medical history of esrd, left arm avf, dm, htn, chf, pulmonary htn, overall poor conditioning now at [facility] and dialyzing at [facility] dialysis unit and being followed by [facility]. Patient was having left arm swelling, with erythema, edema, elevated white cell count, feverish. Subsequently sent to ER, patient has evidence of cellulitis, received 1 dose of Vanc 1gm yesterday with dialysis, presents today to ER for worsening of symptoms. Did not dialyze Tuesday or Wednesday, last treatment was 2021. We are consulted for management of ESRD, HD.     Review of Systems:         Constitutional : weakness.  HEENT:  No headache, otalgia, itchy eyes, nasal discharge or sore throat.  Cardiac:  No chest pain, dyspnea, orthopnea or PND.  Chest:  No cough, phlegm or wheezing.  Abdomen:  No abdominal pain, nausea or vomiting.  Neuro:  No focal weakness, abnormal movements or seizure-like activity.  :   No hematuria, no pyuria, no dysuria, no flank pain.  LUE edema, redness.   ROS was otherwise negative except as mentioned in the [place].       Past medical history, surgical history, social history, family history, allergies and all medications reviewed and agreed.      Past History/Allergies?Social History:     Past Medical History:   Diagnosis Date   • Acute bronchitis    • Acute renal insufficiency     • Allergic rhinitis    • Anemia in chronic kidney disease    • Arrhythmia    • Atrial fibrillation (CMS/HCC)     chronic   • Brachycephaly    • Breast pain, right    • Chest pain    • CHF (congestive heart failure) (CMS/HCC)    • Chronic combined systolic and diastolic CHF (congestive heart failure) (CMS/HCC)    • Chronic renal insufficiency    • CKD (chronic kidney disease), stage IV (CMS/HCC)    • Cough    • Depression    • Diabetes mellitus (CMS/HCC)     TYPE 2   • Dialysis patient (CMS/HCC)    • Diverticulosis    • ORTIZ (dyspnea on exertion)    • Dyspnea    • Esophageal reflux    • Essential hypertension    • Fatigue    • GERD (gastroesophageal reflux disease)    • Gout    • Head injury    • Headache    • Hoarseness    • Hypercalcemia    • Hyperlipidemia    • Hypertension    • Influenza A (H1N1)    • Iron deficiency anemia    • Itching    • Leg swelling    • Lightheadedness    • Macular degeneration    • Malaise and fatigue    • Mitral valve regurgitation     moderate to severe   • Nontoxic multinodular goiter     RIGHT 2.0 CM  LEFT  2.5 CM   • Obesity    • JOSELUIS on CPAP    • Osteoarthritis    • PAF (paroxysmal atrial fibrillation) (CMS/HCC)    • Palpitations    • Paresthesias    • Proteinuria    • Pulmonary hypertension (CMS/HCC)    • Pulmonary nodule    • Pulmonic valve regurgitation     mild   • Sebaceous cyst    • SOBOE (shortness of breath on exertion)    • Solitary thyroid nodule    • Subclinical hypothyroidism    • Tachycardia    • TR (tricuspid regurgitation)     mild to moderate   • Type 2 diabetes mellitus (CMS/HCC)    • 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)    • UTI (urinary tract infection)          Past Surgical History :     Past Surgical History:   Procedure Laterality Date   • APPENDECTOMY     • ARTERIOVENOUS FISTULA/SHUNT SURGERY Left 2/5/2020    Procedure: LEFT BRACHIAL ARTERY AXILLARY VEIN GORTEX SHUNT;  Surgeon: Kaveh Mcdonnell MD;  Location: [facility] OR;   Service: Vascular;  Laterality: Left;   • CARDIAC CATHETERIZATION  1986    procedure outcome:    • CARDIAC CATHETERIZATION N/A 11/21/2016    Procedure: Coronary angiography;  Surgeon: Marcin العراقي MD;  Location: [facility] CATH INVASIVE LOCATION;  Service:    • CARDIAC CATHETERIZATION N/A 11/21/2016    Procedure: Left Heart Cath;  Surgeon: Marcin العراقي MD;  Location: [facility] CATH INVASIVE LOCATION;  Service:    • CARDIAC CATHETERIZATION N/A 8/29/2018    Procedure: Right Heart Cath with adenosine challenge if wedge pressure is normal.;  Surgeon: Paulo Decker MD;  Location: [facility] CATH INVASIVE LOCATION;  Service: Cardiovascular   • CARDIAC ELECTROPHYSIOLOGY PROCEDURE N/A 3/28/2017    Procedure: Ablation atrial flutter;  Surgeon: Rodríguez Ward MD;  Location: [facility] CATH INVASIVE LOCATION;  Service:    • CARDIAC ELECTROPHYSIOLOGY PROCEDURE N/A 7/3/2017    Procedure: AV node ablation;  Surgeon: Rodríguez Ward MD;  Location: [facility] CATH INVASIVE LOCATION;  Service:    • CARDIAC ELECTROPHYSIOLOGY PROCEDURE N/A 7/3/2017    Procedure: Pacemaker DC new  [employer] and will need 3830 lead-I did text Rachael Meneses ;  Surgeon: Rodríguez Ward MD;  Location: [facility] CATH INVASIVE LOCATION;  Service:    • CARDIAC SURGERY     • CHOLECYSTECTOMY     • CLAVICLE SURGERY Left    • COLONOSCOPY N/A 11/14/2017    Procedure: COLONOSCOPY INTO CECUM/ TERMINAL ILEUM WITH POLYPECTOMY  X 8 AND CLIP X 2;  Surgeon: Jacy Browning MD;  Location: [facility] ENDOSCOPY;  Service:    • ENDOSCOPY N/A 11/14/2017    Procedure: ESOPHAGOGASTRODUODENOSCOPY WITH BIOPSIES;  Surgeon: Jacy Browning MD;  Location: [facility] ENDOSCOPY;  Service:    • GASTRIC RESTRICTION SURGERY     • HYSTERECTOMY     • INSERTION HEMODIALYSIS CATHETER N/A 1/10/2020    Procedure: TUNNEL DIALYSIS CATHETER;  Surgeon: Carlos Manuel Jernigan MD;  Location: [facility] MAIN OR;  Service: Vascular   • JOINT REPLACEMENT     • LUMBAR DECOMPRESSION      L4-5   • MITRAL VALVE  "REPAIR/REPLACEMENT N/A 2016    Procedure: INTRAOPERATIVE KATELYN, MIDLINE STERNOTOMY, MITRAL VALVE REPAIR, TRICUSPID VALVE REPAIR, MAZE PROCEDURE;  Surgeon: Young Vital MD;  Location: [facility];  Service:    • SHOULDER SURGERY Left     AFTER SURGERY FOR FRACTURED CLAVICLE   • TONSILLECTOMY     • TOTAL KNEE ARTHROPLASTY      bilateral          Family History:     Family History   Problem Relation Age of Onset   • Emphysema Mother    • Heart disease Father    • Lung cancer Father    • Prostate cancer Father    • Asthma Sister    • Lung cancer Daughter    • Colon cancer Other    • No Known Problems Maternal Grandmother    • No Known Problems Maternal Grandfather    • Arthritis Paternal Grandmother    • No Known Problems Paternal Grandfather    • Malig Hyperthermia Neg Hx           Social History:     Social History     Tobacco Use   • Smoking status: Former Smoker     Packs/day: 1.50     Years: 20.00     Pack years: 30.00     Start date: 1970   • Smokeless tobacco: Never Used   • Tobacco comment: D/C  SMOKING 1 1/2 PPD FOR 13 YRS BEFORE QUITTING   Substance Use Topics   • Alcohol use: No     Comment: caffeine use          Allergies:     Allergies   Allergen Reactions   • Cephalexin Hives and Swelling   • Meperidine Hives and Swelling   • Penicillins Hives and Swelling         Home meds:     (Not in a hospital admission)        Scheduled meds:          Objectives:     tMax 24 hrs: Temp (24hrs), Av.1 °F (36.7 °C), Min:98.1 °F (36.7 °C), Max:98.1 °F (36.7 °C)      Vitals Ranges:   Temp:  [98.1 °F (36.7 °C)] 98.1 °F (36.7 °C)  Heart Rate:  [70-87] 70  Resp:  [14-18] 14  BP: (109-110)/(48-67) 110/48    Intake and Output Last 3 Shifts:   No intake/output data recorded.      Exam:     /48   Pulse 70   Temp 98.1 °F (36.7 °C) (Tympanic)   Resp 14   Ht 157.5 cm (62\")   Wt 58.1 kg (128 lb)   SpO2 (!) 75%   BMI 23.41 kg/m²     General Appearance:    Well developed, well nourished, no distress "   Head:    Normocephalic, atraumatic   Eyes:     EOM's intact, sclerae anicteric        Ears:    TMs not observed   Nose:   Patent without discharge   Neck:   Supple, JVD   Lungs:     Clear to auscultation bilaterally, respiratory effort is normal   Chest wall:    No tenderness   Heart:    Regular rate and rhythm, S1 and S2 normal, no   rub    or gallop   Abdomen:     Soft, nontender, nondistended,  no masses, no organomegaly   Extremities:   + edema, left arm cellulitis, bilateral hand purplish discoloration with cold fingers on left.    Neurologic:  No focal deficits.  Speech is fluent.  Conversation is coherent.       Data Review:       All data reviewed, prior records, imaging, labs reviewed          Imaging:      [unfilled]      Assessment:        * No active hospital problems. *    · ESRD, HD. 5 days a week, next stage at [facility]-Monday through Friday, however patient is noncompliant with treatment, averages around 3 treatments/week  · LUE cellulits, s/p 1 dose Vanc 4/14 at NH, poor thrill/bruit  · DM II  · Hypertension  · ACD  · JOSELUIS, CPAP  · S/p TVR, MVR, maze procedure .       Plan:     · Will perform HD today, will run TTS schedule while inpatient. UF as tolerated.   · Hemodynamically stable  · Follow up with labs, patient has chronic anemia  · On KENDRA as outpatient, transfuse if hemoglobin is less than 7.0  · Fu duplex results and then decide further may need vascular eval  · Hd after duplex, follow up with result  · Will request vascular surgery consult for evaluation of bilateral hand perfusion, Dr Lacho Maya.    · Continue to encourage and  on compliance    Thanks for this consultation and allowing our participation in her care. Will follow.       Ronald Calvin MD  [facility]  4/15/2021  10:33 EDT

## 2021-04-15 NOTE — H&P
Internal medicine history and physical  INTERNAL MEDICINE   [facility]       Patient Identification:  Name: Veronica Henao  Age: 86 y.o.  Sex: female  :  1935  MRN: [id number]                   Primary Care Physician: Nelly Price MD                               Date of admission:4/15/2021    Chief Complaint: Confusion lethargy and decreased intake for the last few days, redness and weak pulse and cold left upper extremity.    History of Present Illness:   Source of information emergency room records prior hospitalization records and discussion with ER physician.  Patient herself is somnolent and does not engage in significant information exchange.  Patient is a 86-year-old female who has complicated past medical history including end-stage renal disease on hemodialysis via left arm AV fistula, history of atrial fibrillation on chronic anticoagulation therapy, diabetes mellitus, hypertension and progressive decline in her overall functional status has been progressively declining but did have her last hemodialysis 3 days ago.  She refused her dialysis yesterday and was noted to have increasing redness ecchymosis and increasingly cold left upper extremity distal to her shunt.  She has been not eating and drinking very well.  Work-up in the emergency room revealed white blood cell count of 25,000 along with abnormal chest x-ray concerning for volume overload versus atypical pneumonia.  Patient is being admitted for further care.  Patient family member requested her to be DNR.  Patient refuses dialysis and they are interested in pursuing comfort and palliative care.      Past Medical History:  Past Medical History:   Diagnosis Date   • Acute bronchitis    • Acute renal insufficiency    • Allergic rhinitis    • Anemia in chronic kidney disease    • Arrhythmia    • Atrial fibrillation (CMS/HCC)     chronic   • Brachycephaly    • Breast pain, right    • Chest pain    • CHF (congestive heart  failure) (CMS/[facility])    • Chronic combined systolic and diastolic CHF (congestive heart failure) (CMS/HCC)    • Chronic renal insufficiency    • CKD (chronic kidney disease), stage IV (CMS/HCC)    • Cough    • Depression    • Diabetes mellitus (CMS/HCC)     TYPE 2   • Dialysis patient (CMS/HCC)    • Diverticulosis    • ORTIZ (dyspnea on exertion)    • Dyspnea    • Esophageal reflux    • Essential hypertension    • Fatigue    • GERD (gastroesophageal reflux disease)    • Gout    • Head injury    • Headache    • Hoarseness    • Hypercalcemia    • Hyperlipidemia    • Hypertension    • Influenza A (H1N1)    • Iron deficiency anemia    • Itching    • Leg swelling    • Lightheadedness    • Macular degeneration    • Malaise and fatigue    • Mitral valve regurgitation     moderate to severe   • Nontoxic multinodular goiter     RIGHT 2.0 CM  LEFT  2.5 CM   • Obesity    • JOSELUIS on CPAP    • Osteoarthritis    • PAF (paroxysmal atrial fibrillation) (CMS/[facility])    • Palpitations    • Paresthesias    • Proteinuria    • Pulmonary hypertension (CMS/HCC)    • Pulmonary nodule    • Pulmonic valve regurgitation     mild   • Sebaceous cyst    • SOBOE (shortness of breath on exertion)    • Solitary thyroid nodule    • Subclinical hypothyroidism    • Tachycardia    • TR (tricuspid regurgitation)     mild to moderate   • Type 2 diabetes mellitus (CMS/HCC)    • 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)    • UTI (urinary tract infection)      Past Surgical History:  Past Surgical History:   Procedure Laterality Date   • APPENDECTOMY     • ARTERIOVENOUS FISTULA/SHUNT SURGERY Left 2/5/2020    Procedure: LEFT BRACHIAL ARTERY AXILLARY VEIN GORTEX SHUNT;  Surgeon: Kaveh Mcdonnell MD;  Location: [facility];  Service: Vascular;  Laterality: Left;   • CARDIAC CATHETERIZATION  1986    procedure outcome:    • CARDIAC CATHETERIZATION N/A 11/21/2016    Procedure: Coronary angiography;  Surgeon: Marcin العراقي MD;  Location:  [facility] CATH INVASIVE LOCATION;  Service:    • CARDIAC CATHETERIZATION N/A 11/21/2016    Procedure: Left Heart Cath;  Surgeon: Marcin العراقي MD;  Location: [facility] CATH INVASIVE LOCATION;  Service:    • CARDIAC CATHETERIZATION N/A 8/29/2018    Procedure: Right Heart Cath with adenosine challenge if wedge pressure is normal.;  Surgeon: Paulo Decker MD;  Location: [facility] CATH INVASIVE LOCATION;  Service: Cardiovascular   • CARDIAC ELECTROPHYSIOLOGY PROCEDURE N/A 3/28/2017    Procedure: Ablation atrial flutter;  Surgeon: Rodríguez Ward MD;  Location: [facility] CATH INVASIVE LOCATION;  Service:    • CARDIAC ELECTROPHYSIOLOGY PROCEDURE N/A 7/3/2017    Procedure: AV node ablation;  Surgeon: Rodríguez Ward MD;  Location: [facility] CATH INVASIVE LOCATION;  Service:    • CARDIAC ELECTROPHYSIOLOGY PROCEDURE N/A 7/3/2017    Procedure: Pacemaker DC new  [employer] and will need 3830 lead-I did text Rachael Meneses ;  Surgeon: Rodríguez Ward MD;  Location: [facility] INVASIVE LOCATION;  Service:    • CARDIAC SURGERY     • CHOLECYSTECTOMY     • CLAVICLE SURGERY Left    • COLONOSCOPY N/A 11/14/2017    Procedure: COLONOSCOPY INTO CECUM/ TERMINAL ILEUM WITH POLYPECTOMY  X 8 AND CLIP X 2;  Surgeon: Jacy Browning MD;  Location: [facility] ENDOSCOPY;  Service:    • ENDOSCOPY N/A 11/14/2017    Procedure: ESOPHAGOGASTRODUODENOSCOPY WITH BIOPSIES;  Surgeon: Jacy Browning MD;  Location: [facility] ENDOSCOPY;  Service:    • GASTRIC RESTRICTION SURGERY     • HYSTERECTOMY     • INSERTION HEMODIALYSIS CATHETER N/A 1/10/2020    Procedure: TUNNEL DIALYSIS CATHETER;  Surgeon: Carlos Manuel Jernigan MD;  Location: [facility] MAIN OR;  Service: Vascular   • JOINT REPLACEMENT     • LUMBAR DECOMPRESSION      L4-5   • MITRAL VALVE REPAIR/REPLACEMENT N/A 12/14/2016    Procedure: INTRAOPERATIVE KATELYN, MIDLINE STERNOTOMY, MITRAL VALVE REPAIR, TRICUSPID VALVE REPAIR, MAZE PROCEDURE;  Surgeon: Young Vital MD;  Location: [facility] MAIN OR;  Service:    •  SHOULDER SURGERY Left     AFTER SURGERY FOR FRACTURED CLAVICLE   • TONSILLECTOMY     • TOTAL KNEE ARTHROPLASTY      bilateral      Home Meds:  Medications Prior to Admission   Medication Sig Dispense Refill Last Dose   • acetaminophen (TYLENOL) 325 MG tablet Take 2 tablets by mouth Every 6 (Six) Hours As Needed for Mild Pain (1-3).  0    • Blood Glucose Monitoring Suppl (TRUE METRIX AIR GLUCOSE METER) device 1 each Daily. 1 each 0    • butenafine (Mentax) 1 % cream Apply  topically to the appropriate area as directed 2 (Two) Times a Day. To feet two times daily 45 g 3    • gabapentin (NEURONTIN) 100 MG capsule Take 1 capsule by mouth 2 (two) times a day. 10 capsule 0    • glucose blood (TRUE METRIX BLOOD GLUCOSE TEST) test strip 1 each by Other route 2 (Two) Times a Day. Use as instructed 100 each 3    • HYDROcodone-acetaminophen (NORCO) 7.5-325 MG per tablet Take 1 tablet by mouth Every 4 (Four) Hours As Needed for Moderate Pain . 20 tablet 0    • insulin aspart (novoLOG FLEXPEN) 100 UNIT/ML solution pen-injector sc pen Inject  under the skin into the appropriate area as directed 3 (Three) Times a Day With Meals.      • Lancets 28G misc To check sugar qd. E11.9  each 12    • latanoprost (XALATAN) 0.005 % ophthalmic solution       • midodrine (PROAMATINE) 5 MG tablet Take 1 tablet by mouth 3 (Three) Times a Day.      • mirtazapine (REMERON) 7.5 MG half tablet Take 15 mg by mouth Every Night.      • omeprazole (PrilOSEC) 20 MG capsule Take 1 capsule by mouth Daily. 90 capsule 1    • sertraline (ZOLOFT) 50 MG tablet Take 1 tablet by mouth Daily. 90 tablet 3    • TRUEPLUS LANCETS 28G misc E11.9 DM to check sugar  each 3    • Vancomycin HCl in Dextrose (VANCOCIN HCL IV) Infuse  into a venous catheter. To be given during dialysis, mon, wed, fri      • Alcohol Swabs (B-D SINGLE USE SWABS REGULAR) pads Inject 1 each under the skin into the appropriate area as directed Daily. 100 each 3    • bacitracin 500  UNIT/GM ointment Apply  topically to the appropriate area as directed Every 12 (Twelve) Hours. To hips      • ofloxacin (Ocuflox) 0.3 % ophthalmic solution Administer 2 drops into the left eye 4 (Four) Times a Day. 5 mL 1      Current Meds:     Current Facility-Administered Medications:   •  [COMPLETED] Insert peripheral IV, , , Once **AND** sodium chloride 0.9 % flush 10 mL, 10 mL, Intravenous, PRN, Gino May MD, 10 mL at 04/15/21 1306  •  vancomycin 1250 mg/250 mL 0.9% NS IVPB ([facility]), 20 mg/kg, Intravenous, Once, Gino May MD, Stopped at 04/15/21 1503  Allergies:  Allergies   Allergen Reactions   • Cephalexin Hives and Swelling   • Meperidine Hives and Swelling   • Penicillins Hives and Swelling     Social History:   Social History     Tobacco Use   • Smoking status: Former Smoker     Packs/day: 1.50     Years: 20.00     Pack years: 30.00     Start date: 6/14/1970   • Smokeless tobacco: Never Used   • Tobacco comment: D/C 1970 SMOKING 1 1/2 PPD FOR 13 YRS BEFORE QUITTING   Substance Use Topics   • Alcohol use: No     Comment: caffeine use      Family History:  Family History   Problem Relation Age of Onset   • Emphysema Mother    • Heart disease Father    • Lung cancer Father    • Prostate cancer Father    • Asthma Sister    • Lung cancer Daughter    • Colon cancer Other    • No Known Problems Maternal Grandmother    • No Known Problems Maternal Grandfather    • Arthritis Paternal Grandmother    • No Known Problems Paternal Grandfather    • Malig Hyperthermia Neg Hx           Review of Systems  See history of present illness and past medical history.    Constitutional: Remarkable for his increasing fatigue and weakness and subjective fever and chills  Cardiovascular: Remarkable for no chest pain or shortness of breath  GI: Remarkable for decreased appetite but no nausea vomiting or diarrhea  : Remarkable for no burning in urination frequency urgency  Musculoskeletal: Remarkable for no new  "joint aches and pain  Neurological: Remarkable for somnolent and lethargic.    Vitals:   /48   Pulse 76   Temp 98.1 °F (36.7 °C) (Tympanic)   Resp 14   Ht 157.5 cm (62\")   Wt 58.1 kg (128 lb)   SpO2 98%   BMI 23.41 kg/m²   I/O: No intake or output data in the 24 hours ending 04/15/21 9651  Exam:  Patient is examined using the personal protective equipment as per guidelines from infection control for this particular patient as enacted.  Hand washing was performed before and after patient interaction.  General Appearance:   Ill-appearing lethargic female who answers simple questions but does not engage in information exchange.   Head:    Normocephalic, without obvious abnormality, atraumatic   Eyes:   Pale   Ears:    Normal external ear canals, both ears   Nose:   Nares normal, septum midline, mucosa normal, no drainage    or sinus tenderness   Throat:   Lips, tongue, gums normal; oral mucosa pink and moist   Neck:   Supple, symmetrical, trachea midline, no adenopathy;     thyroid:  no enlargement/tenderness/nodules; no carotid    bruit or JVD   Back:     Symmetric, no curvature, ROM normal, no CVA tenderness   Lungs:     Clear to auscultation bilaterally, respirations unlabored   Chest Wall:    No tenderness or deformity    Heart:   Paced rhythm   Abdomen:    Soft nontender   Extremities:  Ecchymotic changes noted left upper extremity cold with ischemic changes and   Pulses:   Pulses palpable in all extremities; symmetric all extremities   Skin:   Skin color normal, Skin is warm and dry,  no rashes or palpable lesions   Neurologic: ,  Grossly nonfocal examination       Data Review:      I reviewed the patient's new clinical results.  Results from last 7 days   Lab Units 04/15/21  1350   WBC 10*3/mm3 25.26*   HEMOGLOBIN g/dL 12.7   PLATELETS 10*3/mm3 296     Results from last 7 days   Lab Units 04/15/21  1350   SODIUM mmol/L 135*   POTASSIUM mmol/L 4.8   CHLORIDE mmol/L 94*   CO2 mmol/L 23.3   BUN mg/dL " 65*   CREATININE mg/dL 5.31*   CALCIUM mg/dL 9.1   GLUCOSE mg/dL 170*     Microbiology Results (last 10 days)     Procedure Component Value - Date/Time    COVID PRE-OP / PRE-PROCEDURE SCREENING ORDER (NO ISOLATION) - Swab, Nasopharynx [[id number]]  (Normal) Collected: 04/15/21 1306    Lab Status: Final result Specimen: Swab from Nasopharynx Updated: 04/15/21 1726    Narrative:      The following orders were created for panel order COVID PRE-OP / PRE-PROCEDURE SCREENING ORDER (NO ISOLATION) - Swab, Nasopharynx.  Procedure                               Abnormality         Status                     ---------                               -----------         ------                     COVID-19,APTIMA PANTHER,...[[id number]]  Normal              Final result                 Please view results for these tests on the individual orders.    COVID-19,APTIMA PANTHER,[facility] IN-HOUSE, NP/OP SWAB IN UTM/VTM/SALINE TRANSPORT MEDIA,24 HR TAT - Swab, Nasopharynx [[id number]]  (Normal) Collected: 04/15/21 1306    Lab Status: Final result Specimen: Swab from Nasopharynx Updated: 04/15/21 1726     COVID19 Not Detected    Narrative:      Fact sheet for providers: https://www.fda.gov/media/304416/download     Fact sheet for patients: https://www.fda.gov/media/091645/download    Test performed by RT PCR.        ECG 12 Lead   Final Result   HEART RATE= 80  bpm   RR Interval= 746  ms   NJ Interval= 185  ms   P Horizontal Axis= 212  deg   P Front Axis= 0  deg   QRSD Interval= 127  ms   QT Interval= 426  ms   QRS Axis= 56  deg   T Wave Axis= 146  deg   - ABNORMAL ECG -   Ventricular-paced rhythm   NO SIGNIFICANT CHANGE FROM PREVIOUS ECG   Electronically Signed By: [facility]) 15-Apr-2021 16:24:47   Date and Time of Study: 2021-04-15 13:18:36          Assessment:  Active Hospital Problems    Diagnosis  POA   • **Cellulitis of left upper extremity [L03.114]  Yes   • DNR (do not resuscitate) [Z66]  Yes   • Macular degeneration [H35.30]   Yes   • Chronic atrial fibrillation (CMS/[facility]) [I48.20]  Yes   • Control of atrial fibrillation with pacemaker (CMS/[facility]) [I48.91, Z95.0]  Yes   • ESRD on hemodialysis (CMS/[facility]) [N18.6, Z99.2]  Not Applicable   • Anemia in chronic kidney disease [N18.9, D63.1]  Yes   • JOSELUIS on CPAP [G47.33, Z99.89]  Not Applicable       Medical decision making:  Erythema of the left arm with cold ecchymotic changes and leukocytosis-this likely represent evolving cellulitis with associated steal syndrome/ischemia of the left upper extremity.  Plan is to continue with IV antibiotics and vascular surgery consultation.  Patient's case has been discussed with vascular surgery service by the ER provider.  End-stage renal disease on hemodialysis patient refused dialysis earlier and has conveyed that she does not want anymore dialysis.  Nephrology service have evaluated the patient and recommended dialysis intermittent dialysis ordered.  Will refer the patient and family member to decide if they want to pursue dialysis or not.  Diabetes mellitus with hypoglycemia-provided with IV D5 half-normal saline.  DNR status as per patient's request-continue present regimen and since he does not want dialysis will consider palliative care consult to define goals of care and the structure of care going forward.  Sridevi Huizar MD   4/15/2021  17:18 EDT  Much of this encounter note is an electronic transcription/translation of spoken language to printed text. The electronic translation of spoken language may permit erroneous, or at times, nonsensical words or phrases to be inadvertently transcribed; Although I have reviewed the note for such errors, some may still exist

## 2021-04-15 NOTE — PLAN OF CARE
Goal Outcome Evaluation:  Plan of Care Reviewed With: patient, daughter  Progress: no change  Outcome Summary: Pt admitted to floor- was DC 6 days ago; pt c/o extreme pain in bilateral hips and back; wounds on hips; vascular surgery consulted; nephrology consulted- LM on VM for dialysis- pt stated she would try dialysis tomorrow; pt not eating or drinking much at all; BP slightly lower; wound care and case mgmt consulted; palliative care consulted to speak with daughter (POA) and son tomorrow- they would like better goals of care and pain control. SHunt in left arm is red and sore; pt needs a low air loss bed for skin care

## 2021-04-15 NOTE — ED NOTES
Pt to ED from [facility] for FTT, has not been eating and drinking x days. Last dialysis on Monday, pt refused yesterday. States they had been debating on palliative care and stopping dialysis. Fingers are blue and cold to touch.     Pt wearing face mask during their stay in ER. This RN wore face googles, mask and gloves while providing patient care.        Mraia Luz Barton, RN  04/15/21 9383       Maria Luz Barton, RN  04/15/21 0721

## 2021-04-15 NOTE — NURSING NOTE
While admitting patient- spoke with patient and daughter (POA) in length about goals of care.  Daughter expressed frustration about patient chronic pain and felt she was not adequately treated last admission.  Patient and daughter also expressed that the patient should be a DNR and DNI and would like to fill out the proper paperwork.  Pt and daughter would also like to speak with palliative regarding goals of care and options as far a [facility] and having resources to help make a decision regarding the next step of care.  Both are also concerned about continuing dialysis- pt has verbalized she does not want dialysis anymore. Physician notified.

## 2021-04-15 NOTE — ED NOTES
[phone] Please call dialysis when arrived to inpatient room      Maria Luz Barton, RN  04/15/21 3222

## 2021-04-15 NOTE — PROGRESS NOTES
Left upper venous , graft, and arterial doppler complete and  Preliminary was negative for left arm dvt, patent lt arm graft with inadequate volume flows and severe digits bilaterally to Dr May

## 2021-04-15 NOTE — ED NOTES
Pt has wounds to bilat hips, dressings to both in place, unstageable wound to R hip, no open skin noted. L hip has wound also, visualized by Dr wright while at bedside. Brief checked, clean and dry at this time.     Maria Luz Barton, RN  04/15/21 3152       Maria Luz Barton RN  04/15/21 2571

## 2021-04-16 PROBLEM — A41.9 SEPSIS (HCC): Status: ACTIVE | Noted: 2021-01-01

## 2021-04-16 PROBLEM — I99.8 ISCHEMIA OF UPPER EXTREMITY: Status: ACTIVE | Noted: 2021-01-01

## 2021-04-16 PROBLEM — E16.2 HYPOGLYCEMIA: Status: ACTIVE | Noted: 2021-01-01

## 2021-04-16 NOTE — PROGRESS NOTES
Name: Veronica Henao ADMIT: 4/15/2021   : 1935  PCP: Nelly Price MD    MRN: [id number] LOS: 1 days   AGE/SEX: 86 y.o. female  ROOM: [facility]     Subjective   Subjective   Patient is unable to provide history as she is very lethargic and she is oriented x1 only.  History from the nurses.  Patient appears to be in pain.  There was no shortness of breath cough.  No nausea or vomiting.  No fever or chills.  No nausea or vomiting.         Objective   Objective   Vital Signs  Temp:  [97.1 °F (36.2 °C)-98.6 °F (37 °C)] 98.2 °F (36.8 °C)  Heart Rate:  [69-76] 76  Resp:  [14-16] 16  BP: ()/(40-65) 102/47  SpO2:  [98 %-100 %] 100 %  on  Flow (L/min):  [2] 2;   Device (Oxygen Therapy): room air    Intake/Output Summary (Last 24 hours) at 2021 1431  Last data filed at 2021 1413  Gross per 24 hour   Intake 2350 ml   Output --   Net 2350 ml     Body mass index is 23.41 kg/m².      04/15/21  0925 04/15/21  0945 21  0911   Weight: 60.3 kg (133 lb) 58.1 kg (128 lb) 58.1 kg (128 lb) (not weighed by RD)     Physical Exam  General.  Elderly female.  Lethargic.  Appears ill.  Drowsy but arousable.  Oriented x1 only.  No respiratory distress.  Eyes.  Some yellowish discharge from both eyes.  No pallor.  No jaundice.  Intact extraocular musculature.  Oral cavity.  Dry mucous membrane.  Neck.  Positive JVD.  Stiff.  No lymphadenopathy or thyromegaly.  Cardiovascular.  Regular rate and rhythm.  Mild tachycardia.  Grade 2 systolic murmur.  Chest.  Poor to auscultation bilaterally with bilateral crackles.  Given.  Soft lax no tenderness no organomegaly no guarding or rebound.  Extremities.  Bilateral cyanosis of distal phalanxes of both hands.  AV shunt in the left upper extremity with left upper extremity edema.  +1 bilateral lower extremity edema.  CNS.  No lateralization.    Results Review:      Results from last 7 days   Lab Units 04/15/21  1350   SODIUM mmol/L 135*   POTASSIUM mmol/L 4.8   CHLORIDE mmol/L  94*   CO2 mmol/L 23.3   BUN mg/dL 65*   CREATININE mg/dL 5.31*   GLUCOSE mg/dL 170*   CALCIUM mg/dL 9.1   AST (SGOT) U/L 7   ALT (SGPT) U/L 9     Estimated Creatinine Clearance: 7 mL/min (A) (by C-G formula based on SCr of 5.31 mg/dL (H)).      Results from last 7 days   Lab Units 04/16/21  0611 04/15/21  2226 04/15/21  2140 04/15/21  2117 04/15/21  2057 04/15/21  2009 04/15/21  1802   GLUCOSE mg/dL 206* 87 76 40* 54* 67* 234*     Results from last 7 days   Lab Units 04/15/21  1350   TROPONIN T ng/mL 0.040*     Results from last 7 days   Lab Units 04/15/21  1350   PROBNP pg/mL 21,502.0*               Invalid input(s):  PHOS        Invalid input(s): LDLCALC  Results from last 7 days   Lab Units 04/15/21  1350   WBC 10*3/mm3 25.26*   HEMOGLOBIN g/dL 12.7   HEMATOCRIT % 38.2   PLATELETS 10*3/mm3 296   MCV fL 100.8*   MCH pg 33.5*   MCHC g/dL 33.2   RDW % 13.3   RDW-SD fl 49.5   MPV fL 9.3   NEUTROPHIL % % 92.2*   LYMPHOCYTE % % 3.0*   MONOCYTES % % 3.1*   EOSINOPHIL % % 0.1*   BASOPHIL % % 0.4   IMM GRAN % % 1.2*   NEUTROS ABS 10*3/mm3 23.28*   LYMPHS ABS 10*3/mm3 0.76   MONOS ABS 10*3/mm3 0.78   EOS ABS 10*3/mm3 0.03   BASOS ABS 10*3/mm3 0.10   IMMATURE GRANS (ABS) 10*3/mm3 0.31*   NRBC /100 WBC 0.0             Results from last 7 days   Lab Units 04/15/21  1350   LACTATE mmol/L 1.0             Results from last 7 days   Lab Units 04/15/21  0956   BLOODCX  No growth at 24 hours                   Imaging:  Imaging Results (Last 24 Hours)     ** No results found for the last 24 hours. **             I reviewed the patient's new clinical results / labs / tests / procedures      Assessment/Plan     Active Hospital Problems    Diagnosis  POA   • **Cellulitis of left upper extremity [L03.114]  Yes   • Ischemia of upper extremity [I99.8]  Yes   • Hypoglycemia [E16.2]  Yes   • Sepsis (CMS/[facility]) [A41.9]  Yes   • DNR (do not resuscitate) [Z66]  Yes   • Macular degeneration [H35.30]  Yes   • Chronic atrial fibrillation (CMS/HCC)  [I48.20]  Yes   • Control of atrial fibrillation with pacemaker (CMS/[facility]) [I48.91, Z95.0]  Yes   • ESRD on hemodialysis (CMS/[facility]) [N18.6, Z99.2]  Not Applicable   • Anemia in chronic kidney disease [N18.9, D63.1]  Yes   • JOSELUIS on CPAP [G47.33, Z99.89]  Not Applicable   • Type 2 diabetes mellitus (CMS/[facility]) [E11.9]  Yes      Resolved Hospital Problems   No resolved problems to display.           · Cellulitis of the left upper extremity/bilateral upper extremity digital ischemia left more than the right/sepsis.  Vascular surgery does not believe this is a macrovascular disease and that nothing much can be offered in terms of surgical intervention.  Currently on IV Vanco.  Abscess indicated by hypotension leukocytosis.  · End-stage renal disease.  Has refused dialysis before.  She was agreeable to dialysis today and unfortunately she did not tolerate hemodialysis as she went hypotensive and dialysis has been stopped.  Nephrology is following.  Patient is volume overloaded  · History of hypertension/atrial fibrillation.  Hypotensive now secondary to sepsis.  No oral hyper tensive agents.  · Type 2 diabetes.  Positive hypoglycemia.  Receiving IV dextrose.  · Prognosis/DNR status.  Patient has a DNR status and this was confirmed.  She has a very poor prognosis.  Palliative care was consulted for comfort measures.  They are speaking with the daughter.  I tried to call the doctor myself but there was no answer.  · Discussed with nurses.      China Gill MD  [facility]  04/16/21  14:31 EDT    Addendum.  4/16/2021.  2:44 PM.  Received a call from palliative nurse Ivory who has spoken to the daughter and the nephrologist.  The daughter/nephrologist and myself are all in agreement that the patient needs to be transferred to the palliative care for comfort measures only.  We will proceed with transferring the patient.

## 2021-04-16 NOTE — CONSULTS
Purpose of the visit was to evaluate for: goals of care/advanced care planning, support for patient/family and transfer to comfort care bed/unit. Spoke with MD and RN as well as patient and family and discussed palliative care, goals of care and care options.      Assessment:  Patient is palliative care appropriate for inpatient care given ESRD with dialysis, digital ischemia, FTR, DM, afib. Patient resting in bed. She is oriented to self and location. Edema in noted in lower extremities and left upper arm. She denies any pain or shortness of breath at this time. PPS 20%. Spoke with RN (Ann Marie).     Recommendations/Plan: Support with goals of care.-comfort measures only. Transfer patient to palliative care unit.     Other Comments: I spoke with patient. She states she wants to at least try dialysis. She does state she is tired. I also spoke with patient daughter via telephone, Nesha. She is wanting to proceed with comfort measures only. She understands her mother is not living and continues to decline. She has had extensive discussion with nephrologist and patient isn't tolerating dialysis -hypotensive. In fact, unable to complete this morning due to severe hypotension.  Her oral intake is reduced and she continues to sleep a lot. She wants her mother to just be comfortable. She states her brother, Cheo is agreeable. She does have POA and I requested she bring copy to the hospital for it be updated. She request to transfer to palliative care for comfort measures only. I informed her what palliative care is, visitor policy, and comfort measures. I also informed her that [facility] may need to be involved at some point and she is agreeable if needed. I updated both RN (Ann Marie), nephrologist (Dr Calvin) and Dr Gill.

## 2021-04-16 NOTE — PLAN OF CARE
Problem: Adult Inpatient Plan of Care  Goal: Plan of Care Review  Outcome: Ongoing, Progressing  Flowsheets (Taken 4/16/2021 1951)  Plan of Care Reviewed With: daughter  Outcome Summary: Patient transferred to [facility] for palliative care. Patient's daughter at bedside. The daughter was oriented to unit and any questions regarding care were addressed. Patient medicated once this evening with morphine for increased pain. Will continue to monitor and manage symptoms according to palliative goals of care.

## 2021-04-16 NOTE — CASE MANAGEMENT/SOCIAL WORK
Discharge Planning Assessment  [facility]     Patient Name: Veronica Henao  MRN: [id number]  Today's Date: 4/16/2021    Admit Date: 4/15/2021    Discharge Needs Assessment     Row Name 04/16/21 0974       Living Environment    Lives With  facility resident    Current Living Arrangements  extended care facility    Primary Care Provided by  child(afshin) Facility staff and children    Provides Primary Care For  no one, unable/limited ability to care for self    Family Caregiver if Needed  child(afshin), adult    Family Caregiver Names  daughter Nesha Young [phone] and son Cheo Hester [phone]    Quality of Family Relationships  helpful;involved;supportive    Able to Return to Prior Arrangements  no       Resource/Environmental Concerns    Resource/Environmental Concerns  none    Transportation Concerns  car, none       Transition Planning    Patient/Family Anticipates Transition to  inpatient hospice    Patient/Family Anticipated Services at Transition  hospice care    Transportation Anticipated  family or friend will provide       Discharge Needs Assessment    Current Outpatient/Agency/Support Group  hospice facility    Concerns to be Addressed  discharge planning    Anticipated Changes Related to Illness  inability to care for self    Outpatient/Agency/Support Group Needs  hospice facility    Discharge Facility/Level of Care Needs  hospice facility    Current Discharge Risk  physical impairment        Discharge Plan     Row Name 04/16/21 3620       Plan    Plan  Follow for palliative consult and recommendations.    Provided Post Acute Provider List?  N/A    N/A Provider List Comment  The patient's daughter was not provided with a HH/SNF list nor a print out of the HH/nursing home compare list as the patient's daughter wants the patient to be made palliative.    Provided Post Acute Provider Quality & Resource List?  N/A    N/A Quality & Resource List Comment  The patient's daughter  was not provided with a HH/SNF list nor a print out of the HH/nursing home compare list as the patient's daughter wants the patient to be made palliative.    Patient/Family in Agreement with Plan  yes    Plan Comments  Spoke to Melany with [facility] who states that the patient is from a skilled bed at [facility] with no bedhold and can return but a pre-cert will be needed.  Melany states that the patient’s negative COVID test dated 4/15/21 is appropriate for her return.  Spoke to the patient’s daughter Nesha Young [phone] who states that the patient was bedbound, was at [facility] prior to admission, they have decided to discontinue dialysis for her mother and are now interested in palliative care for her mother.  CCP will follow for palliative care consult, will follow for patient’s prognosis, will follow to see if [facility] services are needed and will assist with any d/c needs that may arise for the patient.  MARTA Coates        Continued Care and Services - Admitted Since 4/15/2021     Destination     Service Provider Request Status Selected Services Address Phone Fax Patient Preferred    [facility]  Accepted N/A [address] [id number] [phone] --            Selected Continued Care - Prior Encounters Includes selections from prior encounters from 1/15/2021 to 4/16/2021    Discharged on 4/9/2021 Admission date: 4/4/2021 - Discharge disposition: Skilled Nursing Facility (DC - External)    Destination     Service Provider Selected Services Address Phone Fax Patient Preferred    [facility]  Skilled Nursing [address] [phone] [phone] --                      Demographic Summary     Row Name 04/16/21 1336       General Information    Admission Type  inpatient    Arrived From  subacute/long-term acute care    Referral Source  admission list    Reason for Consult  discharge planning     Preferred Language  English       Contact Information    Permission Granted to Share Info With  family/designee        Functional Status     Row Name 04/16/21 1336       Functional Status    Usual Activity Tolerance  poor    Current Activity Tolerance  poor       Functional Status, IADL    Medications  completely dependent    Meal Preparation  completely dependent    Housekeeping  completely dependent    Laundry  completely dependent    Shopping  completely dependent       Mental Status Summary    Recent Changes in Mental Status/Cognitive Functioning  unable to assess        Psychosocial    No documentation.       Abuse/Neglect    No documentation.       Legal    No documentation.       Substance Abuse    No documentation.       Patient Forms    No documentation.           MARTA Landin

## 2021-04-16 NOTE — PROGRESS NOTES
"                                                                                        [facility] Follow up Note        PATIENT IDENTIFICATION     Name: Veronica Henao  Age: 86 y.o.  Sex: female  :  1935  MRN: [id number]       CHIEF COMPLIANTS / REASON FOR FOLLOW UP                Subjective:          Appears ill looking no distress responds yes or no.  Seen on dialysis, about 1 hour and 45  Dialysis blood pressure dropped to 70s/30s.  Dialysis is stopped and blood return.  Overall outlook extremely poor.           Review of Systems:       Poor historian, dementia, chronically ill looking.  Constitutional: No fever, no chills, no lethargy, no weakness.  HEENT:  No headache, otalgia, itchy eyes, nasal discharge or sore throat.  Cardiac:  No chest pain, dyspnea, orthopnea or PND.  Chest:  No cough, phlegm or wheezing.  Abdomen:  No abdominal pain, nausea or vomiting.  Neuro:  No focal weakness, abnormal movements or seizure-like activity.  :   No hematuria, no pyuria, no dysuria, no flank pain.  Extremities:  No  joint pains.  ROS was otherwise negative except as mentioned in the [facility].        OBJECTIVE                                                                        Exam:  /47 (BP Location: Right arm, Patient Position: Sitting)   Pulse 76   Temp 98.2 °F (36.8 °C) (Temporal)   Resp 16   Ht 157.5 cm (62.01\")   Wt 58.1 kg (128 lb) Comment: not weighed by RD  SpO2 100%   BMI 23.41 kg/m²   Intake/Output last 3 shifts:  I/O last 3 completed shifts:  In: 1150 [I.V.:1000; IV Piggyback:150]  Out: -   Intake/Output this shift:  I/O this shift:  In: 1200 [I.V.:1000; IV Piggyback:200]  Out: -     General Appearance: Appears chronically ill looking  Head:  Normocephalic, without obvious abnormality, atraumatic  Eyes:  Sclerae anicteric, EOM's intact     Neck:  Supple,  no adenopathy;      Lungs: Mild crackles   heart: 2 x 6 murmur  Abdomen:  Soft, nontender,    no masses, no " hepatomegaly, no splenomegaly  Extremities: Left arm cellulitis, left arm AV graft, lower extremity cellulitis, wounds.  neurologic:   Patient lethargic, poor historian, overall outlook extremely poor.    Scheduled Meds:bacitracin, , Topical, Q12H  gabapentin, 100 mg, Oral, Q12H  heparin (porcine), 5,000 Units, Subcutaneous, Q12H  insulin lispro, 0-9 Units, Subcutaneous, TID AC  latanoprost, 1 drop, Both Eyes, Nightly  midodrine, 5 mg, Oral, TID AC  mirtazapine, 15 mg, Oral, Nightly  ofloxacin, 2 drop, Left Eye, 4x Daily  pantoprazole, 40 mg, Oral, QAM  sertraline, 50 mg, Oral, Daily  sodium chloride, 10 mL, Intravenous, Q12H  vancomycin, 1,000 mg, Intravenous, Once  Vancomycin Pharmacy Intermittent Dosing, , Does not apply, Daily      Continuous Infusions:dextrose 5 % and sodium chloride 0.45 %, 100 mL/hr, Last Rate: 100 mL/hr (04/16/21 0942)  Pharmacy to dose vancomycin,       PRN Meds:•  acetaminophen  •  dextrose  •  dextrose  •  glucagon (human recombinant)  •  HYDROcodone-acetaminophen  •  HYDROmorphone  •  nitroglycerin  •  ondansetron  •  Pharmacy to dose vancomycin  •  [COMPLETED] Insert peripheral IV **AND** sodium chloride  •  sodium chloride         Data Review:                                                                           BMP:   Results from last 7 days   Lab Units 04/15/21  1350   GLUCOSE mg/dL 170*   CO2 mmol/L 23.3   BUN mg/dL 65*   CREATININE mg/dL 5.31*   CALCIUM mg/dL 9.1     Coagulation: No results found for: INR, APTT  Cardiac markers:   Results from last 7 days   Lab Units 04/15/21  1350   TROPONIN T ng/mL 0.040*     ABGs:       Invalid input(s): PO2       Imaging:                                                                              Chest X-Ray is obtained; result reviewed.           ASSESSMENT:                                                                                Cellulitis of left upper extremity    Type 2 diabetes mellitus (CMS/HCC)    JOSELUIS on CPAP    Anemia in  chronic kidney disease    ESRD on hemodialysis (CMS/[facility])    Control of atrial fibrillation with pacemaker (CMS/[facility])    Chronic atrial fibrillation (CMS/[facility])    Macular degeneration    DNR (do not resuscitate)    Ischemia of upper extremity    Hypoglycemia    Sepsis (CMS/[facility])      · ESRD, HD. 5 days a week, next stage at [facility]-Monday through Friday, however patient is noncompliant with treatment, averages around 3 treatments/week  · LUE cellulits, s/p 1 dose Vanc 4/14 at NH, poor thrill/bruit  · DM II  · Hypertension  · ACD  · JOSELUIS, CPAP  · S/p TVR, MVR, maze procedure .         Plan:      · Patient seen on hemodialysis, blood pressure dropped to 70s/30s.  · Patient was taken off of hemodialysis.  · Overall bluish discoloration of the hands secondary to hypotension and poor perfusion.    · Spoke to vascular surgery Dr. Michelle Recio  · His opinion was duplex do not show any arterial compromise.  · Overall probably poor hemodynamics and poor perfusion.  · I had extensive discussion with her daughter Nesha Young on the phone and she is POA, she spoke to her brother and called me back and they would like to proceed with comfort measures only.  · I spoke to Dr. Huizar.  · Patient will transition to comfort measures only will not perform hemodialysis as per recommendation of her daughter and I am in agreement.          Ronald Calvin MD  [facility]  4/16/2021  14:42 EDT

## 2021-04-16 NOTE — CONSULTS
Name: Veronica Henao ADMIT: 4/15/2021   : 1935  PCP: Nelly Price MD    MRN: [id number] LOS: 1 days   AGE/SEX: 86 y.o. female  ROOM: [address]      Patient Care Team:  Nelly Price MD as PCP - General (Internal Medicine)  Gino Gibbs MD as Consulting Physician (Cardiology)  Hayes Ferrer MD as Consulting Physician (Pulmonary Disease)  Berry Hunt II, MD as Consulting Physician (Hematology and Oncology)  Nelly Price MD as Referring Physician (Internal Medicine)  Bethany Ibanez MD as Consulting Physician (Endocrinology)  Joy Mora RP as Pharmacist (Pharmacy)  Jam Goldsmith RPH as Pharmacist (Pharmacy)  Chief Complaint   Patient presents with   • Upper Extremity Issue   • Failure To Thrive     CC: Digital ischemia evaluation    Subjective     Inpatient Vascular Surgery Consult  Consult performed by: Lacho Recio MD  Consult ordered by: Sridevi Huizar MD  Reason for consult: Digital ischemia evaluation cellulitis evaluation.        Patient seen on hemodialysis.  She is very frail and majority of history obtained from review of chart and discussion with nephrology.    Patient is 86-year-old female past medical history of esrd, left arm avf, dm, htn, chf, pulmonary htn, overall poor conditioning now at [facility] and being followed by [facility]. Patient was having left arm swelling, with erythema, edema, elevated white cell count, feverish. Subsequently sent to ER, patient has evidence of cellulitis, received 1 dose of Vanc 1gm yesterday with dialysis, presents today to ER for worsening of symptoms. Did not dialyze Tuesday or Wednesday, last treatment was 2021. We are consulted for management of ESRD, HD.     Review of Systems   Unable to perform ROS: Mental status change       Past Medical History:   Diagnosis Date   • Acute bronchitis    • Acute renal insufficiency    • Allergic  rhinitis    • Anemia in chronic kidney disease    • Arrhythmia    • Atrial fibrillation (CMS/HCC)     chronic   • Brachycephaly    • Breast pain, right    • Chest pain    • CHF (congestive heart failure) (CMS/HCC)    • Chronic combined systolic and diastolic CHF (congestive heart failure) (CMS/HCC)    • Chronic renal insufficiency    • CKD (chronic kidney disease), stage IV (CMS/HCC)    • Cough    • Depression    • Diabetes mellitus (CMS/HCC)     TYPE 2   • Dialysis patient (CMS/HCC)    • Diverticulosis    • ORTIZ (dyspnea on exertion)    • Dyspnea    • Esophageal reflux    • Essential hypertension    • Fatigue    • GERD (gastroesophageal reflux disease)    • Gout    • Head injury    • Headache    • Hoarseness    • Hypercalcemia    • Hyperlipidemia    • Hypertension    • Influenza A (H1N1)    • Iron deficiency anemia    • Itching    • Leg swelling    • Lightheadedness    • Macular degeneration    • Malaise and fatigue    • Mitral valve regurgitation     moderate to severe   • Nontoxic multinodular goiter     RIGHT 2.0 CM  LEFT  2.5 CM   • Obesity    • JOSELUIS on CPAP    • Osteoarthritis    • PAF (paroxysmal atrial fibrillation) (CMS/HCC)    • Palpitations    • Paresthesias    • Proteinuria    • Pulmonary hypertension (CMS/HCC)    • Pulmonary nodule    • Pulmonic valve regurgitation     mild   • Sebaceous cyst    • SOBOE (shortness of breath on exertion)    • Solitary thyroid nodule    • Subclinical hypothyroidism    • Tachycardia    • TR (tricuspid regurgitation)     mild to moderate   • Type 2 diabetes mellitus (CMS/HCC)    • 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)    • UTI (urinary tract infection)      Past Surgical History:   Procedure Laterality Date   • APPENDECTOMY     • ARTERIOVENOUS FISTULA/SHUNT SURGERY Left 2/5/2020    Procedure: LEFT BRACHIAL ARTERY AXILLARY VEIN GORTEX SHUNT;  Surgeon: Kaveh Mcdonnell MD;  Location: [facility];  Service: Vascular;  Laterality: Left;   • CARDIAC  CATHETERIZATION  1986    procedure outcome:    • CARDIAC CATHETERIZATION N/A 11/21/2016    Procedure: Coronary angiography;  Surgeon: Marcin العراقي MD;  Location: [facility] CATH INVASIVE LOCATION;  Service:    • CARDIAC CATHETERIZATION N/A 11/21/2016    Procedure: Left Heart Cath;  Surgeon: Marcin العراقي MD;  Location: [facility] CATH INVASIVE LOCATION;  Service:    • CARDIAC CATHETERIZATION N/A 8/29/2018    Procedure: Right Heart Cath with adenosine challenge if wedge pressure is normal.;  Surgeon: Paulo Decker MD;  Location: [facility] CATH INVASIVE LOCATION;  Service: Cardiovascular   • CARDIAC ELECTROPHYSIOLOGY PROCEDURE N/A 3/28/2017    Procedure: Ablation atrial flutter;  Surgeon: Rodríguez Ward MD;  Location: [facility] CATH INVASIVE LOCATION;  Service:    • CARDIAC ELECTROPHYSIOLOGY PROCEDURE N/A 7/3/2017    Procedure: AV node ablation;  Surgeon: Rodríguez Ward MD;  Location: [facility] CATH INVASIVE LOCATION;  Service:    • CARDIAC ELECTROPHYSIOLOGY PROCEDURE N/A 7/3/2017    Procedure: Pacemaker DC new  [employer] and will need 3830 lead-I did chase Meneses ;  Surgeon: Rodríguez Ward MD;  Location: [facility] INVASIVE LOCATION;  Service:    • CARDIAC SURGERY     • CHOLECYSTECTOMY     • CLAVICLE SURGERY Left    • COLONOSCOPY N/A 11/14/2017    Procedure: COLONOSCOPY INTO CECUM/ TERMINAL ILEUM WITH POLYPECTOMY  X 8 AND CLIP X 2;  Surgeon: Jacy Browning MD;  Location: [facility] ENDOSCOPY;  Service:    • ENDOSCOPY N/A 11/14/2017    Procedure: ESOPHAGOGASTRODUODENOSCOPY WITH BIOPSIES;  Surgeon: Jacy Browning MD;  Location: [facility] ENDOSCOPY;  Service:    • GASTRIC RESTRICTION SURGERY     • HYSTERECTOMY     • INSERTION HEMODIALYSIS CATHETER N/A 1/10/2020    Procedure: TUNNEL DIALYSIS CATHETER;  Surgeon: Carlos Manuel Jernigan MD;  Location: [facility] MAIN OR;  Service: Vascular   • JOINT REPLACEMENT     • LUMBAR DECOMPRESSION      L4-5   • MITRAL VALVE REPAIR/REPLACEMENT N/A 12/14/2016    Procedure:  INTRAOPERATIVE KATELYN, MIDLINE STERNOTOMY, MITRAL VALVE REPAIR, TRICUSPID VALVE REPAIR, MAZE PROCEDURE;  Surgeon: Young Vital MD;  Location: [facility] OR;  Service:    • SHOULDER SURGERY Left     AFTER SURGERY FOR FRACTURED CLAVICLE   • TONSILLECTOMY     • TOTAL KNEE ARTHROPLASTY      bilateral     Family History   Problem Relation Age of Onset   • Emphysema Mother    • Heart disease Father    • Lung cancer Father    • Prostate cancer Father    • Asthma Sister    • Lung cancer Daughter    • Colon cancer Other    • No Known Problems Maternal Grandmother    • No Known Problems Maternal Grandfather    • Arthritis Paternal Grandmother    • No Known Problems Paternal Grandfather    • Malig Hyperthermia Neg Hx      Social History     Tobacco Use   • Smoking status: Former Smoker     Packs/day: 1.50     Years: 20.00     Pack years: 30.00     Start date: 6/14/1970   • Smokeless tobacco: Never Used   • Tobacco comment: D/C 1970 SMOKING 1 1/2 PPD FOR 13 YRS BEFORE QUITTING   Substance Use Topics   • Alcohol use: No     Comment: caffeine use   • Drug use: No     Medications Prior to Admission   Medication Sig Dispense Refill Last Dose   • acetaminophen (TYLENOL) 325 MG tablet Take 2 tablets by mouth Every 6 (Six) Hours As Needed for Mild Pain (1-3).  0 4/14/2021 at Unknown time   • Alcohol Swabs (B-D SINGLE USE SWABS REGULAR) pads Inject 1 each under the skin into the appropriate area as directed Daily. 100 each 3 4/15/2021 at Unknown time   • bacitracin 500 UNIT/GM ointment Apply  topically to the appropriate area as directed Every 12 (Twelve) Hours. To hips   4/14/2021 at Unknown time   • Blood Glucose Monitoring Suppl (TRUE METRIX AIR GLUCOSE METER) device 1 each Daily. 1 each 0 4/15/2021 at Unknown time   • butenafine (Mentax) 1 % cream Apply  topically to the appropriate area as directed 2 (Two) Times a Day. To feet two times daily 45 g 3 4/14/2021 at Unknown time   • gabapentin (NEURONTIN) 100 MG capsule Take 1  capsule by mouth 2 (two) times a day. 10 capsule 0 4/15/2021 at Unknown time   • glucose blood (TRUE METRIX BLOOD GLUCOSE TEST) test strip 1 each by Other route 2 (Two) Times a Day. Use as instructed 100 each 3 4/15/2021 at Unknown time   • HYDROcodone-acetaminophen (NORCO) 7.5-325 MG per tablet Take 1 tablet by mouth Every 4 (Four) Hours As Needed for Moderate Pain . 20 tablet 0 4/14/2021 at Unknown time   • insulin aspart (novoLOG FLEXPEN) 100 UNIT/ML solution pen-injector sc pen Inject  under the skin into the appropriate area as directed 3 (Three) Times a Day With Meals.   4/15/2021 at Unknown time   • Lancets 28G misc To check sugar qd. E11.9  each 12 4/15/2021 at Unknown time   • latanoprost (XALATAN) 0.005 % ophthalmic solution    4/15/2021 at Unknown time   • midodrine (PROAMATINE) 5 MG tablet Take 1 tablet by mouth 3 (Three) Times a Day.   4/15/2021 at Unknown time   • mirtazapine (REMERON) 7.5 MG half tablet Take 15 mg by mouth Every Night.      • ofloxacin (Ocuflox) 0.3 % ophthalmic solution Administer 2 drops into the left eye 4 (Four) Times a Day. 5 mL 1 4/15/2021 at Unknown time   • omeprazole (PrilOSEC) 20 MG capsule Take 1 capsule by mouth Daily. 90 capsule 1    • sertraline (ZOLOFT) 50 MG tablet Take 1 tablet by mouth Daily. 90 tablet 3 4/15/2021 at Unknown time   • TRUEPLUS LANCETS 28G misc E11.9 DM to check sugar  each 3 4/15/2021 at Unknown time   • Vancomycin HCl in Dextrose (VANCOCIN HCL IV) Infuse  into a venous catheter. To be given during dialysis, mon, wed, fri   Unknown at Unknown time     bacitracin, , Topical, Q12H  gabapentin, 100 mg, Oral, Q12H  heparin (porcine), 5,000 Units, Subcutaneous, Q12H  insulin lispro, 0-9 Units, Subcutaneous, TID AC  latanoprost, 1 drop, Both Eyes, Nightly  midodrine, 5 mg, Oral, TID AC  mirtazapine, 15 mg, Oral, Nightly  ofloxacin, 2 drop, Left Eye, 4x Daily  pantoprazole, 40 mg, Oral, QAM  sertraline, 50 mg, Oral, Daily  sodium chloride, 10 mL,  "Intravenous, Q12H  vancomycin, 20 mg/kg, Intravenous, Once      dextrose 5 % and sodium chloride 0.45 %, 100 mL/hr, Last Rate: 100 mL/hr (04/16/21 0942)      •  acetaminophen  •  dextrose  •  dextrose  •  glucagon (human recombinant)  •  HYDROcodone-acetaminophen  •  HYDROmorphone  •  nitroglycerin  •  ondansetron  •  [COMPLETED] Insert peripheral IV **AND** sodium chloride  •  sodium chloride  Cephalexin, Meperidine, and Penicillins    Objective     Physical Exam:  Physical Exam  Vitals and nursing note reviewed.   Constitutional:       Appearance: Normal appearance.   HENT:      Head: Normocephalic and atraumatic.      Right Ear: External ear normal.      Left Ear: External ear normal.      Nose: Nose normal.      Mouth/Throat:      Mouth: Mucous membranes are moist.   Eyes:      Extraocular Movements: Extraocular movements intact.      Pupils: Pupils are equal, round, and reactive to light.   Cardiovascular:      Rate and Rhythm: Normal rate and regular rhythm.      Pulses: Normal pulses.      Heart sounds: Normal heart sounds.   Pulmonary:      Effort: Pulmonary effort is normal.      Breath sounds: Normal breath sounds.   Abdominal:      General: Abdomen is flat.      Palpations: Abdomen is soft.   Musculoskeletal:         General: Normal range of motion.      Cervical back: Normal range of motion and neck supple.   Skin:     General: Skin is warm.   Neurological:      General: No focal deficit present.      Mental Status: She is alert and oriented to person, place, and time.          Vital Signs and Labs:  Vital Signs Patient Vitals for the past 24 hrs:   BP Temp Temp src Pulse Resp SpO2 Height Weight   04/16/21 1030 102/47 98.2 °F (36.8 °C) Temporal 76 16 -- -- --   04/16/21 1006 94/40 -- -- -- -- -- -- --   04/16/21 0935 102/47 98.2 °F (36.8 °C) Temporal 76 16 -- -- --   04/16/21 0911 -- -- -- -- -- -- 157.5 cm (62.01\") 58.1 kg (128 lb)   04/16/21 0700 (!) 88/41 98.6 °F (37 °C) -- 69 15 100 % -- -- "   04/16/21 0635 92/40 -- -- 70 -- -- -- --   04/15/21 2300 112/47 98.2 °F (36.8 °C) Oral 71 14 100 % -- --   04/15/21 1900 102/65 97.1 °F (36.2 °C) Oral 74 14 100 % -- --   04/15/21 1605 100/48 -- -- 76 14 98 % -- --   04/15/21 1500 99/52 -- -- 75 14 100 % -- --   04/15/21 1307 114/50 -- -- 69 14 100 % -- --     I/O:  I/O last 3 completed shifts:  In: 1150 [I.V.:1000; IV Piggyback:150]  Out: -     CBC    Results from last 7 days   Lab Units 04/15/21  1350   WBC 10*3/mm3 25.26*   HEMOGLOBIN g/dL 12.7   PLATELETS 10*3/mm3 296     BMP   Results from last 7 days   Lab Units 04/15/21  1350   SODIUM mmol/L 135*   POTASSIUM mmol/L 4.8   CHLORIDE mmol/L 94*   CO2 mmol/L 23.3   BUN mg/dL 65*   CREATININE mg/dL 5.31*   GLUCOSE mg/dL 170*     Cr Clearance Estimated Creatinine Clearance: 7 mL/min (A) (by C-G formula based on SCr of 5.31 mg/dL (H)).  Coag     HbA1C   Lab Results   Component Value Date    HGBA1C 7.00 (H) 08/28/2020    HGBA1C 6.50 (H) 06/08/2020    HGBA1C 7.04 (H) 01/06/2020     Blood Glucose   Glucose   Date/Time Value Ref Range Status   04/16/2021 0611 206 (H) 70 - 130 mg/dL Final   04/15/2021 2226 87 70 - 130 mg/dL Final   04/15/2021 2140 76 70 - 130 mg/dL Final   04/15/2021 2117 40 (C) 70 - 130 mg/dL Final   04/15/2021 2057 54 (L) 70 - 130 mg/dL Final   04/15/2021 2009 67 (L) 70 - 130 mg/dL Final   04/15/2021 1802 234 (H) 70 - 130 mg/dL Final     Infection   Results from last 7 days   Lab Units 04/15/21  0956   BLOODCX  No growth at 24 hours     CMP   Results from last 7 days   Lab Units 04/15/21  1350   SODIUM mmol/L 135*   POTASSIUM mmol/L 4.8   CHLORIDE mmol/L 94*   CO2 mmol/L 23.3   BUN mg/dL 65*   CREATININE mg/dL 5.31*   GLUCOSE mg/dL 170*   ALBUMIN g/dL 2.50*   BILIRUBIN mg/dL 0.3   ALK PHOS U/L 158*   AST (SGOT) U/L 7   ALT (SGPT) U/L 9     ABG      UA      AAKASH  No results found for: POCMETH, POCAMPHET, POCBARBITUR, POCBENZO, POCCOCAINE, POCOPIATES, POCOXYCODO, POCPHENCYC, POCPROPOXY, POCTHC,  [facility]  Radiology(recent) XR Chest 1 View    Result Date: 4/15/2021  FINDINGS AND IMPRESSION: Left sided pacemaker, median sternotomy wires and a prosthetic heart valve are present.. Chain sutures projecting over the left upper quadrant, as before.  Pulmonary vascular congestion is present with superimposed interstitial thickening within the bilateral mid to lower lungs, left greater then right. Findings are most suggestive of pulmonary edema in the appropriate clinical context. Atypical pneumonia is felt to be less likely. Correlation with patient history is recommended.  No pneumothorax or pleural effusion is seen. Cardiac silhouette is within normal limits for size. Moderate to severe bilateral glenohumeral osteoarthritis is present, left greater then right.  This report was finalized on 4/15/2021 2:33 PM by Dr. Tucker Casey M.D.      Doppler Arterial Single Level Upper Extremity - Bilateral CAR    Addendum Date: 4/15/2021    · Arterial pressures falsely elevated on the right, but with normal waveforms. Severe digital ischemia noted on the right. Right digital ischemia is located in the global area of the digit. · Normal arterial pressures on the left. Severe digital ischemia noted on the left. Left digital ischemia is located in the global area of the digit.        Active Hospital Problems    Diagnosis  POA   • **Cellulitis of left upper extremity [L03.114]  Yes   • DNR (do not resuscitate) [Z66]  Yes   • Macular degeneration [H35.30]  Yes   • Chronic atrial fibrillation (CMS/HCC) [I48.20]  Yes   • Control of atrial fibrillation with pacemaker (CMS/HCC) [I48.91, Z95.0]  Yes   • ESRD on hemodialysis (CMS/HCC) [N18.6, Z99.2]  Not Applicable   • Anemia in chronic kidney disease [N18.9, D63.1]  Yes   • JOSELUIS on CPAP [G47.33, Z99.89]  Not Applicable      Resolved Hospital Problems   No resolved problems to display.     Problem Points:  3:  Patient has a new problem, with no additional work-up planned (max of  1)  Total problem points:3    Data Points:  1:  I have reviewed or order clinical lab test  1:  I have reviewed or order radiology test (except heart catheterization or echo)  2:  I have personally and independently review of image, tracing, or specimen  Total data points:4 or more    Risk Points:  High:  Chronic illness with severe exacerbation or progression    MDM requires 2/3 (Problem points, Data points and Risk)  MDM Prob point Data point Risk   SF 1 1 Minimal   Low 2 2 Low   Mod 3 3 Moderate   High 4 4 High     Code requires 3/3 (MDM, History and Exam)  Code MDM History Exam Time   69931 SF/Low Detailed Detailed 30   48625 Mod Comprehensive Comprehensive 50   65349 High Comprehensive Comprehensive 70     Detailed history:  4 elements HPI or status of 3 chronic problems; 2-9 system ROS  Comprehensive:  4 elements HPI or status of 3 chronic problems;  10 system ROS    Detailed Exam:  12 findings from any organ system  Comprehensive Exam:  2 findings from each of 9 systems.   81150    Assessment/Plan       Cellulitis of left upper extremity    JOSELUIS on CPAP    Anemia in chronic kidney disease    ESRD on hemodialysis (CMS/[facility])    Control of atrial fibrillation with pacemaker (CMS/[facility])    Chronic atrial fibrillation (CMS/[facility])    Macular degeneration    DNR (do not resuscitate)                              86 y.o. female patient is extremely frail and medically unstable.  Decubitus ulcers.  She has some left forearm cellulitis that is not intimately associated with her left arm hemodialysis access graft.  I discussed the case with nephrology.  He had a long conversation with the family today and they are leaning heavily towards a more comfort care goals of care oriented model.  She also has ischemia noted distally in her digits on upper extremity arterial Doppler.  Currently on his upper extremity arterial Doppler she has good flow essentially normal flow down to the wrist bilaterally with severe digital ischemia.  I  do not think there is any sort of macrovascular issue and feel this is more consistent with hypotension and cardiac pump issues.  We will follow peripherally but really do not have a whole lot to offer.  I think the cellulitis should be treated with antibiotics.  Please call again if patient's family decides to pursue full nonpalliative care.    I discussed the patients findings and my recommendations with patient.    Lacho Recio MD  04/16/21  11:39 EDT    Please call my office with any question: [phone]

## 2021-04-16 NOTE — DISCHARGE PLACEMENT REQUEST
"Veronica Henao (86 y.o. Female)     Date of Birth Social Security Number Address Home Phone MRN    1935  [address] [id number] [phone] [id number]    [place] Marital Status          [employer]        Admission Date Admission Type Admitting Provider Attending Provider Department, Room/Bed    4/15/21 Emergency Sridevi Huizar MD Hussein, Samer H, MD [address], S619/1    Discharge Date Discharge Disposition Discharge Destination                       Attending Provider: China Gill MD    Allergies: Cephalexin, Meperidine, Penicillins    Isolation: None   Infection: None   Code Status: No CPR    Ht: 157.5 cm (62.01\")   Wt: 58.1 kg (128 lb)    Admission Cmt: None   Principal Problem: Cellulitis of left upper extremity [L03.114]                 Active Insurance as of 4/15/2021     Primary Coverage     Payor Plan Insurance Group Employer/Plan Group    HUMANA MEDICARE REPLACEMENT HUMANA MEDICARE REPLACEMENT [id number]     Payor Plan Address Payor Plan Phone Number Payor Plan Fax Number Effective Dates    [address] [address] [phone]  6/1/2011 - None Entered    [facility] [id number]       Subscriber Name Subscriber Birth Date Member ID       VERONICA HENAO 1935 [id number]                 Emergency Contacts      (Rel.) Home Phone Work Phone Mobile Phone    HectorNesha (Daughter) [phone] -- [phone]    Cheo Hester (Son) [phone] -- [phone]            {Outbreak/Travel/Exposure Documentation......;  Question Available Choices Patient Response   COVID-19 Outbreak Screen:  Do you currently have a new onset of the following symptoms?        Fever/Chills, Cough, Shortness of air, Loss of taste or smell, No, Unknown  No (04/15/21 0922)   COVID-19 Outbreak Screen: In the last 14 days, have you had contact with anyone who is ill, has show any of the symptoms listed above and/or has been diagnosis with the 2019 Novel Coronavirus? " This includes any immediate household members but excludes any patients with whom you have been in contact within your normal work duties wearing proper PPE, if you are a healthcare worker.  Yes, No, Unknown              No (04/15/21 0922)   COVID-19 Outbreak Screen: Who was notified? Free text (not recorded)   Ebola Screening Outbreak Screen: Have you traveled to the [place] or [place] within the past 21 days?  Yes, No, Unknown No (04/15/21 0922)   Ebola Screening Outbreak Screen: Do you have ANY of the following symptoms: Fever/Chills, Vomiting, Diarrhea, Fatigue, Headache, Muscle pain, Unexplained bleeding, Abdominal (stomach) pain, No, Unknown (not recorded)   Ebola Screening Outbreak Screen: Name of Person notified Free text (not recorded)   Travel Screen: Have you traveled in the last month? If so, to what country have you traveled? If [place] what state? Yes, No, Unknown  List of all countries  List of all States No (04/15/21 0922)  (not recorded)  (not recorded)   Infection Risk: Do you currently have the following symptoms?  (If cough is selected, the Tuberculosis Screen is performed.) Cough, Fever, Rash, No No (04/15/21 0922)   Tuberculosis Screen: Do you have any of the following Tuberculosis Risks?  · Have you lived or spent time with anyone who had or may have TB?  · Have you lived in or visited any of the following areas for more than one month: [place], [place], [place], [place] or [place], the [place] or [place]?  · Do you have HIV/AIDS?  · Have you lived in or worked in a nursing home, homeless shelter, correctional facility, or substance abuse treatment facility?   · No    If Yes do you have any of the following symptoms? Yes responses display to the right    If Yes, symptoms listed are:  Cough greater than or equal to 3 weeks, Loss of appetite, Unexplained weight loss, Night sweats, Bloody sputum or hemoptysis, Hoarseness, Fever, Fatigue, Chest pain, No (not  recorded)  (not recorded)   Exposure Screen: Have you been exposed to any of these contagious diseases in the last month? Measles, Chickenpox, Meningitis, Pertussis, Whooping Cough, No No (04/15/21 2649)

## 2021-04-16 NOTE — PROGRESS NOTES
"Pharmacokinetic Evaluation: Vancomycin IV  Patient: Veronica Henao  Admission Date: 415  MRN: [id number]  : 1935    Day of vancomycin therapy: 1  Consult for Dr. Gill  Treating: SSTI/LUE cellulitis   Goal random: <20 mcg/mL    Current regimen: Vancomycin Intermittent Dosing  Other antimicrobials: None    Relevant clinical data and objective history reviewed:  86 y.o. female 157.5 cm (62.01\") 58.1 kg (128 lb)  Body mass index is 23.41 kg/m².  Results from last 7 days   Lab Units 04/15/21  1350   CREATININE mg/dL 5.31*     Estimated Creatinine Clearance: 7 mL/min (A) (by C-G formula based on SCr of 5.31 mg/dL (H)).    WBC   Date Value Ref Range Status   04/15/2021 25.26 (H) 3.40 - 10.80 10*3/mm3 Final     Temp:  [97.1 °F (36.2 °C)-98.6 °F (37 °C)] 98.2 °F (36.8 °C)  Heart Rate:  [69-76] 76  Resp:  [14-16] 16  BP: ()/(40-65) 102/47    Intake/Output Summary (Last 24 hours) at 2021 1354  Last data filed at 2021 0737  Gross per 24 hour   Intake 2150 ml   Output --   Net 2150 ml     Baseline labs/radiology/cultures:    Labs:  4/15 Lactate: 1.0    Radiology:   4/15 CXR: Left sided pacemaker, median sternotomy wires and a prosthetic heart valve are present.. Chain sutures projecting over the left upper quadrant, as before. Pulmonary vascular congestion is present with superimposed interstitial   thickening within the bilateral mid to lower lungs, left greater then right. Findings are most suggestive of pulmonary edema in the appropriate clinical context. Atypical pneumonia is felt to be less likely. Correlation with patient history is recommended.   No pneumothorax or pleural effusion is seen. Cardiac silhouette is within normal limits for size. Moderate to severe bilateral glenohumeral osteoarthritis is present, left greater then right.     Cultures:   4/15 Blood cx x2: In process    Assessment/Plan:   PMH: Acute bronchitis, Acute renal insufficiency, Allergic rhinitis, Anemia in chronic kidney " disease, Arrhythmia, Atrial fibrillation, Brachycephaly, Breast pain, right, Chest pain, Chronic combined systolic and diastolic CHF, Chronic renal insufficiency, CKD, stage IV, Cough, Depression, Diabetes mellitus, Dialysis patient, Diverticulosis, Dyspnea on exertion, Dyspnea, Esophageal reflux, Essential hypertension, Fatigue, GERD, Gout, Head injury, Headache, Hoarseness, Hypercalcemia, Hyperlipidemia, Hypertension, Influenza A (H1N1), Iron deficiency anemia, Itching, Leg swelling, Lightheadedness, Macular degeneration, Malaise and fatigue, Mitral valve regurgitation, Nontoxic multinodular goiter, Obesity, JOSELUIS on CPAP, Osteoarthritis, PAF, Palpitations, Paresthesias, Proteinuria, Pulmonary hypertension, Pulmonary nodule, Pulmonic valve regurgitation, Sebaceous cyst, Shortness of breath on exertion, Solitary thyroid nodule, Subclinical hypothyroidism, Tachycardia, TR (tricuspid regurgitation), Type 2 diabetes mellitus, Type 2 diabetes mellitus with chronic kidney disease, and UTI     1. Pt with ESRD on HD. HD today  2. Give vancomycin 1000 mg IV x1 dose now  3. Vancomycin random level in AM    Vancomycin IV Dosing & Levels History:  4/16 1000 mg x1   4/17 Random: mcg/mL    Thank you for your consult,    Aisha Mccracken [facility]

## 2021-04-16 NOTE — CONSULTS
"Adult Nutrition  Assessment/PES    Patient Name:  Veronica Henao  YOB: 1935  MRN: [id number]  Admit Date:  4/15/2021    Assessment Date:  4/16/2021    Comments:  Nutrition screen completed for decrease po take./MST 3 per admission screen. Pt with multiple skin issues. Hx of ESRD on HD. Family discussing comfort care. Will add supplements for now and continue to monitor po intake.    Reason for Assessment     Row Name 04/16/21 0909          Reason for Assessment    Reason For Assessment  identified at risk by screening criteria     Diagnosis  -- lethargic, ESRD on HD refusing dialysis, skin breakdown, DM     Identified At Risk by Screening Criteria  reduced oral intake over the last month;large or nonhealing wound, burn or pressure injury         Nutrition/Diet History     Row Name 04/16/21 0910          Nutrition/Diet History    Typical Food/Fluid Intake  decrease po intake,  total feed         Anthropometrics     Row Name 04/16/21 0911          Anthropometrics    Height  157.5 cm (62.01\")     Weight  58.1 kg (128 lb) not weighed by RD        Ideal Body Weight (IBW)    Ideal Body Weight (IBW) (kg)  50.45     % Ideal Body Weight  115.08        Body Mass Index (BMI)    BMI (kg/m2)  23.45     BMI Assessment  BMI 18.5-24.9: normal         Labs/Tests/Procedures/Meds     Row Name 04/16/21 0911          Labs/Procedures/Meds    Lab Results Reviewed  reviewed     Lab Results Comments  glu, na, bun, cr, a;b        Diagnostic Tests/Procedures    Diagnostic Test/Procedure Reviewed  reviewed        Medications    Pertinent Medications Reviewed  reviewed     Pertinent Medications Comments  heparin, insulin, remeron, protonic, vanc, IV at 100         Physical Findings     Row Name 04/16/21 0912          Physical Findings    Skin  poor skin integrity/turgor;pressure injury right and left thigh, sacral spine stage 3, deep tissue injury, skin tear         Estimated/Assessed Needs     Row Name 04/16/21 0913 04/16/21 0911 " "      Calculation Measurements    Weight Used For Calculations  58.1 kg (128 lb 1.4 oz)  --    Height  --  157.5 cm (62.01\")       Estimated/Assessed Needs    Additional Documentation  Protein Requirements (Group);Fluid Requirements (Group);KCAL/KG (Group)  --       KCAL/KG    KCAL/KG  35 Kcal/Kg (kcal)  --    35 Kcal/Kg (kcal)  2033.5  --       Protein Requirements    Weight Used For Protein Calculations  58.1 kg (128 lb 1.4 oz)  --    Est Protein Requirement Amount (gms/kg)  1.5 gm protein  --    Estimated Protein Requirements (gms/day)  87.15  --       Fluid Requirements    Fluid Requirements (mL/day)  2033  --    RDA Method (mL)  2033  --        Nutrition Prescription Ordered     Row Name 04/16/21 0913          Nutrition Prescription PO    Current PO Diet  Regular     Common Modifiers  Renal;Cardiac;Consistent Carbohydrate                 Problem/Interventions:  Problem 1     Row Name 04/16/21 0913          Nutrition Diagnoses Problem 1    Problem 1  Predicted Suboptimal Intake     Etiology (related to)  Medical Diagnosis         Problem 2     Row Name 04/16/21 0914          Nutrition Diagnoses Problem 2    Problem 2  Increased Nutrient Needs     Etiology (related to)  Medical Diagnosis     Skin  Pressure injury             Intervention Goal     Row Name 04/16/21 0914          Intervention Goal    General  Maintain nutrition     PO  PO intake (%)     PO Intake %  80 %     Weight  Maintain weight         Nutrition Intervention     Row Name 04/16/21 0914          Nutrition Intervention    RD/Tech Action  Follow Tx progress;Care plan reviewd;Encourage intake         Nutrition Prescription     Row Name 04/16/21 0914          Nutrition Prescription PO    PO Prescription  Begin/change supplement     Supplement  Nova Renal     Supplement Frequency  3 times a day         Education/Evaluation     Row Name 04/16/21 0914          Education    Education  Education not appropriate at this time     Please explain  Patient " confusion        Monitor/Evaluation    Monitor  Per protocol           Electronically signed by:  Dalia Hernadez RD  04/16/21 09:15 EDT

## 2021-04-16 NOTE — NURSING NOTE
CWOCN- reviewed chart and photos. Patient is in dialysis. RN has ordered patient a RIKY mattress prior.   Patient had fallen earlier this month. She has a history of falls. She had deep bruising prior and now bilateral hips both have large areas of black eschar. The right hip also has purple/maroon tissue noted. Periwound areas are red. Patient also has redness and partial thickness skin loss to the gluteal cleft and buttocks. Patient is considering palliative care, per chart.  Recommend to apply barrier ointment to the coccyx/buttocks. Recommend to pain the hip wounds with betadine to keep the eschar intact and dry. Moisture may open these to large wounds putting patient at risk for infection.

## 2021-04-16 NOTE — SIGNIFICANT NOTE
04/16/21 1207   OTHER   Discipline speech language pathologist   Rehab Time/Intention   Session Not Performed other (see comments)  (Orders received for Swallow eval due to pt refusing to eat. Goals of care are being discussed. Pt in dialysis at this time. ST to f/u for eval.)

## 2021-04-17 NOTE — PROGRESS NOTES
Name: Veronica Henao ADMIT: 4/15/2021   : 1935  PCP: Nelly Price MD    MRN: [id number] LOS: 2 days   AGE/SEX: 86 y.o. female  ROOM: [facility]     Subjective   Subjective   Family is in the room beside the patient.  Family reports patient is very comfortable and not in pain.         Objective   Objective   Vital Signs  Temp:  [97.1 °F (36.2 °C)-97.6 °F (36.4 °C)] 97.6 °F (36.4 °C)  Heart Rate:  [71-72] 71  Resp:  [12] 12  BP: (98)/(54) 98/54  SpO2:  [88 %] 88 %  on   ;   Device (Oxygen Therapy): room air  No intake or output data in the 24 hours ending 21  Body mass index is 23.41 kg/m².      04/15/21  0925 04/15/21  0945 21  0911   Weight: 60.3 kg (133 lb) 58.1 kg (128 lb) 58.1 kg (128 lb) (not weighed by RD)     Physical Exam    General.  Elderly female.  Unresponsive.  Nonarousable.  Positive agonal breathing.    Eyes.  Sluggish pupils bilaterally.  Oral cavity.  Dry mucous membrane.  Neck.  No JVD..  Stiff.  No lymphadenopathy or thyromegaly.  Cardiovascular.  Regular rate and rhythm.  Grade 2 systolic murmur.  Chest.  Poor to auscultation bilaterally with bilateral crackles and rhonchi..  Given.  Soft lax no tenderness no organomegaly no guarding or rebound.  Extremities.  Bilateral cyanosis of distal phalanxes of both hands that appears to be better than yesterday..  AV shunt in the left upper extremity with left upper extremity edema.  +1 bilateral lower extremity edema.  CNS.  No lateralization.    Results Review:      Results from last 7 days   Lab Units 04/15/21  1350   SODIUM mmol/L 135*   POTASSIUM mmol/L 4.8   CHLORIDE mmol/L 94*   CO2 mmol/L 23.3   BUN mg/dL 65*   CREATININE mg/dL 5.31*   GLUCOSE mg/dL 170*   CALCIUM mg/dL 9.1   AST (SGOT) U/L 7   ALT (SGPT) U/L 9     Estimated Creatinine Clearance: 7 mL/min (A) (by C-G formula based on SCr of 5.31 mg/dL (H)).      Results from last 7 days   Lab Units 21  1658 21  0611 04/15/21  2226 04/15/21  2140 04/15/21  2117  04/15/21  2057 04/15/21  2009 04/15/21  1802   GLUCOSE mg/dL 228* 206* 87 76 40* 54* 67* 234*     Results from last 7 days   Lab Units 04/15/21  1350   TROPONIN T ng/mL 0.040*     Results from last 7 days   Lab Units 04/15/21  1350   PROBNP pg/mL 21,502.0*               Invalid input(s):  PHOS        Invalid input(s): LDLCALC  Results from last 7 days   Lab Units 04/15/21  1350   WBC 10*3/mm3 25.26*   HEMOGLOBIN g/dL 12.7   HEMATOCRIT % 38.2   PLATELETS 10*3/mm3 296   MCV fL 100.8*   MCH pg 33.5*   MCHC g/dL 33.2   RDW % 13.3   RDW-SD fl 49.5   MPV fL 9.3   NEUTROPHIL % % 92.2*   LYMPHOCYTE % % 3.0*   MONOCYTES % % 3.1*   EOSINOPHIL % % 0.1*   BASOPHIL % % 0.4   IMM GRAN % % 1.2*   NEUTROS ABS 10*3/mm3 23.28*   LYMPHS ABS 10*3/mm3 0.76   MONOS ABS 10*3/mm3 0.78   EOS ABS 10*3/mm3 0.03   BASOS ABS 10*3/mm3 0.10   IMMATURE GRANS (ABS) 10*3/mm3 0.31*   NRBC /100 WBC 0.0             Results from last 7 days   Lab Units 04/15/21  1350   LACTATE mmol/L 1.0             Results from last 7 days   Lab Units 04/15/21  1657 04/15/21  0956   BLOODCX  No growth at 2 days No growth at 2 days                   Imaging:  Imaging Results (Last 24 Hours)     ** No results found for the last 24 hours. **             I reviewed the patient's new clinical results / labs / tests / procedures      Assessment/Plan     Active Hospital Problems    Diagnosis  POA   • **Cellulitis of left upper extremity [L03.114]  Yes   • Ischemia of upper extremity [I99.8]  Yes   • Hypoglycemia [E16.2]  Yes   • Sepsis (CMS/HCC) [A41.9]  Yes   • DNR (do not resuscitate) [Z66]  Yes   • Macular degeneration [H35.30]  Yes   • Chronic atrial fibrillation (CMS/HCC) [I48.20]  Yes   • Control of atrial fibrillation with pacemaker (CMS/HCC) [I48.91, Z95.0]  Yes   • ESRD on hemodialysis (CMS/[facility]) [N18.6, Z99.2]  Not Applicable   • Anemia in chronic kidney disease [N18.9, D63.1]  Yes   • JOSELUIS on CPAP [G47.33, Z99.89]  Not Applicable   • Type 2 diabetes mellitus  (CMS/[facility]) [E11.9]  Yes      Resolved Hospital Problems   No resolved problems to display.           · Cellulitis of the left upper extremity/bilateral upper extremity digital ischemia left more than the right/sepsis.  Vascular surgery does not believe this is a macrovascular disease and that nothing much can be offered in terms of surgical intervention.  Was on IV vancomycin and this is stopped since the palliative care transfer.    · End-stage renal disease.  Has refused dialysis before.   unfortunately she did not tolerate hemodialysis as she went hypotensive and dialysis has been stopped.  Nephrology is following.  Patient is volume overloaded  · History of hypertension/atrial fibrillation.  Hypotensive now secondary to sepsis.  No oral hyper tensive agents.  · Type 2 diabetes.  Positive hypoglycemia.   · Prognosis/DNR status.  Patient has a DNR status and this was confirmed.  She has a very poor prognosis.  Patient now is in palliative care after discussion with the family..  Patient appears comfortable.     I discussed the case with her surrounding family I also explained the poor prognosis and above diagnoses.  I anticipate death within 24 hours.  All family members are agreeable for comfort measures and they are very comfortable with this decision    China Gill MD  [facility]  04/17/21  18:14 EDT

## 2021-04-17 NOTE — PLAN OF CARE
Goal Outcome Evaluation:  Plan of Care Reviewed With: patient  Progress: no change  Outcome Summary: RR 12 & unable to obtain O2 with 0430 VS, Morphine x3 for c/o hip pain, Ativan x1 @ 2235 for increased anxiety/restlessness, mepilex to multiple wound sites, barrier ointment to coccyx

## 2021-04-17 NOTE — CONSULTS
I responded to the patients room from a consult entered in Epic. After a brief conversation with the daughter who was present in the room, I offered prayer. I informed daughter of my on-call availability until 4/17/2021 0800, when another on-call  will be available.

## 2021-04-17 NOTE — PROGRESS NOTES
"                                                                                        [facility] Follow up Note        PATIENT IDENTIFICATION     Name: Veronica Henao  Age: 86 y.o.  Sex: female  :  1935  MRN: [id number]       CHIEF COMPLIANTS / REASON FOR FOLLOW UP                Subjective:          Appears ill looking no distress   Family has decided comfort measures          Review of Systems:       Poor historian, dementia, chronically ill looking.  Constitutional: No fever, no chills, no lethargy, no weakness.  HEENT:  No headache, otalgia, itchy eyes, nasal discharge or sore throat.  Cardiac:  No chest pain, dyspnea, orthopnea or PND.  Chest:  No cough, phlegm or wheezing.  Abdomen:  No abdominal pain, nausea or vomiting.  Neuro:  No focal weakness, abnormal movements or seizure-like activity.  :   No hematuria, no pyuria, no dysuria, no flank pain.  Extremities:  No  joint pains.  ROS was otherwise negative except as mentioned in the [place].        OBJECTIVE                                                                        Exam:  BP 98/54 (BP Location: Right arm, Patient Position: Lying)   Pulse 72   Temp 97.1 °F (36.2 °C) (Oral)   Resp 12   Ht 157.5 cm (62.01\")   Wt 58.1 kg (128 lb) Comment: not weighed by RD  SpO2 100%   BMI 23.41 kg/m²   Intake/Output last 3 shifts:  I/O last 3 completed shifts:  In: 2350 [I.V.:2000; IV Piggyback:350]  Out: -   Intake/Output this shift:  No intake/output data recorded.    General Appearance: Appears chronically ill looking  Head:  Normocephalic, without obvious abnormality, atraumatic  Eyes:  Sclerae anicteric, EOM's intact     Neck:  Supple,  no adenopathy;      Lungs: Mild crackles   heart: 2 x 6 murmur  Abdomen:  Soft, nontender,    no masses, no hepatomegaly, no splenomegaly  Extremities: Left arm cellulitis, left arm AV graft, lower extremity cellulitis, wounds.  neurologic:   Patient lethargic, poor historian, overall outlook " extremely poor.    Scheduled Meds:ofloxacin, 2 drop, Left Eye, 4x Daily      Continuous Infusions:   PRN Meds:•  acetaminophen **OR** acetaminophen **OR** acetaminophen  •  acetaminophen  •  diphenhydrAMINE **OR** diphenhydrAMINE  •  Glycerin-Hypromellose-  •  glycopyrrolate **OR** glycopyrrolate **OR** glycopyrrolate **OR** glycopyrrolate  •  haloperidol **OR** haloperidol **OR** haloperidol lactate  •  HYDROmorphone **OR** Morphine **OR** morphine  •  HYDROmorphone **OR** Morphine **OR** morphine  •  Lidocaine Viscous HCl  •  LORazepam **OR** LORazepam **OR** LORazepam  •  LORazepam **OR** LORazepam **OR** LORazepam  •  LORazepam **OR** LORazepam **OR** LORazepam  •  ondansetron  •  [COMPLETED] Insert peripheral IV **AND** sodium chloride  •  sodium chloride  •  sodium chloride         Data Review:                                                                           BMP:   Results from last 7 days   Lab Units 04/15/21  1350   GLUCOSE mg/dL 170*   CO2 mmol/L 23.3   BUN mg/dL 65*   CREATININE mg/dL 5.31*   CALCIUM mg/dL 9.1     Coagulation: No results found for: INR, APTT  Cardiac markers:   Results from last 7 days   Lab Units 04/15/21  1350   TROPONIN T ng/mL 0.040*     ABGs:       Invalid input(s): PO2       Imaging:                                                                              Chest X-Ray is obtained; result reviewed.           ASSESSMENT:                                                                                Cellulitis of left upper extremity    Type 2 diabetes mellitus (CMS/HCC)    JOSELUIS on CPAP    Anemia in chronic kidney disease    ESRD on hemodialysis (CMS/HCC)    Control of atrial fibrillation with pacemaker (CMS/HCC)    Chronic atrial fibrillation (CMS/HCC)    Macular degeneration    DNR (do not resuscitate)    Ischemia of upper extremity    Hypoglycemia    Sepsis (CMS/HCC)      · ESRD, HD. 5 days a week, [facility] stage [facility] through Friday, however patient is  noncompliant with treatment, averages around 3 treatments/week  · LUE cellulits, s/p 1 dose Vanc 4/14 at NH, poor thrill/bruit  · DM II  · Hypertension  · ACD  · JOSELUIS, CPAP  · S/p TVR, MVR, maze procedure .         Plan:      · Daughter Nesha Young who is POA has made decision to stop dialysis.   · I agree with the plan  · Overall poor prognosis   · Overall bluish discoloration of the hands secondary to hypotension and poor perfusion.    · Spoke to vascular surgery Dr. Lacho Recio  · His opinion was duplex do not show any arterial compromise.  · Overall probably poor hemodynamics and poor perfusion.  · I had extensive discussion with her daughter Nesha Young on the phone and she is POA, she spoke to her brother and called me back and they would like to proceed with comfort measures only.  · I spoke to Dr. Huizar.  · Patient will transition to comfort measures only will not perform hemodialysis as per recommendation of her daughter and I am in agreement.          Ronald Calvin MD  [facility]  4/17/2021  15:11 EDT

## 2021-04-17 NOTE — PLAN OF CARE
Problem: Adult Inpatient Plan of Care  Goal: Plan of Care Review  Outcome: Ongoing, Progressing  Flowsheets (Taken 4/17/2021 1937)  Progress: declining  Plan of Care Reviewed With: family  Outcome Summary: Patient medicated prior to turns for symptom management. She appears comfortable at rest. Family at bedside supportive of care and were updated on patient's status and changes in vital signs. Will continue to monitor per palliative goals of care.

## 2021-04-18 ENCOUNTER — READMISSION MANAGEMENT (OUTPATIENT)
Dept: CALL CENTER | Facility: HOSPITAL | Age: 86
End: 2021-04-18

## 2021-04-18 NOTE — OUTREACH NOTE
Prep Survey      Responses   [facility] facility patient discharged from?  [facility]   Is LACE score < 7 ?  No   Emergency Room discharge w/ pulse ox?  No   Eligibility  Not Eligible   What are the reasons patient is not eligible?  [facility]   Does the patient have one of the following disease processes/diagnoses(primary or secondary)?  Other   Prep survey completed?  Yes          Wilma Zhao RN

## 2021-04-19 NOTE — CASE MANAGEMENT/SOCIAL WORK
Discharge Planning Assessment  [facility]     Patient Name: Veronica Henao  MRN: [id number]  Today's Date: 2021    Admit Date: 4/15/2021    Discharge Needs Assessment    No documentation.       Discharge Plan     Row Name 21 1433       Plan    Plan Comments  The patient transferred to [facility] from [address] [place] [address] on 21. The patient was palliative. BLAKE Berg, RN, CDP, CCP    Final Discharge Disposition Code  20 -     Final Note  The patient  on 21 @ 21:04. BLAKE Berg, RN, CDP, CCP        Continued Care and Services - Discharged on 2021 Admission date: 4/15/2021 - Discharge disposition: Home or Self Care    Destination     Service Provider Request Status Selected Services Address Phone Fax Patient Preferred    [facility]  Accepted N/A [address] [address] [phone] --            Selected Continued Care - Prior Encounters Includes selections from prior encounters from 1/15/2021 to 2021    Discharged on 2021 Admission date: 2021 - Discharge disposition: Skilled Nursing Facility (DC - External)    Destination     Service Provider Selected Services Address Phone Fax Patient Preferred    [facility]  Skilled Nursing [address] [address] [phone] --                    Expected Discharge Date and Time     Expected Discharge Date Expected Discharge Time    2021         Demographic Summary    No documentation.       Functional Status    No documentation.       Psychosocial    No documentation.       Abuse/Neglect    No documentation.       Legal    No documentation.       Substance Abuse    No documentation.       Patient Forms    No documentation.           Kalyn Robertson, CORRY

## 2021-04-19 NOTE — CASE MANAGEMENT/SOCIAL WORK
Case Management Discharge Note      Final Note: The patient  on 21 @ 21:04. B. BLAKE Robertson, RN, CDP, CCP    Provided Post Acute Provider List?: N/A  N/A Provider List Comment: The patient's daughter was not provided with a HH/SNF list nor a print out of the HH/nursing home compare list as the patient's daughter wants the patient to be made palliative.  Provided Post Acute Provider Quality & Resource List?: N/A  N/A Quality & Resource List Comment: The patient's daughter was not provided with a HH/SNF list nor a print out of the HH/nursing home compare list as the patient's daughter wants the patient to be made palliative.    Selected Continued Care - Discharged on 2021 Admission date: 4/15/2021 - Discharge disposition: Home or Self Care    Destination    No services have been selected for the patient.              Durable Medical Equipment    No services have been selected for the patient.              Dialysis/Infusion    No services have been selected for the patient.              Home Medical Care    No services have been selected for the patient.              Therapy    No services have been selected for the patient.              Community Resources    No services have been selected for the patient.                Selected Continued Care - Prior Encounters Includes selections from prior encounters from 1/15/2021 to 2021    Discharged on 2021 Admission date: 2021 - Discharge disposition: Skilled Nursing Facility (DC - External)    Destination     Service Provider Selected Services Address Phone Fax Patient Preferred    [facility]  Skilled Nursing [address] [place] [id number] [phone] --                         Final Discharge Disposition Code: 20 -

## 2021-04-20 LAB
BACTERIA SPEC AEROBE CULT: NORMAL
BACTERIA SPEC AEROBE CULT: NORMAL

## 2021-05-21 NOTE — THERAPY DISCHARGE NOTE
Outpatient Physical Therapy Vestibular Discharge Summary       Patient Name: Veronica Henao  : 1935  MRN: [id number]  Today's Date: 2020      Visit Date: 2020    Visit Dx:     ICD-10-CM ICD-9-CM   1. Dizziness R42 780.4   2. BPPV (benign paroxysmal positional vertigo), unspecified laterality H81.10 386.11   3. Vestibular hypofunction, unspecified laterality H83.2X9 386.50       Patient Active Problem List   Diagnosis   • Atopic rhinitis   • Gout   • Cephalalgia   • Hypercalcemia   • Hyperlipidemia   • Type 2 diabetes mellitus with diabetic chronic kidney disease (CMS/[facility])   • Lung mass   • Essential hypertension   • Gastroesophageal reflux disease   • Tricuspid insufficiency   • Morbid obesity (CMS/[facility])   • JOSELUIS on CPAP   • Chronic venous insufficiency   • Mitral valve insufficiency   • S/P Maze operation for atrial fibrillation   • S/P TVR (tricuspid valve repair)   • S/P MVR (mitral valve repair)   • Acute non-recurrent maxillary sinusitis   • Atrial flutter, paroxysmal (CMS/[facility])   • Third degree atrioventricular block (CMS/[facility])   • Chronic kidney disease, stage IV (severe) (CMS/[facility])   • Anemia in chronic kidney disease   • Iron deficiency anemia   • Pulmonary hypertension (CMS/[facility])   • Acute on chronic diastolic congestive heart failure (CMS/[facility])   • ESRD on hemodialysis (CMS/[facility])   • Control of atrial fibrillation with pacemaker (CMS/[facility])           OP PT Discharge Summary  Date of Discharge: 20  Reason for Discharge: Patient/Caregiver request  Outcomes Achieved: Discharge from facility occurred on same date as evluation  Discharge Destination: Home without follow-up  Discharge Instructions/Additional Comments: Patient attended vestibular PT evaluation on 10/17/18 and did not return. This encounter is now discharged.        Milton Lopez, PT  2020        Protopic Pregnancy And Lactation Text: This medication is Pregnancy Category C. It is unknown if this medication is excreted in breast milk when applied topically.

## 2021-11-25 NOTE — ANESTHESIA PREPROCEDURE EVALUATION
Anesthesia Evaluation        Airway   Mallampati: II  no difficulty expected  Dental    (+) upper dentures        Pulmonary    (+) a smoker Former, COPD, sleep apnea on CPAP,   Cardiovascular     ECG reviewed  Rhythm: irregular  Rate: normal    (+) valvular problems/murmurs, dysrhythmias Atrial Flutter,       Neuro/Psych  (+) psychiatric history Depression,    GI/Hepatic/Renal/Endo    (+)  chronic renal disease CRI, diabetes mellitus type 2,     Musculoskeletal     Abdominal    Substance History      OB/GYN          Other                                  Anesthesia Plan    ASA 4     general     intravenous induction   Anesthetic plan and risks discussed with patient.      
full weight-bearing

## 2022-07-13 NOTE — PROGRESS NOTES
Chief Complaint   Patient presents with   • Hyperlipidemia   • Diabetes   • Heartburn   • Hypertension       History of Present Illness   Veronica Henao is a 84 y.o. female presents for review of chronic issues and to discuss new concerns. She has been experiencing increasing heartburn over the last 2 months. Most notable at night and can even regurgitate at times. Started pantoprazole last week and this has given improvement. She is also taking zantac daily. She has renal insufficiency, htn, hyperlip, and dm. Reports that her glucose is at goal level. bp does remain elevated. She is taking furosemide and hydralazine as rx.       The following portions of the patient's history were reviewed and updated as appropriate: allergies, current medications, past family history, past medical history, past social history, past surgical history and problem list.  Current Outpatient Medications on File Prior to Visit   Medication Sig Dispense Refill   • acetaminophen (TYLENOL) 325 MG tablet Take 2 tablets by mouth Every 6 (Six) Hours As Needed for Mild Pain (1-3).  0   • Alcohol Swabs (B-D SINGLE USE SWABS REGULAR) pads Inject 1 each under the skin Daily. 100 each 11   • Blood Glucose Monitoring Suppl (TRUE METRIX AIR GLUCOSE METER) device 1 each Daily. 1 each 0   • cholecalciferol (VITAMIN D3) 1000 UNITS tablet Take 1,000 Units by mouth daily.     • furosemide (LASIX) 40 MG tablet Take 1 tablet by mouth 3 (Three) Times a Day. 270 tablet 2   • hydrALAZINE (APRESOLINE) 100 MG tablet Take 1 tablet by mouth 3 (Three) Times a Day. 270 tablet 1   • hydrOXYzine (ATARAX) 25 MG tablet TAKE 1 TABLET AT NIGHT AS NEEDED FOR ITCHING 90 tablet 1   • Incontinence Supply Disposable (BLADDER CONTROL PADS EX ABSORB) misc      • Lancets 28G misc To check sugar qd. E11.9  each 12   • nystatin-triamcinolone (MYCOLOG) 222104-8.1 UNIT/GM-% ointment APPLY TOPICALLY 2 TIMES DAILY 30 g 3   • pramipexole (MIRAPEX) 0.25 MG tablet TAKE 1 TABLET BY  MOUTH THREE TIMES DAILY 270 tablet 1   • Semaglutide (OZEMPIC) 0.25 or 0.5 MG/DOSE solution pen-injector Inject 0.5 mg under the skin into the appropriate area as directed 1 (One) Time Per Week. 3 pen 4   • sertraline (ZOLOFT) 50 MG tablet Take 1 tablet by mouth Daily. 90 tablet 1   • TRUE METRIX BLOOD GLUCOSE TEST test strip CHECK BLOOD SUGAR TWICE DAILY 100 each 3   • TRUEPLUS LANCETS 28G misc E11.9 DM to check sugar  each 12   • vitamin B-12 (CYANOCOBALAMIN) 1000 MCG tablet Take 1 tablet by mouth Daily. 90 tablet 3   • Vitamin E 400 UNITS chewable tablet Chew 400 Units Daily. Two daily     • Vitamins A & D (VITAMIN A & D) 86620-021 UNITS tablet      • [DISCONTINUED] omeprazole (priLOSEC) 40 MG capsule Take 1 capsule by mouth Daily. 90 capsule 1   • [DISCONTINUED] raNITIdine (ZANTAC) 150 MG tablet TAKE 1 TABLET TWICE DAILY 180 tablet 1   • ciprofloxacin (CIPRO) 250 MG tablet Take 1 tablet by mouth Daily. 7 tablet 0   • [DISCONTINUED] rivaroxaban (XARELTO) 15 MG tablet Take 1 tablet by mouth Daily With Dinner. 56 tablet 0     No current facility-administered medications on file prior to visit.      Review of Systems   HENT: Negative.    Eyes: Negative.    Respiratory: Negative.    Cardiovascular: Negative.    Gastrointestinal: Positive for nausea and vomiting.   Endocrine: Negative.    Genitourinary: Negative.    Musculoskeletal: Positive for arthralgias.   Skin: Negative.    Allergic/Immunologic: Negative.    Neurological: Negative.    Hematological: Negative.    Psychiatric/Behavioral: Negative.        Objective   Physical Exam   Constitutional: She is oriented to person, place, and time. She appears well-developed and well-nourished.   HENT:   Head: Normocephalic and atraumatic.   Right Ear: External ear normal.   Left Ear: External ear normal.   Nose: Nose normal.   Mouth/Throat: Oropharynx is clear and moist.   Eyes: Pupils are equal, round, and reactive to light. Conjunctivae and EOM are normal.   Neck:  "Normal range of motion. Neck supple.   Cardiovascular: Normal rate, regular rhythm and normal heart sounds.   Pulmonary/Chest: Effort normal and breath sounds normal.   Abdominal: Soft. Bowel sounds are normal.   Musculoskeletal: Normal range of motion.   Neurological: She is alert and oriented to person, place, and time.   Skin: Skin is warm and dry.   Psychiatric: She has a normal mood and affect. Her behavior is normal. Judgment and thought content normal.   Nursing note and vitals reviewed.       /62   Pulse 84   Ht 157.5 cm (62\")   Wt 69.9 kg (154 lb)   SpO2 99%   BMI 28.17 kg/m²     Assessment/Plan   Diagnoses and all orders for this visit:    Chronic diastolic congestive heart failure (CMS/HCC)    Essential hypertension    Gastroesophageal reflux disease without esophagitis    Type 2 diabetes mellitus with chronic kidney disease, with long-term current use of insulin, unspecified CKD stage (CMS/HCC)    Chronic kidney disease, stage IV (severe) (CMS/HCC)    Anemia in stage 4 chronic kidney disease (CMS/HCC)    Other orders  -     pantoprazole (PROTONIX) 40 MG EC tablet; Take 1 tablet by mouth Daily.      Patient w/ chf and hypertension. Will continue lasix and hydralazine and will reduce sodium and monitor bp. She has huy. Started pantoprazole w/ benefit and will continue this. She has ckd. May will need to return to h2 blocker in the future due to potential for nephrotoxicity. She will call in one month to discuss. Will test blood counts today. Will monitor blood glucose and will test a1c routinely. F/u w/ nephrologist routinely. F/u here in 4 months or prn.            " Detail Level: Detailed

## 2023-01-11 NOTE — PROGRESS NOTES
Continued Stay Note  [facility]     Patient Name: Veronica Henao  MRN: [id number]  Today's Date: 4/6/2021    Admit Date: 4/4/2021    Discharge Plan     Row Name 04/06/21 1022       Plan    Plan Comments  Spoke with daughter regarding DC plans. Per daughter Nesha patient has had multiple falls recently and may need LTC. Per Lucrecia [facility] does not have a Medicaid pending bed bc patient does not have the funds to private pay. Discussed [facility] and [facility] with daughter and she was agreeable. Also discussed the possibility of a facility that does not offer in house HD and explained family would need to provide transportation to and from HD until patient is established with Medicaid when she would qualify for [facility] transport. Daughter stated they can provide transportation if needed. Spoke with Liz and she will send referral to their dialysis company for review. Liz aware LTC may be needed. CCP will follow. Jelena Dougherty RN    Row Name 04/06/21 0842       Plan    Plan Comments  Inbound VM from Lucrecia/Regine. Returned call and no answer. Message sent to Lucrecia. CCP will follow up. Jelena Dougherty RN        Discharge Codes    No documentation.             Jelena Dougherty RN     Wound Care: [place]

## 2023-04-12 NOTE — PROGRESS NOTES
PATIENTINFORMATION    Date of Office Visit: 2018  Encounter Provider: Maria Luz Husain MD  Place of Service: [facility] CARDIOLOGY  Patient Name: Veronica Henao  : 1935    Subjective:     Encounter Date:2018      Patient ID: Veronica Henao is a 83 y.o. female.      History of Present Illness    This is a lady that I have been following for many years.  In , she went to the emergency room and was diagnosed with atrial fibrillation with rapid ventricular response.  She had normal left ventricular function and no significant valvular heart disease.  She transferred her care to me in 2014.  She had a recurrence of atrial fibrillation in 2015 associated with diastolic heart failure.  In 2016, she was complaining of chest pain and shortness of breath.  A repeat echocardiogram at that time showed her ejection fraction to be 51% with elevated left atrial pressure, severe left atrial enlargement, moderate to severe mitral regurgitation, and moderate to severe tricuspid regurgitation.  Nuclear stress test from 2016 showed no evidence for ischemia.  Transesophageal echocardiogram in 10/2016 showed normal left ventricular systolic function, severe left atrial enlargement, and mild to moderate mitral regurgitation with moderate to severe tricuspid regurgitation.  She had a cardiac catheterization in 2016, which showed no significant coronary disease.  In 2016, she had a mitral valve repair with a 23 mm ATF band posterior annuloplasty and P2 chordal repair.  The tricuspid valve was repaired with a 26 mm ring and plication of the anterior and septal leaflet commissure.  She also underwent a right and left Maze procedure and left atrial appendage ligation.      In 2017, she was found to be in rapid atrial flutter.  I cardioverted her but she went back into atrial flutter the next day.  On 2017, Dr. Ward performed and atrial flutter ablation.  She had an  echocardiogram in 05/2017, which showed her ejection fraction to be 70%, severe left atrial enlargement, mild mitral stenosis from her valve repair but no regurgitation.  There was mild tricuspid regurgitation with moderate pulmonary hypertension.  She went back into atrial flutter in 06/2017 and was cardioverted back to sinus rhythm but again went right back into atrial flutter and Dr. Ward performed an AV node ablation with a pacemaker, which was performed on 07/05/2017.  The pacemaker was placed with a ventricular lead at the His bundle.       She was readmitted on 07/25/2017, with heart failure.  She was diuresed well.  Pacemaker interrogation in 08/2017 showed high thresholds in her His lead.  Dr. Ward made some adjustments and said that she may feel some muscular pacing and has a right ventricular lead for backup.     She was hospitalized in August 2018 with shortness of breath.  She had a normal lower extremity venous Doppler.  She underwent a right heart catheterization which showed a PA pressure of 59/16 with aware of 14 with large V waves.  Her pulmonary pressures did not change with adenosine.  She had a transthoracic echocardiogram which showed her ejection fraction to be 65%, moderate left atrial enlargement, mitral annuloplasty ring with a mildly increased gradient of 7 mmHg with trace regurgitation.  The tricuspid valve also had an annuloplasty ring with mild to moderate tricuspid regurgitation and right ventricular systolic pressure of 63 mmHg.    She has not been able to start lytic are seen.  She says she is still short of breath.  She is lightheaded.  She describes it as just a very light headedness.  She is okay with sitting but when she stands she notices it and it continues the whole time she is up.  She is very cautious of falls.    Review of Systems   Constitution: Positive for malaise/fatigue. Negative for fever, weight gain and weight loss.   HENT: Negative for ear pain, hearing  "loss, nosebleeds and sore throat.    Eyes: Negative for double vision, pain, vision loss in left eye and vision loss in right eye.   Cardiovascular:        See history of present illness.   Respiratory: Positive for shortness of breath. Negative for cough, sleep disturbances due to breathing, snoring and wheezing.    Endocrine: Negative for cold intolerance, heat intolerance and polyuria.   Skin: Negative for itching, poor wound healing and rash.   Musculoskeletal: Negative for joint pain, joint swelling and myalgias.   Gastrointestinal: Negative for abdominal pain, diarrhea, hematochezia, nausea and vomiting.   Genitourinary: Negative for hematuria and hesitancy.   Neurological: Negative for numbness, paresthesias and seizures.   Psychiatric/Behavioral: Negative for depression. The patient is not nervous/anxious.            ECG 12 Lead  Date/Time: 9/27/2018 3:23 PM  Performed by: IRVIN LAN  Authorized by: IRVIN LAN   Comparison: compared with previous ECG from 8/27/2018  Similar to previous ECG  Rhythm: atrial fibrillation  Rhythm comments: paced rhythm  BPM: 74  Clinical impression: abnormal ECG               Objective:     /90 (BP Location: Right arm, Patient Position: Sitting, Cuff Size: Adult)   Pulse 74   Resp 16   Ht 157.5 cm (62\")   Wt 84.4 kg (186 lb)   BMI 34.02 kg/m²  Body mass index is 34.02 kg/m².     Physical Exam   Constitutional: She appears well-developed.   HENT:   Head: Normocephalic and atraumatic.   Eyes: Pupils are equal, round, and reactive to light. Conjunctivae and lids are normal. Lids are everted and swept, no foreign bodies found.   Neck: Normal range of motion. No JVD present. Carotid bruit is not present. No tracheal deviation present. No thyroid mass present.   Cardiovascular: Normal rate, regular rhythm and normal heart sounds.    Pulses:       Dorsalis pedis pulses are 2+ on the right side, and 2+ on the left side.   Pulmonary/Chest: Effort normal and breath " sounds normal.   Abdominal: Normal appearance and bowel sounds are normal.   Musculoskeletal: Normal range of motion.   Neurological: She is alert. She has normal strength.   Skin: Skin is warm, dry and intact.   Psychiatric: She has a normal mood and affect. Her behavior is normal.   Vitals reviewed.          Assessment/Plan:       1.  Atrial fibrillation.  CHADS2-Vasc score of 6.  Continue Xarelto  2.  Diastolic heart failure.  Currently appears to be euvolemic.  Last ejection fraction was normal in August 2018.  She does have some mild aortic stenosis from her repair.  She is compliant with a low-salt diet.  3.  AV node ablation status post pacemaker  4.  Diabetes  5.  Hypertension.  Controlled.  6.  Hyperlipidemia  7.  Obstructive sleep apnea compliant with CPAP  8.  Chronic kidney disease.  She follows with Dr. Honeycutt  9.  History of mitral regurgitation status post mitral valve repair.  She has some residual mitral stenosis but no regurgitation.  10.  History of tricuspid regurgitation status post tricuspid repair  11.  Severe pulmonary hypertension.   right heart catheterization showed normal filling pressures but moderate pulmonary hypertension.  She did not tolerate adenosine.  She did have a large V waves.  This may just be from her history of mitral valve disease but repeat echo did not show severe mitral regurgitation.    - She is supposed to start Letairis and follow-up with Dr. Ferrer in November.  I suggested to follow-up in December to see where things stand.    Orders Placed This Encounter   Procedures   • ECG 12 Lead     This order was created via procedure documentation        Discharge Medications          Accurate as of 9/27/18  3:58 PM. If you have any questions, ask your nurse or doctor.               Continue These Medications      Instructions Start Date   acetaminophen 325 MG tablet  Commonly known as:  TYLENOL   650 mg, Oral, Every 6 Hours PRN      allopurinol 100 MG tablet  Commonly known  as:  ZYLOPRIM   100 mg, Oral, 2 Times Daily      B-D SINGLE USE SWABS REGULAR pads   1 each, Subcutaneous, Daily      Bladder Control Pads Ex Absorb misc   No dose, route, or frequency recorded.      CENTRUM SILVER PO   1 tablet, Oral, Daily      cholecalciferol 1000 units tablet  Commonly known as:  VITAMIN D3   1,000 Units, Oral, Daily      diphenhydrAMINE 2 % cream  Commonly known as:  BENADRYL   Topical, 3 Times Daily PRN      fish oil 1000 MG capsule capsule   Oral, Daily With Breakfast      furosemide 40 MG tablet  Commonly known as:  LASIX   40 mg, Oral, Daily      glimepiride 2 MG tablet  Commonly known as:  AMARYL   4 mg, Oral, 2 Times Daily, DM E11.9      hydrALAZINE 100 MG tablet  Commonly known as:  APRESOLINE   100 mg, Oral, 2 Times Daily      hydrALAZINE 25 MG tablet  Commonly known as:  APRESOLINE   25 mg, Oral, 2 Times Daily      hydrOXYzine 25 MG tablet  Commonly known as:  ATARAX   TAKE 1 TABLET AT BEDTIME      Lancets 28G misc   To check sugar qd. E11.9 DM      TRUEPLUS LANCETS 28G misc   E11.9 DM to check sugar QD      latanoprost 0.005 % ophthalmic solution  Commonly known as:  XALATAN   1 drop, Both Eyes, Nightly      linagliptin 5 MG tablet tablet  Commonly known as:  TRADJENTA   5 mg, Oral, Daily      nystatin-triamcinolone 731100-9.1 UNIT/GM-% ointment  Commonly known as:  MYCOLOG   Topical, 2 Times Daily      raNITIdine 150 MG tablet  Commonly known as:  ZANTAC   150 mg, Oral, 2 Times Daily      rivaroxaban 15 MG tablet  Commonly known as:  XARELTO   15 mg, Oral, Daily With Dinner      Semaglutide 0.25 or 0.5 MG/DOSE solution pen-injector  Commonly known as:  OZEMPIC   0.5 mg, Subcutaneous, Weekly      sertraline 50 MG tablet  Commonly known as:  ZOLOFT   TAKE 1 TABLET EVERY DAY AS DIRECTED      TRUE METRIX AIR GLUCOSE METER device   1 each, Does not apply, Daily      TRUE METRIX BLOOD GLUCOSE TEST test strip  Generic drug:  glucose blood   CHECK BLOOD SUGAR TWICE DAILY      Vitamin A & D  54760-758 units tablet   No dose, route, or frequency recorded.      Vitamin B-12 5000 MCG sublingual tablet   5,000 mcg, Oral, Daily      Vitamin E 400 units chewable tablet   400 Units, Oral, Daily, Two daily         Stop These Medications    insulin degludec 100 UNIT/ML solution pen-injector injection  Commonly known as:  TRESIBA FLEXTOUCH  Stopped by:  Maria Luz Husain MD     levocetirizine 5 MG tablet  Commonly known as:  XYZAL  Stopped by:  MD Maria Luz To MD  09/27/18  3:58 PM   Never

## (undated) DEVICE — Device: Brand: DEFENDO AIR/WATER/SUCTION AND BIOPSY VALVE

## (undated) DEVICE — ISOLATION BAG: Brand: CONVERTORS

## (undated) DEVICE — TUBING, SUCTION, 1/4" X 10', STRAIGHT: Brand: MEDLINE

## (undated) DEVICE — SUT SILK 2/0 TIES 18IN A185H

## (undated) DEVICE — SNAR POLYP SENSATION STDOVL 27 240 BX40

## (undated) DEVICE — Device

## (undated) DEVICE — NDL HYPO PRECISIONGLIDE REG 25G 1 1/2

## (undated) DEVICE — SKIN AFFIX SURG ADHESIVE 72/CS 0.55ML: Brand: MEDLINE

## (undated) DEVICE — ST. SORBAVIEW ULTIMATE IJ SYSTEM A,C: Brand: CENTURION

## (undated) DEVICE — LOU MINOR PROCEDURE: Brand: MEDLINE INDUSTRIES, INC.

## (undated) DEVICE — SYS TRNSEP ACC BRK ACROSS A/ 71CM

## (undated) DEVICE — GLIDESHEATH BASIC HYDROPHILIC COATED INTRODUCER SHEATH: Brand: GLIDESHEATH

## (undated) DEVICE — SUT SILK 3/0 TIES 18IN A184H

## (undated) DEVICE — ANTIBACTERIAL UNDYED BRAIDED (POLYGLACTIN 910), SYNTHETIC ABSORBABLE SUTURE: Brand: COATED VICRYL

## (undated) DEVICE — SUT PROLN 6/0 BV1 D/A 30IN 8709H

## (undated) DEVICE — LIMB HOLDER, WRIST/ANKLE: Brand: DEROYAL

## (undated) DEVICE — Device: Brand: EZ STEER NAV DS

## (undated) DEVICE — 8 FOOT DISPOSABLE EXTENSION CABLE WITH SAFE CONNECT / ALLIGATOR CLIP

## (undated) DEVICE — BALN PRESS WEDGE 5F 110CM

## (undated) DEVICE — SOL NS 500ML

## (undated) DEVICE — GOWN,NON-REINFORCED,SIRUS,SET IN SLV,XXL: Brand: MEDLINE

## (undated) DEVICE — LOU PACE DEFIB: Brand: MEDLINE INDUSTRIES, INC.

## (undated) DEVICE — Device: Brand: SMARTABLATE

## (undated) DEVICE — BITEBLOCK OMNI BLOC

## (undated) DEVICE — BIOPATCH™ ANTIMICROBIAL DRESSING WITH CHLORHEXIDINE GLUCONATE IS A HYDROPHILLIC POLYURETHANE ABSORPTIVE FOAM WITH CHLORHEXIDINE GLUCONATE (CHG) WHICH INHIBITS BACTERIAL GROWTH UNDER THE DRESSING. THE DRESSING IS INTENDED TO BE USED TO ABSORB EXUDATE, COVER A WOUND CAUSED BY VASCULAR AND NONVASCULAR PERCUTANEOUS MEDICAL DEVICES DURING SURGERY, AS WELL AS REDUCE LOCAL INFECTION AND COLONIZATION OF MICROORGANISMS.: Brand: BIOPATCH

## (undated) DEVICE — Device: Brand: REFERENCE PATCH CARTO 3

## (undated) DEVICE — ELECTRD ECG CARBON/SNP RL FM A/ 5PK

## (undated) DEVICE — PK CATH CARD 40

## (undated) DEVICE — GLV SURG BIOGEL LTX PF 7 1/2

## (undated) DEVICE — Device: Brand: WEBSTER CS

## (undated) DEVICE — SHEATH INTRO FASTCATH 0.038GW 8F 12CM

## (undated) DEVICE — SUT SILK 4/0 TIES 18IN A183H

## (undated) DEVICE — PREF.GUIDING SHEATH W/MULT.CRV: Brand: PREFACE

## (undated) DEVICE — AIRLIFE™ NASAL OXYGEN CANNULA CURVED, NONFLARED TIP WITH 21 FOOT (6.4 M) CRUSH-RESISTANT TUBING, OVER-THE-EAR STYLE: Brand: AIRLIFE™

## (undated) DEVICE — 3M™ BAIR HUGGER® UNDERBODY BLANKET, ADULT, 10 PER CASE 54500: Brand: BAIR HUGGER™

## (undated) DEVICE — Device: Brand: EZ STEER NAV

## (undated) DEVICE — CATH GUIDE RIGHTSITE C315HIS-02

## (undated) DEVICE — CVR PROB 96IN LF STRL

## (undated) DEVICE — TBG 02 CRUSH RESIST LF CLR 7FT

## (undated) DEVICE — SYR LUERLOK 20CC BX/50

## (undated) DEVICE — PAD GRND REM POLYHESIVE A/ DISP

## (undated) DEVICE — ADHS SKIN DERMABOND TOP ADVANCED

## (undated) DEVICE — APPL CHLORAPREP W/TINT 10.5ML PERC STRL

## (undated) DEVICE — BOOT WAFF HEEL CUST

## (undated) DEVICE — THE CARR-LOCKE INJECTION NEEDLE IS A SINGLE USE, DISPOSABLE, FLEXIBLE SHEATH INJECTION NEEDLE USED FOR THE INJECTION OF VARIOUS TYPES OF MEDIA THROUGH FLEXIBLE ENDOSCOPES.

## (undated) DEVICE — KT MANIFLD CARDIAC

## (undated) DEVICE — INTRO TEAR AWAY/LVD W/SD PRT 8F 13CM

## (undated) DEVICE — Device: Brand: LASSO NAV

## (undated) DEVICE — CANNULA,ADULT,SOFT-TOUCH,7'TUBE,UC: Brand: PENDING

## (undated) DEVICE — ANGIO-SEAL VIP VASCULAR CLOSURE DEVICE: Brand: ANGIO-SEAL

## (undated) DEVICE — SUT SILK 3/0 SH CR5 18IN C0135

## (undated) DEVICE — LOU EP: Brand: MEDLINE INDUSTRIES, INC.

## (undated) DEVICE — THE TORRENT IRRIGATION SCOPE CONNECTOR IS USED WITH THE TORRENT IRRIGATION TUBING TO PROVIDE IRRIGATION FLUIDS SUCH AS STERILE WATER DURING GASTROINTESTINAL ENDOSCOPIC PROCEDURES WHEN USED IN CONJUNCTION WITH AN IRRIGATION PUMP (OR ELECTROSURGICAL UNIT).: Brand: TORRENT

## (undated) DEVICE — SOL IRR NACL 0.9PCT BT 1000ML

## (undated) DEVICE — SUT ETHLN 2/0 PS 18IN 585H

## (undated) DEVICE — THE SINGLE USE ETRAP – POLYP TRAP IS USED FOR SUCTION RETRIEVAL OF ENDOSCOPICALLY REMOVED POLYPS.: Brand: ETRAP

## (undated) DEVICE — SYR LUERLOK 5CC

## (undated) DEVICE — SLITTER CATH GUIDE ATTAIN ADJ

## (undated) DEVICE — SYS TRNSEP ACC ACROSS ADLT BRK1 71CM

## (undated) DEVICE — KT CATH TAL PALINDROME SAPPHIRE 14.5F23CM

## (undated) DEVICE — CANN NASL CO2 TRULINK W/O2 A/

## (undated) DEVICE — 3M™ STERI-DRAPE™ INSTRUMENT POUCH 1018: Brand: STERI-DRAPE™

## (undated) DEVICE — DECANT BG O JET

## (undated) DEVICE — PK AV FISTL/SHNT 40

## (undated) DEVICE — Device: Brand: ISMUS

## (undated) DEVICE — STPLR SKIN VISISTAT WD 35CT

## (undated) DEVICE — Device: Brand: THERMOCOOL SF NAV

## (undated) DEVICE — SINGLE-USE BIOPSY FORCEPS: Brand: RADIAL JAW 4